# Patient Record
Sex: FEMALE | Race: WHITE | Employment: UNEMPLOYED | URBAN - NONMETROPOLITAN AREA
[De-identification: names, ages, dates, MRNs, and addresses within clinical notes are randomized per-mention and may not be internally consistent; named-entity substitution may affect disease eponyms.]

---

## 2017-03-20 DIAGNOSIS — M62.838 MUSCLE SPASM: ICD-10-CM

## 2017-03-20 RX ORDER — CYCLOBENZAPRINE HCL 10 MG
TABLET ORAL
Qty: 90 TABLET | Refills: 2 | Status: SHIPPED | OUTPATIENT
Start: 2017-03-20 | End: 2017-07-24 | Stop reason: SDUPTHER

## 2017-04-03 ENCOUNTER — TELEPHONE (OUTPATIENT)
Dept: INTERNAL MEDICINE | Age: 72
End: 2017-04-03

## 2017-04-03 ENCOUNTER — TELEPHONE (OUTPATIENT)
Dept: FAMILY MEDICINE CLINIC | Age: 72
End: 2017-04-03

## 2017-04-17 ENCOUNTER — OFFICE VISIT (OUTPATIENT)
Dept: INTERNAL MEDICINE | Age: 72
End: 2017-04-17

## 2017-04-17 VITALS
BODY MASS INDEX: 39.86 KG/M2 | DIASTOLIC BLOOD PRESSURE: 74 MMHG | HEIGHT: 64 IN | WEIGHT: 233.5 LBS | HEART RATE: 64 BPM | SYSTOLIC BLOOD PRESSURE: 122 MMHG

## 2017-04-17 DIAGNOSIS — H34.231 RETINAL ARTERIAL BRANCH OCCLUSION, RIGHT: ICD-10-CM

## 2017-04-17 DIAGNOSIS — I10 ESSENTIAL HYPERTENSION: Primary | ICD-10-CM

## 2017-04-17 DIAGNOSIS — J42 CHRONIC BRONCHITIS, UNSPECIFIED CHRONIC BRONCHITIS TYPE (HCC): ICD-10-CM

## 2017-04-17 DIAGNOSIS — R01.1 MURMUR: ICD-10-CM

## 2017-04-17 DIAGNOSIS — E78.00 PURE HYPERCHOLESTEROLEMIA: ICD-10-CM

## 2017-04-17 PROCEDURE — 93000 ELECTROCARDIOGRAM COMPLETE: CPT | Performed by: INTERNAL MEDICINE

## 2017-04-17 PROCEDURE — 99213 OFFICE O/P EST LOW 20 MIN: CPT | Performed by: INTERNAL MEDICINE

## 2017-04-17 RX ORDER — CLOPIDOGREL BISULFATE 75 MG/1
75 TABLET ORAL DAILY
Qty: 90 TABLET | Refills: 2 | Status: ON HOLD | OUTPATIENT
Start: 2017-04-17 | End: 2017-08-08

## 2017-04-17 RX ORDER — ATORVASTATIN CALCIUM 20 MG/1
20 TABLET, FILM COATED ORAL DAILY
Qty: 30 TABLET | Refills: 5 | Status: SHIPPED | OUTPATIENT
Start: 2017-04-17 | End: 2017-10-05 | Stop reason: SDUPTHER

## 2017-04-17 RX ORDER — IPRATROPIUM BROMIDE AND ALBUTEROL SULFATE 2.5; .5 MG/3ML; MG/3ML
1 SOLUTION RESPIRATORY (INHALATION) EVERY 4 HOURS
COMMUNITY

## 2017-04-17 RX ORDER — METOPROLOL TARTRATE 50 MG/1
TABLET, FILM COATED ORAL
Qty: 60 TABLET | Refills: 6 | Status: SHIPPED | OUTPATIENT
Start: 2017-04-17 | End: 2017-10-05 | Stop reason: SDUPTHER

## 2017-04-17 RX ORDER — CHLORTHALIDONE 25 MG/1
25 TABLET ORAL DAILY
Qty: 30 TABLET | Refills: 5 | Status: SHIPPED | OUTPATIENT
Start: 2017-04-17 | End: 2017-10-05 | Stop reason: SDUPTHER

## 2017-04-17 RX ORDER — VALSARTAN 160 MG/1
TABLET ORAL
Qty: 30 TABLET | Refills: 5 | Status: SHIPPED | OUTPATIENT
Start: 2017-04-17 | End: 2017-10-05 | Stop reason: SDUPTHER

## 2017-04-17 ASSESSMENT — PATIENT HEALTH QUESTIONNAIRE - PHQ9
SUM OF ALL RESPONSES TO PHQ9 QUESTIONS 1 & 2: 2
SUM OF ALL RESPONSES TO PHQ QUESTIONS 1-9: 2
1. LITTLE INTEREST OR PLEASURE IN DOING THINGS: 1
2. FEELING DOWN, DEPRESSED OR HOPELESS: 1

## 2017-05-11 ENCOUNTER — OFFICE VISIT (OUTPATIENT)
Dept: FAMILY MEDICINE CLINIC | Age: 72
End: 2017-05-11

## 2017-05-11 VITALS
DIASTOLIC BLOOD PRESSURE: 84 MMHG | RESPIRATION RATE: 12 BRPM | BODY MASS INDEX: 40.67 KG/M2 | HEIGHT: 64 IN | TEMPERATURE: 98.3 F | SYSTOLIC BLOOD PRESSURE: 152 MMHG | WEIGHT: 238.2 LBS | HEART RATE: 86 BPM

## 2017-05-11 DIAGNOSIS — G89.29 CHRONIC PAIN OF LEFT ANKLE: ICD-10-CM

## 2017-05-11 DIAGNOSIS — Z80.0 FAMILY HISTORY OF COLON CANCER: ICD-10-CM

## 2017-05-11 DIAGNOSIS — M25.572 CHRONIC PAIN OF LEFT ANKLE: ICD-10-CM

## 2017-05-11 DIAGNOSIS — I10 BENIGN ESSENTIAL HTN: ICD-10-CM

## 2017-05-11 DIAGNOSIS — M25.472 LEFT ANKLE SWELLING: Primary | ICD-10-CM

## 2017-05-11 PROCEDURE — 99214 OFFICE O/P EST MOD 30 MIN: CPT | Performed by: FAMILY MEDICINE

## 2017-05-11 RX ORDER — MELOXICAM 15 MG/1
15 TABLET ORAL DAILY
Qty: 30 TABLET | Refills: 2 | Status: SHIPPED | OUTPATIENT
Start: 2017-05-11 | End: 2017-07-14 | Stop reason: SDUPTHER

## 2017-05-11 RX ORDER — FUROSEMIDE 20 MG/1
20 TABLET ORAL DAILY
Qty: 30 TABLET | Refills: 2 | Status: SHIPPED | OUTPATIENT
Start: 2017-05-11 | End: 2017-07-14 | Stop reason: SDUPTHER

## 2017-05-11 ASSESSMENT — ENCOUNTER SYMPTOMS
COUGH: 0
RHINORRHEA: 0
WHEEZING: 0
VOMITING: 0
CHEST TIGHTNESS: 0
DIARRHEA: 0
BACK PAIN: 0
SORE THROAT: 0
EYE PAIN: 0
SHORTNESS OF BREATH: 0
NAUSEA: 0
CONSTIPATION: 0
BLOOD IN STOOL: 0
ABDOMINAL PAIN: 0

## 2017-06-26 DIAGNOSIS — I10 BENIGN ESSENTIAL HTN: ICD-10-CM

## 2017-06-26 RX ORDER — ISOSORBIDE MONONITRATE 30 MG/1
30 TABLET, EXTENDED RELEASE ORAL DAILY
Qty: 90 TABLET | Refills: 0 | Status: SHIPPED | OUTPATIENT
Start: 2017-06-26 | End: 2017-07-14 | Stop reason: SDUPTHER

## 2017-06-26 RX ORDER — ISOSORBIDE MONONITRATE 30 MG/1
TABLET, EXTENDED RELEASE ORAL
Qty: 90 TABLET | Refills: 0 | OUTPATIENT
Start: 2017-06-26

## 2017-07-14 ENCOUNTER — OFFICE VISIT (OUTPATIENT)
Dept: FAMILY MEDICINE CLINIC | Age: 72
End: 2017-07-14

## 2017-07-14 VITALS
DIASTOLIC BLOOD PRESSURE: 68 MMHG | SYSTOLIC BLOOD PRESSURE: 104 MMHG | WEIGHT: 232 LBS | BODY MASS INDEX: 41.11 KG/M2 | HEIGHT: 63 IN | RESPIRATION RATE: 14 BRPM | HEART RATE: 52 BPM

## 2017-07-14 DIAGNOSIS — I10 BENIGN ESSENTIAL HTN: ICD-10-CM

## 2017-07-14 DIAGNOSIS — Z12.39 SCREENING FOR BREAST CANCER: ICD-10-CM

## 2017-07-14 DIAGNOSIS — Z12.11 SCREEN FOR COLON CANCER: ICD-10-CM

## 2017-07-14 DIAGNOSIS — M25.472 LEFT ANKLE SWELLING: ICD-10-CM

## 2017-07-14 DIAGNOSIS — Z00.00 ANNUAL PHYSICAL EXAM: Primary | ICD-10-CM

## 2017-07-14 DIAGNOSIS — G89.29 CHRONIC PAIN OF LEFT ANKLE: ICD-10-CM

## 2017-07-14 DIAGNOSIS — J44.9 CHRONIC OBSTRUCTIVE PULMONARY DISEASE, UNSPECIFIED COPD TYPE (HCC): ICD-10-CM

## 2017-07-14 DIAGNOSIS — M25.572 CHRONIC PAIN OF LEFT ANKLE: ICD-10-CM

## 2017-07-14 PROCEDURE — 99397 PER PM REEVAL EST PAT 65+ YR: CPT | Performed by: FAMILY MEDICINE

## 2017-07-14 RX ORDER — MELOXICAM 15 MG/1
15 TABLET ORAL DAILY
Qty: 30 TABLET | Refills: 11 | Status: ON HOLD | OUTPATIENT
Start: 2017-07-14 | End: 2017-08-08 | Stop reason: HOSPADM

## 2017-07-14 RX ORDER — ISOSORBIDE MONONITRATE 30 MG/1
30 TABLET, EXTENDED RELEASE ORAL DAILY
Qty: 30 TABLET | Refills: 11 | Status: SHIPPED | OUTPATIENT
Start: 2017-07-14

## 2017-07-14 RX ORDER — ALBUTEROL SULFATE 90 UG/1
2 AEROSOL, METERED RESPIRATORY (INHALATION) EVERY 6 HOURS PRN
Qty: 1 INHALER | Refills: 11 | Status: SHIPPED | OUTPATIENT
Start: 2017-07-14

## 2017-07-14 RX ORDER — DIPHENHYDRAMINE HCL 25 MG
25 CAPSULE ORAL EVERY 6 HOURS PRN
COMMUNITY

## 2017-07-14 RX ORDER — ZINC OXIDE
OINTMENT (GRAM) TOPICAL PRN
COMMUNITY

## 2017-07-14 RX ORDER — FUROSEMIDE 20 MG/1
20 TABLET ORAL DAILY
Qty: 30 TABLET | Refills: 11 | Status: ON HOLD | OUTPATIENT
Start: 2017-07-14 | End: 2021-11-19 | Stop reason: HOSPADM

## 2017-07-14 ASSESSMENT — ENCOUNTER SYMPTOMS
WHEEZING: 0
SORE THROAT: 0
SHORTNESS OF BREATH: 0
CONSTIPATION: 0
BLOOD IN STOOL: 0
ABDOMINAL PAIN: 0
DIARRHEA: 0
NAUSEA: 0
EYE PAIN: 0
RHINORRHEA: 0
COUGH: 0
VOMITING: 0
CHEST TIGHTNESS: 0
BACK PAIN: 0

## 2017-07-24 DIAGNOSIS — M62.838 MUSCLE SPASM: ICD-10-CM

## 2017-07-24 RX ORDER — CYCLOBENZAPRINE HCL 10 MG
TABLET ORAL
Qty: 90 TABLET | Refills: 2 | Status: SHIPPED | OUTPATIENT
Start: 2017-07-24 | End: 2017-10-05 | Stop reason: SDUPTHER

## 2017-07-25 ENCOUNTER — OFFICE VISIT (OUTPATIENT)
Dept: FAMILY MEDICINE CLINIC | Age: 72
End: 2017-07-25
Payer: MEDICARE

## 2017-07-25 VITALS
RESPIRATION RATE: 14 BRPM | HEART RATE: 92 BPM | SYSTOLIC BLOOD PRESSURE: 134 MMHG | WEIGHT: 236.2 LBS | DIASTOLIC BLOOD PRESSURE: 78 MMHG | BODY MASS INDEX: 42.51 KG/M2
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
## 2017-07-25 DIAGNOSIS — S61.451A CAT BITE OF HAND, RIGHT, INITIAL ENCOUNTER: Primary | ICD-10-CM

## 2017-07-25 PROCEDURE — 99213 OFFICE O/P EST LOW 20 MIN: CPT | Performed by: FAMILY MEDICINE

## 2017-07-25 RX ORDER — AMOXICILLIN AND CLAVULANATE POTASSIUM 875; 125 MG/1; MG/1
1 TABLET, FILM COATED ORAL 2 TIMES DAILY
Qty: 20 TABLET | Refills: 0 | Status: SHIPPED | OUTPATIENT
Start: 2017-07-25 | End: 2017-08-04 | Stop reason: ALTCHOICE

## 2017-07-25 ASSESSMENT — ENCOUNTER SYMPTOMS
BLOOD IN STOOL: 0
EYE PAIN: 0
SORE THROAT: 0
COUGH: 0
VOMITING: 0
CHEST TIGHTNESS: 0
NAUSEA: 0
BACK PAIN: 0
WHEEZING: 0
CONSTIPATION: 0
RHINORRHEA: 0
ABDOMINAL PAIN: 0
DIARRHEA: 0
SHORTNESS OF BREATH: 0

## 2017-08-08 ENCOUNTER — HOSPITAL ENCOUNTER (OUTPATIENT)
Age: 72
Setting detail: OUTPATIENT SURGERY
Discharge: HOME | End: 2017-08-08
Attending: INTERNAL MEDICINE | Admitting: INTERNAL MEDICINE
Payer: MEDICARE

## 2017-08-08 ENCOUNTER — ANESTHESIA (OUTPATIENT)
Dept: ENDOSCOPY | Age: 72
End: 2017-08-08
Payer: MEDICARE

## 2017-08-08 ENCOUNTER — ANESTHESIA EVENT (OUTPATIENT)
Dept: ENDOSCOPY | Age: 72
End: 2017-08-08
Payer: MEDICARE

## 2017-08-08 VITALS
TEMPERATURE: 97.2 F | WEIGHT: 224 LBS | SYSTOLIC BLOOD PRESSURE: 129 MMHG | RESPIRATION RATE: 18 BRPM | DIASTOLIC BLOOD PRESSURE: 62 MMHG | OXYGEN SATURATION: 95 % | BODY MASS INDEX: 41.22 KG/M2 | HEART RATE: 79 BPM | HEIGHT: 62 IN

## 2017-08-08 VITALS — OXYGEN SATURATION: 99 % | DIASTOLIC BLOOD PRESSURE: 69 MMHG | SYSTOLIC BLOOD PRESSURE: 142 MMHG

## 2017-08-08 DIAGNOSIS — H34.231 RETINAL ARTERIAL BRANCH OCCLUSION, RIGHT: ICD-10-CM

## 2017-08-08 PROCEDURE — 2580000003 HC RX 258: Performed by: INTERNAL MEDICINE

## 2017-08-08 PROCEDURE — 88305 TISSUE EXAM BY PATHOLOGIST: CPT

## 2017-08-08 PROCEDURE — 3700000001 HC ADD 15 MINUTES (ANESTHESIA): Performed by: INTERNAL MEDICINE

## 2017-08-08 PROCEDURE — 6360000002 HC RX W HCPCS: Performed by: NURSE ANESTHETIST, CERTIFIED REGISTERED

## 2017-08-08 PROCEDURE — 7100000000 HC PACU RECOVERY - FIRST 15 MIN: Performed by: INTERNAL MEDICINE

## 2017-08-08 PROCEDURE — 7100000001 HC PACU RECOVERY - ADDTL 15 MIN: Performed by: INTERNAL MEDICINE

## 2017-08-08 PROCEDURE — 3609010600 HC COLONOSCOPY POLYPECTOMY SNARE/COLD BIOPSY: Performed by: INTERNAL MEDICINE

## 2017-08-08 PROCEDURE — 6360000002 HC RX W HCPCS

## 2017-08-08 PROCEDURE — 3700000000 HC ANESTHESIA ATTENDED CARE: Performed by: INTERNAL MEDICINE

## 2017-08-08 RX ORDER — CLOPIDOGREL BISULFATE 75 MG/1
75 TABLET ORAL DAILY
Qty: 1 TABLET | Refills: 0 | Status: SHIPPED | OUTPATIENT
Start: 2017-08-11 | End: 2017-10-05 | Stop reason: SDUPTHER

## 2017-08-08 RX ORDER — SODIUM CHLORIDE 450 MG/100ML
INJECTION, SOLUTION INTRAVENOUS CONTINUOUS
Status: DISCONTINUED | OUTPATIENT
Start: 2017-08-08 | End: 2017-08-08 | Stop reason: HOSPADM

## 2017-08-08 RX ORDER — PROPOFOL 10 MG/ML
INJECTION, EMULSION INTRAVENOUS PRN
Status: DISCONTINUED | OUTPATIENT
Start: 2017-08-08 | End: 2017-08-08 | Stop reason: SDUPTHER

## 2017-08-08 RX ADMIN — PROPOFOL 15 MG: 10 INJECTION, EMULSION INTRAVENOUS at 10:56

## 2017-08-08 RX ADMIN — PROPOFOL 25 MG: 10 INJECTION, EMULSION INTRAVENOUS at 11:07

## 2017-08-08 RX ADMIN — PROPOFOL 30 MG: 10 INJECTION, EMULSION INTRAVENOUS at 10:44

## 2017-08-08 RX ADMIN — PROPOFOL 30 MG: 10 INJECTION, EMULSION INTRAVENOUS at 10:51

## 2017-08-08 RX ADMIN — SODIUM CHLORIDE: 4.5 INJECTION, SOLUTION INTRAVENOUS at 11:02

## 2017-08-08 RX ADMIN — PROPOFOL 20 MG: 10 INJECTION, EMULSION INTRAVENOUS at 11:09

## 2017-08-08 RX ADMIN — PROPOFOL 30 MG: 10 INJECTION, EMULSION INTRAVENOUS at 10:41

## 2017-08-08 RX ADMIN — PROPOFOL 10 MG: 10 INJECTION, EMULSION INTRAVENOUS at 11:03

## 2017-08-08 RX ADMIN — PROPOFOL 10 MG: 10 INJECTION, EMULSION INTRAVENOUS at 10:59

## 2017-08-08 RX ADMIN — PROPOFOL 15 MG: 10 INJECTION, EMULSION INTRAVENOUS at 11:01

## 2017-08-08 RX ADMIN — PROPOFOL 20 MG: 10 INJECTION, EMULSION INTRAVENOUS at 10:48

## 2017-08-08 RX ADMIN — PROPOFOL 20 MG: 10 INJECTION, EMULSION INTRAVENOUS at 10:36

## 2017-08-08 RX ADMIN — PROPOFOL 40 MG: 10 INJECTION, EMULSION INTRAVENOUS at 10:33

## 2017-08-08 RX ADMIN — PROPOFOL 20 MG: 10 INJECTION, EMULSION INTRAVENOUS at 10:35

## 2017-08-08 RX ADMIN — PROPOFOL 15 MG: 10 INJECTION, EMULSION INTRAVENOUS at 11:05

## 2017-08-08 RX ADMIN — PROPOFOL 20 MG: 10 INJECTION, EMULSION INTRAVENOUS at 10:39

## 2017-08-08 RX ADMIN — SODIUM CHLORIDE: 4.5 INJECTION, SOLUTION INTRAVENOUS at 09:22

## 2017-08-08 ASSESSMENT — PAIN SCALES - GENERAL: PAINLEVEL_OUTOF10: 0

## 2017-08-08 ASSESSMENT — PAIN - FUNCTIONAL ASSESSMENT: PAIN_FUNCTIONAL_ASSESSMENT: 0-10

## 2017-10-05 ENCOUNTER — OFFICE VISIT (OUTPATIENT)
Dept: FAMILY MEDICINE CLINIC | Age: 72
End: 2017-10-05
Payer: MEDICARE

## 2017-10-05 VITALS
DIASTOLIC BLOOD PRESSURE: 70 MMHG | HEART RATE: 60 BPM | SYSTOLIC BLOOD PRESSURE: 130 MMHG | BODY MASS INDEX: 42.43 KG/M2 | WEIGHT: 232 LBS | RESPIRATION RATE: 20 BRPM

## 2017-10-05 DIAGNOSIS — S80.02XA CONTUSION OF LEFT KNEE, INITIAL ENCOUNTER: ICD-10-CM

## 2017-10-05 DIAGNOSIS — M62.838 MUSCLE SPASM: ICD-10-CM
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
## 2017-10-05 DIAGNOSIS — E66.01 MORBID OBESITY WITH BMI OF 40.0-44.9, ADULT (HCC): ICD-10-CM

## 2017-10-05 DIAGNOSIS — H34.231 RETINAL ARTERIAL BRANCH OCCLUSION, RIGHT: ICD-10-CM

## 2017-10-05 DIAGNOSIS — I10 BENIGN ESSENTIAL HTN: ICD-10-CM

## 2017-10-05 DIAGNOSIS — Z12.39 SCREENING FOR BREAST CANCER: ICD-10-CM

## 2017-10-05 DIAGNOSIS — E78.00 HYPERCHOLESTEROLEMIA: ICD-10-CM

## 2017-10-05 PROCEDURE — 99214 OFFICE O/P EST MOD 30 MIN: CPT | Performed by: FAMILY MEDICINE

## 2017-10-05 RX ORDER — CLOPIDOGREL BISULFATE 75 MG/1
75 TABLET ORAL DAILY
Qty: 30 TABLET | Refills: 11 | Status: SHIPPED | OUTPATIENT
Start: 2017-10-05

## 2017-10-05 RX ORDER — METOPROLOL TARTRATE 50 MG/1
TABLET, FILM COATED ORAL
Qty: 60 TABLET | Refills: 11 | Status: ON HOLD | OUTPATIENT
Start: 2017-10-05 | End: 2021-10-10 | Stop reason: SDUPTHER

## 2017-10-05 RX ORDER — ATORVASTATIN CALCIUM 20 MG/1
20 TABLET, FILM COATED ORAL DAILY
Qty: 30 TABLET | Refills: 11 | Status: ON HOLD | OUTPATIENT
Start: 2017-10-05 | End: 2021-10-08 | Stop reason: SDUPTHER

## 2017-10-05 RX ORDER — CYCLOBENZAPRINE HCL 10 MG
TABLET ORAL
Qty: 90 TABLET | Refills: 11 | Status: SHIPPED | OUTPATIENT
Start: 2017-10-05

## 2017-10-05 RX ORDER — VALSARTAN 160 MG/1
TABLET ORAL
Qty: 30 TABLET | Refills: 11 | Status: SHIPPED | OUTPATIENT
Start: 2017-10-05 | End: 2018-08-28 | Stop reason: RX

## 2017-10-05 RX ORDER — MELOXICAM 15 MG/1
15 TABLET ORAL DAILY
COMMUNITY

## 2017-10-05 RX ORDER — CHLORTHALIDONE 25 MG/1
25 TABLET ORAL DAILY
Qty: 30 TABLET | Refills: 11 | Status: ON HOLD | OUTPATIENT
Start: 2017-10-05 | End: 2021-10-08 | Stop reason: HOSPADM

## 2017-10-05 ASSESSMENT — ENCOUNTER SYMPTOMS
BLOOD IN STOOL: 0
CHEST TIGHTNESS: 0
BACK PAIN: 0
RHINORRHEA: 0
CONSTIPATION: 0
WHEEZING: 0
COUGH: 0
SORE THROAT: 0
EYE PAIN: 0
NAUSEA: 0
VOMITING: 0
SHORTNESS OF BREATH: 0
ABDOMINAL PAIN: 0
DIARRHEA: 0

## 2017-10-05 NOTE — MR AVS SNAPSHOT
· Replace butter, margarine, and hydrogenated or partially hydrogenated oils with olive and canola oils. (Canola oil margarine without trans fat is fine.)  · Replace red meat with fish, poultry, and soy protein (like tofu). · Limit processed and packaged foods like chips, crackers, and cookies. · Bake, broil, or steam foods. Don't alberto them. · Be physically active. Get at least 30 minutes of exercise on most days of the week. Walking is a good choice. You also may want to do other activities, such as running, swimming, cycling, or playing tennis or team sports. · Stay at a healthy weight or lose weight by making the changes in eating and physical activity listed above. Losing just a small amount of weight, even 5 to 10 pounds, can reduce your risk for having a heart attack or stroke. · Do not smoke. When should you call for help? Watch closely for changes in your health, and be sure to contact your doctor if:  · You need help making lifestyle changes. · You have questions about your medicine. Where can you learn more? Go to https://The Gluten Free Gourmet.Silentsoft. org and sign in to your [employer] account. Enter N333 in the Weatlas box to learn more about \"High Cholesterol: Care Instructions. \"     If you do not have an account, please click on the \"Sign Up Now\" link. Current as of: April 3, 2017  Content Version: 11.3  © 5646-0437 [employer]. Care instructions adapted under license by [facility] ([facility]). If you have questions about a medical condition or this instruction, always ask your healthcare professional. [place] [address] any warranty or liability for your use of this information. High Blood Pressure: Care Instructions  Your Care Instructions  If your blood pressure is usually above 140/90, you have high blood pressure, or hypertension. That means the top number is 140 or higher or the bottom number is 90 or higher, or both. Despite what a lot of people think, high blood pressure usually doesn't cause headaches or make you feel dizzy or lightheaded. It usually has no symptoms. But it does increase your risk for heart attack, stroke, and kidney or eye damage. The higher your blood pressure, the more your risk increases. Your doctor will give you a goal for your blood pressure. Your goal will be based on your health and your age. An example of a goal is to keep your blood pressure below 140/90. Lifestyle changes, such as eating healthy and being active, are always important to help lower blood pressure. You might also take medicine to reach your blood pressure goal.  Follow-up care is a key part of your treatment and safety. Be sure to make and go to all appointments, and call your doctor if you are having problems. It's also a good idea to know your test results and keep a list of the medicines you take. How can you care for yourself at home? Medical treatment  · If you stop taking your medicine, your blood pressure will go back up. You may take one or more types of medicine to lower your blood pressure. Be safe with medicines. Take your medicine exactly as prescribed. Call your doctor if you think you are having a problem with your medicine. · Talk to your doctor before you start taking aspirin every day. Aspirin can help certain people lower their risk of a heart attack or stroke. But taking aspirin isn't right for everyone, because it can cause serious bleeding. · See your doctor regularly. You may need to see the doctor more often at first or until your blood pressure comes down. · If you are taking blood pressure medicine, talk to your doctor before you take decongestants or anti-inflammatory medicine, such as ibuprofen. Some of these medicines can raise blood pressure. · Learn how to check your blood pressure at home. Lifestyle changes  · Stay at a healthy weight.  This is especially important if you put on weight around the waist. Losing even 10 pounds can help you lower your blood pressure. · If your doctor recommends it, get more exercise. Walking is a good choice. Bit by bit, increase the amount you walk every day. Try for at least 30 minutes on most days of the week. You also may want to swim, bike, or do other activities. · Avoid or limit alcohol. Talk to your doctor about whether you can drink any alcohol. · Try to limit how much sodium you eat to less than 2,300 milligrams (mg) a day. Your doctor may ask you to try to eat less than 1,500 mg a day. · Eat plenty of fruits (such as bananas and oranges), vegetables, legumes, whole grains, and low-fat dairy products. · Lower the amount of saturated fat in your diet. Saturated fat is found in animal products such as milk, cheese, and meat. Limiting these foods may help you lose weight and also lower your risk for heart disease. · Do not smoke. Smoking increases your risk for heart attack and stroke. If you need help quitting, talk to your doctor about stop-smoking programs and medicines. These can increase your chances of quitting for good. When should you call for help? Call 911 anytime you think you may need emergency care. This may mean having symptoms that suggest that your blood pressure is causing a serious heart or blood vessel problem. Your blood pressure may be over 180/110. For example, call 911 if:  · You have symptoms of a heart attack. These may include:  ¨ Chest pain or pressure, or a strange feeling in the chest.  ¨ Sweating. ¨ Shortness of breath. ¨ Nausea or vomiting. ¨ Pain, pressure, or a strange feeling in the back, neck, jaw, or upper belly or in one or both shoulders or arms. ¨ Lightheadedness or sudden weakness. ¨ A fast or irregular heartbeat. · You have symptoms of a stroke. These may include:  ¨ Sudden numbness, tingling, weakness, or loss of movement in your face, arm, or leg, especially on only one side of your body. account. Enter U440 in the [facility] box to learn more about \"DASH Diet: Care Instructions. \"     If you do not have an account, please click on the \"Sign Up Now\" link. Current as of: April 3, 2017  Content Version: 11.3  © 1638-4239 [employer]. Care instructions adapted under license by [facility] ([facility]). If you have questions about a medical condition or this instruction, always ask your healthcare professional. [place] [address] any warranty or liability for your use of this information. Today's Medication Changes          These changes are accurate as of: 10/5/17  9:49 AM.  If you have any questions, ask your nurse or doctor. CHANGE how you take these medications           cyclobenzaprine 10 MG tablet   Commonly known as:  FLEXERIL   Instructions:  TAKE ONE TABLET BY MOUTH THREE TIMES DAILY AS NEEDED FOR MUSCLE SPASM   Quantity:  90 tablet   Refills:  11   What changed:  See the new instructions.    Changed by:  Nia Kapadia MD            Where to Get Your Medications      [facility] [address], [address]  [address], [address]     Phone:  [phone]     atorvastatin 20 MG tablet    chlorthalidone 25 MG tablet    clopidogrel 75 MG tablet    cyclobenzaprine 10 MG tablet    metoprolol tartrate 50 MG tablet    valsartan 160 MG tablet               Your Current Medications Are              meloxicam (MOBIC) 15 MG tablet Take 15 mg by mouth daily    atorvastatin (LIPITOR) 20 MG tablet Take 1 tablet by mouth daily    metoprolol tartrate (LOPRESSOR) 50 MG tablet TAKE ONE TABLET BY MOUTH TWICE DAILY    chlorthalidone (HYGROTON) 25 MG tablet Take 1 tablet by mouth daily    valsartan (DIOVAN) 160 MG tablet Change to one tab daily PM    clopidogrel (PLAVIX) 75 MG tablet Take 1 tablet by mouth daily cyclobenzaprine (FLEXERIL) 10 MG tablet TAKE ONE TABLET BY MOUTH THREE TIMES DAILY AS NEEDED FOR MUSCLE SPASM    diphenhydrAMINE (BENADRYL) 25 MG capsule Take 25 mg by mouth every 6 hours as needed for Itching    zinc oxide (DESITIN) 40 % ointment Apply topically as needed for Dry Skin Apply topically as needed.     furosemide (LASIX) 20 MG tablet Take 1 tablet by mouth daily    isosorbide mononitrate (IMDUR) 30 MG extended release tablet Take 1 tablet by mouth daily    albuterol sulfate  (90 Base) MCG/ACT inhaler Inhale 2 puffs into the lungs every 6 hours as needed for Wheezing    Calcium-Magnesium-Zinc (FLAKITO-MAG-ZINC PO) Take by mouth daily    Calcium Carb-Cholecalciferol (CALCIUM + D3 PO) Take by mouth daily    CINNAMON PO Take by mouth daily    TURMERIC CURCUMIN PO Take by mouth daily    ipratropium-albuterol (DUONEB) 0.5-2.5 (3) MG/3ML SOLN nebulizer solution Inhale 1 vial into the lungs every 4 hours    beclomethasone (QVAR) 80 MCG/ACT inhaler Inhale 2 puffs into the lungs 2 times daily    Acetaminophen 650 MG TABS Take 650 mg by mouth every 4 hours as needed for Pain or Fever    cetirizine (ZYRTEC) 10 MG tablet Take 1 tablet by mouth daily as needed (allergies)    clonazePAM (KLONOPIN) 1 MG tablet Take 1 mg by mouth nightly     citalopram (CELEXA) 40 MG tablet Take 40 mg by mouth daily as needed    LYSINE PO Take by mouth daily    vitamin D (CHOLECALCIFEROL) 1000 UNIT TABS tablet Take 1,000 Units by mouth daily    Pyridoxine HCl (VITAMIN B-6) 50 MG tablet Take 100 mg by mouth daily    Cyanocobalamin (VITAMIN B 12 PO) Take by mouth daily    Magnesium 500 MG TABS Take by mouth    Ascorbic Acid (VITAMIN C) 500 MG tablet Take 500 mg by mouth daily      Allergies              Latex Hives    Hives from rubber gloves    Betadine [Povidone Iodine] Hives    Bee Venom Swelling    Dilaudid [Hydromorphone Hcl] Other (See Comments)    Severe confusion    Adhesive Tape Rash    Sulfa Antibiotics Rash Additional Information        Basic Information     Date Of Birth Sex Race Ethnicity Preferred Language Preferred Written Language    1945 Female White Non-/Non  English English      Problem List as of 10/5/2017  Date Reviewed: 10/5/2017                Open bimalleolar fracture of left ankle    Benign essential HTN    Morbid obesity with BMI of 40.0-44.9, adult ([facility] [address]    Hypertension    Asthma      Immunizations as of 10/5/2017     Name Date    Influenza Vaccine, unspecified formulation 1/16/2017    Influenza Whole 12/15/2015    Pneumococcal 13-valent Conjugate ([id number]) 10/17/2016    Pneumococcal Polysaccharide ([id number]) 12/15/2015    Td 3/8/2016      Preventive Care        Date Due    Hepatitis C screening is recommended for all adults regardless of risk factors born between [facility] at least once (lifetime) who have never been tested. 1945    Zoster Vaccine 3/31/2005    Osteoporosis screening or a bone density scan (Dexa) is recommended once at age 72. Based upon the results and risk factors for bone loss, your provider will recommend whether this needs to be repeated. 3/31/2010    Tetanus Combination Vaccine (1 - Tdap) 3/9/2016    Mammograms are recommended every 2 years for low/average risk patients aged 48 - 69, and every year for high risk patients per updated national guidelines. However these guidelines can be individualized by your provider. 4/1/2017    Yearly Flu Vaccine (1) 9/1/2017    Cholesterol Screening 4/10/2022    Colonoscopy 8/8/2027            MyChart Signup           Our records indicate that you have declined MyChart signup.

## 2017-10-05 NOTE — PATIENT INSTRUCTIONS
High Cholesterol: Care Instructions  Your Care Instructions  Cholesterol is a type of fat in your blood. It is needed for many body functions, such as making new cells. Cholesterol is made by your body. It also comes from food you eat. High cholesterol means that you have too much of the fat in your blood. This raises your risk of a heart attack and stroke. LDL and HDL are part of your total cholesterol. LDL is the \"bad\" cholesterol. High LDL can raise your risk for heart disease, heart attack, and stroke. HDL is the \"good\" cholesterol. It helps clear bad cholesterol from the body. High HDL is linked with a lower risk of heart disease, heart attack, and stroke. Your cholesterol levels help your doctor find out your risk for having a heart attack or stroke. You and your doctor can talk about whether you need to lower your risk and what treatment is best for you. A heart-healthy lifestyle along with medicines can help lower your cholesterol and your risk. The way you choose to lower your risk will depend on how high your risk is for heart attack and stroke. It will also depend on how you feel about taking medicines. Follow-up care is a key part of your treatment and safety. Be sure to make and go to all appointments, and call your doctor if you are having problems. It's also a good idea to know your test results and keep a list of the medicines you take. How can you care for yourself at home? · Eat a variety of foods every day. Good choices include fruits, vegetables, whole grains (like oatmeal), dried beans and peas, nuts and seeds, soy products (like tofu), and fat-free or low-fat dairy products. · Replace butter, margarine, and hydrogenated or partially hydrogenated oils with olive and canola oils. (Canola oil margarine without trans fat is fine.)  · Replace red meat with fish, poultry, and soy protein (like tofu). · Limit processed and packaged foods like chips, crackers, and cookies.   · Bake, broil, or steam foods. Don't alberto them. · Be physically active. Get at least 30 minutes of exercise on most days of the week. Walking is a good choice. You also may want to do other activities, such as running, swimming, cycling, or playing tennis or team sports. · Stay at a healthy weight or lose weight by making the changes in eating and physical activity listed above. Losing just a small amount of weight, even 5 to 10 pounds, can reduce your risk for having a heart attack or stroke. · Do not smoke. When should you call for help? Watch closely for changes in your health, and be sure to contact your doctor if:  · You need help making lifestyle changes. · You have questions about your medicine. Where can you learn more? Go to https://IntervalZero.Goodfilms. org and sign in to your [employer] account. Enter R049 in the Simpler box to learn more about \"High Cholesterol: Care Instructions. \"     If you do not have an account, please click on the \"Sign Up Now\" link. Current as of: April 3, 2017  Content Version: 11.3  © 2581-6989 [employer]. Care instructions adapted under license by [facility] ([facility]). If you have questions about a medical condition or this instruction, always ask your healthcare professional. [address] any warranty or liability for your use of this information. High Blood Pressure: Care Instructions  Your Care Instructions  If your blood pressure is usually above 140/90, you have high blood pressure, or hypertension. That means the top number is 140 or higher or the bottom number is 90 or higher, or both. Despite what a lot of people think, high blood pressure usually doesn't cause headaches or make you feel dizzy or lightheaded. It usually has no symptoms. But it does increase your risk for heart attack, stroke, and kidney or eye damage. The higher your blood pressure, the more your risk increases.   Your doctor will give you a goal for your to your doctor about whether you can drink any alcohol. · Try to limit how much sodium you eat to less than 2,300 milligrams (mg) a day. Your doctor may ask you to try to eat less than 1,500 mg a day. · Eat plenty of fruits (such as bananas and oranges), vegetables, legumes, whole grains, and low-fat dairy products. · Lower the amount of saturated fat in your diet. Saturated fat is found in animal products such as milk, cheese, and meat. Limiting these foods may help you lose weight and also lower your risk for heart disease. · Do not smoke. Smoking increases your risk for heart attack and stroke. If you need help quitting, talk to your doctor about stop-smoking programs and medicines. These can increase your chances of quitting for good. When should you call for help? Call 911 anytime you think you may need emergency care. This may mean having symptoms that suggest that your blood pressure is causing a serious heart or blood vessel problem. Your blood pressure may be over 180/110. For example, call 911 if:  · You have symptoms of a heart attack. These may include:  ¨ Chest pain or pressure, or a strange feeling in the chest.  ¨ Sweating. ¨ Shortness of breath. ¨ Nausea or vomiting. ¨ Pain, pressure, or a strange feeling in the back, neck, jaw, or upper belly or in one or both shoulders or arms. ¨ Lightheadedness or sudden weakness. ¨ A fast or irregular heartbeat. · You have symptoms of a stroke. These may include:  ¨ Sudden numbness, tingling, weakness, or loss of movement in your face, arm, or leg, especially on only one side of your body. ¨ Sudden vision changes. ¨ Sudden trouble speaking. ¨ Sudden confusion or trouble understanding simple statements. ¨ Sudden problems with walking or balance. ¨ A sudden, severe headache that is different from past headaches. · You have severe back or belly pain. Do not wait until your blood pressure comes down on its own. Get help right away.   Call your includes whole grains, fish, and poultry. The DASH diet is one of several lifestyle changes your doctor may recommend to lower your high blood pressure. Your doctor may also want you to decrease the amount of sodium in your diet. Lowering sodium while following the DASH diet can lower blood pressure even further than just the DASH diet alone. Follow-up care is a key part of your treatment and safety. Be sure to make and go to all appointments, and call your doctor if you are having problems. It's also a good idea to know your test results and keep a list of the medicines you take. How can you care for yourself at home? Following the DASH diet  · Eat 4 to 5 servings of fruit each day. A serving is 1 medium-sized piece of fruit, ½ cup chopped or canned fruit, 1/4 cup dried fruit, or 4 ounces (½ cup) of fruit juice. Choose fruit more often than fruit juice. · Eat 4 to 5 servings of vegetables each day. A serving is 1 cup of lettuce or raw leafy vegetables, ½ cup of chopped or cooked vegetables, or 4 ounces (½ cup) of vegetable juice. Choose vegetables more often than vegetable juice. · Get 2 to 3 servings of low-fat and fat-free dairy each day. A serving is 8 ounces of milk, 1 cup of yogurt, or 1 ½ ounces of cheese. · Eat 6 to 8 servings of grains each day. A serving is 1 slice of bread, 1 ounce of dry cereal, or ½ cup of cooked rice, pasta, or cooked cereal. Try to choose whole-grain products as much as possible. · Limit lean meat, poultry, and fish to 2 servings each day. A serving is 3 ounces, about the size of a deck of cards. · Eat 4 to 5 servings of nuts, seeds, and legumes (cooked dried beans, lentils, and split peas) each week. A serving is 1/3 cup of nuts, 2 tablespoons of seeds, or ½ cup of cooked beans or peas. · Limit fats and oils to 2 to 3 servings each day. A serving is 1 teaspoon of vegetable oil or 2 tablespoons of salad dressing. · Limit sweets and added sugars to 5 servings or less a week. A serving is 1 tablespoon jelly or jam, ½ cup sorbet, or 1 cup of lemonade. · Eat less than 2,300 milligrams (mg) of sodium a day. If you limit your sodium to 1,500 mg a day, you can lower your blood pressure even more. Tips for success  · Start small. Do not try to make dramatic changes to your diet all at once. You might feel that you are missing out on your favorite foods and then be more likely to not follow the plan. Make small changes, and stick with them. Once those changes become habit, add a few more changes. · Try some of the following:  ¨ Make it a goal to eat a fruit or vegetable at every meal and at snacks. This will make it easy to get the recommended amount of fruits and vegetables each day. ¨ Try yogurt topped with fruit and nuts for a snack or healthy dessert. ¨ Add lettuce, tomato, cucumber, and onion to sandwiches. ¨ Combine a ready-made pizza crust with low-fat mozzarella cheese and lots of vegetable toppings. Try using tomatoes, squash, spinach, broccoli, carrots, cauliflower, and onions. ¨ Have a variety of cut-up vegetables with a low-fat dip as an appetizer instead of chips and dip. ¨ Sprinkle sunflower seeds or chopped almonds over salads. Or try adding chopped walnuts or almonds to cooked vegetables. ¨ Try some vegetarian meals using beans and peas. Add garbanzo or kidney beans to salads. Make burritos and tacos with mashed lynn beans or black beans. Where can you learn more? Go to https://Finestrellashawna.healthTouchPo Android POS. org and sign in to your [employer] account. Enter T166 in the [facility] box to learn more about \"DASH Diet: Care Instructions. \"     If you do not have an account, please click on the \"Sign Up Now\" link. Current as of: April 3, 2017  Content Version: 11.3  © 4778-1640 [employer]. Care instructions adapted under license by [facility] ([facility]).  If you have questions about a medical condition or this instruction, always ask your healthcare professional. [address] any warranty or liability for your use of this information.

## 2017-10-05 NOTE — PROGRESS NOTES
Subjective:      Patient ID: Radha Estrella is a 67 y.o. female. HPI  Pt here for a check up. REviewed BMI of 42. Encouraged diet, exercise and weight loss. Reviewed health maintenance, needs mammogram, had hi dose flu at [address] this year. Needs med refills. Fall yesterday in her house, hit left knee. Wasn't using her cane. , former smoker, pmh reviewed. Review of Systems   Constitutional: Negative for chills, fatigue, fever and unexpected weight change. HENT: Negative for congestion, ear pain, rhinorrhea and sore throat. Eyes: Negative for pain and visual disturbance. Respiratory: Negative for cough, chest tightness, shortness of breath and wheezing. Cardiovascular: Negative for chest pain and palpitations. Gastrointestinal: Negative for abdominal pain, blood in stool, constipation, diarrhea, nausea and vomiting. Genitourinary: Negative for difficulty urinating, frequency, hematuria and urgency. Musculoskeletal: Negative for back pain, joint swelling, myalgias and neck pain. Left knee pain   Skin: Negative for rash. Neurological: Negative for dizziness and headaches. Hematological: Negative for adenopathy. Does not bruise/bleed easily. Psychiatric/Behavioral: Negative for behavioral problems and sleep disturbance. The patient is not nervous/anxious. Objective:   Physical Exam   Constitutional: She is oriented to person, place, and time. She appears well-developed and well-nourished. HENT:   Head: Normocephalic and atraumatic. Right Ear: External ear normal.   Left Ear: External ear normal.   Nose: Nose normal.   Mouth/Throat: Oropharynx is clear and moist.   Eyes: EOM are normal. Pupils are equal, round, and reactive to light. Neck: Neck supple. Carotid bruit is not present. No thyromegaly present. Cardiovascular: Normal rate, regular rhythm and normal heart sounds. Pulmonary/Chest: Breath sounds normal. She has no wheezes. She has no rales.

## 2018-01-31 ENCOUNTER — TELEPHONE (OUTPATIENT)
Dept: FAMILY MEDICINE CLINIC | Age: 73
End: 2018-01-31

## 2018-01-31 NOTE — TELEPHONE ENCOUNTER
Received fax from [facility] requesting Rx for lidocaine ointment. This medication is not on Pt medication list.    Spoke to Pt she is not requesting this medication, please disregard.

## 2018-02-27 ENCOUNTER — OFFICE VISIT (OUTPATIENT)
Dept: FAMILY MEDICINE CLINIC | Age: 73
End: 2018-02-27
Payer: MEDICARE

## 2018-02-27 VITALS
BODY MASS INDEX: 38.7 KG/M2 | SYSTOLIC BLOOD PRESSURE: 120 MMHG | OXYGEN SATURATION: 95 % | HEIGHT: 63 IN | RESPIRATION RATE: 28 BRPM | DIASTOLIC BLOOD PRESSURE: 64 MMHG | TEMPERATURE: 98.2 F | WEIGHT: 218.4 LBS | HEART RATE: 95 BPM

## 2018-02-27 DIAGNOSIS — J20.9 ACUTE BRONCHITIS, UNSPECIFIED ORGANISM: Primary | ICD-10-CM

## 2018-02-27 DIAGNOSIS — J44.9 CHRONIC OBSTRUCTIVE PULMONARY DISEASE, UNSPECIFIED COPD TYPE (HCC): ICD-10-CM

## 2018-02-27 PROCEDURE — 99213 OFFICE O/P EST LOW 20 MIN: CPT | Performed by: FAMILY MEDICINE

## 2018-02-27 RX ORDER — AMOXICILLIN AND CLAVULANATE POTASSIUM 875; 125 MG/1; MG/1
1 TABLET, FILM COATED ORAL 2 TIMES DAILY
Qty: 20 TABLET | Refills: 0 | Status: SHIPPED | OUTPATIENT
Start: 2018-02-27 | End: 2018-03-09

## 2018-02-27 RX ORDER — PREDNISONE 20 MG/1
TABLET ORAL
Qty: 15 TABLET | Refills: 0 | Status: ON HOLD | OUTPATIENT
Start: 2018-02-27 | End: 2021-10-08 | Stop reason: HOSPADM

## 2018-02-27 ASSESSMENT — ENCOUNTER SYMPTOMS
HEARTBURN: 0
WHEEZING: 0
SORE THROAT: 0
SHORTNESS OF BREATH: 1
COUGH: 1
RHINORRHEA: 1
HEMOPTYSIS: 0

## 2018-02-27 NOTE — PROGRESS NOTES
motion. She exhibits no edema. Lymphadenopathy:     She has cervical adenopathy. Right cervical: Superficial cervical adenopathy present. Left cervical: Superficial cervical adenopathy present. Neurological: She is alert and oriented to person, place, and time. She has normal reflexes. No cranial nerve deficit. Skin: Skin is warm and dry. No rash noted. Psychiatric: She has a normal mood and affect. Nursing note and vitals reviewed.       Assessment:      Acute bronchitis  COPD      Plan:      Augmentin 875 mg BID x 10 days  Prednisone 20 mg BID/QD

## 2018-08-28 DIAGNOSIS — I10 BENIGN ESSENTIAL HTN: Primary | ICD-10-CM

## 2018-08-28 RX ORDER — LOSARTAN POTASSIUM 100 MG/1
100 TABLET ORAL DAILY
Qty: 30 TABLET | Refills: 2 | Status: SHIPPED | OUTPATIENT
Start: 2018-08-28

## 2018-09-29 ENCOUNTER — CARE COORDINATION (OUTPATIENT)
Dept: CASE MANAGEMENT | Age: 73
End: 2018-09-29

## 2018-10-25 DIAGNOSIS — J44.9 CHRONIC OBSTRUCTIVE PULMONARY DISEASE, UNSPECIFIED COPD TYPE (HCC): ICD-10-CM

## 2018-10-25 DIAGNOSIS — M62.838 MUSCLE SPASM: ICD-10-CM

## 2018-10-29 RX ORDER — CYCLOBENZAPRINE HCL 10 MG
TABLET ORAL
Qty: 90 TABLET | Refills: 11 | OUTPATIENT
Start: 2018-10-29

## 2018-12-03 ENCOUNTER — CARE COORDINATION (OUTPATIENT)
Dept: CARE COORDINATION | Age: 73
End: 2018-12-03

## 2018-12-03 NOTE — CARE COORDINATION
Left message on voice mail with explanation of the benefits of enrolling in [facility] which was approved for the patient by their PCP. Please call the office (phone number given) for more information and also to let us know if you would like to enroll in (or decline) the Tele-Care Program.       Aracelsi Person.  [address] [place] [address], [address] [place] [address] Nurse /

## 2018-12-21 DIAGNOSIS — I10 BENIGN ESSENTIAL HTN: ICD-10-CM

## 2018-12-21 RX ORDER — ISOSORBIDE MONONITRATE 30 MG/1
TABLET, EXTENDED RELEASE ORAL
Qty: 30 TABLET | Refills: 11 | OUTPATIENT
Start: 2018-12-21

## 2018-12-28 DIAGNOSIS — I10 BENIGN ESSENTIAL HTN: ICD-10-CM

## 2018-12-28 RX ORDER — ISOSORBIDE MONONITRATE 30 MG/1
TABLET, EXTENDED RELEASE ORAL
Qty: 30 TABLET | Refills: 11 | OUTPATIENT
Start: 2018-12-28

## 2020-11-03 PROBLEM — I10 HYPERTENSION: Status: RESOLVED | Noted: 2020-11-03 | Resolved: 2020-11-03

## 2021-10-02 ENCOUNTER — APPOINTMENT (OUTPATIENT)
Dept: GENERAL RADIOLOGY | Age: 76
DRG: 871 | End: 2021-10-02
Payer: MEDICARE

## 2021-10-02 ENCOUNTER — HOSPITAL ENCOUNTER (INPATIENT)
Age: 76
LOS: 8 days | Discharge: NURSING, REHAB OR LONG-TERM CARE | DRG: 871 | End: 2021-10-10
Attending: EMERGENCY MEDICINE | Admitting: INTERNAL MEDICINE
Payer: MEDICARE

## 2021-10-02 DIAGNOSIS — J18.9 PNEUMONIA DUE TO INFECTIOUS ORGANISM, UNSPECIFIED LATERALITY, UNSPECIFIED PART OF LUNG: ICD-10-CM

## 2021-10-02 DIAGNOSIS — I48.91 ATRIAL FIBRILLATION WITH RAPID VENTRICULAR RESPONSE (HCC): Primary | ICD-10-CM

## 2021-10-02 DIAGNOSIS — J44.1 CHRONIC OBSTRUCTIVE PULMONARY DISEASE WITH ACUTE EXACERBATION (HCC): ICD-10-CM

## 2021-10-02 DIAGNOSIS — E78.00 HYPERCHOLESTEROLEMIA: ICD-10-CM

## 2021-10-02 DIAGNOSIS — I10 BENIGN ESSENTIAL HTN: ICD-10-CM

## 2021-10-02 LAB
-: ABNORMAL
ABSOLUTE EOS #: 0 K/UL (ref 0–0.4)
ABSOLUTE IMMATURE GRANULOCYTE: 0 K/UL (ref 0–0.3)
ABSOLUTE LYMPH #: 0.57 K/UL (ref 1–4.8)
ABSOLUTE MONO #: 2.85 K/UL (ref 0.1–0.8)
ALLEN TEST: ABNORMAL
ALLEN TEST: NORMAL
ALLEN TEST: POSITIVE
AMORPHOUS: ABNORMAL
AMYLASE: 7 U/L (ref 28–100)
ANION GAP SERPL CALCULATED.3IONS-SCNC: 19 MMOL/L (ref 9–17)
BACTERIA: ABNORMAL
BASOPHILS # BLD: 0 % (ref 0–2)
BASOPHILS ABSOLUTE: 0 K/UL (ref 0–0.2)
BILIRUBIN URINE: NEGATIVE
BNP INTERPRETATION: ABNORMAL
BUN BLDV-MCNC: 47 MG/DL (ref 8–23)
BUN/CREAT BLD: ABNORMAL (ref 9–20)
CALCIUM SERPL-MCNC: 9.2 MG/DL (ref 8.6–10.4)
CARBOXYHEMOGLOBIN: 1.5 % (ref 0–5)
CARBOXYHEMOGLOBIN: 2 % (ref 0–5)
CASTS UA: ABNORMAL /LPF (ref 0–8)
CHLORIDE BLD-SCNC: 90 MMOL/L (ref 98–107)
CO2: 21 MMOL/L (ref 20–31)
COLOR: YELLOW
CREAT SERPL-MCNC: 1.23 MG/DL (ref 0.5–0.9)
CRYSTALS, UA: ABNORMAL /HPF
DIFFERENTIAL TYPE: ABNORMAL
EOSINOPHILS RELATIVE PERCENT: 0 % (ref 1–4)
EPITHELIAL CELLS UA: ABNORMAL /HPF (ref 0–5)
FIO2: 40
FIO2: ABNORMAL
FIO2: NORMAL
GFR AFRICAN AMERICAN: 51 ML/MIN
GFR NON-AFRICAN AMERICAN: 42 ML/MIN
GFR SERPL CREATININE-BSD FRML MDRD: ABNORMAL ML/MIN/{1.73_M2}
GFR SERPL CREATININE-BSD FRML MDRD: ABNORMAL ML/MIN/{1.73_M2}
GLUCOSE BLD-MCNC: 159 MG/DL (ref 70–99)
GLUCOSE BLD-MCNC: 226 MG/DL (ref 74–100)
GLUCOSE URINE: NEGATIVE
HCO3 VENOUS: 23.9 MMOL/L (ref 24–30)
HCO3 VENOUS: 24.2 MMOL/L (ref 24–30)
HCT VFR BLD CALC: 42.8 % (ref 36.3–47.1)
HEMOGLOBIN: 14.2 G/DL (ref 11.9–15.1)
IMMATURE GRANULOCYTES: 0 %
INR BLD: 1.1
KETONES, URINE: NEGATIVE
LACTIC ACID, SEPSIS WHOLE BLOOD: 1.6 MMOL/L (ref 0.5–1.9)
LACTIC ACID, SEPSIS: NORMAL MMOL/L (ref 0.5–1.9)
LEUKOCYTE ESTERASE, URINE: ABNORMAL
LIPASE: 6 U/L (ref 13–60)
LYMPHOCYTES # BLD: 2 % (ref 24–44)
MAGNESIUM: 2.4 MG/DL (ref 1.6–2.6)
MCH RBC QN AUTO: 30.7 PG (ref 25.2–33.5)
MCHC RBC AUTO-ENTMCNC: 33.2 G/DL (ref 28.4–34.8)
MCV RBC AUTO: 92.4 FL (ref 82.6–102.9)
METHEMOGLOBIN: ABNORMAL % (ref 0–1.5)
METHEMOGLOBIN: NORMAL % (ref 0–1.5)
MODE: ABNORMAL
MODE: NORMAL
MODE: NORMAL
MONOCYTES # BLD: 10 % (ref 1–7)
MORPHOLOGY: NORMAL
MUCUS: ABNORMAL
NEGATIVE BASE EXCESS, ART: NORMAL (ref 0–2)
NEGATIVE BASE EXCESS, VEN: 1 MMOL/L (ref 0–2)
NEGATIVE BASE EXCESS, VEN: 2 MMOL/L (ref 0–2)
NITRITE, URINE: NEGATIVE
NOTIFICATION TIME: ABNORMAL
NOTIFICATION TIME: NORMAL
NOTIFICATION: ABNORMAL
NOTIFICATION: NORMAL
NRBC AUTOMATED: 0 PER 100 WBC
O2 DEVICE/FLOW/%: ABNORMAL
O2 DEVICE/FLOW/%: NORMAL
O2 DEVICE/FLOW/%: NORMAL
O2 SAT, VEN: 81.3 % (ref 60–85)
O2 SAT, VEN: 90.5 % (ref 60–85)
OTHER OBSERVATIONS UA: ABNORMAL
OXYHEMOGLOBIN: ABNORMAL % (ref 95–98)
OXYHEMOGLOBIN: NORMAL % (ref 95–98)
PARTIAL THROMBOPLASTIN TIME: 22.6 SEC (ref 20.5–30.5)
PATIENT TEMP: 37
PATIENT TEMP: 37
PATIENT TEMP: NORMAL
PCO2, VEN, TEMP ADJ: ABNORMAL MMHG (ref 39–55)
PCO2, VEN, TEMP ADJ: NORMAL MMHG (ref 39–55)
PCO2, VEN: 44.2 (ref 39–55)
PCO2, VEN: 47.6 (ref 39–55)
PDW BLD-RTO: 12.8 % (ref 11.8–14.4)
PEEP/CPAP: ABNORMAL
PEEP/CPAP: NORMAL
PH UA: 5 (ref 5–8)
PH VENOUS: 7.32 (ref 7.32–7.42)
PH VENOUS: 7.36 (ref 7.32–7.42)
PH, VEN, TEMP ADJ: ABNORMAL (ref 7.32–7.42)
PH, VEN, TEMP ADJ: NORMAL (ref 7.32–7.42)
PLATELET # BLD: ABNORMAL K/UL (ref 138–453)
PLATELET ESTIMATE: ABNORMAL
PLATELET, FLUORESCENCE: NORMAL K/UL (ref 138–453)
PLATELET, IMMATURE FRACTION: NORMAL % (ref 1.1–10.3)
PMV BLD AUTO: ABNORMAL FL (ref 8.1–13.5)
PO2, VEN, TEMP ADJ: ABNORMAL MMHG (ref 30–50)
PO2, VEN, TEMP ADJ: NORMAL MMHG (ref 30–50)
PO2, VEN: 50 (ref 30–50)
PO2, VEN: 61.1 (ref 30–50)
POC HCO3: 25.8 MMOL/L (ref 21–28)
POC O2 SATURATION: 98 % (ref 94–98)
POC PCO2 TEMP: NORMAL MM HG
POC PCO2: 44.7 MM HG (ref 35–48)
POC PH TEMP: NORMAL
POC PH: 7.37 (ref 7.35–7.45)
POC PO2 TEMP: NORMAL MM HG
POC PO2: 100.8 MM HG (ref 83–108)
POSITIVE BASE EXCESS, ART: 0 (ref 0–3)
POSITIVE BASE EXCESS, VEN: ABNORMAL MMOL/L (ref 0–2)
POSITIVE BASE EXCESS, VEN: NORMAL MMOL/L (ref 0–2)
POTASSIUM SERPL-SCNC: 3.5 MMOL/L (ref 3.7–5.3)
PRO-BNP: 3251 PG/ML
PROTEIN UA: NEGATIVE
PROTHROMBIN TIME: 11.3 SEC (ref 9.1–12.3)
PSV: ABNORMAL
PSV: NORMAL
PT. POSITION: ABNORMAL
PT. POSITION: NORMAL
RBC # BLD: 4.63 M/UL (ref 3.95–5.11)
RBC # BLD: ABNORMAL 10*6/UL
RBC UA: ABNORMAL /HPF (ref 0–4)
RENAL EPITHELIAL, UA: ABNORMAL /HPF
RESPIRATORY RATE: ABNORMAL
RESPIRATORY RATE: NORMAL
SAMPLE SITE: ABNORMAL
SAMPLE SITE: NORMAL
SAMPLE SITE: NORMAL
SARS-COV-2, RAPID: NOT DETECTED
SEG NEUTROPHILS: 88 % (ref 36–66)
SEGMENTED NEUTROPHILS ABSOLUTE COUNT: 25.08 K/UL (ref 1.8–7.7)
SET RATE: ABNORMAL
SET RATE: NORMAL
SODIUM BLD-SCNC: 130 MMOL/L (ref 135–144)
SPECIFIC GRAVITY UA: 1.01 (ref 1–1.03)
SPECIMEN DESCRIPTION: NORMAL
TCO2 (CALC), ART: NORMAL MMOL/L (ref 22–29)
TEXT FOR RESPIRATORY: ABNORMAL
TEXT FOR RESPIRATORY: NORMAL
TOTAL HB: ABNORMAL G/DL (ref 12–16)
TOTAL HB: NORMAL G/DL (ref 12–16)
TOTAL RATE: ABNORMAL
TOTAL RATE: NORMAL
TRICHOMONAS: ABNORMAL
TROPONIN INTERP: ABNORMAL
TROPONIN INTERP: ABNORMAL
TROPONIN T: ABNORMAL NG/ML
TROPONIN T: ABNORMAL NG/ML
TROPONIN, HIGH SENSITIVITY: 92 NG/L (ref 0–14)
TROPONIN, HIGH SENSITIVITY: 96 NG/L (ref 0–14)
TURBIDITY: CLEAR
URINE HGB: ABNORMAL
UROBILINOGEN, URINE: NORMAL
VT: ABNORMAL
VT: NORMAL
WBC # BLD: 28.5 K/UL (ref 3.5–11.3)
WBC # BLD: ABNORMAL 10*3/UL
WBC UA: ABNORMAL /HPF (ref 0–5)
YEAST: ABNORMAL

## 2021-10-02 PROCEDURE — 6370000000 HC RX 637 (ALT 250 FOR IP): Performed by: STUDENT IN AN ORGANIZED HEALTH CARE EDUCATION/TRAINING PROGRAM

## 2021-10-02 PROCEDURE — 87150 DNA/RNA AMPLIFIED PROBE: CPT

## 2021-10-02 PROCEDURE — 84484 ASSAY OF TROPONIN QUANT: CPT

## 2021-10-02 PROCEDURE — 83735 ASSAY OF MAGNESIUM: CPT

## 2021-10-02 PROCEDURE — 87205 SMEAR GRAM STAIN: CPT

## 2021-10-02 PROCEDURE — 81001 URINALYSIS AUTO W/SCOPE: CPT

## 2021-10-02 PROCEDURE — 80048 BASIC METABOLIC PNL TOTAL CA: CPT

## 2021-10-02 PROCEDURE — 2000000000 HC ICU R&B

## 2021-10-02 PROCEDURE — 93005 ELECTROCARDIOGRAM TRACING: CPT

## 2021-10-02 PROCEDURE — 82947 ASSAY GLUCOSE BLOOD QUANT: CPT

## 2021-10-02 PROCEDURE — 85055 RETICULATED PLATELET ASSAY: CPT

## 2021-10-02 PROCEDURE — 87635 SARS-COV-2 COVID-19 AMP PRB: CPT

## 2021-10-02 PROCEDURE — 71045 X-RAY EXAM CHEST 1 VIEW: CPT

## 2021-10-02 PROCEDURE — 85025 COMPLETE CBC W/AUTO DIFF WBC: CPT

## 2021-10-02 PROCEDURE — 96375 TX/PRO/DX INJ NEW DRUG ADDON: CPT

## 2021-10-02 PROCEDURE — 5A09357 ASSISTANCE WITH RESPIRATORY VENTILATION, LESS THAN 24 CONSECUTIVE HOURS, CONTINUOUS POSITIVE AIRWAY PRESSURE: ICD-10-PCS | Performed by: INTERNAL MEDICINE

## 2021-10-02 PROCEDURE — 36415 COLL VENOUS BLD VENIPUNCTURE: CPT

## 2021-10-02 PROCEDURE — 94660 CPAP INITIATION&MGMT: CPT

## 2021-10-02 PROCEDURE — 82805 BLOOD GASES W/O2 SATURATION: CPT

## 2021-10-02 PROCEDURE — 2500000003 HC RX 250 WO HCPCS: Performed by: STUDENT IN AN ORGANIZED HEALTH CARE EDUCATION/TRAINING PROGRAM

## 2021-10-02 PROCEDURE — 87040 BLOOD CULTURE FOR BACTERIA: CPT

## 2021-10-02 PROCEDURE — 85610 PROTHROMBIN TIME: CPT

## 2021-10-02 PROCEDURE — 82803 BLOOD GASES ANY COMBINATION: CPT

## 2021-10-02 PROCEDURE — 36600 WITHDRAWAL OF ARTERIAL BLOOD: CPT

## 2021-10-02 PROCEDURE — 2580000003 HC RX 258: Performed by: STUDENT IN AN ORGANIZED HEALTH CARE EDUCATION/TRAINING PROGRAM

## 2021-10-02 PROCEDURE — 83880 ASSAY OF NATRIURETIC PEPTIDE: CPT

## 2021-10-02 PROCEDURE — 83690 ASSAY OF LIPASE: CPT

## 2021-10-02 PROCEDURE — 6360000002 HC RX W HCPCS: Performed by: STUDENT IN AN ORGANIZED HEALTH CARE EDUCATION/TRAINING PROGRAM

## 2021-10-02 PROCEDURE — 99223 1ST HOSP IP/OBS HIGH 75: CPT | Performed by: NURSE PRACTITIONER

## 2021-10-02 PROCEDURE — 99285 EMERGENCY DEPT VISIT HI MDM: CPT

## 2021-10-02 PROCEDURE — 96374 THER/PROPH/DIAG INJ IV PUSH: CPT

## 2021-10-02 PROCEDURE — 85730 THROMBOPLASTIN TIME PARTIAL: CPT

## 2021-10-02 PROCEDURE — 2500000003 HC RX 250 WO HCPCS

## 2021-10-02 PROCEDURE — 87086 URINE CULTURE/COLONY COUNT: CPT

## 2021-10-02 PROCEDURE — 82150 ASSAY OF AMYLASE: CPT

## 2021-10-02 PROCEDURE — 83605 ASSAY OF LACTIC ACID: CPT

## 2021-10-02 PROCEDURE — 93005 ELECTROCARDIOGRAM TRACING: CPT | Performed by: STUDENT IN AN ORGANIZED HEALTH CARE EDUCATION/TRAINING PROGRAM

## 2021-10-02 RX ORDER — NITROGLYCERIN 0.4 MG/1
0.4 TABLET SUBLINGUAL ONCE
Status: COMPLETED | OUTPATIENT
Start: 2021-10-02 | End: 2021-10-02

## 2021-10-02 RX ORDER — FUROSEMIDE 10 MG/ML
40 INJECTION INTRAMUSCULAR; INTRAVENOUS ONCE
Status: COMPLETED | OUTPATIENT
Start: 2021-10-02 | End: 2021-10-02

## 2021-10-02 RX ORDER — HEPARIN SODIUM 10000 [USP'U]/100ML
5-30 INJECTION, SOLUTION INTRAVENOUS CONTINUOUS
Status: DISCONTINUED | OUTPATIENT
Start: 2021-10-02 | End: 2021-10-08

## 2021-10-02 RX ORDER — DILTIAZEM HYDROCHLORIDE 5 MG/ML
20 INJECTION INTRAVENOUS ONCE
Status: COMPLETED | OUTPATIENT
Start: 2021-10-02 | End: 2021-10-02

## 2021-10-02 RX ORDER — HEPARIN SODIUM 1000 [USP'U]/ML
4000 INJECTION, SOLUTION INTRAVENOUS; SUBCUTANEOUS ONCE
Status: COMPLETED | OUTPATIENT
Start: 2021-10-02 | End: 2021-10-02

## 2021-10-02 RX ORDER — DILTIAZEM HYDROCHLORIDE 5 MG/ML
INJECTION INTRAVENOUS
Status: COMPLETED
Start: 2021-10-02 | End: 2021-10-02

## 2021-10-02 RX ORDER — LORAZEPAM 2 MG/ML
1 INJECTION INTRAMUSCULAR ONCE
Status: COMPLETED | OUTPATIENT
Start: 2021-10-02 | End: 2021-10-02

## 2021-10-02 RX ORDER — PREDNISONE 20 MG/1
60 TABLET ORAL ONCE
Status: COMPLETED | OUTPATIENT
Start: 2021-10-02 | End: 2021-10-02

## 2021-10-02 RX ORDER — HEPARIN SODIUM 1000 [USP'U]/ML
2000 INJECTION, SOLUTION INTRAVENOUS; SUBCUTANEOUS PRN
Status: DISCONTINUED | OUTPATIENT
Start: 2021-10-02 | End: 2021-10-08 | Stop reason: ALTCHOICE

## 2021-10-02 RX ORDER — HEPARIN SODIUM 1000 [USP'U]/ML
4000 INJECTION, SOLUTION INTRAVENOUS; SUBCUTANEOUS PRN
Status: DISCONTINUED | OUTPATIENT
Start: 2021-10-02 | End: 2021-10-08 | Stop reason: ALTCHOICE

## 2021-10-02 RX ORDER — IPRATROPIUM BROMIDE AND ALBUTEROL SULFATE 2.5; .5 MG/3ML; MG/3ML
1 SOLUTION RESPIRATORY (INHALATION) ONCE
Status: DISCONTINUED | OUTPATIENT
Start: 2021-10-02 | End: 2021-10-03

## 2021-10-02 RX ORDER — METOPROLOL TARTRATE 5 MG/5ML
5 INJECTION INTRAVENOUS ONCE
Status: COMPLETED | OUTPATIENT
Start: 2021-10-02 | End: 2021-10-02

## 2021-10-02 RX ORDER — DIGOXIN 0.25 MG/ML
125 INJECTION INTRAMUSCULAR; INTRAVENOUS ONCE
Status: COMPLETED | OUTPATIENT
Start: 2021-10-02 | End: 2021-10-02

## 2021-10-02 RX ORDER — 0.9 % SODIUM CHLORIDE 0.9 %
1000 INTRAVENOUS SOLUTION INTRAVENOUS ONCE
Status: COMPLETED | OUTPATIENT
Start: 2021-10-02 | End: 2021-10-02

## 2021-10-02 RX ADMIN — LORAZEPAM 1 MG: 2 INJECTION INTRAMUSCULAR; INTRAVENOUS at 19:55

## 2021-10-02 RX ADMIN — PREDNISONE 60 MG: 20 TABLET ORAL at 19:42

## 2021-10-02 RX ADMIN — DILTIAZEM HYDROCHLORIDE 20 MG: 5 INJECTION INTRAVENOUS at 17:34

## 2021-10-02 RX ADMIN — DIGOXIN 125 MCG: 0.25 INJECTION INTRAMUSCULAR; INTRAVENOUS at 20:17

## 2021-10-02 RX ADMIN — NITROGLYCERIN 0.4 MG: 0.4 TABLET SUBLINGUAL at 20:48

## 2021-10-02 RX ADMIN — DILTIAZEM HYDROCHLORIDE 5 MG/HR: 5 INJECTION INTRAVENOUS at 18:07

## 2021-10-02 RX ADMIN — HEPARIN SODIUM 4000 UNITS: 1000 INJECTION INTRAVENOUS; SUBCUTANEOUS at 18:48

## 2021-10-02 RX ADMIN — SODIUM CHLORIDE 1000 ML: 9 INJECTION, SOLUTION INTRAVENOUS at 17:28

## 2021-10-02 RX ADMIN — METOPROLOL TARTRATE 5 MG: 1 INJECTION, SOLUTION INTRAVENOUS at 18:52

## 2021-10-02 RX ADMIN — HEPARIN SODIUM AND DEXTROSE 8.5 UNITS/KG/HR: 10000; 5 INJECTION INTRAVENOUS at 18:48

## 2021-10-02 RX ADMIN — FUROSEMIDE 40 MG: 10 INJECTION, SOLUTION INTRAMUSCULAR; INTRAVENOUS at 19:52

## 2021-10-02 RX ADMIN — Medication 500 MG: at 19:27

## 2021-10-02 RX ADMIN — CEFTRIAXONE SODIUM 1000 MG: 1 INJECTION, POWDER, FOR SOLUTION INTRAMUSCULAR; INTRAVENOUS at 18:48

## 2021-10-02 NOTE — ED PROVIDER NOTES
[facility]     Emergency Department     Faculty Attestation    I performed a history and physical examination of the patient and discussed management with the resident. I reviewed the residents note and agree with the documented findings and plan of care. Any areas of disagreement are noted on the chart. I was personally present for the key portions of any procedures. I have documented in the chart those procedures where I was not present during the key portions. I have reviewed the emergency nurses triage note. I agree with the chief complaint, past medical history, past surgical history, allergies, medications, social and family history as documented unless otherwise noted below. For Physician Assistant/ Nurse Practitioner cases/documentation I have personally evaluated this patient and have completed at least one if not all key elements of the E/M (history, physical exam, and MDM). Additional findings are as noted. Patient brought in by EMS with complaints of shortness of breath and feeling weak for the past 5 days. Patient was found to be in SVT by EMS. Patient was given 2 doses of adenosine and 20 mg of Cardizem without conversion. On arrival here, patient is alert and oriented and answering questions appropriately. She denies chest pain. She says she does feel short of breath. She is also diaphoretic. She denies any recent fever, cough, abdominal pain. Patient has been vaccinated for Covid. We will start a Cardizem drip. Will get EKG, chest x-ray, labs and plan to admit patient.     EKG Interpretation    Interpreted by emergency department physician    Rhythm: atrial fibrillation - rapid  Rate: >160  Axis: left  Ectopy: none  Conduction: normal  ST Segments: nonspecific changes  T Waves: non specific changes  Q Waves: nonspecific    Clinical Impression: atrial fibrillation (new onset)    MD Brannon Almonte MD  Attending Emergency  Physician Raheel Gracia MD  10/02/21 [id number]

## 2021-10-02 NOTE — ED NOTES
Writer notifies phlebotomy about difficulty obtaining blood cultures  Phlebotomy to be bedside approximately 30 minutes to attempt        Remy Cox RN  10/02/21 2956

## 2021-10-02 NOTE — ED NOTES
Bed: 23  Expected date:   Expected time:   Means of arrival:   Comments:  RORY [id number] Darlyn Hughes RN  10/02/21 2000

## 2021-10-02 NOTE — ED NOTES
Pt. Arrives to ED via [facility] from home for c/o SOB x5 days. On EMS arrival pt. Was found to be in SVT , given adenosine 6mg, 12mg without conversion. Pt. Was then given 20mg cardizem with intermediate conversion then HR returned to 180. Pt. Was also hypoxic for EMS SpO2 80% on RA, pt. Non-oxygen dependent. Pt. Arrives tachycardic, tachypneic, diaphoretic and labored respirations. Writer discusses COVID with pt., pt. Received both vaccines, pt. Agreeable to nasal cannula, NRB and BiPAP, pt. Expresses she does not wish to be intubated. Arnoldo Howard RN at bedside to witness pt. Wishes. Dr. Hilaria Urrutia notified. Pt. Karuna Younger changed, placed in gown, purewick in place. X-ray at bedside.        Remy Cox RN  10/02/21 4936

## 2021-10-03 ENCOUNTER — APPOINTMENT (OUTPATIENT)
Dept: CT IMAGING | Age: 76
DRG: 871 | End: 2021-10-03
Payer: MEDICARE

## 2021-10-03 PROBLEM — J44.9 COPD (CHRONIC OBSTRUCTIVE PULMONARY DISEASE) (HCC): Status: ACTIVE | Noted: 2018-09-19

## 2021-10-03 PROBLEM — A41.9 SEPSIS WITH ACUTE HYPOXIC RESPIRATORY FAILURE WITHOUT SEPTIC SHOCK (HCC): Status: ACTIVE | Noted: 2021-10-03

## 2021-10-03 PROBLEM — R77.8 ELEVATED TROPONIN: Status: ACTIVE | Noted: 2021-10-03

## 2021-10-03 PROBLEM — E87.6 HYPOKALEMIA: Status: ACTIVE | Noted: 2021-10-03

## 2021-10-03 PROBLEM — M19.90 ARTHRITIS: Status: ACTIVE | Noted: 2018-09-19

## 2021-10-03 PROBLEM — R01.1 HEART MURMUR: Status: ACTIVE | Noted: 2018-09-19

## 2021-10-03 PROBLEM — R65.20 SEPSIS WITH ACUTE HYPOXIC RESPIRATORY FAILURE WITHOUT SEPTIC SHOCK (HCC): Status: ACTIVE | Noted: 2021-10-03

## 2021-10-03 PROBLEM — D64.9 ANEMIA: Status: ACTIVE | Noted: 2018-09-20

## 2021-10-03 PROBLEM — K63.5 COLON POLYPS: Status: ACTIVE | Noted: 2021-06-02

## 2021-10-03 PROBLEM — E87.1 HYPONATREMIA: Status: ACTIVE | Noted: 2021-10-03

## 2021-10-03 PROBLEM — N39.0 UTI (URINARY TRACT INFECTION): Status: ACTIVE | Noted: 2021-10-03

## 2021-10-03 PROBLEM — H54.8 LEGALLY BLIND IN RIGHT EYE, AS DEFINED IN USA: Status: ACTIVE | Noted: 2018-09-19

## 2021-10-03 PROBLEM — Z91.81 AT HIGH RISK FOR FALLS: Status: ACTIVE | Noted: 2018-09-19

## 2021-10-03 PROBLEM — J96.01 SEPSIS WITH ACUTE HYPOXIC RESPIRATORY FAILURE WITHOUT SEPTIC SHOCK (HCC): Status: ACTIVE | Noted: 2021-10-03

## 2021-10-03 PROBLEM — I50.33 ACUTE ON CHRONIC DIASTOLIC HEART FAILURE (HCC): Status: ACTIVE | Noted: 2021-10-03

## 2021-10-03 LAB
ADENOVIRUS PCR: NOT DETECTED
ALBUMIN SERPL-MCNC: 3.3 G/DL (ref 3.5–5.2)
ALBUMIN/GLOBULIN RATIO: 0.8 (ref 1–2.5)
ALLEN TEST: NORMAL
ALLEN TEST: POSITIVE
ALP BLD-CCNC: 63 U/L (ref 35–104)
ALT SERPL-CCNC: 22 U/L (ref 5–33)
ANION GAP SERPL CALCULATED.3IONS-SCNC: 16 MMOL/L (ref 9–17)
ANION GAP: 7 MMOL/L (ref 7–16)
AST SERPL-CCNC: 28 U/L
BILIRUB SERPL-MCNC: 0.7 MG/DL (ref 0.3–1.2)
BNP INTERPRETATION: ABNORMAL
BORDETELLA PARAPERTUSSIS: NOT DETECTED
BORDETELLA PERTUSSIS PCR: NOT DETECTED
BUN BLDV-MCNC: 48 MG/DL (ref 8–23)
BUN/CREAT BLD: ABNORMAL (ref 9–20)
C-REACTIVE PROTEIN: 244 MG/L (ref 0–5)
CALCIUM SERPL-MCNC: 8.7 MG/DL (ref 8.6–10.4)
CHLAMYDIA PNEUMONIAE BY PCR: NOT DETECTED
CHLORIDE BLD-SCNC: 91 MMOL/L (ref 98–107)
CO2: 23 MMOL/L (ref 20–31)
CORONAVIRUS 229E PCR: NOT DETECTED
CORONAVIRUS HKU1 PCR: NOT DETECTED
CORONAVIRUS NL63 PCR: NOT DETECTED
CORONAVIRUS OC43 PCR: NOT DETECTED
CREAT SERPL-MCNC: 1.12 MG/DL (ref 0.5–0.9)
CULTURE: NORMAL
FIO2: 40
FIO2: NORMAL
GFR AFRICAN AMERICAN: 57 ML/MIN
GFR NON-AFRICAN AMERICAN: 47 ML/MIN
GFR NON-AFRICAN AMERICAN: 48 ML/MIN
GFR SERPL CREATININE-BSD FRML MDRD: 59 ML/MIN
GFR SERPL CREATININE-BSD FRML MDRD: ABNORMAL ML/MIN/{1.73_M2}
GLUCOSE BLD-MCNC: 165 MG/DL (ref 65–105)
GLUCOSE BLD-MCNC: 169 MG/DL (ref 70–99)
GLUCOSE BLD-MCNC: 182 MG/DL (ref 74–100)
HCO3 VENOUS: 28 MMOL/L (ref 22–29)
HCT VFR BLD CALC: 37.4 % (ref 36.3–47.1)
HEMOGLOBIN: 12 G/DL (ref 11.9–15.1)
HUMAN METAPNEUMOVIRUS PCR: NOT DETECTED
INFLUENZA A BY PCR: NOT DETECTED
INFLUENZA A H1 (2009) PCR: NORMAL
INFLUENZA A H1 PCR: NORMAL
INFLUENZA A H3 PCR: NORMAL
INFLUENZA B BY PCR: NOT DETECTED
INR BLD: 1.1
LACTIC ACID, SEPSIS WHOLE BLOOD: 1.9 MMOL/L (ref 0.5–1.9)
LACTIC ACID, SEPSIS: NORMAL MMOL/L (ref 0.5–1.9)
LACTIC ACID, WHOLE BLOOD: 1.7 MMOL/L (ref 0.7–2.1)
LACTIC ACID: NORMAL MMOL/L
LV EF: 59 %
LVEF MODALITY: NORMAL
Lab: NORMAL
MAGNESIUM: 2.2 MG/DL (ref 1.6–2.6)
MCH RBC QN AUTO: 31.1 PG (ref 25.2–33.5)
MCHC RBC AUTO-ENTMCNC: 32.1 G/DL (ref 28.4–34.8)
MCV RBC AUTO: 96.9 FL (ref 82.6–102.9)
MODE: NORMAL
MODE: NORMAL
MYCOPLASMA PNEUMONIAE PCR: NOT DETECTED
NEGATIVE BASE EXCESS, ART: NORMAL (ref 0–2)
NEGATIVE BASE EXCESS, VEN: NORMAL (ref 0–2)
NRBC AUTOMATED: 0 PER 100 WBC
O2 DEVICE/FLOW/%: NORMAL
O2 DEVICE/FLOW/%: NORMAL
O2 SAT, VEN: 82 % (ref 60–85)
PARAINFLUENZA 1 PCR: NOT DETECTED
PARAINFLUENZA 2 PCR: NOT DETECTED
PARAINFLUENZA 3 PCR: NOT DETECTED
PARAINFLUENZA 4 PCR: NOT DETECTED
PARTIAL THROMBOPLASTIN TIME: 20.3 SEC (ref 20.5–30.5)
PARTIAL THROMBOPLASTIN TIME: 21.7 SEC (ref 20.5–30.5)
PARTIAL THROMBOPLASTIN TIME: 36.8 SEC (ref 20.5–30.5)
PARTIAL THROMBOPLASTIN TIME: 45.7 SEC (ref 20.5–30.5)
PATIENT TEMP: NORMAL
PATIENT TEMP: NORMAL
PCO2, VEN: 45.1 MM HG (ref 41–51)
PDW BLD-RTO: 15.3 % (ref 11.8–14.4)
PH VENOUS: 7.4 (ref 7.32–7.43)
PLATELET # BLD: 850 K/UL (ref 138–453)
PMV BLD AUTO: 9.8 FL (ref 8.1–13.5)
PO2, VEN: 46.4 MM HG (ref 30–50)
POC BUN: 60 MG/DL (ref 8–26)
POC CHLORIDE: 97 MMOL/L (ref 98–107)
POC CREATININE: 1.1 MG/DL (ref 0.51–1.19)
POC HCO3: 24.5 MMOL/L (ref 21–28)
POC HEMATOCRIT: 39 % (ref 36–46)
POC HEMOGLOBIN: 13.4 G/DL (ref 12–16)
POC IONIZED CALCIUM: 1.06 MMOL/L (ref 1.15–1.33)
POC LACTIC ACID: 1.17 MMOL/L (ref 0.56–1.39)
POC O2 SATURATION: 97 % (ref 94–98)
POC PCO2 TEMP: NORMAL MM HG
POC PCO2 TEMP: NORMAL MM HG
POC PCO2: 37.1 MM HG (ref 35–48)
POC PH TEMP: NORMAL
POC PH TEMP: NORMAL
POC PH: 7.43 (ref 7.35–7.45)
POC PO2 TEMP: NORMAL MM HG
POC PO2 TEMP: NORMAL MM HG
POC PO2: 86 MM HG (ref 83–108)
POC POTASSIUM: 5.8 MMOL/L (ref 3.5–4.5)
POC SODIUM: 131 MMOL/L (ref 138–146)
POC TCO2: 28 MMOL/L (ref 22–30)
POSITIVE BASE EXCESS, ART: 0 (ref 0–3)
POSITIVE BASE EXCESS, VEN: 3 (ref 0–3)
POTASSIUM SERPL-SCNC: 3.4 MMOL/L (ref 3.7–5.3)
PRO-BNP: 2425 PG/ML
PROCALCITONIN: 0.66 NG/ML
PROTHROMBIN TIME: 11.4 SEC (ref 9.1–12.3)
RBC # BLD: 3.86 M/UL (ref 3.95–5.11)
RESP SYNCYTIAL VIRUS PCR: NOT DETECTED
RHINO/ENTEROVIRUS PCR: NOT DETECTED
SAMPLE SITE: NORMAL
SAMPLE SITE: NORMAL
SARS-COV-2, PCR: NOT DETECTED
SODIUM BLD-SCNC: 130 MMOL/L (ref 135–144)
SPECIMEN DESCRIPTION: NORMAL
SPECIMEN DESCRIPTION: NORMAL
TCO2 (CALC), ART: NORMAL MMOL/L (ref 22–29)
TOTAL CO2, VENOUS: NORMAL MMOL/L (ref 23–30)
TOTAL PROTEIN: 7.2 G/DL (ref 6.4–8.3)
TROPONIN INTERP: ABNORMAL
TROPONIN INTERP: ABNORMAL
TROPONIN T: ABNORMAL NG/ML
TROPONIN T: ABNORMAL NG/ML
TROPONIN, HIGH SENSITIVITY: 62 NG/L (ref 0–14)
TROPONIN, HIGH SENSITIVITY: 66 NG/L (ref 0–14)
TSH SERPL DL<=0.05 MIU/L-ACNC: 0.35 MIU/L (ref 0.3–5)
WBC # BLD: 32.8 K/UL (ref 3.5–11.3)

## 2021-10-03 PROCEDURE — 2580000003 HC RX 258: Performed by: STUDENT IN AN ORGANIZED HEALTH CARE EDUCATION/TRAINING PROGRAM

## 2021-10-03 PROCEDURE — 83605 ASSAY OF LACTIC ACID: CPT

## 2021-10-03 PROCEDURE — 93306 TTE W/DOPPLER COMPLETE: CPT

## 2021-10-03 PROCEDURE — APPSS60 APP SPLIT SHARED TIME 46-60 MINUTES: Performed by: NURSE PRACTITIONER

## 2021-10-03 PROCEDURE — 6360000002 HC RX W HCPCS: Performed by: STUDENT IN AN ORGANIZED HEALTH CARE EDUCATION/TRAINING PROGRAM

## 2021-10-03 PROCEDURE — 6370000000 HC RX 637 (ALT 250 FOR IP): Performed by: NURSE PRACTITIONER

## 2021-10-03 PROCEDURE — 85014 HEMATOCRIT: CPT

## 2021-10-03 PROCEDURE — 6370000000 HC RX 637 (ALT 250 FOR IP): Performed by: STUDENT IN AN ORGANIZED HEALTH CARE EDUCATION/TRAINING PROGRAM

## 2021-10-03 PROCEDURE — 83880 ASSAY OF NATRIURETIC PEPTIDE: CPT

## 2021-10-03 PROCEDURE — 2000000000 HC ICU R&B

## 2021-10-03 PROCEDURE — 83735 ASSAY OF MAGNESIUM: CPT

## 2021-10-03 PROCEDURE — 99222 1ST HOSP IP/OBS MODERATE 55: CPT | Performed by: INTERNAL MEDICINE

## 2021-10-03 PROCEDURE — 0202U NFCT DS 22 TRGT SARS-COV-2: CPT

## 2021-10-03 PROCEDURE — 36600 WITHDRAWAL OF ARTERIAL BLOOD: CPT

## 2021-10-03 PROCEDURE — 87641 MR-STAPH DNA AMP PROBE: CPT

## 2021-10-03 PROCEDURE — 94640 AIRWAY INHALATION TREATMENT: CPT

## 2021-10-03 PROCEDURE — 85610 PROTHROMBIN TIME: CPT

## 2021-10-03 PROCEDURE — 2500000003 HC RX 250 WO HCPCS: Performed by: STUDENT IN AN ORGANIZED HEALTH CARE EDUCATION/TRAINING PROGRAM

## 2021-10-03 PROCEDURE — 86140 C-REACTIVE PROTEIN: CPT

## 2021-10-03 PROCEDURE — 2700000000 HC OXYGEN THERAPY PER DAY

## 2021-10-03 PROCEDURE — 82947 ASSAY GLUCOSE BLOOD QUANT: CPT

## 2021-10-03 PROCEDURE — 94660 CPAP INITIATION&MGMT: CPT

## 2021-10-03 PROCEDURE — 86038 ANTINUCLEAR ANTIBODIES: CPT

## 2021-10-03 PROCEDURE — 80051 ELECTROLYTE PANEL: CPT

## 2021-10-03 PROCEDURE — 86225 DNA ANTIBODY NATIVE: CPT

## 2021-10-03 PROCEDURE — 71250 CT THORAX DX C-: CPT

## 2021-10-03 PROCEDURE — 85730 THROMBOPLASTIN TIME PARTIAL: CPT

## 2021-10-03 PROCEDURE — 2580000003 HC RX 258: Performed by: NURSE PRACTITIONER

## 2021-10-03 PROCEDURE — 51798 US URINE CAPACITY MEASURE: CPT

## 2021-10-03 PROCEDURE — 84443 ASSAY THYROID STIM HORMONE: CPT

## 2021-10-03 PROCEDURE — 94664 DEMO&/EVAL PT USE INHALER: CPT

## 2021-10-03 PROCEDURE — 84145 PROCALCITONIN (PCT): CPT

## 2021-10-03 PROCEDURE — 51702 INSERT TEMP BLADDER CATH: CPT

## 2021-10-03 PROCEDURE — 84520 ASSAY OF UREA NITROGEN: CPT

## 2021-10-03 PROCEDURE — 6360000002 HC RX W HCPCS: Performed by: NURSE PRACTITIONER

## 2021-10-03 PROCEDURE — 99291 CRITICAL CARE FIRST HOUR: CPT | Performed by: INTERNAL MEDICINE

## 2021-10-03 PROCEDURE — 99233 SBSQ HOSP IP/OBS HIGH 50: CPT | Performed by: STUDENT IN AN ORGANIZED HEALTH CARE EDUCATION/TRAINING PROGRAM

## 2021-10-03 PROCEDURE — 82803 BLOOD GASES ANY COMBINATION: CPT

## 2021-10-03 PROCEDURE — 87040 BLOOD CULTURE FOR BACTERIA: CPT

## 2021-10-03 PROCEDURE — 82330 ASSAY OF CALCIUM: CPT

## 2021-10-03 PROCEDURE — 2500000003 HC RX 250 WO HCPCS: Performed by: NURSE PRACTITIONER

## 2021-10-03 PROCEDURE — 36415 COLL VENOUS BLD VENIPUNCTURE: CPT

## 2021-10-03 PROCEDURE — 84484 ASSAY OF TROPONIN QUANT: CPT

## 2021-10-03 PROCEDURE — 80053 COMPREHEN METABOLIC PANEL: CPT

## 2021-10-03 PROCEDURE — 83516 IMMUNOASSAY NONANTIBODY: CPT

## 2021-10-03 PROCEDURE — 82565 ASSAY OF CREATININE: CPT

## 2021-10-03 PROCEDURE — 85027 COMPLETE CBC AUTOMATED: CPT

## 2021-10-03 RX ORDER — ONDANSETRON 2 MG/ML
4 INJECTION INTRAMUSCULAR; INTRAVENOUS EVERY 6 HOURS PRN
Status: DISCONTINUED | OUTPATIENT
Start: 2021-10-03 | End: 2021-10-11 | Stop reason: HOSPADM

## 2021-10-03 RX ORDER — HEPARIN SODIUM 10000 [USP'U]/100ML
5-30 INJECTION, SOLUTION INTRAVENOUS CONTINUOUS
Status: DISCONTINUED | OUTPATIENT
Start: 2021-10-03 | End: 2021-10-03 | Stop reason: SDUPTHER

## 2021-10-03 RX ORDER — POTASSIUM CHLORIDE 29.8 MG/ML
20 INJECTION INTRAVENOUS PRN
Status: DISCONTINUED | OUTPATIENT
Start: 2021-10-03 | End: 2021-10-11 | Stop reason: HOSPADM

## 2021-10-03 RX ORDER — SODIUM CHLORIDE 9 MG/ML
INJECTION, SOLUTION INTRAVENOUS CONTINUOUS
Status: DISCONTINUED | OUTPATIENT
Start: 2021-10-03 | End: 2021-10-11 | Stop reason: HOSPADM

## 2021-10-03 RX ORDER — 0.9 % SODIUM CHLORIDE 0.9 %
500 INTRAVENOUS SOLUTION INTRAVENOUS ONCE
Status: COMPLETED | OUTPATIENT
Start: 2021-10-03 | End: 2021-10-03

## 2021-10-03 RX ORDER — VANCOMYCIN HYDROCHLORIDE 1 G/200ML
1000 INJECTION, SOLUTION INTRAVENOUS EVERY 12 HOURS
Status: DISCONTINUED | OUTPATIENT
Start: 2021-10-03 | End: 2021-10-03

## 2021-10-03 RX ORDER — SODIUM CHLORIDE 9 MG/ML
25 INJECTION, SOLUTION INTRAVENOUS PRN
Status: DISCONTINUED | OUTPATIENT
Start: 2021-10-03 | End: 2021-10-03

## 2021-10-03 RX ORDER — ALBUTEROL SULFATE 2.5 MG/3ML
2.5 SOLUTION RESPIRATORY (INHALATION)
Status: DISCONTINUED | OUTPATIENT
Start: 2021-10-03 | End: 2021-10-03

## 2021-10-03 RX ORDER — NOREPINEPHRINE BIT/0.9 % NACL 16MG/250ML
2-100 INFUSION BOTTLE (ML) INTRAVENOUS CONTINUOUS
Status: DISCONTINUED | OUTPATIENT
Start: 2021-10-03 | End: 2021-10-04

## 2021-10-03 RX ORDER — ISOSORBIDE MONONITRATE 30 MG/1
30 TABLET, EXTENDED RELEASE ORAL DAILY
Status: DISCONTINUED | OUTPATIENT
Start: 2021-10-03 | End: 2021-10-03

## 2021-10-03 RX ORDER — SODIUM CHLORIDE 0.9 % (FLUSH) 0.9 %
5-40 SYRINGE (ML) INJECTION PRN
Status: DISCONTINUED | OUTPATIENT
Start: 2021-10-03 | End: 2021-10-11 | Stop reason: HOSPADM

## 2021-10-03 RX ORDER — ASCORBIC ACID 500 MG
500 TABLET ORAL DAILY
Status: DISCONTINUED | OUTPATIENT
Start: 2021-10-03 | End: 2021-10-03

## 2021-10-03 RX ORDER — ALBUTEROL SULFATE 2.5 MG/3ML
2.5 SOLUTION RESPIRATORY (INHALATION) EVERY 4 HOURS PRN
Status: DISCONTINUED | OUTPATIENT
Start: 2021-10-03 | End: 2021-10-11 | Stop reason: HOSPADM

## 2021-10-03 RX ORDER — MAGNESIUM SULFATE IN WATER 40 MG/ML
2000 INJECTION, SOLUTION INTRAVENOUS PRN
Status: DISCONTINUED | OUTPATIENT
Start: 2021-10-03 | End: 2021-10-11 | Stop reason: HOSPADM

## 2021-10-03 RX ORDER — ONDANSETRON 2 MG/ML
4 INJECTION INTRAMUSCULAR; INTRAVENOUS EVERY 6 HOURS PRN
Status: DISCONTINUED | OUTPATIENT
Start: 2021-10-03 | End: 2021-10-03

## 2021-10-03 RX ORDER — ACETAMINOPHEN 650 MG/1
650 SUPPOSITORY RECTAL EVERY 6 HOURS PRN
Status: DISCONTINUED | OUTPATIENT
Start: 2021-10-03 | End: 2021-10-03

## 2021-10-03 RX ORDER — CLOPIDOGREL BISULFATE 75 MG/1
75 TABLET ORAL DAILY
Status: DISCONTINUED | OUTPATIENT
Start: 2021-10-03 | End: 2021-10-11 | Stop reason: HOSPADM

## 2021-10-03 RX ORDER — LOSARTAN POTASSIUM 50 MG/1
100 TABLET ORAL DAILY
Status: DISCONTINUED | OUTPATIENT
Start: 2021-10-03 | End: 2021-10-03

## 2021-10-03 RX ORDER — IPRATROPIUM BROMIDE AND ALBUTEROL SULFATE 2.5; .5 MG/3ML; MG/3ML
1 SOLUTION RESPIRATORY (INHALATION) 4 TIMES DAILY
Status: DISCONTINUED | OUTPATIENT
Start: 2021-10-03 | End: 2021-10-11 | Stop reason: HOSPADM

## 2021-10-03 RX ORDER — POLYETHYLENE GLYCOL 3350 17 G/17G
17 POWDER, FOR SOLUTION ORAL DAILY PRN
Status: DISCONTINUED | OUTPATIENT
Start: 2021-10-03 | End: 2021-10-03

## 2021-10-03 RX ORDER — ACETAMINOPHEN 650 MG/1
650 SUPPOSITORY RECTAL EVERY 6 HOURS PRN
Status: DISCONTINUED | OUTPATIENT
Start: 2021-10-03 | End: 2021-10-11 | Stop reason: HOSPADM

## 2021-10-03 RX ORDER — HEPARIN SODIUM 1000 [USP'U]/ML
30 INJECTION, SOLUTION INTRAVENOUS; SUBCUTANEOUS PRN
Status: DISCONTINUED | OUTPATIENT
Start: 2021-10-03 | End: 2021-10-03

## 2021-10-03 RX ORDER — FUROSEMIDE 20 MG/1
20 TABLET ORAL DAILY
Status: DISCONTINUED | OUTPATIENT
Start: 2021-10-03 | End: 2021-10-03

## 2021-10-03 RX ORDER — ACETAMINOPHEN 325 MG/1
650 TABLET ORAL EVERY 6 HOURS PRN
Status: DISCONTINUED | OUTPATIENT
Start: 2021-10-03 | End: 2021-10-03

## 2021-10-03 RX ORDER — ATORVASTATIN CALCIUM 20 MG/1
20 TABLET, FILM COATED ORAL DAILY
Status: DISCONTINUED | OUTPATIENT
Start: 2021-10-03 | End: 2021-10-11 | Stop reason: HOSPADM

## 2021-10-03 RX ORDER — CITALOPRAM 20 MG/1
20 TABLET ORAL DAILY
Status: DISCONTINUED | OUTPATIENT
Start: 2021-10-03 | End: 2021-10-11 | Stop reason: HOSPADM

## 2021-10-03 RX ORDER — ACETAMINOPHEN 325 MG/1
650 TABLET ORAL EVERY 6 HOURS PRN
Status: DISCONTINUED | OUTPATIENT
Start: 2021-10-03 | End: 2021-10-11 | Stop reason: HOSPADM

## 2021-10-03 RX ORDER — POLYETHYLENE GLYCOL 3350 17 G/17G
17 POWDER, FOR SOLUTION ORAL DAILY PRN
Status: DISCONTINUED | OUTPATIENT
Start: 2021-10-03 | End: 2021-10-11 | Stop reason: HOSPADM

## 2021-10-03 RX ORDER — SODIUM CHLORIDE 0.9 % (FLUSH) 0.9 %
10 SYRINGE (ML) INJECTION PRN
Status: DISCONTINUED | OUTPATIENT
Start: 2021-10-03 | End: 2021-10-03

## 2021-10-03 RX ORDER — ONDANSETRON 4 MG/1
4 TABLET, ORALLY DISINTEGRATING ORAL EVERY 8 HOURS PRN
Status: DISCONTINUED | OUTPATIENT
Start: 2021-10-03 | End: 2021-10-03

## 2021-10-03 RX ORDER — SODIUM CHLORIDE 0.9 % (FLUSH) 0.9 %
5-40 SYRINGE (ML) INJECTION EVERY 12 HOURS SCHEDULED
Status: DISCONTINUED | OUTPATIENT
Start: 2021-10-03 | End: 2021-10-03

## 2021-10-03 RX ORDER — ONDANSETRON 4 MG/1
4 TABLET, ORALLY DISINTEGRATING ORAL EVERY 8 HOURS PRN
Status: DISCONTINUED | OUTPATIENT
Start: 2021-10-03 | End: 2021-10-11 | Stop reason: HOSPADM

## 2021-10-03 RX ORDER — VITAMIN B COMPLEX
1000 TABLET ORAL DAILY
Status: DISCONTINUED | OUTPATIENT
Start: 2021-10-03 | End: 2021-10-03

## 2021-10-03 RX ORDER — SODIUM CHLORIDE 9 MG/ML
25 INJECTION, SOLUTION INTRAVENOUS PRN
Status: DISCONTINUED | OUTPATIENT
Start: 2021-10-03 | End: 2021-10-11 | Stop reason: HOSPADM

## 2021-10-03 RX ORDER — HEPARIN SODIUM 1000 [USP'U]/ML
4000 INJECTION, SOLUTION INTRAVENOUS; SUBCUTANEOUS PRN
Status: DISCONTINUED | OUTPATIENT
Start: 2021-10-03 | End: 2021-10-03 | Stop reason: SDUPTHER

## 2021-10-03 RX ORDER — METOPROLOL TARTRATE 50 MG/1
50 TABLET, FILM COATED ORAL 2 TIMES DAILY
Status: DISCONTINUED | OUTPATIENT
Start: 2021-10-03 | End: 2021-10-03

## 2021-10-03 RX ORDER — SODIUM CHLORIDE 0.9 % (FLUSH) 0.9 %
5-40 SYRINGE (ML) INJECTION EVERY 12 HOURS SCHEDULED
Status: DISCONTINUED | OUTPATIENT
Start: 2021-10-03 | End: 2021-10-11 | Stop reason: HOSPADM

## 2021-10-03 RX ORDER — FLUTICASONE PROPIONATE 110 UG/1
2 AEROSOL, METERED RESPIRATORY (INHALATION) 2 TIMES DAILY
Status: DISCONTINUED | OUTPATIENT
Start: 2021-10-03 | End: 2021-10-03

## 2021-10-03 RX ADMIN — ATORVASTATIN CALCIUM 20 MG: 20 TABLET, FILM COATED ORAL at 09:58

## 2021-10-03 RX ADMIN — ISOSORBIDE MONONITRATE 30 MG: 30 TABLET ORAL at 09:58

## 2021-10-03 RX ADMIN — SODIUM CHLORIDE 500 ML: 9 INJECTION, SOLUTION INTRAVENOUS at 13:00

## 2021-10-03 RX ADMIN — Medication 1000 UNITS: at 09:57

## 2021-10-03 RX ADMIN — IPRATROPIUM BROMIDE AND ALBUTEROL SULFATE 1 AMPULE: .5; 2.5 SOLUTION RESPIRATORY (INHALATION) at 20:30

## 2021-10-03 RX ADMIN — FUROSEMIDE 20 MG: 20 TABLET ORAL at 09:57

## 2021-10-03 RX ADMIN — HEPARIN SODIUM 2000 UNITS: 1000 INJECTION INTRAVENOUS; SUBCUTANEOUS at 14:00

## 2021-10-03 RX ADMIN — SODIUM CHLORIDE 75 ML/HR: 9 INJECTION, SOLUTION INTRAVENOUS at 16:30

## 2021-10-03 RX ADMIN — ALBUTEROL SULFATE 2.5 MG: 2.5 SOLUTION RESPIRATORY (INHALATION) at 10:10

## 2021-10-03 RX ADMIN — LOSARTAN POTASSIUM 100 MG: 50 TABLET, FILM COATED ORAL at 09:57

## 2021-10-03 RX ADMIN — METOPROLOL TARTRATE 50 MG: 50 TABLET, FILM COATED ORAL at 05:41

## 2021-10-03 RX ADMIN — DILTIAZEM HYDROCHLORIDE 15 MG/HR: 5 INJECTION INTRAVENOUS at 01:26

## 2021-10-03 RX ADMIN — HEPARIN SODIUM 4000 UNITS: 1000 INJECTION INTRAVENOUS; SUBCUTANEOUS at 01:42

## 2021-10-03 RX ADMIN — Medication 4 MCG/MIN: at 20:20

## 2021-10-03 RX ADMIN — OXYCODONE HYDROCHLORIDE AND ACETAMINOPHEN 500 MG: 500 TABLET ORAL at 09:58

## 2021-10-03 RX ADMIN — VANCOMYCIN HYDROCHLORIDE 2000 MG: 1 INJECTION, POWDER, LYOPHILIZED, FOR SOLUTION INTRAVENOUS at 16:09

## 2021-10-03 RX ADMIN — CEFEPIME HYDROCHLORIDE 1000 MG: 1 INJECTION, POWDER, FOR SOLUTION INTRAMUSCULAR; INTRAVENOUS at 13:30

## 2021-10-03 RX ADMIN — CLOPIDOGREL 75 MG: 75 TABLET, FILM COATED ORAL at 09:57

## 2021-10-03 RX ADMIN — FLUTICASONE PROPIONATE 2 PUFF: 110 AEROSOL, METERED RESPIRATORY (INHALATION) at 08:58

## 2021-10-03 RX ADMIN — HEPARIN SODIUM 2000 UNITS: 1000 INJECTION INTRAVENOUS; SUBCUTANEOUS at 21:17

## 2021-10-03 RX ADMIN — SODIUM CHLORIDE: 9 INJECTION, SOLUTION INTRAVENOUS at 23:00

## 2021-10-03 ASSESSMENT — ENCOUNTER SYMPTOMS
TROUBLE SWALLOWING: 0
SORE THROAT: 0
SINUS PRESSURE: 0
EYES NEGATIVE: 1
DIARRHEA: 0
COUGH: 1
ABDOMINAL PAIN: 0
NAUSEA: 0
CONSTIPATION: 1
DIARRHEA: 1
WHEEZING: 1
CONSTIPATION: 0
RHINORRHEA: 0
ALLERGIC/IMMUNOLOGIC NEGATIVE: 1
CHOKING: 0
ABDOMINAL DISTENTION: 1
EYE DISCHARGE: 0
STRIDOR: 1
CHEST TIGHTNESS: 0
SHORTNESS OF BREATH: 1

## 2021-10-03 ASSESSMENT — PULMONARY FUNCTION TESTS
PIF_VALUE: 10
PIF_VALUE: 13
PIF_VALUE: 16

## 2021-10-03 ASSESSMENT — PAIN SCALES - GENERAL
PAINLEVEL_OUTOF10: 0

## 2021-10-03 NOTE — ED NOTES
Writer enters room, pt. Continues to remove pulse ox from finer  Pt. Reeducated about importance of pulse ox, pt. Shows evidence of learning   Pt. Resting on stretcher, eyes open, RR labored and tachypneic on BiPAP  Pt.  Updated on POC  Will continue to monitor        Mounika CourserBELKIS  10/03/21 9283

## 2021-10-03 NOTE — CONSULTS
Attending Physician Statement:    I have discussed the care of  Amadeo Markham , including pertinent history and exam findings, with the Cardiology fellow/resident. I have seen and examined the patient and the key elements of all parts of the encounter have been performed by me. I agree with the assessment, plan and orders as documented by the fellow/resident, after I modified exam findings and plan of treatments, and the final version is my approved version of the assessment. Additional Comments: The patient was seen and examined, agree with below. Presented with SOB, In Afib, new onset and on Cardizem. Will follow on CT chest, R/O underlying Pneumonia and sepsis. Currently on Antibiotics. Will follow on Echo. Continue IV heparin. [facility]    Consult / H&P               Today's Date: 10/3/2021  Patient Name: Amadeo Markham  Date of admission: 10/2/2021  5:17 PM  Patient's age: 68 y.o., 1945  Admission Dx: Atrial fibrillation with RVR ([address] [I48.91]    Reason for Consult:  Cardiac evaluation    Requesting Physician: Benny Berger MD    CHIEF COMPLAINT: Shortness of breath    History Obtained From:  patient, electronic medical record    HISTORY OF PRESENT ILLNESS:      The patient is a 68 y.o.  female who is admitted to the hospital for worsening shortness of breath. Patient states that she was feeling sick for the past 5 days, had no appetite was not taking her medications and ultimately her son called the EMS for worsening shortness of breath. Her heart rate was in 200s in SVT was initially given 6 mg followed by 12 mg of adenosine without conversion and then was given 20 mg of Cardizem after which her heart rate became better however returned back to 180s by EMS. In the ER patient was alert and oriented. EKG consistent with atrial fibrillation with RVR. Patient was still hypoxic was put on BiPAP.   Vitals /78, heart rate in 100s and saturating 100% on BiPAP. Patient was started on diltiazem drip and heparin drip    Pertinent labs sodium 130, K3.4, creatinine 1.12  Troponin 92-> 62  WBC 28.5  UA suggestive for UTI    Last echo back in 2017 showed LVEF 55%, left atrium moderately dilated, mild dilatation of aortic root and ascending aorta 4.2 cm  No significant cardiac history found    Home meds: Losartan, Lopressor 50 mg twice daily, Plavix and Lasix        Past Medical History:   has a past medical history of Asthma, COPD (chronic obstructive pulmonary disease) ([address] Gout, Hyperlipidemia, Hypertension, Mitral insufficiency, and Pneumonia. Past Surgical History:   has a past surgical history that includes Hysterectomy (1986); knee surgery; Colonoscopy; Dental surgery; Tonsillectomy and adenoidectomy; Ankle surgery (Left, 03/22/2016); and pr colsc flx w/rmvl of tumor polyp lesion snare tq (N/A, 8/8/2017). Home Medications:    Prior to Admission medications    Medication Sig Start Date End Date Taking? Authorizing Provider   losartan (COZAAR) 100 MG tablet Take 1 tablet by mouth daily 8/28/18   Emma Teran MD   predniSONE (DELTASONE) 20 MG tablet Take 1 tab BID x 5 days, then 1 tab daily x 5 days. Take with food.  2/27/18   Emma Teran MD   meloxicam (MOBIC) 15 MG tablet Take 15 mg by mouth daily    Historical Provider, MD   atorvastatin (LIPITOR) 20 MG tablet Take 1 tablet by mouth daily 10/5/17   Emma Teran MD   metoprolol tartrate (LOPRESSOR) 50 MG tablet TAKE ONE TABLET BY MOUTH TWICE DAILY 10/5/17   Emma Teran MD   chlorthalidone (HYGROTON) 25 MG tablet Take 1 tablet by mouth daily 10/5/17   Emma Teran MD   clopidogrel (PLAVIX) 75 MG tablet Take 1 tablet by mouth daily 10/5/17   Emma Teran MD   cyclobenzaprine (FLEXERIL) 10 MG tablet TAKE ONE TABLET BY MOUTH THREE TIMES DAILY AS NEEDED FOR MUSCLE SPASM 10/5/17   Emma Teran MD   diphenhydrAMINE (BENADRYL) 25 MG capsule Take 25 mg by mouth every 6 hours as needed for Itching    Historical Provider, MD   zinc oxide (DESITIN) 40 % ointment Apply topically as needed for Dry Skin Apply topically as needed.     Historical Provider, MD   furosemide (LASIX) 20 MG tablet Take 1 tablet by mouth daily 7/14/17   Papito Kirk MD   isosorbide mononitrate (IMDUR) 30 MG extended release tablet Take 1 tablet by mouth daily 7/14/17   Papito Kirk MD   albuterol sulfate  (90 Base) MCG/ACT inhaler Inhale 2 puffs into the lungs every 6 hours as needed for Wheezing 7/14/17   Papito Kirk MD   Calcium-Magnesium-Zinc (FLAKITO-MAG-ZINC PO) Take by mouth daily    Historical Provider, MD   Calcium Carb-Cholecalciferol (CALCIUM + D3 PO) Take by mouth daily    Historical Provider, MD   CINNAMON PO Take by mouth daily    Historical Provider, MD   TURMERIC CURCUMIN PO Take by mouth daily    Historical Provider, MD   ipratropium-albuterol (DUONEB) 0.5-2.5 (3) MG/3ML SOLN nebulizer solution Inhale 1 vial into the lungs every 4 hours    Historical Provider, MD   beclomethasone (QVAR) 80 MCG/ACT inhaler Inhale 2 puffs into the lungs 2 times daily 6/17/16   Papito Kirk MD   Acetaminophen 650 MG TABS Take 650 mg by mouth every 4 hours as needed for Pain or Fever 4/4/16   Susann Eisenmenger, MD   clonazePAM (KLONOPIN) 1 MG tablet Take 1 mg by mouth nightly     Historical Provider, MD   citalopram (CELEXA) 40 MG tablet Take 40 mg by mouth daily as needed    Historical Provider, MD   LYSINE PO Take by mouth daily    Historical Provider, MD   vitamin D (CHOLECALCIFEROL) 1000 UNIT TABS tablet Take 1,000 Units by mouth daily    Historical Provider, MD   Pyridoxine HCl (VITAMIN B-6) 50 MG tablet Take 100 mg by mouth daily    Historical Provider, MD   Cyanocobalamin (VITAMIN B 12 PO) Take by mouth daily    Historical Provider, MD   Magnesium 500 MG TABS Take by mouth    Historical Provider, MD   Ascorbic Acid (VITAMIN C) 500 MG tablet Take 500 mg by mouth daily Historical Provider, MD      Current Facility-Administered Medications: ascorbic acid (VITAMIN C) tablet 500 mg, 500 mg, Oral, Daily  atorvastatin (LIPITOR) tablet 20 mg, 20 mg, Oral, Daily  fluticasone (FLOVENT HFA) 110 MCG/ACT inhaler 2 puff, 2 puff, Inhalation, BID  clopidogrel (PLAVIX) tablet 75 mg, 75 mg, Oral, Daily  furosemide (LASIX) tablet 20 mg, 20 mg, Oral, Daily  isosorbide mononitrate (IMDUR) extended release tablet 30 mg, 30 mg, Oral, Daily  losartan (COZAAR) tablet 100 mg, 100 mg, Oral, Daily  metoprolol tartrate (LOPRESSOR) tablet 50 mg, 50 mg, Oral, BID  vitamin D (CHOLECALCIFEROL) tablet 1,000 Units, 1,000 Units, Oral, Daily  sodium chloride flush 0.9 % injection 5-40 mL, 5-40 mL, IntraVENous, 2 times per day  sodium chloride flush 0.9 % injection 10 mL, 10 mL, IntraVENous, PRN  0.9 % sodium chloride infusion, 25 mL, IntraVENous, PRN  ondansetron (ZOFRAN-ODT) disintegrating tablet 4 mg, 4 mg, Oral, Q8H PRN **OR** ondansetron (ZOFRAN) injection 4 mg, 4 mg, IntraVENous, Q6H PRN  polyethylene glycol (GLYCOLAX) packet 17 g, 17 g, Oral, Daily PRN  acetaminophen (TYLENOL) tablet 650 mg, 650 mg, Oral, Q6H PRN **OR** acetaminophen (TYLENOL) suppository 650 mg, 650 mg, Rectal, Q6H PRN  albuterol (PROVENTIL) nebulizer solution 2.5 mg, 2.5 mg, Nebulization, As Directed RT PRN  cefTRIAXone (ROCEPHIN) 1000 mg IVPB in 50 mL D5W minibag, 1,000 mg, IntraVENous, Q24H  azithromycin (ZITHROMAX) 500 mg in dextrose 5% 250 mL IVPB, 500 mg, IntraVENous, Q24H  dilTIAZem 125 mg in dextrose 5 % 125 mL infusion, 5-15 mg/hr, IntraVENous, Continuous  heparin (porcine) injection 4,000 Units, 4,000 Units, IntraVENous, PRN  heparin (porcine) injection 2,000 Units, 2,000 Units, IntraVENous, PRN  heparin 25,000 units in dextrose 5% 250 mL (premix) infusion, 5-30 Units/kg/hr, IntraVENous, Continuous  ipratropium-albuterol (DUONEB) nebulizer solution 1 ampule, 1 ampule, Inhalation, Once    Allergies:  Latex, Betadine [povidone iodine], Bee venom, Dilaudid [hydromorphone hcl], Adhesive tape, and Sulfa antibiotics    Social History:   reports that she has quit smoking. She has never used smokeless tobacco. She reports current alcohol use. She reports that she does not use drugs. Family History: family history includes Asthma in her father; Cancer in her father and mother; Heart Disease in her father; High Blood Pressure in her mother. No h/o sudden cardiac death. {    REVIEW OF SYSTEMS:    · Constitutional: there has been no unanticipated weight loss. There's been No change in energy level, No change in activity level. · Eyes: No visual changes or diplopia. No scleral icterus. · ENT: No Headaches  · Cardiovascular: No cardiac history  · Respiratory: No previous pulmonary problems, No cough  · Gastrointestinal: No abdominal pain. No change in bowel or bladder habits. · Genitourinary: No dysuria, trouble voiding, or hematuria. · Musculoskeletal:  No gait disturbance, No weakness or joint complaints. · Integumentary: No rash or pruritis. · Neurological: No headache, diplopia, change in muscle strength, numbness or tingling. No change in gait, balance, coordination, mood, affect, memory, mentation, behavior. · Psychiatric: No anxiety, or depression. · Endocrine: No temperature intolerance. No excessive thirst, fluid intake, or urination. No tremor. · Hematologic/Lymphatic: No abnormal bruising or bleeding, blood clots or swollen lymph nodes. · Allergic/Immunologic: No nasal congestion or hives. PHYSICAL EXAM:      /78   Pulse 116   Temp 99 °F (37.2 °C) (Oral)   Resp (!) 36   Ht 5' 4\" (1.626 m)   Wt 272 lb (123.4 kg)   SpO2 100%   BMI 46.69 kg/m²    Constitutional and General Appearance: alert, cooperative, no distress and appears stated age  HEENT: PERRL, no cervical lymphadenopathy. No masses palpable. Normal oral mucosa  Respiratory:  ·   Resp Auscultation: Good respiratory effort.   increased work of breathing. Cardiovascular:  · Heart tones are crisp and normal. irregular S1 and S2.  · Jugular venous pulsation elevated  ·   Abdomen:   · No masses or tenderness  · Bowel sounds present  Extremities:  ·  No Cyanosis or Clubbing  ·  Lower extremity edema: YES  ·  Skin: Warm and dry  Neurological:  · Alert and oriented. · Moves all extremities well  · No abnormalities of mood, affect, memory, mentation, or behavior are noted    DATA:    Diagnostics:      EKG:   atrial fibrillation, rate 160. ECHO:   not obtained. Stress Test:   not obtained. Cardiac Angiography:   not obtained. Labs:     CBC:   Recent Labs     10/02/21  1734   WBC 28.5*   HGB 14.2   HCT 42.8   PLT See Reflexed IPF Result     BMP:   Recent Labs     10/02/21  1734 10/03/21  0539   * 130*   K 3.5* 3.4*   CO2 21 23   BUN 47* 48*   CREATININE 1.23* 1.12*   LABGLOM 42* 47*   GLUCOSE 159* 169*     BNP: No results for input(s): BNP in the last 72 hours. PT/INR:   Recent Labs     10/02/21  1734 10/03/21  0539   PROTIME 11.3 11.4   INR 1.1 1.1     APTT:  Recent Labs     10/02/21  1734 10/03/21  0539   APTT 22.6 20.3*     CARDIAC ENZYMES:No results for input(s): CKTOTAL, CKMB, CKMBINDEX, TROPONINI in the last 72 hours. FASTING LIPID PANEL:  Lab Results   Component Value Date    HDL 45 06/02/2016    LDLCALC 131 06/02/2016    TRIG 94 06/02/2016     LIVER PROFILE:  Recent Labs     10/03/21  0539   AST 28   ALT 22   LABALBU 3.3*       IMPRESSION:    1. New onset atrial fibrillation  2. Acute hypoxic respiratory distress  3. NSTEMI likely type II from demand ischemia from CASEY/UTI  4. UTI  5. CASEY  6.  Hypokalemia    Patient Active Problem List   Diagnosis    Asthma    Benign essential HTN    Morbid obesity with BMI of 40.0-44.9, adult ([facility]    Open bimalleolar fracture of left ankle    Hyperlipidemia    Chronic obstructive pulmonary disease ([facility]    Pulmonary emphysema ([facility]    Postoperative confusion    Acute respiratory failure with hypoxia (HCC)    Simple chronic bronchitis (HCC)    Atrial fibrillation with RVR (HCC)    Pneumonia of left lower lobe due to infectious organism       RECOMMENDATIONS:  1. Continue heparin drip for now, try to wean off Cardizem drip as patient's heart rate is getting controlled  2. Echocardiogram pending  3. Rest of the management as per primary team    Will discuss with rounding attending Dr. Norah Nayak for final recommendations.     Sammy Packer MD  Cardiology Service  Internal Medicine Residency Program, PGY-3  [facility]

## 2021-10-03 NOTE — PROGRESS NOTES
Pharmacy Note     Renal Dose Adjustment    Jazmin Crandall is a 68 y.o. female. Pharmacist assessment of renally cleared medications. Recent Labs     10/02/21  1734 10/03/21  0539   BUN 47* 48*       Recent Labs     10/03/21  0539 10/03/21  1231   CREATININE 1.12* 1.10       Estimated Creatinine Clearance: 56 mL/min (based on SCr of 1.1 mg/dL). Estimated CrCl using Ideal Body Weight:  ~38 mL/min (based on IBW 55.1 kg)    Height:   Ht Readings from Last 1 Encounters:   10/03/21 5' 4\" (1.626 m)     Weight:  Wt Readings from Last 1 Encounters:   10/03/21 272 lb (123.4 kg)       The following medication dose has been adjusted based upon renal function per P&T Guidelines:             Changed to Cefepime 1000 mg Q 12 hours     Fabiana Gauthier, PharmD  10/3/2021 12:55 PM

## 2021-10-03 NOTE — PROGRESS NOTES
[facility]  Office: [address], DO, Deepali Sandhu, DO, Annabelle Reeder, DO, Mara Michael Luu, DO, Reji Levy MD, Lorene Garduno MD, Nitesh Pacheco MD, Mirna Warren MD, Angie Aleman MD, Vasyl Roberto MD, Ghulam Panda MD, Audra Abrams, DO, Carmen Coronel, DO, Mony Castillo MD,  Cruz Moody, DO, eFlipe Moreno MD, Roz Lentz MD, Susan Ford MD, Opal Solis MD, , Carmelo Sol MD, Anh Huerta MD, Tarun Rivas MD, Iraida Du, [facility], [facility], CNP, Selina Gonzales, CNP, Leslie Mendoza, CNS, Corinne Dennis, Jakub Marinelli, CNP, Megan Mendes, CNP, Shasha Castañeda, CNP, Alfredo Uribe, CNP, Ramila Navarro PA-C, Cara Andrews, [facility], Brittney Mercedes, CNP, Jose Apdemetra, CNP, Guido Rodas, CNP, Sahra Duenas, CNP, Veda Nathan, CNP, Chase Shoulders, CNP, Starla Liner, CNP, Michaelle Soto, [address] [place] [address]    Progress Note    10/3/2021    10:39 AM    Name:   Mitch Sheppard  MRN:     [id number]     Acct:      [de-identified]   Room:   23/[facility] Day:  1  Admit Date:  10/2/2021  5:17 PM    PCP:   KALYN Starks CNP  Code Status:  Full Code    Subjective:     C/C:   Chief Complaint   Patient presents with    Irregular Heart Beat     SVT, adenosine x2 without conversion, 20mg cardizem without conversion     Interval History Status: worsened. Patient noted at bedside. Patient is looking diaphoretic, ill-appearing. Complains of shortness of breath, not able to speak in full sentences. Patient was just taken off BiPAP. Has been on BiPAP throughout the night. Noted to be in A. Fib. Patient states that her problems started when she started having diarrhea around 5 days back, diarrhea lasted on 3 days, she had abdominal pain which gradually resolved.   Patient then started having severe shortness of breath, was not able to do her daily activities, shortness of breath even at rest.  Decreased appetite decreased energy, patient does not report any chest pain or palpitations. Brief History:     Patient was brought to the Er for the concen of shortness of breath. Patient called EMS and upon arrival patient ws found to be in svt with a rate of 200 bpm and hypoxic with saturations in the 80's on Room Air. She was given 6, 12 mg of adenosine without chemical conversion and then was given 20 mg of cardizem and converted briefly then HR returned to 180's. . Patient was transorted to [facility] for further medical care  Upon arrival patient was tachypenic, diaphoretic and placed on additional supplemental oxygen. Patient work up showed grossly abnormal electrolytes with sodium 130, potassium 3.5, chloride 90, CO2 21, BUN 47 creatinine 1.23.  proBNP 3251, high sensitive troponin 92. Hemogram with gross leukocytosis with a WBC of 28.5, hemoglobin 14.2 hematocrit 42.8. Urinalysis with moderate leukocyte esterase, many bacteria moderate hemoglobin. Chest x-ray with findings most consistent with mild congestive heart failure. Left retrocardiac opacification/consolidation may represent a combination of pleural fluid and airspace disease. Underlying pneumonia not excluded       Review of Systems:     Review of Systems   Constitutional: Positive for activity change, chills, diaphoresis and fatigue. Negative for fever. HENT: Negative for congestion and ear discharge. Eyes: Negative for discharge. Respiratory: Positive for cough, shortness of breath and stridor. Cardiovascular: Positive for palpitations and leg swelling. Negative for chest pain. Gastrointestinal: Positive for abdominal distention and diarrhea. Negative for abdominal pain and constipation. Endocrine: Negative for cold intolerance and heat intolerance. Genitourinary: Negative for difficulty urinating, flank pain and frequency. Musculoskeletal: Positive for arthralgias, gait problem, joint swelling and myalgias.    Skin: Positive for pallor. Neurological: Negative for seizures, syncope and speech difficulty. Hematological: Negative for adenopathy. Psychiatric/Behavioral: Negative for agitation and behavioral problems. Medications: Allergies: Allergies   Allergen Reactions    Latex Hives     Hives from rubber gloves    Betadine [Povidone Iodine] Hives    Bee Venom Swelling    Dilaudid [Hydromorphone Hcl] Other (See Comments)     Severe confusion    Adhesive Tape Rash    Sulfa Antibiotics Rash       Current Meds:   Scheduled Meds:    vitamin C  500 mg Oral Daily    atorvastatin  20 mg Oral Daily    fluticasone  2 puff Inhalation BID    clopidogrel  75 mg Oral Daily    furosemide  20 mg Oral Daily    isosorbide mononitrate  30 mg Oral Daily    losartan  100 mg Oral Daily    metoprolol tartrate  50 mg Oral BID    Vitamin D  1,000 Units Oral Daily    sodium chloride flush  5-40 mL IntraVENous 2 times per day    cefTRIAXone (ROCEPHIN) IV  1,000 mg IntraVENous Q24H    azithromycin  500 mg IntraVENous Q24H    ipratropium-albuterol  1 ampule Inhalation Once     Continuous Infusions:    sodium chloride      dilTIAZem (CARDIZEM) 125 mg in dextrose 5% 125 mL infusion 15 mg/hr (10/03/21 0126)    heparin (PORCINE) Infusion 12.5 Units/kg/hr (10/03/21 0141)     PRN Meds: sodium chloride flush, sodium chloride, ondansetron **OR** ondansetron, polyethylene glycol, acetaminophen **OR** acetaminophen, albuterol, heparin (porcine), heparin (porcine)    Data:     Past Medical History:   has a past medical history of Asthma, COPD (chronic obstructive pulmonary disease) ([facility] [address] Gout, Hyperlipidemia, Hypertension, Mitral insufficiency, and Pneumonia. Social History:   reports that she has quit smoking. She has never used smokeless tobacco. She reports current alcohol use. She reports that she does not use drugs.      Family History:   Family History   Problem Relation Age of Onset    High Blood Pressure Mother     Cancer Mother     Asthma Father     Heart Disease Father     Cancer Father        Vitals:  BP (!) 103/59   Pulse 88   Temp 99 °F (37.2 °C) (Oral)   Resp (!) 34   Ht 5' 4\" (1.626 m)   Wt 272 lb (123.4 kg)   SpO2 92%   BMI 46.69 kg/m²   Temp (24hrs), Av °F (37.2 °C), Min:99 °F (37.2 °C), Max:99 °F (37.2 °C)    Recent Labs     10/02/21  2135   POCGLU 226*       I/O (24Hr): Intake/Output Summary (Last 24 hours) at 10/3/2021 1039  Last data filed at 10/3/2021 0880  Gross per 24 hour   Intake 300 ml   Output 600 ml   Net -300 ml       Labs:  Hematology:  Recent Labs     10/02/21  1734 10/03/21  0539   WBC 28.5*  --    RBC 4.63  --    HGB 14.2  --    HCT 42.8  --    MCV 92.4  --    MCH 30.7  --    MCHC 33.2  --    RDW 12.8  --    PLT See Reflexed IPF Result  --    MPV NOT REPORTED  --    INR 1.1 1.1     Chemistry:  Recent Labs     10/02/21  1734 10/02/21  1734 10/02/21  2005 10/03/21  0302 10/03/21  0539   *  --   --   --  130*   K 3.5*  --   --   --  3.4*   CL 90*  --   --   --  91*   CO2 21  --   --   --  23   GLUCOSE 159*  --   --   --  169*   BUN 47*  --   --   --  48*   CREATININE 1.23*  --   --   --  1.12*   MG 2.4  --   --   --  2.2   ANIONGAP 19*  --   --   --  16   LABGLOM 42*  --   --   --  47*   GFRAA 51*  --   --   --  57*   CALCIUM 9.2  --   --   --  8.7   PROBNP 3,251*  --   --   --  2,425*   TROPHS 96*   < > 92* 66* 62*    < > = values in this interval not displayed.      Recent Labs     10/02/21  1848 10/02/21  2135 10/03/21  0539   PROT  --   --  7.2   LABALBU  --   --  3.3*   TSH  --   --  0.35   AST  --   --  28   ALT  --   --  22   ALKPHOS  --   --  63   BILITOT  --   --  0.70   AMYLASE 7*  --   --    LIPASE 6*  --   --    POCGLU  --  226*  --      ABG:  Lab Results   Component Value Date    POCPH 7.370 10/02/2021    PH 7.44 2016    POCPCO2 44.7 10/02/2021    PCO2 36 2016    POCPO2 100.8 10/02/2021    PO2 64 2016    POCHCO3 25.8 10/02/2021    HCO3 24 2016    NBEA NOT REPORTED 10/02/2021    PBEA 0 10/02/2021    KYW4AWN NOT REPORTED 10/02/2021    STEZ4JZD 98 10/02/2021    O2SAT 93 03/30/2016    FIO2 40.0 10/02/2021     Lab Results   Component Value Date/Time    SPECIAL LTAC 10ML 10/02/2021 07:00 PM     Lab Results   Component Value Date/Time    CULTURE NO GROWTH 12 HOURS 10/02/2021 07:00 PM       Radiology:  XR CHEST PORTABLE    Result Date: 10/2/2021  Findings most consistent with mild congestive heart failure. Left retrocardiac opacification/consolidation may represent a combination of pleural fluid and airspace disease. Underlying pneumonia not excluded. Physical Examination:        Physical Exam  Constitutional:       General: She is in acute distress. Appearance: She is obese. She is ill-appearing and diaphoretic. HENT:      Head: Normocephalic and atraumatic. Right Ear: Tympanic membrane normal.      Left Ear: Tympanic membrane normal.      Nose: Nose normal.      Mouth/Throat:      Mouth: Mucous membranes are moist.      Pharynx: Oropharyngeal exudate present. Comments: Oral thrush  Eyes:      Extraocular Movements: Extraocular movements intact. Pupils: Pupils are equal, round, and reactive to light. Cardiovascular:      Rate and Rhythm: Tachycardia present. Rhythm irregular. Pulses: Normal pulses. Pulmonary:      Effort: Respiratory distress present. Breath sounds: Wheezing, rhonchi and rales present. Abdominal:      General: Bowel sounds are normal. There is distension. Palpations: Abdomen is soft. There is no mass. Tenderness: There is no abdominal tenderness. There is no guarding. Hernia: No hernia is present. Musculoskeletal:         General: Swelling present. Right lower leg: Edema present. Left lower leg: Edema present. Comments: Left extremity more swollen than rt   Skin:     General: Skin is warm. Neurological:      General: No focal deficit present.       Mental Status: She is alert and oriented to person, place, and time. Psychiatric:         Mood and Affect: Mood normal.         Behavior: Behavior normal.         Assessment:        Hospital Problems         Last Modified POA    * (Principal) Sepsis with acute hypoxic respiratory failure without septic shock ([address] 10/3/2021 Yes    Benign essential HTN 10/2/2021 Yes    Morbid obesity with BMI of 40.0-44.9, adult ([address] 10/2/2021 Yes    COPD exacerbation ([address] 10/3/2021 Yes    Acute respiratory failure with hypoxia ([address] 10/3/2021 Yes    Atrial fibrillation with RVR ([address] 10/2/2021 Yes    Pneumonia of left lower lobe due to infectious organism 10/2/2021 Yes    Acute on chronic diastolic heart failure ([address] 10/3/2021 Yes    UTI (urinary tract infection) 10/3/2021 Yes    Hyponatremia 10/3/2021 Yes    Hypokalemia 10/3/2021 Yes    Elevated troponin 10/3/2021 Yes          Plan:        Sepsis with community-acquired pneumonia with leukocytosis -start Rocephin and azithromycin, x-ray shows congestion with questionable pneumonia, will obtain CT scan of the chest to better define lung pathology. Obtain Legionella, mycoplasma, strep pneumoniae. Obtain respiratory cultures. With clinical picture of pneumonia, hyponatremia, diarrhea high suspicion for Legionella. Obtain blood cultures. Acute respiratory failure with hypoxia with COPD exacerbation-likely secondary to diastolic heart failure and pneumonia-continue antibiotics, continue gentle diuresis until tolerated by blood pressure, continue BiPAP. Breathing treatments with DuoNeb and Symbicort. A. fib with RVR-new onset, on Cardizem drip, wean off as tolerated, resume Lopressor 50 mg twice daily, heparin gtt, obtain echo. Keep on telemetry    Acute on chronic diastolic heart failure secondary to A. fib with RVR, pneumonia-concern for sepsis, gentle diuresis with 20 of Lasix, hold additional IV fluids. Continue BiPAP intermittently during the day and overnight. Obtain echo.   Continue to trend troponin. UTI-follow urine culture, patient already on Rocephin. Hypertension-on Lopressor, Cozaar 100, Imdur 30. Elevated troponin likely secondary to the chronic ischemia -continue to trend, obtain echo    Hyponatremia-continue to monitor with BMP    Hypokalemia-replace potassium 40 mEq    dvt prop- already on heparin    Patient remains critically sick, requiring BiPAP.   Will evaluate with CT chest.    Deedee Vickers MD  10/3/2021  10:39 AM

## 2021-10-03 NOTE — PLAN OF CARE
Problem: Non-Violent Restraints  Goal: Removal from restraints as soon as assessed to be safe  Outcome: Ongoing  Goal: No harm/injury to patient while restraints in use  Outcome: Ongoing  Goal: Patient's dignity will be maintained  Outcome: Ongoing     Problem: Skin Integrity:  Goal: Will show no infection signs and symptoms  Description: Will show no infection signs and symptoms  Outcome: Ongoing  Goal: Absence of new skin breakdown  Description: Absence of new skin breakdown  Outcome: Ongoing     Problem: Falls - Risk of:  Goal: Will remain free from falls  Description: Will remain free from falls  Outcome: Ongoing  Goal: Absence of physical injury  Description: Absence of physical injury  Outcome: Ongoing     Problem: Respiratory:  Goal: Ability to maintain normal respiratory secretions will improve  Description: Ability to maintain normal respiratory secretions will improve  Outcome: Ongoing

## 2021-10-03 NOTE — H&P
[facility]  Office: [address], DO, Lorloly Din, DO, George Acron, DO, Irvin May Blood, DO, Scottie Garcia MD, Enrike Daly MD, Kitty Inman MD, Marco A Johnson MD, Rachna Asencio MD, Merlin Sands MD, Pete Dawson MD, Poncho Murillo, DO, Joselo Randall, DO, Talisha Corea MD,  Frank Mccain, DO, Jorge Rivera MD, Natalie Mckinley MD, Alejandra Garvey MD, Mor La MD, , Renan Arevalo MD, Ethan Geiger MD, Rufina De La Torre MD, Danika Mckay, [facility], [facility] Martita, CNP, Wilma Flores, CNP, Paula Avalos, CNS, Raquel Pang, CNP, Adelia Alpers, CNP, Remy Fierro, CNP, Humaira Gayle, CNP, Sindhu Peraza, CNP, Ana Camacho PA-C, Alicia Peterson, [facility], Edouard Cruz, CNP, Hermann Zhao, CNP, Cortez Cai, CNP, Savana Pat, CNP, Mary Ann Narayanan, CNP, Brittany Carlin, CNP, Nona Andersen, CNP, Demario Burgess, [address]    HISTORY AND PHYSICAL EXAMINATION            Date:   10/2/2021  Patient name:  Lakisha Garcia  Date of admission:  10/2/2021  5:17 PM  MRN:   [id number]  Account:  [de-identified]  YOB: 1945  PCP:    KALYN Krishnamurthy CNP  Room:   23/23  Code Status:    Prior    Chief Complaint:     Chief Complaint   Patient presents with    Irregular Heart Beat     SVT, adenosine x2 without conversion, 20mg cardizem without conversion       History Obtained From:     electronic medical record    History of Present Illness:     Lakisha Garcia is a 68 y.o. Non- / non  female who presents with Irregular Heart Beat (SVT, adenosine x2 without conversion, 20mg cardizem without conversion)   and is admitted to the hospital for the management of Acute respiratory failure with hypoxia [facility])     Patient was brought to the Er for the concen of shortness of breath.   Patient called EMS and upon arrival patient ws found to be in svt with a rate of 200 bpm and hypoxic with saturations in the 80's on Room Air. She was given 6, 12 mg of adenosine without chemical conversion and then was given 20 mg of cardizem and converted briefly then HR returned to 180's. . Patient was transorted to [facility] for further medical care  Upon arrival patient was tachypenic, diaphoretic and placed on additional supplemental oxygen. Patient work up showed grossly abnormal electrolytes with sodium 130, potassium 3.5, chloride 90, CO2 21, BUN 47 creatinine 1.23.  proBNP 3251, high sensitive troponin 92. Hemogram with gross leukocytosis with a WBC of 28.5, hemoglobin 14.2 hematocrit 42.8. Urinalysis with moderate leukocyte esterase, many bacteria moderate hemoglobin. Chest x-ray with Findings most consistent with mild congestive heart failure. Left retrocardiac opacification/consolidation may represent a combination of pleural fluid and airspace disease. Underlying pneumonia not excluded        Past Medical History:     Past Medical History:   Diagnosis Date    Asthma     COPD (chronic obstructive pulmonary disease) ([facility] [address]     Gout     Hyperlipidemia     Hypertension     Mitral insufficiency     Pneumonia         Past Surgical History:     Past Surgical History:   Procedure Laterality Date    ANKLE SURGERY Left 03/22/2016    ORIF    COLONOSCOPY      DENTAL SURGERY      HYSTERECTOMY  1986    KNEE SURGERY      [facility] COLSC FLX W/RMVL OF TUMOR POLYP LESION SNARE TQ N/A 8/8/2017    COLONOSCOPY POLYPECTOMY SNARE/COLD BIOPSY performed by Lan Queen MD at [place] [address]          Medications Prior to Admission:     Prior to Admission medications    Medication Sig Start Date End Date Taking? Authorizing Provider   losartan (COZAAR) 100 MG tablet Take 1 tablet by mouth daily 8/28/18   Jakub Tanner MD   predniSONE (DELTASONE) 20 MG tablet Take 1 tab BID x 5 days, then 1 tab daily x 5 days. Take with food.  2/27/18   Jakub Tanner MD   meloxicam (MOBIC) 15 MG tablet Take 15 mg by mouth daily    Historical Provider, MD   atorvastatin (LIPITOR) 20 MG tablet Take 1 tablet by mouth daily 10/5/17   Natacha Jones MD   metoprolol tartrate (LOPRESSOR) 50 MG tablet TAKE ONE TABLET BY MOUTH TWICE DAILY 10/5/17   Natacha Jones MD   chlorthalidone (HYGROTON) 25 MG tablet Take 1 tablet by mouth daily 10/5/17   Natacha Jones MD   clopidogrel (PLAVIX) 75 MG tablet Take 1 tablet by mouth daily 10/5/17   Natacha Jones MD   cyclobenzaprine (FLEXERIL) 10 MG tablet TAKE ONE TABLET BY MOUTH THREE TIMES DAILY AS NEEDED FOR MUSCLE SPASM 10/5/17   Natacha Jones MD   diphenhydrAMINE (BENADRYL) 25 MG capsule Take 25 mg by mouth every 6 hours as needed for Itching    Historical Provider, MD   zinc oxide (DESITIN) 40 % ointment Apply topically as needed for Dry Skin Apply topically as needed.     Historical Provider, MD   furosemide (LASIX) 20 MG tablet Take 1 tablet by mouth daily 7/14/17   Natacha Jones MD   isosorbide mononitrate (IMDUR) 30 MG extended release tablet Take 1 tablet by mouth daily 7/14/17   Natacha Jones MD   albuterol sulfate  (90 Base) MCG/ACT inhaler Inhale 2 puffs into the lungs every 6 hours as needed for Wheezing 7/14/17   Natacha Jones MD   Calcium-Magnesium-Zinc (FLAKITO-MAG-ZINC PO) Take by mouth daily    Historical Provider, MD   Calcium Carb-Cholecalciferol (CALCIUM + D3 PO) Take by mouth daily    Historical Provider, MD   CINNAMON PO Take by mouth daily    Historical Provider, MD   TURMERIC CURCUMIN PO Take by mouth daily    Historical Provider, MD   ipratropium-albuterol (DUONEB) 0.5-2.5 (3) MG/3ML SOLN nebulizer solution Inhale 1 vial into the lungs every 4 hours    Historical Provider, MD   beclomethasone (QVAR) 80 MCG/ACT inhaler Inhale 2 puffs into the lungs 2 times daily 6/17/16   Natacha Jones MD   Acetaminophen 650 MG TABS Take 650 mg by mouth every 4 hours as needed for Pain or Fever 4/4/16   Usha Knox MD   clonazePAM (KLONOPIN) 1 MG tablet Take 1 mg by mouth nightly     Historical Provider, MD   citalopram (CELEXA) 40 MG tablet Take 40 mg by mouth daily as needed    Historical Provider, MD   LYSINE PO Take by mouth daily    Historical Provider, MD   vitamin D (CHOLECALCIFEROL) 1000 UNIT TABS tablet Take 1,000 Units by mouth daily    Historical Provider, MD   Pyridoxine HCl (VITAMIN B-6) 50 MG tablet Take 100 mg by mouth daily    Historical Provider, MD   Cyanocobalamin (VITAMIN B 12 PO) Take by mouth daily    Historical Provider, MD   Magnesium 500 MG TABS Take by mouth    Historical Provider, MD   Ascorbic Acid (VITAMIN C) 500 MG tablet Take 500 mg by mouth daily    Historical Provider, MD        Allergies:     Latex, Betadine [povidone iodine], Bee venom, Dilaudid [hydromorphone hcl], Adhesive tape, and Sulfa antibiotics    Social History:     Tobacco:    reports that she has quit smoking. She has never used smokeless tobacco.  Alcohol:      reports current alcohol use. Drug Use:  reports no history of drug use. Family History:     Family History   Problem Relation Age of Onset    High Blood Pressure Mother     Cancer Mother     Asthma Father     Heart Disease Father     Cancer Father        Review of Systems:     Positive and Negative as described in HPI. Review of Systems   Unable to perform ROS: Severe respiratory distress (on bipap)       Physical Exam:   /67   Pulse 126   Temp 99 °F (37.2 °C) (Oral)   Resp 29   Wt 260 lb (117.9 kg)   SpO2 97%   BMI 46.80 kg/m²   Temp (24hrs), Av °F (37.2 °C), Min:99 °F (37.2 °C), Max:99 °F (37.2 °C)    No results for input(s): POCGLU in the last 72 hours. Intake/Output Summary (Last 24 hours) at 10/2/2021 2031  Last data filed at 10/2/2021 1941  Gross per 24 hour   Intake 300 ml   Output --   Net 300 ml       Physical Exam  Vitals and nursing note reviewed. Constitutional:       General: She is in acute distress. Appearance: She is well-developed.  She deficit.    Psychiatric:         Speech: Speech normal.         Behavior: Behavior normal.         Investigations:      Laboratory Testing:  Recent Results (from the past 24 hour(s))   CBC Auto Differential    Collection Time: 10/02/21  5:34 PM   Result Value Ref Range    WBC 28.5 (H) 3.5 - 11.3 k/uL    RBC 4.63 3.95 - 5.11 m/uL    Hemoglobin 14.2 11.9 - 15.1 g/dL    Hematocrit 42.8 36.3 - 47.1 %    MCV 92.4 82.6 - 102.9 fL    MCH 30.7 25.2 - 33.5 pg    MCHC 33.2 28.4 - 34.8 g/dL    RDW 12.8 11.8 - 14.4 %    Platelets See Reflexed IPF Result 138 - 453 k/uL    MPV NOT REPORTED 8.1 - 13.5 fL    NRBC Automated 0.0 0.0 per 100 WBC    Differential Type NOT REPORTED     WBC Morphology NOT REPORTED     RBC Morphology NOT REPORTED     Platelet Estimate NOT REPORTED     Immature Granulocytes 0 0 %    Seg Neutrophils 88 (H) 36 - 66 %    Lymphocytes 2 (L) 24 - 44 %    Monocytes 10 (H) 1 - 7 %    Eosinophils % 0 (L) 1 - 4 %    Basophils 0 0 - 2 %    Absolute Immature Granulocyte 0.00 0.00 - 0.30 k/uL    Segs Absolute 25.08 (H) 1.8 - 7.7 k/uL    Absolute Lymph # 0.57 (L) 1.0 - 4.8 k/uL    Absolute Mono # 2.85 (H) 0.1 - 0.8 k/uL    Absolute Eos # 0.00 0.0 - 0.4 k/uL    Basophils Absolute 0.00 0.0 - 0.2 k/uL    Morphology Normal    Basic Metabolic Panel w/ Reflex to MG    Collection Time: 10/02/21  5:34 PM   Result Value Ref Range    Glucose 159 (H) 70 - 99 mg/dL    BUN 47 (H) 8 - 23 mg/dL    CREATININE 1.23 (H) 0.50 - 0.90 mg/dL    Bun/Cre Ratio NOT REPORTED 9 - 20    Calcium 9.2 8.6 - 10.4 mg/dL    Sodium 130 (L) 135 - 144 mmol/L    Potassium 3.5 (L) 3.7 - 5.3 mmol/L    Chloride 90 (L) 98 - 107 mmol/L    CO2 21 20 - 31 mmol/L    Anion Gap 19 (H) 9 - 17 mmol/L    GFR Non-African American 42 (L) >60 mL/min    GFR  51 (L) >60 mL/min    GFR Comment          GFR Staging NOT REPORTED    Troponin    Collection Time: 10/02/21  5:34 PM   Result Value Ref Range    Troponin, High Sensitivity 96 (HH) 0 - 14 ng/L    Troponin T PROVIDED     Peep/Cpap NOT REPORTED     PSV NOT REPORTED     Text for Respiratory NOT REPORTED     NOTIFICATION NOT REPORTED     NOTIFICATION TIME NOT REPORTED    Lactate, Sepsis    Collection Time: 10/02/21  6:48 PM   Result Value Ref Range    Lactic Acid, Sepsis NOT REPORTED 0.5 - 1.9 mmol/L    Lactic Acid, Sepsis, Whole Blood 1.6 0.5 - 1.9 mmol/L   Lipase    Collection Time: 10/02/21  6:48 PM   Result Value Ref Range    Lipase 6 (L) 13 - 60 U/L   Amylase    Collection Time: 10/02/21  6:48 PM   Result Value Ref Range    Amylase 7 (L) 28 - 100 U/L   Blood Gas, Venous    Collection Time: 10/02/21  8:05 PM   Result Value Ref Range    pH, Jose 7.322 7.320 - 7.420    pCO2, Jose 47.6 39 - 55    pO2, Jose 61.1 (H) 30 - 50    HCO3, Venous 23.9 (L) 24 - 30 mmol/L    Positive Base Excess, Jose NOT REPORTED 0.0 - 2.0 mmol/L    Negative Base Excess, Jose 2.0 0.0 - 2.0 mmol/L    O2 Sat, Jose 90.5 (H) 60.0 - 85.0 %    Total Hb NOT REPORTED 12.0 - 16.0 g/dl    Oxyhemoglobin NOT REPORTED 95.0 - 98.0 %    Carboxyhemoglobin 1.5 0 - 5 %    Methemoglobin NOT REPORTED 0.0 - 1.5 %    Pt Temp 37.0     pH, Jose, Temp Adj NOT REPORTED 7.320 - 7.420    pCO2, Jose, Temp Adj NOT REPORTED 39 - 55 mmHg    pO2, Jose, Temp Adj NOT REPORTED 30 - 50 mmHg    O2 Device/Flow/% NOT REPORTED     Respiratory Rate NOT REPORTED     Jake Test NOT REPORTED     Sample Site NOT REPORTED     Pt. Position NOT REPORTED     Mode NOT REPORTED     Set Rate NOT REPORTED     Total Rate NOT REPORTED     VT NOT REPORTED     FIO2 INFORMATION NOT PROVIDED     Peep/Cpap NOT REPORTED     PSV NOT REPORTED     Text for Respiratory NOT REPORTED     NOTIFICATION NOT REPORTED     NOTIFICATION TIME NOT REPORTED        Imaging/Diagnostics:  XR CHEST PORTABLE    Result Date: 10/2/2021  Findings most consistent with mild congestive heart failure. Left retrocardiac opacification/consolidation may represent a combination of pleural fluid and airspace disease.   Underlying pneumonia not to Dr. Peace Blank, APRN - CNP

## 2021-10-03 NOTE — PROGRESS NOTES
[facility]  Office: [address], DO, Ana Pole, DO, Delmis Saraland, DO, Eugenio Linton Blood, DO, Hay Wadsworth MD, Kecia Burnette MD, Miguelangel White MD, Jenniffer Murphy MD, Sanju Jolley MD, Nolvia Kebede MD, Dolores Ramirez MD, Kun Self, DO, Myles Zapata, DO, Marce Garcia MD,  Nicolette Byrne, DO, Esperanza Salinas MD, Pravin Link MD, Cristi Chairez MD, Wes Newsome MD, , Cayden Garcia MD, Juan Diego Pradhan MD, Park Holland MD, Jillian Spann, CNP, [facility], CNP, Santa Duke, CNP, Tessy Zelaya, CNS, Gilberto Swan, Taisha Watkins, CNP, Famaida Gibbons, CNP, Colten Edgar, CNP, Jayjay Sanchez, CNP, Kavon Sotelo PA-C, Morris Borrego, [facility], Hannah Amador, CNP, Genevieve Turner, CNP, Wilton Larry, CNP, Hank Vargas, CNP, Sang Guerrero, CNP, Jose Luis Cortez, CNP, Cristobal Thomas, CNP, Owen Oneal, [address] [place] [address]    Second Visit Note  For more detailed information please refer to the progress note of the day      10/3/2021    12:20 PM    Name:   Elisha Sanders  MRN:     [id number]     Acct:      [de-identified]   Room:   23/23   Day:  1  Admit Date:  10/2/2021  5:17 PM    PCP:   KALYN Esteves CNP  Code Status:  Full Code      Pt vitals were reviewed   New labs were reviewed    Patient was seen at bedside, patient has been confused. She is still tachypneic and tachycardic. Blood pressure is low  systolic. Patient has difficulty speaking in complete sentences. She does answer orientation questions but few words not makes sense. CT scan reviewed, has bilateral pleural effusions, moderate pericardial effusion. WBC is up to 32,000. Updated plan : Moderate pericardial effusion-discussed with cardiology, plan to do stat echo.   Blood pressure is  systolic, 808FQ bolus ordered    Sepsis-CT chest does not show much evidence of pneumonia, WBC [place] elevated to [address], will change antibiotics to cefepime and vancomycin. Consult infectious disease. Check CRP, repeat blood cultures x2. Viral panel negative. Acute diastolic heart failure-patient has bilateral pleural effusion, needs diuresis but sepsis needs to be ruled out, 500 mL fluid given for hypotension. Continue to evaluate for Lasix later her blood pressure is stable. Monitor closely  Patient is high risk of decompensation. Critical care consulted  Discussed with critical care resident, patient is sick and stepdown beds are not available.     Benny Berger MD  10/3/2021  12:20 PM

## 2021-10-03 NOTE — ED NOTES
Writer enters room  Pt. Found to be at end of bed, monitoring equipment removed  Pt. Educated pt. Needs to remain on monitor  Many attempts at verbal redirection unsuccessful   Pt.  Repositioned back in bed by  and Abdulaziz Vivas RN  Primary service notified      Adrián Perdomo RN  10/03/21 0599

## 2021-10-03 NOTE — H&P
Critical Care - History and Physical Examination    Patient's name:  Chastity Marques [facility] Record Number: [id number]  Patient's account/billing number: [de-identified]  Patient's YOB: 1945  Age: 68 y.o. Date of Admission: 10/2/2021  5:17 PM  Date of History and Physical Examination: 10/3/2021    Primary Care Physician: KALYN Carvajal CNP  Attending Physician: Dr. Graciela Merlos Status: Full Code    Chief complaint:   Chief Complaint   Patient presents with    Irregular Heart Beat     SVT, adenosine x2 without conversion, 20mg cardizem without conversion     HISTORY OF PRESENT ILLNESS:      History was obtained from chart review and the patient. Nedra Malcolm is a 68 y.o. with PMH of   -Essential hypertension-Cozaar 100 mg daily, Lipitor 20 daily, Lopressor 50 twice daily, Plavix daily, Lasix 20 daily, Imdur 30 daily  -COPD-former smoker, and also has secondhand smoke exposure at home. On Symbicort and Spiriva and DuoNeb nebulizer as needed. -CHF-Plavix, beta-blocker, Lasix, chlorthalidone-last echo 2017-EF 37%, grade 1 diastolic dysfunction in 7989  -Chronic arthritis-on meloxicam, Tylenol, Flexeril  -Mood disorder-Klonopin and Celexa  -Morbid obesity  -SEJAL-noncompliance with BiPAP    Patient came in with chief complaint of shortness of breath and palpitations. Patient stated that the shortness of breath started about 5 days ago and was associated with increased sputum production and cough, worsened with exertion, denied any orthopnea or PND, any recent travels or any recent flulike symptoms or any recent sick contacts. Patient denies any chest pain. Patient denies any recent changes in medications, missing any medications, compliance with diet. Patient also reported that she has been feeling that her legs are becoming more swollen in the last few days.   Patient states that she started using her inhalers and nebulizers which did not provide any relief and her breathing status worsened. Yesterday patient also started having some palpitations, denied any syncope or collapse. Because of worsening respiratory status and shortness of breath and palpitations, patient son called EMS. Patient was found to be hypoxic and tachycardic with initial rhythm of SVT.  EMS. Patient was put on nasal cannula and 2 doses of adenosine were given and patient was transferred to ER for further management. Initial vitals show tachypnea with respiratory rate in 30s, heart rate in 150s or 160s, hypertension. Labs at time of admission showed no lactic acidosis, elevated proBNP in 3000 which is above patient's baseline, tropes were flat. Other labs showed CASEY with creatinine of 1.23, blood sugars were slightly elevated. TSH is normal.  Patient did have some leukocytosis. PT/INR 11.4/1.1. Covid test were negative  UA showed moderate leukocyte esterase and many bacteriuria. Patient was put on BiPAP. Initial EKG in ER showed A. fib with RVR, patient received 20 IV Cardizem without any response received digoxin. Cardiology was consulted and patient was given Cardizem bolus and started on Cardizem drip and heparin drip. Initial concerns for sepsis and patient received fluid bolus, ceftriaxone and azithromycin. Patient also received Lasix because of CHF findings on chest x-ray. Patient was also being treated for COPD exacerbation, received steroids and bronchodilators. Patient also received Ativan overnight. Per nursing staff, patient became more altered and hypotensive this morning and hence ICU was consulted. On my evaluation, patient afebrile, tachypneic, pulse in 90s, blood pressure controlled. Patient continues to be on heparin and Cardizem drip. Patient was on 4 L of oxygen via nasal cannula and saturating well. Labs this morning showed mild hyponatremia likely dilutional, mild hypokalemia likely due to Lasix. CASEY improved. Patient has normal baseline creatinine.   CT chest Take 1 tablet by mouth daily 10/5/17   Kat Gonzalez MD   clopidogrel (PLAVIX) 75 MG tablet Take 1 tablet by mouth daily 10/5/17   Kat Gonzalez MD   cyclobenzaprine (FLEXERIL) 10 MG tablet TAKE ONE TABLET BY MOUTH THREE TIMES DAILY AS NEEDED FOR MUSCLE SPASM 10/5/17   Kat Gonzalez MD   diphenhydrAMINE (BENADRYL) 25 MG capsule Take 25 mg by mouth every 6 hours as needed for Itching    Historical Provider, MD   zinc oxide (DESITIN) 40 % ointment Apply topically as needed for Dry Skin Apply topically as needed.     Historical Provider, MD   furosemide (LASIX) 20 MG tablet Take 1 tablet by mouth daily 7/14/17   Kat Gonzalez MD   isosorbide mononitrate (IMDUR) 30 MG extended release tablet Take 1 tablet by mouth daily 7/14/17   Kat Gonzalez MD   albuterol sulfate  (90 Base) MCG/ACT inhaler Inhale 2 puffs into the lungs every 6 hours as needed for Wheezing 7/14/17   Kat Gonzalez MD   Calcium-Magnesium-Zinc (FLAKITO-MAG-ZINC PO) Take by mouth daily    Historical Provider, MD   Calcium Carb-Cholecalciferol (CALCIUM + D3 PO) Take by mouth daily    Historical Provider, MD   CINNAMON PO Take by mouth daily    Historical Provider, MD   TURMERIC CURCUMIN PO Take by mouth daily    Historical Provider, MD   ipratropium-albuterol (DUONEB) 0.5-2.5 (3) MG/3ML SOLN nebulizer solution Inhale 1 vial into the lungs every 4 hours    Historical Provider, MD   beclomethasone (QVAR) 80 MCG/ACT inhaler Inhale 2 puffs into the lungs 2 times daily 6/17/16   Kat Gonzalez MD   Acetaminophen 650 MG TABS Take 650 mg by mouth every 4 hours as needed for Pain or Fever 4/4/16   Adrianne Hardin MD   clonazePAM (KLONOPIN) 1 MG tablet Take 1 mg by mouth nightly     Historical Provider, MD   citalopram (CELEXA) 40 MG tablet Take 40 mg by mouth daily as needed    Historical Provider, MD   LYSINE PO Take by mouth daily    Historical Provider, MD   vitamin D (CHOLECALCIFEROL) 1000 UNIT TABS tablet Take 1,000 Units by mouth daily    Historical Provider, MD   Pyridoxine HCl (VITAMIN B-6) 50 MG tablet Take 100 mg by mouth daily    Historical Provider, MD   Cyanocobalamin (VITAMIN B 12 PO) Take by mouth daily    Historical Provider, MD   Magnesium 500 MG TABS Take by mouth    Historical Provider, MD   Ascorbic Acid (VITAMIN C) 500 MG tablet Take 500 mg by mouth daily    Historical Provider, MD       SOCIAL HISTORY:       TOBACCO:   reports that she has quit smoking. She has never used smokeless tobacco.  ETOH:   reports current alcohol use. DRUGS:  reports no history of drug use. FAMILY HISTORY:          Problem Relation Age of Onset    High Blood Pressure Mother     Cancer Mother     Asthma Father     Heart Disease Father     Cancer Father        REVIEW OF SYSTEMS (ROS):     Review of Systems   Constitutional: Positive for fatigue. Negative for appetite change, chills, fever and unexpected weight change. HENT: Negative for hearing loss, postnasal drip, rhinorrhea, sinus pressure, sore throat and trouble swallowing. Eyes: Negative. Respiratory: Positive for cough, shortness of breath and wheezing. Negative for choking and chest tightness. Cardiovascular: Positive for palpitations and leg swelling. Gastrointestinal: Positive for constipation. Negative for abdominal pain, diarrhea and nausea. Endocrine: Negative. Genitourinary: Negative for difficulty urinating, dysuria, frequency, hematuria and urgency. Skin: Negative. Allergic/Immunologic: Negative. Neurological: Negative for tremors, syncope, light-headedness and headaches. Psychiatric/Behavioral: Negative.           OBJECTIVE:     VITAL SIGNS:  BP 90/67   Pulse 97   Temp 99 °F (37.2 °C) (Oral)   Resp 30   Ht 5' 4\" (1.626 m)   Wt 272 lb (123.4 kg)   SpO2 97%   BMI 46.69 kg/m²   Tmax over 24 hours:  Temp (24hrs), Av °F (37.2 °C), Min:99 °F (37.2 °C), Max:99 °F (37.2 °C)      Patient Vitals for the past 8 hrs:   BP Pulse Resp SpO2   10/03/21 1432 90/67 97 30 97 %   10/03/21 1401 (!) 106/56 95 27 95 %   10/03/21 1331 106/70 105 (!) 31 98 %   10/03/21 1327 -- -- (!) 32 --   10/03/21 1232 113/60 93 (!) 37 96 %   10/03/21 1136 -- -- -- 97 %   10/03/21 1131 97/82 103 (!) 38 --   10/03/21 1105 -- 98 (!) 32 96 %   10/03/21 1102 108/82 -- -- 98 %   10/03/21 1032 126/73 90 -- 94 %   10/03/21 0902 (!) 103/59 88 (!) 34 92 %   10/03/21 0858 -- -- (!) 36 98 %   10/03/21 0857 -- -- 26 --   10/03/21 0855 -- -- 28 --   10/03/21 0817 93/73 95 26 98 %         Intake/Output Summary (Last 24 hours) at 10/3/2021 1459  Last data filed at 10/3/2021 8849  Gross per 24 hour   Intake 300 ml   Output 600 ml   Net -300 ml     Date 10/03/21 0000 - 10/03/21 2359   Shift 4229-2565 9629-6634 1475-4037 24 Hour Total   INTAKE   Shift Total(mL/kg)       OUTPUT   Urine(mL/kg/hr) 400(0.4)   400   Shift Total(mL/kg) 400(3.2)   400(3.2)   Weight (kg) 123.4 123.4 123.4 123.4     Wt Readings from Last 3 Encounters:   10/03/21 272 lb (123.4 kg)   02/27/18 218 lb 6.4 oz (99.1 kg)   10/05/17 232 lb (105.2 kg)     Body mass index is 46.69 kg/m². PHYSICAL EXAM:  Physical Exam  Constitutional:       General: She is in acute distress. Appearance: She is obese. HENT:      Head: Normocephalic. Nose: Nose normal.      Mouth/Throat:      Mouth: Mucous membranes are moist.   Eyes:      Extraocular Movements: Extraocular movements intact. Pupils: Pupils are equal, round, and reactive to light. Cardiovascular:      Rate and Rhythm: Tachycardia present. Rhythm irregular. Heart sounds: No murmur heard. No friction rub. Pulmonary:      Effort: Respiratory distress present. Breath sounds: Rhonchi present. Comments: Diminished breath sounds bilaterally. Abdominal:      General: Bowel sounds are normal.      Palpations: Abdomen is soft. Musculoskeletal:         General: Normal range of motion. Cervical back: Normal range of motion.       Right lower leg: Edema present. Left lower leg: Edema present. Skin:     Capillary Refill: Capillary refill takes less than 2 seconds. Coloration: Skin is not jaundiced. Neurological:      General: No focal deficit present. Mental Status: Mental status is at baseline.        MEDICATIONS:  Scheduled Meds:   atorvastatin  20 mg Oral Daily    clopidogrel  75 mg Oral Daily    [Held by provider] furosemide  20 mg Oral Daily    isosorbide mononitrate  30 mg Oral Daily    metoprolol tartrate  50 mg Oral BID    Vitamin D  1,000 Units Oral Daily    sodium chloride flush  5-40 mL IntraVENous 2 times per day    cefepime  1,000 mg IntraVENous Q12H    vancomycin  2,000 mg IntraVENous Q18H     Continuous Infusions:   sodium chloride      dilTIAZem (CARDIZEM) 125 mg in dextrose 5% 125 mL infusion 15 mg/hr (10/03/21 0126)    heparin (PORCINE) Infusion 14.5 Units/kg/hr (10/03/21 1400)     PRN Meds:   sodium chloride flush, 10 mL, PRN  sodium chloride, 25 mL, PRN  ondansetron, 4 mg, Q8H PRN   Or  ondansetron, 4 mg, Q6H PRN  polyethylene glycol, 17 g, Daily PRN  acetaminophen, 650 mg, Q6H PRN   Or  acetaminophen, 650 mg, Q6H PRN  albuterol, 2.5 mg, As Directed RT PRN  heparin (porcine), 4,000 Units, PRN  heparin (porcine), 2,000 Units, PRN          ABGs:   Lab Results   Component Value Date    PH 7.44 03/30/2016    PCO2 36 03/30/2016    PO2 64 03/30/2016    HCO3 24 03/30/2016    SYM9ISG NOT REPORTED 10/02/2021    O2SAT 93 03/30/2016    FIO2 NOT REPORTED 10/03/2021       DATA:  Complete Blood Count:   Recent Labs     10/02/21  1734 10/03/21  1107   WBC 28.5* 32.8*   RBC 4.63 3.86*   HGB 14.2 12.0   HCT 42.8 37.4   MCV 92.4 96.9   MCH 30.7 31.1   MCHC 33.2 32.1   RDW 12.8 15.3*   PLT See Reflexed IPF Result 850*   MPV NOT REPORTED 9.8        Last 3 Blood Glucose:   Recent Labs     10/02/21  1734 10/03/21  0539   GLUCOSE 159* 169*        PT/INR:    Lab Results   Component Value Date    PROTIME 11.4 10/03/2021 INR 1.1 10/03/2021     PTT:    Lab Results   Component Value Date    APTT 45.7 10/03/2021    APTT 38.1 03/21/2016       Comprehensive Metabolic Profile:   Recent Labs     10/02/21  1734 10/03/21  0539 10/03/21  1231   * 130*  --    K 3.5* 3.4*  --    CL 90* 91*  --    CO2 21 23  --    BUN 47* 48*  --    CREATININE 1.23* 1.12* 1.10   GLUCOSE 159* 169*  --    CALCIUM 9.2 8.7  --    PROT  --  7.2  --    LABALBU  --  3.3*  --    BILITOT  --  0.70  --    ALKPHOS  --  63  --    AST  --  28  --    ALT  --  22  --       Magnesium:   Lab Results   Component Value Date    MG 2.2 10/03/2021    MG 2.4 10/02/2021    MG 2.3 03/30/2016     Phosphorus:   Lab Results   Component Value Date    PHOS 2.6 03/25/2016     Ionized Calcium:   Lab Results   Component Value Date    CAION 1.13 03/25/2016        Urinalysis:   Lab Results   Component Value Date    NITRU NEGATIVE 10/02/2021    COLORU Yellow 10/02/2021    PHUR 5.0 10/02/2021    LABCAST 4-8 Hyaline 03/22/2016    LABCAST NONE SEEN 03/22/2016    WBCUA 20 TO 50 10/02/2021    RBCUA 5 TO 10 10/02/2021    RBCUA 25-50 03/22/2016    MUCUS NOT REPORTED 10/02/2021    TRICHOMONAS NOT REPORTED 10/02/2021    YEAST NOT REPORTED 10/02/2021    BACTERIA MANY 10/02/2021    SPECGRAV 1.014 10/02/2021    LEUKOCYTESUR MODERATE 10/02/2021    UROBILINOGEN Normal 10/02/2021    BILIRUBINUR NEGATIVE 10/02/2021    BLOODU LARGE 03/22/2016    GLUCOSEU NEGATIVE 10/02/2021    KETUA NEGATIVE 10/02/2021    AMORPHOUS NOT REPORTED 10/02/2021       HgBA1c:  No results found for: LABA1C  TSH:    Lab Results   Component Value Date    TSH 0.35 10/03/2021       Lactic Acid:   Lab Results   Component Value Date    LACTA 0.9 03/23/2016      Troponin: No results for input(s): TROPONINI in the last 72 hours.     Radiological imaging  CT CHEST WO CONTRAST    Result Date: 10/3/2021  EXAMINATION: CT OF THE CHEST WITHOUT CONTRAST 10/3/2021 9:39 am TECHNIQUE: CT of the chest was performed without the administration of intravenous contrast. Multiplanar reformatted images are provided for review. Dose modulation, iterative reconstruction, and/or weight based adjustment of the mA/kV was utilized to reduce the radiation dose to as low as reasonably achievable. COMPARISON: None. HISTORY: ORDERING SYSTEM PROVIDED HISTORY: EVALUATE INFILTRATES ON CXR, LEFT SIDED CONSOLIDATION TECHNOLOGIST PROVIDED HISTORY: EVALUATE INFILTRATES ON CXR, LEFT SIDED CONSOLIDATION Reason for Exam: EVALUATE INFILTRATES ON CXR, LEFT SIDED CONSOLIDATION FINDINGS: Mediastinum: Severe cardiomegaly. There is a moderate pericardial effusion measuring up to 14 mm in thickness overlying the right atrium. Moderate atherosclerotic calcification of the coronary arteries. The aorta and pulmonary arteries are normal in caliber. No mediastinal or hilar lymphadenopathy. The thyroid gland and esophagus are normal. Lungs/pleura: The airways are patent centrally. Left pleural effusion contributes to complete atelectasis of the left lower lobe. There is partial atelectasis of the lingula. Right pleural effusion contributes to moderate right lower lobe atelectasis. Upper Abdomen: Adrenal glands are normal.  No significant findings in the visualized upper abdomen. Soft Tissues/Bones: Degenerative changes throughout the thoracic spine. No acute skeletal finding in the chest.     1. Moderate left and small right pleural effusions. There is complete atelectasis of the left lower lobe and partial atelectasis of the right lower lobe. Superimposed pneumonitis may be considered clinically. 2. Severe cardiomegaly with a moderate pericardial effusion. XR CHEST PORTABLE    Result Date: 10/2/2021  EXAMINATION: ONE XRAY VIEW OF THE CHEST 10/2/2021 5:48 pm COMPARISON: None. HISTORY: ORDERING SYSTEM PROVIDED HISTORY: shortness of breath TECHNOLOGIST PROVIDED HISTORY: shortness of breath FINDINGS: Portable study is limited by obesity. The heart is moderately enlarged.  There is left retrocardiac consolidation. There is thickening of the minor fissure. There is pulmonary vascular congestion and suspicion of trace right pleural fluid. Findings most consistent with mild congestive heart failure. Left retrocardiac opacification/consolidation may represent a combination of pleural fluid and airspace disease. Underlying pneumonia not excluded. ASSESSMENT:     Principal Problem:    Sepsis with acute hypoxic respiratory failure without septic shock (HCC)  Active Problems:    Benign essential HTN    Morbid obesity with BMI of 45.0-49.9, adult (HCC)    COPD exacerbation (HCC)    Acute respiratory failure with hypoxia (HCC)    Atrial fibrillation with RVR (HCC)    Pneumonia of left lower lobe due to infectious organism    Acute on chronic diastolic heart failure (HCC)    UTI (urinary tract infection)    Hyponatremia    Hypokalemia    Elevated troponin  Resolved Problems:    * No resolved hospital problems. *      PLAN:     -Acute hypoxic respiratory failure requiring BiPAP. Likely due to underlying pneumonia versus CHF versus A. fib with RVR. Continue BiPAP  Follow-up on chest x-ray tomorrow. Sepsis work-up as ordered. Continue Vanco and cefepime. Follow-up on pro-Gordon.  ID on board. Gentle hydration at normal slide at 75 mL/h. Follow-up on lactic acid levels. Patient is high risk of intubation, hence was transferred to the ICU.  -A. fib with RVR-currently rate controlled. Follow-up on cardiology recommendations.  -Left lower lobe pneumonia with pleural effusion-continue antibiotics, follow-up on cultures. Also follow-up on ARIANNE and ANCA. Follow-up on chest x-ray tomorrow.  -shock-sepsis due to pneumonia versus iatrogenic due to diuresis and home meds. -Asymptomatic UTI-already on antibiotics. Follow-up on urine cultures  -Pericardial [id number] on echo. Cardiology recs. -COPD-continue bronchodilators. -[id number] on echo.       Gladys Harkins MD  Internal Medicine Resident  [address] [facility]  10/3/2021 2:59 PM      The critical care team assigned to the patient will be following up the patient in the intensive care unit. I have discussed the current plan with the critical care attending. The above mentioned assessment and plan will be reviewed again in detail by the critical care attending at bedside, and can be further changed or modified accordingly by the attending physician. Attending Physician Statement  I have discussed the care of Mitch Sheppard, including pertinent history and exam findings,  with the resident. I have seen and examined the patient and the key elements of all parts of the encounter have been performed by me. I agree with the assessment, plan and orders as documented by the resident with additions . Seen in ER. Requiring BiPAP for respiratory support. Morbidly obese. Decreased breath sounds bilateral posterior left greater than right  CT of the chest shows left lower lobe consolidation with left loculated pleural effusion. Small right effusion   WBC is elevated  Tachycardic, A. fib now rate controlled  Impression  Sepsis due to left lower lobe pneumonia with loculated effusion. Small right pleural effusion  Acute hypoxic respiratory failure. Requiring BiPAP  A. fib with a RVR presently rate controlled  Pericardial effusion  Plan  IV antibiotics  Continue BiPAP for respiratory support. High risk for intubation. Admit to MICU  Diagnostic thoracentesis in a.m. by IR    Total critical care time caring for this patient with life threatening, unstable organ failure, including direct patient contact, management of life support systems, review of data including imaging and labs, discussions with other team members and physicians at least 28   Min so far today, excluding procedures. Treatment plan Discussed with nursing staff in detail , all questions answered .    Electronically signed by Channing Stokes MD on   10/3/21 at 4:57 PM EDT    Please note that this chart was generated using voice recognition Dragon dictation software. Although every effort was made to ensure the accuracy of this automated transcription, some errors in transcription may have occurred.

## 2021-10-03 NOTE — FLOWSHEET NOTE
10/03/21 1911   Provider Notification   Reason for Communication Evaluate; Review case  (pt lethargic/altered LOC)   Provider Name Dr. Flaco Wolff   Provider Notification Resident   Method of Communication Face to face   Response Other (Comment)  (keep pt on Bipap through the night)   Notification Time 1911    pt became hypotensive and had decreased LOC after switching from bipap to NC @ 4L. Bipap restarted and pt orientation improved.

## 2021-10-03 NOTE — CONSULTS
[facility] - Initial Consult Note  Today's Date and Time: 10/3/2021, 1:13 PM    Impression :   Concern for pneumonia  Concern for sepsis  Hypotension   Leukocytosis  Acute Afib RVR  CHF exacerbation  COPD  CASEY  Essential HN  Sulfa allergy    Recommendations:   D/C Vancomycin  Continue cefepime pending culture data  Repeat blood cultures 10/3/21 as 1/2 coag neg staph from cultures on 10/2/21 is likely a contaminant  Consider thoracentesis to remove Lt pleural effusion    Medical Decision Making/Summary/Discussion:10/3/2021     Patient evaluated  Discussed with Dr Toñito Campbell  Patient is acutely ill. Most likely cause is cardiac decompensation with associated pleural effusion. I don't really see a pneumonia by CXR or CT but will maintain Cefepime pending culture data and clinical evolution. Infection Control Recommendations   [place] Precautions      Antimicrobial Stewardship Recommendations     Simplification of therapy  Targeted therapy  Renal considerations    Coordination of Outpatient Care:   Estimated Length of IV antimicrobials:TBD  Patient will need Midline Catheter Insertion: No  Patient will need PICC line Insertion:No  Patient will need: Home IV , [place],  SNF,  LTAC: TBD  Patient will need outpatient wound care:No    Chief complaint/reason for consultation:   Sepsis, pneumonia, respiratory failure, pneumonia      History of Present Illness:   Atilio Sargent is a 68y.o.-year-old female who was initially admitted on 10/2/2021. Patient seen at the request of Dr. Kayla Sierra:    This patient presented to the ED via EMS on 10/2/21 with increased weakness, diarrhea and shortness of breath over the past few days. She was found to be in SVT and then afib RVR for which she was started on a Cardizem gtt without successful conversion from adenosine and Cardizem push.     CXR is concerning for CHF exacerbation and CT chest shows a large left sided pleural effusion. WBC elevated     Blood culture 1/2 positive for coag negative staph-likely contaminant    Urine culture pending  UA suggestive of UTI    She has been started on cefepime and vancomycin     CURRENT EVALUATION 10/3/2021    Low grade fevers  Tachypnea on BiPAP    The patient is restless and continues taking off her monitor equipment. Echocardiogram ordered per Cardiology  Heparin and Cardizem gtt for afib. Labs, X rays reviewed: 10/3/2021    BUN:48  Cr:1.12    WBC:28.5-->32.8  Hb:12  Plat: 850    CRP: Pending    CXR:  10/2/21    CT chest:  10/3/21      Cultures:  Urine:    Blood:  10/2/21: Coag negative staph 1/2=-likely contaminant  Sputum :    Wound:      Discussed with patient, RN, Dr Samra Leger. .    I have personally reviewed the past medical history, past surgical history, medications, social history, and family history, and I have updated the database accordingly.   Past Medical History:     Past Medical History:   Diagnosis Date    Asthma     COPD (chronic obstructive pulmonary disease) ([facility] [address]     Gout     Hyperlipidemia     Hypertension     Mitral insufficiency     Pneumonia        Past Surgical  History:     Past Surgical History:   Procedure Laterality Date    ANKLE SURGERY Left 03/22/2016    ORIF    COLONOSCOPY      DENTAL SURGERY      HYSTERECTOMY  1986    KNEE SURGERY      FL COLSC FLX W/RMVL OF TUMOR POLYP LESION SNARE TQ N/A 8/8/2017    COLONOSCOPY POLYPECTOMY SNARE/COLD BIOPSY performed by Anisha Armstrong MD at [facility]    TONSILLECTOMY AND ADENOIDECTOMY         Medications:      vitamin C  500 mg Oral Daily    atorvastatin  20 mg Oral Daily    fluticasone  2 puff Inhalation BID    clopidogrel  75 mg Oral Daily    [Held by provider] furosemide  20 mg Oral Daily    isosorbide mononitrate  30 mg Oral Daily    losartan  100 mg Oral Daily    metoprolol tartrate  50 mg Oral BID    Vitamin D  1,000 Units Oral Daily    sodium chloride flush  5-40 mL IntraVENous 2 times per day    sodium chloride  500 mL IntraVENous Once    vancomycin  1,000 mg IntraVENous Q12H    cefepime  1,000 mg IntraVENous Q12H    ipratropium-albuterol  1 ampule Inhalation Once       Social History:     Social History     Socioeconomic History    Marital status:      Spouse name: Not on file    Number of children: Not on file    Years of education: Not on file    Highest education level: Not on file   Occupational History    Not on file   Tobacco Use    Smoking status: Former Smoker    Smokeless tobacco: Never Used    Tobacco comment: over 30 years ago    Vaping Use    Vaping Use: Never used   Substance and Sexual Activity    Alcohol use: Yes     Alcohol/week: 0.0 standard drinks     Comment: occasionally     Drug use: No    Sexual activity: Not on file   Other Topics Concern    Not on file   Social History Narrative    Not on file     Social Determinants of Health     Financial Resource Strain:     Difficulty of Paying Living Expenses:    Food Insecurity:     Worried About Running Out of Food in the Last Year:     [address] in the Last Year:    Transportation Needs:     Lack of Transportation (Medical):      Lack of Transportation (Non-Medical):    Physical Activity:     Days of Exercise per Week:     Minutes of Exercise per Session:    Stress:     Feeling of Stress :    Social Connections:     Frequency of Communication with Friends and Family:     Frequency of Social Gatherings with Friends and Family:     Attends [employer] Services:     Active Member of Clubs or Organizations:     Attends Club or Organization Meetings:     Marital Status:    Intimate Partner Violence:     Fear of Current or Ex-Partner:     Emotionally Abused:     Physically Abused:     Sexually Abused:        Family History:     Family History   Problem Relation Age of Onset    High Blood Pressure Mother     Cancer Mother     Asthma Father     Heart Disease Father     Cancer Father         Allergies:   Latex, Betadine [povidone iodine], Bee venom, Dilaudid [hydromorphone hcl], Adhesive tape, and Sulfa antibiotics     Review of Systems:   Constitutional: Diaphoretic  Head: No headaches  Eyes: No double vision or blurry vision. No conjunctival inflammation. ENT: No sore throat or runny nose. . No hearing loss, tinnitus or vertigo. Cardiovascular: No chest pain or palpitations. shortness of breath. JOHN  Lung:  shortness of breath & cough. clear sputum production  Abdomen: No nausea, vomiting, diarrhea, or abdominal pain. Jakub Gurabo No cramps. Genitourinary: No increased urinary frequency, or dysuria. No hematuria. No suprapubic or CVA pain  Musculoskeletal: No muscle aches or pains. No joint effusions, swelling or deformities  Hematologic: No bleeding or bruising. Neurologic: No headache, weakness, numbness, or tingling. Integument: No rash, no ulcers. Psychiatric: No depression. Endocrine: No polyuria, no polydipsia, no polyphagia. Physical Examination :     Patient Vitals for the past 8 hrs:   BP Pulse Resp SpO2   10/03/21 1232 113/60 93 (!) 37 96 %   10/03/21 1136 -- -- -- 97 %   10/03/21 1131 97/82 103 (!) 38 --   10/03/21 1105 -- 98 (!) 32 96 %   10/03/21 1102 108/82 -- -- 98 %   10/03/21 1032 126/73 90 -- 94 %   10/03/21 0902 (!) 103/59 88 (!) 34 92 %   10/03/21 0858 -- -- (!) 36 98 %   10/03/21 0857 -- -- 26 --   10/03/21 0855 -- -- 28 --   10/03/21 0817 93/73 95 26 98 %   10/03/21 0631 123/78 116 (!) 36 100 %   10/03/21 0543 131/68 114 (!) 40 100 %   10/03/21 0541 131/68 130 29 92 %     General Appearance: Restless  Head:  Normocephalic, no trauma  Eyes: Pupils equal, round, reactive to light and accommodation; extraocular movements intact; sclera anicteric; conjunctivae pink. No embolic phenomena. ENT: Oropharynx clear, without erythema, exudate, or thrush. No tenderness of sinuses. Mouth/throat: mucosa pink and moist. No lesions. Dentition in good repair.   Neck:Supple, without lymphadenopathy. Thyroid normal, No bruits. Pulmonary/Chest: Clear to auscultation, without wheezes, rales, or rhonchi. No dullness to percussion. Cardiovascular: afib  Abdomen: Soft, non tender. Bowel sounds normal. No organomegaly  All four Extremities: No cyanosis, clubbing, edema, or effusions. Neurologic: No gross sensory or motor deficits. Skin: Warm and dry with good turgor. No signs of peripheral arterial or venous insufficiency. No ulcerations. No open wounds. Medical Decision Making -Laboratory:   I have independently reviewed/ordered the following labs:    CBC with Differential:   Recent Labs     10/02/21  1734 10/03/21  1107   WBC 28.5* 32.8*   HGB 14.2 12.0   HCT 42.8 37.4   PLT See Reflexed IPF Result 850*   LYMPHOPCT 2*  --    MONOPCT 10*  --      BMP:   Recent Labs     10/02/21  1734 10/02/21  1734 10/03/21  0539 10/03/21  1231   *  --  130*  --    K 3.5*  --  3.4*  --    CL 90*  --  91*  --    CO2 21  --  23  --    BUN 47*  --  48*  --    CREATININE 1.23*   < > 1.12* 1.10   MG 2.4  --  2.2  --     < > = values in this interval not displayed. Hepatic Function Panel:   Recent Labs     10/03/21  0539   PROT 7.2   LABALBU 3.3*   BILITOT 0.70   ALKPHOS 63   ALT 22   AST 28     No results for input(s): RPR in the last 72 hours. No results for input(s): HIV in the last 72 hours. No results for input(s): BC in the last 72 hours.   Lab Results   Component Value Date    MUCUS NOT REPORTED 10/02/2021    PH 7.44 03/30/2016    RBC 3.86 10/03/2021    RBC 3.94 06/02/2016    TRICHOMONAS NOT REPORTED 10/02/2021    WBC 32.8 10/03/2021    YEAST NOT REPORTED 10/02/2021    TURBIDITY Clear 10/02/2021     Lab Results   Component Value Date    CREATININE 1.10 10/03/2021    CREATININE 1.12 10/03/2021    GLUCOSE 169 10/03/2021    GLUCOSE 97 05/09/2016       Medical Decision Making-Imaging:     Narrative   EXAMINATION:   CT OF THE CHEST WITHOUT CONTRAST 10/3/2021 9:39 am       TECHNIQUE:   CT of the chest was performed without the administration of intravenous   contrast. Multiplanar reformatted images are provided for review. Dose   modulation, iterative reconstruction, and/or weight based adjustment of the   mA/kV was utilized to reduce the radiation dose to as low as reasonably   achievable.       COMPARISON:   None.       HISTORY:   ORDERING SYSTEM PROVIDED HISTORY: EVALUATE INFILTRATES ON CXR, LEFT SIDED   CONSOLIDATION   TECHNOLOGIST PROVIDED HISTORY:   EVALUATE INFILTRATES ON CXR, LEFT SIDED CONSOLIDATION   Reason for Exam: EVALUATE INFILTRATES ON CXR, LEFT SIDED CONSOLIDATION       FINDINGS:   Mediastinum: Severe cardiomegaly. [place] Section is a moderate pericardial effusion   measuring up to 14 mm in thickness overlying the right atrium.  Moderate   atherosclerotic calcification of the coronary arteries.  The aorta and   pulmonary arteries are normal in caliber.  No mediastinal or hilar   lymphadenopathy.  The thyroid gland and esophagus are normal.       Lungs/pleura: The airways are patent centrally.  Left pleural effusion   contributes to complete atelectasis of the left lower lobe.  There is partial   atelectasis of the lingula.  Right pleural effusion contributes to moderate   right lower lobe atelectasis.       Upper Abdomen: Adrenal glands are normal.  No significant findings in the   visualized upper abdomen.       Soft Tissues/Bones: Degenerative changes throughout the thoracic spine.  No   acute skeletal finding in the chest.           Impression   1. Moderate left and small right pleural effusions. [place] Section is complete   atelectasis of the left lower lobe and partial atelectasis of the right lower   lobe.  Superimposed pneumonitis may be considered clinically.    2. Severe cardiomegaly with a moderate pericardial effusion.             Narrative   EXAMINATION:   ONE XRAY VIEW OF THE CHEST       10/2/2021 5:48 pm       COMPARISON:   None.       HISTORY:   ORDERING SYSTEM PROVIDED HISTORY: shortness of breath TECHNOLOGIST PROVIDED HISTORY:   shortness of breath       FINDINGS:   Portable study is limited by obesity.  The heart is moderately enlarged. There is left retrocardiac consolidation.  There is thickening of the minor   fissure.  There is pulmonary vascular congestion and suspicion of trace right   pleural fluid.           Impression   Findings most consistent with mild congestive heart failure.       Left retrocardiac opacification/consolidation may represent a combination of   pleural fluid and airspace disease.  Underlying pneumonia not excluded.               Medical Decision [id number]:     Respiratory Panel, Molecular, with COVID-19 (Restricted: peds pts or suitable admitted adults) [[id number]]    Collected: 10/03/21 1108    Updated: 10/03/21 1332    Specimen Source: Nasopharyngeal Swab     Specimen Description . NASOPHARYNGEAL SWAB    Adenovirus PCR Not Detected    Coronavirus 229E PCR Not Detected    Coronavirus HKU1 PCR Not Detected    Coronavirus NL63 PCR Not Detected    Coronavirus OC43 PCR Not Detected    SARS-CoV-2, PCR Not Detected    Human Metapneumovirus PCR Not Detected    Rhino/Enterovirus PCR Not Detected    Influenza A by PCR Not Detected    Influenza A H1 PCR NOT REPORTED    Influenza A H1 (2009) PCR NOT REPORTED    Influenza A H3 PCR NOT REPORTED    Influenza B by PCR Not Detected    Parainfluenza 1 PCR Not Detected    Parainfluenza 2 PCR Not Detected    Parainfluenza 3 PCR Not Detected    Parainfluenza 4 PCR Not Detected    Resp Syncytial Virus PCR Not Detected    Bordetella Parapertussis Not Detected    B Pertussis by PCR Not Detected    Chlamydia pneumoniae By PCR Not Detected    Mycoplasma pneumo by PCR Not Detected       Medical Decision Making-Other:     Note:  Labs, medications, radiologic studies were reviewed with personal review of films  Large amounts of data were reviewed  Discussed with nursing Staff, Discharge planner  Infection Control and Prevention measures reviewed  All prior entries were reviewed  Administer medications as ordered  Prognosis: Good  Discharge planning reviewed      Thank you for allowing us to participate in the care of this patient. Please call with questions. KALYN Aquino - JORDY     ATTESTATION:    I have discussed the case, including pertinent history and exam findings with the APRN. I have evaluated the  History, physical findings and pictures of the patient and the key elements of the encounter have been performed by me. I have reviewed the laboratory data, other diagnostic studies and discussed them with the APRN. I have updated the medical record where necessary. I agree with the assessment, plan and orders as documented by the APRN.     Otilio Montalvo MD.      Pager: [phone] - Office: [phone]

## 2021-10-03 NOTE — ED NOTES
Pt. Returns from CT via stretcher   Pt. Work of breathing increasing  Leonard, RT to bedside to place BiPAP  Pt.  Continues to remove pulse ox sticker      Hetal Godfrey RN  10/03/21 2412

## 2021-10-03 NOTE — PROGRESS NOTES
Pharmacy Note  Vancomycin Consult    Tereza Suarez is a 68 y.o. female started on Vancomycin for blood stream infection, pneumonia; consult received from Dr. Clementina Gilman, to manage therapy. Also receiving the following antibiotics: cefepime. Patient Active Problem List   Diagnosis    Asthma    Benign essential HTN    Morbid obesity with BMI of 45.0-49.9, [facility] ([facility]    Open bimalleolar fracture of left ankle    Hyperlipidemia    COPD exacerbation ([facility]    Pulmonary emphysema (HCC)    Postoperative confusion    Acute respiratory failure with hypoxia (HCC)    Simple chronic bronchitis (HCC)    Atrial fibrillation with RVR (HCC)    Pneumonia of left lower lobe due to infectious organism    Sepsis with acute hypoxic respiratory failure without septic shock (HCC)    Acute on chronic diastolic heart failure (HCC)    UTI (urinary tract infection)    Hyponatremia    Hypokalemia    Elevated troponin    Anemia    Arthritis    At high risk for falls    Colon polyps    Heart murmur    Legally blind in right eye, as defined in [place]    COPD (chronic obstructive pulmonary disease) ([facility]     Allergies:  Latex, Betadine [povidone iodine], Bee venom, Dilaudid [hydromorphone hcl], Adhesive tape, and Sulfa antibiotics     Temp max: 99    Recent Labs     10/02/21  1734 10/02/21  1734 10/03/21  0539 10/03/21  1107 10/03/21  1231   BUN 47*  --  48*  --   --    CREATININE 1.23*   < > 1.12*  --  1.10   WBC 28.5*  --   --  32.8*  --     < > = values in this interval not displayed. Intake/Output Summary (Last 24 hours) at 10/3/2021 1306  Last data filed at 10/3/2021 7165  Gross per 24 hour   Intake 300 ml   Output 600 ml   Net -300 ml     Culture Date      Source                       Results  10/2                     blood    Ht Readings from Last 1 Encounters:   10/03/21 5' 4\" (1.626 m)        Wt Readings from Last 1 Encounters:   10/03/21 272 lb (123.4 kg)       Body mass index is 46.69 kg/m².     Estimated Creatinine

## 2021-10-03 NOTE — ED NOTES
Dr. Jayesh Armstrong at bedside   Pt.  RR labored, remains on vent   Will continue to monitor      Payton Isaacs RN  10/03/21 6133

## 2021-10-04 ENCOUNTER — APPOINTMENT (OUTPATIENT)
Dept: ULTRASOUND IMAGING | Age: 76
DRG: 871 | End: 2021-10-04
Payer: MEDICARE

## 2021-10-04 ENCOUNTER — APPOINTMENT (OUTPATIENT)
Dept: GENERAL RADIOLOGY | Age: 76
DRG: 871 | End: 2021-10-04
Payer: MEDICARE

## 2021-10-04 LAB
ALBUMIN SERPL-MCNC: 2.8 G/DL (ref 3.5–5.2)
ALBUMIN/GLOBULIN RATIO: 0.8 (ref 1–2.5)
ALP BLD-CCNC: 56 U/L (ref 35–104)
ALT SERPL-CCNC: 38 U/L (ref 5–33)
ANION GAP SERPL CALCULATED.3IONS-SCNC: 12 MMOL/L (ref 9–17)
AST SERPL-CCNC: 83 U/L
BILIRUB SERPL-MCNC: 0.42 MG/DL (ref 0.3–1.2)
BUN BLDV-MCNC: 59 MG/DL (ref 8–23)
BUN/CREAT BLD: ABNORMAL (ref 9–20)
CALCIUM SERPL-MCNC: 8.1 MG/DL (ref 8.6–10.4)
CHLORIDE BLD-SCNC: 97 MMOL/L (ref 98–107)
CO2: 24 MMOL/L (ref 20–31)
CREAT SERPL-MCNC: 1.35 MG/DL (ref 0.5–0.9)
CULTURE: ABNORMAL
GFR AFRICAN AMERICAN: 46 ML/MIN
GFR NON-AFRICAN AMERICAN: 38 ML/MIN
GFR SERPL CREATININE-BSD FRML MDRD: ABNORMAL ML/MIN/{1.73_M2}
GFR SERPL CREATININE-BSD FRML MDRD: ABNORMAL ML/MIN/{1.73_M2}
GLUCOSE BLD-MCNC: 133 MG/DL (ref 70–99)
GLUCOSE, FLUID: 116 MG/DL
HCT VFR BLD CALC: 35.3 % (ref 36.3–47.1)
HEMOGLOBIN: 11.4 G/DL (ref 11.9–15.1)
INR BLD: 1.1
LACTATE DEHYDROGENASE, FLUID: 159 U/L
LACTATE DEHYDROGENASE: 242 U/L (ref 135–214)
Lab: ABNORMAL
MAGNESIUM: 2.5 MG/DL (ref 1.6–2.6)
MCH RBC QN AUTO: 30.6 PG (ref 25.2–33.5)
MCHC RBC AUTO-ENTMCNC: 32.3 G/DL (ref 28.4–34.8)
MCV RBC AUTO: 94.9 FL (ref 82.6–102.9)
MRSA, DNA, NASAL: NORMAL
NRBC AUTOMATED: 0 PER 100 WBC
PARTIAL THROMBOPLASTIN TIME: 61.3 SEC (ref 20.5–30.5)
PARTIAL THROMBOPLASTIN TIME: 67.1 SEC (ref 20.5–30.5)
PDW BLD-RTO: 12.5 % (ref 11.8–14.4)
PH FLUID: 7.5
PLATELET # BLD: 448 K/UL (ref 138–453)
PMV BLD AUTO: 9 FL (ref 8.1–13.5)
POTASSIUM SERPL-SCNC: 3.1 MMOL/L (ref 3.7–5.3)
PROTHROMBIN TIME: 11.3 SEC (ref 9.1–12.3)
RBC # BLD: 3.72 M/UL (ref 3.95–5.11)
SODIUM BLD-SCNC: 133 MMOL/L (ref 135–144)
SPECIMEN DESCRIPTION: ABNORMAL
SPECIMEN DESCRIPTION: NORMAL
SPECIMEN TYPE: NORMAL
TOTAL PROTEIN, BODY FLUID: 3.1 G/DL
TOTAL PROTEIN: 6 G/DL (ref 6.4–8.3)
TOTAL PROTEIN: 6.1 G/DL (ref 6.4–8.3)
WBC # BLD: 26.1 K/UL (ref 3.5–11.3)

## 2021-10-04 PROCEDURE — 6370000000 HC RX 637 (ALT 250 FOR IP): Performed by: INTERNAL MEDICINE

## 2021-10-04 PROCEDURE — 94660 CPAP INITIATION&MGMT: CPT

## 2021-10-04 PROCEDURE — 82945 GLUCOSE OTHER FLUID: CPT

## 2021-10-04 PROCEDURE — 94640 AIRWAY INHALATION TREATMENT: CPT

## 2021-10-04 PROCEDURE — 80053 COMPREHEN METABOLIC PANEL: CPT

## 2021-10-04 PROCEDURE — 83735 ASSAY OF MAGNESIUM: CPT

## 2021-10-04 PROCEDURE — 2580000003 HC RX 258: Performed by: STUDENT IN AN ORGANIZED HEALTH CARE EDUCATION/TRAINING PROGRAM

## 2021-10-04 PROCEDURE — 36415 COLL VENOUS BLD VENIPUNCTURE: CPT

## 2021-10-04 PROCEDURE — C1729 CATH, DRAINAGE: HCPCS

## 2021-10-04 PROCEDURE — 99233 SBSQ HOSP IP/OBS HIGH 50: CPT | Performed by: INTERNAL MEDICINE

## 2021-10-04 PROCEDURE — 0W9B3ZZ DRAINAGE OF LEFT PLEURAL CAVITY, PERCUTANEOUS APPROACH: ICD-10-PCS | Performed by: RADIOLOGY

## 2021-10-04 PROCEDURE — 2700000000 HC OXYGEN THERAPY PER DAY

## 2021-10-04 PROCEDURE — 89051 BODY FLUID CELL COUNT: CPT

## 2021-10-04 PROCEDURE — 6360000002 HC RX W HCPCS: Performed by: STUDENT IN AN ORGANIZED HEALTH CARE EDUCATION/TRAINING PROGRAM

## 2021-10-04 PROCEDURE — 87206 SMEAR FLUORESCENT/ACID STAI: CPT

## 2021-10-04 PROCEDURE — 85730 THROMBOPLASTIN TIME PARTIAL: CPT

## 2021-10-04 PROCEDURE — 88112 CYTOPATH CELL ENHANCE TECH: CPT

## 2021-10-04 PROCEDURE — 94761 N-INVAS EAR/PLS OXIMETRY MLT: CPT

## 2021-10-04 PROCEDURE — 87102 FUNGUS ISOLATION CULTURE: CPT

## 2021-10-04 PROCEDURE — 83986 ASSAY PH BODY FLUID NOS: CPT

## 2021-10-04 PROCEDURE — 71045 X-RAY EXAM CHEST 1 VIEW: CPT

## 2021-10-04 PROCEDURE — 85610 PROTHROMBIN TIME: CPT

## 2021-10-04 PROCEDURE — 87070 CULTURE OTHR SPECIMN AEROBIC: CPT

## 2021-10-04 PROCEDURE — 6370000000 HC RX 637 (ALT 250 FOR IP): Performed by: STUDENT IN AN ORGANIZED HEALTH CARE EDUCATION/TRAINING PROGRAM

## 2021-10-04 PROCEDURE — 85027 COMPLETE CBC AUTOMATED: CPT

## 2021-10-04 PROCEDURE — 99291 CRITICAL CARE FIRST HOUR: CPT | Performed by: INTERNAL MEDICINE

## 2021-10-04 PROCEDURE — 87116 MYCOBACTERIA CULTURE: CPT

## 2021-10-04 PROCEDURE — 32555 ASPIRATE PLEURA W/ IMAGING: CPT

## 2021-10-04 PROCEDURE — 84155 ASSAY OF PROTEIN SERUM: CPT

## 2021-10-04 PROCEDURE — 2709999900 HC NON-CHARGEABLE SUPPLY

## 2021-10-04 PROCEDURE — 87075 CULTR BACTERIA EXCEPT BLOOD: CPT

## 2021-10-04 PROCEDURE — 2000000000 HC ICU R&B

## 2021-10-04 PROCEDURE — 87205 SMEAR GRAM STAIN: CPT

## 2021-10-04 PROCEDURE — 88305 TISSUE EXAM BY PATHOLOGIST: CPT

## 2021-10-04 PROCEDURE — 83615 LACTATE (LD) (LDH) ENZYME: CPT

## 2021-10-04 PROCEDURE — 84157 ASSAY OF PROTEIN OTHER: CPT

## 2021-10-04 RX ORDER — FUROSEMIDE 20 MG/1
20 TABLET ORAL DAILY
Status: DISCONTINUED | OUTPATIENT
Start: 2021-10-04 | End: 2021-10-11 | Stop reason: HOSPADM

## 2021-10-04 RX ORDER — SODIUM CHLORIDE 0.9 % (FLUSH) 0.9 %
5-40 SYRINGE (ML) INJECTION PRN
Status: DISCONTINUED | OUTPATIENT
Start: 2021-10-04 | End: 2021-10-11 | Stop reason: HOSPADM

## 2021-10-04 RX ORDER — POTASSIUM CHLORIDE 7.45 MG/ML
40 INJECTION INTRAVENOUS 2 TIMES DAILY
Status: DISCONTINUED | OUTPATIENT
Start: 2021-10-04 | End: 2021-10-04

## 2021-10-04 RX ORDER — SODIUM CHLORIDE 0.9 % (FLUSH) 0.9 %
5-40 SYRINGE (ML) INJECTION EVERY 12 HOURS SCHEDULED
Status: DISCONTINUED | OUTPATIENT
Start: 2021-10-04 | End: 2021-10-11 | Stop reason: HOSPADM

## 2021-10-04 RX ORDER — METOPROLOL TARTRATE 50 MG/1
50 TABLET, FILM COATED ORAL 2 TIMES DAILY
Status: DISCONTINUED | OUTPATIENT
Start: 2021-10-04 | End: 2021-10-09

## 2021-10-04 RX ORDER — SODIUM CHLORIDE 9 MG/ML
25 INJECTION, SOLUTION INTRAVENOUS PRN
Status: DISCONTINUED | OUTPATIENT
Start: 2021-10-04 | End: 2021-10-11 | Stop reason: HOSPADM

## 2021-10-04 RX ADMIN — ACETAMINOPHEN 650 MG: 325 TABLET ORAL at 09:25

## 2021-10-04 RX ADMIN — DILTIAZEM HYDROCHLORIDE 30 MG: 30 TABLET, FILM COATED ORAL at 23:29

## 2021-10-04 RX ADMIN — IPRATROPIUM BROMIDE AND ALBUTEROL SULFATE 1 AMPULE: .5; 2.5 SOLUTION RESPIRATORY (INHALATION) at 19:53

## 2021-10-04 RX ADMIN — FUROSEMIDE 20 MG: 20 TABLET ORAL at 12:41

## 2021-10-04 RX ADMIN — METOPROLOL TARTRATE 50 MG: 50 TABLET, FILM COATED ORAL at 12:41

## 2021-10-04 RX ADMIN — IPRATROPIUM BROMIDE AND ALBUTEROL SULFATE 1 AMPULE: .5; 2.5 SOLUTION RESPIRATORY (INHALATION) at 15:42

## 2021-10-04 RX ADMIN — HEPARIN SODIUM AND DEXTROSE 16.5 UNITS/KG/HR: 10000; 5 INJECTION INTRAVENOUS at 21:41

## 2021-10-04 RX ADMIN — METOPROLOL TARTRATE 50 MG: 50 TABLET, FILM COATED ORAL at 21:28

## 2021-10-04 RX ADMIN — CEFEPIME HYDROCHLORIDE 1000 MG: 1 INJECTION, POWDER, FOR SOLUTION INTRAMUSCULAR; INTRAVENOUS at 12:39

## 2021-10-04 RX ADMIN — ATORVASTATIN CALCIUM 20 MG: 20 TABLET, FILM COATED ORAL at 09:26

## 2021-10-04 RX ADMIN — CITALOPRAM 20 MG: 20 TABLET, FILM COATED ORAL at 09:26

## 2021-10-04 RX ADMIN — SODIUM CHLORIDE: 9 INJECTION, SOLUTION INTRAVENOUS at 12:49

## 2021-10-04 RX ADMIN — SODIUM CHLORIDE, PRESERVATIVE FREE 10 ML: 5 INJECTION INTRAVENOUS at 08:06

## 2021-10-04 RX ADMIN — IPRATROPIUM BROMIDE AND ALBUTEROL SULFATE 1 AMPULE: .5; 2.5 SOLUTION RESPIRATORY (INHALATION) at 12:50

## 2021-10-04 RX ADMIN — CLOPIDOGREL 75 MG: 75 TABLET, FILM COATED ORAL at 17:26

## 2021-10-04 RX ADMIN — HEPARIN SODIUM AND DEXTROSE 16.5 UNITS/KG/HR: 10000; 5 INJECTION INTRAVENOUS at 02:56

## 2021-10-04 RX ADMIN — POTASSIUM CHLORIDE 40 MEQ: 7.46 INJECTION, SOLUTION INTRAVENOUS at 08:10

## 2021-10-04 RX ADMIN — POTASSIUM BICARBONATE 40 MEQ: 782 TABLET, EFFERVESCENT ORAL at 17:21

## 2021-10-04 RX ADMIN — DILTIAZEM HYDROCHLORIDE 30 MG: 30 TABLET, FILM COATED ORAL at 12:41

## 2021-10-04 RX ADMIN — SODIUM CHLORIDE, PRESERVATIVE FREE 10 ML: 5 INJECTION INTRAVENOUS at 23:31

## 2021-10-04 RX ADMIN — CEFEPIME HYDROCHLORIDE 1000 MG: 1 INJECTION, POWDER, FOR SOLUTION INTRAMUSCULAR; INTRAVENOUS at 00:56

## 2021-10-04 RX ADMIN — IPRATROPIUM BROMIDE AND ALBUTEROL SULFATE 1 AMPULE: .5; 2.5 SOLUTION RESPIRATORY (INHALATION) at 07:51

## 2021-10-04 ASSESSMENT — PAIN SCALES - GENERAL
PAINLEVEL_OUTOF10: 0
PAINLEVEL_OUTOF10: 0
PAINLEVEL_OUTOF10: 3

## 2021-10-04 ASSESSMENT — PULMONARY FUNCTION TESTS
PIF_VALUE: 16
PIF_VALUE: 17

## 2021-10-04 NOTE — PROGRESS NOTES
[facility]   Progress Note                   Date:   10/4/2021  Patient name: Shen Ortiz  Date of admission:  10/2/2021  5:17 PM  MRN:   [id number]  YOB: 1945  PCP: KALYN Doyle CNP    Reason for Admission:     Subjective:       Vitals stable, in atrial fibrillation rate controlled. Currently requiring pressor support, on Levophed  Continue heparin drip  CT chest suggestive of left lower lobe loculated pleural effusion, plan for thoracentesis by IR    Echocardiogram : left ventricle is normal in size with normal systolic function globally. Calculated ejection fraction is 59% ( Atrial-Fib). Mild mitral regurgitation. Mild tricuspid regurgitation. Estimated right ventricular systolic pressure is 35 mmHg. Anterior clear space noted, most likely adipose tissue vs small loculated  pericardial effusion. No evidence of tamponade. Pleural effusion is seen. Patient is on Lasix 20 mg daily  Meds: Imdur 30, Cozaar 100 mg, Lopressor 50 mg twice daily      Brief history  The patient is a 68 y.o.  female who is admitted to the hospital for worsening shortness of breath. Patient states that she was feeling sick for the past 5 days, had no appetite was not taking her medications and ultimately her son called the EMS for worsening shortness of breath.     Her heart rate was in 200s in SVT was initially given 6 mg followed by 12 mg of adenosine without conversion and then was given 20 mg of Cardizem after which her heart rate became better however returned back to 180s by EMS.    In the ER patient was alert and oriented. EKG consistent with atrial fibrillation with RVR. Patient was still hypoxic was put on BiPAP. Vitals /78, heart rate in 100s and saturating 100% on BiPAP.   Patient was started on diltiazem drip and heparin drip     Pertinent labs sodium 130, K3.4, creatinine 1.12  Troponin 92-> 62  WBC 28.5  UA suggestive for UTI     Last echo back in 2017 showed LVEF 55%, left atrium moderately dilated, mild dilatation of aortic root and ascending aorta 4.2 cm  No significant cardiac history found    Medications:   Scheduled Meds:   potassium chloride  40 mEq IntraVENous BID    sodium chloride flush  5-40 mL IntraVENous 2 times per day    atorvastatin  20 mg Oral Daily    clopidogrel  75 mg Oral Daily    cefepime  1,000 mg IntraVENous Q12H    citalopram  20 mg Oral Daily    sodium chloride flush  5-40 mL IntraVENous 2 times per day    ipratropium-albuterol  1 ampule Inhalation 4x daily     Continuous Infusions:   sodium chloride      sodium chloride 75 mL/hr at 10/03/21 2300    sodium chloride      norepinephrine Stopped (10/04/21 0212)    dilTIAZem (CARDIZEM) 125 mg in dextrose 5% 125 mL infusion 15 mg/hr (10/03/21 0126)    heparin (PORCINE) Infusion 16.5 Units/kg/hr (10/04/21 0256)     CBC:   Recent Labs     10/02/21  1734 10/03/21  1107 10/04/21  0345   WBC 28.5* 32.8* 26.1*   HGB 14.2 12.0 11.4*   PLT See Reflexed IPF Result 850* 448     BMP:    Recent Labs     10/02/21  1734 10/02/21  1734 10/03/21  0539 10/03/21  1231 10/04/21  0345   *  --  130*  --  133*   K 3.5*  --  3.4*  --  3.1*   CL 90*  --  91*  --  97*   CO2 21  --  23  --  24   BUN 47*  --  48*  --  59*   CREATININE 1.23*   < > 1.12* 1.10 1.35*   GLUCOSE 159*  --  169*  --  133*    < > = values in this interval not displayed. Hepatic:   Recent Labs     10/03/21  0539 10/04/21  0345   AST 28 83*   ALT 22 38*   BILITOT 0.70 0.42   ALKPHOS 63 56     Troponin: No results for input(s): TROPONINI in the last 72 hours. BNP: No results for input(s): BNP in the last 72 hours. Lipids: No results for input(s): CHOL, HDL in the last 72 hours.     Invalid input(s): LDLCALCU  INR:   Recent Labs     10/02/21  1734 10/03/21  0539   INR 1.1 1.1       Objective:   Vitals: BP (!) 94/57   Pulse 94   Temp 98.4 °F (36.9 °C) (Oral)   Resp 26   Ht 5' 4\" (1.626 m)   Wt 268 lb 4.8 oz (121.7 kg)   SpO2 97%   BMI 46.05 kg/m²   General appearance: alert and cooperative with exam  HEENT: Head: Normocephalic, no lesions, without obvious abnormality. Neck: no adenopathy, no carotid bruit, no JVD, supple, symmetrical, trachea midline and thyroid not enlarged, symmetric, no tenderness/mass/nodules  Lungs: clear to auscultation bilaterally  Heart: regular rate and rhythm, S1, S2 normal, no murmur, click, rub or gallop  Abdomen: soft, non-tender; bowel sounds normal; no masses,  no organomegaly  Extremities: extremities normal, atraumatic, no cyanosis or edema  Neurologic: Mental status: Alert, oriented, thought content appropriate    EKG:    ECHO:    CATH:    STRESS:      Other Current Problems:   Patient Active Problem List:     Asthma     Benign essential HTN     Morbid obesity with BMI of 40.0-44.9, adult (HCC)     Open bimalleolar fracture of left ankle     Hyperlipidemia     COPD exacerbation (HCC)     Pulmonary emphysema (HCC)     Postoperative confusion     Acute respiratory failure with hypoxia (HCC)     Simple chronic bronchitis (HCC)     Atrial fibrillation with RVR ([address]     Pneumonia of left lower lobe due to infectious organism     Sepsis with acute hypoxic respiratory failure without septic shock (HCC)     Acute on chronic diastolic heart failure (HCC)     UTI (urinary tract infection)     Hyponatremia     Hypokalemia     Elevated troponin     Anemia     Arthritis     At high risk for falls     Colon polyps     Heart murmur     Legally blind in right eye, as defined in [place]     COPD (chronic obstructive pulmonary disease) ([facility]      Assessment and Plan:    Impression:    1. New onset atrial fibrillation  2. Acute hypoxic respiratory distress  3. NSTEMI likely type II from demand ischemia from CASEY/UTI  4. Sepsis  5. UTI  6. CASEY  7. Hypokalemia   8. Left loculated pleural effusion      Recommendations  1. Continue heparin drip  2. Echocardiogram reviewed. 3. Replace electrolytes  4.  Will reevaluate after thoracentesis      Will discuss patient with attending physician. Recommendations above pending approval by attending physician. Magdy Calderón MD  PGY-3 Internal Medicine Resident  [address] [place] [address]  10/4/2021 8:03 AM               I performed a history and physical examination of the patient and discussed management with the resident. I reviewed the residents note and agree with the documented findings and plan of care. Any areas of disagreement are noted on the chart. I was personally present for the key portions of any procedures. I have documented in the chart those procedures where I was not present during the key portions. I have personally evaluated this patient and have completed at least one if not all key elements of the E/M (history, physical exam, and MDM). Additional findings are as noted. Loculated left pleural effusion-plan for thoracocentesis. Also has UTI- septic shock. Off pressors. Has afib. Hypokalemia- replace K. On IV heparin. On IV diltiazem. Continue metoprolol 50mg po BID. TTE showed preserved LV systolic function- no significant valve abnormalities. Add cardiazem 30mg po TID and wean IV dilitazem. Continue IV heparin. Once no need for procedure, will switch to NOAC.  Small pericardial effusion noted on echo  Polo Vázquez MD

## 2021-10-04 NOTE — PLAN OF CARE
Problem: Skin Integrity:  Goal: Will show no infection signs and symptoms  Description: Will show no infection signs and symptoms  10/4/2021 0842 by Veronica Vila  Outcome: Ongoing  10/4/2021 0032 by Ede Downs RN  Outcome: Met This Shift  Goal: Absence of new skin breakdown  Description: Absence of new skin breakdown  10/4/2021 0842 by Veronica Vila  Outcome: Ongoing  10/4/2021 0032 by Ede Downs RN  Outcome: Met This Shift     Problem: Falls - Risk of:  Goal: Will remain free from falls  Description: Will remain free from falls  10/4/2021 0842 by Veronica Vila  Outcome: Ongoing  10/4/2021 0032 by Ede Downs RN  Outcome: Met This Shift  Goal: Absence of physical injury  Description: Absence of physical injury  10/4/2021 0842 by Vernoica Vila  Outcome: Ongoing  10/4/2021 0032 by Ede Downs RN  Outcome: Met This Shift     Problem: Respiratory:  Goal: Ability to maintain normal respiratory secretions will improve  Description: Ability to maintain normal respiratory secretions will improve  10/4/2021 0842 by Veronica Vila  Outcome: Ongoing  10/4/2021 0032 by Ede Downs RN  Outcome: Met This Shift

## 2021-10-04 NOTE — CARE COORDINATION
Case Management Initial Discharge Plan  Barbara Campbell,         Met with:patient to discuss discharge plans. Information verified: address, contacts, phone number, , insurance Yes  Insurance Provider: [facility]    Emergency Contact/Next of Kin name & number: pt's son Vamshi Gonzales [phone], pt's son Althea Plasencia [phone]  Who are involved in patient's support system? Pt's son Jerson Garrido    PCP: Jatinder Ramirez, APRN - CNP  Date of last visit: 6 months ago      Discharge Planning    Living Arrangements:  Children, Family Members     Home has 1 story  3 stairs to climb to get into front door    Patient able to perform ADL's:Independent    Current Services (outpatient & in home) none  DME equipment: walker, nebulizer  DME provider: n/a    Is patient receiving oral anticoagulation therapy? Yes, Plavix    If indicated:   Physician managing anticoagulation treatment: n/a  Where does patient obtain lab work for ATC treatment? n/a      Potential Assistance Needed:  [address] [place] [address]    Patient agreeable to home care: No  [place] of choice provided:  n/a    Prior SNF/Rehab Placement and Facility: somewhere in [address] [place] [address] to SNF/Rehab: No  [place] of choice provided: n/a     Evaluation: n/a    Expected Discharge date:  10/09/21    Patient expects to be discharged to: If home: is the family and/or caregiver wiling & able to provide support at home? yes  Who will be providing this support? Pt's son    Follow Up Appointment: Best Day/ Time:      Transportation provider: family  Transportation arrangements needed for discharge: No    Readmission Risk              Risk of Unplanned Readmission:  22             Does patient have a readmission risk score greater than 14?: Yes  If yes, follow-up appointment must be made within 7 days of discharge.      Goals of Care:       Educated pt on transitional options, provided freedom of choice and she is  agreeable with plan      Discharge Plan: Home w/ son, Monitor for Home O2 needs.  has transportation and established PCP        Electronically signed by Vangie Persaud RN on 10/4/21 at 3:56 PM EDT

## 2021-10-04 NOTE — FLOWSHEET NOTE
SPIRITUAL CARE DEPARTMENT - [address]  PROGRESS NOTE    Shift date: 10/3/21  Shift day: Sunday   Shift # 2    Room # 0124/0124-01   Name: Irma Mirza            Age: 68 y.o. Gender: female          Caodaism: unknown   Place of Judaism: unknown    Referral: Routine Visit    Admit Date & Time: 10/2/2021  5:17 PM    PATIENT/EVENT DESCRIPTION:  Irma Mirza is a 68 y.o. female       SPIRITUAL ASSESSMENT/INTERVENTION:   stopped by for routine rounding several times this evening. The patient is on a bap machine and was resting each time the  visited.  offered a prayer. SPIRITUAL CARE FOLLOW-UP PLAN:  Chaplains will remain available to offer spiritual and emotional support as needed.       Electronically signed by Jackie Guadalupe on 10/3/2021 at 8:06 PM.  [email or web address]  [phone]

## 2021-10-04 NOTE — PROGRESS NOTES
[facility], [facility]. Atrial fibrillation with rapid ventricular response (HCC) [I48.91]  Atrial fibrillation with RVR (HCC) [I48.91]  Chronic obstructive pulmonary disease with acute exacerbation (HCC) [J44.1]  Pneumonia due to infectious organism, unspecified laterality, unspecified part of lung [J18.9] . Vitals:    10/04/21 0815   BP: (!) 109/57   Pulse: 87   Resp: 24   Temp:    SpO2: 96%   . Patients home meds are   Prior to Admission medications    Medication Sig Start Date End Date Taking? Authorizing Provider   losartan (COZAAR) 100 MG tablet Take 1 tablet by mouth daily 8/28/18   Lizette Shoemaker MD   predniSONE (DELTASONE) 20 MG tablet Take 1 tab BID x 5 days, then 1 tab daily x 5 days. Take with food. 2/27/18   Lizette Shoemaker MD   meloxicam (MOBIC) 15 MG tablet Take 15 mg by mouth daily    Historical Provider, MD   atorvastatin (LIPITOR) 20 MG tablet Take 1 tablet by mouth daily 10/5/17   Lizette Shoemaker MD   metoprolol tartrate (LOPRESSOR) 50 MG tablet TAKE ONE TABLET BY MOUTH TWICE DAILY 10/5/17   Lizette Shoemaker MD   chlorthalidone (HYGROTON) 25 MG tablet Take 1 tablet by mouth daily 10/5/17   Lizette Shoemaker MD   clopidogrel (PLAVIX) 75 MG tablet Take 1 tablet by mouth daily 10/5/17   Lizette Shoemaker MD   cyclobenzaprine (FLEXERIL) 10 MG tablet TAKE ONE TABLET BY MOUTH THREE TIMES DAILY AS NEEDED FOR MUSCLE SPASM 10/5/17   Lizette Shoemaker MD   diphenhydrAMINE (BENADRYL) 25 MG capsule Take 25 mg by mouth every 6 hours as needed for Itching    Historical Provider, MD   zinc oxide (DESITIN) 40 % ointment Apply topically as needed for Dry Skin Apply topically as needed.     Historical Provider, MD   furosemide (LASIX) 20 MG tablet Take 1 tablet by mouth daily 7/14/17   Lizette Shoemaker MD   isosorbide mononitrate (IMDUR) 30 MG extended release tablet Take 1 tablet by mouth daily 7/14/17   Lizette Shoemaker MD   albuterol sulfate  (90 Base) MCG/ACT inhaler Inhale 2 puffs into the lungs every 6 hours as needed for Wheezing 7/14/17   Fabio Everett MD   Calcium-Magnesium-Zinc (FLAKITO-MAG-ZINC PO) Take by mouth daily    Historical Provider, MD   Calcium Carb-Cholecalciferol (CALCIUM + D3 PO) Take by mouth daily    Historical Provider, MD   CINNAMON PO Take by mouth daily    Historical Provider, MD   TURMERIC CURCUMIN PO Take by mouth daily    Historical Provider, MD   ipratropium-albuterol (DUONEB) 0.5-2.5 (3) MG/3ML SOLN nebulizer solution Inhale 1 vial into the lungs every 4 hours    Historical Provider, MD   beclomethasone (QVAR) 80 MCG/ACT inhaler Inhale 2 puffs into the lungs 2 times daily 6/17/16   Fabio Everett MD   Acetaminophen 650 MG TABS Take 650 mg by mouth every 4 hours as needed for Pain or Fever 4/4/16   Avila Pham MD   clonazePAM (KLONOPIN) 1 MG tablet Take 1 mg by mouth nightly     Historical Provider, MD   citalopram (CELEXA) 40 MG tablet Take 40 mg by mouth daily as needed    Historical Provider, MD   LYSINE PO Take by mouth daily    Historical Provider, MD   vitamin D (CHOLECALCIFEROL) 1000 UNIT TABS tablet Take 1,000 Units by mouth daily    Historical Provider, MD   Pyridoxine HCl (VITAMIN B-6) 50 MG tablet Take 100 mg by mouth daily    Historical Provider, MD   Cyanocobalamin (VITAMIN B 12 PO) Take by mouth daily    Historical Provider, MD   Magnesium 500 MG TABS Take by mouth    Historical Provider, MD   Ascorbic Acid (VITAMIN C) 500 MG tablet Take 500 mg by mouth daily    Historical Provider, MD   .      Assessment       RR 24  Breath Sounds: clear/diminished      Bronchodilator assessment at level  3    []    Bronchodilator Assessment  BRONCHODILATOR ASSESSMENT SCORE  Score 0 1 2 3 4 5   Breath Sounds   []  Patient Baseline []  No Wheeze good aeration []  Faint, scattered wheezing, good aeration [x]  Expiratory Wheezing and or moderately diminished []  Insp/Exp wheeze and/or very diminished []  Insp/Exp and/ or marked distress   Respiratory Rate   []  Patient Baseline []  Less than 20 []  Less than 20 [x]  20-25 []  Greater than 25 []  Greater than 25   Peak flow % of Pred or PB [x]  NA   []  Greater than 90%  []  81-90% []  71-80% []  Less than or equal to 70%  or unable to perform []  Unable due to Respiratory Distress   Dyspnea re []  Patient Baseline []  No SOB []  No SOB [x]  SOB on exertion []  SOB min activity []  At rest/acute   e FEV% Predicted       [x]  NA []  Above 69%  []  Unable []  Above 60-69%  []  Unable []  Above 50-59%  []  Unable []  Above 35-49%  []  Unable []  Less than 35%  []  Unable

## 2021-10-04 NOTE — PROGRESS NOTES
[facility] -Progress Note    Today's Date and Time: 10/4/2021, 11:06 AM    Impression :   Concern for pneumonia  Concern for sepsis  Hypotension   Leukocytosis  Acute Afib RVR  CHF exacerbation  COPD  CASEY  Essential HN  Sulfa allergy  UTI  Lt pleural effusion  S/P Lt thoracentesis with removal of 350 cc fluid    Recommendations:   D/C Vancomycin  Continue cefepime pending culture data  Repeat blood cultures 10/3/21 as 1/2 coag neg staph from cultures on 10/2/21 is likely a contaminant      Medical Decision Making/Summary/Discussion:10/4/2021     Patient evaluated  Patient is acutely ill. Most likely cause is cardiac decompensation with associated pleural effusion. I don't really see a pneumonia by CXR or CT but will maintain Cefepime pending culture data and clinical evolution. S/P Lt thoracentesis with removal of 350 cc fluid 10-4-21    Infection Control Recommendations   [facility] [place] Precautions      Antimicrobial Stewardship Recommendations     Simplification of therapy  Targeted therapy  Renal considerations    Coordination of Outpatient Care:   Estimated Length of IV antimicrobials:TBD  Patient will need Midline Catheter Insertion: No  Patient will need PICC line Insertion:No  Patient will need: Home IV , [place],  SNF,  LTAC: TBD  Patient will need outpatient wound care:No    Chief complaint/reason for consultation:   Sepsis, pneumonia, respiratory failure, pneumonia      History of Present Illness:   Roverto Vásquez is a 68y.o.-year-old female who was initially admitted on 10/2/2021. Patient seen at the request of Dr. Maame Lee:    This patient presented to the ED via EMS on 10/2/21 with increased weakness, diarrhea and shortness of breath over the past few days. She was found to be in SVT and then afib RVR for which she was started on a Cardizem gtt without successful conversion from adenosine and Cardizem push.     CXR is concerning for CHF exacerbation and CT chest shows a large left sided pleural effusion. WBC elevated     Blood culture 1/2 positive for coag negative staph-likely contaminant    Urine culture pending  UA suggestive of UTI    She has been started on cefepime and vancomycin     CURRENT EVALUATION 10/4/2021    Patient evaluated and examined in the ICU. Afebrile, hemodynamically stable, off pressors  - On BiPAP this morning, FiO2 40%, saturating well    Echocardiogram ordered per Cardiology  Heparin and Cardizem gtt for afib. Patient underwent Lt thoracentesis with removal of 350 cc clear yellowish fluid      Labs, X rays reviewed: 10/4/2021    BUN: 59<--48  Cr:1.35<--1.12    WBC: 26.1<--32.8  Hb: 11.4<--12  Plat: 448<-- 850    CRP: 244    CXR:  10/2/21    CT chest:  10/3/21      Cultures:  Urine:    Blood:  10/2/21: Coag negative staph 1/2=-likely contaminant  Sputum :    Wound:  10-4-21: Thoracentesis pending    Discussed with patient, RN, Dr Aleah Lam. .    I have personally reviewed the past medical history, past surgical history, medications, social history, and family history, and I have updated the database accordingly.   Past Medical History:     Past Medical History:   Diagnosis Date    Asthma     COPD (chronic obstructive pulmonary disease) ([facility] [address]     Gout     Hyperlipidemia     Hypertension     Mitral insufficiency     Pneumonia        Past Surgical  History:     Past Surgical History:   Procedure Laterality Date    ANKLE SURGERY Left 03/22/2016    ORIF    COLONOSCOPY      DENTAL SURGERY      HYSTERECTOMY  1986    KNEE SURGERY      MN COLSC FLX W/RMVL OF TUMOR POLYP LESION SNARE TQ N/A 8/8/2017    COLONOSCOPY POLYPECTOMY SNARE/C[facility]    TONSILLECTOMY AND ADENOIDECTOMY         Medications:      potassium chloride  40 mEq IntraVENous BID    sodium chloride flush  5-40 mL IntraVENous 2 times per day    furosemide  20 mg Oral Daily    metoprolol tartrate 50 mg Oral BID    atorvastatin  20 mg Oral Daily    clopidogrel  75 mg Oral Daily    cefepime  1,000 mg IntraVENous Q12H    citalopram  20 mg Oral Daily    sodium chloride flush  5-40 mL IntraVENous 2 times per day    ipratropium-albuterol  1 ampule Inhalation 4x daily       Social History:     Social History     Socioeconomic History    Marital status:      Spouse name: Not on file    Number of children: Not on file    Years of education: Not on file    Highest education level: Not on file   Occupational History    Not on file   Tobacco Use    Smoking status: Former Smoker    Smokeless tobacco: Never Used    Tobacco comment: over 30 years ago    Vaping Use    Vaping Use: Never used   Substance and Sexual Activity    Alcohol use: Yes     Alcohol/week: 0.0 standard drinks     Comment: occasionally     Drug use: No    Sexual activity: Not on file   Other Topics Concern    Not on file   Social History Narrative    Not on file     Social Determinants of Health     Financial Resource Strain:     Difficulty of Paying Living Expenses:    Food Insecurity:     Worried About Running Out of Food in the Last Year:     [address] in the Last Year:    Transportation Needs:     Lack of Transportation (Medical):      Lack of Transportation (Non-Medical):    Physical Activity:     Days of Exercise per Week:     Minutes of Exercise per Session:    Stress:     Feeling of Stress :    Social Connections:     Frequency of Communication with Friends and Family:     Frequency of Social Gatherings with Friends and Family:     Attends Taoist Services:     Active Member of Clubs or Organizations:     Attends Club or Organization Meetings:     Marital Status:    Intimate Partner Violence:     Fear of Current or Ex-Partner:     Emotionally Abused:     Physically Abused:     Sexually Abused:        Family History:     Family History   Problem Relation Age of Onset    High Blood Pressure Mother     Cancer Mother     Asthma Father     Heart Disease Father     Cancer Father         Allergies:   Latex, Betadine [povidone iodine], Bee venom, Dilaudid [hydromorphone hcl], Adhesive tape, and Sulfa antibiotics     Review of Systems:   Constitutional: Diaphoretic  Head: No headaches  Eyes: No double vision or blurry vision. No conjunctival inflammation. ENT: No sore throat or runny nose. . No hearing loss, tinnitus or vertigo. Cardiovascular: No chest pain or palpitations. shortness of breath. JOHN  Lung:  shortness of breath & cough. clear sputum production  Abdomen: No nausea, vomiting, diarrhea, or abdominal pain. Pinky Angles No cramps. Genitourinary: No increased urinary frequency, or dysuria. No hematuria. No suprapubic or CVA pain  Musculoskeletal: No muscle aches or pains. No joint effusions, swelling or deformities  Hematologic: No bleeding or bruising. Neurologic: No headache, weakness, numbness, or tingling. Integument: No rash, no ulcers. Psychiatric: No depression. Endocrine: No polyuria, no polydipsia, no polyphagia.     Physical Examination :     Patient Vitals for the past 8 hrs:   BP Temp Temp src Pulse Resp SpO2   10/04/21 1030 (!) 154/56 -- -- 111 24 98 %   10/04/21 1000 (!) 142/50 -- -- 102 27 95 %   10/04/21 0945 131/64 -- -- 99 20 94 %   10/04/21 0915 111/60 -- -- 100 25 95 %   10/04/21 0900 (!) 82/58 -- -- 101 22 94 %   10/04/21 0845 (!) 120/59 -- -- 92 26 96 %   10/04/21 0830 109/65 -- -- 93 24 97 %   10/04/21 0815 (!) 109/57 -- -- 87 24 96 %   10/04/21 0800 (!) 117/52 -- -- 78 24 97 %   10/04/21 0745 (!) 112/55 -- -- 95 24 96 %   10/04/21 0730 (!) 98/44 -- -- 90 25 97 %   10/04/21 0715 (!) 97/50 -- -- 87 25 97 %   10/04/21 0700 103/63 -- -- 88 22 97 %   10/04/21 0645 99/60 -- -- 87 24 97 %   10/04/21 0615 (!) 94/57 -- -- 94 26 97 %   10/04/21 0600 (!) 97/51 -- -- 91 26 97 %   10/04/21 0545 (!) 94/52 -- -- 93 24 97 %   10/04/21 0530 (!) 88/49 -- -- 95 21 97 %   10/04/21 0526 -- -- -- 96 24 97 %   10/04/21 0515 (!) 91/54 -- -- 90 24 91 %   10/04/21 0500 (!) 106/56 -- -- 103 (!) 35 96 %   10/04/21 0445 (!) 98/46 -- -- 83 23 93 %   10/04/21 0430 (!) 122/91 -- -- 94 26 94 %   10/04/21 0415 (!) 105/47 -- -- 100 24 95 %   10/04/21 0400 101/64 98.4 °F (36.9 °C) Oral 94 25 94 %   10/04/21 0345 (!) 106/54 -- -- 84 23 96 %   10/04/21 0330 (!) 110/50 -- -- 92 24 96 %   10/04/21 0318 -- -- -- -- 22 97 %   10/04/21 0315 113/62 -- -- 98 (!) 31 97 %     General Appearance: Restless  Head:  Normocephalic, no trauma  Eyes: Pupils equal, round, reactive to light and accommodation; extraocular movements intact; sclera anicteric; conjunctivae pink. No embolic phenomena. ENT: Oropharynx clear, without erythema, exudate, or thrush. No tenderness of sinuses. Mouth/throat: mucosa pink and moist. No lesions. Dentition in good repair. Neck:Supple, without lymphadenopathy. Thyroid normal, No bruits. Pulmonary/Chest: Clear to auscultation, without wheezes, rales, or rhonchi. No dullness to percussion. Cardiovascular: afib  Abdomen: Soft, non tender. Bowel sounds normal. No organomegaly  All four Extremities: No cyanosis, clubbing, edema, or effusions. Neurologic: No gross sensory or motor deficits. Skin: Warm and dry with good turgor. No signs of peripheral arterial or venous insufficiency. No ulcerations. No open wounds. Medical Decision Making -Laboratory:   I have independently reviewed/ordered the following labs:    CBC with Differential:   Recent Labs     10/02/21  1734 10/02/21  1734 10/03/21  1107 10/04/21  0345   WBC 28.5*   < > 32.8* 26.1*   HGB 14.2   < > 12.0 11.4*   HCT 42.8   < > 37.4 35.3*   PLT See Reflexed IPF Result   < > 850* 448   LYMPHOPCT 2*  --   --   --    MONOPCT 10*  --   --   --     < > = values in this interval not displayed.      BMP:   Recent Labs     10/03/21  0539 10/03/21  1231 10/04/21  0345   *  --  133*   K 3.4*  --  3.1*   CL 91*  --  97*   CO2 23  --  24   BUN 48*  --  59* CREATININE 1.12* 1.10 1.35*   MG 2.2  --  2.5     Hepatic Function Panel:   Recent Labs     10/03/21  0539 10/03/21  0539 10/04/21  0345 10/04/21  0858   PROT 7.2   < > 6.1* 6.0*   LABALBU 3.3*  --  2.8*  --    BILITOT 0.70  --  0.42  --    ALKPHOS 63  --  56  --    ALT 22  --  38*  --    AST 28  --  83*  --     < > = values in this interval not displayed. No results for input(s): RPR in the last 72 hours. No results for input(s): HIV in the last 72 hours. No results for input(s): BC in the last 72 hours. Lab Results   Component Value Date    MUCUS NOT REPORTED 10/02/2021    PH 7.44 03/30/2016    RBC 3.72 10/04/2021    RBC 3.94 06/02/2016    TRICHOMONAS NOT REPORTED 10/02/2021    WBC 26.1 10/04/2021    YEAST NOT REPORTED 10/02/2021    TURBIDITY Clear 10/02/2021     Lab Results   Component Value Date    CREATININE 1.35 10/04/2021    GLUCOSE 133 10/04/2021    GLUCOSE 97 05/09/2016       Medical Decision Making-Imaging:     Narrative   EXAMINATION:   CT OF THE CHEST WITHOUT CONTRAST 10/3/2021 9:39 am       TECHNIQUE:   CT of the chest was performed without the administration of intravenous   contrast. Multiplanar reformatted images are provided for review.  Dose   modulation, iterative reconstruction, and/or weight based adjustment of the   mA/kV was utilized to reduce the radiation dose to as low as reasonably   achievable.       COMPARISON:   None.       HISTORY:   ORDERING SYSTEM PROVIDED HISTORY: EVALUATE INFILTRATES ON CXR, LEFT SIDED   CONSOLIDATION   TECHNOLOGIST PROVIDED HISTORY:   EVALUATE INFILTRATES ON CXR, LEFT SIDED CONSOLIDATION   Reason for Exam: EVALUATE INFILTRATES ON CXR, LEFT SIDED CONSOLIDATION       FINDINGS:   Mediastinum: Severe cardiomegaly. Omelia Sovereign is a moderate pericardial effusion   measuring up to 14 mm in thickness overlying the right atrium.  Moderate   atherosclerotic calcification of the coronary arteries.  The aorta and   pulmonary arteries are normal in caliber.  No mediastinal or hilar   lymphadenopathy.  The thyroid gland and esophagus are normal.       Lungs/pleura: The airways are patent centrally.  Left pleural effusion   contributes to complete atelectasis of the left lower lobe.  There is partial   atelectasis of the lingula.  Right pleural effusion contributes to moderate   right lower lobe atelectasis.       Upper Abdomen: Adrenal glands are normal.  No significant findings in the   visualized upper abdomen.       Soft Tissues/Bones: Degenerative changes throughout the thoracic spine.  No   acute skeletal finding in the chest.           Impression   1. Moderate left and small right pleural effusions. Robertha Migue is complete   atelectasis of the left lower lobe and partial atelectasis of the right lower   lobe.  Superimposed pneumonitis may be considered clinically. 2. Severe cardiomegaly with a moderate pericardial effusion.             Narrative   EXAMINATION:   ONE XRAY VIEW OF THE CHEST       10/2/2021 5:48 pm       COMPARISON:   None.       HISTORY:   ORDERING SYSTEM PROVIDED HISTORY: shortness of breath   TECHNOLOGIST PROVIDED HISTORY:   shortness of breath       FINDINGS:   Portable study is limited by obesity.  The heart is moderately enlarged. There is left retrocardiac consolidation.  There is thickening of the minor   fissure.  There is pulmonary vascular congestion and suspicion of trace right   pleural fluid.           Impression   Findings most consistent with mild congestive heart failure.       Left retrocardiac opacification/consolidation may represent a combination of   pleural fluid and airspace disease.  Underlying pneumonia not excluded.               Medical Decision [facility]:     Respiratory Panel, Molecular, with COVID-19 (Restricted: peds pts or suitable admitted adults) [[id number]]    Collected: 10/03/21 1108    Updated: 10/03/21 0662    Specimen Source: Nasopharyngeal Swab     Specimen Description . NASOPHARYNGEAL SWAB    Adenovirus PCR Not Detected    Coronavirus 229E PCR Not Detected    Coronavirus HKU1 PCR Not Detected    Coronavirus NL63 PCR Not Detected    Coronavirus OC43 PCR Not Detected    SARS-CoV-2, PCR Not Detected    Human Metapneumovirus PCR Not Detected    Rhino/Enterovirus PCR Not Detected    Influenza A by PCR Not Detected    Influenza A H1 PCR NOT REPORTED    Influenza A H1 (2009) PCR NOT REPORTED    Influenza A H3 PCR NOT REPORTED    Influenza B by PCR Not Detected    Parainfluenza 1 PCR Not Detected    Parainfluenza 2 PCR Not Detected    Parainfluenza 3 PCR Not Detected    Parainfluenza 4 PCR Not Detected    Resp Syncytial Virus PCR Not Detected    Bordetella Parapertussis Not Detected    B Pertussis by PCR Not Detected    Chlamydia pneumoniae By PCR Not Detected    Mycoplasma pneumo by PCR Not Detected       Medical Decision Making-Other:     Note:  Labs, medications, radiologic studies were reviewed with personal review of films  Large amounts of data were reviewed  Discussed with nursing Staff, Discharge planner  Infection Control and Prevention measures reviewed  All prior entries were reviewed  Administer medications as ordered  Prognosis: Good  Discharge planning reviewed      Thank you for allowing us to participate in the care of this patient. Please call with questions. Bety Peres MD, PGY-1  Infectious Disease/ Internal Medicine Resident  [address], [place]    ATTESTATION:    I have discussed the case, including pertinent history and exam findings with the residents and students. I have seen and examined the patient and the key elements of the encounter have been performed by me. I was present when the student obtained his information or examined the patient. I have reviewed the laboratory data, other diagnostic studies and discussed them with the residents. I have updated the medical record where necessary.     I agree with the assessment, plan and orders as documented by the resident/ student.     Titi Marsh MD.    Pager: [phone] - Office: [phone]

## 2021-10-04 NOTE — PROGRESS NOTES
Physical Therapy        Physical Therapy Cancel Note      DATE: 10/4/2021    NAME: Raheel Gaston  MRN: [id number]   : 1945      Patient not seen this date for Physical Therapy due to:    Pt politely declined PT this date; she's been going back and forth between bipap and nasal O2--currently on nasal O2, satting at 90/91% and feels SOB; she's to have thoracentesis sometime later today, and is agreeable to try to mobilize tomorrow. Per RN, pt is now off levophed for BP. Check pt status 10/5 and evaluate as appropriate.         Electronically signed by Isaiah Julien PT on 10/4/2021 at 8:39 AM

## 2021-10-04 NOTE — FLOWSHEET NOTE
10/04/21 1643   Encounter Summary   Services provided to: Patient   Referral/Consult From: [address] [facility]   (10/04/2021)   Complexity of Encounter Moderate   Length of Encounter 30 minutes   Spiritual Assessment Completed Yes   Routine   Type Follow up   Assessment Calm; Approachable; Hopeful;Coping   Intervention Active listening;Explored feelings, thoughts, concerns;Explored coping resources;Nurtured hope;Prayer;Sustaining presence/ Ministry of presence   Outcome Acceptance;Comfort;Expressed gratitude

## 2021-10-04 NOTE — PROGRESS NOTES
INTENSIVE CARE UNIT  Resident Physician Progress Note    Patient - Anisa Graham  Date of Admission -  10/2/2021  5:17 PM  Date of Evaluation -  10/4/2021  Room and Bed Number -  0124/0124-01   Hospital Day - 2    SUBJECTIVE:   HISTORY OF PRESENT ILLNESS:      Shortness of breath weakness. Atrial fibrillation with RVR. Adenosine x's 2 without conversion. 20 mg cardizem without conversion and therefore started on cardizem drip     HX of HTN, COPD, CHF, SEJAL, Obesity,     OVERNIGHT EVENTS:        No acute events overnight. Continues on cardizem drip. Heparin drip. Will plan to transition to oral medications today pending bedside swallow     TODAY:      No sedation - awake and alert     Vasopressors: Off of levophed since yesterday. BiPap  Overnight --> Nasal cannula this am     Hypokalemia at 3.1 --> replaced 20 meq IV, effer k ordered daily.    BUN/Creatinine: 59/1.35 --> continue to monitor   WBC downtrending   Hgb stable         OBJECTIVE:   VITAL SIGNS:  Patient Vitals for the past 8 hrs:   BP Temp Temp src Pulse Resp SpO2   10/04/21 0815 (!) 109/57 -- -- 87 24 96 %   10/04/21 0800 (!) 117/52 -- -- 78 24 97 %   10/04/21 0745 (!) 112/55 -- -- 95 24 96 %   10/04/21 0730 (!) 98/44 -- -- 90 25 97 %   10/04/21 0715 (!) 97/50 -- -- 87 25 97 %   10/04/21 0700 103/63 -- -- 88 22 97 %   10/04/21 0645 99/60 -- -- 87 24 97 %   10/04/21 0615 (!) 94/57 -- -- 94 26 97 %   10/04/21 0600 (!) 97/51 -- -- 91 26 97 %   10/04/21 0545 (!) 94/52 -- -- 93 24 97 %   10/04/21 0530 (!) 88/49 -- -- 95 21 97 %   10/04/21 0526 -- -- -- 96 24 97 %   10/04/21 0515 (!) 91/54 -- -- 90 24 91 %   10/04/21 0500 (!) 106/56 -- -- 103 (!) 35 96 %   10/04/21 0445 (!) 98/46 -- -- 83 23 93 %   10/04/21 0430 (!) 122/91 -- -- 94 26 94 %   10/04/21 0415 (!) 105/47 -- -- 100 24 95 %   10/04/21 0400 101/64 98.4 °F (36.9 °C) Oral 94 25 94 %   10/04/21 0345 (!) 106/54 -- -- 84 23 96 %   10/04/21 0330 (!) 110/50 -- -- 92 24 96 %   10/04/21 0318 -- -- -- -- 22 97 %   10/04/21 0315 113/62 -- -- 98 (!) 31 97 %   10/04/21 0300 (!) 106/57 -- -- 97 24 94 %   10/04/21 0245 116/76 -- -- 92 27 95 %   10/04/21 0230 (!) 114/58 -- -- 90 22 95 %   10/04/21 0215 (!) 117/59 -- -- 100 28 97 %   10/04/21 0200 120/70 -- -- 85 24 97 %   10/04/21 0145 110/60 -- -- 95 26 96 %       Last Body weight:   Wt Readings from Last 3 Encounters:   10/03/21 268 lb 4.8 oz (121.7 kg)   18 218 lb 6.4 oz (99.1 kg)   10/05/17 232 lb (105.2 kg)       Body Mass Index : Body mass index is 46.05 kg/m². Tmax over 24 hours: Temp (24hrs), Av.4 °F (36.9 °C), Min:97.9 °F (36.6 °C), Max:98.9 °F (37.2 °C)      Ins/Outs:   In: 2970.5 [I.V.:2970.5]  Out:  [Urine:]    PHYSICAL EXAM:  Constitutional: Awake and alert, well developed and well nourished, in no acute distress, tolerating bipap well   EENT: PERRL, EOMI, sclera clear, anicteric,  no lesions,  Neck: Supple, symmetrical, trachea midline, no adenopathy, no jvd  Respiratory: clear to auscultation, course breath sounds bilateral lowers, diminished bilateral lowers.    Cardiovascular: regular rate and rhythm, no murmur noted and 2+ pulses throughout, pedal edema noted bilaterally  Abdomen: soft, nontender, nondistended, no masses or organomegaly  Extremities:  peripheral pulses normal, no pedal edema, no clubbing or cyanosis    MEDICATIONS:  Scheduled Meds:   potassium chloride  40 mEq IntraVENous BID    sodium chloride flush  5-40 mL IntraVENous 2 times per day    atorvastatin  20 mg Oral Daily    clopidogrel  75 mg Oral Daily    cefepime  1,000 mg IntraVENous Q12H    citalopram  20 mg Oral Daily    sodium chloride flush  5-40 mL IntraVENous 2 times per day    ipratropium-albuterol  1 ampule Inhalation 4x daily       Continuous Infusions:   sodium chloride      sodium chloride 75 mL/hr at 10/03/21 2300    sodium chloride      norepinephrine Stopped (10/04/21 021)    dilTIAZem (CARDIZEM) 125 mg in dextrose 5% 125 mL infusion Stopped (10/04/21 0820)    heparin (PORCINE) Infusion 16.5 Units/kg/hr (10/04/21 0256)       PRN Meds:   sodium chloride flush, 5-40 mL, PRN  sodium chloride, 25 mL, PRN  sodium chloride flush, 5-40 mL, PRN  sodium chloride, 25 mL, PRN  ondansetron, 4 mg, Q8H PRN   Or  ondansetron, 4 mg, Q6H PRN  polyethylene glycol, 17 g, Daily PRN  acetaminophen, 650 mg, Q6H PRN   Or  acetaminophen, 650 mg, Q6H PRN  potassium chloride, 20 mEq, PRN  magnesium sulfate, 2,000 mg, PRN  albuterol, 2.5 mg, Q4H PRN  heparin (porcine), 4,000 Units, PRN  heparin (porcine), 2,000 Units, PRN        SUPPORT DEVICES: [] Ventilator [] BIPAP  [x] Nasal Cannula [] Room Air  VENT SETTINGS (Comprehensive) (if applicable):        DATA:  Complete Blood Count:   Recent Labs     10/02/21  1734 10/03/21  1107 10/04/21  0345   WBC 28.5* 32.8* 26.1*   RBC 4.63 3.86* 3.72*   HGB 14.2 12.0 11.4*   HCT 42.8 37.4 35.3*   MCV 92.4 96.9 94.9   MCH 30.7 31.1 30.6   MCHC 33.2 32.1 32.3   RDW 12.8 15.3* 12.5   PLT See Reflexed IPF Result 850* 448   MPV NOT REPORTED 9.8 9.0        Last 3 Blood Glucose:   Recent Labs     10/02/21  1734 10/03/21  0539 10/04/21  0345   GLUCOSE 159* 169* 133*        PT/INR:    Lab Results   Component Value Date    PROTIME 11.3 10/04/2021    INR 1.1 10/04/2021     PTT:    Lab Results   Component Value Date    APTT 67.1 10/04/2021    APTT 38.1 03/21/2016       Basic Metabolic Profile:   Recent Labs     10/02/21  1734 10/02/21  1734 10/03/21  0539 10/03/21  1231 10/04/21  0345   *  --  130*  --  133*   K 3.5*  --  3.4*  --  3.1*   CL 90*  --  91*  --  97*   CO2 21  --  23  --  24   BUN 47*  --  48*  --  59*   CREATININE 1.23*   < > 1.12* 1.10 1.35*   GLUCOSE 159*  --  169*  --  133*    < > = values in this interval not displayed.        Liver Function:  Recent Labs     10/04/21  0345   PROT 6.1*   LABALBU 2.8*   ALT 38*   AST 83*   ALKPHOS 56   BILITOT 0.42       Magnesium:   Lab Results   Component Value Date    MG 2.5 excluded. US THORACENTESIS Which side should the procedure be performed? Left    (Results Pending)       ASSESSMENT:     Patient Active Problem List    Diagnosis Date Noted    Sepsis with acute hypoxic respiratory failure without septic shock ([facility] 10/03/2021    Acute on chronic diastolic heart failure ([address] 10/03/2021    UTI (urinary tract infection) 10/03/2021    Hyponatremia 10/03/2021    Hypokalemia 10/03/2021    Elevated troponin 10/03/2021    Atrial fibrillation with RVR ([address] 10/02/2021    Pneumonia of left lower lobe due to infectious organism 10/02/2021    Colon polyps 06/02/2021    Anemia 09/20/2018    Arthritis 09/19/2018    At high risk for falls 09/19/2018    Heart murmur 09/19/2018    Legally blind in right eye, as defined in [place] 09/19/2018    COPD (chronic obstructive pulmonary disease) ([facility] [address] 09/19/2018    Simple chronic bronchitis (HCC)     Acute respiratory failure with hypoxia (HCC)     Postoperative confusion     Pulmonary emphysema ([address]     Open bimalleolar fracture of left ankle 03/22/2016    Hyperlipidemia     COPD exacerbation (HCC)     Benign essential HTN 03/04/2016    Morbid obesity with BMI of 40.0-44.9, adult ([address] 03/04/2016    Asthma         PLAN:       PLAN/MEDICAL DECISION MAKING:      Acute hypoxic respiratory failure requiring BiPAP. - CHF versus A. fib with RVR. - left sided pleural effusion   - Continue BiPAP at night prn, NC throughout the day as tolerated. - Continue cefepime. Follow-up on pro-Gordon.   - discontinue vancomycin    - ID on board. - no pneumonia noted. -Gentle hydration at normal slide at 75 mL/h.   -Patient is high risk of intubation, continue icu monitoring     A. fib with RVR-   - Continue cardizem drip    - transition to oral medications as able    - Off of levophed today    - new onset     Left lower lobe pneumonia with pleural effusion   - continue antibiotics, follow-up on cultures.      - CXR showing left sided pleural effusion    - IR for thoracentesis today    - CXR post - pending     -Asymptomatic UTI-   - Follow-up on urine cultures   - Continue cefepime for now     -Pericardial effusion   - follow-up on echo. - Cardiology recs. -COPD   -continue bronchodilators. - nasal cannula as needed for     -WPY-smoxba-sm on echo.    - Plavix restarted   - Lopressor restarted   - Lasix restarted   - Lipitor restarted     - Urinary retention    - goldstein placed    - monitor I/O's   - Urine output 0.6 cc/kg/hr overnight     DVT ppx: Heparin   GI ppx: pepcid 20 mg bid         CODE STATUS: Full Code    DISPOSITION:  [x] To remain ICU: on cardizem drip   [] OK for out of ICU from [place], [place]  Emergency Medicine Resident, PGY2  Critical Care Service   10/04/21 9:33 AM

## 2021-10-04 NOTE — PLAN OF CARE
Problem: Non-Violent Restraints  Goal: Removal from restraints as soon as assessed to be safe  10/4/2021 0032 by Torri Downs RN  Outcome: Completed  10/3/2021 1757 by Renetta Moreno RN  Outcome: Ongoing  Goal: No harm/injury to patient while restraints in use  10/4/2021 0032 by Torri Downs RN  Outcome: Completed  10/3/2021 1757 by Renetta Moreno RN  Outcome: Ongoing  Goal: Patient's dignity will be maintained  10/4/2021 0032 by Torri Downs RN  Outcome: Completed  10/3/2021 1757 by Renetta Moreno RN  Outcome: Ongoing     Problem: Skin Integrity:  Goal: Will show no infection signs and symptoms  Description: Will show no infection signs and symptoms  10/4/2021 0032 by Torri Downs RN  Outcome: Met This Shift  10/3/2021 1757 by Renetta Moreno RN  Outcome: Ongoing  Goal: Absence of new skin breakdown  Description: Absence of new skin breakdown  10/4/2021 0032 by Torri Downs RN  Outcome: Met This Shift  10/3/2021 1757 by Renetta Moreno RN  Outcome: Ongoing     Problem: Falls - Risk of:  Goal: Will remain free from falls  Description: Will remain free from falls  10/4/2021 0032 by Torri Downs RN  Outcome: Met This Shift  10/3/2021 1757 by Renetta Moreno RN  Outcome: Ongoing  Goal: Absence of physical injury  Description: Absence of physical injury  10/4/2021 0032 by Torri Downs RN  Outcome: Met This Shift  10/3/2021 1757 by Renetta Moreno RN  Outcome: Ongoing     Problem: Respiratory:  Goal: Ability to maintain normal respiratory secretions will improve  Description: Ability to maintain normal respiratory secretions will improve  10/4/2021 0032 by Torri Downs RN  Outcome: Met This Shift  10/3/2021 1757 by Renetta Moreno RN  Outcome: Ongoing

## 2021-10-04 NOTE — PROGRESS NOTES
Patient here for ultrasound thoracentesis. Here with floor RN et on monitors. Addison ZAMORANO in room and consent signed. Ultrasound of left back done. Back prepped, draped and numbed with lidocaine. Needle in without difficulty and 350 ml of yellow fluid drained. Needle out and dressing on. Post scan has been completed. Specimen to be sent to lab.

## 2021-10-04 NOTE — BRIEF OP NOTE
Brief Postoperative Note for Thoracentesis    Shad Rivero  YOB: 1945  [id number]    Pre-operative Diagnosis: leftPleural effusion      Post-operative Diagnosis: Same    Procedure: Ultrasound guided Thoracentesis     Anesthesia: 1% Lidocaine     Surgeons/Assistants: Roseline Teran PA-C    Complications: none    EBL: Minimal    Specimens: were obtained    Ultrasound guided left thoracentesis performed. 350 ml clear yellow fluid obtained. Dressing applied.       Electronically signed by JAUN Lovelace on 10/4/2021 at 1:50 PM

## 2021-10-05 ENCOUNTER — APPOINTMENT (OUTPATIENT)
Dept: GENERAL RADIOLOGY | Age: 76
DRG: 871 | End: 2021-10-05
Payer: MEDICARE

## 2021-10-05 LAB
ANCA MYELOPEROXIDASE: <0.3 AU/ML (ref 0–3.5)
ANCA PROTEINASE 3: <0.7 AU/ML (ref 0–2)
ANION GAP SERPL CALCULATED.3IONS-SCNC: 11 MMOL/L (ref 9–17)
ANTI DNA DOUBLE STRANDED: 0.7 IU/ML
ANTI-NUCLEAR ANTIBODY (ANA): NEGATIVE
APPEARANCE FLUID: NORMAL
BASO FLUID: NORMAL %
BUN BLDV-MCNC: 41 MG/DL (ref 8–23)
BUN/CREAT BLD: ABNORMAL (ref 9–20)
CALCIUM SERPL-MCNC: 8 MG/DL (ref 8.6–10.4)
CASE NUMBER:: NORMAL
CHLORIDE BLD-SCNC: 102 MMOL/L (ref 98–107)
CO2: 23 MMOL/L (ref 20–31)
COLOR FLUID: NORMAL
CREAT SERPL-MCNC: 0.97 MG/DL (ref 0.5–0.9)
ENA ANTIBODIES SCREEN: 0.2 U/ML
EOSINOPHIL FLUID: NORMAL %
FLUID DIFF COMMENT: NORMAL
GFR AFRICAN AMERICAN: >60 ML/MIN
GFR NON-AFRICAN AMERICAN: 56 ML/MIN
GFR SERPL CREATININE-BSD FRML MDRD: ABNORMAL ML/MIN/{1.73_M2}
GFR SERPL CREATININE-BSD FRML MDRD: ABNORMAL ML/MIN/{1.73_M2}
GLUCOSE BLD-MCNC: 108 MG/DL (ref 70–99)
HCT VFR BLD CALC: 36.7 % (ref 36.3–47.1)
HEMOGLOBIN: 11.5 G/DL (ref 11.9–15.1)
LEGIONELLA PNEUMOPHILIA AG, URINE: NEGATIVE
LYMPHOCYTES, BODY FLUID: 0 %
MCH RBC QN AUTO: 30.3 PG (ref 25.2–33.5)
MCHC RBC AUTO-ENTMCNC: 31.3 G/DL (ref 28.4–34.8)
MCV RBC AUTO: 96.8 FL (ref 82.6–102.9)
MONOCYTE, FLUID: NORMAL %
NEUTROPHIL, FLUID: 72 %
NRBC AUTOMATED: 0 PER 100 WBC
OTHER CELLS FLUID: NORMAL %
PARTIAL THROMBOPLASTIN TIME: 58.4 SEC (ref 20.5–30.5)
PDW BLD-RTO: 12.5 % (ref 11.8–14.4)
PLATELET # BLD: 443 K/UL (ref 138–453)
PMV BLD AUTO: 9.1 FL (ref 8.1–13.5)
POTASSIUM SERPL-SCNC: 3.6 MMOL/L (ref 3.7–5.3)
RBC # BLD: 3.79 M/UL (ref 3.95–5.11)
RBC FLUID: <3000 /MM3
SODIUM BLD-SCNC: 136 MMOL/L (ref 135–144)
SOURCE: NORMAL
SPECIMEN DESCRIPTION: NORMAL
SPECIMEN TYPE: NORMAL
STREP PNEUMONIAE ANTIGEN: NEGATIVE
WBC # BLD: 12.9 K/UL (ref 3.5–11.3)
WBC FLUID: 1693 /MM3

## 2021-10-05 PROCEDURE — 6360000002 HC RX W HCPCS: Performed by: STUDENT IN AN ORGANIZED HEALTH CARE EDUCATION/TRAINING PROGRAM

## 2021-10-05 PROCEDURE — 6370000000 HC RX 637 (ALT 250 FOR IP): Performed by: STUDENT IN AN ORGANIZED HEALTH CARE EDUCATION/TRAINING PROGRAM

## 2021-10-05 PROCEDURE — 71045 X-RAY EXAM CHEST 1 VIEW: CPT

## 2021-10-05 PROCEDURE — 99291 CRITICAL CARE FIRST HOUR: CPT | Performed by: INTERNAL MEDICINE

## 2021-10-05 PROCEDURE — 97530 THERAPEUTIC ACTIVITIES: CPT

## 2021-10-05 PROCEDURE — 97162 PT EVAL MOD COMPLEX 30 MIN: CPT

## 2021-10-05 PROCEDURE — 97110 THERAPEUTIC EXERCISES: CPT

## 2021-10-05 PROCEDURE — 85027 COMPLETE CBC AUTOMATED: CPT

## 2021-10-05 PROCEDURE — 94640 AIRWAY INHALATION TREATMENT: CPT

## 2021-10-05 PROCEDURE — 2500000003 HC RX 250 WO HCPCS: Performed by: STUDENT IN AN ORGANIZED HEALTH CARE EDUCATION/TRAINING PROGRAM

## 2021-10-05 PROCEDURE — 87899 AGENT NOS ASSAY W/OPTIC: CPT

## 2021-10-05 PROCEDURE — 94761 N-INVAS EAR/PLS OXIMETRY MLT: CPT

## 2021-10-05 PROCEDURE — 2700000000 HC OXYGEN THERAPY PER DAY

## 2021-10-05 PROCEDURE — 2000000000 HC ICU R&B

## 2021-10-05 PROCEDURE — 85730 THROMBOPLASTIN TIME PARTIAL: CPT

## 2021-10-05 PROCEDURE — 94660 CPAP INITIATION&MGMT: CPT

## 2021-10-05 PROCEDURE — 97535 SELF CARE MNGMENT TRAINING: CPT

## 2021-10-05 PROCEDURE — 99233 SBSQ HOSP IP/OBS HIGH 50: CPT | Performed by: INTERNAL MEDICINE

## 2021-10-05 PROCEDURE — 6370000000 HC RX 637 (ALT 250 FOR IP): Performed by: INTERNAL MEDICINE

## 2021-10-05 PROCEDURE — 1200000000 HC SEMI PRIVATE

## 2021-10-05 PROCEDURE — 2580000003 HC RX 258: Performed by: STUDENT IN AN ORGANIZED HEALTH CARE EDUCATION/TRAINING PROGRAM

## 2021-10-05 PROCEDURE — 36415 COLL VENOUS BLD VENIPUNCTURE: CPT

## 2021-10-05 PROCEDURE — 87449 NOS EACH ORGANISM AG IA: CPT

## 2021-10-05 PROCEDURE — 80048 BASIC METABOLIC PNL TOTAL CA: CPT

## 2021-10-05 PROCEDURE — 93005 ELECTROCARDIOGRAM TRACING: CPT | Performed by: INTERNAL MEDICINE

## 2021-10-05 PROCEDURE — 97166 OT EVAL MOD COMPLEX 45 MIN: CPT

## 2021-10-05 RX ORDER — ESMOLOL HYDROCHLORIDE 10 MG/ML
50-300 INJECTION, SOLUTION INTRAVENOUS CONTINUOUS
Status: DISCONTINUED | OUTPATIENT
Start: 2021-10-05 | End: 2021-10-07

## 2021-10-05 RX ORDER — POLYETHYLENE GLYCOL 3350 17 G/17G
17 POWDER, FOR SOLUTION ORAL DAILY
Status: DISCONTINUED | OUTPATIENT
Start: 2021-10-05 | End: 2021-10-11 | Stop reason: HOSPADM

## 2021-10-05 RX ORDER — DILTIAZEM HYDROCHLORIDE 120 MG/1
120 CAPSULE, COATED, EXTENDED RELEASE ORAL DAILY
Status: DISCONTINUED | OUTPATIENT
Start: 2021-10-05 | End: 2021-10-11 | Stop reason: HOSPADM

## 2021-10-05 RX ORDER — ESMOLOL HYDROCHLORIDE 10 MG/ML
50 INJECTION INTRAVENOUS ONCE
Status: COMPLETED | OUTPATIENT
Start: 2021-10-05 | End: 2021-10-05

## 2021-10-05 RX ORDER — FUROSEMIDE 10 MG/ML
40 INJECTION INTRAMUSCULAR; INTRAVENOUS ONCE
Status: COMPLETED | OUTPATIENT
Start: 2021-10-05 | End: 2021-10-05

## 2021-10-05 RX ADMIN — ONDANSETRON 4 MG: 4 TABLET, ORALLY DISINTEGRATING ORAL at 23:11

## 2021-10-05 RX ADMIN — IPRATROPIUM BROMIDE AND ALBUTEROL SULFATE 1 AMPULE: .5; 2.5 SOLUTION RESPIRATORY (INHALATION) at 11:38

## 2021-10-05 RX ADMIN — ACETAMINOPHEN 650 MG: 325 TABLET ORAL at 07:39

## 2021-10-05 RX ADMIN — IPRATROPIUM BROMIDE AND ALBUTEROL SULFATE 1 AMPULE: .5; 2.5 SOLUTION RESPIRATORY (INHALATION) at 08:55

## 2021-10-05 RX ADMIN — SODIUM CHLORIDE, PRESERVATIVE FREE 10 ML: 5 INJECTION INTRAVENOUS at 19:34

## 2021-10-05 RX ADMIN — DILTIAZEM HYDROCHLORIDE 120 MG: 120 CAPSULE, COATED, EXTENDED RELEASE ORAL at 17:30

## 2021-10-05 RX ADMIN — ATORVASTATIN CALCIUM 20 MG: 20 TABLET, FILM COATED ORAL at 07:39

## 2021-10-05 RX ADMIN — SODIUM CHLORIDE: 9 INJECTION, SOLUTION INTRAVENOUS at 04:00

## 2021-10-05 RX ADMIN — HEPARIN SODIUM AND DEXTROSE 16.5 UNITS/KG/HR: 10000; 5 INJECTION INTRAVENOUS at 12:00

## 2021-10-05 RX ADMIN — IPRATROPIUM BROMIDE AND ALBUTEROL SULFATE 1 AMPULE: .5; 2.5 SOLUTION RESPIRATORY (INHALATION) at 15:46

## 2021-10-05 RX ADMIN — ESMOLOL HYDROCHLORIDE 6 MG: 10 INJECTION, SOLUTION INTRAVENOUS at 20:08

## 2021-10-05 RX ADMIN — CEFEPIME HYDROCHLORIDE 1000 MG: 1 INJECTION, POWDER, FOR SOLUTION INTRAMUSCULAR; INTRAVENOUS at 12:02

## 2021-10-05 RX ADMIN — ESMOLOL HYDROCHLORIDE 50 MCG/KG/MIN: 10 INJECTION INTRAVENOUS at 20:12

## 2021-10-05 RX ADMIN — DILTIAZEM HYDROCHLORIDE 30 MG: 30 TABLET, FILM COATED ORAL at 05:01

## 2021-10-05 RX ADMIN — CLOPIDOGREL 75 MG: 75 TABLET, FILM COATED ORAL at 07:39

## 2021-10-05 RX ADMIN — CEFEPIME HYDROCHLORIDE 1000 MG: 1 INJECTION, POWDER, FOR SOLUTION INTRAMUSCULAR; INTRAVENOUS at 01:33

## 2021-10-05 RX ADMIN — DILTIAZEM HYDROCHLORIDE 30 MG: 30 TABLET, FILM COATED ORAL at 12:45

## 2021-10-05 RX ADMIN — IPRATROPIUM BROMIDE AND ALBUTEROL SULFATE 1 AMPULE: .5; 2.5 SOLUTION RESPIRATORY (INHALATION) at 19:05

## 2021-10-05 RX ADMIN — ESMOLOL HYDROCHLORIDE 150 MCG/KG/MIN: 10 INJECTION INTRAVENOUS at 22:32

## 2021-10-05 RX ADMIN — SODIUM CHLORIDE, PRESERVATIVE FREE 10 ML: 5 INJECTION INTRAVENOUS at 08:51

## 2021-10-05 RX ADMIN — METOPROLOL TARTRATE 50 MG: 50 TABLET, FILM COATED ORAL at 19:35

## 2021-10-05 RX ADMIN — FUROSEMIDE 40 MG: 10 INJECTION, SOLUTION INTRAMUSCULAR; INTRAVENOUS at 07:21

## 2021-10-05 RX ADMIN — METOPROLOL TARTRATE 50 MG: 50 TABLET, FILM COATED ORAL at 07:39

## 2021-10-05 RX ADMIN — POTASSIUM BICARBONATE 40 MEQ: 782 TABLET, EFFERVESCENT ORAL at 08:51

## 2021-10-05 RX ADMIN — POLYETHYLENE GLYCOL 3350 17 G: 17 POWDER, FOR SOLUTION ORAL at 07:39

## 2021-10-05 RX ADMIN — CITALOPRAM 20 MG: 20 TABLET, FILM COATED ORAL at 07:39

## 2021-10-05 ASSESSMENT — PULMONARY FUNCTION TESTS
PIF_VALUE: 13
PIF_VALUE: 15

## 2021-10-05 ASSESSMENT — PAIN SCALES - GENERAL
PAINLEVEL_OUTOF10: 0
PAINLEVEL_OUTOF10: 3

## 2021-10-05 NOTE — PROGRESS NOTES
BRONCHOSPASM/BRONCHOCONSTRICTION     [x]         IMPROVE AERATION/BREATH SOUNDS  [x]   ADMINISTER BRONCHODILATOR THERAPY AS APPROPRIATE  [x]   ASSESS BREATH SOUNDS  []   IMPLEMENT AEROSOL/MDI PROTOCOL  [x]   PATIENT EDUCATION AS NEEDED    NON INVASIVE VENTILATION  PROVIDE OPTIMAL VENTILATION/ACCEPTABLE SP02  IMPLEMENT NON INVASIVE VENTILATION PROTOCOL  ASSESSMENT SKIN INTEGRITY  PATIENT EDUCATION AS NEEDED  BIPAP AS NEEDED

## 2021-10-05 NOTE — PROGRESS NOTES
INTENSIVE CARE UNIT  Resident Physician Progress Note    Patient - Shad Corbetttter  Date of Admission -  10/2/2021  5:17 PM  Date of Evaluation -  10/5/2021  Room and Bed Number -  0124/0124-01   Hospital Day - 3    SUBJECTIVE:   HISTORY OF PRESENT ILLNESS:      Shortness of breath weakness. New onset Atrial fibrillation with RVR. Adenosine x's 2 without conversion. 20 mg cardizem without conversion and therefore started on cardizem drip      HX of HTN, COPD, CHF, SEJAL, Obesity,     OVERNIGHT EVENTS:        Thoracentesis yesterday - 350 ml off of left sided effusion   Increased Bipap utilization overnight - CXR this am showing continued left sided pleural effusion  Lasix 40 mg IV this am. - hold home PO   Off cardizem drip - transitioned to orals    TODAY:      Tachycardic in low 100's   Hypertensive    Bipap Settings: 40% PIP 2 PEEP 8  4 L NC intermittently     BUN/Cr: 41/0.97 - improving   Urine output: 1 ml/kg/hr   I/Os: -2,700 since admit   IVF: Balbina Presley   BM - none since arrival - added miralax     BG: controlled     WBC: 12 trending downward - ID recommending continuing cefepime pending culture data   Hgb 11.5  Hypokalemic - replacing with effer k           OBJECTIVE:   VITAL SIGNS:  Patient Vitals for the past 8 hrs:   BP Pulse Resp SpO2   10/05/21 1935 (!) 130/96 134 -- --   10/05/21 1908 -- 118 30 95 %   10/05/21 1905 -- -- 28 90 %   10/05/21 1638 -- 125 30 96 %   10/05/21 1600 114/88 110 26 95 %   10/05/21 1553 -- 112 27 94 %   10/05/21 1322 (!) 131/93 137 (!) 33 94 %       Last Body weight:   Wt Readings from Last 3 Encounters:   10/05/21 267 lb 10.2 oz (121.4 kg)   02/27/18 218 lb 6.4 oz (99.1 kg)   10/05/17 232 lb (105.2 kg)       Body Mass Index : Body mass index is 45.94 kg/m². Tmax over 24 hours: No data recorded. Ins/Outs: In: 1167 [P.O.:120;  I.V.:1047]  Out: 4395 [Urine:4395]    PHYSICAL EXAM:  Constitutional: Awake and alert, well developed and well nourished, in no acute distress, 10/05/21  0523   WBC 32.8* 26.1* 12.9*   RBC 3.86* 3.72* 3.79*   HGB 12.0 11.4* 11.5*   HCT 37.4 35.3* 36.7   MCV 96.9 94.9 96.8   MCH 31.1 30.6 30.3   MCHC 32.1 32.3 31.3   RDW 15.3* 12.5 12.5   * 448 443   MPV 9.8 9.0 9.1        Last 3 Blood Glucose:   Recent Labs     10/03/21  0539 10/04/21  0345 10/05/21  0523   GLUCOSE 169* 133* 108*        PT/INR:    Lab Results   Component Value Date    PROTIME 11.3 10/04/2021    INR 1.1 10/04/2021     PTT:    Lab Results   Component Value Date    APTT 58.4 10/05/2021    APTT 38.1 03/21/2016       Basic Metabolic Profile:   Recent Labs     10/03/21  0539 10/03/21  1231 10/04/21  0345 10/05/21  0523   *  --  133* 136   K 3.4*  --  3.1* 3.6*   CL 91*  --  97* 102   CO2 23  --  24 23   BUN 48*  --  59* 41*   CREATININE 1.12* 1.10 1.35* 0.97*   GLUCOSE 169*  --  133* 108*       Liver Function:  Recent Labs     10/04/21  0345 10/04/21  0345 10/04/21  0858   PROT 6.1*   < > 6.0*   LABALBU 2.8*  --   --    ALT 38*  --   --    AST 83*  --   --    ALKPHOS 56  --   --    BILITOT 0.42  --   --     < > = values in this interval not displayed.        Magnesium:   Lab Results   Component Value Date    MG 2.5 10/04/2021    MG 2.2 10/03/2021    MG 2.4 10/02/2021     Phosphorus:   Lab Results   Component Value Date    PHOS 2.6 03/25/2016     Ionized Calcium:   Lab Results   Component Value Date    CAION 1.13 03/25/2016        Urinalysis:   Lab Results   Component Value Date    NITRU NEGATIVE 10/02/2021    COLORU Yellow 10/02/2021    PHUR 5.0 10/02/2021    LABCAST 4-8 Hyaline 03/22/2016    LABCAST NONE SEEN 03/22/2016    WBCUA 20 TO 50 10/02/2021    RBCUA 5 TO 10 10/02/2021    RBCUA 25-50 03/22/2016    MUCUS NOT REPORTED 10/02/2021    TRICHOMONAS NOT REPORTED 10/02/2021    YEAST NOT REPORTED 10/02/2021    BACTERIA MANY 10/02/2021    SPECGRAV 1.014 10/02/2021    LEUKOCYTESUR MODERATE 10/02/2021    UROBILINOGEN Normal 10/02/2021    BILIRUBINUR NEGATIVE 10/02/2021    BLOODU LARGE 03/22/2016    GLUCOSEU NEGATIVE 10/02/2021    KETUA NEGATIVE 10/02/2021    AMORPHOUS NOT REPORTED 10/02/2021       HgBA1c:  No results found for: LABA1C  TSH:    Lab Results   Component Value Date    TSH 0.35 10/03/2021     Lactic Acid:   Lab Results   Component Value Date    LACTA NOT REPORTED 10/03/2021    LACTA 0.9 03/23/2016      Troponin: No results for input(s): TROPONINI in the last 72 hours. Microbiology:  No new data to review     Radiology/Imaging:  XR CHEST PORTABLE   Final Result   No significant interval change. XR CHEST PORTABLE   Final Result   Interval decrease in the left pleural effusion. No evidence of pneumothorax         US THORACENTESIS Which side should the procedure be performed? Left   Final Result   Successful ultrasound guided thoracentesis. XR CHEST PORTABLE   Final Result   No significant interval change in pleural effusions and associated basilar   opacities, left greater than right. CT CHEST WO CONTRAST   Final Result   1. Moderate left and small right pleural effusions. There is complete   atelectasis of the left lower lobe and partial atelectasis of the right lower   lobe. Superimposed pneumonitis may be considered clinically. 2. Severe cardiomegaly with a moderate pericardial effusion. XR CHEST PORTABLE   Final Result   Findings most consistent with mild congestive heart failure. Left retrocardiac opacification/consolidation may represent a combination of   pleural fluid and airspace disease. Underlying pneumonia not excluded.              ASSESSMENT:     Patient Active Problem List    Diagnosis Date Noted    Sepsis with acute hypoxic respiratory failure without septic shock ([address] 10/03/2021    Acute on chronic diastolic heart failure ([address] 10/03/2021    UTI (urinary tract infection) 10/03/2021    Hyponatremia 10/03/2021    Hypokalemia 10/03/2021    Elevated troponin 10/03/2021    Atrial fibrillation with RVR ([address] 10/02/2021    Pneumonia of left lower lobe due to infectious organism 10/02/2021    Colon polyps 06/02/2021    Anemia 09/20/2018    Arthritis 09/19/2018    At high risk for falls 09/19/2018    Heart murmur 09/19/2018    Legally blind in right eye, as defined in [place] 09/19/2018    COPD (chronic obstructive pulmonary disease) ([facility] [address] 09/19/2018    Simple chronic bronchitis (HCC)     Acute respiratory failure with hypoxia (HCC)     Postoperative confusion     Pulmonary emphysema ([facility] [address] [facility]     Open bimalleolar fracture of left ankle 03/22/2016    Hyperlipidemia     COPD exacerbation (HCC)     Benign essential HTN 03/04/2016    Morbid obesity with BMI of 40.0-44.9, adult ([facility] [address] 03/04/2016    Asthma         PLAN:       PLAN/MEDICAL DECISION MAKING:    Acute hypoxic respiratory failure requiring BiPAP - resolved                 - CHF    - New onset A. fib with RVR. - left sided pleural effusion              - Continue BiPAP at night prn, NC throughout the day as tolerated. - Continue cefepime.  Follow-up on pro-Gordon.              - ID on board. - no pneumonia noted.      A. fib with RVR- new onset              - Cardizem  mg daily    - Metoprolol    - Continue heparin gtt per cardiology    - plan for CURT/CV tomorrow                    Left lower lobe pneumonia with pleural effusion              - continue antibiotics, follow-up on cultures.                - CXR showing left sided pleural effusion               - IR for thoracentesis yesterday 350 ml off      -Asymptomatic UTI-              - Continue cefepime for now    - ID following      -Pericardial effusion              - follow-up on echo.                - Cardiology recs as above     -COPD              -continue bronchodilators. - nasal cannula as needed for      -[id number] on echo.               - Plavix restarted              - Lopressor restarted              - Lasix restarted              - Lipitor restarted      - Urinary retention               - goldstein placed               - monitor I/O's              - Urine output 1 cc/kg/hr overnight    - I/O: net negative  2, 751 L     DVT ppx: Heparin   GI ppx: pepcid 20 mg bid     CODE STATUS: Full Code    DISPOSITION:  [] To remain ICU:   [x] OK for out of ICU from [address]  Emergency Medicine Resident, PGY2  Critical Care Service   10/05/21 7:44 PM

## 2021-10-05 NOTE — PROGRESS NOTES
Physical Therapy        Physical Therapy Cancel Note      DATE: 10/5/2021    NAME: Elisha Sanders  MRN: [id number]   : 1945      Patient not seen this date for Physical Therapy due to:     Other: OT evaluating pt/ADLs; check back later for PT eval      Electronically signed by Raquel Barajas PT on 10/5/2021 at 8:38 AM

## 2021-10-05 NOTE — PROGRESS NOTES
[facility]   Progress Note                   Date:   10/5/2021  Patient name: Duane Stone  Date of admission:  10/2/2021  5:17 PM  MRN:   [id number]  YOB: 1945  PCP: KALYN Torrez CNP    Reason for Admission:     Subjective:       Patient continues to be in A. fib heart rate in 100s -110's this morning. She received her morning dose of Cardizem 30 mg. Patient underwent thoracocentesis yesterday with 350 mL drained. Chest x-ray showed resolution of pleural effusion however repeat chest x-ray done this morning suggestive of reaccumulation of fluid. Final report pending. Got additional IV Lasix 40 mg  Continue heparin drip    Repeat potassium 3.6, will replace. Continue Cardizem 30 mg 3 times daily, Lopressor 50 mg twice daily and heparin drip    [id number] Score for Atrial Fibrillation Stroke Risk   Risk   Factors  Component Value   C CHF No 0   H HTN Yes 1   A2 Age >= 76 Yes,  (77 y.o.) 2   D DM No 0   S2 Prior Stroke/TIA No 0   V Vascular Disease No 0   A Age 74-69 No,  (77 y.o.) 0   Sc Sex female 1    [id number]  Score  4   Score last updated 10/5/21 4:74 AM EDT    Click here for a link to the UpToDate guideline \"Atrial Fibrillation: Anticoagulation therapy to prevent embolization    Disclaimer: Risk Score calculation is dependent on accuracy of patient problem list and past encounter diagnosis. Brief history  The patient is a 68 y.o.  female who is admitted to the hospital for worsening shortness of breath. Patient states that she was feeling sick for the past 5 days, had no appetite was not taking her medications and ultimately her son called the EMS for worsening shortness of breath.     Her heart rate was in 200s in SVT was initially given 6 mg followed by 12 mg of adenosine without conversion and then was given 20 mg of Cardizem after which her heart rate became better however returned back to 180s by EMS.      In the ER patient was alert and oriented. EKG consistent with atrial fibrillation with RVR. Patient was still hypoxic was put on BiPAP. Vitals /78, heart rate in 100s and saturating 100% on BiPAP. Patient was started on diltiazem drip and heparin drip     Pertinent labs sodium 130, K3.4, creatinine 1.12  Troponin 92-> 62  WBC 28.5  UA suggestive for UTI     Last echo back in 2017 showed LVEF 55%, left atrium moderately dilated, mild dilatation of aortic root and ascending aorta 4.2 cm  No significant cardiac history found    Medications:   Scheduled Meds:   polyethylene glycol  17 g Oral Daily    sodium chloride flush  5-40 mL IntraVENous 2 times per day    furosemide  20 mg Oral Daily    metoprolol tartrate  50 mg Oral BID    dilTIAZem  30 mg Oral 3 times per day    potassium bicarb-citric acid  40 mEq Oral Daily    atorvastatin  20 mg Oral Daily    clopidogrel  75 mg Oral Daily    cefepime  1,000 mg IntraVENous Q12H    citalopram  20 mg Oral Daily    sodium chloride flush  5-40 mL IntraVENous 2 times per day    ipratropium-albuterol  1 ampule Inhalation 4x daily     Continuous Infusions:   sodium chloride      sodium chloride 10 mL/hr at 10/05/21 0717    sodium chloride      heparin (PORCINE) Infusion 16.5 Units/kg/hr (10/04/21 2141)     CBC:   Recent Labs     10/03/21  1107 10/04/21  0345 10/05/21  0523   WBC 32.8* 26.1* 12.9*   HGB 12.0 11.4* 11.5*   * 448 443     BMP:    Recent Labs     10/03/21  0539 10/03/21  1231 10/04/21  0345 10/05/21  0523   *  --  133* 136   K 3.4*  --  3.1* 3.6*   CL 91*  --  97* 102   CO2 23  --  24 23   BUN 48*  --  59* 41*   CREATININE 1.12* 1.10 1.35* 0.97*   GLUCOSE 169*  --  133* 108*     Hepatic:   Recent Labs     10/03/21  0539 10/04/21  0345   AST 28 83*   ALT 22 38*   BILITOT 0.70 0.42   ALKPHOS 63 56     Troponin: No results for input(s): TROPONINI in the last 72 hours. BNP: No results for input(s): BNP in the last 72 hours.   Lipids: No results for input(s): CHOL, HDL in the last 72 hours. Invalid input(s): LDLCALCU  INR:   Recent Labs     10/02/21  1734 10/03/21  0539 10/04/21  0858   INR 1.1 1.1 1.1       Objective:   Vitals: /82   Pulse 99   Temp 98.4 °F (36.9 °C) (Oral)   Resp 26   Ht 5' 4\" (1.626 m)   Wt 267 lb 10.2 oz (121.4 kg)   SpO2 96%   BMI 45.94 kg/m²   General appearance: alert and cooperative with exam  HEENT: Head: Normocephalic, no lesions, without obvious abnormality. Neck: no adenopathy, no carotid bruit, no JVD, supple, symmetrical, trachea midline and thyroid not enlarged, symmetric, no tenderness/mass/nodules  Lungs: clear to auscultation bilaterally  Heart: regular rate and rhythm, S1, S2 normal, no murmur, click, rub or gallop  Abdomen: soft, non-tender; bowel sounds normal; no masses,  no organomegaly  Extremities: extremities normal, atraumatic, no cyanosis or edema  Neurologic: Mental status: Alert, oriented, thought content appropriate    EKG: A fib    ECHO:  Echocardiogram : left ventricle is normal in size with normal systolic function globally. Calculated ejection fraction is 59% ( Atrial-Fib). Mild mitral regurgitation. Mild tricuspid regurgitation. Estimated right ventricular systolic pressure is 35 mmHg. Anterior clear space noted, most likely adipose tissue vs small loculated  pericardial effusion. No evidence of tamponade. Pleural effusion is seen.           Other Current Problems:   Patient Active Problem List:     Asthma     Benign essential HTN     Morbid obesity with BMI of 40.0-44.9, adult ([facility] [address]     Open bimalleolar fracture of left ankle     Hyperlipidemia     COPD exacerbation (HCC)     Pulmonary emphysema (HCC)     Postoperative confusion     Acute respiratory failure with hypoxia (HCC)     Simple chronic bronchitis (HCC)     Atrial fibrillation with RVR (HCC)     Pneumonia of left lower lobe due to infectious organism     Sepsis with acute hypoxic respiratory failure without septic shock (HCC)     Acute on chronic diastolic heart failure (HCC)     UTI (urinary tract infection)     Hyponatremia     Hypokalemia     Elevated troponin     Anemia     Arthritis     At high risk for falls     Colon polyps     Heart murmur     Legally blind in right eye, as defined in [place]     COPD (chronic obstructive pulmonary disease) ([facility] [address]      Assessment and Plan:    Impression:    1. New onset atrial fibrillation  2. Acute hypoxic respiratory distress resolved  3. NSTEMI likely type II from demand ischemia from CASEY/UTI  4. Sepsis  5. Hypokalemia  6. CASEY resolving  7. Hypokalemia   8. Left loculated pleural effusion      Recommendations  1. Continue heparin drip for now, and can switch to NOAC once patient does not need any more procedures. 2. Patient remains tachycardic with heart rate in 110's, will remonitor after dose of metoprolol is given. Is still tachycardic we can go up on the dose of metoprolol 75 mg twice daily. 3. Replace electrolytes to keep K more than 4 and Mg >2      Will discuss patient with attending physician. Recommendations above pending approval by attending physician. Shirley Jackson MD  PGY-3 Internal Medicine Resident  [address] [place] [address]  10/5/2021 7:44 AM    I performed a history and physical examination of the patient and discussed management with the resident. I reviewed the residents note and agree with the documented findings and plan of care. Any areas of disagreement are noted on the chart. I was personally present for the key portions of any procedures. I have documented in the chart those procedures where I was not present during the key portions. I have personally evaluated this patient and have completed at least one if not all key elements of the E/M (history, physical exam, and MDM). Additional findings are as noted. Replace K. Continue metoprolol. Change cardiazem to CD 120mg qday. Continue IV heparin. CURT/CV tomorrow. NPO MN.      Shyla Bruner MD

## 2021-10-05 NOTE — PROGRESS NOTES
[facility]. Atrial fibrillation with rapid ventricular response (HCC) [I48.91]  Atrial fibrillation with RVR (HCC) [I48.91]  Chronic obstructive pulmonary disease with acute exacerbation (HCC) [J44.1]  Pneumonia due to infectious organism, unspecified laterality, unspecified part of lung [J18.9] . Vitals:    10/05/21 1007   BP: 116/72   Pulse: 88   Resp: 23   Temp:    SpO2: 96%   . Patients home meds are   Prior to Admission medications    Medication Sig Start Date End Date Taking? Authorizing Provider   losartan (COZAAR) 100 MG tablet Take 1 tablet by mouth daily 8/28/18   Idella Boas, MD   predniSONE (DELTASONE) 20 MG tablet Take 1 tab BID x 5 days, then 1 tab daily x 5 days. Take with food. 2/27/18   Idella Boas, MD   meloxicam (MOBIC) 15 MG tablet Take 15 mg by mouth daily    Historical Provider, MD   atorvastatin (LIPITOR) 20 MG tablet Take 1 tablet by mouth daily 10/5/17   Idella Boas, MD   metoprolol tartrate (LOPRESSOR) 50 MG tablet TAKE ONE TABLET BY MOUTH TWICE DAILY 10/5/17   Idella Boas, MD   chlorthalidone (HYGROTON) 25 MG tablet Take 1 tablet by mouth daily 10/5/17   Idella Boas, MD   clopidogrel (PLAVIX) 75 MG tablet Take 1 tablet by mouth daily 10/5/17   Idella Boas, MD   cyclobenzaprine (FLEXERIL) 10 MG tablet TAKE ONE TABLET BY MOUTH THREE TIMES DAILY AS NEEDED FOR MUSCLE SPASM 10/5/17   Idella Boas, MD   diphenhydrAMINE (BENADRYL) 25 MG capsule Take 25 mg by mouth every 6 hours as needed for Itching    Historical Provider, MD   zinc oxide (DESITIN) 40 % ointment Apply topically as needed for Dry Skin Apply topically as needed.     Historical Provider, MD   furosemide (LASIX) 20 MG tablet Take 1 tablet by mouth daily 7/14/17   Idella Boas, MD   isosorbide mononitrate (IMDUR) 30 MG extended release tablet Take 1 tablet by mouth daily 7/14/17   Idella Boas, MD   albuterol sulfate  (90 Base) MCG/ACT inhaler Inhale 2 puffs into the lungs every 6 hours as needed for Wheezing 7/14/17   Fabio Everett MD   Calcium-Magnesium-Zinc (FLAKITO-MAG-ZINC PO) Take by mouth daily    Historical Provider, MD   Calcium Carb-Cholecalciferol (CALCIUM + D3 PO) Take by mouth daily    Historical Provider, MD   CINNAMON PO Take by mouth daily    Historical Provider, MD   TURMERIC CURCUMIN PO Take by mouth daily    Historical Provider, MD   ipratropium-albuterol (DUONEB) 0.5-2.5 (3) MG/3ML SOLN nebulizer solution Inhale 1 vial into the lungs every 4 hours    Historical Provider, MD   beclomethasone (QVAR) 80 MCG/ACT inhaler Inhale 2 puffs into the lungs 2 times daily 6/17/16   Fabio Everett MD   Acetaminophen 650 MG TABS Take 650 mg by mouth every 4 hours as needed for Pain or Fever 4/4/16   Avila Pham MD   clonazePAM (KLONOPIN) 1 MG tablet Take 1 mg by mouth nightly     Historical Provider, MD   citalopram (CELEXA) 40 MG tablet Take 40 mg by mouth daily as needed    Historical Provider, MD   LYSINE PO Take by mouth daily    Historical Provider, MD   vitamin D (CHOLECALCIFEROL) 1000 UNIT TABS tablet Take 1,000 Units by mouth daily    Historical Provider, MD   Pyridoxine HCl (VITAMIN B-6) 50 MG tablet Take 100 mg by mouth daily    Historical Provider, MD   Cyanocobalamin (VITAMIN B 12 PO) Take by mouth daily    Historical Provider, MD   Magnesium 500 MG TABS Take by mouth    Historical Provider, MD   Ascorbic Acid (VITAMIN C) 500 MG tablet Take 500 mg by mouth daily    Historical Provider, MD   .      Assessment   RR 24  Breath Sounds: clear/diminished in bases      Bronchodilator assessment at level  3    [x]    Bronchodilator Assessment  BRONCHODILATOR ASSESSMENT SCORE  Score 0 1 2 3 4 5   Breath Sounds   []  Patient Baseline []  No Wheeze good aeration []  Faint, scattered wheezing, good aeration [x]  Expiratory Wheezing and or moderately diminished []  Insp/Exp wheeze and/or very diminished []  Insp/Exp and/ or marked distress   Respiratory Rate   []  Patient Baseline []  Less than 20 []  Less than 20 [x]  20-25 []  Greater than 25 []  Greater than 25   Peak flow % of Pred or PB [x]  NA   []  Greater than 90%  []  81-90% []  71-80% []  Less than or equal to 70%  or unable to perform []  Unable due to Respiratory Distress   Dyspnea re []  Patient Baseline []  No SOB []  No SOB [x]  SOB on exertion []  SOB min activity []  At rest/acute   e FEV% Predicted       [x]  NA []  Above 69%  []  Unable []  Above 60-69%  []  Unable []  Above 50-59%  []  Unable []  Above 35-49%  []  Unable []  Less than 35%  []  Unable

## 2021-10-05 NOTE — PLAN OF CARE
Problem: Skin Integrity:  Goal: Will show no infection signs and symptoms  Description: Will show no infection signs and symptoms  Outcome: Ongoing  Goal: Absence of new skin breakdown  Description: Absence of new skin breakdown  Outcome: Ongoing     Problem: Falls - Risk of:  Goal: Will remain free from falls  Description: Will remain free from falls  Outcome: Ongoing  Goal: Absence of physical injury  Description: Absence of physical injury  Outcome: Ongoing     Problem: Respiratory:  Goal: Ability to maintain normal respiratory secretions will improve  Description: Ability to maintain normal respiratory secretions will improve  Outcome: Ongoing

## 2021-10-05 NOTE — PROGRESS NOTES
Occupational Therapy   Occupational Therapy Initial Assessment  Date: 10/5/2021   Patient Name: Angélica Tomlin  MRN: [id number]     : 1945    Date of Service: 10/5/2021    Chief Complaint   Patient presents with    Irregular Heart Beat     SVT, adenosine x2 without conversion, 20mg cardizem without conversion       Discharge Recommendations:  Patient would benefit from continued therapy after discharge  OT Equipment Recommendations  Equipment Needed: Yes  Mobility Devices: ADL Assistive Devices; [place]: Rolling  ADL Assistive Devices: Toileting - Drop Arm Commode, Heavy Duty Drop Arm Commode;Sock-Aid Hard;Long-handled [place];Long-handled Sponge;Reacher    Assessment   Performance deficits / Impairments: Decreased ADL status; Decreased functional mobility ; Decreased safe awareness;Decreased cognition;Decreased high-level IADLs;Decreased endurance;Decreased balance;Decreased posture  Assessment: Pt supine in bed upon arrival and retired supine in bed at end of session. Pt required Max-Max A x 2 for sit<>supine this date for LE and trunk progression. 1 posterior LOB seated EOB with Max A to correct. CGA-Min A for functional transfers and Min A for functional mobility EOB<>BSC. Use of RW. Pt required mx A for toileting to complete bottom hygiene d/t inability to maintain balance and unilaterally reach. Brushed hair with Min A and washed face with Mod IND lying supine in bed at end of session. Pt required restbreaks throughout session d/t fatigue and SOB. Good use of pursed lip breathing tech with cues to initiate. Pt is expected to require skilled OT services during their acute hospitalization stay to address the above noted deficits through skilled occupational therapy intervention for promotion of increased independence throughout ADLs, IADLs and functional mobility tasks.  Pt is currently unsafe to return to their prior living arrangements without  assist/supervision to safely engage in all aspects of ADLs, IADLs and functional mobility tasks. Prognosis: Good  Decision Making: Medium Complexity  OT Education: Plan of Care;Energy Conservation;OT Role;ADL Adaptive Strategies;Transfer Training;Equipment  Patient Education: safety awareness, activity promotion, walker management-good to fair return  Barriers to Learning: Decreased cognition  REQUIRES OT FOLLOW UP: Yes  Activity Tolerance  Activity Tolerance: Patient limited by fatigue  Activity Tolerance: Limited by SOB, Limited by c/o dizziness  Safety Devices  Safety Devices in place: Yes  Type of devices: Gait belt;Call light within reach; Left in bed;Bed alarm in place; Patient at risk for falls;Nurse notified  Restraints  Initially in place: No           Patient Diagnosis(es): The primary encounter diagnosis was Atrial fibrillation with rapid ventricular response ([facility] [address] Diagnoses of Pneumonia due to infectious organism, unspecified laterality, unspecified part of lung and Chronic obstructive pulmonary disease with acute exacerbation ([facility] [address] were also pertinent to this visit. has a past medical history of Asthma, COPD (chronic obstructive pulmonary disease) ([facility] [address] Gout, Hyperlipidemia, Hypertension, Mitral insufficiency, and Pneumonia. has a past surgical history that includes Hysterectomy (1986); knee surgery; Colonoscopy; Dental surgery; Tonsillectomy and adenoidectomy; Ankle surgery (Left, 03/22/2016); and pr colsc flx w/rmvl of tumor polyp lesion snare tq (N/A, 8/8/2017).            Restrictions  Restrictions/Precautions  Restrictions/Precautions: General Precautions, Fall Risk, Up as Tolerated  Required Braces or Orthoses?: No  Position Activity Restriction  Other position/activity restrictions: 10/5 Thoracentesis    Subjective   General  Patient assessed for rehabilitation services?: Yes  Family / Caregiver Present: No  Diagnosis: Sepsis w/ acute hypoxic respiratory failure without septic shock  General Comment  Comments: RN ok'd for OT rasheed minutes.)  Standing Balance: Contact guard assistance  Standing Balance  Time: 4 minutes  Activity: static standing EOB, dynamic standing for toileting tasks  Comment: Min VCs for proper hand placement on RW while standing d/t pt attempting to lean on walker with forearms w/ fair return, poor carryover. Functional Mobility  Functional - Mobility Device: Rolling Walker  Activity: Other  Assist Level: Minimal assistance  Functional Mobility Comments: Completed func mob EOB<>BSC with min A for walker management. Pt fatigued extremely quickly. Toilet Transfers  Toilet - Technique: Ambulating  Equipment Used: Standard toilet  Toilet Transfer: Minimal assistance  Toilet Transfers Comments: Min sit to stand, CGA stand to sit. ADL  Feeding: Independent;Setup  Grooming: Minimal assistance;Setup; Increased time to complete (Lying supine at end of session, pt washed face with Mod IND. Brushed hair with Min A to complete back d/t c/o BP cuff hurting. Increased time d/t fatigue, requiring rest breaks intermittently.)  UE Bathing: Minimal assistance;Setup; Increased time to complete;Verbal cueing  LE Bathing: Maximum assistance; Increased time to complete;Setup;Verbal cueing  UE Dressing: Minimal assistance;Setup;Verbal cueing; Increased time to complete  LE Dressing: Maximum assistance; Increased time to complete;Setup;Verbal cueing (Max A to don socks seated EOB d/t pt unable to tolerate completing dynamic task d/t SOB, fatigue, decreased balance.)  Toileting: Maximum assistance; Increased time to complete;Setup (Standing at toilet required max A to complete bottom hygiene d/t inabiltiy to unilaterally reach and maintain standing balance. Assist to manage clothing.)  Additional Comments: Pt significant limited by fatigue. SOB throughout with intermittent rest breaks requiring. Good use of pursed lip breathing tech w/ cues to initiate.     Tone RUE  RUE Tone: Normotonic  Tone LUE  LUE Tone: Normotonic  Coordination  Movements Are Fluid And Coordinated: Yes     Bed mobility  Supine to Sit: Maximum assistance (Trunk and LE progression)  Sit to Supine: Maximum assistance;2 Person assistance (LE and trunk progression, RN kindly assisted writer)  Scooting: Moderate assistance (Scoot EOB for proper positioning)  Comment: HOB elevated ~40 degrees. Increased time/effort, pt with posterior LOB during supine to sit with max A to correct. Pt had significant difficulty throughout bed mobility tasks. Became SOB EOB requiring time to recover with good return on education to use pursed lip breathing tech. Transfers  Sit to stand: Minimal assistance  Stand to sit: Contact guard assistance  Transfer Comments: Use of RW     Cognition  Overall Cognitive Status: Exceptions  Arousal/Alertness: Delayed responses to stimuli  Following Commands: Follows one step commands with increased time; Follows multistep commands with increased time  Attention Span: Attends with cues to redirect  Safety Judgement: Decreased awareness of need for safety  Problem Solving: Assistance required to generate solutions;Assistance required to implement solutions  Insights: Decreased awareness of deficits  Initiation: Requires cues for some  Sequencing: Requires cues for some  Cognition Comment: Increased time to repond to commands/questions throughout session.         Sensation  Overall Sensation Status: WFL        LUE AROM (degrees)  LUE General AROM: Attempted to formally assess, but d/t pt fatigue/SOB and pt requesting to return supine, aborted to focus on pt tolerating static sitting EOB  Left Hand AROM (degrees)  Left Hand AROM: WFL  RUE AROM (degrees)  RUE General AROM: Attempted to formally assess, but d/t pt fatigue/SOB and pt requesting to return supine, aborted to focus on pt tolerating static sitting EOB  Right Hand AROM (degrees)  Right Hand AROM: WFL  LUE Strength  LUE Strength Comment: Not formally assessed d/t pt fatigue at end of session and not able to tolerate

## 2021-10-05 NOTE — PROGRESS NOTES
Physical Therapy    [address]  Initial Assessment    NAME: Mahesh Barnes  : 1945  MRN: [id number]    Date of Service: 10/5/2021  History copied and pasted from H+P:  Patient came in with chief complaint of shortness of breath and palpitations. Patient stated that the shortness of breath started about 5 days ago and was associated with increased sputum production and cough, worsened with exertion, denied any orthopnea or PND, any recent travels or any recent flulike symptoms or any recent sick contacts. Patient denies any chest pain. Patient denies any recent changes in medications, missing any medications, compliance with diet. Patient also reported that she has been feeling that her legs are becoming more swollen in the last few days. Patient states that she started using her inhalers and nebulizers which did not provide any relief and her breathing status worsened. Yesterday patient also started having some palpitations, denied any syncope or collapse. Because of worsening respiratory status and shortness of breath and palpitations, patient son called EMS. Patient was found to be hypoxic and tachycardic with initial rhythm of SVT.  EMS. Patient was put on nasal cannula and 2 doses of adenosine were given and patient was transferred to ER for further management. Pt underwent thoracentesis 10/4/21 with removal of 350mls of fluid.        Discharge Recommendations:  Further therapy recommended at discharge. PT Equipment Recommendations  Equipment Needed: No    Assessment    Pt alert, cooperative, oriented but still had some difficulty following directions for MMT and exercise. Mod A for bed mobility and transfers. She's currently unsafe for DC home and would benefit from continued therapies to improve safety/strength and mobility. Body structures, Functions, Activity limitations: Decreased functional mobility ; Decreased ROM; Decreased endurance;Decreased balance;Decreased strength;Decreased posture  Prognosis: Good  Decision Making: Medium Complexity  PT Education: Goals;PT Role;Plan of Care  REQUIRES PT FOLLOW UP: Yes  Activity Tolerance  Activity Tolerance: Patient limited by fatigue;Patient limited by endurance       Patient Diagnosis(es): The primary encounter diagnosis was Atrial fibrillation with rapid ventricular response ([facility] [address] Diagnoses of Pneumonia due to infectious organism, unspecified laterality, unspecified part of lung and Chronic obstructive pulmonary disease with acute exacerbation ([facility] [address] were also pertinent to this visit. has a past medical history of Asthma, COPD (chronic obstructive pulmonary disease) ([facility] [address] Gout, Hyperlipidemia, Hypertension, Mitral insufficiency, and Pneumonia. has a past surgical history that includes Hysterectomy (1986); knee surgery; Colonoscopy; Dental surgery; Tonsillectomy and adenoidectomy; Ankle surgery (Left, 03/22/2016); and pr colsc flx w/rmvl of tumor polyp lesion snare tq (N/A, 8/8/2017).     Restrictions  Restrictions/Precautions  Restrictions/Precautions: General Precautions, Fall Risk, Up as Tolerated  Required Braces or Orthoses?: No  Position Activity Restriction  Other position/activity restrictions: 10/5 Thoracentesis  Vision/Hearing  Vision Exceptions: Wears glasses for reading  Hearing: Exceptions to [facility]  Hearing Exceptions: Hard of hearing/hearing concerns     Subjective  General  Patient assessed for rehabilitation services?: Yes  Response To Previous Treatment: Not applicable  Family / Caregiver Present: No  Follows Commands: Impaired (slow to respond, needed frequent verbal, visual and tactile cues to participate with MMT and exercise)  Pain Screening  Patient Currently in Pain: Denies    Orientation  Orientation  Overall Orientation Status: Within Normal Limits  Social/Functional History  Social/Functional History  Lives With: Son, Family  Type of Home: House  Home Layout: One level  Home Access: Stairs to enter without rails  Entrance Stairs - Number of Steps: 3  Bathroom Shower/Tub: Tub/Shower unit  Bathroom Toilet: Standard  Bathroom Equipment:  (no equiptment)  Home Equipment: 4 wheeled walker (Multiple attempt to retrieve info on how often pt uses rollator, but answers fluctuated throughout session)  Receives Help From: Family  ADL Assistance: [address]: Needs assistance (Family assists)  Homemaking Responsibilities: Yes  Meal Prep Responsibility: Primary  Laundry Responsibility: Primary (Her laundry only)  Cleaning Responsibility: Secondary (light cleaning)  Shopping Responsibility: No (son and daughter in law)  Ambulation Assistance: Independent (uses rwalker)  Transfer Assistance: Independent  Active : No (pt states she has a license, but doesn't have a car)  Patient's  Info: son drives  Occupation: Retired  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 Objective     Observation/Palpation  Posture: Fair    PROM RLE (degrees)  RLE PROM: WFL  PROM LLE (degrees)  LLE PROM: WFL  PROM RUE (degrees)  RUE PROM: Exceptions  PROM LUE (degrees)  LUE PROM: WFL  Strength RLE  Strength RLE: Exception--hip 3-/5; knee distal grossly 3+/5  Strength LLE  Strength LLE: Exception-hip 3-/5; knee distal grossly 3+/5  Strength RUE  Strength RUE: Exception--shoulder 1/5; elbow distal 3/5  Strength LUE  Strength LUE: Exception--grossly 3+/5  Tone RLE  RLE Tone: Normotonic  Tone LLE  LLE Tone: Normotonic  Sensation  Overall Sensation Status: WFL  Bed mobility  Rolling to Right: Moderate assistance (HOB elevated)  Supine to Sit: Moderate assistance (HOB elevated)  Scooting: Moderate assistance  Comment:  (pt moves slowly and had a lot of difficulty with supine to sit and moving from bed to chair)  Transfers  Sit to Stand:  Moderate Assistance  Stand to sit: Moderate

## 2021-10-05 NOTE — PLAN OF CARE
Problem: Skin Integrity:  Goal: Will show no infection signs and symptoms  Description: Will show no infection signs and symptoms  10/5/2021 0712 by Omar Rogers  Outcome: Ongoing  10/5/2021 0240 by Mery Downs RN  Outcome: Ongoing  Goal: Absence of new skin breakdown  Description: Absence of new skin breakdown  10/5/2021 0712 by Omar Rogers  Outcome: Ongoing  10/5/2021 0240 by Mery Downs RN  Outcome: Ongoing     Problem: Falls - Risk of:  Goal: Will remain free from falls  Description: Will remain free from falls  10/5/2021 0712 by Omar Rogers  Outcome: Ongoing  10/5/2021 0240 by Mery Downs RN  Outcome: Ongoing  Goal: Absence of physical injury  Description: Absence of physical injury  10/5/2021 6413 by Omar Rogers  Outcome: Ongoing  10/5/2021 0240 by Mery Downs RN  Outcome: Ongoing     Problem: Respiratory:  Goal: Ability to maintain normal respiratory secretions will improve  Description: Ability to maintain normal respiratory secretions will improve  10/5/2021 0712 by Omar Rogers  Outcome: Ongoing  10/5/2021 0240 by Mery Downs RN  Outcome: Ongoing

## 2021-10-05 NOTE — PROGRESS NOTES
Critical care team - Resident sign-out to medicine service      Date and time: 10/5/2021 7:45 PM  Patient's name:  [id number] Record Number: [id number]  Patient's account/billing number: [de-identified]  Patient's YOB: 1945  Age: 68 y.o. Date of Admission: 10/2/2021  5:17 PM  Length of stay during current admission: 3    Primary Care Physician: KALYN Pascual CNP    Code Status: Full Code    Mode of physician to physician communication:        [x] Via telephone   [] In person     Date and time of sign-out: 10/5/2021 7:45 PM    Accepting Internal Medicine resident: Dr. Dr Markus coronel    Accepting Medicine team: IM Team Med 1    Accepting team's attending: Dr. Ruthie Figueroa    Patient's current ICU Bed:  117     Patient's assigned bed on floor:  331        [x] Med-Surg Monitored [] Step-down       [] Psychiatry ICU       [] Psych floor     Reason for ICU admission:     Acute hypoxic respiratory failure requiring bipap  Atrial fibrillation with RVR - new onset       ICU course summary:     69 yo F with PMH of HTN, COPD, CHF, Mood Disorder, SEJAL presented with SOB, increased sputum production, cough, palpitations, leg swelling. She was noted to be  Hypoxic and in SVT,  Was given adenosine x 2 by the EMS and transferred to ER. She was noted to be hypoxic Tachypneic, Tachycardic, hypertensive. She was placed on BiPAP with improvement in oxygen saturation. She was transferred to ICU due to concern for impending intubation. There was initial concern for possible pneumonia with COPD and CHF exacerbation, was started on rocephin azithromycin, later switched to cefepime, steroids, gentle hydration with 50 cc/hr. CT chest was obtained which revealed concern for bilateral pleural effusion, L>R with atelectasis. She underwent thoracentesis on 10/4/2021, with removal of 350 cc of transudative fluid. COVID negative, was also noted to have U/A s/o UTI.   ID was consulted and completed antibiotics for 5 days and is currently off antibiotics. She was weaned off of BiPAP and transitioned to nasal cannula. During admission, she went into atrial fibrillation with RVR, was started on cardizem gtt and heparin gtt. Due to concern for CHF, after stabilization of vitals, she was also started on diuresis with lasix. Cardiology was consulted,   She was transitioned to PO cardizem and is currently on PO cardizem and PO lopressor with plan for cardioversion under anesthesia today. The procedure was canceled yesterday as the they were unable to pass scope in the cath lab after sedation. Procedures during patient's ICU stay:     Thoracentesis 10/4 - 350 cc transudative fluid removed. CURT with Cardioversion today     Current Vitals:   BP (!) 130/96   Pulse 134   Temp 98.4 °F (36.9 °C) (Oral)   Resp 30   Ht 5' 4\" (1.626 m)   Wt 267 lb 10.2 oz (121.4 kg)   SpO2 95%   BMI 45.94 kg/m²     Cultures:     Blood cultures:                 [] None drawn      [] Negative             [x]  Positive (Details: likely contaminate 1/2 coag neg staph )  Urine Culture:                   [] None drawn      [x] Negative             []  Positive (Details:  )  Sputum Culture:               [] None drawn       [x] Negative             []  Positive (Details:  )   Endotracheal aspirate:     [x] None drawn       [] Negative             []  Positive (Details:  )       Consults:     1. Infectious Disease   2. Cardiology   3.  Interventional Radiology     Assessment:     Patient Active Problem List    Diagnosis Date Noted    Sepsis with acute hypoxic respiratory failure without septic shock ([facility] [address] 10/03/2021    Acute on chronic diastolic heart failure ([address] 10/03/2021    UTI (urinary tract infection) 10/03/2021    Hyponatremia 10/03/2021    Hypokalemia 10/03/2021    Elevated troponin 10/03/2021    Atrial fibrillation with RVR ([address] 10/02/2021    Pneumonia of left lower lobe due to infectious organism 10/02/2021  Colon polyps 06/02/2021    Anemia 09/20/2018    Arthritis 09/19/2018    At high risk for falls 09/19/2018    Heart murmur 09/19/2018    Legally blind in right eye, as defined in [place] 09/19/2018    COPD (chronic obstructive pulmonary disease) ([facility] [address] 09/19/2018    Simple chronic bronchitis (HCC)     Acute respiratory failure with hypoxia (HCC)     Postoperative confusion     Pulmonary emphysema ([facility] [address]     Open bimalleolar fracture of left ankle 03/22/2016    Hyperlipidemia     COPD exacerbation (HCC)     Benign essential HTN 03/04/2016    Morbid obesity with BMI of 40.0-44.9, adult ([facility] [address] 03/04/2016    Asthma        Additional assessment:    Acute hypoxic respiratory failure requiring BiPAP - resolved  - secondary to CHF, COPD exacerbation, New onset A. fib with RVR, left > right pleural effusion s/p transudative thoracentesis  - Continue BiPAP at night prn, NC throughout the day as tolerated. - Currently off antibiotics.      A. fib with RVR- new onset  - Cardizem  mg daily Metoprolol 50 mg twice daily. - Continue heparin gtt per cardiology   - Plan for CURT/CV later today under anesthesia  - Depending on conversion we will plan for transfer to step down       Left lower lobe pneumonia with pleural effusion  - S/P thoracentesis - s/o transudative effusion.   - CXR showing left sided pleural effusion   - No concern for infection at this time. - Follow up on final cultures.      Asymptomatic UTI-  - Completed course of antibiotics     Pericardial effusion - stable      COPD  - Continue bronchodilators, continue supplemental oxygen.      CHF  - Continue home dose of lasix. - Continue lopressor, lipitor, plavix.      - Urinary retention   - S/P goldstein catheter placement. - monitor I/O's     DVT ppx: Heparin   GI ppx: pepcid 20 mg bid      CODE STATUS: Full Code     DISPOSITION:  []? To remain ICU:   [x]?  OK for out of ICU from [address] standpoint       Recommended Follow-up: 1. Follow up cardiology recommendations   2. Infectious disease recommendations   3.  Monitor left sided pleural effusion       Isabelle Davis MD      Department of Internal Medicine  [facility] [address]         10/7/2021, 2:12 PM

## 2021-10-05 NOTE — PROGRESS NOTES
[facility] -Progress Note    Today's Date and Time: 10/5/2021, 9:57 AM    Impression :   Concern for pneumonia  Concern for sepsis  Hypotension   Leukocytosis  Acute Afib RVR  CHF exacerbation  COPD  CASEY  Essential HN  Sulfa allergy  UTI  Lt pleural effusion  S/P Lt thoracentesis with removal of 350 cc fluid    Recommendations:   D/C Vancomycin  Continue cefepime pending culture data- for pneumonia concern  Repeat blood cultures 10/3/21 as 1/2 coag neg staph from cultures on 10/2/21 is likely a contaminant    Medical Decision Making/Summary/Discussion:10/5/2021     Patient evaluated  Patient is acutely ill. Most likely cause is cardiac decompensation with associated pleural effusion. I don't really see a pneumonia by CXR or CT but will maintain Cefepime pending culture data and clinical evolution. S/P Lt thoracentesis with removal of 350 cc fluid 10-4-21    Infection Control Recommendations   [place] Precautions      Antimicrobial Stewardship Recommendations     Simplification of therapy  Targeted therapy  Renal considerations    Coordination of Outpatient Care:   Estimated Length of IV antimicrobials:TBD  Patient will need Midline Catheter Insertion: No  Patient will need PICC line Insertion:No  Patient will need: Home IV , [place],  SNF,  LTAC: TBD  Patient will need outpatient wound care:No    Chief complaint/reason for consultation:   Sepsis, pneumonia, respiratory failure, pneumonia      History of Present Illness:   Mahesh Barnes is a 68y.o.-year-old female who was initially admitted on 10/2/2021. Patient seen at the request of Dr. Jewels Link:    This patient presented to the ED via EMS on 10/2/21 with increased weakness, diarrhea and shortness of breath over the past few days. She was found to be in SVT and then afib RVR for which she was started on a Cardizem gtt without successful conversion from adenosine and Cardizem push.     CXR is concerning for CHF exacerbation and CT chest shows a large left sided pleural effusion. WBC elevated     Blood culture 1/2 positive for coag negative staph-likely contaminant    Urine culture pending  UA suggestive of UTI    She has been started on cefepime and vancomycin     CURRENT EVALUATION 10/5/2021    Patient evaluated and examined in the ICU. Afebrile, hemodynamically stable, off pressors.  - On 3L NC oxygen this morning, saturating well, she used BiPAP overnight with FiO2 40% and PEEP 8.    Echocardiogram 10/3: LVEF 59%;     S/p Left thoracentesis on 10/4:  with removal of 350 cc clear yellowish fluid. Culture\" No growth        Labs, X rays reviewed: 10/5/2021    BUN: 41<--48  Cr:0.97<--1.12    WBC: 12.9<--26.1  Hb: 11.5<--11.4  Plat: 443<-- 448    CRP: 244    CXR:  10/2/21    CT chest:  10/3/21      Cultures:  Urine:    Blood:  10/2/21: Coag negative staph 1/2=-likely contaminant  Sputum :    Wound:  10-4-21: Thoracentesis: No growth      Discussed with patient, RN, Dr Hannah Dos Santos. .    I have personally reviewed the past medical history, past surgical history, medications, social history, and family history, and I have updated the database accordingly.   Past Medical History:     Past Medical History:   Diagnosis Date    Asthma     COPD [facility]chronic [facility] pulmonary disease) ([facility] [address]     Gout     Hyperlipidemia     Hypertension     Mitral insufficiency     Pneumonia        Past Surgical  History:     Past Surgical History:   Procedure Laterality Date    ANKLE SURGERY Left 03/22/2016    ORIF    COLONOSCOPY      DENTAL SURGERY      HYSTERECTOMY  1986    KNEE SURGERY      NE COLSC FLX W/RMVL OF TUMOR POLYP LESION SNARE TQ N/A 8/8/2017    COLONOSCOPY POLYPECTOMY SNARE/COLD BIOPSY performed by Darylene Brazier, MD at [facility]    TONSILLECTOMY AND ADENOIDECTOMY         Medications:      polyethylene glycol  17 g Oral Daily    sodium chloride flush  5-40 mL IntraVENous 2 times per day     Physically Abused:     Sexually Abused:        Family History:     Family History   Problem Relation Age of Onset    High Blood Pressure Mother     Cancer Mother     Asthma Father     Heart Disease Father     Cancer Father         Allergies:   Latex, Betadine [povidone iodine], Bee venom, Dilaudid [hydromorphone hcl], Adhesive tape, and Sulfa antibiotics     Review of Systems:   Constitutional: Diaphoretic  Head: No headaches  Eyes: No double vision or blurry vision. No conjunctival inflammation. ENT: No sore throat or runny nose. . No hearing loss, tinnitus or vertigo. Cardiovascular: No chest pain or palpitations. shortness of breath. JOHN  Lung:  shortness of breath & cough. clear sputum production  Abdomen: No nausea, vomiting, diarrhea, or abdominal pain. Ricardo Gutter No cramps. Genitourinary: No increased urinary frequency, or dysuria. No hematuria. No suprapubic or CVA pain  Musculoskeletal: No muscle aches or pains. No joint effusions, swelling or deformities  Hematologic: No bleeding or bruising. Neurologic: No headache, weakness, numbness, or tingling. Integument: No rash, no ulcers. Psychiatric: No depression. Endocrine: No polyuria, no polydipsia, no polyphagia.     Physical Examination :     Patient Vitals for the past 8 hrs:   BP Pulse Resp SpO2 Weight   10/05/21 0900 (!) 136/98 94 24 99 % --   10/05/21 0855 -- -- 21 97 % --   10/05/21 0830 (!) 151/68 107 (!) 31 92 % --   10/05/21 0806 (!) 159/84 117 (!) 36 96 % --   10/05/21 0719 (!) 153/81 107 27 96 % --   10/05/21 0614 131/82 99 26 96 % --   10/05/21 0503 (!) 156/87 118 (!) 31 98 % --   10/05/21 0400 138/81 102 20 92 % --   10/05/21 0346 -- -- 25 -- --   10/05/21 0300 106/62 92 22 96 % --   10/05/21 0218 119/63 97 19 98 % --   10/05/21 0200 -- -- -- -- 267 lb 10.2 oz (121.4 kg)     General Appearance: Restless  Head:  Normocephalic, no trauma  Eyes: Pupils equal, round, reactive to light and accommodation; extraocular movements intact; sclera anicteric; conjunctivae pink. No embolic phenomena. ENT: Oropharynx clear, without erythema, exudate, or thrush. No tenderness of sinuses. Mouth/throat: mucosa pink and moist. No lesions. Dentition in good repair. Neck:Supple, without lymphadenopathy. Thyroid normal, No bruits. Pulmonary/Chest: Clear to auscultation, without wheezes, rales, or rhonchi. No dullness to percussion. Cardiovascular: afib  Abdomen: Soft, non tender. Bowel sounds normal. No organomegaly  All four Extremities: No cyanosis, clubbing, edema, or effusions. Neurologic: No gross sensory or motor deficits. Skin: Warm and dry with good turgor. No signs of peripheral arterial or venous insufficiency. No ulcerations. No open wounds. Medical Decision Making -Laboratory:   I have independently reviewed/ordered the following labs:    CBC with Differential:   Recent Labs     10/02/21  1734 10/03/21  1107 10/04/21  0345 10/05/21  0523   WBC 28.5*   < > 26.1* 12.9*   HGB 14.2   < > 11.4* 11.5*   HCT 42.8   < > 35.3* 36.7   PLT See Reflexed IPF Result   < > 448 443   LYMPHOPCT 2*  --   --   --    MONOPCT 10*  --   --   --     < > = values in this interval not displayed. BMP:   Recent Labs     10/03/21  0539 10/03/21  1231 10/04/21  0345 10/05/21  0523   *  --  133* 136   K 3.4*  --  3.1* 3.6*   CL 91*  --  97* 102   CO2 23  --  24 23   BUN 48*  --  59* 41*   CREATININE 1.12*   < > 1.35* 0.97*   MG 2.2  --  2.5  --     < > = values in this interval not displayed. Hepatic Function Panel:   Recent Labs     10/03/21  0539 10/03/21  0539 10/04/21  0345 10/04/21  0858   PROT 7.2   < > 6.1* 6.0*   LABALBU 3.3*  --  2.8*  --    BILITOT 0.70  --  0.42  --    ALKPHOS 63  --  56  --    ALT 22  --  38*  --    AST 28  --  83*  --     < > = values in this interval not displayed. No results for input(s): RPR in the last 72 hours. No results for input(s): HIV in the last 72 hours. No results for input(s): BC in the last 72 hours.   Lab Results Parainfluenza 3 PCR Not Detected    Parainfluenza 4 PCR Not Detected    Resp Syncytial Virus PCR Not Detected    Bordetella Parapertussis Not Detected    B Pertussis by PCR Not Detected    Chlamydia pneumoniae By PCR Not Detected    Mycoplasma pneumo by PCR Not Detected       Medical Decision Making-Other:     Note:  Labs, medications, radiologic studies were reviewed with personal review of films  Large amounts of data were reviewed  Discussed with nursing Staff, Discharge planner  Infection Control and Prevention measures reviewed  All prior entries were reviewed  Administer medications as ordered  Prognosis: Good  Discharge planning reviewed      Thank you for allowing us to participate in the care of this patient. Please call with questions. Chanda Payne MD, PGY-1  Infectious Disease/ Internal Medicine Resident  [address] [place] [address]      ATTESTATION:    I have discussed the case, including pertinent history and exam findings with the residents and students. I have seen and examined the patient and the key elements of the encounter have been performed by me. I was present when the student obtained his information or examined the patient. I have reviewed the laboratory data, other diagnostic studies and discussed them with the residents. I have updated the medical record where necessary. I agree with the assessment, plan and orders as documented by the resident/ student.     Galdino Branch MD.

## 2021-10-06 ENCOUNTER — APPOINTMENT (OUTPATIENT)
Dept: CARDIAC CATH/INVASIVE PROCEDURES | Age: 76
DRG: 871 | End: 2021-10-06
Payer: MEDICARE

## 2021-10-06 LAB
ANION GAP SERPL CALCULATED.3IONS-SCNC: 10 MMOL/L (ref 9–17)
BUN BLDV-MCNC: 30 MG/DL (ref 8–23)
BUN/CREAT BLD: ABNORMAL (ref 9–20)
CALCIUM SERPL-MCNC: 8.7 MG/DL (ref 8.6–10.4)
CHLORIDE BLD-SCNC: 100 MMOL/L (ref 98–107)
CO2: 27 MMOL/L (ref 20–31)
CREAT SERPL-MCNC: 0.85 MG/DL (ref 0.5–0.9)
EKG ATRIAL RATE: 74 BPM
EKG P AXIS: 62 DEGREES
EKG P-R INTERVAL: 234 MS
EKG Q-T INTERVAL: 376 MS
EKG QRS DURATION: 104 MS
EKG QTC CALCULATION (BAZETT): 417 MS
EKG R AXIS: 45 DEGREES
EKG T AXIS: 53 DEGREES
EKG VENTRICULAR RATE: 74 BPM
GFR AFRICAN AMERICAN: >60 ML/MIN
GFR NON-AFRICAN AMERICAN: >60 ML/MIN
GFR SERPL CREATININE-BSD FRML MDRD: ABNORMAL ML/MIN/{1.73_M2}
GFR SERPL CREATININE-BSD FRML MDRD: ABNORMAL ML/MIN/{1.73_M2}
GLUCOSE BLD-MCNC: 120 MG/DL (ref 70–99)
HCT VFR BLD CALC: 39.5 % (ref 36.3–47.1)
HEMOGLOBIN: 12.5 G/DL (ref 11.9–15.1)
LV EF: 55 %
LVEF MODALITY: NORMAL
MCH RBC QN AUTO: 30 PG (ref 25.2–33.5)
MCHC RBC AUTO-ENTMCNC: 31.6 G/DL (ref 28.4–34.8)
MCV RBC AUTO: 94.7 FL (ref 82.6–102.9)
NRBC AUTOMATED: 0 PER 100 WBC
PARTIAL THROMBOPLASTIN TIME: 53.4 SEC (ref 20.5–30.5)
PARTIAL THROMBOPLASTIN TIME: 66.9 SEC (ref 20.5–30.5)
PARTIAL THROMBOPLASTIN TIME: 83 SEC (ref 20.5–30.5)
PDW BLD-RTO: 12.4 % (ref 11.8–14.4)
PLATELET # BLD: 465 K/UL (ref 138–453)
PMV BLD AUTO: 8.5 FL (ref 8.1–13.5)
POTASSIUM SERPL-SCNC: 3.9 MMOL/L (ref 3.7–5.3)
RBC # BLD: 4.17 M/UL (ref 3.95–5.11)
SODIUM BLD-SCNC: 137 MMOL/L (ref 135–144)
SURGICAL PATHOLOGY REPORT: NORMAL
WBC # BLD: 12.4 K/UL (ref 3.5–11.3)

## 2021-10-06 PROCEDURE — 6360000002 HC RX W HCPCS

## 2021-10-06 PROCEDURE — 6370000000 HC RX 637 (ALT 250 FOR IP): Performed by: STUDENT IN AN ORGANIZED HEALTH CARE EDUCATION/TRAINING PROGRAM

## 2021-10-06 PROCEDURE — 6360000002 HC RX W HCPCS: Performed by: STUDENT IN AN ORGANIZED HEALTH CARE EDUCATION/TRAINING PROGRAM

## 2021-10-06 PROCEDURE — 2000000000 HC ICU R&B

## 2021-10-06 PROCEDURE — 85730 THROMBOPLASTIN TIME PARTIAL: CPT

## 2021-10-06 PROCEDURE — 2580000003 HC RX 258: Performed by: STUDENT IN AN ORGANIZED HEALTH CARE EDUCATION/TRAINING PROGRAM

## 2021-10-06 PROCEDURE — 2500000003 HC RX 250 WO HCPCS: Performed by: STUDENT IN AN ORGANIZED HEALTH CARE EDUCATION/TRAINING PROGRAM

## 2021-10-06 PROCEDURE — 36415 COLL VENOUS BLD VENIPUNCTURE: CPT

## 2021-10-06 PROCEDURE — 94640 AIRWAY INHALATION TREATMENT: CPT

## 2021-10-06 PROCEDURE — 93005 ELECTROCARDIOGRAM TRACING: CPT | Performed by: INTERNAL MEDICINE

## 2021-10-06 PROCEDURE — 5A2204Z RESTORATION OF CARDIAC RHYTHM, SINGLE: ICD-10-PCS | Performed by: INTERNAL MEDICINE

## 2021-10-06 PROCEDURE — 6370000000 HC RX 637 (ALT 250 FOR IP): Performed by: INTERNAL MEDICINE

## 2021-10-06 PROCEDURE — 99233 SBSQ HOSP IP/OBS HIGH 50: CPT | Performed by: INTERNAL MEDICINE

## 2021-10-06 PROCEDURE — 94761 N-INVAS EAR/PLS OXIMETRY MLT: CPT

## 2021-10-06 PROCEDURE — 2700000000 HC OXYGEN THERAPY PER DAY

## 2021-10-06 PROCEDURE — 94660 CPAP INITIATION&MGMT: CPT

## 2021-10-06 PROCEDURE — 80048 BASIC METABOLIC PNL TOTAL CA: CPT

## 2021-10-06 PROCEDURE — 99291 CRITICAL CARE FIRST HOUR: CPT | Performed by: INTERNAL MEDICINE

## 2021-10-06 PROCEDURE — 85027 COMPLETE CBC AUTOMATED: CPT

## 2021-10-06 PROCEDURE — B24BZZ4 ULTRASONOGRAPHY OF HEART WITH AORTA, TRANSESOPHAGEAL: ICD-10-PCS | Performed by: INTERNAL MEDICINE

## 2021-10-06 PROCEDURE — 93010 ELECTROCARDIOGRAM REPORT: CPT | Performed by: INTERNAL MEDICINE

## 2021-10-06 RX ORDER — METOPROLOL TARTRATE 5 MG/5ML
5 INJECTION INTRAVENOUS EVERY 5 MIN PRN
Status: COMPLETED | OUTPATIENT
Start: 2021-10-06 | End: 2021-10-08

## 2021-10-06 RX ADMIN — CITALOPRAM 20 MG: 20 TABLET, FILM COATED ORAL at 10:12

## 2021-10-06 RX ADMIN — METOPROLOL TARTRATE 50 MG: 50 TABLET, FILM COATED ORAL at 19:47

## 2021-10-06 RX ADMIN — ATORVASTATIN CALCIUM 20 MG: 20 TABLET, FILM COATED ORAL at 10:12

## 2021-10-06 RX ADMIN — IPRATROPIUM BROMIDE AND ALBUTEROL SULFATE 1 AMPULE: .5; 2.5 SOLUTION RESPIRATORY (INHALATION) at 13:04

## 2021-10-06 RX ADMIN — IPRATROPIUM BROMIDE AND ALBUTEROL SULFATE 1 AMPULE: .5; 2.5 SOLUTION RESPIRATORY (INHALATION) at 20:35

## 2021-10-06 RX ADMIN — ESMOLOL HYDROCHLORIDE 50 MCG/KG/MIN: 10 INJECTION INTRAVENOUS at 02:05

## 2021-10-06 RX ADMIN — DILTIAZEM HYDROCHLORIDE 120 MG: 120 CAPSULE, COATED, EXTENDED RELEASE ORAL at 10:13

## 2021-10-06 RX ADMIN — FUROSEMIDE 20 MG: 20 TABLET ORAL at 10:13

## 2021-10-06 RX ADMIN — SODIUM CHLORIDE, PRESERVATIVE FREE 10 ML: 5 INJECTION INTRAVENOUS at 19:49

## 2021-10-06 RX ADMIN — CEFEPIME HYDROCHLORIDE 1000 MG: 1 INJECTION, POWDER, FOR SOLUTION INTRAMUSCULAR; INTRAVENOUS at 14:39

## 2021-10-06 RX ADMIN — CEFEPIME HYDROCHLORIDE 1000 MG: 1 INJECTION, POWDER, FOR SOLUTION INTRAMUSCULAR; INTRAVENOUS at 00:45

## 2021-10-06 RX ADMIN — POTASSIUM BICARBONATE 40 MEQ: 782 TABLET, EFFERVESCENT ORAL at 11:08

## 2021-10-06 RX ADMIN — METOPROLOL TARTRATE 50 MG: 50 TABLET, FILM COATED ORAL at 10:14

## 2021-10-06 RX ADMIN — HEPARIN SODIUM AND DEXTROSE 14.5 UNITS/KG/HR: 10000; 5 INJECTION INTRAVENOUS at 17:40

## 2021-10-06 RX ADMIN — ONDANSETRON 4 MG: 2 INJECTION INTRAMUSCULAR; INTRAVENOUS at 11:43

## 2021-10-06 RX ADMIN — IPRATROPIUM BROMIDE AND ALBUTEROL SULFATE 1 AMPULE: .5; 2.5 SOLUTION RESPIRATORY (INHALATION) at 15:51

## 2021-10-06 RX ADMIN — HEPARIN SODIUM AND DEXTROSE 16.5 UNITS/KG/HR: 10000; 5 INJECTION INTRAVENOUS at 01:49

## 2021-10-06 RX ADMIN — CLOPIDOGREL 75 MG: 75 TABLET, FILM COATED ORAL at 10:13

## 2021-10-06 RX ADMIN — IPRATROPIUM BROMIDE AND ALBUTEROL SULFATE 1 AMPULE: .5; 2.5 SOLUTION RESPIRATORY (INHALATION) at 09:07

## 2021-10-06 RX ADMIN — METOPROLOL TARTRATE 5 MG: 1 INJECTION, SOLUTION INTRAVENOUS at 17:33

## 2021-10-06 ASSESSMENT — PAIN SCALES - GENERAL
PAINLEVEL_OUTOF10: 0

## 2021-10-06 ASSESSMENT — PULMONARY FUNCTION TESTS
PIF_VALUE: 14
PIF_VALUE: 12

## 2021-10-06 NOTE — PROGRESS NOTES
[facility]   Progress Note                   Date:   10/6/2021  Patient name: Antonio Cox  Date of admission:  10/2/2021  5:17 PM  MRN:   [id number]  YOB: 1945  PCP: KALYN Pascual CNP    Reason for Admission:     Subjective:     Overnight patient was tachycardic in 170s, was started on esmolol drip      Patient continues to be in atrial fibrillation, with heart rate in 100s. T-max 99.1 °F, blood pressure stable. Labs reviewed, keep K > 4  Continue heparin drip. Patient is undergoing CURT cardioversion today. Continue Cardizem 120 mg daily, Lopressor 50 mg twice daily and heparin drip  On Lasix 20 mg daily    Intake/Output Summary (Last 24 hours) at 10/6/2021 1014  Last data filed at 10/6/2021 0807  Gross per 24 hour   Intake 1016.6 ml   Output 2615 ml   Net -1598.4 ml       Brief history  The patient is a 68 y.o.  female who is admitted to the hospital for worsening shortness of breath. Patient states that she was feeling sick for the past 5 days, had no appetite was not taking her medications and ultimately her son called the EMS for worsening shortness of breath.     Her heart rate was in 200s in SVT was initially given 6 mg followed by 12 mg of adenosine without conversion and then was given 20 mg of Cardizem after which her heart rate became better however returned back to 180s by EMS.    In the ER patient was alert and oriented. EKG consistent with atrial fibrillation with RVR. Patient was still hypoxic was put on BiPAP. Vitals /78, heart rate in 100s and saturating 100% on BiPAP.   Patient was started on diltiazem drip and heparin drip     Pertinent labs sodium 130, K3.4, creatinine 1.12  Troponin 92-> 62  WBC 28.5  UA suggestive for UTI     Last echo back in 2017 showed LVEF 55%, left atrium moderately dilated, mild dilatation of aortic root and ascending aorta 4.2 cm  No significant cardiac history found    Medications:   Scheduled Meds:   bilaterally  Heart: regular rate and rhythm, S1, S2 normal, no murmur, click, rub or gallop  Abdomen: soft, non-tender; bowel sounds normal; no masses,  no organomegaly  Extremities: extremities normal, atraumatic, no cyanosis or edema  Neurologic: Mental status: Alert, oriented, thought content appropriate    EKG: A fib    ECHO:  Echocardiogram : left ventricle is normal in size with normal systolic function globally. Calculated ejection fraction is 59% ( Atrial-Fib). Mild mitral regurgitation. Mild tricuspid regurgitation. Estimated right ventricular systolic pressure is 35 mmHg. Anterior clear space noted, most likely adipose tissue vs small loculated  pericardial effusion. No evidence of tamponade. Pleural effusion is seen. Other Current Problems:   Patient Active Problem List:     Asthma     Benign essential HTN     Morbid obesity with BMI of 40.0-44.9, adult ([facility] [address]     Open bimalleolar fracture of left ankle     Hyperlipidemia     COPD exacerbation (HCC)     Pulmonary emphysema (HCC)     Postoperative confusion     Acute respiratory failure with hypoxia (HCC)     Simple chronic bronchitis (HCC)     Atrial fibrillation with RVR (HCC)     Pneumonia of left lower lobe due to infectious organism     Sepsis with acute hypoxic respiratory failure without septic shock (HCC)     Acute on chronic diastolic heart failure (HCC)     UTI (urinary tract infection)     Hyponatremia     Hypokalemia     Elevated troponin     Anemia     Arthritis     At high risk for falls     Colon polyps     Heart murmur     Legally blind in right eye, as defined in [place]     COPD (chronic obstructive pulmonary disease) ([facility] [address]      Assessment and Plan:    Impression:    1. New onset atrial fibrillation, CHADVASC score- 4  2. Acute hypoxic respiratory distress resolved  3. NSTEMI likely type II from demand ischemia from CASEY/UTI  4. Sepsis  5. Hypokalemia  6. CASEY resolving  7. No evidence of GI injury  8.  Left loculated pleural effusion      Recommendations  1. Continue heparin drip for now, and can switch to NOAC once patient does not need any more procedures. 2. Plan for cardioversion today  3. Continue Cardizem 120 mg and Lopressor  4. Replace electrolytes to keep K > 4 and Mg >2      Will discuss patient with attending physician. Recommendations above pending approval by attending physician. Cristina Newman MD  PGY-3 Internal Medicine Resident  [facility]  10/6/2021 10:07 AM    Attending Physician Statement  I have discussed the care of Shen Ortiz, including pertinent history and exam findings,  with the cardiology fellow/resident. I have seen and examined the patient and the key elements of all parts of the encounter have been performed by me. I agree with the assessment, plan and orders as documented by the resident with additional recommendations as below:     Progressive improvement in resp status, comfortable on exam, remains in Afib with good rate control, plan for CURT guided CV today. Risks, alternatives and benefits of procedure discussed with patient, she understands and willing to proceed.      Lotus Saldaña MD, [address] [facility]

## 2021-10-06 NOTE — PROGRESS NOTES
INTENSIVE CARE UNIT  Resident Physician Progress Note    Patient - Laura Whitman  Date of Admission -  10/2/2021  5:17 PM  Date of Evaluation -  10/6/2021  Room and Bed Number -  0124/0124-01   Hospital Day - 4    SUBJECTIVE:   HISTORY OF PRESENT ILLNESS:      Shortness of breath weakness. New onset Atrial fibrillation with RVR. Adenosine x's 2 without conversion. 20 mg cardizem without conversion and therefore started on cardizem drip      HX of HTN, COPD, CHF, SEJAL, Obesity,     Patient was agitated in ER. nd was given 1 mg IV ativan was given. Patient placed on Bipap due to low Sao2 per ABG. 500ml IV bolus for hypotension. ID consult for sepsis from UTI/ possible pneumonia. Patient admitted to MICU     Levofed was started and was slowly able to be weaned off. Patient having to swapped Bipap for cannula, during night. L thoracentesis pulled 350mL demonstrating transudative fluid    Patient had improved and was discussed transfering to tep down when she experienced Afib with HR of 170's. Patient was started on esmolol gtt started. Esmolol slowly wean off however she continues in afib, HR controlled at 100s. Cardio to perform a CURT and cardioversion today. Pending patient coverts out of A-fib it is possible she transitions to step down/med surg today. OVERNIGHT EVENTS:        Patient had improved and was discussed transfering to tep down when she experienced Afib with HR of 170's. Patient was started on esmolol gtt started    TODAY:      Remains in A-fib  Tachycardic in low 100's   Hypertensive  Cardiology to perform CURT and cardioversion later today. Bipap Settings: 40% PIP 2 PEEP 8  4 L NC intermittently     BUN/Cr: 30/.85 - improving   Urine output: 1.5 ml/kg/hr yesterday  I/Os: - since admit -3,150.9  IVF: Bety Jade   BM - none since arrival - on miralax     BG: controlled     WBC: 12.4 continues trending downward - ID recommending continuing cefepime pending culture data.  Plan to continue x 2 more days  Hgb 12.5 and stable          OBJECTIVE:   VITAL SIGNS:  Patient Vitals for the past 8 hrs:   BP Temp Temp src Pulse Resp SpO2 Weight   10/06/21 1014 -- -- -- 116 -- -- --   10/06/21 1000 116/69 98.4 °F (36.9 °C) Bladder 114 29 94 % --   10/06/21 0926 -- -- -- -- (!) 38 96 % --   10/06/21 0908 -- -- -- -- (!) 31 97 % --   10/06/21 0800 115/71 99.1 °F (37.3 °C) Bladder 106 29 -- --   10/06/21 0700 117/73 -- -- 105 19 99 % --   10/06/21 0630 -- -- -- 101 20 97 % --   10/06/21 0600 132/79 99 °F (37.2 °C) Bladder 105 22 98 % 254 lb 10.1 oz (115.5 kg)   10/06/21 0530 -- -- -- 102 17 99 % --   10/06/21 0500 104/72 -- -- 102 21 98 % --   10/06/21 0430 106/72 -- -- 103 22 99 % --   10/06/21 0400 104/69 99.3 °F (37.4 °C) Bladder 105 18 97 % --   10/06/21 0345 -- -- -- 113 (!) 33 99 % --       Last Body weight:   Wt Readings from Last 3 Encounters:   10/06/21 254 lb 10.1 oz (115.5 kg)   18 218 lb 6.4 oz (99.1 kg)   10/05/17 232 lb (105.2 kg)       Body Mass Index : Body mass index is 43.71 kg/m². Tmax over 24 hours: Temp (24hrs), Av.4 °F (37.4 °C), Min:98.4 °F (36.9 °C), Max:100 °F (37.8 °C)      Ins/Outs:    In: 865.6 [I.V.:865.6]  Out:  [Urine:]    PHYSICAL EXAM:  Constitutional: Awake and alert, well developed and well nourished, in no acute distress, tolerating bipap well intermittently on NC   EENT: PERRL, EOMI, sclera clear, anicteric,  no lesions,  Neck: Supple, symmetrical, trachea midline, no adenopathy, no jvd  Respiratory: clear to auscultation, course breath sounds bilateral lowers, diminished bilateral lowers left > right   Cardiovascular: tachycardia and irregular rhythm, no murmur noted and 2+ pulses throughout, pedal edema noted bilaterally  Abdomen: soft, nontender, nondistended, no masses or organomegaly  Extremities:  peripheral pulses normal, no pedal edema, no clubbing or cyanosis    MEDICATIONS:  Scheduled Meds:   polyethylene glycol  17 g Oral Daily    dilTIAZem  120 mg Oral Daily    sodium chloride flush  5-40 mL IntraVENous 2 times per day    furosemide  20 mg Oral Daily    metoprolol tartrate  50 mg Oral BID    potassium bicarb-citric acid  40 mEq Oral Daily    atorvastatin  20 mg Oral Daily    clopidogrel  75 mg Oral Daily    cefepime  1,000 mg IntraVENous Q12H    citalopram  20 mg Oral Daily    sodium chloride flush  5-40 mL IntraVENous 2 times per day    ipratropium-albuterol  1 ampule Inhalation 4x daily       Continuous Infusions:   esmolol Stopped (10/06/21 0251)    sodium chloride      sodium chloride Stopped (10/05/21 1925)    sodium chloride      heparin (PORCINE) Infusion 14.5 Units/kg/hr (10/06/21 0347)       PRN Meds:   sodium chloride flush, 5-40 mL, PRN  sodium chloride, 25 mL, PRN  sodium chloride flush, 5-40 mL, PRN  sodium chloride, 25 mL, PRN  ondansetron, 4 mg, Q8H PRN   Or  ondansetron, 4 mg, Q6H PRN  polyethylene glycol, 17 g, Daily PRN  acetaminophen, 650 mg, Q6H PRN   Or  acetaminophen, 650 mg, Q6H PRN  potassium chloride, 20 mEq, PRN  magnesium sulfate, 2,000 mg, PRN  albuterol, 2.5 mg, Q4H PRN  heparin (porcine), 4,000 Units, PRN  heparin (porcine), 2,000 Units, PRN        SUPPORT DEVICES: [] Ventilator [x] BIPAP  [x] Nasal Cannula [] Room Air      DATA:  Complete Blood Count:   Recent Labs     10/04/21  0345 10/05/21  0523 10/06/21  0313   WBC 26.1* 12.9* 12.4*   RBC 3.72* 3.79* 4.17   HGB 11.4* 11.5* 12.5   HCT 35.3* 36.7 39.5   MCV 94.9 96.8 94.7   MCH 30.6 30.3 30.0   MCHC 32.3 31.3 31.6   RDW 12.5 12.5 12.4    443 465*   MPV 9.0 9.1 8.5        Last 3 Blood Glucose:   Recent Labs     10/04/21  0345 10/05/21  0523 10/06/21  0313   GLUCOSE 133* 108* 120*        PT/INR:    Lab Results   Component Value Date    PROTIME 11.3 10/04/2021    INR 1.1 10/04/2021     PTT:    Lab Results   Component Value Date    APTT 66.9 10/06/2021    APTT 38.1 03/21/2016       Basic Metabolic Profile:   Recent Labs     10/04/21  0345 10/05/21  0523 10/06/21  0313   * 136 137   K 3.1* 3.6* 3.9   CL 97* 102 100   CO2 24 23 27   BUN 59* 41* 30*   CREATININE 1.35* 0.97* 0.85   GLUCOSE 133* 108* 120*       Liver Function:  Recent Labs     10/04/21  0345 10/04/21  0345 10/04/21  0858   PROT 6.1*   < > 6.0*   LABALBU 2.8*  --   --    ALT 38*  --   --    AST 83*  --   --    ALKPHOS 56  --   --    BILITOT 0.42  --   --     < > = values in this interval not displayed. Magnesium:   Lab Results   Component Value Date    MG 2.5 10/04/2021    MG 2.2 10/03/2021    MG 2.4 10/02/2021     Phosphorus:   Lab Results   Component Value Date    PHOS 2.6 03/25/2016     Ionized Calcium:   Lab Results   Component Value Date    CAION 1.13 03/25/2016        Urinalysis:   Lab Results   Component Value Date    NITRU NEGATIVE 10/02/2021    COLORU Yellow 10/02/2021    PHUR 5.0 10/02/2021    LABCAST 4-8 Hyaline 03/22/2016    LABCAST NONE SEEN 03/22/2016    WBCUA 20 TO 50 10/02/2021    RBCUA 5 TO 10 10/02/2021    RBCUA 25-50 03/22/2016    MUCUS NOT REPORTED 10/02/2021    TRICHOMONAS NOT REPORTED 10/02/2021    YEAST NOT REPORTED 10/02/2021    BACTERIA MANY 10/02/2021    SPECGRAV 1.014 10/02/2021    LEUKOCYTESUR MODERATE 10/02/2021    UROBILINOGEN Normal 10/02/2021    BILIRUBINUR NEGATIVE 10/02/2021    BLOODU LARGE 03/22/2016    GLUCOSEU NEGATIVE 10/02/2021    KETUA NEGATIVE 10/02/2021    AMORPHOUS NOT REPORTED 10/02/2021       HgBA1c:  No results found for: LABA1C  TSH:    Lab Results   Component Value Date    TSH 0.35 10/03/2021     Lactic Acid:   Lab Results   Component Value Date    LACTA NOT REPORTED 10/03/2021    LACTA 0.9 03/23/2016      Troponin: No results for input(s): TROPONINI in the last 72 hours. Microbiology:  No new data to review     Radiology/Imaging:  XR CHEST PORTABLE   Final Result   No significant interval change. XR CHEST PORTABLE   Final Result   Interval decrease in the left pleural effusion.   No evidence of pneumothorax         US THORACENTESIS Which side should the procedure be performed? Left   Final Result   Successful ultrasound guided thoracentesis. XR CHEST PORTABLE   Final Result   No significant interval change in pleural effusions and associated basilar   opacities, left greater than right. CT CHEST WO CONTRAST   Final Result   1. Moderate left and small right pleural effusions. There is complete   atelectasis of the left lower lobe and partial atelectasis of the right lower   lobe. Superimposed pneumonitis may be considered clinically. 2. Severe cardiomegaly with a moderate pericardial effusion. XR CHEST PORTABLE   Final Result   Findings most consistent with mild congestive heart failure. Left retrocardiac opacification/consolidation may represent a combination of   pleural fluid and airspace disease. Underlying pneumonia not excluded.              ASSESSMENT:     Patient Active Problem List    Diagnosis Date Noted    Sepsis with acute hypoxic respiratory failure without septic shock ([address] 10/03/2021    Acute on chronic diastolic heart failure ([address] 10/03/2021    UTI (urinary tract infection) 10/03/2021    Hyponatremia 10/03/2021    Hypokalemia 10/03/2021    Elevated troponin 10/03/2021    Atrial fibrillation with RVR ([address] 10/02/2021    Pneumonia of left lower lobe due to infectious organism 10/02/2021    Colon polyps 06/02/2021    Anemia 09/20/2018    Arthritis 09/19/2018    At high risk for falls 09/19/2018    Heart murmur 09/19/2018    Legally blind in right eye, as defined in [place] 09/19/2018    COPD (chronic obstructive pulmonary disease) ([address] 09/19/2018    Simple chronic bronchitis (HCC)     Acute respiratory failure with hypoxia (HCC)     Postoperative confusion     Pulmonary emphysema ([address]     Open bimalleolar fracture of left ankle 03/22/2016    Hyperlipidemia     COPD exacerbation (HCC)     Benign essential HTN 03/04/2016    Morbid obesity with BMI of 40.0-44.9, adult ([address] 03/04/2016    Asthma         PLAN:       PLAN/MEDICAL DECISION MAKING:    Acute hypoxic respiratory failure requiring BiPAP - resolved                 - CHF    - New onset A. fib with RVR. - left sided pleural effusion s/p transudative thoracentesis              - Continue BiPAP at night prn, NC throughout the day as tolerated. - Continue cefepime. Continue x 2 more days. Pro-Gordon .66. Leukocytosis correcting.              - ID on board. - no pneumonia noted.      A. fib with RVR- new onset              - Cardizem  mg daily    - Metoprolol    - Continue heparin gtt per cardiology    - Plan for CURT/CV later today   - Depending on conversion we will plan for step down      Left lower lobe pneumonia with pleural effusion              - continue antibiotics, follow-up on cultures.                - CXR showing left sided pleural effusion               - IR for thoracentesis 350 ml transudate fluid.   - No concern for infection at this time. - Follow up cultures     -Asymptomatic UTI-              - Continue cefepime for now. Leukocytosis correcting.   - ID following      -Pericardial effusion              - follow-up on echo.                - Cardiology recs as above     -COPD              -continue bronchodilators. - nasal cannula as needed     -[id number] on echo. - Plavix restarted              - Lopressor restarted              - Lasix restarted              - Lipitor restarted      - Urinary retention               - goldstein placed               - monitor I/O's              - Urine output 15 cc/kg/hr overnight    - I/O: net -3,150. 9     DVT ppx: Heparin   GI ppx: pepcid 20 mg bid     CODE STATUS: Full Code    DISPOSITION:  [x] To remain ICU:   [] OK for out of ICU from [address] [place] [address] Road    -------------------------------------  Summer Gibbs DO  PGY-1, Department of [facility] [address], [place], [place]

## 2021-10-06 NOTE — PROGRESS NOTES
[facility] -Progress Note    Today's Date and Time: 10/6/2021, 1:42 PM    Impression :   Concern for pneumonia  Concern for sepsis  Hypotension   Leukocytosis  Acute Afib RVR  CHF exacerbation  COPD  CASEY  Essential HN  Sulfa allergy  UTI  Lt pleural effusion  S/P Lt thoracentesis with removal of 350 cc fluid    Recommendations:   D/C Vancomycin  Discontinue cefepime, culture data: No growth . Stop date 10/6/2021. Repeat blood cultures 10/3/21 as 1/2 coag neg staph from cultures on 10/2/21 is likely a contaminant    Medical Decision Making/Summary/Discussion:10/6/2021     Patient evaluated  Patient is acutely ill. Most likely cause is cardiac decompensation with associated pleural effusion. I don't really see a pneumonia by CXR or CT but will maintain Cefepime pending culture data and clinical evolution. S/P Lt thoracentesis with removal of 350 cc fluid 10-4-21    Infection Control Recommendations   [place] Precautions      Antimicrobial Stewardship Recommendations     Simplification of therapy  Targeted therapy  Renal considerations    Coordination of Outpatient Care:   Estimated Length of IV antimicrobials:TBD  Patient will need Midline Catheter Insertion: No  Patient will need PICC line Insertion:No  Patient will need: Home IV , [place],  SNF,  LTAC: TBD  Patient will need outpatient wound care:No    Chief complaint/reason for consultation:   Sepsis, pneumonia, respiratory failure, pneumonia      History of Present Illness:   Jazmin Crandall is a 68y.o.-year-old female who was initially admitted on 10/2/2021. Patient seen at the request of Dr. Scott Ibarra:    This patient presented to the ED via EMS on 10/2/21 with increased weakness, diarrhea and shortness of breath over the past few days.      She was found to be in SVT and then afib RVR for which she was started on a Cardizem gtt without successful conversion from adenosine and Cardizem push.    CXR is concerning for CHF exacerbation and CT chest shows a large left sided pleural effusion. WBC elevated     Blood culture 1/2 positive for coag negative staph-likely contaminant    Urine culture pending  UA suggestive of UTI    She has been started on cefepime and vancomycin     CURRENT EVALUATION 10/6/2021    Patient evaluated and examined in the ICU. She is alert and oriented. Son is at bedside. Denies chest pain, SOB. She does have some nausea, but no vomiting. Afebrile, hemodynamically stable, off pressors. On 3L [place] oxygen this morning, saturating well. Echocardiogram 10/3: LVEF 59%. CURT will be done today. S/p Left thoracentesis on 10/4:  with removal of 350 cc clear yellowish fluid. Culture: No growth    Labs, X rays reviewed: 10/6/2021    BUN: 41<--48 <-- 30  Cr:0.97<--1.12 <-- 0.85    WBC: 12.9<--26.1 <-- 12.4  Hb: 11.5<--11.4 --> 12.5  Plat: 443<-- 448 --> 465    CRP: 244    CXR:  10/2/21    CT chest:  10/3/21      Cultures:  Urine:    Blood:  10/2/21: Coag negative staph 1/2=-likely contaminant  10/3/21: No growth   Sputum :    Wound:  10-4-21: Thoracentesis: No growth      Discussed with patient, RN, Dr Tad Banks. .    I have personally reviewed the past medical history, past surgical history, medications, social history, and family history, and I have updated the database accordingly.   Past Medical History:     Past Medical History:   Diagnosis Date    Asthma     COPD (chronic obstructive pulmonary disease) ([facility] [address]     Gout     Hyperlipidemia     Hypertension     Mitral insufficiency     Pneumonia        Past Surgical  History:     Past Surgical History:   Procedure Laterality Date    ANKLE SURGERY Left 03/22/2016    ORIF    COLONOSCOPY      DENTAL SURGERY      HYSTERECTOMY  1986    KNEE SURGERY      NV COLSC FLX W/RMVL OF TUMOR POLYP LESION SNARE TQ N/A 8/8/2017    COLONOSCOPY POLYPECTOMY SNARE/COLD BIOPSY performed by Shara Dodson MD at [address] [place] [address] TONSILLECTOMY AND ADENOIDECTOMY         Medications:      polyethylene glycol  17 g Oral Daily    dilTIAZem  120 mg Oral Daily    sodium chloride flush  5-40 mL IntraVENous 2 times per day    furosemide  20 mg Oral Daily    metoprolol tartrate  50 mg Oral BID    potassium bicarb-citric acid  40 mEq Oral Daily    atorvastatin  20 mg Oral Daily    clopidogrel  75 mg Oral Daily    cefepime  1,000 mg IntraVENous Q12H    citalopram  20 mg Oral Daily    sodium chloride flush  5-40 mL IntraVENous 2 times per day    ipratropium-albuterol  1 ampule Inhalation 4x daily       Social History:     Social History     Socioeconomic History    Marital status:      Spouse name: Not on file    Number of children: Not on file    Years of education: Not on file    Highest education level: Not on file   Occupational History    Not on file   Tobacco Use    Smoking status: Former Smoker    Smokeless tobacco: Never Used    Tobacco comment: over 30 years ago    Vaping Use    Vaping Use: Never used   Substance and Sexual Activity    Alcohol use: Yes     Alcohol/week: 0.0 standard drinks     Comment: occasionally     Drug use: No    Sexual activity: Not on file   Other Topics Concern    Not on file   Social History Narrative    Not on file     Social Determinants of Health     Financial Resource Strain:     Difficulty of Paying Living Expenses:    Food Insecurity:     Worried About Running Out of Food in the Last Year:     [address] in the Last Year:    Transportation Needs:     Lack of Transportation (Medical):      Lack of Transportation (Non-Medical):    Physical Activity:     Days of Exercise per Week:     Minutes of Exercise per Session:    Stress:     Feeling of Stress :    Social Connections:     Frequency of Communication with Friends and Family:     Frequency of Social Gatherings with Friends and Family:     Attends Nondenominational Services:     Active Member of Clubs or Organizations:     Attends Club or Organization Meetings:     Marital Status:    Intimate Partner Violence:     Fear of Current or Ex-Partner:     Emotionally Abused:     Physically Abused:     Sexually Abused:        Family History:     Family History   Problem Relation Age of Onset    High Blood Pressure Mother     Cancer Mother     Asthma Father     Heart Disease Father     Cancer Father         Allergies:   Latex, Betadine [povidone iodine], Bee venom, Dilaudid [hydromorphone hcl], Adhesive tape, and Sulfa antibiotics     Review of Systems:   Constitutional: Diaphoretic  Head: No headaches  Eyes: No double vision or blurry vision. No conjunctival inflammation. ENT: No sore throat or runny nose. . No hearing loss, tinnitus or vertigo. Cardiovascular: No chest pain or palpitations. shortness of breath. JOHN  Lung:  shortness of breath & cough. clear sputum production  Abdomen: No nausea, vomiting, diarrhea, or abdominal pain. Elizabeth Gip No cramps. Genitourinary: No increased urinary frequency, or dysuria. No hematuria. No suprapubic or CVA pain  Musculoskeletal: No muscle aches or pains. No joint effusions, swelling or deformities  Hematologic: No bleeding or bruising. Neurologic: No headache, weakness, numbness, or tingling. Integument: No rash, no ulcers. Psychiatric: No depression. Endocrine: No polyuria, no polydipsia, no polyphagia.     Physical Examination :     Patient Vitals for the past 8 hrs:   BP Temp Temp src Pulse Resp SpO2 Weight   10/06/21 1014 -- -- -- 116 -- -- --   10/06/21 1000 116/69 98.4 °F (36.9 °C) Bladder 114 29 94 % --   10/06/21 0926 -- -- -- -- (!) 38 96 % --   10/06/21 0908 -- -- -- -- (!) 31 97 % --   10/06/21 0800 115/71 99.1 °F (37.3 °C) Bladder 106 29 -- --   10/06/21 0700 117/73 -- -- 105 19 99 % --   10/06/21 0630 -- -- -- 101 20 97 % --   10/06/21 0600 132/79 99 °F (37.2 °C) Bladder 105 22 98 % 254 lb 10.1 oz (115.5 kg)     General Appearance: Restless  Head:  Normocephalic, no trauma  Eyes: Pupils equal, round, reactive to light and accommodation; extraocular movements intact; sclera anicteric; conjunctivae pink. No embolic phenomena. ENT: Oropharynx clear, without erythema, exudate, or thrush. No tenderness of sinuses. Mouth/throat: mucosa pink and moist. No lesions. Dentition in good repair. Neck:Supple, without lymphadenopathy. Thyroid normal, No bruits. Pulmonary/Chest: Clear to auscultation, without wheezes, rales, or rhonchi. No dullness to percussion. Cardiovascular: afib  Abdomen: Soft, non tender. Bowel sounds normal. No organomegaly  All four Extremities: No cyanosis, clubbing, edema, or effusions. Neurologic: No gross sensory or motor deficits. Skin: Warm and dry with good turgor. No signs of peripheral arterial or venous insufficiency. No ulcerations. No open wounds. Medical Decision Making -Laboratory:   I have independently reviewed/ordered the following labs:    CBC with Differential:   Recent Labs     10/05/21  0523 10/06/21  0313   WBC 12.9* 12.4*   HGB 11.5* 12.5   HCT 36.7 39.5    465*     BMP:   Recent Labs     10/04/21  0345 10/04/21  0345 10/05/21  0523 10/06/21  0313   *   < > 136 137   K 3.1*   < > 3.6* 3.9   CL 97*   < > 102 100   CO2 24   < > 23 27   BUN 59*   < > 41* 30*   CREATININE 1.35*   < > 0.97* 0.85   MG 2.5  --   --   --     < > = values in this interval not displayed. Hepatic Function Panel:   Recent Labs     10/04/21  0345 10/04/21  0858   PROT 6.1* 6.0*   LABALBU 2.8*  --    BILITOT 0.42  --    ALKPHOS 56  --    ALT 38*  --    AST 83*  --      No results for input(s): RPR in the last 72 hours. No results for input(s): HIV in the last 72 hours. No results for input(s): BC in the last 72 hours.   Lab Results   Component Value Date    MUCUS NOT REPORTED 10/02/2021    PH 7.44 03/30/2016    RBC 4.17 10/06/2021    RBC 3.94 06/02/2016    TRICHOMONAS NOT REPORTED 10/02/2021    WBC 12.4 10/06/2021    YEAST NOT REPORTED 10/02/2021    TURBIDITY Clear 10/02/2021     Lab Results   Component Value Date    CREATININE 0.85 10/06/2021    GLUCOSE 120 10/06/2021    GLUCOSE 97 05/09/2016       Medical Decision Making-Imaging:     Narrative   EXAMINATION:   CT OF THE CHEST WITHOUT CONTRAST 10/3/2021 9:39 am       TECHNIQUE:   CT of the chest was performed without the administration of intravenous   contrast. Multiplanar reformatted images are provided for review. Dose   modulation, iterative reconstruction, and/or weight based adjustment of the   mA/kV was utilized to reduce the radiation dose to as low as reasonably   achievable.       COMPARISON:   None.       HISTORY:   ORDERING SYSTEM PROVIDED HISTORY: EVALUATE INFILTRATES ON CXR, LEFT SIDED   CONSOLIDATION   TECHNOLOGIST PROVIDED HISTORY:   EVALUATE INFILTRATES ON CXR, LEFT SIDED CONSOLIDATION   Reason for Exam: EVALUATE INFILTRATES ON CXR, LEFT SIDED CONSOLIDATION       FINDINGS:   Mediastinum: Severe cardiomegaly. Hahn Neither is a moderate pericardial effusion   measuring up to 14 mm in thickness overlying the right atrium.  Moderate   atherosclerotic calcification of the coronary arteries.  The aorta and   pulmonary arteries are normal in caliber.  No mediastinal or hilar   lymphadenopathy.  The thyroid gland and esophagus are normal.       Lungs/pleura: The airways are patent centrally.  Left pleural effusion   contributes to complete atelectasis of the left lower lobe.  There is partial   atelectasis of the lingula.  Right pleural effusion contributes to moderate   right lower lobe atelectasis.       Upper Abdomen: Adrenal glands are normal.  No significant findings in the   visualized upper abdomen.       Soft Tissues/Bones: Degenerative changes throughout the thoracic spine.  No   acute skeletal finding in the chest.           Impression   1.  Moderate left and small right pleural effusions. Hahn Neither is complete   atelectasis of the left lower lobe and partial atelectasis of the right lower   lobe.  Superimposed pneumonitis may be considered clinically. 2. Severe cardiomegaly with a moderate pericardial effusion.             Narrative   EXAMINATION:   ONE XRAY VIEW OF THE CHEST       10/2/2021 5:48 pm       COMPARISON:   None.       HISTORY:   ORDERING SYSTEM PROVIDED HISTORY: shortness of breath   TECHNOLOGIST PROVIDED HISTORY:   shortness of breath       FINDINGS:   Portable study is limited by obesity.  The heart is moderately enlarged. There is left retrocardiac consolidation.  There is thickening of the minor   fissure.  There is pulmonary vascular congestion and suspicion of trace right   pleural fluid.           Impression   Findings most consistent with mild congestive heart failure.       Left retrocardiac opacification/consolidation may represent a combination of   pleural fluid and airspace disease.  Underlying pneumonia not excluded.               Medical Decision Hphytg-[id number]-Ojpio:     Respiratory Panel, Molecular, with COVID-19 (Restricted: peds pts or suitable admitted adults) [[id number]]    Collected: 10/03/21 1108    Updated: 10/03/21 1332    Specimen Source: Nasopharyngeal Swab     Specimen Description . NASOPHARYNGEAL SWAB    Adenovirus PCR Not Detected    Coronavirus 229E PCR Not Detected    Coronavirus HKU1 PCR Not Detected    Coronavirus NL63 PCR Not Detected    Coronavirus OC43 PCR Not Detected    SARS-CoV-2, PCR Not Detected    Human Metapneumovirus PCR Not Detected    Rhino/Enterovirus PCR Not Detected    Influenza A by PCR Not Detected    Influenza A H1 PCR NOT REPORTED    Influenza A H1 (2009) PCR NOT REPORTED    Influenza A H3 PCR NOT REPORTED    Influenza B by PCR Not Detected    Parainfluenza 1 PCR Not Detected    Parainfluenza 2 PCR Not Detected    Parainfluenza 3 PCR Not Detected    Parainfluenza 4 PCR Not Detected    Resp Syncytial Virus PCR Not Detected    Bordetella Parapertussis Not Detected    B Pertussis by PCR Not Detected    Chlamydia pneumoniae By PCR Not Detected Mycoplasma pneumo by PCR Not Detected       Medical Decision Making-Other:     Note:  Labs, medications, radiologic studies were reviewed with personal review of films  Large amounts of data were reviewed  Discussed with nursing Staff, Discharge planner  Infection Control and Prevention measures reviewed  All prior entries were reviewed  Administer medications as ordered  Prognosis: Good  Discharge planning reviewed      Thank you for allowing us to participate in the care of this patient. Please call with questions. Cris Boone, MS3, participated in the evaluation of the patient under my direct supervision. ATTESTATION:    I have discussed the case, including pertinent history and exam findings with the residents and students. I have seen and examined the patient and the key elements of the encounter have been performed by me. I was present when the student obtained his information or examined the patient. I have reviewed the laboratory data, other diagnostic studies and discussed them with the residents. I have updated the medical record where necessary. I agree with the assessment, plan and orders as documented by the resident/ student.     Cassie Ravi MD.

## 2021-10-06 NOTE — PROGRESS NOTES
Physical Therapy        Physical Therapy Cancel Note      DATE: 10/6/2021    NAME: Akosua Powell  MRN: [id number]   : 1945      Patient not seen this date for Physical Therapy due to:    Pt leaving floor for a CURT, therapist will resume 10/7/21      Electronically signed by Pam Franco PTA on 10/6/2021 at 1:18 PM

## 2021-10-06 NOTE — PROGRESS NOTES
Patient overnight has removed bipap multiple times as well as nasal cannula, Respiratory has been notified as well as critical care.

## 2021-10-06 NOTE — PLAN OF CARE
Problem: Skin Integrity:  Goal: Will show no infection signs and symptoms  Description: Will show no infection signs and symptoms  Outcome: Ongoing  Goal: Absence of new skin breakdown  Description: Absence of new skin breakdown  Outcome: Ongoing     Problem: Falls - Risk of:  Goal: Will remain free from falls  Description: Will remain free from falls  Outcome: Ongoing  Goal: Absence of physical injury  Description: Absence of physical injury  Outcome: Ongoing     Problem: Respiratory:  Goal: Ability to maintain normal respiratory secretions will improve  Description: Ability to maintain normal respiratory secretions will improve  Outcome: Ongoing  Intervention: Encourage coughing and deep breathing  10/5/2021 0859 by Moriah Isaacs RCP  Note: BRONCHOSPASM/BRONCHOCONSTRICTION   [x]  IMPROVE AERATION/BREATH SOUNDS  [x]   ADMINISTER BRONCHODILATOR THERAPY AS APPROPRIATE  [x]   ASSESS BREATH SOUNDS  [x]   IMPLEMENT AEROSOL/MDI PROTOCOL  [x]   PATIENT EDUCATION AS NEEDED        Problem: Musculor/Skeletal Functional Status  Goal: Highest potential functional level  Outcome: Ongoing  Goal: Absence of falls  Outcome: Ongoing

## 2021-10-06 NOTE — PROGRESS NOTES
Patient presented to cath lab for CURT/cardioverison. Unable to pass CURT probe. Will cancel the procedure for now and reschedule with anesthesia.      [facility]  Cardiology Fellow

## 2021-10-06 NOTE — PLAN OF CARE
Problem: Skin Integrity:  Goal: Will show no infection signs and symptoms  Description: Will show no infection signs and symptoms  10/6/2021 0809 by aBm Ferreira RN  Outcome: Ongoing  10/5/2021 2113 by Oumar Barrow RN  Outcome: Ongoing  Goal: Absence of new skin breakdown  Description: Absence of new skin breakdown  10/6/2021 0809 by Bam Ferreira RN  Outcome: Ongoing  10/5/2021 2113 by Oumar Barrow RN  Outcome: Ongoing

## 2021-10-07 ENCOUNTER — APPOINTMENT (OUTPATIENT)
Dept: CARDIAC CATH/INVASIVE PROCEDURES | Age: 76
DRG: 871 | End: 2021-10-07
Payer: MEDICARE

## 2021-10-07 ENCOUNTER — ANESTHESIA EVENT (OUTPATIENT)
Dept: CARDIAC CATH/INVASIVE PROCEDURES | Age: 76
DRG: 871 | End: 2021-10-07
Payer: MEDICARE

## 2021-10-07 ENCOUNTER — ANESTHESIA (OUTPATIENT)
Dept: CARDIAC CATH/INVASIVE PROCEDURES | Age: 76
DRG: 871 | End: 2021-10-07
Payer: MEDICARE

## 2021-10-07 VITALS — DIASTOLIC BLOOD PRESSURE: 63 MMHG | SYSTOLIC BLOOD PRESSURE: 84 MMHG | OXYGEN SATURATION: 99 %

## 2021-10-07 PROBLEM — I48.91 ATRIAL FIBRILLATION STATUS POST CARDIOVERSION (HCC): Status: ACTIVE | Noted: 2021-10-07

## 2021-10-07 LAB
ANION GAP SERPL CALCULATED.3IONS-SCNC: 11 MMOL/L (ref 9–17)
BUN BLDV-MCNC: 23 MG/DL (ref 8–23)
BUN/CREAT BLD: ABNORMAL (ref 9–20)
CALCIUM SERPL-MCNC: 9.2 MG/DL (ref 8.6–10.4)
CHLORIDE BLD-SCNC: 99 MMOL/L (ref 98–107)
CO2: 28 MMOL/L (ref 20–31)
CREAT SERPL-MCNC: 0.76 MG/DL (ref 0.5–0.9)
EKG ATRIAL RATE: 138 BPM
EKG ATRIAL RATE: 197 BPM
EKG Q-T INTERVAL: 268 MS
EKG Q-T INTERVAL: 322 MS
EKG QRS DURATION: 104 MS
EKG QRS DURATION: 122 MS
EKG QTC CALCULATION (BAZETT): 457 MS
EKG QTC CALCULATION (BAZETT): 475 MS
EKG R AXIS: -25 DEGREES
EKG R AXIS: -32 DEGREES
EKG T AXIS: 143 DEGREES
EKG T AXIS: 145 DEGREES
EKG VENTRICULAR RATE: 131 BPM
EKG VENTRICULAR RATE: 175 BPM
GFR AFRICAN AMERICAN: >60 ML/MIN
GFR NON-AFRICAN AMERICAN: >60 ML/MIN
GFR SERPL CREATININE-BSD FRML MDRD: ABNORMAL ML/MIN/{1.73_M2}
GFR SERPL CREATININE-BSD FRML MDRD: ABNORMAL ML/MIN/{1.73_M2}
GLUCOSE BLD-MCNC: 107 MG/DL (ref 70–99)
HCT VFR BLD CALC: 39.4 % (ref 36.3–47.1)
HEMOGLOBIN: 12 G/DL (ref 11.9–15.1)
MAGNESIUM: 2.1 MG/DL (ref 1.6–2.6)
MCH RBC QN AUTO: 30.1 PG (ref 25.2–33.5)
MCHC RBC AUTO-ENTMCNC: 30.5 G/DL (ref 28.4–34.8)
MCV RBC AUTO: 98.7 FL (ref 82.6–102.9)
NRBC AUTOMATED: 0 PER 100 WBC
PARTIAL THROMBOPLASTIN TIME: 69.1 SEC (ref 20.5–30.5)
PDW BLD-RTO: 12.3 % (ref 11.8–14.4)
PLATELET # BLD: 435 K/UL (ref 138–453)
PMV BLD AUTO: 8.7 FL (ref 8.1–13.5)
POTASSIUM SERPL-SCNC: 3.6 MMOL/L (ref 3.7–5.3)
RBC # BLD: 3.99 M/UL (ref 3.95–5.11)
SODIUM BLD-SCNC: 138 MMOL/L (ref 135–144)
WBC # BLD: 13 K/UL (ref 3.5–11.3)

## 2021-10-07 PROCEDURE — 93312 ECHO TRANSESOPHAGEAL: CPT

## 2021-10-07 PROCEDURE — 6370000000 HC RX 637 (ALT 250 FOR IP): Performed by: STUDENT IN AN ORGANIZED HEALTH CARE EDUCATION/TRAINING PROGRAM

## 2021-10-07 PROCEDURE — 93325 DOPPLER ECHO COLOR FLOW MAPG: CPT

## 2021-10-07 PROCEDURE — 6360000002 HC RX W HCPCS

## 2021-10-07 PROCEDURE — 93010 ELECTROCARDIOGRAM REPORT: CPT | Performed by: INTERNAL MEDICINE

## 2021-10-07 PROCEDURE — 2700000000 HC OXYGEN THERAPY PER DAY

## 2021-10-07 PROCEDURE — 6360000002 HC RX W HCPCS: Performed by: STUDENT IN AN ORGANIZED HEALTH CARE EDUCATION/TRAINING PROGRAM

## 2021-10-07 PROCEDURE — 85027 COMPLETE CBC AUTOMATED: CPT

## 2021-10-07 PROCEDURE — 6370000000 HC RX 637 (ALT 250 FOR IP): Performed by: INTERNAL MEDICINE

## 2021-10-07 PROCEDURE — 6360000002 HC RX W HCPCS: Performed by: NURSE ANESTHETIST, CERTIFIED REGISTERED

## 2021-10-07 PROCEDURE — 1200000000 HC SEMI PRIVATE

## 2021-10-07 PROCEDURE — 7100000001 HC PACU RECOVERY - ADDTL 15 MIN

## 2021-10-07 PROCEDURE — 83735 ASSAY OF MAGNESIUM: CPT

## 2021-10-07 PROCEDURE — 36415 COLL VENOUS BLD VENIPUNCTURE: CPT

## 2021-10-07 PROCEDURE — 94761 N-INVAS EAR/PLS OXIMETRY MLT: CPT

## 2021-10-07 PROCEDURE — 80048 BASIC METABOLIC PNL TOTAL CA: CPT

## 2021-10-07 PROCEDURE — 94640 AIRWAY INHALATION TREATMENT: CPT

## 2021-10-07 PROCEDURE — 2500000003 HC RX 250 WO HCPCS: Performed by: STUDENT IN AN ORGANIZED HEALTH CARE EDUCATION/TRAINING PROGRAM

## 2021-10-07 PROCEDURE — 7100000000 HC PACU RECOVERY - FIRST 15 MIN

## 2021-10-07 PROCEDURE — 3700000001 HC ADD 15 MINUTES (ANESTHESIA)

## 2021-10-07 PROCEDURE — 99233 SBSQ HOSP IP/OBS HIGH 50: CPT | Performed by: INTERNAL MEDICINE

## 2021-10-07 PROCEDURE — 92960 CARDIOVERSION ELECTRIC EXT: CPT | Performed by: INTERNAL MEDICINE

## 2021-10-07 PROCEDURE — 99291 CRITICAL CARE FIRST HOUR: CPT | Performed by: INTERNAL MEDICINE

## 2021-10-07 PROCEDURE — 94660 CPAP INITIATION&MGMT: CPT

## 2021-10-07 PROCEDURE — 2580000003 HC RX 258: Performed by: STUDENT IN AN ORGANIZED HEALTH CARE EDUCATION/TRAINING PROGRAM

## 2021-10-07 PROCEDURE — 85730 THROMBOPLASTIN TIME PARTIAL: CPT

## 2021-10-07 PROCEDURE — 3700000000 HC ANESTHESIA ATTENDED CARE

## 2021-10-07 RX ORDER — SODIUM CHLORIDE 9 MG/ML
25 INJECTION, SOLUTION INTRAVENOUS PRN
Status: CANCELLED | OUTPATIENT
Start: 2021-10-07

## 2021-10-07 RX ORDER — SODIUM CHLORIDE 0.9 % (FLUSH) 0.9 %
5-40 SYRINGE (ML) INJECTION EVERY 12 HOURS SCHEDULED
Status: CANCELLED | OUTPATIENT
Start: 2021-10-07

## 2021-10-07 RX ORDER — POTASSIUM CHLORIDE 7.45 MG/ML
10 INJECTION INTRAVENOUS
Status: COMPLETED | OUTPATIENT
Start: 2021-10-07 | End: 2021-10-07

## 2021-10-07 RX ORDER — SODIUM CHLORIDE 0.9 % (FLUSH) 0.9 %
5-40 SYRINGE (ML) INJECTION PRN
Status: CANCELLED | OUTPATIENT
Start: 2021-10-07

## 2021-10-07 RX ORDER — PROPOFOL 10 MG/ML
INJECTION, EMULSION INTRAVENOUS PRN
Status: DISCONTINUED | OUTPATIENT
Start: 2021-10-07 | End: 2021-10-07 | Stop reason: SDUPTHER

## 2021-10-07 RX ORDER — POTASSIUM BICARBONATE 25 MEQ/1
25 TABLET, EFFERVESCENT ORAL 2 TIMES DAILY
Status: DISCONTINUED | OUTPATIENT
Start: 2021-10-07 | End: 2021-10-07

## 2021-10-07 RX ADMIN — DILTIAZEM HYDROCHLORIDE 120 MG: 120 CAPSULE, COATED, EXTENDED RELEASE ORAL at 08:59

## 2021-10-07 RX ADMIN — CLOPIDOGREL 75 MG: 75 TABLET, FILM COATED ORAL at 08:59

## 2021-10-07 RX ADMIN — METOPROLOL TARTRATE 5 MG: 1 INJECTION, SOLUTION INTRAVENOUS at 04:23

## 2021-10-07 RX ADMIN — METOPROLOL TARTRATE 50 MG: 50 TABLET, FILM COATED ORAL at 09:00

## 2021-10-07 RX ADMIN — CITALOPRAM 20 MG: 20 TABLET, FILM COATED ORAL at 08:58

## 2021-10-07 RX ADMIN — POTASSIUM CHLORIDE 10 MEQ: 10 INJECTION, SOLUTION INTRAVENOUS at 12:52

## 2021-10-07 RX ADMIN — POTASSIUM CHLORIDE 10 MEQ: 10 INJECTION, SOLUTION INTRAVENOUS at 16:51

## 2021-10-07 RX ADMIN — HEPARIN SODIUM AND DEXTROSE 14.5 UNITS/KG/HR: 10000; 5 INJECTION INTRAVENOUS at 04:48

## 2021-10-07 RX ADMIN — PROPOFOL INJECTABLE EMULSION 50 MG: 10 INJECTION, EMULSION INTRAVENOUS at 14:53

## 2021-10-07 RX ADMIN — POTASSIUM BICARBONATE 25 MEQ: 977.5 TABLET, EFFERVESCENT ORAL at 09:01

## 2021-10-07 RX ADMIN — IPRATROPIUM BROMIDE AND ALBUTEROL SULFATE 1 AMPULE: .5; 2.5 SOLUTION RESPIRATORY (INHALATION) at 16:02

## 2021-10-07 RX ADMIN — ATORVASTATIN CALCIUM 20 MG: 20 TABLET, FILM COATED ORAL at 08:58

## 2021-10-07 RX ADMIN — SODIUM CHLORIDE, PRESERVATIVE FREE 10 ML: 5 INJECTION INTRAVENOUS at 09:01

## 2021-10-07 RX ADMIN — ACETAMINOPHEN 650 MG: 325 TABLET ORAL at 21:38

## 2021-10-07 RX ADMIN — POTASSIUM CHLORIDE 10 MEQ: 10 INJECTION, SOLUTION INTRAVENOUS at 19:13

## 2021-10-07 RX ADMIN — FUROSEMIDE 20 MG: 20 TABLET ORAL at 09:00

## 2021-10-07 RX ADMIN — POTASSIUM CHLORIDE 10 MEQ: 10 INJECTION, SOLUTION INTRAVENOUS at 14:25

## 2021-10-07 RX ADMIN — METOPROLOL TARTRATE 50 MG: 50 TABLET, FILM COATED ORAL at 21:39

## 2021-10-07 RX ADMIN — HEPARIN SODIUM AND DEXTROSE 14.5 UNITS/KG/HR: 10000; 5 INJECTION INTRAVENOUS at 18:31

## 2021-10-07 RX ADMIN — IPRATROPIUM BROMIDE AND ALBUTEROL SULFATE 1 AMPULE: .5; 2.5 SOLUTION RESPIRATORY (INHALATION) at 12:12

## 2021-10-07 RX ADMIN — IPRATROPIUM BROMIDE AND ALBUTEROL SULFATE 1 AMPULE: .5; 2.5 SOLUTION RESPIRATORY (INHALATION) at 08:52

## 2021-10-07 ASSESSMENT — PULMONARY FUNCTION TESTS
PIF_VALUE: 0
PIF_VALUE: 0
PIF_VALUE: 1
PIF_VALUE: 0
PIF_VALUE: 1
PIF_VALUE: 0
PIF_VALUE: 0
PIF_VALUE: 1
PIF_VALUE: 0
PIF_VALUE: 0
PIF_VALUE: 16
PIF_VALUE: 0
PIF_VALUE: 1
PIF_VALUE: 0
PIF_VALUE: 1
PIF_VALUE: 0
PIF_VALUE: 1
PIF_VALUE: 1
PIF_VALUE: 0
PIF_VALUE: 0
PIF_VALUE: 1
PIF_VALUE: 0
PIF_VALUE: 1
PIF_VALUE: 0
PIF_VALUE: 1
PIF_VALUE: 0
PIF_VALUE: 1
PIF_VALUE: 0
PIF_VALUE: 1
PIF_VALUE: 0
PIF_VALUE: 1
PIF_VALUE: 1
PIF_VALUE: 0
PIF_VALUE: 16
PIF_VALUE: 0
PIF_VALUE: 0
PIF_VALUE: 1

## 2021-10-07 ASSESSMENT — PAIN SCALES - GENERAL
PAINLEVEL_OUTOF10: 3
PAINLEVEL_OUTOF10: 0
PAINLEVEL_OUTOF10: 2
PAINLEVEL_OUTOF10: 0

## 2021-10-07 ASSESSMENT — COPD QUESTIONNAIRES: CAT_SEVERITY: NO INTERVAL CHANGE

## 2021-10-07 NOTE — PROGRESS NOTES
[facility]   Progress Note                   Date:   10/7/2021  Patient name: Jazmin Crandall  Date of admission:  10/2/2021  5:17 PM  MRN:   [id number]  YOB: 1945  PCP: KALYN Berman CNP    Reason for Admission:     Subjective:     Overnight patient was tachycardic in 140s, rate controlled after IV Lopressor 5 mg. Patient could not undergo CURT cardioversion yesterday as the CURT probe could not pass through. Plan is to get it under anesthesia. Patient continues to be in atrial fibrillation with heart rate in 110s   T-max 99.7 °F, blood pressure stable. Labs reviewed, keep K > 4  Continue heparin drip. Continue Cardizem 120 mg daily, Lopressor 50 mg twice daily and heparin drip  On Lasix 20 mg daily    Intake/Output Summary (Last 24 hours) at 10/7/2021 0731  Last data filed at 10/7/2021 0600  Gross per 24 hour   Intake 1110 ml   Output 2155 ml   Net -1045 ml       Brief history  The patient is a 68 y.o.  female who is admitted to the hospital for worsening shortness of breath. Patient states that she was feeling sick for the past 5 days, had no appetite was not taking her medications and ultimately her son called the EMS for worsening shortness of breath.     Her heart rate was in 200s in SVT was initially given 6 mg followed by 12 mg of adenosine without conversion and then was given 20 mg of Cardizem after which her heart rate became better however returned back to 180s by EMS.    In the ER patient was alert and oriented. EKG consistent with atrial fibrillation with RVR. Patient was still hypoxic was put on BiPAP. Vitals /78, heart rate in 100s and saturating 100% on BiPAP.   Patient was started on diltiazem drip and heparin drip     Pertinent labs sodium 130, K3.4, creatinine 1.12  Troponin 92-> 62  WBC 28.5  UA suggestive for UTI     Last echo back in 2017 showed LVEF 55%, left atrium moderately dilated, mild dilatation of aortic root and ascending aorta 4.2 cm  No significant cardiac history found    Medications:   Scheduled Meds:   potassium bicarbonate  25 mEq Oral BID    polyethylene glycol  17 g Oral Daily    dilTIAZem  120 mg Oral Daily    sodium chloride flush  5-40 mL IntraVENous 2 times per day    furosemide  20 mg Oral Daily    metoprolol tartrate  50 mg Oral BID    atorvastatin  20 mg Oral Daily    clopidogrel  75 mg Oral Daily    citalopram  20 mg Oral Daily    sodium chloride flush  5-40 mL IntraVENous 2 times per day    ipratropium-albuterol  1 ampule Inhalation 4x daily     Continuous Infusions:   esmolol Stopped (10/06/21 0251)    sodium chloride      sodium chloride Stopped (10/05/21 1925)    sodium chloride      heparin (PORCINE) Infusion 14.5 Units/kg/hr (10/07/21 0448)     CBC:   Recent Labs     10/05/21  0523 10/06/21  0313 10/07/21  0411   WBC 12.9* 12.4* 13.0*   HGB 11.5* 12.5 12.0    465* 435     BMP:    Recent Labs     10/05/21  0523 10/06/21  0313 10/07/21  0411    137 138   K 3.6* 3.9 3.6*    100 99   CO2 23 27 28   BUN 41* 30* 23   CREATININE 0.97* 0.85 0.76   GLUCOSE 108* 120* 107*     Hepatic:   No results for input(s): AST, ALT, ALB, BILITOT, ALKPHOS in the last 72 hours. Troponin: No results for input(s): TROPONINI in the last 72 hours. BNP: No results for input(s): BNP in the last 72 hours. Lipids: No results for input(s): CHOL, HDL in the last 72 hours. Invalid input(s): LDLCALCU  INR:   Recent Labs     10/04/21  0858   INR 1.1       Objective:   Vitals: /78   Pulse 97   Temp 99.7 °F (37.6 °C) (Bladder)   Resp 20   Ht 5' 4\" (1.626 m)   Wt 256 lb 6.3 oz (116.3 kg)   SpO2 95%   BMI 44.01 kg/m²   General appearance: alert and cooperative with exam  HEENT: Head: Normocephalic, no lesions, without obvious abnormality.   Neck: no adenopathy, no carotid bruit, no JVD, supple, symmetrical, trachea midline and thyroid not enlarged, symmetric, no tenderness/mass/nodules  Lungs: clear to auscultation bilaterally  Heart: regular rate and rhythm, S1, S2 normal, no murmur, click, rub or gallop  Abdomen: soft, non-tender; bowel sounds normal; no masses,  no organomegaly  Extremities: extremities normal, atraumatic, no cyanosis or edema  Neurologic: Mental status: Alert, oriented, thought content appropriate    EKG: A fib    ECHO:  Echocardiogram : left ventricle is normal in size with normal systolic function globally. Calculated ejection fraction is 59% ( Atrial-Fib). Mild mitral regurgitation. Mild tricuspid regurgitation. Estimated right ventricular systolic pressure is 35 mmHg. Anterior clear space noted, most likely adipose tissue vs small loculated  pericardial effusion. No evidence of tamponade. Pleural effusion is seen. Other Current Problems:   Patient Active Problem List:     Asthma     Benign essential HTN     Morbid obesity with BMI of 40.0-44.9, adult ([facility] [address]     Open bimalleolar fracture of left ankle     Hyperlipidemia     COPD exacerbation (HCC)     Pulmonary emphysema (HCC)     Postoperative confusion     Acute respiratory failure with hypoxia (HCC)     Simple chronic bronchitis (HCC)     Atrial fibrillation with RVR (HCC)     Pneumonia of left lower lobe due to infectious organism     Sepsis with acute hypoxic respiratory failure without septic shock (HCC)     Acute on chronic diastolic heart failure (HCC)     UTI (urinary tract infection)     Hyponatremia     Hypokalemia     Elevated troponin     Anemia     Arthritis     At high risk for falls     Colon polyps     Heart murmur     Legally blind in right eye, as defined in [place]     COPD (chronic obstructive pulmonary disease) ([facility] [address]      Assessment and Plan:    Impression:    1. New onset atrial fibrillation, CHADVASC score- 4  2. Acute hypoxic respiratory distress resolved  3. NSTEMI likely type II from demand ischemia from CASEY/UTI  4. Sepsis  5. Hypokalemia  6. CASEY resolving  7. No evidence of GI injury  8.  Left loculated pleural effusion      Recommendations  1. Continue heparin drip for now, and can switch to NOAC once patient does not need any more procedures. 2. Plan to reschedule cardioversion with anesthesia since CURT probe could not pass through. 3. Continue Cardizem 120 mg and Lopressor  4. Replace electrolytes to keep K > 4 and Mg >2      Will discuss patient with attending physician. Recommendations above pending approval by attending physician. Viki Garrido MD  PGY-3 Internal Medicine Resident  [address] [place] [address]  10/7/2021 7:31 AM    I performed a history and physical examination of the patient and discussed management with the resident. I reviewed the residents note and agree with the documented findings and plan of care. Any areas of disagreement are noted on the chart. I was personally present for the key portions of any procedures. I have documented in the chart those procedures where I was not present during the key portions. I have personally evaluated this patient and have completed at least one if not all key elements of the E/M (history, physical exam, and MDM). Additional findings are as noted. Unable to pass CURT probe. Anesthesia requested for CURT/CV.  Negrito Hodge MD

## 2021-10-07 NOTE — PLAN OF CARE
Problem: Respiratory:  Goal: Ability to maintain normal respiratory secretions will improve  Description: Ability to maintain normal respiratory secretions will improve  10/6/2021 2152 by Cm Woods RCP  Outcome: Ongoing   BRONCHOSPASM/BRONCHOCONSTRICTION     [x]         IMPROVE AERATION/BREATH SOUNDS  [x]   ADMINISTER BRONCHODILATOR THERAPY AS APPROPRIATE  [x]   ASSESS BREATH SOUNDS  [x]   IMPLEMENT AEROSOL/MDI PROTOCOL  [x]   PATIENT EDUCATION AS NEEDED     PROVIDE ADEQUATE OXYGENATION WITH ACCEPTABLE SP02/ABG'S    [x]  IDENTIFY APPROPRIATE OXYGEN THERAPY  [x]   MONITOR SP02/ABG'S AS NEEDED   [x]   PATIENT EDUCATION AS NEEDED     NON INVASIVE VENTILATION  PROVIDE OPTIMAL VENTILATION/ACCEPTABLE SP02  IMPLEMENT NON INVASIVE VENTILATION PROTOCOL  ASSESSMENT SKIN INTEGRITY  PATIENT EDUCATION AS NEEDED  BIPAP AS NEEDED

## 2021-10-07 NOTE — PROGRESS NOTES
Report- called to Jcarlos Him a short time ago  Pt transferred to room 331 without issue. Jcarlos Him updated further.

## 2021-10-07 NOTE — ANESTHESIA POSTPROCEDURE EVALUATION
Department of Anesthesiology  Postprocedure Note    Patient: Nedra Malcolm  MRN: [id number]  YOB: 1945  Date of evaluation: 10/7/2021  Time:  3:29 PM     Procedure Summary     Date: 10/07/21 Room / Location: [facility] Cath Lab    Anesthesia Start: 7381 Anesthesia Stop: 5117    Procedure: CATH LAB WITH ANESTHESIA Diagnosis:     Scheduled Providers: Marilou Singleton MD; KALYN Iverson - CRNA Responsible Provider: Marilou Singleton MD    Anesthesia Type: MAC ASA Status: 4          Anesthesia Type: MAC    Chris Phase I:      Chris Phase II:      Last vitals: Reviewed and per EMR flowsheets.        Anesthesia Post Evaluation    Patient location during evaluation: PACU  Patient participation: complete - patient participated  Level of consciousness: awake and alert  Pain score: 0  Airway patency: patent  Nausea & Vomiting: no nausea and no vomiting  Cardiovascular status: hemodynamically stable  Respiratory status: acceptable  Hydration status: stable

## 2021-10-07 NOTE — PROGRESS NOTES
Physical Therapy        Physical Therapy Cancel Note      DATE: 10/7/2021    NAME: Mague Lombardi  MRN: [id number]   : 1945      Patient not seen this date for Physical Therapy due to:    Surgery/Procedure: CATH LAB      Electronically signed by Isadora Lowery PTA on 10/7/2021 at 3:14 PM

## 2021-10-07 NOTE — PLAN OF CARE
Problem: Skin Integrity:  Goal: Will show no infection signs and symptoms  Description: Will show no infection signs and symptoms  10/7/2021 0803 by Nia Haywood RN  Outcome: Ongoing  10/6/2021 2310 by Ariel Oliveira RN  Outcome: Ongoing  Goal: Absence of new skin breakdown  Description: Absence of new skin breakdown  10/7/2021 0803 by Nia Haywood RN  Outcome: Ongoing  10/6/2021 2310 by Ariel Oliveira RN  Outcome: Ongoing

## 2021-10-07 NOTE — PROGRESS NOTES
[facility] -Progress Note    Today's Date and Time: 10/7/2021, 7:49 AM    Impression :   Concern for pneumonia  Concern for sepsis  Hypotension   Leukocytosis  Acute Afib RVR  CHF exacerbation  COPD  CASEY  Essential HN  Sulfa allergy  UTI  Lt pleural effusion  S/P Lt thoracentesis with removal of 350 cc fluid    Recommendations:   D/C Vancomycin  Completed cefepime, culture data: No growth . Stop date 10/6/2021. Repeat blood cultures 10/3/21 as 1/2 coag neg staph from cultures on 10/2/21 is likely a contaminant    Medical Decision Making/Summary/Discussion:10/7/2021     Patient evaluated  Patient is acutely ill. Most likely cause is cardiac decompensation with associated pleural effusion. I don't really see a pneumonia by CXR or CT but will maintain Cefepime pending culture data and clinical evolution. S/P Lt thoracentesis with removal of 350 cc fluid 10-4-21    Infection Control Recommendations   Polo Precautions      Antimicrobial Stewardship Recommendations     Simplification of therapy  Targeted therapy  Renal considerations    Coordination of Outpatient Care:   Estimated Length of IV antimicrobials:TBD  Patient will need Midline Catheter Insertion: No  Patient will need PICC line Insertion:No  Patient will need: Home IV , [place],  SNF,  LTAC: TBD  Patient will need outpatient wound care:No    Chief complaint/reason for consultation:   Sepsis, pneumonia, respiratory failure, pneumonia      History of Present Illness:   Laura Whitman is a 68y.o.-year-old female who was initially admitted on 10/2/2021. Patient seen at the request of Dr. Kellee Ramirez:    This patient presented to the ED via EMS on 10/2/21 with increased weakness, diarrhea and shortness of breath over the past few days.      She was found to be in SVT and then afib RVR for which she was started on a Cardizem gtt without successful conversion from adenosine and Cardizem push.    CXR is concerning for CHF exacerbation and CT chest shows a large left sided pleural effusion. WBC elevated     Blood culture 1/2 positive for coag negative staph-likely contaminant    Urine culture pending  UA suggestive of UTI    She has been started on cefepime and vancomycin     CURRENT EVALUATION 10/7/2021    Patient evaluated and examined in the ICU. She is alert and oriented. Denies chest pain, SOB. She does have some nausea, but no vomiting. Afebrile, hemodynamically stable, off pressors, still in a.fib HR 110s  On BiPAP this morning, saturating well. BiPAP: FiO2 40%/ PEEP 8/ RR 14    Echocardiogram 10/3: LVEF 59%. Patient could not undergo CURT cardioversion yesterday as the CURT probe could not pass through. Plan is to get it under anesthesia. S/p Left thoracentesis on 10/4:  with removal of 350 cc clear yellowish fluid. Culture: No growth    Labs, X rays reviewed: 10/7/2021    BUN: 23<--30<--41<--48 <-- 30  Cr: 0.76<--0.85<--0.97<--1.12 <-- 0.85    WBC: 13.0<--12.4<--12.9<--26.1 <-- 12.4  Hb: 12.0<--12.5<--11.5<--11.4 --> 12.5  Plat: 435<--465<--443<-- 448 --> 465    CRP: 244    CXR:  10/2/21    CT chest:  10/3/21      Cultures:  Urine:    Blood:  10/2/21: Coag negative staph 1/2=-likely contaminant  10/3/21: No growth   Sputum :    Wound:  10-4-21: Thoracentesis: No growth      Discussed with patient, RN, Dr Lisa Hutchins. .    I have personally reviewed the past medical history, past surgical history, medications, social history, and family history, and I have updated the database accordingly.   Past Medical History:     Past Medical History:   Diagnosis Date    Asthma     COPD (chronic obstructive pulmonary disease) ([address]     Gout     Hyperlipidemia     Hypertension     Mitral insufficiency     Pneumonia        Past Surgical  History:     Past Surgical History:   Procedure Laterality Date    ANKLE SURGERY Left 03/22/2016    ORIF    COLONOSCOPY     [facility]      HYSTERECTOMY  1986    KNEE SURGERY      IN COLSC FLX W/RMVL OF TUMOR POLYP LESION SNARE TQ N/A 8/8/2017    COLONOSCOPY POLYPECTOMY SNARE/COLD BIOPSY performed by Samira Maravilla MD at [facility]    TONSILLECTOMY AND ADENOIDECTOMY         Medications:      potassium bicarbonate  25 mEq Oral BID    polyethylene glycol  17 g Oral Daily    dilTIAZem  120 mg Oral Daily    sodium chloride flush  5-40 mL IntraVENous 2 times per day    furosemide  20 mg Oral Daily    metoprolol tartrate  50 mg Oral BID    atorvastatin  20 mg Oral Daily    clopidogrel  75 mg Oral Daily    citalopram  20 mg Oral Daily    sodium chloride flush  5-40 mL IntraVENous 2 times per day    ipratropium-albuterol  1 ampule Inhalation 4x daily       Social History:     Social History     Socioeconomic History    Marital status:      Spouse name: Not on file    Number of children: Not on file    Years of education: Not on file    Highest education level: Not on file   Occupational History    Not on file   Tobacco Use    Smoking status: Former Smoker    Smokeless tobacco: Never Used    Tobacco comment: over 30 years ago    Vaping Use    Vaping Use: Never used   Substance and Sexual Activity    Alcohol use: Yes     Alcohol/week: 0.0 standard drinks     Comment: occasionally     Drug use: No    Sexual activity: Not on file   Other Topics Concern    Not on file   Social History Narrative    Not on file     Social Determinants of Health     Financial Resource Strain:     Difficulty of Paying Living Expenses:    Food Insecurity:     Worried About Running Out of Food in the Last Year:     [address] in the Last Year:    Transportation Needs:     Lack of Transportation (Medical):      Lack of Transportation (Non-Medical):    Physical Activity:     Days of Exercise per Week:     Minutes of Exercise per Session:    Stress:     Feeling of Stress :    Social Connections:     Frequency of Communication with Friends and Family:     Frequency of Social Gatherings with Friends and Family:     Attends Mandaen Services:     Active Member of Clubs or Organizations:     Attends Club or Organization Meetings:     Marital Status:    Intimate Partner Violence:     Fear of Current or Ex-Partner:     Emotionally Abused:     Physically Abused:     Sexually Abused:        Family History:     Family History   Problem Relation Age of Onset    High Blood Pressure Mother     Cancer Mother     Asthma Father     Heart Disease Father     Cancer Father         Allergies:   Latex, Betadine [povidone iodine], Bee venom, Dilaudid [hydromorphone hcl], Adhesive tape, and Sulfa antibiotics     Review of Systems:   Constitutional: Diaphoretic  Head: No headaches  Eyes: No double vision or blurry vision. No conjunctival inflammation. ENT: No sore throat or runny nose. . No hearing loss, tinnitus or vertigo. Cardiovascular: No chest pain or palpitations. shortness of breath. JOHN  Lung:  shortness of breath & cough. clear sputum production  Abdomen: No nausea, vomiting, diarrhea, or abdominal pain. Erleen Jenkins No cramps. Genitourinary: No increased urinary frequency, or dysuria. No hematuria. No suprapubic or CVA pain  Musculoskeletal: No muscle aches or pains. No joint effusions, swelling or deformities  Hematologic: No bleeding or bruising. Neurologic: No headache, weakness, numbness, or tingling. Integument: No rash, no ulcers. Psychiatric: No depression. Endocrine: No polyuria, no polydipsia, no polyphagia.     Physical Examination :     Patient Vitals for the past 8 hrs:   BP Temp Temp src Pulse Resp SpO2 Weight   10/07/21 0700 137/78 -- -- 97 20 95 % --   10/07/21 0630 -- -- -- 97 22 96 % --   10/07/21 0600 125/88 99.7 °F (37.6 °C) Bladder 106 (!) 34 96 % 256 lb 6.3 oz (116.3 kg)   10/07/21 0530 -- -- -- 100 25 96 % --   10/07/21 0526 -- -- -- 93 29 93 % --   10/07/21 0500 -- -- -- 100 (!) 33 98 % --   10/07/21 0430 -- -- -- 120 25 98 % -- 10/07/21 0400 132/72 99.9 °F (37.7 °C) Bladder 111 25 98 % --   10/07/21 0330 -- -- -- 94 24 98 % --   10/07/21 0300 123/75 -- -- 114 26 97 % --   10/07/21 0230 -- -- -- 108 21 98 % --   10/07/21 0200 120/77 100 °F (37.8 °C) Bladder 101 20 98 % --   10/07/21 0130 -- -- -- 114 (!) 33 98 % --   10/07/21 0100 (!) 147/104 -- -- 121 (!) 40 98 % --   10/07/21 0030 -- -- -- 101 22 99 % --   10/07/21 0006 -- -- -- 93 15 98 % --   10/07/21 0000 134/73 100.4 °F (38 °C) Bladder 95 20 98 % --     General Appearance: Restless  Head:  Normocephalic, no trauma  Eyes: Pupils equal, round, reactive to light and accommodation; extraocular movements intact; sclera anicteric; conjunctivae pink. No embolic phenomena. ENT: Oropharynx clear, without erythema, exudate, or thrush. No tenderness of sinuses. Mouth/throat: mucosa pink and moist. No lesions. Dentition in good repair. Neck:Supple, without lymphadenopathy. Thyroid normal, No bruits. Pulmonary/Chest: Clear to auscultation, without wheezes, rales, or rhonchi. No dullness to percussion. Cardiovascular: afib  Abdomen: Soft, non tender. Bowel sounds normal. No organomegaly  All four Extremities: No cyanosis, clubbing, edema, or effusions. Neurologic: No gross sensory or motor deficits. Skin: Warm and dry with good turgor. No signs of peripheral arterial or venous insufficiency. No ulcerations. No open wounds. Medical Decision Making -Laboratory:   I have independently reviewed/ordered the following labs:    CBC with Differential:   Recent Labs     10/06/21  0313 10/07/21  0411   WBC 12.4* 13.0*   HGB 12.5 12.0   HCT 39.5 39.4   * 435     BMP:   Recent Labs     10/06/21  0313 10/07/21  0411    138   K 3.9 3.6*    99   CO2 27 28   BUN 30* 23   CREATININE 0.85 0.76   MG  --  2.1     Hepatic Function Panel:   Recent Labs     10/04/21  0858   PROT 6.0*     No results for input(s): RPR in the last 72 hours.   No results for input(s): HIV in the last 72 hours. No results for input(s): BC in the last 72 hours. Lab Results   Component Value Date    MUCUS NOT REPORTED 10/02/2021    PH 7.44 03/30/2016    RBC 3.99 10/07/2021    RBC 3.94 06/02/2016    TRICHOMONAS NOT REPORTED 10/02/2021    WBC 13.0 10/07/2021    YEAST NOT REPORTED 10/02/2021    TURBIDITY Clear 10/02/2021     Lab Results   Component Value Date    CREATININE 0.76 10/07/2021    GLUCOSE 107 10/07/2021    GLUCOSE 97 05/09/2016       Medical Decision Making-Imaging:     Narrative   EXAMINATION:   CT OF THE CHEST WITHOUT CONTRAST 10/3/2021 9:39 am       TECHNIQUE:   CT of the chest was performed without the administration of intravenous   contrast. Multiplanar reformatted images are provided for review. Dose   modulation, iterative reconstruction, and/or weight based adjustment of the   mA/kV was utilized to reduce the radiation dose to as low as reasonably   achievable.       COMPARISON:   None.       HISTORY:   ORDERING SYSTEM PROVIDED HISTORY: EVALUATE INFILTRATES ON CXR, LEFT SIDED   CONSOLIDATION   TECHNOLOGIST PROVIDED HISTORY:   EVALUATE INFILTRATES ON CXR, LEFT SIDED CONSOLIDATION   Reason for Exam: EVALUATE INFILTRATES ON CXR, LEFT SIDED CONSOLIDATION       FINDINGS:   Mediastinum: Severe cardiomegaly. Teddy Aniya is a moderate pericardial effusion   measuring up to 14 mm in thickness overlying the right atrium.  Moderate   atherosclerotic calcification of the coronary arteries.  The aorta and   pulmonary arteries are normal in caliber.  No mediastinal or hilar   lymphadenopathy.  The thyroid gland and esophagus are normal.       Lungs/pleura:  The airways are patent centrally.  Left pleural effusion   contributes to complete atelectasis of the left lower lobe.  There is partial   atelectasis of the lingula.  Right pleural effusion contributes to moderate   right lower lobe atelectasis.       Upper Abdomen: Adrenal glands are normal.  No significant findings in the   visualized upper abdomen.       Soft Tissues/Bones: Degenerative changes throughout the thoracic spine.  No   acute skeletal finding in the chest.           Impression   1. Moderate left and small right pleural effusions. Jolayne Cancel is complete   atelectasis of the left lower lobe and partial atelectasis of the right lower   lobe.  Superimposed pneumonitis may be considered clinically. 2. Severe cardiomegaly with a moderate pericardial effusion.             Narrative   EXAMINATION:   ONE XRAY VIEW OF THE CHEST       10/2/2021 5:48 pm       COMPARISON:   None.       HISTORY:   ORDERING SYSTEM PROVIDED HISTORY: shortness of breath   TECHNOLOGIST PROVIDED HISTORY:   shortness of breath       FINDINGS:   Portable study is limited by obesity.  The heart is moderately enlarged. There is left retrocardiac consolidation.  There is thickening of the minor   fissure.  There is pulmonary vascular congestion and suspicion of trace right   pleural fluid.           Impression   Findings most consistent with mild congestive heart failure.       Left retrocardiac opacification/consolidation may represent a combination of   pleural fluid and airspace disease.  Underlying pneumonia not excluded.               Medical Decision [id number]:     Respiratory Panel, Molecular, with COVID-19 (Restricted: peds pts or suitable admitted adults) [[id number]]    Collected: 10/03/21 1108    Updated: 10/03/21 1332    Specimen Source: Nasopharyngeal Swab     Specimen Description . NASOPHARYNGEAL SWAB    Adenovirus PCR Not Detected    Coronavirus 229E PCR Not Detected    Coronavirus HKU1 PCR Not Detected    Coronavirus NL63 PCR Not Detected    Coronavirus OC43 PCR Not Detected    SARS-CoV-2, PCR Not Detected    Human Metapneumovirus PCR Not Detected    Rhino/Enterovirus PCR Not Detected    Influenza A by PCR Not Detected    Influenza A H1 PCR NOT REPORTED    Influenza A H1 (2009) PCR NOT REPORTED    Influenza A H3 PCR NOT REPORTED    Influenza B by PCR Not Detected Parainfluenza 1 PCR Not Detected    Parainfluenza 2 PCR Not Detected    Parainfluenza 3 PCR Not Detected    Parainfluenza 4 PCR Not Detected    Resp Syncytial Virus PCR Not Detected    Bordetella Parapertussis Not Detected    B Pertussis by PCR Not Detected    Chlamydia pneumoniae By PCR Not Detected    Mycoplasma pneumo by PCR Not Detected       Medical Decision Making-Other:     Note:  Labs, medications, radiologic studies were reviewed with personal review of films  Large amounts of data were reviewed  Discussed with nursing Staff, Discharge planner  Infection Control and Prevention measures reviewed  All prior entries were reviewed  Administer medications as ordered  Prognosis: Good  Discharge planning reviewed      Thank you for allowing us to participate in the care of this patient. Please call with questions. Tressa Nelson MD, PGY-1  Infectious Disease/ Internal Medicine Resident  [place], [place]    ATTESTATION:    I have discussed the case, including pertinent history and exam findings with the APRN. I have evaluated the  History, physical findings and pictures of the patient and the key elements of the encounter have been performed by me. I have reviewed the laboratory data, other diagnostic studies and discussed them with the APRN. I have updated the medical record where necessary. I agree with the assessment, plan and orders as documented by the APRN.     Gucci Banks MD.

## 2021-10-07 NOTE — PROGRESS NOTES
[facility]  Pre-Procedure Conscious Sedation Data         Pre Procedure Conscious Sedation Data:  ASA Class:                  [] I [] II [x] III [] IV     Mallampati Class:       [] I [] II [x] III [] IV      Dina Leblanc MD  Fellow Cardiovascular Disease  [facility]

## 2021-10-07 NOTE — PROCEDURES
[facility]  CURT / Cardioversion procedure Note         Today's Date: 10/7/2021    Operators:  Primary: Rubia Brumfield MD (Attending)   Assistant: Dimas Sanchez MD (Fellow)    Patient seen and examined. History and Physical reviewed. Labs reviewed. After informed consent was obtained with explanation of the risks and benefits, the patient was brought to Cath lab. All sedation was administered by the cardiologist. The oropharynx was pre anesthetisized with cetacaine spray. CURT:    Structures:  LA: Dilated  SHANNAN: No thrombus  RA: Normal  RV:  Normal  LV: Normal in size with normal systolic function   Estimated LVEF: 55%  Aorta: Mild atheromatous disease arch  Percardium: No pericardial effusion  Septum: No intracardiac shunt via color Doppler. Valves:  Mitral Valve: Structurally normal. No regurgitation is identified. Aortic Valve: The aortic valve is trileaflet and opens adequately. No regurgitiation is identified. Tricuspid valve: Structurally normal. Moderate to severe regurgitation is identified. Pulmonary valve: Normal. No significant regurgitation    No valvular vegetations or thrombus identified. Summary:     1. A CURT was performed without complications. 2. Normal Lv size with normal systolic function. Estimated LVEF 55%  3. No thrombus or valvular vegetation identified      CARDIOVERSION:  After an adequate level of sedation was achieved, 200J in biphasic synchronized delivery was administered. conversion to normal sinus rhythm. The patient awoke without complications. A post procedure 12 L ECG was ordered and reviewed. The patient will continue with the discharge meds and has been instructed to follow-up with his cardiologist for continued long term care and cardiovascular management. There were no complications encountered.       Electronically signed by Viridiana Devi MD on 10/7/2021 at 2:58 PM  Fellow, [address] [place] [address] Mo Hammond

## 2021-10-07 NOTE — PROGRESS NOTES
INTENSIVE CARE UNIT  Resident Physician Progress Note    Patient - Amadeo Markham  Date of Admission -  10/2/2021  5:17 PM  Date of Evaluation -  10/7/2021  Room and Bed Number -  0124/0124-01   Hospital Day - 5    SUBJECTIVE:   HISTORY OF PRESENT ILLNESS:      Shortness of breath weakness. New onset Atrial fibrillation with RVR. Initially SVT - Adenosine x's 2 without conversion. 20 mg cardizem without conversion and therefore started on cardizem drip      HX of HTN, COPD, CHF, SEJAL, Obesity,     Patient was agitated in ER. nd was given 1 mg IV ativan was given. Patient placed on Bipap due to low Sao2 per ABG. 500ml IV bolus for hypotension. ID consult for sepsis from UTI/ possible pneumonia. Patient admitted to MICU     Levofed was started and was slowly able to be weaned off. Patient having to swapped Bipap for cannula, during night. L thoracentesis pulled 350mL demonstrating transudative fluid    Patient had improved and was discussed transfering to step down when she experienced Afib with HR of 170's. Patient was started on esmolol gtt started. Esmolol slowly wean off however she continues in afib, HR controlled at 100s. Cardio to perform a CURT and cardioversion today. Pending patient coverts out of A-fib it is possible she transitions to step down/med surg today. OVERNIGHT EVENTS:        Patient was seen and evaluated at bedside, no acute events overnight. She continues to be on 2L oxygen through nasal cannula. BP stable, 128/108 mm Hg, /min. Labs and imaging reviewed, K 3.6 replaced, BMP unremarkable, WBC 13, plan for CURT with cardioversion in OR today.        OBJECTIVE:   VITAL SIGNS:  Patient Vitals for the past 8 hrs:   BP Temp Temp src Pulse Resp SpO2 Weight   10/07/21 1100 -- 99.9 °F (37.7 °C) Bladder 97 28 -- --   10/07/21 1000 (!) 128/108 -- -- 114 25 -- --   10/07/21 0957 124/86 99.7 °F (37.6 °C) Bladder 122 25 -- --   10/07/21 0900 -- -- -- 113 30 -- --   10/07/21 4072 -- -- -- -- 24 92 % --   10/07/21 0800 (!) 152/130 99.5 °F (37.5 °C) Bladder 121 29 -- --   10/07/21 0700 137/78 -- -- 97 20 95 % --   10/07/21 0630 -- -- -- 97 22 96 % --   10/07/21 0600 125/88 99.7 °F (37.6 °C) Bladder 106 (!) 34 96 % 256 lb 6.3 oz (116.3 kg)   10/07/21 0530 -- -- -- 100 25 96 % --   10/07/21 0526 -- -- -- 93 29 93 % --   10/07/21 0500 -- -- -- 100 (!) 33 98 % --       Last Body weight:   Wt Readings from Last 3 Encounters:   10/07/21 256 lb 6.3 oz (116.3 kg)   18 218 lb 6.4 oz (99.1 kg)   10/05/17 232 lb (105.2 kg)       Body Mass Index : Body mass index is 44.01 kg/m². Tmax over 24 hours: Temp (24hrs), Av.9 °F (37.7 °C), Min:99.5 °F (37.5 °C), Max:100.4 °F (38 °C)      Ins/Outs: In: 1110 [P.O.:200; I.V.:910]  Out:  [Urine:]    PHYSICAL EXAM:  Constitutional: Awake and alert, well developed and well nourished, in no acute distress, on nasal cannula.   EENT: PERRL, EOMI, sclera clear, anicteric,  no lesions,  Neck: Supple, symmetrical, trachea midline, no adenopathy, no jvd  Respiratory: clear to auscultation, course breath sounds bilateral lowers, diminished bilateral lowers left > right   Cardiovascular: tachycardia and irregular rhythm, no murmur noted and 2+ pulses throughout, pedal edema noted bilaterally  Abdomen: soft, nontender, nondistended, no masses or organomegaly  Extremities:  peripheral pulses normal, no pedal edema, no clubbing or cyanosis    MEDICATIONS:  Scheduled Meds:   potassium chloride  10 mEq IntraVENous Q1H    polyethylene glycol  17 g Oral Daily    dilTIAZem  120 mg Oral Daily    sodium chloride flush  5-40 mL IntraVENous 2 times per day    furosemide  20 mg Oral Daily    metoprolol tartrate  50 mg Oral BID    atorvastatin  20 mg Oral Daily    clopidogrel  75 mg Oral Daily    citalopram  20 mg Oral Daily    sodium chloride flush  5-40 mL IntraVENous 2 times per day    ipratropium-albuterol  1 ampule Inhalation 4x daily       Continuous Infusions:   sodium chloride      sodium chloride Stopped (10/05/21 1925)    sodium chloride      heparin (PORCINE) Infusion 14.5 Units/kg/hr (10/07/21 0448)       PRN Meds:   metoprolol, 5 mg, Q5 Min PRN  metoprolol, 5 mg, Q5 Min PRN  sodium chloride flush, 5-40 mL, PRN  sodium chloride, 25 mL, PRN  sodium chloride flush, 5-40 mL, PRN  sodium chloride, 25 mL, PRN  ondansetron, 4 mg, Q8H PRN   Or  ondansetron, 4 mg, Q6H PRN  polyethylene glycol, 17 g, Daily PRN  acetaminophen, 650 mg, Q6H PRN   Or  acetaminophen, 650 mg, Q6H PRN  potassium chloride, 20 mEq, PRN  magnesium sulfate, 2,000 mg, PRN  albuterol, 2.5 mg, Q4H PRN  heparin (porcine), 4,000 Units, PRN  heparin (porcine), 2,000 Units, PRN        SUPPORT DEVICES: [] Ventilator [] BIPAP  [x] Nasal Cannula [] Room Air      DATA:  Complete Blood Count:   Recent Labs     10/05/21  0523 10/06/21  0313 10/07/21  0411   WBC 12.9* 12.4* 13.0*   RBC 3.79* 4.17 3.99   HGB 11.5* 12.5 12.0   HCT 36.7 39.5 39.4   MCV 96.8 94.7 98.7   MCH 30.3 30.0 30.1   MCHC 31.3 31.6 30.5   RDW 12.5 12.4 12.3    465* 435   MPV 9.1 8.5 8.7        Last 3 Blood Glucose:   Recent Labs     10/05/21  0523 10/06/21  0313 10/07/21  0411   GLUCOSE 108* 120* 107*        PT/INR:    Lab Results   Component Value Date    PROTIME 11.3 10/04/2021    INR 1.1 10/04/2021     PTT:    Lab Results   Component Value Date    APTT 69.1 10/07/2021    APTT 38.1 03/21/2016       Basic Metabolic Profile:   Recent Labs     10/05/21  0523 10/06/21  0313 10/07/21  0411    137 138   K 3.6* 3.9 3.6*    100 99   CO2 23 27 28   BUN 41* 30* 23   CREATININE 0.97* 0.85 0.76   GLUCOSE 108* 120* 107*       Liver Function:  No results for input(s): PROT, LABALBU, ALT, AST, GGT, ALKPHOS, BILITOT in the last 72 hours.     Magnesium:   Lab Results   Component Value Date    MG 2.1 10/07/2021    MG 2.5 10/04/2021    MG 2.2 10/03/2021     Phosphorus:   Lab Results   Component Value Date    PHOS 2.6 03/25/2016 Ionized Calcium:   Lab Results   Component Value Date    CAION 1.13 03/25/2016        Urinalysis:   Lab Results   Component Value Date    NITRU NEGATIVE 10/02/2021    COLORU Yellow 10/02/2021    PHUR 5.0 10/02/2021    LABCAST 4-8 Hyaline 03/22/2016    LABCAST NONE SEEN 03/22/2016    WBCUA 20 TO 50 10/02/2021    RBCUA 5 TO 10 10/02/2021    RBCUA 25-50 03/22/2016    MUCUS NOT REPORTED 10/02/2021    TRICHOMONAS NOT REPORTED 10/02/2021    YEAST NOT REPORTED 10/02/2021    BACTERIA MANY 10/02/2021    SPECGRAV 1.014 10/02/2021    LEUKOCYTESUR MODERATE 10/02/2021    UROBILINOGEN Normal 10/02/2021    BILIRUBINUR NEGATIVE 10/02/2021    BLOODU LARGE 03/22/2016    GLUCOSEU NEGATIVE 10/02/2021    KETUA NEGATIVE 10/02/2021    AMORPHOUS NOT REPORTED 10/02/2021       HgBA1c:  No results found for: LABA1C  TSH:    Lab Results   Component Value Date    TSH 0.35 10/03/2021     Lactic Acid:   Lab Results   Component Value Date    LACTA NOT REPORTED 10/03/2021    LACTA 0.9 03/23/2016      Troponin: No results for input(s): TROPONINI in the last 72 hours. Microbiology:  No new data to review     Radiology/Imaging:  XR CHEST PORTABLE   Final Result   No significant interval change. XR CHEST PORTABLE   Final Result   Interval decrease in the left pleural effusion. No evidence of pneumothorax         US THORACENTESIS Which side should the procedure be performed? Left   Final Result   Successful ultrasound guided thoracentesis. XR CHEST PORTABLE   Final Result   No significant interval change in pleural effusions and associated basilar   opacities, left greater than right. CT CHEST WO CONTRAST   Final Result   1. Moderate left and small right pleural effusions. There is complete   atelectasis of the left lower lobe and partial atelectasis of the right lower   lobe. Superimposed pneumonitis may be considered clinically. 2. Severe cardiomegaly with a moderate pericardial effusion.          XR CHEST PORTABLE Final Result   Findings most consistent with mild congestive heart failure. Left retrocardiac opacification/consolidation may represent a combination of   pleural fluid and airspace disease. Underlying pneumonia not excluded. ASSESSMENT:     Patient Active Problem List    Diagnosis Date Noted    Sepsis with acute hypoxic respiratory failure without septic shock ([address] 10/03/2021    Acute on chronic diastolic heart failure ([address] 10/03/2021    UTI (urinary tract infection) 10/03/2021    Hyponatremia 10/03/2021    Hypokalemia 10/03/2021    Elevated troponin 10/03/2021    Atrial fibrillation with RVR ([address] 10/02/2021    Pneumonia of left lower lobe due to infectious organism 10/02/2021    Colon polyps 06/02/2021    Anemia 09/20/2018    Arthritis 09/19/2018    At high risk for falls 09/19/2018    Heart murmur 09/19/2018    Legally blind in right eye, as defined in [place] 09/19/2018    COPD (chronic obstructive pulmonary disease) ([address] 09/19/2018    Simple chronic bronchitis (HCC)     Acute respiratory failure with hypoxia (HCC)     Postoperative confusion     Pulmonary emphysema ([address]     Open bimalleolar fracture of left ankle 03/22/2016    Hyperlipidemia     COPD exacerbation (HCC)     Benign essential HTN 03/04/2016    Morbid obesity with BMI of 40.0-44.9, adult ([address] 03/04/2016    Asthma         PLAN:       PLAN/MEDICAL DECISION MAKING:    Acute hypoxic respiratory failure requiring BiPAP - resolved     - CHF   - New onset A. fib with RVR. - left sided pleural effusion s/p transudative thoracentesis  - Continue BiPAP at night prn, NC throughout the day as tolerated. - Currently off antibiotics.      A. fib with RVR- new onset  - Cardizem  mg daily Metoprolol 50 mg twice daily.    - Continue heparin gtt per cardiology   - Plan for CURT/CV later today under anesthesia  - Depending on conversion we will plan for transfer to step down      Left lower lobe pneumonia with pleural effusion  - S/P thoracentesis - s/o transudative effusion.   - CXR showing left sided pleural effusion   - No concern for infection at this time. - Follow up on final cultures.      Asymptomatic UTI-  - Completed course of antibiotics    Pericardial effusion - stable      COPD  - Continue bronchodilators, continue supplemental oxygen.      CHF  - Continue home dose of lasix. - Continue lopressor, lipitor, plavix.      - Urinary retention   - S/P goldstein catheter placement.    - monitor I/O's    DVT ppx: Heparin   GI ppx: pepcid 20 mg bid     CODE STATUS: Full Code    DISPOSITION:  [] To remain ICU:   [x] OK for out of ICU from [address] standpoint    John Douglass MD      Department of Internal Medicine  [facility]         10/7/2021, 1:12 PM

## 2021-10-07 NOTE — ANESTHESIA PRE PROCEDURE
Department of Anesthesiology  Preprocedure Note       Name:  Shen Ortiz   Age:  68 y.o.  :  1945                                          MRN:  [id number]         Date:  10/7/2021      Surgeon: * No surgeons listed *    Procedure: * No procedures listed *    Medications prior to admission:   Prior to Admission medications    Medication Sig Start Date End Date Taking? Authorizing Provider   losartan (COZAAR) 100 MG tablet Take 1 tablet by mouth daily 18   Lindwood Rubinstein, MD   predniSONE (DELTASONE) 20 MG tablet Take 1 tab BID x 5 days, then 1 tab daily x 5 days. Take with food. 18   Lindwood Rubinstein, MD   meloxicam (MOBIC) 15 MG tablet Take 15 mg by mouth daily    Historical Provider, MD   atorvastatin (LIPITOR) 20 MG tablet Take 1 tablet by mouth daily 10/5/17   Lindwood Rubinstein, MD   metoprolol tartrate (LOPRESSOR) 50 MG tablet TAKE ONE TABLET BY MOUTH TWICE DAILY 10/5/17   Lindwood Rubinstein, MD   chlorthalidone (HYGROTON) 25 MG tablet Take 1 tablet by mouth daily 10/5/17   Lindwood Rubinstein, MD   clopidogrel (PLAVIX) 75 MG tablet Take 1 tablet by mouth daily 10/5/17   Lindwood Rubinstein, MD   cyclobenzaprine (FLEXERIL) 10 MG tablet TAKE ONE TABLET BY MOUTH THREE TIMES DAILY AS NEEDED FOR MUSCLE SPASM 10/5/17   Lindwood Rubinstein, MD   diphenhydrAMINE (BENADRYL) 25 MG capsule Take 25 mg by mouth every 6 hours as needed for Itching    Historical Provider, MD   zinc oxide (DESITIN) 40 % ointment Apply topically as needed for Dry Skin Apply topically as needed.     Historical Provider, MD   furosemide (LASIX) 20 MG tablet Take 1 tablet by mouth daily 17   Lindwood Rubinstein, MD   isosorbide mononitrate (IMDUR) 30 MG extended release tablet Take 1 tablet by mouth daily 17   Lindwood Rubinstein, MD   albuterol sulfate  (90 Base) MCG/ACT inhaler Inhale 2 puffs into the lungs every 6 hours as needed for Wheezing 17   Lindwood Rubinstein, MD   Calcium-Magnesium-Zinc [facility] PO) Take by mouth daily    Historical Provider, MD   Calcium Carb-Cholecalciferol (CALCIUM + D3 PO) Take by mouth daily    Historical Provider, MD   CINNAMON PO Take by mouth daily    Historical Provider, MD   TURMERIC CURCUMIN PO Take by mouth daily    Historical Provider, MD   ipratropium-albuterol (DUONEB) 0.5-2.5 (3) MG/3ML SOLN nebulizer solution Inhale 1 vial into the lungs every 4 hours    Historical Provider, MD   beclomethasone (QVAR) 80 MCG/ACT inhaler Inhale 2 puffs into the lungs 2 times daily 6/17/16   Jakub Tanner MD   Acetaminophen 650 MG TABS Take 650 mg by mouth every 4 hours as needed for Pain or Fever 4/4/16   Akira Cotton MD   clonazePAM (KLONOPIN) 1 MG tablet Take 1 mg by mouth nightly     Historical Provider, MD   citalopram (CELEXA) 40 MG tablet Take 40 mg by mouth daily as needed    Historical Provider, MD   LYSINE PO Take by mouth daily    Historical Provider, MD   vitamin D (CHOLECALCIFEROL) 1000 UNIT TABS tablet Take 1,000 Units by mouth daily    Historical Provider, MD   Pyridoxine HCl (VITAMIN B-6) 50 MG tablet Take 100 mg by mouth daily    Historical Provider, MD   Cyanocobalamin (VITAMIN B 12 PO) Take by mouth daily    Historical Provider, MD   Magnesium 500 MG TABS Take by mouth    Historical Provider, MD   Ascorbic Acid (VITAMIN C) 500 MG tablet Take 500 mg by mouth daily    Historical Provider, MD       Current medications:    Current Facility-Administered Medications   Medication Dose Route Frequency Provider Last Rate Last Admin    potassium chloride 10 mEq/100 mL IVPB (Peripheral Line)  10 mEq IntraVENous Q1H Geronimo Holm  mL/hr at 10/07/21 1252 10 mEq at 10/07/21 1252    metoprolol (LOPRESSOR) injection 5 mg  5 mg IntraVENous Q5 Min PRN Noah Singh MD   5 mg at 10/06/21 7037    metoprolol (LOPRESSOR) injection 5 mg  5 mg IntraVENous Q5 Min PRN Noah Singh MD   5 mg at 10/07/21 4170    polyethylene glycol (GLYCOLAX) packet 17 g  17 g Oral Daily Katy Tylerkimberlee Rosas, DO   17 g at 10/05/21 0739    dilTIAZem (CARDIZEM CD) extended release capsule 120 mg  120 mg Oral Daily Karon Berg MD   120 mg at 10/07/21 0859    sodium chloride flush 0.9 % injection 5-40 mL  5-40 mL IntraVENous 2 times per day Pam Gonzalez MD   10 mL at 10/07/21 0901    sodium chloride flush 0.9 % injection 5-40 mL  5-40 mL IntraVENous PRN Pam Gonzalez MD        0.9 % sodium chloride infusion  25 mL IntraVENous PRN Pam Gonzalez MD        furosemide (LASIX) tablet 20 mg  20 mg Oral Daily Katy Becker DO   20 mg at 10/07/21 0900    metoprolol tartrate (LOPRESSOR) tablet 50 mg  50 mg Oral BID Katy Becker DO   50 mg at 10/07/21 0900    atorvastatin (LIPITOR) tablet 20 mg  20 mg Oral Daily Steve Castellon MD   20 mg at 10/07/21 0858    clopidogrel (PLAVIX) tablet 75 mg  75 mg Oral Daily Steve Castellon MD   75 mg at 10/07/21 0859    0.9 % sodium chloride infusion   IntraVENous Continuous Medina Olivares DO   Stopped at 10/05/21 1925    citalopram (CELEXA) tablet 20 mg  20 mg Oral Daily Steve Castellon MD   20 mg at 10/07/21 0858    sodium chloride flush 0.9 % injection 5-40 mL  5-40 mL IntraVENous 2 times per day Steve Castellon MD   10 mL at 10/04/21 0806    sodium chloride flush 0.9 % injection 5-40 mL  5-40 mL IntraVENous PRN Steve Castellon MD        0.9 % sodium chloride infusion  25 mL IntraVENous PRN Steve Castellon MD        ondansetron (ZOFRAN-ODT) disintegrating tablet 4 mg  4 mg Oral Q8H PRN Steve Castellon MD   4 mg at 10/05/21 2311    Or    ondansetron (ZOFRAN) injection 4 mg  4 mg IntraVENous Q6H PRN Steve Castellon MD   4 mg at 10/06/21 1143    polyethylene glycol (GLYCOLAX) packet 17 g  17 g Oral Daily PRN Steve Castellon MD        acetaminophen (TYLENOL) tablet 650 mg  650 mg Oral Q6H PRN Steve Castellon MD   650 mg at 10/05/21 0739    Or    acetaminophen (TYLENOL) suppository 650 mg  650 mg Rectal Q6H PRN Steve Castellon MD        potassium chloride 20 mEq/50 mL IVPB (Central Line)  20 mEq IntraVENous PRN Allison Matos MD        magnesium sulfate 2000 mg in 50 mL IVPB premix  2,000 mg IntraVENous PRN Allison Matos MD        albuterol (PROVENTIL) nebulizer solution 2.5 mg  2.5 mg Nebulization Q4H PRN Allison Matos MD        ipratropium-albuterol (DUONEB) nebulizer solution 1 ampule  1 ampule Inhalation 4x daily Allison Matos MD   1 ampule at 10/07/21 1212    heparin (porcine) injection 4,000 Units  4,000 Units IntraVENous PRN Allison Matos MD   4,000 Units at 10/03/21 0142    heparin (porcine) injection 2,000 Units  2,000 Units IntraVENous PRN Allison Matos MD   2,000 Units at 10/03/21 2117    heparin 25,000 units in dextrose 5% 250 mL (premix) infusion  5-30 Units/kg/hr IntraVENous Continuous Allison Matos MD 17.1 mL/hr at 10/07/21 0448 14.5 Units/kg/hr at 10/07/21 0448       Allergies:     Allergies   Allergen Reactions    Latex Hives     Hives from rubber gloves    Betadine [Povidone Iodine] Hives    Bee Venom Swelling    Dilaudid [Hydromorphone Hcl] Other (See Comments)     Severe confusion    Adhesive Tape Rash    Sulfa Antibiotics Rash       Problem List:    Patient Active Problem List   Diagnosis Code    Asthma J45.909    Benign essential HTN I10    Morbid obesity with BMI of 40.0-44.9, adult ([facility]) E66.01, Z68.41    Open bimalleolar fracture of left ankle S82.842B    Hyperlipidemia E78.5    COPD exacerbation ([facility]) J44.1    Pulmonary emphysema ([facility]) J43.9    Postoperative confusion R41.0    Acute respiratory failure with hypoxia ([facility]) J96.01    Simple chronic bronchitis ([facility]) J41.0    Atrial fibrillation with RVR ([facility]) I48.91    Pneumonia of left lower lobe due to infectious organism J18.9    Sepsis with acute hypoxic respiratory failure without septic shock ([facility]) A41.9, R65.20, J96.01    Acute on chronic diastolic heart failure ([facility]) I50.33    UTI (urinary tract infection) N39.0    Hyponatremia E87.1    Hypokalemia E87.6    Elevated troponin R77.8    Anemia D64.9  Arthritis M19.90    At high risk for falls Z91.81    Colon polyps K63.5    Heart murmur R01.1    Legally blind in right eye, as defined in [place] H54.8    COPD (chronic obstructive pulmonary disease) ([address] J44.9       Past Medical History:        Diagnosis Date    Asthma     COPD (chronic obstructive pulmonary disease) (HCC)     Gout     Hyperlipidemia     Hypertension     Mitral insufficiency     Pneumonia        Past Surgical History:        Procedure Laterality Date    ANKLE SURGERY Left 03/22/2016    ORIF    COLONOSCOPY      DENTAL SURGERY      HYSTERECTOMY  1986    KNEE SURGERY      NM COLSC FLX W/RMVL OF TUMOR POLYP LESION SNARE TQ N/A 8/8/2017    COLONOSCOPY POLYPECTOMY SNARE/COLD BIOPSY performed by Shara Dodson MD at [place] [address]         Social History:    Social History     Tobacco Use    Smoking status: Former Smoker    Smokeless tobacco: Never Used    Tobacco comment: over 30 years ago    Substance Use Topics    Alcohol use: Yes     Alcohol/week: 0.0 standard drinks     Comment: occasionally                                 Counseling given: Not Answered  Comment: over 30 years ago       Vital Signs (Current):   Vitals:    10/07/21 0900 10/07/21 0957 10/07/21 1000 10/07/21 1100   BP:  124/86 (!) 128/108    Pulse: 113 122 114 97   Resp: 30 25 25 28   Temp:  99.7 °F (37.6 °C)  99.9 °F (37.7 °C)   TempSrc:  Bladder  Bladder   SpO2:       Weight:       Height:                                                  BP Readings from Last 3 Encounters:   10/07/21 (!) 128/108   02/27/18 120/64   10/05/17 130/70       NPO Status:                                                                                 BMI:   Wt Readings from Last 3 Encounters:   10/07/21 256 lb 6.3 oz (116.3 kg)   02/27/18 218 lb 6.4 oz (99.1 kg)   10/05/17 232 lb (105.2 kg)     Body mass index is 44.01 kg/m².     CBC:   Lab Results   Component Value Date    WBC 13.0 10/07/2021    RBC 3.99 10/07/2021    RBC 3.94 06/02/2016    HGB 12.0 10/07/2021    HCT 39.4 10/07/2021    MCV 98.7 10/07/2021    RDW 12.3 10/07/2021     10/07/2021       CMP:   Lab Results   Component Value Date     10/07/2021    K 3.6 10/07/2021    CL 99 10/07/2021    CO2 28 10/07/2021    BUN 23 10/07/2021    CREATININE 0.76 10/07/2021    GFRAA >60 10/07/2021    AGRATIO 2.2 05/09/2016    LABGLOM >60 10/07/2021    LABGLOM >90 03/30/2016    GLUCOSE 107 10/07/2021    GLUCOSE 97 05/09/2016    PROT 6.0 10/04/2021    CALCIUM 9.2 10/07/2021    BILITOT 0.42 10/04/2021    ALKPHOS 56 10/04/2021    AST 83 10/04/2021    ALT 38 10/04/2021       POC Tests: No results for input(s): POCGLU, POCNA, POCK, POCCL, POCBUN, POCHEMO, POCHCT in the last 72 hours.     Coags:   Lab Results   Component Value Date    PROTIME 11.3 10/04/2021    INR 1.1 10/04/2021    APTT 69.1 10/07/2021    APTT 38.1 03/21/2016       HCG (If Applicable): No results found for: PREGTESTUR, PREGSERUM, HCG, HCGQUANT     ABGs: No results found for: PHART, PO2ART, YFF6XOM, KTQ8ZWM, BEART, [id number]     Type & Screen (If Applicable):  Lab Results   Component Value Date    LABRH POS 03/21/2016       Drug/Infectious Status (If Applicable):  No results found for: HIV, HEPCAB    COVID-19 Screening (If Applicable):   Lab Results   Component Value Date    COVID19 Not Detected 10/03/2021           Anesthesia Evaluation  Patient summary reviewed no history of anesthetic complications:   Airway: Mallampati: III  TM distance: >3 FB   Neck ROM: full  Mouth opening: > = 3 FB Dental:          Pulmonary:normal exam    (+) COPD: no interval change,  asthma: seasonal asthma,     (-) pneumonia                           Cardiovascular:    (+) hypertension: no interval change, dysrhythmias: atrial fibrillation,       ECG reviewed  Rhythm: irregular  Rate: abnormal  Echocardiogram reviewed                  Neuro/Psych:   (+) psychiatric history:depression/anxiety             GI/Hepatic/Renal: (+) morbid obesity          Endo/Other: Negative Endo/Other ROS                    Abdominal:   (+) obese,           Vascular: negative vascular ROS. Other Findings:             Anesthesia Plan      MAC     ASA 4       Induction: intravenous. Anesthetic plan and risks discussed with patient. Plan discussed with CRNA.                   Lam Arreola MD   10/7/2021

## 2021-10-08 LAB
ANION GAP SERPL CALCULATED.3IONS-SCNC: 12 MMOL/L (ref 9–17)
BUN BLDV-MCNC: 19 MG/DL (ref 8–23)
BUN/CREAT BLD: ABNORMAL (ref 9–20)
CALCIUM SERPL-MCNC: 9.4 MG/DL (ref 8.6–10.4)
CHLORIDE BLD-SCNC: 98 MMOL/L (ref 98–107)
CO2: 28 MMOL/L (ref 20–31)
CREAT SERPL-MCNC: 0.75 MG/DL (ref 0.5–0.9)
CULTURE: NORMAL
EKG ATRIAL RATE: 468 BPM
EKG Q-T INTERVAL: 354 MS
EKG QRS DURATION: 118 MS
EKG QTC CALCULATION (BAZETT): 463 MS
EKG R AXIS: -37 DEGREES
EKG T AXIS: 132 DEGREES
EKG VENTRICULAR RATE: 103 BPM
GFR AFRICAN AMERICAN: >60 ML/MIN
GFR NON-AFRICAN AMERICAN: >60 ML/MIN
GFR SERPL CREATININE-BSD FRML MDRD: ABNORMAL ML/MIN/{1.73_M2}
GFR SERPL CREATININE-BSD FRML MDRD: ABNORMAL ML/MIN/{1.73_M2}
GLUCOSE BLD-MCNC: 112 MG/DL (ref 70–99)
HCT VFR BLD CALC: 42.2 % (ref 36.3–47.1)
HEMOGLOBIN: 13 G/DL (ref 11.9–15.1)
Lab: NORMAL
MCH RBC QN AUTO: 30.4 PG (ref 25.2–33.5)
MCHC RBC AUTO-ENTMCNC: 30.8 G/DL (ref 28.4–34.8)
MCV RBC AUTO: 98.8 FL (ref 82.6–102.9)
NRBC AUTOMATED: 0 PER 100 WBC
PARTIAL THROMBOPLASTIN TIME: 70.1 SEC (ref 20.5–30.5)
PDW BLD-RTO: 12.3 % (ref 11.8–14.4)
PLATELET # BLD: 442 K/UL (ref 138–453)
PMV BLD AUTO: 8.8 FL (ref 8.1–13.5)
POTASSIUM SERPL-SCNC: 3.7 MMOL/L (ref 3.7–5.3)
RBC # BLD: 4.27 M/UL (ref 3.95–5.11)
SARS-COV-2, RAPID: NOT DETECTED
SODIUM BLD-SCNC: 138 MMOL/L (ref 135–144)
SPECIMEN DESCRIPTION: NORMAL
SPECIMEN DESCRIPTION: NORMAL
WBC # BLD: 17.1 K/UL (ref 3.5–11.3)

## 2021-10-08 PROCEDURE — 6370000000 HC RX 637 (ALT 250 FOR IP): Performed by: NURSE PRACTITIONER

## 2021-10-08 PROCEDURE — 87635 SARS-COV-2 COVID-19 AMP PRB: CPT

## 2021-10-08 PROCEDURE — 93005 ELECTROCARDIOGRAM TRACING: CPT

## 2021-10-08 PROCEDURE — 94640 AIRWAY INHALATION TREATMENT: CPT

## 2021-10-08 PROCEDURE — 99232 SBSQ HOSP IP/OBS MODERATE 35: CPT | Performed by: INTERNAL MEDICINE

## 2021-10-08 PROCEDURE — 1200000000 HC SEMI PRIVATE

## 2021-10-08 PROCEDURE — 99233 SBSQ HOSP IP/OBS HIGH 50: CPT | Performed by: INTERNAL MEDICINE

## 2021-10-08 PROCEDURE — 85730 THROMBOPLASTIN TIME PARTIAL: CPT

## 2021-10-08 PROCEDURE — 6360000002 HC RX W HCPCS: Performed by: STUDENT IN AN ORGANIZED HEALTH CARE EDUCATION/TRAINING PROGRAM

## 2021-10-08 PROCEDURE — 6370000000 HC RX 637 (ALT 250 FOR IP): Performed by: INTERNAL MEDICINE

## 2021-10-08 PROCEDURE — 85027 COMPLETE CBC AUTOMATED: CPT

## 2021-10-08 PROCEDURE — 6370000000 HC RX 637 (ALT 250 FOR IP): Performed by: STUDENT IN AN ORGANIZED HEALTH CARE EDUCATION/TRAINING PROGRAM

## 2021-10-08 PROCEDURE — 93010 ELECTROCARDIOGRAM REPORT: CPT | Performed by: INTERNAL MEDICINE

## 2021-10-08 PROCEDURE — 80048 BASIC METABOLIC PNL TOTAL CA: CPT

## 2021-10-08 PROCEDURE — 2700000000 HC OXYGEN THERAPY PER DAY

## 2021-10-08 PROCEDURE — 2500000003 HC RX 250 WO HCPCS: Performed by: STUDENT IN AN ORGANIZED HEALTH CARE EDUCATION/TRAINING PROGRAM

## 2021-10-08 PROCEDURE — 97530 THERAPEUTIC ACTIVITIES: CPT

## 2021-10-08 PROCEDURE — 97116 GAIT TRAINING THERAPY: CPT

## 2021-10-08 PROCEDURE — 2580000003 HC RX 258

## 2021-10-08 PROCEDURE — 36415 COLL VENOUS BLD VENIPUNCTURE: CPT

## 2021-10-08 PROCEDURE — 2580000003 HC RX 258: Performed by: STUDENT IN AN ORGANIZED HEALTH CARE EDUCATION/TRAINING PROGRAM

## 2021-10-08 PROCEDURE — 97110 THERAPEUTIC EXERCISES: CPT

## 2021-10-08 RX ORDER — CITALOPRAM 40 MG/1
20 TABLET ORAL DAILY
Qty: 30 TABLET | Refills: 3 | Status: SHIPPED | OUTPATIENT
Start: 2021-10-08

## 2021-10-08 RX ORDER — DILTIAZEM HYDROCHLORIDE 120 MG/1
120 CAPSULE, COATED, EXTENDED RELEASE ORAL DAILY
Qty: 30 CAPSULE | Refills: 3 | Status: SHIPPED | OUTPATIENT
Start: 2021-10-09 | End: 2021-10-10

## 2021-10-08 RX ORDER — ATORVASTATIN CALCIUM 40 MG/1
40 TABLET, FILM COATED ORAL DAILY
Qty: 90 TABLET | Refills: 3 | Status: SHIPPED | OUTPATIENT
Start: 2021-10-08

## 2021-10-08 RX ORDER — 0.9 % SODIUM CHLORIDE 0.9 %
250 INTRAVENOUS SOLUTION INTRAVENOUS ONCE
Status: COMPLETED | OUTPATIENT
Start: 2021-10-08 | End: 2021-10-08

## 2021-10-08 RX ADMIN — METOPROLOL TARTRATE 5 MG: 1 INJECTION, SOLUTION INTRAVENOUS at 19:17

## 2021-10-08 RX ADMIN — METOPROLOL TARTRATE 50 MG: 50 TABLET, FILM COATED ORAL at 19:25

## 2021-10-08 RX ADMIN — CITALOPRAM 20 MG: 20 TABLET, FILM COATED ORAL at 09:32

## 2021-10-08 RX ADMIN — ATORVASTATIN CALCIUM 20 MG: 20 TABLET, FILM COATED ORAL at 09:32

## 2021-10-08 RX ADMIN — SODIUM CHLORIDE 250 ML: 9 INJECTION, SOLUTION INTRAVENOUS at 19:36

## 2021-10-08 RX ADMIN — APIXABAN 5 MG: 5 TABLET, FILM COATED ORAL at 21:54

## 2021-10-08 RX ADMIN — DILTIAZEM HYDROCHLORIDE 120 MG: 120 CAPSULE, COATED, EXTENDED RELEASE ORAL at 09:32

## 2021-10-08 RX ADMIN — IPRATROPIUM BROMIDE AND ALBUTEROL SULFATE 1 AMPULE: .5; 2.5 SOLUTION RESPIRATORY (INHALATION) at 12:14

## 2021-10-08 RX ADMIN — SODIUM CHLORIDE, PRESERVATIVE FREE 10 ML: 5 INJECTION INTRAVENOUS at 21:55

## 2021-10-08 RX ADMIN — POLYETHYLENE GLYCOL 3350 17 G: 17 POWDER, FOR SOLUTION ORAL at 09:32

## 2021-10-08 RX ADMIN — IPRATROPIUM BROMIDE AND ALBUTEROL SULFATE 1 AMPULE: .5; 2.5 SOLUTION RESPIRATORY (INHALATION) at 08:12

## 2021-10-08 RX ADMIN — FUROSEMIDE 20 MG: 20 TABLET ORAL at 09:32

## 2021-10-08 RX ADMIN — HEPARIN SODIUM AND DEXTROSE 14.67 UNITS/KG/HR: 10000; 5 INJECTION INTRAVENOUS at 06:59

## 2021-10-08 RX ADMIN — CLOPIDOGREL 75 MG: 75 TABLET, FILM COATED ORAL at 09:32

## 2021-10-08 RX ADMIN — APIXABAN 5 MG: 5 TABLET, FILM COATED ORAL at 14:20

## 2021-10-08 RX ADMIN — METOPROLOL TARTRATE 5 MG: 1 INJECTION, SOLUTION INTRAVENOUS at 19:04

## 2021-10-08 RX ADMIN — IPRATROPIUM BROMIDE AND ALBUTEROL SULFATE 1 AMPULE: .5; 2.5 SOLUTION RESPIRATORY (INHALATION) at 21:04

## 2021-10-08 RX ADMIN — IPRATROPIUM BROMIDE AND ALBUTEROL SULFATE 1 AMPULE: .5; 2.5 SOLUTION RESPIRATORY (INHALATION) at 15:49

## 2021-10-08 RX ADMIN — METOPROLOL TARTRATE 50 MG: 50 TABLET, FILM COATED ORAL at 09:32

## 2021-10-08 ASSESSMENT — PAIN SCALES - GENERAL: PAINLEVEL_OUTOF10: 0

## 2021-10-08 NOTE — PROGRESS NOTES
[facility]. Atrial fibrillation with rapid ventricular response (HCC) [I48.91]  Atrial fibrillation with RVR (HCC) [I48.91]  Chronic obstructive pulmonary disease with acute exacerbation (HCC) [J44.1]  Pneumonia due to infectious organism, unspecified laterality, unspecified part of lung [J18.9] . Vitals:    10/07/21 2025   BP: (!) 157/88   Pulse: 80   Resp: 20   Temp: 98.6 °F (37 °C)   SpO2: 94%   . Patients home meds are   Prior to Admission medications    Medication Sig Start Date End Date Taking? Authorizing Provider   losartan (COZAAR) 100 MG tablet Take 1 tablet by mouth daily 8/28/18   Brittney Ferreira MD   predniSONE (DELTASONE) 20 MG tablet Take 1 tab BID x 5 days, then 1 tab daily x 5 days. Take with food. 2/27/18   Brittney Ferreira MD   meloxicam (MOBIC) 15 MG tablet Take 15 mg by mouth daily    Historical Provider, MD   atorvastatin (LIPITOR) 20 MG tablet Take 1 tablet by mouth daily 10/5/17   Brittney Ferreira MD   metoprolol tartrate (LOPRESSOR) 50 MG tablet TAKE ONE TABLET BY MOUTH TWICE DAILY 10/5/17   Brittney Ferreira MD   chlorthalidone (HYGROTON) 25 MG tablet Take 1 tablet by mouth daily 10/5/17   Brittney Ferreira MD   clopidogrel (PLAVIX) 75 MG tablet Take 1 tablet by mouth daily 10/5/17   Brittney Ferreira MD   cyclobenzaprine (FLEXERIL) 10 MG tablet TAKE ONE TABLET BY MOUTH THREE TIMES DAILY AS NEEDED FOR MUSCLE SPASM 10/5/17   Brittney Ferreira MD   diphenhydrAMINE (BENADRYL) 25 MG capsule Take 25 mg by mouth every 6 hours as needed for Itching    Historical Provider, MD   zinc oxide (DESITIN) 40 % ointment Apply topically as needed for Dry Skin Apply topically as needed.     Historical Provider, MD   furosemide (LASIX) 20 MG tablet Take 1 tablet by mouth daily 7/14/17   Brittney Ferreira MD   isosorbide mononitrate (IMDUR) 30 MG extended release tablet Take 1 tablet by mouth daily 7/14/17   Brittney Ferreira MD   albuterol sulfate  (90 Base) MCG/ACT inhaler Inhale 2 puffs into the lungs every 6 hours as needed for Wheezing 7/14/17   Narda Evans MD   Calcium-Magnesium-Zinc (FLAKITO-MAG-ZINC PO) Take by mouth daily    Historical Provider, MD   Calcium Carb-Cholecalciferol (CALCIUM + D3 PO) Take by mouth daily    Historical Provider, MD   CINNAMON PO Take by mouth daily    Historical Provider, MD   TURMERIC CURCUMIN PO Take by mouth daily    Historical Provider, MD   ipratropium-albuterol (DUONEB) 0.5-2.5 (3) MG/3ML SOLN nebulizer solution Inhale 1 vial into the lungs every 4 hours    Historical Provider, MD   beclomethasone (QVAR) 80 MCG/ACT inhaler Inhale 2 puffs into the lungs 2 times daily 6/17/16   Narda Evans MD   Acetaminophen 650 MG TABS Take 650 mg by mouth every 4 hours as needed for Pain or Fever 4/4/16   Vu Mcgregor MD   clonazePAM (KLONOPIN) 1 MG tablet Take 1 mg by mouth nightly     Historical Provider, MD   citalopram (CELEXA) 40 MG tablet Take 40 mg by mouth daily as needed    Historical Provider, MD   LYSINE PO Take by mouth daily    Historical Provider, MD   vitamin D (CHOLECALCIFEROL) 1000 UNIT TABS tablet Take 1,000 Units by mouth daily    Historical Provider, MD   Pyridoxine HCl (VITAMIN B-6) 50 MG tablet Take 100 mg by mouth daily    Historical Provider, MD   Cyanocobalamin (VITAMIN B 12 PO) Take by mouth daily    Historical Provider, MD   Magnesium 500 MG TABS Take by mouth    Historical Provider, MD   Ascorbic Acid (VITAMIN C) 500 MG tablet Take 500 mg by mouth daily    Historical Provider, MD   .  Recent Surgical History: None = 0     Assessment       RR 20  Breath Sounds: clear and dimnished      Bronchodilator assessment at level  0  Home meds QID  Hyperinflation assessment at level   Secretion Management assessment at level      []    Bronchodilator Assessment  BRONCHODILATOR ASSESSMENT SCORE  Score 0 1 2 3 4 5   Breath Sounds   [x]  Patient Baseline []  No Wheeze good aeration []  Faint, scattered wheezing, good aeration []  Expiratory Wheezing and or moderately diminished []  Insp/Exp wheeze and/or very diminished []  Insp/Exp and/ or marked distress   Respiratory Rate   [x]  Patient Baseline []  Less than 20 []  Less than 20 []  20-25 []  Greater than 25 []  Greater than 25   Peak flow % of Pred or PB []  NA   []  Greater than 90%  []  81-90% []  71-80% []  Less than or equal to 70%  or unable to perform []  Unable due to Respiratory Distress   Dyspnea re [x]  Patient Baseline []  No SOB []  No SOB []  SOB on exertion []  SOB min activity []  At rest/acute   e FEV% Predicted       []  NA []  Above 69%  []  Unable []  Above 60-69%  []  Unable []  Above 50-59%  []  Unable []  Above 35-49%  []  Unable []  Less than 35%  []  Unable                 []  Hyperinflation Assessment  Score 1 2 3   CXR and Breath Sounds   []  Clear []  No atelectasis  Basilar aeration []  Atelectasis or absent basilar breath sounds   Incentive Spirometry Volume  (Per IBW)   []  Greater than or equal to 15ml/Kg []  less than 15ml/Kg []  less than 15ml/Kg   Surgery within last 2 weeks []  None or general   []  Abdominal or thoracic surgery  []  Abdominal or thoracic   Chronic Pulmonary Historyre []  No []  Yes []  Yes     []  Secretion Management Assessment  Score 1 2 3   Bilateral Breath Sounds   []  Occasional Rhonchi []  Scattered Rhonchi []  Course Rhonchi and/or poor aeration   Sputum    []  Small amount of thin secretions []  Moderate amount of viscous secretions []  Copius, Viscious Yellow/ Secretions   CXR as reported by physician []  clear  []  Unavailable []  Infiltrates and/or consolidation  []  Unavailable []  Mucus Plugging and or lobar consolidation  []  Unavailable   Cough []  Strong, productive cough []  Weak productive cough []  No cough or weak non-productive cough   RAMIRO CONCEPCION RCP  8:15 AM                            FEMALE                                  MALE                            FEV1 Predicted Normal Values                        FEV1 Predicted Normal Values          Age                                     Height in Feet and Inches       Age                                     Height in Feet and Inches       4' 11\" 5' 1\" 5' 3\" 5' 5\" 5' 7\" 5' 9\" 5' 11\" 6' 1\"  4' 11\" 5' 1\" 5' 3\" 5' 5\" 5' 7\" 5' 9\" 5' 11\" 6' 1\"   42 - 45 2.49 2.66 2.84 3.03 3.22 3.42 3.62 3.83 42 - 45 2.82 3.03 3.26 3.49 3.72 3.96 4.22 4.47   46 - 49 2.40 2.57 2.76 2.94 3.14 3.33 3.54 3.75 46 - 49 2.70 2.92 3.14 3.37 3.61 3.85 4.10 4.36   50 - 53 2.31 2.48 2.66 2.85 3.04 3.24 3.45 3.66 50 - 53 2.58 2.80 3.02 3.25 3.49 3.73 3.98 4.24   54 - 57 2.21 2.38 2.57 2.75 2.95 3.14 3.35 3.56 54 - 57 2.46 2.67 2.89 3.12 3.36 3.60 3.85 4.11   58 - 61 2.10 2.28 2.46 2.65 2.84 3.04 3.24 3.45 58 - 61 2.32 2.54 2.76 2.99 3.23 3.47 3.72 3.98   62 - 65 1.99 2.17 2.35 2.54 2.73 2.93 3.13 3.34 62 - 65 2.19 2.40 2.62 2.85 3.09 3.33 3.58 3.84   66 - 69 1.88 2.05 2.23 2.42 2.61 2.81 3.02 3.23 66 - 69 2.04 2.26 2.48 2.71 2.95 3.19 3.44 3.70   70+ 1.82 1.99 2.17 2.36 2.55 2.75 2.95 3.16 70+ 1.97 2.19 2.41 2.64 2.87 3.12 3.37 3.62             Predicted Peak Expiratory Flow Rate                                       Height (in)  Female       Height (in) Male           Age 23 11 69 10 51 57 78 74 Age            58 268 085 309 087 060 316 242 108  51 48 92 01 13 54 46 31 76   25 337 352 366 381 396 411 426 441 25 447 476 [phone] [phone]   30 329 344 359 374 389 404 419 434 30 437 466 494 523 552 [phone] 667   35 322 337 351 366 381 396 411 426 35 426 455 484 512 541 [phone]   40 314 329 344 359 374 389 404 419 40 416 445 473 502 531 559 588 617 647   45 307 322 336 351 366 381 396 411 45 405 434 463 491 520 549 577 606 636   50 299 314 329 344 359 374 389 404 50 395 424 452 481 510 538 567 596 625   55 292 307 321 336 351 366 381 396 55 384 413 442 470 499 528 556 585 615   60 284 299 314 329 344 359 374 389 60 374 403 431 460 489 517 546 575 605   65 277 292 306 321 336 351 366 381 65 363 392 421 449 478 507 535 564 594   70 269 284 299 314 329 344 359 374 70 353 382 410 439 468 496 525 554 583   75 261 274 289 305 319 334 348 364 75 344 372 400 429 458 487 515 544 573   80 253 266 282 296 312 327 342 356 80 335 362 390 419 448 476 [phone]

## 2021-10-08 NOTE — PROGRESS NOTES
[facility] -Progress Note    Today's Date and Time: 10/8/2021, 9:38 AM    Impression :   Concern for pneumonia  Concern for sepsis  Hypotension   Leukocytosis  Acute Afib RVR  CHF exacerbation  COPD  CASEY  Essential HN  Sulfa allergy  UTI  Lt pleural effusion  S/P Lt thoracentesis with removal of 350 cc fluid    Recommendations:   Monitor off antibiotics:  D/C Vancomycin  Completed cefepime, culture data: No growth . Stop date 10/6/2021. ID will sign off    Medical Decision Making/Summary/Discussion:10/8/2021     Patient evaluated  Patient is acutely ill. Most likely cause is cardiac decompensation with associated pleural effusion. I don't really see a pneumonia by CXR or CT but will maintain Cefepime pending culture data and clinical evolution. S/P Lt thoracentesis with removal of 350 cc fluid 10-4-21    Infection Control Recommendations   [place] Precautions      Antimicrobial Stewardship Recommendations     Simplification of therapy  Targeted therapy  Renal considerations    Coordination of Outpatient Care:   Estimated Length of IV antimicrobials:TBD  Patient will need Midline Catheter Insertion: No  Patient will need PICC line Insertion:No  Patient will need: Home IV , [place],  SNF,  LTAC: TBD  Patient will need outpatient wound care:No    Chief complaint/reason for consultation:   Sepsis, pneumonia, respiratory failure, pneumonia      History of Present Illness:   Akosua Powell is a 68y.o.-year-old female who was initially admitted on 10/2/2021. Patient seen at the request of Dr. Keri Zamudio:    This patient presented to the ED via EMS on 10/2/21 with increased weakness, diarrhea and shortness of breath over the past few days. She was found to be in SVT and then afib RVR for which she was started on a Cardizem gtt without successful conversion from adenosine and Cardizem push.     CXR is concerning for CHF exacerbation and CT chest shows a large left sided pleural effusion. WBC elevated     Blood culture 1/2 positive for coag negative staph-likely contaminant    Urine culture pending  UA suggestive of UTI    She has been started on cefepime and vancomycin     CURRENT EVALUATION 10/8/2021    Patient evaluated and examined in the room. She is transferred out of ICU. She is alert and oriented. Denies chest pain, SOB. Afebrile, hemodynamically stable    - On 3L NC oxygen, saturating well. Echocardiogram 10/3: LVEF 59%. S/p CURT on 10/7/21: No thrombus or valvular vegetation idenified. LVEF 55%    S/p Left thoracentesis on 10/4:  with removal of 350 cc clear yellowish fluid. Culture: No growth    Labs, X rays reviewed: 10/8/2021    BUN: 19<--23<--30<--41<--48 <-- 30  Cr: 0.75<-- 0.76<--0.85<--0.97<--1.12 <-- 0.85    WBC: 17.1<--13.0<--12.4<--12.9<--26.1 <-- 12.4  Hb: 13.0<--12.0<--12.5<--11.5<--11.4 --> 12.5  Plat: 442<--435<--465<--443<-- 448 --> 465    CRP 10/3/21: 244    CXR:  10/2/21    CT chest:  10/3/21      Cultures:  Urine:    Blood:  10/2/21: Coag negative staph 1/2=-likely contaminant  10/3/21: No growth   Sputum :    Wound:  10-4-21: Thoracentesis: No growth      Discussed with patient, RN, Dr Lucero Matos. .    I have personally reviewed the past medical history, past surgical history, medications, social history, and family history, and I have updated the database accordingly.   Past Medical History:     Past Medical History:   Diagnosis Date    Asthma     COPD [facility]chronic obstructive pulmonary disease) ([facility] [address]     Gout     Hyperlipidemia     Hypertension     Mitral insufficiency     Pneumonia        Past Surgical  History:     Past Surgical History:   Procedure Laterality Date    ANKLE SURGERY Left 03/22/2016    ORIF    COLONOSCOPY      DENTAL SURGERY      HYSTERECTOMY  1986    KNEE SURGERY      NM COLSC FLX W/RMVL OF TUMOR POLYP LESION SNARE TQ N/A 8/8/2017    COLONOSCOPY POLYPECTOMY SNARE/COLD BIOPSY performed by Edmond Kaye Giovani Kelly MD at [facility]    TONSILLECTOMY AND ADENOIDECTOMY         Medications:      polyethylene glycol  17 g Oral Daily    dilTIAZem  120 mg Oral Daily    sodium chloride flush  5-40 mL IntraVENous 2 times per day    furosemide  20 mg Oral Daily    metoprolol tartrate  50 mg Oral BID    atorvastatin  20 mg Oral Daily    clopidogrel  75 mg Oral Daily    citalopram  20 mg Oral Daily    sodium chloride flush  5-40 mL IntraVENous 2 times per day    ipratropium-albuterol  1 ampule Inhalation 4x daily       Social History:     Social History     Socioeconomic History    Marital status:      Spouse name: Not on file    Number of children: Not on file    Years of education: Not on file    Highest education level: Not on file   Occupational History    Not on file   Tobacco Use    Smoking status: Former Smoker    Smokeless tobacco: Never Used    Tobacco comment: over 30 years ago    Vaping Use    Vaping Use: Never used   Substance and Sexual Activity    Alcohol use: Yes     Alcohol/week: 0.0 standard drinks     Comment: occasionally     Drug use: No    Sexual activity: Not on file   Other Topics Concern    Not on file   Social History Narrative    Not on file     Social Determinants of Health     Financial Resource Strain:     Difficulty of Paying Living Expenses:    Food Insecurity:     Worried About Running Out of Food in the Last Year:     [address] in the Last Year:    Transportation Needs:     Lack of Transportation (Medical):      Lack of Transportation (Non-Medical):    Physical Activity:     Days of Exercise per Week:     Minutes of Exercise per Session:    Stress:     Feeling of Stress :    Social Connections:     Frequency of Communication with Friends and Family:     Frequency of Social Gatherings with Friends and Family:     Attends Hinduism Services:     Active Member of Clubs or Organizations:     Attends Club or Organization Meetings:     Marital Status:    Intimate Partner Violence:     Fear of Current or Ex-Partner:     Emotionally Abused:     Physically Abused:     Sexually Abused:        Family History:     Family History   Problem Relation Age of Onset    High Blood Pressure Mother     Cancer Mother     Asthma Father     Heart Disease Father     Cancer Father         Allergies:   Latex, Betadine [povidone iodine], Bee venom, Dilaudid [hydromorphone hcl], Adhesive tape, and Sulfa antibiotics     Review of Systems:   Constitutional: Diaphoretic  Head: No headaches  Eyes: No double vision or blurry vision. No conjunctival inflammation. ENT: No sore throat or runny nose. . No hearing loss, tinnitus or vertigo. Cardiovascular: No chest pain or palpitations. shortness of breath. JOHN  Lung:  shortness of breath & cough. clear sputum production  Abdomen: No nausea, vomiting, diarrhea, or abdominal pain. Shahida Fanning No cramps. Genitourinary: No increased urinary frequency, or dysuria. No hematuria. No suprapubic or CVA pain  Musculoskeletal: No muscle aches or pains. No joint effusions, swelling or deformities  Hematologic: No bleeding or bruising. Neurologic: No headache, weakness, numbness, or tingling. Integument: No rash, no ulcers. Psychiatric: No depression. Endocrine: No polyuria, no polydipsia, no polyphagia. Physical Examination :     Patient Vitals for the past 8 hrs:   BP Temp Temp src Pulse Resp SpO2 Weight   10/08/21 0841 (!) 153/60 98.1 °F (36.7 °C) Oral 74 20 96 % --   10/08/21 0451 -- -- -- -- -- -- 255 lb 9.6 oz (115.9 kg)     General Appearance: Restless  Head:  Normocephalic, no trauma  Eyes: Pupils equal, round, reactive to light and accommodation; extraocular movements intact; sclera anicteric; conjunctivae pink. No embolic phenomena. ENT: Oropharynx clear, without erythema, exudate, or thrush. No tenderness of sinuses. Mouth/throat: mucosa pink and moist. No lesions. Dentition in good repair. Neck:Supple, without lymphadenopathy. Thyroid normal, No bruits. Pulmonary/Chest: Clear to auscultation, without wheezes, rales, or rhonchi. No dullness to percussion. Cardiovascular: afib  Abdomen: Soft, non tender. Bowel sounds normal. No organomegaly  All four Extremities: No cyanosis, clubbing, edema, or effusions. Neurologic: No gross sensory or motor deficits. Skin: Warm and dry with good turgor. No signs of peripheral arterial or venous insufficiency. No ulcerations. No open wounds. Medical Decision Making -Laboratory:   I have independently reviewed/ordered the following labs:    CBC with Differential:   Recent Labs     10/07/21  0411 10/08/21  0551   WBC 13.0* 17.1*   HGB 12.0 13.0   HCT 39.4 42.2    442     BMP:   Recent Labs     10/07/21  0411 10/08/21  0551    138   K 3.6* 3.7   CL 99 98   CO2 28 28   BUN 23 19   CREATININE 0.76 0.75   MG 2.1  --      Hepatic Function Panel:   No results for input(s): PROT, LABALBU, BILIDIR, IBILI, BILITOT, ALKPHOS, ALT, 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 No results for input(s): RPR in the last 72 hours. No results for input(s): HIV in the last 72 hours. No results for input(s): BC in the last 72 hours. Lab Results   Component Value Date    MUCUS NOT REPORTED 10/02/2021    PH 7.44 03/30/2016    RBC 4.27 10/08/2021    RBC 3.94 06/02/2016    TRICHOMONAS NOT REPORTED 10/02/2021    WBC 17.1 10/08/2021    YEAST NOT REPORTED 10/02/2021    TURBIDITY Clear 10/02/2021     Lab Results   Component Value Date    CREATININE 0.75 10/08/2021    GLUCOSE 112 10/08/2021    GLUCOSE 97 05/09/2016       Medical Decision Making-Imaging:     Narrative   EXAMINATION:   CT OF THE CHEST WITHOUT CONTRAST 10/3/2021 9:39 am       TECHNIQUE:   CT of the chest was performed without the administration of intravenous   contrast. Multiplanar reformatted images are provided for review.  Dose   modulation, iterative reconstruction, and/or weight based adjustment of the   mA/kV was utilized to reduce the radiation dose to as low as reasonably   achievable.       COMPARISON:   None.       HISTORY:   ORDERING SYSTEM PROVIDED HISTORY: EVALUATE INFILTRATES ON CXR, LEFT SIDED   CONSOLIDATION   TECHNOLOGIST PROVIDED HISTORY:   EVALUATE INFILTRATES ON CXR, LEFT SIDED CONSOLIDATION   Reason for Exam: EVALUATE INFILTRATES ON CXR, LEFT SIDED CONSOLIDATION       FINDINGS:   Mediastinum: Severe cardiomegaly. Clementon Camacho is a moderate pericardial effusion   measuring up to 14 mm in thickness overlying the right atrium.  Moderate   atherosclerotic calcification of the coronary arteries.  The aorta and   pulmonary arteries are normal in caliber.  No mediastinal or hilar   lymphadenopathy.  The thyroid gland and esophagus are normal.       Lungs/pleura: The airways are patent centrally.  Left pleural effusion   contributes to complete atelectasis of the left lower lobe.  There is partial   atelectasis of the lingula.  Right pleural effusion contributes to moderate   right lower lobe atelectasis.       Upper Abdomen: Adrenal glands are normal.  No significant findings in the   visualized upper abdomen.       Soft Tissues/Bones: Degenerative changes throughout the thoracic spine.  No   acute skeletal finding in the chest.           Impression   1. Moderate left and small right pleural effusions. Clementon Camacho is complete   atelectasis of the left lower lobe and partial atelectasis of the right lower   lobe.  Superimposed pneumonitis may be considered clinically. 2. Severe cardiomegaly with a moderate pericardial effusion.             Narrative   EXAMINATION:   ONE XRAY VIEW OF THE CHEST       10/2/2021 5:48 pm       COMPARISON:   None.       HISTORY:   ORDERING SYSTEM PROVIDED HISTORY: shortness of breath   TECHNOLOGIST PROVIDED HISTORY:   shortness of breath       FINDINGS:   Portable study is limited by obesity.  The heart is moderately enlarged.    There is left retrocardiac consolidation.  There is thickening of the minor   fissure.  There is pulmonary vascular Disease/ Internal Medicine Resident  [place], [place]    ATTESTATION:    I have discussed the case, including pertinent history and exam findings with the residents and students. I have seen and examined the patient and the key elements of the encounter have been performed by me. I was present when the student obtained his information or examined the patient. I have reviewed the laboratory data, other diagnostic studies and discussed them with the residents. I have updated the medical record where necessary. I agree with the assessment, plan and orders as documented by the resident/ student.     Yuly Farmer MD.

## 2021-10-08 NOTE — CARE COORDINATION
Patient does not feel that she can safely return home and is requesting SNF placement. [place] of choice is given via SNF list.  Writer requests that patient make 3 choices to start with.      1600 Patient and son would like [facility]. Ines from [facility] is notified. Other choices include [facility] in [place] and [facility]     1630 Per Ines, Patient is accepted to [facility].   Will need updated PT/OT note and covid test.  [facility] weekend rep is Ne Villafuerte [phone]

## 2021-10-08 NOTE — PROGRESS NOTES
212Physical Therapy  Facility/Department: [facility] RENAL//MED SURG  Daily Treatment Note  NAME: Roverto Vásquez  : 1945  MRN: [id number]    Date of Service: 10/8/2021    Discharge Recommendations:  Patient would benefit from continued therapy after discharge   PT Equipment Recommendations  Equipment Needed: No    Assessment   Body structures, Functions, Activity limitations: Decreased functional mobility ; Decreased ROM; Decreased endurance;Decreased balance;Decreased strength;Decreased posture  Assessment: The pt is overall MOD A for bed mobs and transfers,able to amb 6ft with RW, MOD , limited by fear of falling , weakness and decrease endurance. recomending continued therapy to address deficits. Prognosis: Good  PT Education: Goals;Plan of Care;General Safety;Gait Training;Home Exercise Program;Functional Mobility Training;Transfer Training;Energy Conservation  REQUIRES PT FOLLOW UP: Yes  Activity Tolerance  Activity Tolerance: Patient limited by fatigue;Patient limited by endurance     Patient Diagnosis(es): The primary encounter diagnosis was Atrial fibrillation with rapid ventricular response ([address] Diagnoses of Pneumonia due to infectious organism, unspecified laterality, unspecified part of lung, Chronic obstructive pulmonary disease with acute exacerbation ([address] and Hypercholesterolemia were also pertinent to this visit. has a past medical history of Asthma, COPD (chronic obstructive pulmonary disease) ([address] Gout, Hyperlipidemia, Hypertension, Mitral insufficiency, and Pneumonia. has a past surgical history that includes Hysterectomy (); knee surgery; Colonoscopy; Dental surgery; Tonsillectomy and adenoidectomy; Ankle surgery (Left, 2016); and pr colsc flx w/rmvl of tumor polyp lesion snare tq (N/A, 2017).     Restrictions  Restrictions/Precautions  Restrictions/Precautions: General Precautions, Fall Risk, Up as Tolerated  Required Braces or Orthoses?: No  Position Activity Restriction  Other position/activity restrictions: 10/5 Thoracentesis  Subjective   General  Response To Previous Treatment: Patient with no complaints from previous session. Family / Caregiver Present: No  Subjective  Subjective: RN and pt agreeable to PT. Pt alert in bed upon arrival, pleasant and cooperative t/o  Pain Screening  Patient Currently in Pain: Denies  Vital Signs  Patient Currently in Pain: Denies       Orientation  Orientation  Overall Orientation Status: Within Normal Limits  Cognition      Objective   Bed mobility  Rolling to Right: Moderate assistance  Supine to Sit: Maximum assistance  Sit to Supine: Unable to assess (Pt retired to chair.)  Scooting: Moderate assistance  Comment: HOB elavated . Cueing for hand placement and sequencing with Good return. Transfers  Sit to Stand: Moderate Assistance  Stand to sit: Moderate Assistance  Bed to Chair: Moderate assistance  Ambulation  Ambulation?: Yes  Ambulation 1  Surface: level tile  Device: Rolling Walker  Other Apparatus: O2  Assistance: Moderate assistance  Quality of Gait: Posterior lean . fear of falling . max cueing for posture and sequencing. Increase difficulty to advance BLE. Gait Deviations: Slow Radha; Increased DONNA; Decreased step length;Decreased step height;Shuffles;Staggers  Distance: 6ft  Comments: Significant amount of time to comeplete task. Limited by fatigue and decrease endurance. Stairs/Curb  Stairs?: No     Balance  Posture: Fair  Sitting - Static: Good  Sitting - Dynamic: Fair  Standing - Static: Fair  Standing - Dynamic: Poor;+  Comments: Standing with RW. Exercises  Heelslides: x10 reps  Hip Flexion: x10 reps  Hip Abduction: x10 reps  Knee Long Arc Quad: x10 reps  Ankle Pumps: x10 reps  Comments: Rest as needed. limited by SOB and decrease endurance.          AM-PAC Score  AM-PAC Inpatient Mobility Raw Score : 11 (10/08/21 1511)  AM-PAC Inpatient T-Scale Score : 33.86 (10/08/21 1511)  Mobility Inpatient CMS 0-100% Score: 72.57 (10/08/21 1511)  Mobility Inpatient CMS G-Code Modifier : CL (10/08/21 1511)          Goals  Short term goals  Time Frame for Short term goals: 12 visits  Short term goal 1: indeepndent bed mobility  Short term goal 2: independent transfers  Short term goal 3: independent gait with rwalker x 50'  Short term goal 4: stair ambulation x 3 steps without HRs with min A+1  Patient Goals   Patient goals : walk by myself again    Plan    Plan  Times per week: 5-6 visits weekly  Times per day: Daily  Current Treatment Recommendations: Strengthening, ROM, Balance Training, Functional Mobility Training, Transfer Training, Endurance Training, Gait Training, Stair training, Safety Education & Training, Patient/Caregiver Education & Training, Equipment Evaluation, Education, & procurement, Positioning  Safety Devices  Type of devices: Call light within reach, Chair alarm in place, Gait belt, Patient at risk for falls, Left in chair, Nurse notified  Restraints  Initially in place: No     Therapy Time   Individual Concurrent Group Co-treatment   Time In 1254         Time Out 1337         Minutes 43         Timed Code Treatment Minutes: 127 [facility]

## 2021-10-08 NOTE — PROGRESS NOTES
[facility]  Progress Note                   Date:   10/8/2021  Patient name: Jazmin Crandall  Date of admission:  10/2/2021  5:17 PM  MRN:   [id number]  YOB: 1945  PCP: KALYN Berman CNP    Reason for Admission: Atrial fibrillation with rapid ventricular response ([address] [I48.91]  Atrial fibrillation with RVR ([address] [I48.91]  Chronic obstructive pulmonary disease with acute exacerbation ([address] [J44.1]  Pneumonia due to infectious organism, unspecified laterality, unspecified part of lung [J18.9]    Subjective:       Clinical Changes /Abnormalities: Seen & examined in room after discussing with RN. No acute CV issues/concerns overnight. Labs, vitals & tele reviewed. Review of Systems    Medications:   Scheduled Meds:   polyethylene glycol  17 g Oral Daily    dilTIAZem  120 mg Oral Daily    sodium chloride flush  5-40 mL IntraVENous 2 times per day    furosemide  20 mg Oral Daily    metoprolol tartrate  50 mg Oral BID    atorvastatin  20 mg Oral Daily    clopidogrel  75 mg Oral Daily    citalopram  20 mg Oral Daily    sodium chloride flush  5-40 mL IntraVENous 2 times per day    ipratropium-albuterol  1 ampule Inhalation 4x daily     Continuous Infusions:   sodium chloride      sodium chloride Stopped (10/05/21 1925)    sodium chloride      heparin (PORCINE) Infusion 14.673 Units/kg/hr (10/08/21 0659)     CBC:   Recent Labs     10/06/21  0313 10/07/21  0411 10/08/21  0551   WBC 12.4* 13.0* 17.1*   HGB 12.5 12.0 13.0   * 435 442     BMP:    Recent Labs     10/06/21  0313 10/07/21  0411 10/08/21  0551    138 138   K 3.9 3.6* 3.7    99 98   CO2 27 28 28   BUN 30* 23 19   CREATININE 0.85 0.76 0.75   GLUCOSE 120* 107* 112*     Hepatic:No results for input(s): AST, ALT, ALB, BILITOT, ALKPHOS in the last 72 hours. Troponin: No results for input(s): TROPHS in the last 72 hours. BNP: No results for input(s): BNP in the last 72 hours.   Lipids: No results for input(s): CHOL, HDL in the last 72 hours. Invalid input(s): LDLCALCU  INR: No results for input(s): INR in the last 72 hours. Objective:   Vitals: BP (!) 151/86   Pulse 70   Temp 95.7 °F (35.4 °C) (Oral)   Resp 20   Ht 5' 4\" (1.626 m)   Wt 255 lb 9.6 oz (115.9 kg)   SpO2 97%   BMI 43.87 kg/m²   General appearance: alert and cooperative with exam  HEENT: Head: Normocephalic, no lesions, without obvious abnormality. Neck:no JVD, trachea midline, no adenopathy  Lungs: Clear to auscultation, dim  Heart: Regular rate and rhythm, s1/s2 auscultated, no murmurs. SR  Abdomen: soft, non-tender, bowel sounds active  Extremities: no edema  Neurologic: not done    Echo 10/3/21  Summary  Left ventricle is normal in size with normal systolic function globally. Calculated ejection fraction is 59% ( Atrial-Fib). Mild mitral regurgitation. Mild tricuspid regurgitation. Estimated right ventricular systolic pressure is 35 mmHg. Anterior clear space noted, most likely adipose tissue vs small loculated  pericardial effusion. No evidence of tamponade. Pleural effusion is seen. CURT/CV 10/7/21   CURT:     Structures:  LA: Dilated  SHANNAN: No thrombus  RA: Normal  RV:  Normal  LV: Normal in size with normal systolic function   Estimated LVEF: 55%  Aorta: Mild atheromatous disease arch  Percardium: No pericardial effusion  Septum: No intracardiac shunt via color Doppler.      Valves:  Mitral Valve: Structurally normal. No regurgitation is identified. Aortic Valve: The aortic valve is trileaflet and opens adequately. No regurgitiation is identified. Tricuspid valve: Structurally normal. Moderate to severe regurgitation is identified. Pulmonary valve: Normal. No significant regurgitation     No valvular vegetations or thrombus identified.     Summary:      1. A CURT was performed without complications. 2. Normal Lv size with normal systolic function. Estimated LVEF 55%  3.  No thrombus or valvular vegetation identified        CARDIOVERSION:  After an adequate level of sedation was achieved, 200J in biphasic synchronized delivery was administered. conversion to normal sinus rhythm. The patient awoke without complications. A post procedure 12 L ECG was ordered and reviewed.     The patient will continue with the discharge meds and has been instructed to follow-up with his cardiologist for continued long term care and cardiovascular management. There were no complications encountered.       Assessment / Acute Cardiac Problems:   1. New onset atrial fibrillation, CHADVASC score- 4  2. Acute hypoxic respiratory distress resolved  3. NSTEMI likely type II from demand ischemia from CASEY/UTI  4. Sepsis  5. Hypokalemia  6. CASEY resolving  7. No evidence of GI injury  8.  Left loculated pleural effusion    Patient Active Problem List:     Asthma     Benign essential HTN     Morbid obesity with BMI of 40.0-44.9, adult ([address]     Open bimalleolar fracture of left ankle     Hyperlipidemia     COPD exacerbation (HCC)     Pulmonary emphysema (HCC)     Postoperative confusion     Acute respiratory failure with hypoxia (HCC)     Simple chronic bronchitis (HCC)     Atrial fibrillation with rapid ventricular response (HCC)     Pneumonia of left lower lobe due to infectious organism     Sepsis with acute hypoxic respiratory failure without septic shock (HCC)     Acute on chronic diastolic heart failure (HCC)     UTI (urinary tract infection)     Hyponatremia     Hypokalemia     Elevated troponin     Anemia     Arthritis     At high risk for falls     Colon polyps     Heart murmur     Legally blind in right eye, as defined in [place]     COPD (chronic obstructive pulmonary disease) ([facility]     Atrial fibrillation status post cardioversion (HCC)    [id number] Score for Atrial Fibrillation Stroke Risk   Risk   Factors  Component Value   C CHF No 0   H HTN Yes 1   A2 Age >= 76 Yes,  (77 y.o.) 2   D DM No 0   S2 Prior Stroke/TIA No 0   V Vascular Disease No 0   A Age 74-69 No,  (77 y.o.) 0   Sc Sex female 1    [id number]  Score  4   Score last updated 10/8/21 31:08 PM EDT    Click here for a link to the UpToDate guideline \"Atrial Fibrillation: Anticoagulation therapy to prevent embolization    Disclaimer: Risk Score calculation is dependent on accuracy of patient problem list and past encounter diagnosis. Plan of Treatment:   1. Remains SR post CURT/CV. Continue PO Cardizem & BB. Will switch Heparin gtt to PO Eliquis  2. Clinically no volume overload. Continue PO Lasix & BB  3. Continue statin, Plavix, BB. No ASA d/t duel therapy with NOAC & Plavix  4. D/C planning. No objection to discharge from CV standpoint with OP f/u in clinic in 2 weeks.      Electronically signed by KALYN Ledbetter CNP on 10/8/2021 at 12:47 PM  [address]  [phone]

## 2021-10-08 NOTE — PROGRESS NOTES
[facility]  Internal Medicine Teaching Residency Program  Inpatient Daily Progress Note  ______________________________________________________________________________    Patient: Sebastian Shah  YOB: 1945   GCS:[id number]    Acct: [de-identified]     Room: 0331/0331-01  Admit date: 10/2/2021  Today's date: 10/08/21  Number of days in the hospital: 6    SUBJECTIVE   Admitting Diagnosis: Sepsis with acute hypoxic respiratory failure without septic shock ([facility]  CC:   Pt seen and examined at bedside. Chart & results reviewed. No acute events overnight. She is hemodynamically stable, saturating well on 3L NC. At home, she is on 2L NC and inhalers. Her shortness of breath is improving. Cardio will switch heparin to NOAC on discharge and follow up outpatient for A.fib    ROS:  Constitutional:  negative for chills, fevers, sweats  Respiratory:  Negative hemoptysis. She has SOB on exertion, cough,   Cardiovascular:  negative for chest pain, chest pressure/discomfort, lower extremity edema, palpitations  Gastrointestinal:  negative for abdominal pain, constipation, diarrhea, nausea, vomiting  Neurological:  negative for dizziness, headache  BRIEF HISTORY   68-year-old  female with PMH of COPD/CHF/SEJAL/HTN, presented with SOB and palpitations. She was found to have left lower lobe pneumonia with pleural effusion and relief SVT followed by new onset A. fib with RVR. She was admitted to medical ICU for management of acute hypoxic respiratory failure likely due to underlying pneumonia vs CHF vs new onset A. fib with RVR. She required BiPAP and was treated with fluids, antibiotics and vasopressors. She weaned off Levophed and completed the course of cefepime for sepsis secondary to pneumonia versus asymptomatic UTI. Inflammatory markers were monitored.   ID was on board.     Cardiology on board for A. fib with RVR was managed with heparin drip and retraction or use of accessory muscles. No egophony noted. Cardiovascular: Regular without murmur, clicks, gallops or rubs. Abdomen: Round and soft without organomegaly. No rebound, rigidity or guarding was appreciated. Lymphatic: No lymphadenopathy. Musculoskeletal: Normal curvature of the spine. No gross muscle weakness. Extremities:  No lower extremity edema, ulcerations, tenderness, varicosities or erythema. Muscle size, tone and strength are normal.  No involuntary movements are noted. Skin:  Warm and dry. Good color, turgor and pigmentation. No lesions or scars.   No cyanosis or clubbing  Neurological/Psychiatric: The patient's general behavior, level of consciousness, thought content and emotional status is normal.        Medications:  Scheduled Medications:    polyethylene glycol  17 g Oral Daily    dilTIAZem  120 mg Oral Daily    sodium chloride flush  5-40 mL IntraVENous 2 times per day    furosemide  20 mg Oral Daily    metoprolol tartrate  50 mg Oral BID    atorvastatin  20 mg Oral Daily    clopidogrel  75 mg Oral Daily    citalopram  20 mg Oral Daily    sodium chloride flush  5-40 mL IntraVENous 2 times per day    ipratropium-albuterol  1 ampule Inhalation 4x daily     Continuous Infusions:    sodium chloride      sodium chloride Stopped (10/05/21 1925)    sodium chloride      heparin (PORCINE) Infusion 14.5 Units/kg/hr (10/07/21 1831)     PRN Medicationsmetoprolol, 5 mg, Q5 Min PRN  metoprolol, 5 mg, Q5 Min PRN  sodium chloride flush, 5-40 mL, PRN  sodium chloride, 25 mL, PRN  sodium chloride flush, 5-40 mL, PRN  sodium chloride, 25 mL, PRN  ondansetron, 4 mg, Q8H PRN   Or  ondansetron, 4 mg, Q6H PRN  polyethylene glycol, 17 g, Daily PRN  acetaminophen, 650 mg, Q6H PRN   Or  acetaminophen, 650 mg, Q6H PRN  potassium chloride, 20 mEq, PRN  magnesium sulfate, 2,000 mg, PRN  albuterol, 2.5 mg, Q4H PRN  heparin (porcine), 4,000 Units, PRN  heparin (porcine), 2,000 Units, PRN        Diagnostic Labs:  CBC:   Recent Labs     10/06/21  0313 10/07/21  0411   WBC 12.4* 13.0*   RBC 4.17 3.99   HGB 12.5 12.0   HCT 39.5 39.4   MCV 94.7 98.7   RDW 12.4 12.3   * 435     BMP:   Recent Labs     10/06/21  0313 10/07/21  0411    138   K 3.9 3.6*    99   CO2 27 28   BUN 30* 23   CREATININE 0.85 0.76     BNP: No results for input(s): BNP in the last 72 hours. PT/INR: No results for input(s): PROTIME, INR in the last 72 hours. APTT:   Recent Labs     10/06/21  1035 10/06/21  1556 10/07/21  0411   APTT 66.9* 53.4* 69.1*     CARDIAC ENZYMES: No results for input(s): CKMB, CKMBINDEX, TROPONINI in the last 72 hours. Invalid input(s): CKTOTAL;3  FASTING LIPID PANEL:  Lab Results   Component Value Date    CHOL 195 06/02/2016    HDL 45 06/02/2016    TRIG 94 06/02/2016     LIVER PROFILE: No results for input(s): AST, ALT, ALB, BILIDIR, BILITOT, ALKPHOS in the last 72 hours. MICROBIOLOGY:   Lab Results   Component Value Date/Time    CULTURE NO GROWTH 3 DAYS 10/04/2021 05:27 PM    CULTURE PENDING 10/04/2021 05:27 PM       Imaging:    CT CHEST WO CONTRAST    Result Date: 10/3/2021  1. Moderate left and small right pleural effusions. There is complete atelectasis of the left lower lobe and partial atelectasis of the right lower lobe. Superimposed pneumonitis may be considered clinically. 2. Severe cardiomegaly with a moderate pericardial effusion. XR CHEST PORTABLE    Result Date: 10/5/2021  No significant interval change. XR CHEST PORTABLE    Result Date: 10/4/2021  Interval decrease in the left pleural effusion. No evidence of pneumothorax     XR CHEST PORTABLE    Result Date: 10/4/2021  No significant interval change in pleural effusions and associated basilar opacities, left greater than right. XR CHEST PORTABLE    Result Date: 10/2/2021  Findings most consistent with mild congestive heart failure.  Left retrocardiac opacification/consolidation may represent a

## 2021-10-08 NOTE — PROGRESS NOTES
INTERNAL MEDICINE                                                                             ICU PATIENT TRANSFER NOTE        Patient:  Minerva Cochran  YOB: 1945  MRN: [id number]     Acct: [de-identified]     Admit date: 10/2/2021    Code Status:-  Full code     Reason for ICU Admission:-       SUPPORT DEVICES: [] Ventilator [] BIPAP  [x] Nasal Cannula [] Room Air    Consultations:- [x] Cardiology [] Nephrology  [] Hemo onco  [] GI                               [x] ID [] ENT  [] Rheum [] Endo   [x]Physiotherapy                                 Others:-     NUTRITION:  [] NPO [] Tube Feeding (Specify: ) [] TPN  [x] PO    Central Lines:- [x] No   [] Yes           If yes - Days/Date of Insertion. Pt seen,examined and Chart reviewed. ICU COURSE :-   71-year-old  female with PMH of COPD/CHF/SEJAL/HTN, presented with SOB and palpitations. She was found to have left lower lobe pneumonia with pleural effusion and relief SVT followed by new onset A. fib with RVR. She was admitted to medical ICU for management of acute hypoxic respiratory failure likely due to underlying pneumonia vs CHF vs new onset A. fib with RVR. She required BiPAP and was treated with fluids, antibiotics and vasopressors. She weaned off Levophed and completed the course of cefepime for sepsis secondary to pneumonia versus asymptomatic UTI. Inflammatory markers were monitored. ID was on board. Cardiology on board for A. fib with RVR was managed with heparin drip and Cardizem drip and Lopressor 50 mg twice daily. TTE showed preserved LV systolic function and no significant valve abnormalities. Cardizem drip was weaned off and started on p.o. Cardizem with increasing dose. She was also found to have stable small pericardial effusion.    She was started on bronchodilators for her COPD. Was on IV Lasix for concomitant CHF exacerbation exacerbation. Electrolytes were replaced timely. Patient underwent CURT today, found to have LA dilated, no thrombus in left atrial appendage, EF 55%. She underwent synchronized cardioversion. She tolerated procedure well without any complications. On my evaluation, patient is hemodynamically stable (/70, 88 HR regular rhythm, 20 RR, afebrile), saturating well on 4 L NC. She is sitting on the bed in a propped up position. She was due for her bronchodilator therapy and was feeling somewhat short of breath, otherwise she felt well. She denies any active complaints. Physical Exam:   Vitals: BP (!) 157/88   Pulse 80   Temp 98.6 °F (37 °C) (Oral)   Resp 20   Ht 5' 4\" (1.626 m)   Wt 255 lb 9.6 oz (115.9 kg)   SpO2 94%   BMI 43.87 kg/m²   24 hour intake/output:    Intake/Output Summary (Last 24 hours) at 10/8/2021 0545  Last data filed at 10/7/2021 1900  Gross per 24 hour   Intake 804 ml   Output 940 ml   Net -136 ml     Last 3 weights: Wt Readings from Last 3 Encounters:   10/08/21 255 lb 9.6 oz (115.9 kg)   02/27/18 218 lb 6.4 oz (99.1 kg)   10/05/17 232 lb (105.2 kg)     · General appearance: awake, alert, cooperative  · HEENT: Head: Normocephalic, no lesions, without obvious abnormality. · Lungs: clear to auscultation bilaterally  · Heart: regular rate and rhythm, S1, S2 normal, no murmur  · Abdomen: soft, non-tender; bowel sounds normal; no masses,  no organomegaly  · Extremities: extremities normal, atraumatic, no cyanosis or edema  · Neurological:  Awake, alert, oriented to name, place and time. Cranial nerves II-XII are grossly intact. Reflexes normal and symmetric.  Sensation grossly normal    Medications:Current Inpatient  Scheduled Meds:   polyethylene glycol  17 g Oral Daily    dilTIAZem  120 mg Oral Daily    sodium chloride flush  5-40 mL IntraVENous 2 times per day    furosemide  20 mg Oral Daily    metoprolol tartrate  50 mg Oral BID    atorvastatin  20 mg Oral Daily    clopidogrel  75 mg Oral Daily    citalopram  20 mg Oral Daily    sodium chloride flush  5-40 mL IntraVENous 2 times per day    ipratropium-albuterol  1 ampule Inhalation 4x daily     Continuous Infusions:   sodium chloride      sodium chloride Stopped (10/05/21 1925)    sodium chloride      heparin (PORCINE) Infusion 14.5 Units/kg/hr (10/07/21 1831)     PRN Meds:metoprolol, metoprolol, sodium chloride flush, sodium chloride, sodium chloride flush, sodium chloride, ondansetron **OR** ondansetron, polyethylene glycol, acetaminophen **OR** acetaminophen, potassium chloride, magnesium sulfate, albuterol, heparin (porcine), heparin (porcine)    Objective:    CBC:   Recent Labs     10/06/21  0313 10/07/21  0411   WBC 12.4* 13.0*   HGB 12.5 12.0   * 435     BMP:    Recent Labs     10/06/21  0313 10/07/21  0411    138   K 3.9 3.6*    99   CO2 27 28   BUN 30* 23   CREATININE 0.85 0.76   GLUCOSE 120* 107*     Calcium:  Recent Labs     10/07/21  0411   CALCIUM 9.2     Ionized Calcium:No results for input(s): IONCA in the last 72 hours. Magnesium:  Recent Labs     10/07/21  0411   MG 2.1     Phosphorus:No results for input(s): PHOS in the last 72 hours. BNP:No results for input(s): BNP in the last 72 hours. Glucose:No results for input(s): POCGLU in the last 72 hours. HgbA1C: No results for input(s): LABA1C in the last 72 hours. INR: No results for input(s): INR in the last 72 hours. Hepatic: No results for input(s): ALKPHOS, ALT, AST, PROT, BILITOT, BILIDIR, LABALBU in the last 72 hours. Amylase and Lipase:No results for input(s): LACTA, AMYLASE in the last 72 hours. Lactic Acid: No results for input(s): LACTA in the last 72 hours. CARDIAC ENZYMES:No results for input(s): CKTOTAL, CKMB, CKMBINDEX, TROPONINI in the last 72 hours. BNP: No results for input(s): BNP in the last 72 hours.   Lipids: No results for input(s): CHOL, TRIG, HDL, LDLCALC in the last 72 hours. Invalid input(s): LDL  ABGs:   Lab Results   Component Value Date    PH 7.44 03/30/2016    PCO2 36 03/30/2016    PO2 64 03/30/2016    HCO3 24 03/30/2016    O2SAT 93 03/30/2016     Thyroid:   Lab Results   Component Value Date    TSH 0.35 10/03/2021        Urinalysis: No results for input(s): BACTERIA, BLOODU, CLARITYU, COLORU, PHUR, PROTEINU, RBCUA, SPECGRAV, BILIRUBINUR, NITRU, WBCUA, LEUKOCYTESUR, GLUCOSEU in the last 72 hours. CULTURES:     Assessment:  Principal Problem:    Sepsis with acute hypoxic respiratory failure without septic shock (HCC)  Active Problems:    Benign essential HTN    Morbid obesity with BMI of 40.0-44.9, adult (HCC)    COPD exacerbation ([facility])    Acute respiratory failure with hypoxia ([facility])    Atrial fibrillation with rapid ventricular response ([facility])    Pneumonia of left lower lobe due to infectious organism    Acute on chronic diastolic heart failure ([facility])    UTI (urinary tract infection)    Hyponatremia    Hypokalemia    Elevated troponin    Atrial fibrillation status post cardioversion [facility])  Resolved Problems:    * No resolved hospital problems. *          Plan:    Acute hypoxic respiratory failure: Resolved  Required BiPAP  Continue nightly BiPAP as needed  On 4 L NC. A. fib with RVR:   [id number] score 4  Underwent CURT/CV today under anesthesia  Continue anticoagulation with heparin  NOAC on discharge  Continue Cardizem 120 mg OD     Left lower lobe pneumonia with pleural effusion with sepsis: resolving  S/p thoracocentesis S/O transudative  No growth on culture    Pericardial effusion:  Stable    COPD exacerbation:resolving  Continue as needed bronchodilators (Symbicort, Spiriva, DuoNeb)  Supplemental oxygen to keep saturations between 88 and 92%    Acute on chronic CHF: resolving  Continue Lasix  Continue Lopressor, Lipitor and Plavix    HTN:  Will resume home BP med, Cozaar 100 mg, Lopressor 50 BID as the BP tolerates.      Chronic arthritis    Morbid obesity    SEJAL:  On home CPAP  Noncompliant    Mood disorder:  Resume Celexa/citalopram    Asymptomatic UTI:  Treated with antibiotics- Cefepime    Urinary retention:  Resolved    NSTEMI type II  Elevated troponin - Demand ischemia from CASEY/UTI    DVT prophylaxis:  On heparin drip    GI prophylaxis:  On Pepcid 20 mg twice daily    PT/OT/SW:  Ongoing    Discharge planning:   to assist in discharge planning. Josee Jay MD  Internal Medicine Resident, PGY-1   [address], [place]  10/7/2021, 10:24 PM

## 2021-10-08 NOTE — PROGRESS NOTES
Called to evaluate patient as she went into A-fib with RVR. With rates of 152. She received 2 doses if 5mg Lopressor IV. -140s. /84. Saturating on 3L NC   EKG afib with RVR. She was asymptomatic. Ordered 250ml fluid bolus.    Ordered her scheduled oral Lopresspr 50mg to be given  Will continue to monitor

## 2021-10-08 NOTE — PROGRESS NOTES
Comprehensive Nutrition Assessment    Type and Reason for Visit:  RD Nutrition Re-Screen/LOS (LOS)    Nutrition Recommendations/Plan:   - Will order Ensure High Protein BID.   - Encourage PO and ONS intake at meals    Nutrition Assessment:  67 yo F admmitted w/ SOB, found to have NSTEMI, afib, sepsis, UTI. PMH: COPD, CHF, SEJAL. Per chart, 25-50% on CLD. Diet advanced to regular during visit. Diet at home: 3 meals/day, snacks (Kristel Bacca), water & 1/2 bottle gatorade typical. Appetite poor this admission. Per pt, some wt loss noticed, not trying to lose. NFPE performed. Malnutrition Assessment:  Malnutrition Status: At risk for malnutrition (Comment) (Potenital wt loss, per pt.)    Context:  Acute Illness     Findings of the 6 clinical characteristics of malnutrition:  Energy Intake:  7 - 50% or less of estimated energy requirements for 5 or more days  Weight Loss:  Unable to assess     Body Fat Loss:  1 - Mild body fat loss Orbital, Triceps   Muscle Mass Loss:  No significant muscle mass loss Temples (temporalis), Clavicles (pectoralis & deltoids), Hand (interosseous)  Fluid Accumulation:  1 - Mild     Strength:  Not Performed    Estimated Daily Nutrient Needs:  Energy (kcal):  160 to 1900 kcal/day (1.0-1.2 factor); Weight Used for Energy Requirements:  Current     Protein (g):  100-120 g/day (1.8-2.0 g/kg); Weight Used for Protein Requirements:  Ideal        Fluid (ml/day):  1 mL/kcal or per MD; Method Used for Fluid Requirements:  1 ml/kcal      Nutrition Related Findings:  Labs reviewed. Meds: Lasix. +2 edema BLE. No wounds. Wounds:  None       Current Nutrition Therapies:    ADULT DIET;  Regular; Low Fat/Low Chol/High Fiber/2 gm Na    Anthropometric Measures:  · Height: 5' 4\" (162.6 cm)  · Current Body Weight: 255 lb 9.6 oz (115.9 kg) (10/8)   · Usual Body Weight: 282 lb (127.9 kg) (Per pt, unsure when she last weighed that)     · Ideal Body Weight: 120 lbs; % Ideal Body Weight 213 %   · BMI: 43.9  · BMI Categories: Obese Class 3 (BMI 40.0 or greater)       Nutrition Diagnosis:   · Inadequate protein-energy intake related to inadequate protein-energy intake as evidenced by intake 0-25%, intake 26-50%    Nutrition Interventions:   Food and/or Nutrient Delivery:  Continue Current Diet, Start Oral Nutrition Supplement  Nutrition Education/Counseling:  Education completed (Educated on 2g Na. Provided Low Na and Label reading handouts from [facility].)   Coordination of Nutrition Care:  Continue to monitor while inpatient    Goals: Increase PO intake to >50% of nutrition needs. Nutrition Monitoring and Evaluation:   Behavioral-Environmental Outcomes:  None Identified   Food/Nutrient Intake Outcomes:  Diet Advancement/Tolerance, Supplement Intake  Physical Signs/Symptoms Outcomes:  Biochemical Data, Fluid Status or Edema, Nutrition Focused Physical Findings, Skin, Weight     Discharge Planning:     Too soon to determine     Electronically signed by Virginia Scott MS, RD, LD on 10/8/21 at 11:33 AM EDT    Contact: [phone] or floor RD

## 2021-10-09 LAB
ANION GAP SERPL CALCULATED.3IONS-SCNC: 14 MMOL/L (ref 9–17)
BUN BLDV-MCNC: 15 MG/DL (ref 8–23)
BUN/CREAT BLD: ABNORMAL (ref 9–20)
CALCIUM SERPL-MCNC: 9.3 MG/DL (ref 8.6–10.4)
CHLORIDE BLD-SCNC: 94 MMOL/L (ref 98–107)
CO2: 27 MMOL/L (ref 20–31)
CREAT SERPL-MCNC: 0.63 MG/DL (ref 0.5–0.9)
CULTURE: NORMAL
CULTURE: NORMAL
EKG ATRIAL RATE: 178 BPM
EKG Q-T INTERVAL: 330 MS
EKG QRS DURATION: 112 MS
EKG QTC CALCULATION (BAZETT): 487 MS
EKG R AXIS: -44 DEGREES
EKG T AXIS: 141 DEGREES
EKG VENTRICULAR RATE: 131 BPM
GFR AFRICAN AMERICAN: >60 ML/MIN
GFR NON-AFRICAN AMERICAN: >60 ML/MIN
GFR SERPL CREATININE-BSD FRML MDRD: ABNORMAL ML/MIN/{1.73_M2}
GFR SERPL CREATININE-BSD FRML MDRD: ABNORMAL ML/MIN/{1.73_M2}
GLUCOSE BLD-MCNC: 103 MG/DL (ref 70–99)
HCT VFR BLD CALC: 43.4 % (ref 36.3–47.1)
HEMOGLOBIN: 13.4 G/DL (ref 11.9–15.1)
Lab: NORMAL
Lab: NORMAL
MCH RBC QN AUTO: 29.7 PG (ref 25.2–33.5)
MCHC RBC AUTO-ENTMCNC: 30.9 G/DL (ref 28.4–34.8)
MCV RBC AUTO: 96.2 FL (ref 82.6–102.9)
NRBC AUTOMATED: 0 PER 100 WBC
PARTIAL THROMBOPLASTIN TIME: 26.7 SEC (ref 20.5–30.5)
PDW BLD-RTO: 12.2 % (ref 11.8–14.4)
PLATELET # BLD: 419 K/UL (ref 138–453)
PMV BLD AUTO: 8.7 FL (ref 8.1–13.5)
POTASSIUM SERPL-SCNC: 3.4 MMOL/L (ref 3.7–5.3)
RBC # BLD: 4.51 M/UL (ref 3.95–5.11)
SODIUM BLD-SCNC: 135 MMOL/L (ref 135–144)
SPECIMEN DESCRIPTION: NORMAL
SPECIMEN DESCRIPTION: NORMAL
WBC # BLD: 16.4 K/UL (ref 3.5–11.3)

## 2021-10-09 PROCEDURE — 6370000000 HC RX 637 (ALT 250 FOR IP): Performed by: STUDENT IN AN ORGANIZED HEALTH CARE EDUCATION/TRAINING PROGRAM

## 2021-10-09 PROCEDURE — 2500000003 HC RX 250 WO HCPCS: Performed by: STUDENT IN AN ORGANIZED HEALTH CARE EDUCATION/TRAINING PROGRAM

## 2021-10-09 PROCEDURE — 6370000000 HC RX 637 (ALT 250 FOR IP): Performed by: NURSE PRACTITIONER

## 2021-10-09 PROCEDURE — 51702 INSERT TEMP BLADDER CATH: CPT

## 2021-10-09 PROCEDURE — 2580000003 HC RX 258: Performed by: STUDENT IN AN ORGANIZED HEALTH CARE EDUCATION/TRAINING PROGRAM

## 2021-10-09 PROCEDURE — 85730 THROMBOPLASTIN TIME PARTIAL: CPT

## 2021-10-09 PROCEDURE — 93005 ELECTROCARDIOGRAM TRACING: CPT | Performed by: STUDENT IN AN ORGANIZED HEALTH CARE EDUCATION/TRAINING PROGRAM

## 2021-10-09 PROCEDURE — 2700000000 HC OXYGEN THERAPY PER DAY

## 2021-10-09 PROCEDURE — 99233 SBSQ HOSP IP/OBS HIGH 50: CPT | Performed by: INTERNAL MEDICINE

## 2021-10-09 PROCEDURE — 6370000000 HC RX 637 (ALT 250 FOR IP): Performed by: INTERNAL MEDICINE

## 2021-10-09 PROCEDURE — 97535 SELF CARE MNGMENT TRAINING: CPT

## 2021-10-09 PROCEDURE — 1200000000 HC SEMI PRIVATE

## 2021-10-09 PROCEDURE — 93010 ELECTROCARDIOGRAM REPORT: CPT | Performed by: INTERNAL MEDICINE

## 2021-10-09 PROCEDURE — 2500000003 HC RX 250 WO HCPCS

## 2021-10-09 PROCEDURE — 36415 COLL VENOUS BLD VENIPUNCTURE: CPT

## 2021-10-09 PROCEDURE — 80048 BASIC METABOLIC PNL TOTAL CA: CPT

## 2021-10-09 PROCEDURE — 85027 COMPLETE CBC AUTOMATED: CPT

## 2021-10-09 PROCEDURE — 94640 AIRWAY INHALATION TREATMENT: CPT

## 2021-10-09 PROCEDURE — 94760 N-INVAS EAR/PLS OXIMETRY 1: CPT

## 2021-10-09 RX ORDER — METOPROLOL TARTRATE 5 MG/5ML
5 INJECTION INTRAVENOUS ONCE
Status: COMPLETED | OUTPATIENT
Start: 2021-10-09 | End: 2021-10-09

## 2021-10-09 RX ORDER — METOPROLOL TARTRATE 5 MG/5ML
5 INJECTION INTRAVENOUS EVERY 6 HOURS PRN
Status: DISCONTINUED | OUTPATIENT
Start: 2021-10-09 | End: 2021-10-09

## 2021-10-09 RX ORDER — POTASSIUM BICARBONATE 25 MEQ/1
25 TABLET, EFFERVESCENT ORAL
Status: DISPENSED | OUTPATIENT
Start: 2021-10-09 | End: 2021-10-09

## 2021-10-09 RX ORDER — METOPROLOL TARTRATE 5 MG/5ML
5 INJECTION INTRAVENOUS EVERY 6 HOURS PRN
Status: DISCONTINUED | OUTPATIENT
Start: 2021-10-09 | End: 2021-10-11 | Stop reason: HOSPADM

## 2021-10-09 RX ORDER — SODIUM CHLORIDE, SODIUM LACTATE, POTASSIUM CHLORIDE, AND CALCIUM CHLORIDE .6; .31; .03; .02 G/100ML; G/100ML; G/100ML; G/100ML
500 INJECTION, SOLUTION INTRAVENOUS ONCE
Status: COMPLETED | OUTPATIENT
Start: 2021-10-09 | End: 2021-10-09

## 2021-10-09 RX ORDER — DILTIAZEM HYDROCHLORIDE 180 MG/1
180 CAPSULE, COATED, EXTENDED RELEASE ORAL DAILY
Status: DISCONTINUED | OUTPATIENT
Start: 2021-10-10 | End: 2021-10-09

## 2021-10-09 RX ORDER — METOPROLOL TARTRATE 5 MG/5ML
5 INJECTION INTRAVENOUS EVERY 6 HOURS
Status: DISCONTINUED | OUTPATIENT
Start: 2021-10-09 | End: 2021-10-09

## 2021-10-09 RX ADMIN — DILTIAZEM HYDROCHLORIDE 120 MG: 120 CAPSULE, COATED, EXTENDED RELEASE ORAL at 08:52

## 2021-10-09 RX ADMIN — METOPROLOL TARTRATE 5 MG: 1 INJECTION, SOLUTION INTRAVENOUS at 05:40

## 2021-10-09 RX ADMIN — CLOPIDOGREL 75 MG: 75 TABLET, FILM COATED ORAL at 09:05

## 2021-10-09 RX ADMIN — SODIUM CHLORIDE, PRESERVATIVE FREE 10 ML: 5 INJECTION INTRAVENOUS at 21:40

## 2021-10-09 RX ADMIN — POTASSIUM BICARBONATE 25 MEQ: 977.5 TABLET, EFFERVESCENT ORAL at 10:07

## 2021-10-09 RX ADMIN — METOPROLOL TARTRATE 75 MG: 25 TABLET ORAL at 21:39

## 2021-10-09 RX ADMIN — CITALOPRAM 20 MG: 20 TABLET, FILM COATED ORAL at 08:52

## 2021-10-09 RX ADMIN — METOPROLOL TARTRATE 5 MG: 1 INJECTION, SOLUTION INTRAVENOUS at 14:32

## 2021-10-09 RX ADMIN — IPRATROPIUM BROMIDE AND ALBUTEROL SULFATE 1 AMPULE: .5; 2.5 SOLUTION RESPIRATORY (INHALATION) at 19:41

## 2021-10-09 RX ADMIN — IPRATROPIUM BROMIDE AND ALBUTEROL SULFATE 1 AMPULE: .5; 2.5 SOLUTION RESPIRATORY (INHALATION) at 15:57

## 2021-10-09 RX ADMIN — SODIUM CHLORIDE: 9 INJECTION, SOLUTION INTRAVENOUS at 21:44

## 2021-10-09 RX ADMIN — IPRATROPIUM BROMIDE AND ALBUTEROL SULFATE 1 AMPULE: .5; 2.5 SOLUTION RESPIRATORY (INHALATION) at 08:46

## 2021-10-09 RX ADMIN — SODIUM CHLORIDE, PRESERVATIVE FREE 10 ML: 5 INJECTION INTRAVENOUS at 14:00

## 2021-10-09 RX ADMIN — APIXABAN 5 MG: 5 TABLET, FILM COATED ORAL at 21:39

## 2021-10-09 RX ADMIN — METOPROLOL TARTRATE 5 MG: 1 INJECTION, SOLUTION INTRAVENOUS at 18:41

## 2021-10-09 RX ADMIN — IPRATROPIUM BROMIDE AND ALBUTEROL SULFATE 1 AMPULE: .5; 2.5 SOLUTION RESPIRATORY (INHALATION) at 11:26

## 2021-10-09 RX ADMIN — APIXABAN 5 MG: 5 TABLET, FILM COATED ORAL at 08:52

## 2021-10-09 RX ADMIN — SODIUM CHLORIDE, POTASSIUM CHLORIDE, SODIUM LACTATE AND CALCIUM CHLORIDE 500 ML: 600; 310; 30; 20 INJECTION, SOLUTION INTRAVENOUS at 14:31

## 2021-10-09 RX ADMIN — FUROSEMIDE 20 MG: 20 TABLET ORAL at 08:52

## 2021-10-09 RX ADMIN — ATORVASTATIN CALCIUM 20 MG: 20 TABLET, FILM COATED ORAL at 08:53

## 2021-10-09 RX ADMIN — METOPROLOL TARTRATE 50 MG: 50 TABLET, FILM COATED ORAL at 08:53

## 2021-10-09 ASSESSMENT — PAIN SCALES - GENERAL
PAINLEVEL_OUTOF10: 0

## 2021-10-09 NOTE — PLAN OF CARE
Problem: Skin Integrity:  Goal: Will show no infection signs and symptoms  Description: Will show no infection signs and symptoms  10/9/2021 1717 by Anne Soto RN  Outcome: Ongoing  10/9/2021 0453 by Dakota Fletcher RN  Outcome: Ongoing  Goal: Absence of new skin breakdown  Description: Absence of new skin breakdown  10/9/2021 1717 by Anne Soto RN  Outcome: Ongoing  10/9/2021 0453 by Dakota Fletcher RN  Outcome: Ongoing     Problem: Falls - Risk of:  Goal: Will remain free from falls  Description: Will remain free from falls  10/9/2021 1717 by Anne Soto RN  Outcome: Ongoing  10/9/2021 0453 by Dakota Fletcher RN  Outcome: Ongoing  Goal: Absence of physical injury  Description: Absence of physical injury  10/9/2021 1717 by Anne Soto RN  Outcome: Ongoing  10/9/2021 0453 by Dakota Fletcher RN  Outcome: Ongoing     Problem: Respiratory:  Goal: Ability to maintain normal respiratory secretions will improve  Description: Ability to maintain normal respiratory secretions will improve  10/9/2021 1717 by Anne Soto RN  Outcome: Ongoing  10/9/2021 0453 by Dakota Fletcher RN  Outcome: Ongoing     Problem: Musculor/Skeletal Functional Status  Goal: Highest potential functional level  10/9/2021 1717 by Anne Soto RN  Outcome: Ongoing  10/9/2021 0453 by Dakota Fletcher RN  Outcome: Ongoing  Goal: Absence of falls  10/9/2021 1717 by Anne Soto RN  Outcome: Ongoing  10/9/2021 0453 by Dakota Fletcher RN  Outcome: Ongoing     Problem: Nutrition  Goal: Optimal nutrition therapy  10/9/2021 1717 by Anne Soto RN  Outcome: Ongoing  10/9/2021 0453 by Dakota Fletcher RN  Outcome: Ongoing

## 2021-10-09 NOTE — PLAN OF CARE
Problem: Respiratory:  Goal: Ability to maintain normal respiratory secretions will improve  Description: Ability to maintain normal respiratory secretions will improve  10/9/2021 1943 by Verona Barakat RCP  Outcome: Ongoing     Problem: Respiratory  Intervention: Respiratory assessment  Note: BRONCHOSPASM/BRONCHOCONSTRICTION     [x]         IMPROVE AERATION/BREATH SOUNDS  [x]   ADMINISTER BRONCHODILATOR THERAPY AS APPROPRIATE  [x]   ASSESS BREATH SOUNDS  []   IMPLEMENT AEROSOL/MDI PROTOCOL  [x]   PATIENT EDUCATION AS NEEDED

## 2021-10-09 NOTE — PROGRESS NOTES
Occupational Therapy  Facility/Department: [facility] RENAL//MED SURG  Daily Treatment Note  NAME: Doni Dunaway  : 1945  MRN: [id number]    Date of Service: 10/9/2021    Discharge Recommendations:  Patient would benefit from continued therapy after discharge in order to increase pt endurance, balance and independence. Discussed with OTR to update POC. Assessment   Performance deficits / Impairments: Decreased ADL status; Decreased functional mobility ; Decreased safe awareness;Decreased high-level IADLs;Decreased endurance;Decreased balance;Decreased posture  Prognosis: Good  OT Education: OT Role;Transfer Training;Precautions; Energy Conservation  Patient Education: purpose of OT; proper hand and foot placement; balance maintaince; deep breathing; walker management  Barriers to Learning: pt demo F carry over  REQUIRES OT FOLLOW UP: Yes  Activity Tolerance  Activity Tolerance: Treatment limited secondary to medical complications (free text)  Activity Tolerance: SOB and tachycardia  Safety Devices  Safety Devices in place: Yes  Type of devices: Gait belt;Patient at risk for falls; Left in chair;Chair alarm in place;Call light within reach;Nurse notified  Restraints  Initially in place: No       Patient Diagnosis(es): The primary encounter diagnosis was Atrial fibrillation with rapid ventricular response ([facility] [address] Diagnoses of Pneumonia due to infectious organism, unspecified laterality, unspecified part of lung, Chronic obstructive pulmonary disease with acute exacerbation ([address] and Hypercholesterolemia were also pertinent to this visit. has a past medical history of Asthma, COPD (chronic obstructive pulmonary disease) ([address] Gout, Hyperlipidemia, Hypertension, Mitral insufficiency, and Pneumonia. has a past surgical history that includes Hysterectomy (); knee surgery; Colonoscopy; Dental surgery; Tonsillectomy and adenoidectomy;  Ankle surgery (Left, 2016); and pr colsc flx w/rmvl of tumor polyp lesion snare tq (N/A, 8/8/2017). Restrictions  Restrictions/Precautions  Restrictions/Precautions: General Precautions, Fall Risk, Up as Tolerated  Required Braces or Orthoses?: No  Position Activity Restriction  Other position/activity restrictions: 10/5 Thoracentesis  Subjective   General  Chart Reviewed: Yes  Patient assessed for rehabilitation services?: Yes  Family / Caregiver Present: No  Diagnosis: Sepsis w/ acute hypoxic respiratory failure without septic shock  General Comment  Comments: Pt and RN agreeable to therapy this day  Pain Assessment  Pain Assessment: 0-10  Pain Level: 0  Pre Treatment Pain Screening  Comments / Details: Pt supine in bed at start of session req min encouragement for therapy. At session end pt seated in chair with BLE elevated, call light in reach and chair alarm on. Vital Signs  Patient Currently in Pain: Denies   Orientation  Orientation  Overall Orientation Status: Within Functional Limits  Objective    ADL  Toileting: Moderate assistance;Setup; Increased time to complete (at [facility])  Additional Comments: Pt noted need for BM transfered to [facility]. Pt attempting personal hygiene standing with RW limited per habitus req CERAN assist. During session pt tachycardic, further ADLs not attmepted. During session pt limited per HR, high BMI and increased SOB.      Balance  Sitting Balance: Stand by assistance (Pt tolerated approx 10-12 min static sitting at EOB and BSC utilizing 0-2 hand support for balance)  Standing Balance: Contact guard assistance  Standing Balance  Time: Pt tolerated approx 5 min  Activity: static/dynamic standing during ADLs  Comment: utilizing RW req verbal cues for pursed lip breathing throughout  Functional Mobility  Functional - Mobility Device: Rolling Walker  Activity: Other  Assist Level: Minimal assistance  Functional Mobility Comments: EOB>BSC>chair req assist with walker management pt on 4L O2 via NC throughout  Toilet Transfers  Toilet - Technique: Ambulating  Equipment Used: Standard bedside commode  Toilet Transfer: Minimal assistance  Toilet Transfers Comments: req assist with walker management  Bed mobility  Supine to Sit: Minimal assistance (req assist with trunk)  Scooting: Minimal assistance  Comment: HOB elevated, utilizing bed rails req increased time  Transfers  Sit to stand: Minimal assistance  Stand to sit: Minimal assistance  Transfer Comments: utilizing RW req cues for proper hand placement     Cognition  Overall Cognitive Status: [facility]      Plan   Plan  Times per week: 3-5x/wk  Current Treatment Recommendations: Safety Education & Training, Balance Training, Patient/Caregiver Education & Training, Self-Care / ADL, Functional Mobility Training, Equipment Evaluation, Education, & procurement, Home Management Training, Endurance Training    AM-PAC Score  AM-PAC Inpatient Daily Activity Raw Score: 17 (10/09/21 1219)  AM-PAC Inpatient ADL T-Scale Score : 37.26 (10/09/21 1219)  ADL Inpatient CMS 0-100% Score: 50.11 (10/09/21 1219)  ADL Inpatient CMS G-Code Modifier : CK (10/09/21 1219)    Goals  Short term goals  Time Frame for Short term goals: By discharge, pt will:  Short term goal 1: Demonstrate bed mobility with Mod A  Short term goal 2: Demonstrate functional sit<>stand transfers with SBA, using LRD PRN  Short term goal 3: Demonstrate functional mobility with SBA, using LRD PRN and 0 VCs for walker navigation  Short term goal 4: Demonstrate +4 minutes of dynamic standing balance w/ unilateral release from RW at [facility] throughout functional/ADL task  Short term goal 5: Demonstrate UB ADLs with SUP and setup  Short term goal 6: Demonstrate LB ADLs with Min A, setup, use of DME PRN  Short term goal 7: Demonstrate good use of energy conservation techniques IND throughotu all functional/ADL tasks with 0 VCs for initiation       Therapy Time   Individual Concurrent Group Co-treatment   Time In 0915         Time Out 0944         Minutes 29         Timed Code Treatment Minutes: [address], CERNA/L

## 2021-10-09 NOTE — PROGRESS NOTES
failure likely due to underlying pneumonia vs CHF vs new onset A. fib with RVR. She required BiPAP and was treated with fluids, antibiotics and vasopressors. She weaned off Levophed and completed the course of cefepime for sepsis secondary to pneumonia versus asymptomatic UTI. Inflammatory markers were monitored. ID was on board.     Cardiology on board for A. fib with RVR was managed with heparin drip and Cardizem drip and Lopressor 50 mg twice daily. TTE showed preserved LV systolic function and no significant valve abnormalities. Cardizem drip was weaned off and started on p.o. Cardizem with increasing dose. She was also found to have stable small pericardial effusion. She was started on bronchodilators for her COPD. Was on IV Lasix for concomitant CHF exacerbation exacerbation. Electrolytes were replaced timely.     Patient underwent CURT today, found to have LA dilated, no thrombus in left atrial appendage, EF 55%. She underwent synchronized cardioversion. She tolerated procedure well without any complications.     On my evaluation, patient is hemodynamically stable (/70, 88 HR regular rhythm, 20 RR, afebrile), saturating well on 4 L NC. She is sitting on the bed in a propped up position. She was due for her bronchodilator therapy and was feeling somewhat short of breath, otherwise she felt well. She denies any active complaints.     OBJECTIVE     Vital Signs:  /85   Pulse 109   Temp 98.1 °F (36.7 °C) (Oral)   Resp 24   Ht 5' 4\" (1.626 m)   Wt 253 lb 15.5 oz (115.2 kg)   SpO2 95%   BMI 43.59 kg/m²     Temp (24hrs), Av.6 °F (36.4 °C), Min:95.7 °F (35.4 °C), Max:98.2 °F (36.8 °C)    In: -   Out: 1325 [Urine:1325]    Physical Exam:  Constitutional: This is a well developed, well nourished, Greater than 40 - Morbid Obesity / Extreme Obesity / Grade III 68y.o. year old female who is alert, oriented, cooperative and in no apparent distress.   Head:normocephalic and atraumatic. EENT:  PERRLA. No conjunctival injections. Septum was midline, mucosa was without erythema, exudates or cobblestoning. No thrush was noted. Neck: Supple without thyromegaly. No elevated JVP. Trachea was midline. Respiratory: Chest was symmetrical without dullness to percussion. Breath sounds bilaterally were clear to auscultation. There were no wheezes, rhonchi or rales. There is no intercostal retraction or use of accessory muscles. No egophony noted. Cardiovascular: Regular without murmur, clicks, gallops or rubs. Abdomen: Round and soft without organomegaly. No rebound, rigidity or guarding was appreciated. Lymphatic: No lymphadenopathy. Musculoskeletal: Normal curvature of the spine. No gross muscle weakness. Extremities:  No lower extremity edema, ulcerations, tenderness, varicosities or erythema. Muscle size, tone and strength are normal.  No involuntary movements are noted. Skin:  Warm and dry. Good color, turgor and pigmentation. No lesions or scars.   No cyanosis or clubbing  Neurological/Psychiatric: The patient's general behavior, level of consciousness, thought content and emotional status is normal.        Medications:  Scheduled Medications:    apixaban  5 mg Oral BID    polyethylene glycol  17 g Oral Daily    dilTIAZem  120 mg Oral Daily    sodium chloride flush  5-40 mL IntraVENous 2 times per day    furosemide  20 mg Oral Daily    metoprolol tartrate  50 mg Oral BID    atorvastatin  20 mg Oral Daily    clopidogrel  75 mg Oral Daily    citalopram  20 mg Oral Daily    sodium chloride flush  5-40 mL IntraVENous 2 times per day    ipratropium-albuterol  1 ampule Inhalation 4x daily     Continuous Infusions:    sodium chloride      sodium chloride Stopped (10/05/21 1925)    sodium chloride       PRN Medicationsmetoprolol, 5 mg, Q6H PRN  sodium chloride flush, 5-40 mL, PRN  sodium chloride, 25 mL, PRN  sodium chloride flush, 5-40 mL, PRN  sodium chloride, 25 mL, PRN  ondansetron, 4 mg, Q8H PRN   Or  ondansetron, 4 mg, Q6H PRN  polyethylene glycol, 17 g, Daily PRN  acetaminophen, 650 mg, Q6H PRN   Or  acetaminophen, 650 mg, Q6H PRN  potassium chloride, 20 mEq, PRN  magnesium sulfate, 2,000 mg, PRN  albuterol, 2.5 mg, Q4H PRN        Diagnostic Labs:  CBC:   Recent Labs     10/07/21  0411 10/08/21  0551 10/09/21  0611   WBC 13.0* 17.1* 16.4*   RBC 3.99 4.27 4.51   HGB 12.0 13.0 13.4   HCT 39.4 42.2 43.4   MCV 98.7 98.8 96.2   RDW 12.3 12.3 12.2    442 419     BMP:   Recent Labs     10/07/21  0411 10/08/21  0551 10/09/21  0611    138 135   K 3.6* 3.7 3.4*   CL 99 98 94*   CO2 28 28 27   BUN 23 19 15   CREATININE 0.76 0.75 0.63     BNP: No results for input(s): BNP in the last 72 hours. PT/INR: No results for input(s): PROTIME, INR in the last 72 hours. APTT:   Recent Labs     10/07/21  0411 10/08/21  0551 10/09/21  0611   APTT 69.1* 70.1* 26.7     CARDIAC ENZYMES: No results for input(s): CKMB, CKMBINDEX, TROPONINI in the last 72 hours. Invalid input(s): CKTOTAL;3  FASTING LIPID PANEL:  Lab Results   Component Value Date    CHOL 195 06/02/2016    HDL 45 06/02/2016    TRIG 94 06/02/2016     LIVER PROFILE: No results for input(s): AST, ALT, ALB, BILIDIR, BILITOT, ALKPHOS in the last 72 hours. MICROBIOLOGY:   Lab Results   Component Value Date/Time    CULTURE NO GROWTH 4 DAYS 10/04/2021 05:27 PM    CULTURE PENDING 10/04/2021 05:27 PM       Imaging:    CT CHEST WO CONTRAST    Result Date: 10/3/2021  1. Moderate left and small right pleural effusions. There is complete atelectasis of the left lower lobe and partial atelectasis of the right lower lobe. Superimposed pneumonitis may be considered clinically. 2. Severe cardiomegaly with a moderate pericardial effusion. XR CHEST PORTABLE    Result Date: 10/5/2021  No significant interval change. XR CHEST PORTABLE    Result Date: 10/4/2021  Interval decrease in the left pleural effusion.   No evidence of pneumothorax     XR CHEST PORTABLE    Result Date: 10/4/2021  No significant interval change in pleural effusions and associated basilar opacities, left greater than right. XR CHEST PORTABLE    Result Date: 10/2/2021  Findings most consistent with mild congestive heart failure. Left retrocardiac opacification/consolidation may represent a combination of pleural fluid and airspace disease. Underlying pneumonia not excluded. US THORACENTESIS Which side should the procedure be performed? Left    Result Date: 10/4/2021  Successful ultrasound guided thoracentesis. ASSESSMENT & PLAN     Acute hypoxic respiratory failure: Resolved  Required BiPAP  Continue nightly BiPAP as needed  On 3 L NC. Will decrease with goal SaO2 of 88-92%      A. fib with RVR:   [id number] score 4  Underwent CURT with under anesthesia  Continue anticoagulation with heparin  NOAC on discharge  Continue Cardizem 120 mg   Continue lopressor increased to 75 mg BID     Left lower lobe pneumonia with pleural effusion with sepsis: resolving  S/p thoracocentesis S/O transudative  No growth on culture  Completed the course of Cefepime  ID signed off.      Pericardial effusion:  Stable     COPD exacerbation:resolving  Continue as needed bronchodilators (Symbicort, Spiriva, DuoNeb)  Supplemental oxygen to keep saturations between 88 and 92%     Chronic CHF:   EF 59 %  Lasix held as she is in negative fluid balance today.    Continue Lopressor, Lipitor and Plavix     HTN:  Will resume home BP med, Cozaar 100 mg, Lopressor 50 BID as the BP tolerates.      Chronic arthritis     Morbid obesity     SEJAL:  On home CPAP  Noncompliant     Mood disorder:  Resume Celexa/citalopram     Asymptomatic UTI:  Treated with antibiotics- Cefepime     Urinary retention:  Resolved     NSTEMI type II  Elevated troponin - Demand ischemia from CASEY/UTI     DVT prophylaxis:  On heparin drip     GI prophylaxis:  On Pepcid 20 mg twice daily     PT/OT/SW:  Ongoing     Discharge planning:   to assist in discharge planning. Darion Burris MD  Internal Medicine Resident, PGY-1   [facility];  [place], [place]  10/9/2021, 8:18 AM

## 2021-10-09 NOTE — CARE COORDINATION
Writer called [facility] weekend rep is Eva Pérez [phone] and left message that OT notes are available. [facility] states that patient is accepted to [facility] and will need a rapid covid prior to discharge. Eva Pérez is also working 10/10 if patient is ready tomorrow.

## 2021-10-09 NOTE — PLAN OF CARE
Problem: Skin Integrity:  Goal: Will show no infection signs and symptoms  Description: Will show no infection signs and symptoms  Outcome: Ongoing  Goal: Absence of new skin breakdown  Description: Absence of new skin breakdown  Outcome: Ongoing     Problem: Falls - Risk of:  Goal: Will remain free from falls  Description: Will remain free from falls  Outcome: Ongoing  Goal: Absence of physical injury  Description: Absence of physical injury  Outcome: Ongoing     Problem: Respiratory:  Goal: Ability to maintain normal respiratory secretions will improve  Description: Ability to maintain normal respiratory secretions will improve  Outcome: Ongoing     Problem: Musculor/Skeletal Functional Status  Goal: Highest potential functional level  Outcome: Ongoing  Goal: Absence of falls  Outcome: Ongoing     Problem: Nutrition  Goal: Optimal nutrition therapy  Outcome: Ongoing

## 2021-10-10 VITALS
BODY MASS INDEX: 43.36 KG/M2 | HEART RATE: 111 BPM | TEMPERATURE: 98.1 F | HEIGHT: 64 IN | RESPIRATION RATE: 24 BRPM | SYSTOLIC BLOOD PRESSURE: 144 MMHG | WEIGHT: 254 LBS | DIASTOLIC BLOOD PRESSURE: 102 MMHG | OXYGEN SATURATION: 99 %

## 2021-10-10 LAB
ANION GAP SERPL CALCULATED.3IONS-SCNC: 13 MMOL/L (ref 9–17)
BUN BLDV-MCNC: 16 MG/DL (ref 8–23)
BUN/CREAT BLD: ABNORMAL (ref 9–20)
CALCIUM SERPL-MCNC: 9.4 MG/DL (ref 8.6–10.4)
CHLORIDE BLD-SCNC: 96 MMOL/L (ref 98–107)
CO2: 28 MMOL/L (ref 20–31)
CREAT SERPL-MCNC: 0.62 MG/DL (ref 0.5–0.9)
CULTURE: ABNORMAL
DIRECT EXAM: ABNORMAL
EKG ATRIAL RATE: 125 BPM
EKG Q-T INTERVAL: 334 MS
EKG QRS DURATION: 118 MS
EKG QTC CALCULATION (BAZETT): 469 MS
EKG R AXIS: -42 DEGREES
EKG T AXIS: 131 DEGREES
EKG VENTRICULAR RATE: 119 BPM
GFR AFRICAN AMERICAN: >60 ML/MIN
GFR NON-AFRICAN AMERICAN: >60 ML/MIN
GFR SERPL CREATININE-BSD FRML MDRD: ABNORMAL ML/MIN/{1.73_M2}
GFR SERPL CREATININE-BSD FRML MDRD: ABNORMAL ML/MIN/{1.73_M2}
GLUCOSE BLD-MCNC: 112 MG/DL (ref 70–99)
HCT VFR BLD CALC: 44.4 % (ref 36.3–47.1)
HEMOGLOBIN: 13.7 G/DL (ref 11.9–15.1)
Lab: ABNORMAL
MCH RBC QN AUTO: 29.3 PG (ref 25.2–33.5)
MCHC RBC AUTO-ENTMCNC: 30.9 G/DL (ref 28.4–34.8)
MCV RBC AUTO: 95.1 FL (ref 82.6–102.9)
NRBC AUTOMATED: 0 PER 100 WBC
PARTIAL THROMBOPLASTIN TIME: 27.5 SEC (ref 20.5–30.5)
PDW BLD-RTO: 12.2 % (ref 11.8–14.4)
PLATELET # BLD: 404 K/UL (ref 138–453)
PMV BLD AUTO: 9 FL (ref 8.1–13.5)
POTASSIUM SERPL-SCNC: 3.4 MMOL/L (ref 3.7–5.3)
RBC # BLD: 4.67 M/UL (ref 3.95–5.11)
SODIUM BLD-SCNC: 137 MMOL/L (ref 135–144)
SPECIMEN DESCRIPTION: ABNORMAL
WBC # BLD: 16.6 K/UL (ref 3.5–11.3)

## 2021-10-10 PROCEDURE — 85027 COMPLETE CBC AUTOMATED: CPT

## 2021-10-10 PROCEDURE — 80048 BASIC METABOLIC PNL TOTAL CA: CPT

## 2021-10-10 PROCEDURE — 6370000000 HC RX 637 (ALT 250 FOR IP): Performed by: STUDENT IN AN ORGANIZED HEALTH CARE EDUCATION/TRAINING PROGRAM

## 2021-10-10 PROCEDURE — 94640 AIRWAY INHALATION TREATMENT: CPT

## 2021-10-10 PROCEDURE — 6370000000 HC RX 637 (ALT 250 FOR IP): Performed by: NURSE PRACTITIONER

## 2021-10-10 PROCEDURE — 36415 COLL VENOUS BLD VENIPUNCTURE: CPT

## 2021-10-10 PROCEDURE — 99233 SBSQ HOSP IP/OBS HIGH 50: CPT | Performed by: INTERNAL MEDICINE

## 2021-10-10 PROCEDURE — 85730 THROMBOPLASTIN TIME PARTIAL: CPT

## 2021-10-10 PROCEDURE — 2500000003 HC RX 250 WO HCPCS: Performed by: STUDENT IN AN ORGANIZED HEALTH CARE EDUCATION/TRAINING PROGRAM

## 2021-10-10 PROCEDURE — 2700000000 HC OXYGEN THERAPY PER DAY

## 2021-10-10 PROCEDURE — 93010 ELECTROCARDIOGRAM REPORT: CPT | Performed by: INTERNAL MEDICINE

## 2021-10-10 PROCEDURE — 6370000000 HC RX 637 (ALT 250 FOR IP): Performed by: INTERNAL MEDICINE

## 2021-10-10 RX ORDER — POTASSIUM CHLORIDE 20 MEQ/1
40 TABLET, EXTENDED RELEASE ORAL ONCE
Status: COMPLETED | OUTPATIENT
Start: 2021-10-10 | End: 2021-10-10

## 2021-10-10 RX ORDER — POTASSIUM BICARBONATE 25 MEQ/1
25 TABLET, EFFERVESCENT ORAL EVERY 4 HOURS
Status: DISCONTINUED | OUTPATIENT
Start: 2021-10-10 | End: 2021-10-10

## 2021-10-10 RX ORDER — DILTIAZEM HYDROCHLORIDE 120 MG/1
120 CAPSULE, COATED, EXTENDED RELEASE ORAL ONCE
Status: COMPLETED | OUTPATIENT
Start: 2021-10-10 | End: 2021-10-10

## 2021-10-10 RX ORDER — DILTIAZEM HYDROCHLORIDE 240 MG/1
240 CAPSULE, COATED, EXTENDED RELEASE ORAL DAILY
Qty: 30 CAPSULE | Refills: 3 | Status: ON HOLD | OUTPATIENT
Start: 2021-10-10 | End: 2021-10-22 | Stop reason: HOSPADM

## 2021-10-10 RX ORDER — METOPROLOL TARTRATE 75 MG/1
TABLET, FILM COATED ORAL
Qty: 60 TABLET | Refills: 3 | Status: ON HOLD | OUTPATIENT
Start: 2021-10-10 | End: 2021-10-22 | Stop reason: HOSPADM

## 2021-10-10 RX ADMIN — CITALOPRAM 20 MG: 20 TABLET, FILM COATED ORAL at 09:37

## 2021-10-10 RX ADMIN — METOPROLOL TARTRATE 75 MG: 25 TABLET ORAL at 09:52

## 2021-10-10 RX ADMIN — CLOPIDOGREL 75 MG: 75 TABLET, FILM COATED ORAL at 09:38

## 2021-10-10 RX ADMIN — POTASSIUM CHLORIDE 40 MEQ: 1500 TABLET, EXTENDED RELEASE ORAL at 14:13

## 2021-10-10 RX ADMIN — POTASSIUM BICARBONATE 25 MEQ: 977.5 TABLET, EFFERVESCENT ORAL at 09:36

## 2021-10-10 RX ADMIN — DILTIAZEM HYDROCHLORIDE 120 MG: 120 CAPSULE, COATED, EXTENDED RELEASE ORAL at 14:13

## 2021-10-10 RX ADMIN — ACETAMINOPHEN 650 MG: 325 TABLET ORAL at 11:37

## 2021-10-10 RX ADMIN — IPRATROPIUM BROMIDE AND ALBUTEROL SULFATE 1 AMPULE: .5; 2.5 SOLUTION RESPIRATORY (INHALATION) at 08:30

## 2021-10-10 RX ADMIN — IPRATROPIUM BROMIDE AND ALBUTEROL SULFATE 1 AMPULE: .5; 2.5 SOLUTION RESPIRATORY (INHALATION) at 16:07

## 2021-10-10 RX ADMIN — DILTIAZEM HYDROCHLORIDE 120 MG: 120 CAPSULE, COATED, EXTENDED RELEASE ORAL at 09:38

## 2021-10-10 RX ADMIN — ATORVASTATIN CALCIUM 20 MG: 20 TABLET, FILM COATED ORAL at 09:38

## 2021-10-10 RX ADMIN — APIXABAN 5 MG: 5 TABLET, FILM COATED ORAL at 09:37

## 2021-10-10 RX ADMIN — METOPROLOL TARTRATE 5 MG: 1 INJECTION, SOLUTION INTRAVENOUS at 05:53

## 2021-10-10 ASSESSMENT — PAIN DESCRIPTION - DESCRIPTORS: DESCRIPTORS: ACHING

## 2021-10-10 ASSESSMENT — PAIN SCALES - GENERAL
PAINLEVEL_OUTOF10: 5
PAINLEVEL_OUTOF10: 8
PAINLEVEL_OUTOF10: 8

## 2021-10-10 ASSESSMENT — PAIN DESCRIPTION - LOCATION: LOCATION: KNEE;HAND

## 2021-10-10 ASSESSMENT — PAIN DESCRIPTION - ORIENTATION: ORIENTATION: RIGHT;LEFT

## 2021-10-10 NOTE — PLAN OF CARE
Problem: Skin Integrity:  Goal: Will show no infection signs and symptoms  Description: Will show no infection signs and symptoms  10/10/2021 0508 by Courtney Ibarra RN  Outcome: Ongoing  10/9/2021 1717 by Jeffrey Patel RN  Outcome: Ongoing  Goal: Absence of new skin breakdown  Description: Absence of new skin breakdown  10/10/2021 0508 by Courtney Ibarra RN  Outcome: Ongoing  10/9/2021 1717 by Jeffrey Patel RN  Outcome: Ongoing     Problem: Falls - Risk of:  Goal: Will remain free from falls  Description: Will remain free from falls  10/10/2021 0508 by Courtney Ibarra RN  Outcome: Ongoing  10/9/2021 1717 by Jeffrey Patel RN  Outcome: Ongoing  Goal: Absence of physical injury  Description: Absence of physical injury  10/10/2021 0508 by Courtney Ibarar RN  Outcome: Ongoing  10/9/2021 1717 by Jeffrey Patel RN  Outcome: Ongoing     Problem: Respiratory:  Goal: Ability to maintain normal respiratory secretions will improve  Description: Ability to maintain normal respiratory secretions will improve  10/10/2021 0508 by Courtney Ibarra RN  Outcome: Ongoing  10/9/2021 1943 by Lacho Farrar RCP  Outcome: Ongoing  10/9/2021 1717 by Jeffrey Patel RN  Outcome: Ongoing     Problem: Musculor/Skeletal Functional Status  Goal: Highest potential functional level  10/10/2021 0508 by Courtney Ibarra RN  Outcome: Ongoing  10/9/2021 1717 by Jeffrey Patel RN  Outcome: Ongoing  Goal: Absence of falls  10/10/2021 0508 by Courtney Ibarra RN  Outcome: Ongoing  10/9/2021 1717 by Jeffrey Patel RN  Outcome: Ongoing     Problem: Nutrition  Goal: Optimal nutrition therapy  10/10/2021 0508 by Coutrney Ibarra RN  Outcome: Ongoing  10/9/2021 1717 by Jeffrey Patel RN  Outcome: Ongoing

## 2021-10-10 NOTE — DISCHARGE INSTR - COC
Continuity of Care Form    Patient Name: Anisa Graham   :  [birth year]  MRN:  [id number]    Admit date:  10/2/2021  Discharge date:  10/10/2021    Code Status Order: Full Code   Advance Directives:      Admitting Physician:  Lenore Barrera MD  PCP: Mae Son, APRN - CNP    Discharging Nurse: MILI POLLARD Unit/Room#: 7802/1828-53  Discharging Unit Phone Number: [phone]    Emergency Contact:   Extended Emergency Contact Information  Primary Emergency Contact: Katie Cardoza [address] Phone: [phone]  Relation: Child   needed?  No  Secondary Emergency Contact: Murtis Cheadle [address] Phone: [phone]  Relation: Child    Past Surgical History:  Past Surgical History:   Procedure Laterality Date    ANKLE SURGERY Left 2016    ORIF    COLONOSCOPY      DENTAL SURGERY      HYSTERECTOMY  1986    KNEE SURGERY      MI COLSC FLX W/RMVL OF TUMOR POLYP LESION SNARE TQ N/A 2017    COLONOSCOPY POLYPECTOMY SNARE/COLD BIOPSY performed by Dina Powell MD at [place] [address]         Immunization History:   Immunization History   Administered Date(s) Administered    COVID-19, Moderna, PF, 100mcg/0.5mL 2021, 04/10/2021    Influenza Vaccine, unspecified formulation 2017    Influenza Virus Vaccine 2017, 2018    Influenza Whole 12/15/2015    Pneumococcal Conjugate 13-valent ([id number]) 10/17/2016    Pneumococcal Polysaccharide ([id number]) 12/15/2015    Td, unspecified formulation 2016       Active Problems:  Patient Active Problem List   Diagnosis Code    Asthma J45.909    Benign essential HTN I10    Morbid obesity with BMI of 40.0-44.9, adult ([address] E66.01, Z68.41    Open bimalleolar fracture of left ankle S82.842B    Hyperlipidemia E78.5    COPD exacerbation ([address] J44.1    Pulmonary emphysema ([address] J43.9    Postoperative confusion R41.0  Acute respiratory failure with hypoxia ([facility]) J96.01    Simple chronic bronchitis ([facility]) J41.0    Atrial fibrillation with rapid ventricular response ([facility]) I48.91    Pneumonia of left lower lobe due to infectious organism J18.9    Sepsis with acute hypoxic respiratory failure without septic shock ([facility]) A41.9, R65.20, J96.01    Acute on chronic diastolic heart failure ([facility]) I50.33    UTI (urinary tract infection) N39.0    Hyponatremia E87.1    Hypokalemia E87.6    Elevated troponin R77.8    Anemia D64.9    Arthritis M19.90    At high risk for falls Z91.81    Colon polyps K63.5    Heart murmur R01.1    Legally blind in right eye, as defined in [place] H54.8    COPD (chronic obstructive pulmonary disease) ([facility]) J44.9    Atrial fibrillation status post cardioversion [facility]) I48.91       Isolation/Infection:   Isolation            No Isolation          Patient Infection Status       Infection Onset Added Last Indicated Last Indicated By Review Planned Expiration Resolved Resolved By    None active    Resolved    COVID-19 Rule Out 10/03/21 10/03/21 10/03/21 Respiratory Panel, Molecular, with COVID-19 (Restricted: peds pts or suitable admitted adults) (Ordered)   10/03/21 Rule-Out Test Resulted    COVID-19 Rule Out 10/02/21 10/02/21 10/02/21 COVID-19, Rapid (Ordered)   10/02/21 Rule-Out Test Resulted            Nurse Assessment:  Last Vital Signs: /60   Pulse 84   Temp 98.3 °F (36.8 °C) (Oral)   Resp 18   Ht 5' 4\" (1.626 m)   Wt 254 lb (115.2 kg)   SpO2 95%   BMI 43.60 kg/m²     Last documented pain score (0-10 scale): Pain Level: 0  Last Weight:   Wt Readings from Last 1 Encounters:   10/10/21 254 lb (115.2 kg)     Mental Status:  oriented    IV Access:  - None    Nursing Mobility/ADLs:  Walking   Assisted  Transfer  Assisted  Bathing  Assisted  Dressing  Assisted  Toileting  Assisted  Feeding  Independent  Med Admin  Independent  Med Delivery   whole    Wound Care Documentation and Therapy:  Wound 03/28/16 Blister Foot Left;Medial Blood filled blister (Active)   Number of days: 2021       Wound 03/28/16 Blister Other (Comment) Left;Posterior intact blood filled blister (Active)   Number of days: 2021       Wound 03/28/16 Blister Foot Dorsal Multiple clear fliud filled blisters  (Active)   Number of days: 2021       Incision 03/22/16 Ankle Left;Medial (Active)   Number of days: 2028       Incision 03/28/16 Ankle Left;Lateral (Active)   Number of days: 2021        Elimination:  Continence:   · Bowel: Yes  · Bladder: Catheter  Urinary Catheter: Insertion Date: 10/2/2021   Colostomy/Ileostomy/Ileal Conduit: No       Date of Last BM: 10/9/2021    Intake/Output Summary (Last 24 hours) at 10/10/2021 1042  Last data filed at 10/10/2021 1009  Gross per 24 hour   Intake 480 ml   Output 1275 ml   Net -795 ml     I/O last 3 completed shifts:  In: -   Out: [address] [facility] [Urine:1175]    Safety Concerns: At Risk for Falls    Impairments/Disabilities:      None    Nutrition Therapy:  Current Nutrition Therapy:   - Oral Diet:  Cardiac    Routes of Feeding: Oral  Liquids: No Restrictions  Daily Fluid Restriction: no  Last Modified Barium Swallow with Video (Video Swallowing Test): not done    Treatments at the Time of Hospital Discharge:   Respiratory Treatments: Nebulizer  Oxygen Therapy:  is on oxygen at 3 L/min per nasal cannula.   Ventilator:    - No ventilator support    Rehab Therapies: Physical Therapy and Occupational Therapy  Weight Bearing Status/Restrictions: No weight bearing restirctions  Other Medical Equipment (for information only, NOT a DME order):  walker, bath bench and bedside commode  Other Treatments: ***    Patient's personal belongings (please select all that are sent with patient):  personal belongings    RN SIGNATURE:  Electronically signed by Sophie Street RN on 10/10/21 at 4:40 PM EDT    CASE MANAGEMENT/SOCIAL WORK SECTION    Inpatient Status Date: ***    Readmission Risk Assessment Score:  Readmission Risk              Risk of Unplanned Readmission:  15           Discharging to Facility/ Agency   · Name: [facility] [address] [facility] [address]         Phone: [phone]       Fax: [phone]        Dialysis Facility (if applicable)   · Name:  · Address:  · Dialysis Schedule:  · Phone:  · Fax:    / signature: Electronically signed by Bety Cotton RN on 10/10/21 at 4:21 PM EDT    PHYSICIAN SECTION    Prognosis: Fair    Condition at Discharge: Stable    Rehab Potential (if transferring to Rehab): Fair    Recommended Labs or Other Treatments After Discharge: please continue eliquis, metoprolol and cardizem for atrial fibrillation     Please follow-up with your primary care physician and cardiologist    Physician Certification: I certify the above information and transfer of Antonio Cox  is necessary for the continuing treatment of the diagnosis listed and that she requires [place] for less 30 days.      Update Admission H&P: No change in H&P    PHYSICIAN SIGNATURE:  Electronically signed by Ruthie Figueroa MD on 10/10/21 at 10:42 AM EDT

## 2021-10-10 NOTE — PROGRESS NOTES
[facility]  Internal Medicine Teaching Residency Program  Inpatient Daily Progress Note  ______________________________________________________________________________    Patient: Antonio Cox  YOB: 1945   PHY:[id number]    Acct: [de-identified]     Room: 0331/0331-01  Admit date: 10/2/2021  Today's date: 10/10/21  Number of days in the hospital: 8    SUBJECTIVE   Admitting Diagnosis: Sepsis with acute hypoxic respiratory failure without septic shock ([facility]  CC: Shortness of breath and palpitation  Pt examined at bedside. Chart & results reviewed. No acute events overnight. Patient did not have any complaints. Heart rate in 120s fluctuating to 140s. Increase Cardizem from 120 daily to 240 mg daily. Plan for discharge to SNF today. On 3 L oxygen via nasal cannula. Reviewed labs. ROS:  Constitutional:  negative for chills, fevers, sweats  Respiratory:  negative for cough, dyspnea on exertion, hemoptysis, shortness of breath, wheezing  Cardiovascular:  negative for chest pain, chest pressure/discomfort, lower extremity edema, palpitations  Gastrointestinal:  negative for abdominal pain, constipation, diarrhea, nausea, vomiting  Neurological:  negative for dizziness, headache  BRIEF HISTORY     26-year-old  female with PMH of COPD/CHF/SEJAL/HTN, presented with SOB and palpitations.  She was found to have left lower lobe pneumonia with pleural effusion and relief SVT followed by new onset A. fib with RVR.  She was admitted to medical ICU for management of acute hypoxic respiratory failure likely due to underlying pneumonia vs CHF vs new onset A. fib with RVR.      She required BiPAP and was treated with fluids, antibiotics and vasopressors.  She weaned off Levophed and completed the course of cefepime for sepsis secondary to pneumonia versus asymptomatic UTI.  Inflammatory markers were monitored.  ID was on board.     Cardiology on board for ADRIA fib with RVR was managed with heparin drip and Cardizem drip and Lopressor 50 mg twice daily.  TTE showed preserved LV systolic function and no significant valve abnormalities.  Cardizem drip was weaned off and started on p.o. Cardizem with increasing dose.  She was also found to have stable small pericardial effusion. She was started on bronchodilators for her COPD. Patient underwent CURT  found to have LA dilated, no thrombus in left atrial appendage, EF 55%.  She underwent synchronized cardioversion.  She tolerated procedure well without any complications. OBJECTIVE     Vital Signs:  BP (!) 144/102   Pulse 111   Temp 98.1 °F (36.7 °C) (Oral)   Resp 24   Ht 5' 4\" (1.626 m)   Wt 254 lb (115.2 kg)   SpO2 99%   BMI 43.60 kg/m²     Temp (24hrs), Av °F (36.7 °C), Min:97.3 °F (36.3 °C), Max:98.6 °F (37 °C)    In: 480   Out: 1275 [Urine:1275]    Physical Exam:  Constitutional: This is a well developed, well nourished, Greater than 40 - Morbid Obesity / Extreme Obesity / Grade III 68y.o. year old female who is alert, oriented, cooperative and in no apparent distress. Head:normocephalic and atraumatic. Neck: Supple without thyromegaly. No elevated JVP. Trachea was midline. Respiratory: Chest was symmetrical without dullness to percussion. Breath sounds bilaterally were clear to auscultation. .   Cardiovascular: Tachycardic heart rate in 130s. Rhythm in atrial fibrillation. Abdomen: Slightly rounded and soft without organomegaly. No rebound, rigidity or guarding was appreciated.     Extremities:  No lower extremity edema  Neurological/Psychiatric: The patient's general behavior, level of consciousness, thought content and emotional status is normal.        Medications:  Scheduled Medications:    dilTIAZem  120 mg Oral Once    potassium chloride  40 mEq Oral Once    metoprolol tartrate  75 mg Oral BID    apixaban  5 mg Oral BID    polyethylene glycol  17 g Oral Daily    dilTIAZem  120 mg Oral Daily    sodium chloride flush  5-40 mL IntraVENous 2 times per day    [Held by provider] furosemide  20 mg Oral Daily    atorvastatin  20 mg Oral Daily    clopidogrel  75 mg Oral Daily    citalopram  20 mg Oral Daily    sodium chloride flush  5-40 mL IntraVENous 2 times per day    ipratropium-albuterol  1 ampule Inhalation 4x daily     Continuous Infusions:    sodium chloride      sodium chloride 10 mL/hr at 10/09/21 2144    sodium chloride       PRN Medicationsmetoprolol, 5 mg, Q6H PRN  sodium chloride flush, 5-40 mL, PRN  sodium chloride, 25 mL, PRN  sodium chloride flush, 5-40 mL, PRN  sodium chloride, 25 mL, PRN  ondansetron, 4 mg, Q8H PRN   Or  ondansetron, 4 mg, Q6H PRN  polyethylene glycol, 17 g, Daily PRN  acetaminophen, 650 mg, Q6H PRN   Or  acetaminophen, 650 mg, Q6H PRN  potassium chloride, 20 mEq, PRN  magnesium sulfate, 2,000 mg, PRN  albuterol, 2.5 mg, Q4H PRN        Diagnostic Labs:  CBC:   Recent Labs     10/08/21  0551 10/09/21  0611 10/10/21  0600   WBC 17.1* 16.4* 16.6*   RBC 4.27 4.51 4.67   HGB 13.0 13.4 13.7   HCT 42.2 43.4 44.4   MCV 98.8 96.2 95.1   RDW 12.3 12.2 12.2    419 404     BMP:   Recent Labs     10/08/21  0551 10/09/21  0611 10/10/21  0600    135 137   K 3.7 3.4* 3.4*   CL 98 94* 96*   CO2 28 27 28   BUN 19 15 16   CREATININE 0.75 0.63 0.62     BNP: No results for input(s): BNP in the last 72 hours. PT/INR: No results for input(s): PROTIME, INR in the last 72 hours. APTT:   Recent Labs     10/08/21  0551 10/09/21  0611 10/10/21  0600   APTT 70.1* 26.7 27.5     CARDIAC ENZYMES: No results for input(s): CKMB, CKMBINDEX, TROPONINI in the last 72 hours. Invalid input(s): CKTOTAL;3  FASTING LIPID PANEL:  Lab Results   Component Value Date    CHOL 195 06/02/2016    HDL 45 06/02/2016    TRIG 94 06/02/2016     LIVER PROFILE: No results for input(s): AST, ALT, ALB, BILIDIR, BILITOT, ALKPHOS in the last 72 hours.    MICROBIOLOGY:   Lab Results   Component Value

## 2021-10-10 NOTE — PROGRESS NOTES
Got perfect serve that HR is 120-140s and Sbp 140. Cardiology recomends increasing cardizem to 240 mg daily. will give the additional dose and discharge only if HR< 110. Notified  and nurse.     Myke Wagner MD  PGY-3, Internal Medicine Resident  [facility]  10/10/2021 12:07 PM

## 2021-10-10 NOTE — CARE COORDINATION
Transitional planning. Informed by Dr. Ruby Hernandez that plans are for pt to discharge today if her HR stays under 100.     [address] transport with Sonia Humberto for 8:45pm  LM for [place] [phone] to call CM back. [address] to [place] with [facility], COVID test on the 8th is good, HENS needed. Informed her of 8:45pm transport time. Will call her if pt is not discharged.      2021 Nikita Emery to ask for report number and fax    [phone], they provided numbers for report and fax  Report: [phone] Ask for Rosemary Forte 81: [phone]

## 2021-10-10 NOTE — PROGRESS NOTES
[facility] Assessment complete. Atrial fibrillation with rapid ventricular response (HCC) [I48.91]  Atrial fibrillation with RVR (HCC) [I48.91]  Chronic obstructive pulmonary disease with acute exacerbation (HCC) [J44.1]  Pneumonia due to infectious organism, unspecified laterality, unspecified part of lung [J18.9] . Vitals:    10/10/21 1114   BP: (!) 144/102   Pulse: 111   Resp: 24   Temp: 98.1 °F (36.7 °C)   SpO2: 99%   . Patients home meds are   Prior to Admission medications    Medication Sig Start Date End Date Taking? Authorizing Provider   metoprolol tartrate 75 MG TABS TAKE ONE TABLET BY MOUTH TWICE DAILY 10/10/21  Yes Myke Wagner MD   dilTIAZem (CARDIZEM CD) 240 MG extended release capsule Take 1 capsule by mouth daily 10/10/21  Yes Myke Wagner MD   apixaban (ELIQUIS) 5 MG TABS tablet Take 1 tablet by mouth 2 times daily 10/8/21  Yes Myke Wagner MD   atorvastatin (LIPITOR) 40 MG tablet Take 1 tablet by mouth daily 10/8/21  Yes Myke Wagner MD   citalopram (CELEXA) 40 MG tablet Take 0.5 tablets by mouth daily 10/8/21  Yes Myke Wagner MD   losartan (COZAAR) 100 MG tablet Take 1 tablet by mouth daily 8/28/18   Sai Aleman MD   meloxicam (MOBIC) 15 MG tablet Take 15 mg by mouth daily    Historical Provider, MD   clopidogrel (PLAVIX) 75 MG tablet Take 1 tablet by mouth daily 10/5/17   Sai Aleman MD   cyclobenzaprine (FLEXERIL) 10 MG tablet TAKE ONE TABLET BY MOUTH THREE TIMES DAILY AS NEEDED FOR MUSCLE SPASM 10/5/17   Sai Aleman MD   diphenhydrAMINE (BENADRYL) 25 MG capsule Take 25 mg by mouth every 6 hours as needed for Itching    Historical Provider, MD   zinc oxide (DESITIN) 40 % ointment Apply topically as needed for Dry Skin Apply topically as needed.     Historical Provider, MD   furosemide (LASIX) 20 MG tablet Take 1 tablet by mouth daily 7/14/17   Sai Aleman MD   isosorbide mononitrate (IMDUR) 30 MG extended release tablet Take 1 tablet by mouth daily 7/14/17   Adilson Watson MD   albuterol sulfate  (90 Base) MCG/ACT inhaler Inhale 2 puffs into the lungs every 6 hours as needed for Wheezing 7/14/17   Adilson Watson MD   Calcium Carb-Cholecalciferol (CALCIUM + D3 PO) Take by mouth daily    Historical Provider, MD   CINNAMON PO Take by mouth daily    Historical Provider, MD   TURMERIC CURCUMIN PO Take by mouth daily    Historical Provider, MD   ipratropium-albuterol (DUONEB) 0.5-2.5 (3) MG/3ML SOLN nebulizer solution Inhale 1 vial into the lungs every 4 hours    Historical Provider, MD   beclomethasone (QVAR) 80 MCG/ACT inhaler Inhale 2 puffs into the lungs 2 times daily 6/17/16   Adilson Watson MD   Acetaminophen 650 MG TABS Take 650 mg by mouth every 4 hours as needed for Pain or Fever 4/4/16   Zakia Nieves MD   clonazePAM (KLONOPIN) 1 MG tablet Take 1 mg by mouth nightly     Historical Provider, MD   LYSINE PO Take by mouth daily    Historical Provider, MD   vitamin D (CHOLECALCIFEROL) 1000 UNIT TABS tablet Take 1,000 Units by mouth daily    Historical Provider, MD   Pyridoxine HCl (VITAMIN B-6) 50 MG tablet Take 100 mg by mouth daily    Historical Provider, MD   Cyanocobalamin (VITAMIN B 12 PO) Take by mouth daily    Historical Provider, MD   Magnesium 500 MG TABS Take by mouth    Historical Provider, MD   Ascorbic Acid (VITAMIN C) 500 MG tablet Take 500 mg by mouth daily    Historical Provider, MD   .  RR 20  Breath Sounds: diminished      Bronchodilator assessment at level  3  Hyperinflation assessment at level   Secretion Management assessment at level      []    Bronchodilator Assessment  BRONCHODILATOR ASSESSMENT SCORE  Score 0 1 2 3 4 5   Breath Sounds   []  Patient Baseline []  No Wheeze good aeration []  Faint, scattered wheezing, good aeration [x]  Expiratory Wheezing and or moderately diminished []  Insp/Exp wheeze and/or very diminished []  Insp/Exp and/ or marked distress   Respiratory Rate []  Patient Baseline []  Less than 20 []  Less than 20 []  20-25 []  Greater than 25 []  Greater than 25   Peak flow % of Pred or PB []  NA   []  Greater than 90%  []  81-90% []  71-80% []  Less than or equal to 70%  or unable to perform []  Unable due to Respiratory Distress   Dyspnea re []  Patient Baseline []  No SOB []  No SOB []  SOB on exertion []  SOB min activity []  At rest/acute   e FEV% Predicted       []  NA []  Above 69%  []  Unable []  Above 60-69%  []  Unable []  Above 50-59%  []  Unable []  Above 35-49%  []  Unable []  Less than 35%  []  Unable                 []  Hyperinflation Assessment  Score 1 2 3   CXR and Breath Sounds   []  Clear []  No atelectasis  Basilar aeration []  Atelectasis or absent basilar breath sounds   Incentive Spirometry Volume  (Per IBW)   []  Greater than or equal to 15ml/Kg []  less than 15ml/Kg []  less than 15ml/Kg   Surgery within last 2 weeks []  None or general   []  Abdominal or thoracic surgery  []  Abdominal or thoracic   Chronic Pulmonary Historyre []  No []  Yes []  Yes     []  Secretion Management Assessment  Score 1 2 3   Bilateral Breath Sounds   []  Occasional Rhonchi []  Scattered Rhonchi []  Course Rhonchi and/or poor aeration   Sputum    []  Small amount of thin secretions []  Moderate amount of viscous secretions []  Copius, Viscious Yellow/ Secretions   CXR as reported by physician []  clear  []  Unavailable []  Infiltrates and/or consolidation  []  Unavailable []  Mucus Plugging and or lobar consolidation  []  Unavailable   Cough []  Strong, productive cough []  Weak productive cough []  No cough or weak non-productive cough   SHELBY LOWE, P  4:11 PM                            FEMALE                                  MALE                            FEV1 Predicted Normal Values                        FEV1 Predicted Normal Values          Age                                     Height in Feet and Inches       Age 364 75 344 372 400 429 [phone] 573   80 253 266 282 296 312 327 342 356 80 335 362 390 419 448 [phone]

## 2021-10-15 ENCOUNTER — APPOINTMENT (OUTPATIENT)
Dept: GENERAL RADIOLOGY | Age: 76
DRG: 871 | End: 2021-10-15
Payer: MEDICARE

## 2021-10-15 ENCOUNTER — APPOINTMENT (OUTPATIENT)
Dept: ULTRASOUND IMAGING | Age: 76
DRG: 871 | End: 2021-10-15
Payer: MEDICARE

## 2021-10-15 ENCOUNTER — HOSPITAL ENCOUNTER (INPATIENT)
Age: 76
LOS: 7 days | Discharge: NURSING, REHAB OR LONG-TERM CARE | DRG: 871 | End: 2021-10-22
Attending: EMERGENCY MEDICINE | Admitting: INTERNAL MEDICINE
Payer: MEDICARE

## 2021-10-15 ENCOUNTER — APPOINTMENT (OUTPATIENT)
Dept: CT IMAGING | Age: 76
DRG: 871 | End: 2021-10-15
Payer: MEDICARE

## 2021-10-15 DIAGNOSIS — I48.91 ATRIAL FIBRILLATION WITH RAPID VENTRICULAR RESPONSE (HCC): ICD-10-CM

## 2021-10-15 DIAGNOSIS — E87.1 HYPONATREMIA: ICD-10-CM

## 2021-10-15 DIAGNOSIS — R07.9 ACUTE CHEST PAIN: Primary | ICD-10-CM

## 2021-10-15 DIAGNOSIS — N17.9 AKI (ACUTE KIDNEY INJURY) (HCC): ICD-10-CM

## 2021-10-15 DIAGNOSIS — J90 PLEURAL EFFUSION: ICD-10-CM

## 2021-10-15 LAB
-: ABNORMAL
ABSOLUTE EOS #: 0.4 K/UL (ref 0–0.44)
ABSOLUTE IMMATURE GRANULOCYTE: 0.1 K/UL (ref 0–0.3)
ABSOLUTE LYMPH #: 1.74 K/UL (ref 1.1–3.7)
ABSOLUTE MONO #: 1.13 K/UL (ref 0.1–1.2)
ALBUMIN SERPL-MCNC: 3.6 G/DL (ref 3.5–5.2)
ALBUMIN/GLOBULIN RATIO: 1.2 (ref 1–2.5)
ALP BLD-CCNC: 75 U/L (ref 35–104)
ALT SERPL-CCNC: 23 U/L (ref 5–33)
AMORPHOUS: ABNORMAL
ANION GAP SERPL CALCULATED.3IONS-SCNC: 13 MMOL/L (ref 9–17)
AST SERPL-CCNC: 27 U/L
BACTERIA: ABNORMAL
BASOPHILS # BLD: 1 % (ref 0–2)
BASOPHILS ABSOLUTE: 0.09 K/UL (ref 0–0.2)
BILIRUB SERPL-MCNC: 1.26 MG/DL (ref 0.3–1.2)
BILIRUBIN URINE: NEGATIVE
BNP INTERPRETATION: ABNORMAL
BUN BLDV-MCNC: 26 MG/DL (ref 8–23)
BUN/CREAT BLD: ABNORMAL (ref 9–20)
CALCIUM SERPL-MCNC: 9.5 MG/DL (ref 8.6–10.4)
CASTS UA: ABNORMAL /LPF (ref 0–2)
CASTS UA: ABNORMAL /LPF (ref 0–2)
CHLORIDE BLD-SCNC: 90 MMOL/L (ref 98–107)
CO2: 26 MMOL/L (ref 20–31)
COLOR: YELLOW
CREAT SERPL-MCNC: 1.35 MG/DL (ref 0.5–0.9)
CRYSTALS, UA: ABNORMAL /HPF
DIFFERENTIAL TYPE: ABNORMAL
EOSINOPHILS RELATIVE PERCENT: 3 % (ref 1–4)
EPITHELIAL CELLS UA: ABNORMAL /HPF (ref 0–5)
GFR AFRICAN AMERICAN: 46 ML/MIN
GFR NON-AFRICAN AMERICAN: 38 ML/MIN
GFR SERPL CREATININE-BSD FRML MDRD: ABNORMAL ML/MIN/{1.73_M2}
GFR SERPL CREATININE-BSD FRML MDRD: ABNORMAL ML/MIN/{1.73_M2}
GLUCOSE BLD-MCNC: 119 MG/DL (ref 70–99)
GLUCOSE URINE: NEGATIVE
GLUCOSE, FLUID: 128 MG/DL
HCT VFR BLD CALC: 43.9 % (ref 36.3–47.1)
HEMOGLOBIN: 14.2 G/DL (ref 11.9–15.1)
IMMATURE GRANULOCYTES: 1 %
KETONES, URINE: NEGATIVE
LACTATE DEHYDROGENASE, FLUID: 125 U/L
LACTATE DEHYDROGENASE: 218 U/L (ref 135–214)
LACTIC ACID, SEPSIS WHOLE BLOOD: 1.8 MMOL/L (ref 0.5–1.9)
LACTIC ACID, SEPSIS WHOLE BLOOD: 1.8 MMOL/L (ref 0.5–1.9)
LACTIC ACID, SEPSIS: NORMAL MMOL/L (ref 0.5–1.9)
LACTIC ACID, SEPSIS: NORMAL MMOL/L (ref 0.5–1.9)
LEGIONELLA PNEUMOPHILIA AG, URINE: NEGATIVE
LEUKOCYTE ESTERASE, URINE: ABNORMAL
LYMPHOCYTES # BLD: 11 % (ref 24–43)
MCH RBC QN AUTO: 30.2 PG (ref 25.2–33.5)
MCHC RBC AUTO-ENTMCNC: 32.3 G/DL (ref 28.4–34.8)
MCV RBC AUTO: 93.4 FL (ref 82.6–102.9)
MONOCYTES # BLD: 7 % (ref 3–12)
MUCUS: ABNORMAL
NITRITE, URINE: NEGATIVE
NRBC AUTOMATED: 0 PER 100 WBC
OTHER OBSERVATIONS UA: ABNORMAL
PDW BLD-RTO: 12.6 % (ref 11.8–14.4)
PH FLUID: 7
PH UA: 5.5 (ref 5–8)
PLATELET # BLD: 391 K/UL (ref 138–453)
PLATELET ESTIMATE: ABNORMAL
PMV BLD AUTO: 9.6 FL (ref 8.1–13.5)
POTASSIUM SERPL-SCNC: 3.7 MMOL/L (ref 3.7–5.3)
PRO-BNP: 1745 PG/ML
PROTEIN UA: NEGATIVE
RBC # BLD: 4.7 M/UL (ref 3.95–5.11)
RBC # BLD: ABNORMAL 10*6/UL
RBC UA: ABNORMAL /HPF (ref 0–2)
RENAL EPITHELIAL, UA: ABNORMAL /HPF
SARS-COV-2, RAPID: NOT DETECTED
SEG NEUTROPHILS: 77 % (ref 36–65)
SEGMENTED NEUTROPHILS ABSOLUTE COUNT: 12.14 K/UL (ref 1.5–8.1)
SODIUM BLD-SCNC: 129 MMOL/L (ref 135–144)
SPECIFIC GRAVITY UA: 1.01 (ref 1–1.03)
SPECIMEN DESCRIPTION: NORMAL
SPECIMEN TYPE: NORMAL
TOTAL PROTEIN, BODY FLUID: 2.5 G/DL
TOTAL PROTEIN: 5.8 G/DL (ref 6.4–8.3)
TOTAL PROTEIN: 6.6 G/DL (ref 6.4–8.3)
TRICHOMONAS: ABNORMAL
TROPONIN INTERP: ABNORMAL
TROPONIN INTERP: ABNORMAL
TROPONIN T: ABNORMAL NG/ML
TROPONIN T: ABNORMAL NG/ML
TROPONIN, HIGH SENSITIVITY: 39 NG/L (ref 0–14)
TROPONIN, HIGH SENSITIVITY: 40 NG/L (ref 0–14)
TURBIDITY: ABNORMAL
URINE HGB: ABNORMAL
UROBILINOGEN, URINE: NORMAL
WBC # BLD: 15.6 K/UL (ref 3.5–11.3)
WBC # BLD: ABNORMAL 10*3/UL
WBC UA: ABNORMAL /HPF (ref 0–5)
YEAST: ABNORMAL

## 2021-10-15 PROCEDURE — 87186 SC STD MICRODIL/AGAR DIL: CPT

## 2021-10-15 PROCEDURE — 83986 ASSAY PH BODY FLUID NOS: CPT

## 2021-10-15 PROCEDURE — 6360000002 HC RX W HCPCS: Performed by: STUDENT IN AN ORGANIZED HEALTH CARE EDUCATION/TRAINING PROGRAM

## 2021-10-15 PROCEDURE — 6360000002 HC RX W HCPCS

## 2021-10-15 PROCEDURE — 94640 AIRWAY INHALATION TREATMENT: CPT

## 2021-10-15 PROCEDURE — C1729 CATH, DRAINAGE: HCPCS

## 2021-10-15 PROCEDURE — 2580000003 HC RX 258: Performed by: STUDENT IN AN ORGANIZED HEALTH CARE EDUCATION/TRAINING PROGRAM

## 2021-10-15 PROCEDURE — 71250 CT THORAX DX C-: CPT

## 2021-10-15 PROCEDURE — 83605 ASSAY OF LACTIC ACID: CPT

## 2021-10-15 PROCEDURE — 83615 LACTATE (LD) (LDH) ENZYME: CPT

## 2021-10-15 PROCEDURE — 2060000000 HC ICU INTERMEDIATE R&B

## 2021-10-15 PROCEDURE — 96375 TX/PRO/DX INJ NEW DRUG ADDON: CPT

## 2021-10-15 PROCEDURE — 87205 SMEAR GRAM STAIN: CPT

## 2021-10-15 PROCEDURE — 96365 THER/PROPH/DIAG IV INF INIT: CPT

## 2021-10-15 PROCEDURE — 81001 URINALYSIS AUTO W/SCOPE: CPT

## 2021-10-15 PROCEDURE — 2700000000 HC OXYGEN THERAPY PER DAY

## 2021-10-15 PROCEDURE — 80053 COMPREHEN METABOLIC PANEL: CPT

## 2021-10-15 PROCEDURE — 87070 CULTURE OTHR SPECIMN AEROBIC: CPT

## 2021-10-15 PROCEDURE — 84155 ASSAY OF PROTEIN SERUM: CPT

## 2021-10-15 PROCEDURE — 94664 DEMO&/EVAL PT USE INHALER: CPT

## 2021-10-15 PROCEDURE — 85025 COMPLETE CBC W/AUTO DIFF WBC: CPT

## 2021-10-15 PROCEDURE — 87449 NOS EACH ORGANISM AG IA: CPT

## 2021-10-15 PROCEDURE — 89051 BODY FLUID CELL COUNT: CPT

## 2021-10-15 PROCEDURE — 99223 1ST HOSP IP/OBS HIGH 75: CPT | Performed by: INTERNAL MEDICINE

## 2021-10-15 PROCEDURE — 71045 X-RAY EXAM CHEST 1 VIEW: CPT

## 2021-10-15 PROCEDURE — 83880 ASSAY OF NATRIURETIC PEPTIDE: CPT

## 2021-10-15 PROCEDURE — 0W9B3ZZ DRAINAGE OF LEFT PLEURAL CAVITY, PERCUTANEOUS APPROACH: ICD-10-PCS | Performed by: STUDENT IN AN ORGANIZED HEALTH CARE EDUCATION/TRAINING PROGRAM

## 2021-10-15 PROCEDURE — 6370000000 HC RX 637 (ALT 250 FOR IP): Performed by: INTERNAL MEDICINE

## 2021-10-15 PROCEDURE — 87077 CULTURE AEROBIC IDENTIFY: CPT

## 2021-10-15 PROCEDURE — 32555 ASPIRATE PLEURA W/ IMAGING: CPT

## 2021-10-15 PROCEDURE — 6370000000 HC RX 637 (ALT 250 FOR IP): Performed by: STUDENT IN AN ORGANIZED HEALTH CARE EDUCATION/TRAINING PROGRAM

## 2021-10-15 PROCEDURE — 96361 HYDRATE IV INFUSION ADD-ON: CPT

## 2021-10-15 PROCEDURE — 87040 BLOOD CULTURE FOR BACTERIA: CPT

## 2021-10-15 PROCEDURE — 87086 URINE CULTURE/COLONY COUNT: CPT

## 2021-10-15 PROCEDURE — 87075 CULTR BACTERIA EXCEPT BLOOD: CPT

## 2021-10-15 PROCEDURE — 84484 ASSAY OF TROPONIN QUANT: CPT

## 2021-10-15 PROCEDURE — 36415 COLL VENOUS BLD VENIPUNCTURE: CPT

## 2021-10-15 PROCEDURE — 84157 ASSAY OF PROTEIN OTHER: CPT

## 2021-10-15 PROCEDURE — 87635 SARS-COV-2 COVID-19 AMP PRB: CPT

## 2021-10-15 PROCEDURE — 2709999900 HC NON-CHARGEABLE SUPPLY

## 2021-10-15 PROCEDURE — 84311 SPECTROPHOTOMETRY: CPT

## 2021-10-15 PROCEDURE — 93005 ELECTROCARDIOGRAM TRACING: CPT | Performed by: STUDENT IN AN ORGANIZED HEALTH CARE EDUCATION/TRAINING PROGRAM

## 2021-10-15 PROCEDURE — 94761 N-INVAS EAR/PLS OXIMETRY MLT: CPT

## 2021-10-15 PROCEDURE — 87150 DNA/RNA AMPLIFIED PROBE: CPT

## 2021-10-15 PROCEDURE — 99285 EMERGENCY DEPT VISIT HI MDM: CPT

## 2021-10-15 PROCEDURE — 82945 GLUCOSE OTHER FLUID: CPT

## 2021-10-15 RX ORDER — ATORVASTATIN CALCIUM 80 MG/1
40 TABLET, FILM COATED ORAL DAILY
Status: DISCONTINUED | OUTPATIENT
Start: 2021-10-16 | End: 2021-10-15

## 2021-10-15 RX ORDER — SODIUM CHLORIDE 9 MG/ML
25 INJECTION, SOLUTION INTRAVENOUS PRN
Status: DISCONTINUED | OUTPATIENT
Start: 2021-10-15 | End: 2021-10-22 | Stop reason: HOSPADM

## 2021-10-15 RX ORDER — ALBUTEROL SULFATE 90 UG/1
2 AEROSOL, METERED RESPIRATORY (INHALATION) EVERY 6 HOURS PRN
Status: DISCONTINUED | OUTPATIENT
Start: 2021-10-15 | End: 2021-10-16

## 2021-10-15 RX ORDER — ONDANSETRON 2 MG/ML
INJECTION INTRAMUSCULAR; INTRAVENOUS
Status: COMPLETED
Start: 2021-10-15 | End: 2021-10-15

## 2021-10-15 RX ORDER — IPRATROPIUM BROMIDE AND ALBUTEROL SULFATE 2.5; .5 MG/3ML; MG/3ML
1 SOLUTION RESPIRATORY (INHALATION) 4 TIMES DAILY
Status: DISCONTINUED | OUTPATIENT
Start: 2021-10-15 | End: 2021-10-22 | Stop reason: HOSPADM

## 2021-10-15 RX ORDER — SODIUM CHLORIDE 9 MG/ML
INJECTION, SOLUTION INTRAVENOUS CONTINUOUS
Status: DISCONTINUED | OUTPATIENT
Start: 2021-10-15 | End: 2021-10-16

## 2021-10-15 RX ORDER — ACETAMINOPHEN 650 MG/1
650 SUPPOSITORY RECTAL EVERY 6 HOURS PRN
Status: DISCONTINUED | OUTPATIENT
Start: 2021-10-15 | End: 2021-10-22 | Stop reason: HOSPADM

## 2021-10-15 RX ORDER — ATORVASTATIN CALCIUM 20 MG/1
20 TABLET, FILM COATED ORAL NIGHTLY
Status: DISCONTINUED | OUTPATIENT
Start: 2021-10-16 | End: 2021-10-22 | Stop reason: HOSPADM

## 2021-10-15 RX ORDER — IPRATROPIUM BROMIDE AND ALBUTEROL SULFATE 2.5; .5 MG/3ML; MG/3ML
1 SOLUTION RESPIRATORY (INHALATION) EVERY 4 HOURS
Status: DISCONTINUED | OUTPATIENT
Start: 2021-10-15 | End: 2021-10-15

## 2021-10-15 RX ORDER — DILTIAZEM HYDROCHLORIDE 240 MG/1
240 CAPSULE, COATED, EXTENDED RELEASE ORAL DAILY
Status: DISCONTINUED | OUTPATIENT
Start: 2021-10-16 | End: 2021-10-16

## 2021-10-15 RX ORDER — ONDANSETRON 2 MG/ML
4 INJECTION INTRAMUSCULAR; INTRAVENOUS EVERY 6 HOURS PRN
Status: DISCONTINUED | OUTPATIENT
Start: 2021-10-15 | End: 2021-10-22 | Stop reason: HOSPADM

## 2021-10-15 RX ORDER — POLYETHYLENE GLYCOL 3350 17 G/17G
17 POWDER, FOR SOLUTION ORAL DAILY PRN
Status: DISCONTINUED | OUTPATIENT
Start: 2021-10-15 | End: 2021-10-22 | Stop reason: HOSPADM

## 2021-10-15 RX ORDER — ACETAMINOPHEN 325 MG/1
650 TABLET ORAL EVERY 6 HOURS PRN
Status: DISCONTINUED | OUTPATIENT
Start: 2021-10-15 | End: 2021-10-22 | Stop reason: HOSPADM

## 2021-10-15 RX ORDER — VANCOMYCIN HYDROCHLORIDE 1 G/200ML
1000 INJECTION, SOLUTION INTRAVENOUS EVERY 24 HOURS
Status: DISCONTINUED | OUTPATIENT
Start: 2021-10-15 | End: 2021-10-16

## 2021-10-15 RX ORDER — FUROSEMIDE 20 MG/1
20 TABLET ORAL DAILY
Status: DISCONTINUED | OUTPATIENT
Start: 2021-10-16 | End: 2021-10-16

## 2021-10-15 RX ORDER — FLUTICASONE PROPIONATE 110 UG/1
2 AEROSOL, METERED RESPIRATORY (INHALATION) 2 TIMES DAILY
Status: DISCONTINUED | OUTPATIENT
Start: 2021-10-15 | End: 2021-10-22 | Stop reason: HOSPADM

## 2021-10-15 RX ORDER — CLOPIDOGREL BISULFATE 75 MG/1
75 TABLET ORAL DAILY
Status: DISCONTINUED | OUTPATIENT
Start: 2021-10-16 | End: 2021-10-22 | Stop reason: HOSPADM

## 2021-10-15 RX ORDER — IODINE/SODIUM IODIDE 2 %
TINCTURE TOPICAL PRN
Status: DISCONTINUED | OUTPATIENT
Start: 2021-10-15 | End: 2021-10-22 | Stop reason: HOSPADM

## 2021-10-15 RX ORDER — ONDANSETRON 4 MG/1
4 TABLET, ORALLY DISINTEGRATING ORAL EVERY 8 HOURS PRN
Status: DISCONTINUED | OUTPATIENT
Start: 2021-10-15 | End: 2021-10-22 | Stop reason: HOSPADM

## 2021-10-15 RX ORDER — 0.9 % SODIUM CHLORIDE 0.9 %
1000 INTRAVENOUS SOLUTION INTRAVENOUS ONCE
Status: COMPLETED | OUTPATIENT
Start: 2021-10-15 | End: 2021-10-15

## 2021-10-15 RX ORDER — ONDANSETRON 2 MG/ML
4 INJECTION INTRAMUSCULAR; INTRAVENOUS ONCE
Status: COMPLETED | OUTPATIENT
Start: 2021-10-15 | End: 2021-10-15

## 2021-10-15 RX ADMIN — FLUTICASONE PROPIONATE 2 PUFF: 110 AEROSOL, METERED RESPIRATORY (INHALATION) at 19:46

## 2021-10-15 RX ADMIN — ONDANSETRON 4 MG: 2 INJECTION INTRAMUSCULAR; INTRAVENOUS at 05:30

## 2021-10-15 RX ADMIN — ONDANSETRON 4 MG: 2 INJECTION INTRAMUSCULAR; INTRAVENOUS at 13:37

## 2021-10-15 RX ADMIN — CEFEPIME HYDROCHLORIDE 2000 MG: 2 INJECTION, POWDER, FOR SOLUTION INTRAVENOUS at 22:39

## 2021-10-15 RX ADMIN — ACETAMINOPHEN 650 MG: 325 TABLET ORAL at 13:37

## 2021-10-15 RX ADMIN — CEFEPIME HYDROCHLORIDE 2000 MG: 2 INJECTION, POWDER, FOR SOLUTION INTRAVENOUS at 05:00

## 2021-10-15 RX ADMIN — SODIUM CHLORIDE: 9 INJECTION, SOLUTION INTRAVENOUS at 15:10

## 2021-10-15 RX ADMIN — SODIUM CHLORIDE 1000 ML: 9 INJECTION, SOLUTION INTRAVENOUS at 05:00

## 2021-10-15 RX ADMIN — IPRATROPIUM BROMIDE AND ALBUTEROL SULFATE 3 ML: .5; 2.5 SOLUTION RESPIRATORY (INHALATION) at 15:10

## 2021-10-15 RX ADMIN — ONDANSETRON 4 MG: 2 INJECTION, SOLUTION INTRAMUSCULAR; INTRAVENOUS at 05:30

## 2021-10-15 RX ADMIN — IPRATROPIUM BROMIDE AND ALBUTEROL SULFATE 3 ML: .5; 2.5 SOLUTION RESPIRATORY (INHALATION) at 19:46

## 2021-10-15 RX ADMIN — VANCOMYCIN HYDROCHLORIDE 2000 MG: 1 INJECTION, POWDER, LYOPHILIZED, FOR SOLUTION INTRAVENOUS at 08:48

## 2021-10-15 ASSESSMENT — ENCOUNTER SYMPTOMS
SHORTNESS OF BREATH: 0
ABDOMINAL PAIN: 0
PHOTOPHOBIA: 0
TACHYPNEA: 1

## 2021-10-15 ASSESSMENT — PAIN DESCRIPTION - ORIENTATION: ORIENTATION: LEFT

## 2021-10-15 ASSESSMENT — PAIN DESCRIPTION - LOCATION: LOCATION: CHEST

## 2021-10-15 ASSESSMENT — PAIN SCALES - GENERAL: PAINLEVEL_OUTOF10: 8

## 2021-10-15 ASSESSMENT — PAIN DESCRIPTION - PAIN TYPE: TYPE: ACUTE PAIN

## 2021-10-15 NOTE — ED NOTES
Pt arrived from skilled nursing facility for complaints of left sided chest pain. Pt stated that it doesn't  go up the neck or arm. Pt placed on full cardiac monitor. Pt received 324 of aspirin PTA. Pt stated that she is currently being treated for a uti and sepsis. Pt arrived with a pure wick in place. Pt is alert and oriented. Resident at the bedside.  Will continue plan of care      Carry BELKIS Zaidi  10/15/21 1857

## 2021-10-15 NOTE — ED NOTES
Bed: 20  Expected date:   Expected time:   Means of arrival:   Comments:  Frank Aleman RN  10/15/21 5519

## 2021-10-15 NOTE — ED PROVIDER NOTES
[facility] ED  Emergency Department  Faculty Sign-Out Addendum     Care of Angélica Tomlin was assumed from previous attending and is being seen for Chest Pain  . The patient's initial evaluation and plan have been discussed with the prior provider who initially evaluated the patient. Handoff taken on this patient patient from prior Attending Physician: Dr. Yanet Naqvi    I was available and discussed any additional care issues that arose and coordinated the management plans with the resident(s) caring for the patient during my duty period. Any areas of disagreement with residents documentation of care or procedures are noted on the chart. I was personally present for the key portions of any/all procedures performed during my duty period. EMERGENCY DEPARTMENT COURSE / MEDICAL DECISION MAKING:       MEDICATIONS GIVEN:  Orders Placed This Encounter   Medications    ondansetron (ZOFRAN) 4 MG/2ML injection     Estevan Graham: cabinet override    cefepime (MAXIPIME) 2000 mg IVPB minibag     Order Specific Question:   Antimicrobial Indications     Answer: Other     Order Specific Question:   Other Abx Indication     Answer:    Suspected Sepsis of Urinary Tract Origin    0.9 % sodium chloride bolus    ondansetron (ZOFRAN) injection 4 mg    vancomycin (VANCOCIN) 2,000 mg in dextrose 5 % 500 mL IVPB     Order Specific Question:   Antimicrobial Indications     Answer:   Pneumonia (HAP)    vancomycin (VANCOCIN) 1000 mg in dextrose 5% 200 mL IVPB     Order Specific Question:   Antimicrobial Indications     Answer:   Pneumonia (HAP)    vancomycin (VANCOCIN) intermittent dosing (placeholder)     Order Specific Question:   Antimicrobial Indications     Answer:   Pneumonia (HAP)       LABS / RADIOLOGY:     Labs Reviewed   CBC WITH AUTO DIFFERENTIAL - Abnormal; Notable for the following components:       Result Value    WBC 15.6 (*)     Seg Neutrophils 77 (*)     Lymphocytes 11 (*)     Immature Granulocytes 1 (*)     Segs Absolute 12.14 (*)     All other components within normal limits   COMPREHENSIVE METABOLIC PANEL - Abnormal; Notable for the following components:    Glucose 119 (*)     BUN 26 (*)     CREATININE 1.35 (*)     Sodium 129 (*)     Chloride 90 (*)     Total Bilirubin 1.26 (*)     GFR Non- 38 (*)     GFR  46 (*)     All other components within normal limits   TROPONIN - Abnormal; Notable for the following components:    Troponin, High Sensitivity 40 (*)     All other components within normal limits   TROPONIN - Abnormal; Notable for the following components:    Troponin, High Sensitivity 39 (*)     All other components within normal limits   URINALYSIS WITH MICROSCOPIC - Abnormal; Notable for the following components:    Turbidity UA Cloudy (*)     Urine Hgb MODERATE (*)     Leukocyte Esterase, Urine SMALL (*)     Mucus, UA 1+ (*)     Yeast, UA MANY (*)     All other components within normal limits   COVID-19, RAPID   CULTURE, URINE   CULTURE, BLOOD 2   CULTURE, BLOOD 1   LEGIONELLA ANTIGEN, URINE   LACTATE, SEPSIS   LACTATE, SEPSIS       XR CHEST PORTABLE    Result Date: 10/15/2021  EXAMINATION: ONE XRAY VIEW OF THE CHEST 10/15/2021 4:46 am COMPARISON: Chest portable October 5, 2021 HISTORY: ORDERING SYSTEM PROVIDED HISTORY: cp TECHNOLOGIST PROVIDED HISTORY:  Reason for Exam: pot upright FINDINGS: The heart is moderately to severely enlarged with otherwise unremarkable configuration. The mediastinal contours are within normal limits. Large left-sided pleural effusion is noted with adjacent marked compressive atelectasis seen. Right lung is well aerated. Bones and soft tissues are unremarkable. Large left-sided pleural effusion with marked compressive atelectasis of the left lung. Moderate to severe cardiomegaly. RECENT VITALS:     Temp: 98.3 °F (36.8 °C),  Pulse: 73, Resp: 24, BP: 98/64, SpO2: 93 %    This patient is a 68 y.o.  Female with left sided chest pain. Labs and imaging reviewed. Found to have large left sided chest pleural effusion. Antibiotics ordered. Stable VS. Patient admitted.     OUTSTANDING TASKS / RECOMMENDATIONS:    1. Bed placement    Ivis Abdi DO  Attending Emergency Physician  [facility] ED        Michael Becker DO  10/15/21 8952

## 2021-10-15 NOTE — ED NOTES
The following labs labeled with pt sticker and tubed to lab:     [] Blue     [x] Lavender   [] on ice  [x] Green/yellow  [x] Green/black [] on ice  [] Yellow  [] Red  [] Pink      [x] COVID-19 swab    [x] Rapid  [] PCR  [] Peds Viral Panel     [] Urine Sample  [] Pelvic Cultures  [x] Blood Cultures x 2          Thiago Wilde RN  10/15/21 4739

## 2021-10-15 NOTE — PROGRESS NOTES
[facility]90[facility] . Vitals:    10/1945   BP:    Pulse:    Resp: 24   Temp:    SpO2:    . Patients home meds are   Prior to Admission medications    Medication Sig Start Date End Date Taking? Authorizing Provider   metoprolol tartrate 75 MG TABS TAKE ONE TABLET BY MOUTH TWICE DAILY 10/10/21   Cecilia Washington MD   dilTIAZem (CARDIZEM CD) 240 MG extended release capsule Take 1 capsule by mouth daily 10/10/21   Cecilia Washington MD   apixaban (ELIQUIS) 5 MG TABS tablet Take 1 tablet by mouth 2 times daily 10/8/21   Cecilia Washington MD   atorvastatin (LIPITOR) 40 MG tablet Take 1 tablet by mouth daily 10/8/21   Cecilia Washington MD   citalopram (CELEXA) 40 MG tablet Take 0.5 tablets by mouth daily 10/8/21   Cecilia Washington MD   losartan (COZAAR) 100 MG tablet Take 1 tablet by mouth daily 8/28/18   Idella Boas, MD   meloxicam (MOBIC) 15 MG tablet Take 15 mg by mouth daily    Historical Provider, MD   clopidogrel (PLAVIX) 75 MG tablet Take 1 tablet by mouth daily 10/5/17   Idella Boas, MD   cyclobenzaprine (FLEXERIL) 10 MG tablet TAKE ONE TABLET BY MOUTH THREE TIMES DAILY AS NEEDED FOR MUSCLE SPASM 10/5/17   Idella Boas, MD   diphenhydrAMINE (BENADRYL) 25 MG capsule Take 25 mg by mouth every 6 hours as needed for Itching    Historical Provider, MD   zinc oxide (DESITIN) 40 % ointment Apply topically as needed for Dry Skin Apply topically as needed.     Historical Provider, MD   furosemide (LASIX) 20 MG tablet Take 1 tablet by mouth daily 7/14/17   Idella Boas, MD   isosorbide mononitrate (IMDUR) 30 MG extended release tablet Take 1 tablet by mouth daily 7/14/17   Idella Boas, MD   albuterol sulfate  (90 Base) MCG/ACT inhaler Inhale 2 puffs into the lungs every 6 hours as needed for Wheezing 7/14/17   Idella Boas, MD   Calcium Carb-Cholecalciferol (CALCIUM + D3 PO) Take by mouth daily    Historical Provider, MD   CINNAMON PO than or equal to 70%  or unable to perform []  Unable due to Respiratory Distress   Dyspnea re [x]  Patient Baseline []  No SOB []  No SOB []  SOB on exertion []  SOB min activity []  At rest/acute   e FEV% Predicted       [x]  NA []  Above 69%  []  Unable []  Above 60-69%  []  Unable []  Above 50-59%  []  Unable []  Above 35-49%  []  Unable []  Less than 35%  []  Unable                 []  Hyperinflation Assessment  Score 1 2 3   CXR and Breath Sounds   []  Clear []  No atelectasis  Basilar aeration []  Atelectasis or absent basilar breath sounds   Incentive Spirometry Volume  (Per IBW)   []  Greater than or equal to 15ml/Kg []  less than 15ml/Kg []  less than 15ml/Kg   Surgery within last 2 weeks []  None or general   []  Abdominal or thoracic surgery  []  Abdominal or thoracic   Chronic Pulmonary Historyre []  No []  Yes []  Yes     []  Secretion Management Assessment  Score 1 2 3   Bilateral Breath Sounds   []  Occasional Rhonchi []  Scattered Rhonchi []  Course Rhonchi and/or poor aeration   Sputum    []  Small amount of thin secretions []  Moderate amount of viscous secretions []  Copius, Viscious Yellow/ Secretions   CXR as reported by physician []  clear  []  Unavailable []  Infiltrates and/or consolidation  []  Unavailable []  Mucus Plugging and or lobar consolidation  []  Unavailable   Cough []  Strong, productive cough []  Weak productive cough []  No cough or weak non-productive cough   Alisha Morgan RCP  7:50 PM                            FEMALE                                  MALE                            FEV1 Predicted Normal Values                        FEV1 Predicted Normal Values          Age                                     Height in Feet and Inches       Age                                     Height in Feet and Inches       4' 11\" 5' 1\" 5' 3\" 5' 5\" 5' 7\" 5' 9\" 5' 11\" 6' 1\"  4' 11\" 5' 1\" 5' 3\" 5' 5\" 5' 7\" 5' 9\" 5' 11\" 6' 1\"   42 - 45 2.49 2.66 2.84 3.03 3.22 3.42 3.62 3.83 42 - 45 2.82 3. 03 3.26 3.49 3.72 3.96 4.22 4.47   46 - 49 2.40 2.57 2.76 2.94 3.14 3.33 3.54 3.75 46 - 49 2.70 2.92 3.14 3.37 3.61 3.85 4.10 4.36   50 - 53 2.31 2.48 2.66 2.85 3.04 3.24 3.45 3.66 50 - 53 2.58 2.80 3.02 3.25 3.49 3.73 3.98 4.24   54 - 57 2.21 2.38 2.57 2.75 2.95 3.14 3.35 3.56 54 - 57 2.46 2.67 2.89 3.12 3.36 3.60 3.85 4.11   58 - 61 2.10 2.28 2.46 2.65 2.84 3.04 3.24 3.45 58 - 61 2.32 2.54 2.76 2.99 3.23 3.47 3.72 3.98   62 - 65 1.99 2.17 2.35 2.54 2.73 2.93 3.13 3.34 62 - 65 2.19 2.40 2.62 2.85 3.09 3.33 3.58 3.84   66 - 69 1.88 2.05 2.23 2.42 2.61 2.81 3.02 3.23 66 - 69 2.04 2.26 2.48 2.71 2.95 3.19 3.44 3.70   70+ 1.82 1.99 2.17 2.36 2.55 2.75 2.95 3.16 70+ 1.97 2.19 2.41 2.64 2.87 3.12 3.37 3.62             Predicted Peak Expiratory Flow Rate                                       Height (in)  Female       Height (in) Male           Age 64 63 56 61 58 73 78 74 Age            21 344 357 372 387 402 417 432 446  60 62 64 66 68 70 72 74 76   25 337 352 366 381 396 411 426 441 25 447 476 505 533 562 591 619 648 677   30 329 344 359 374 389 404 419 434 30 437 466 494 523 552 580 609 638 667   35 322 337 351 366 381 396 411 426 35 426 455 484 512 541 570 598 627 657   40 314 329 344 359 374 389 404 419 40 416 445 473 502 531 559 588 617 647   45 307 322 336 351 366 381 396 411 45 405 434 463 491 520 549 577 606 636   50 299 314 329 344 359 374 389 404 50 395 424 452 481 510 538 567 596 625   55 292 307 321 336 351 366 381 396 55 384 413 442 470 499 528 556 585 615   60 284 299 314 329 344 359 374 389 60 374 403 431 460 489 517 546 575 605   65 277 292 306 321 336 351 366 381 65 363 392 421 449 478 507 535 564 594   70 269 284 299 314 329 344 359 374 70 353 382 410 439 468 496 525 554 583   75 261 274 289 305 319 334 348 364 75 344 372 400 429 458 487 515 544 573   80 253 266 282 296 312 327 342 356 80 335 362 390 419 448 476 [phone]

## 2021-10-15 NOTE — ED NOTES
Pt oxygen level was 89% on room air. Pt placed on 3L NC. Pt fluids infusing at 100 ml/hr. Pt awaiting bed placement.       Aleta Phalen, RN  10/15/21 0109

## 2021-10-15 NOTE — ED PROVIDER NOTES
[facility] [address] [facility]  Emergency Department  Emergency Medicine Resident Sign-out     Care of Irma Mirza was assumed from Dr. Cheyanne Bloom and is being seen for Chest Pain  . The patient's initial evaluation and plan have been discussed with the prior provider who initially evaluated the patient. EMERGENCY DEPARTMENT COURSE / MEDICAL DECISION MAKING:       MEDICATIONS GIVEN:  Orders Placed This Encounter   Medications    ondansetron (ZOFRAN) 4 MG/2ML injection     Estevan Graham: cabinet override    cefepime (MAXIPIME) 2000 mg IVPB minibag     Order Specific Question:   Antimicrobial Indications     Answer: Other     Order Specific Question:   Other Abx Indication     Answer:    Suspected Sepsis of Urinary Tract Origin    0.9 % sodium chloride bolus    ondansetron (ZOFRAN) injection 4 mg    vancomycin (VANCOCIN) 2,000 mg in dextrose 5 % 500 mL IVPB     Order Specific Question:   Antimicrobial Indications     Answer:   Pneumonia (HAP)    vancomycin (VANCOCIN) 1000 mg in dextrose 5% 200 mL IVPB     Order Specific Question:   Antimicrobial Indications     Answer:   Pneumonia (HAP)    vancomycin (VANCOCIN) intermittent dosing (placeholder)     Order Specific Question:   Antimicrobial Indications     Answer:   Pneumonia (HAP)       LABS / RADIOLOGY:     Labs Reviewed   CBC WITH AUTO DIFFERENTIAL - Abnormal; Notable for the following components:       Result Value    WBC 15.6 (*)     Seg Neutrophils 77 (*)     Lymphocytes 11 (*)     Immature Granulocytes 1 (*)     Segs Absolute 12.14 (*)     All other components within normal limits   COMPREHENSIVE METABOLIC PANEL - Abnormal; Notable for the following components:    Glucose 119 (*)     BUN 26 (*)     CREATININE 1.35 (*)     Sodium 129 (*)     Chloride 90 (*)     Total Bilirubin 1.26 (*)     GFR Non- 38 (*)     GFR  46 (*)     All other components within normal limits   TROPONIN - Abnormal; Notable for the following components:    Troponin, High Sensitivity 40 (*)     All other components within normal limits   TROPONIN - Abnormal; Notable for the following components:    Troponin, High Sensitivity 39 (*)     All other components within normal limits   URINALYSIS WITH MICROSCOPIC - Abnormal; Notable for the following components:    Turbidity UA Cloudy (*)     Urine Hgb MODERATE (*)     Leukocyte Esterase, Urine SMALL (*)     Mucus, UA 1+ (*)     Yeast, UA MANY (*)     All other components within normal limits   COVID-19, RAPID   CULTURE, URINE   CULTURE, BLOOD 2   CULTURE, BLOOD 1   LEGIONELLA ANTIGEN, URINE   LACTATE, SEPSIS   LACTATE, SEPSIS       ECHO Complete 2D W Doppler W Color    Result Date: 10/3/2021  Transthoracic Echocardiography Report (TTE)  Patient Name MCLEAN   Date of Study               10/03/2021               ANDRE BURTON   Date of      1945  Gender                      Female  Birth   Age          68 year(s)  Race                           Room Number  0124        Height:                     64 inch, 162.56 cm   Corporate ID [id number]    Weight:                     272 pounds, 123.4 kg  #   Patient Acct [de-identified]   BSA:          2.23 m^2      BMI:      46.69  #                                                              kg/m^2   MR #         [id number]     Sonographer                 Dhruv Mukesh   Accession #  [id number]  Interpreting Physician      Margarita Handy   Fellow                   Referring Nurse                           Practitioner   Interpreting             Referring Physician         Rina Shoemaker MD  Fellow  Additional Comments Technically difficult study due to body habitus Type of Study   TTE procedure:2D Echocardiogram, M-Mode, Doppler, Color Doppler. Procedure Date Date: 10/03/2021 Start: 01:22 PM Study Location: [facility] History / Terri Lipoma.  Comments: COPD, Pneumonia, Obesity, Patient Status: Inpatient Height: 64 inches Weight: 272.01 pounds BSA: 2.23 m^2 BMI: 46.69 kg/m^2 CONCLUSIONS Summary Left ventricle is normal in size with normal systolic function globally. Calculated ejection fraction is 59% ( Atrial-Fib). Mild mitral regurgitation. Mild tricuspid regurgitation. Estimated right ventricular systolic pressure is 35 mmHg. Anterior clear space noted, most likely adipose tissue vs small loculated pericardial effusion. No evidence of tamponade. Pleural effusion is seen. Signature ----------------------------------------------------------------------------  Electronically signed by Toi Hutchinson(Sonographer) on 10/03/2021 02:13  PM ---------------------------------------------------------------------------- ----------------------------------------------------------------------------  Electronically signed by Margarita Handy(Interpreting physician) on  10/03/2021 03:50 PM ---------------------------------------------------------------------------- FINDINGS Left Atrium Left atrium is moderately dilated. Left Ventricle Left ventricle is normal in size with normal systolic function globally. Calculated ejection fraction is 59% ( Atrial-Fib) Right Atrium Right atrial dilatation. Right Ventricle Dilated right ventricle with systolic function within the range of normal. Mitral Valve Mitral valve structure is normal. Mild mitral regurgitation. Aortic Valve Aortic valve is trileaflet. Aortic sclerosis without stenosis. No aortic insufficiency. Tricuspid Valve Tricuspid valve structure is normal. Mild tricuspid regurgitation. Estimated right ventricular systolic pressure is 35 mmHg. Pulmonic Valve The pulmonic valve is normal in structure. Pericardial Effusion Anterior clear space noted, most likely adipose tissue vs small loculated pericardial effusion. No evidence of tamponade. Miscellaneous Normal aortic root dimension.  M-mode / 2D Measurements & Calculations:   LVIDd:5.2 cm(3.7 - 5.6 cm)       Diastolic VKNACR:71.8 ml  YQRDO:5.9 cm(2.2 - 4.0 cm)       Systolic Volume:12.3 ml  IVSd:0.6 cm(0.6 - 1.1 cm)        Aortic Root:3.6 cm(2.0 - 3.7 cm)  LVPWd:0.8 cm(0.6 - 1.1 cm)       LA Dimension: 4.5 cm(1.9 - 4.0 cm)  Fractional Shortenin.69 %    LA volume/Index: 61.2 ml /27m^2  Calculated LVEF (%): 59.67 %     LVOT:2.2 cm                                   RVDd:2.3 cm   Mitral:                                 Aortic   Valve Area (P1/2-Time): 4.23 cm^2       Peak Velocity: 1.69 m/s  Peak E-Wave: 0.95 m/s                   Mean Velocity: 1.21 m/s                                          Peak Gradient: 11.42 mmHg  Peak Gradient: 3.58 mmHg                Mean Gradient: 7 mmHg  Mean Gradient: 2 mmHg  Deceleration Time: 171 msec  P1/2t: 52 msec                          Area (continuity): 2.86 cm^2                                          AV VTI: 28.6 cm   Area (continuity): 3.71 cm^2  Mean Velocity: 0.66 m/s   Tricuspid:                              Pulmonic:   Estimated RVSP: 35 mmHg                 Peak Velocity: 1.26 m/s  Peak TR Velocity: 2.52 m/s              Peak Gradient: 6.35 mmHg  Peak TR Gradient: 25.4016 mmHg  Estimated RA Pressure: 10 mmHg                                           Estimated PASP: 35.4 mmHg      CT CHEST WO CONTRAST    Result Date: 10/3/2021  EXAMINATION: CT OF THE CHEST WITHOUT CONTRAST 10/3/2021 9:39 am TECHNIQUE: CT of the chest was performed without the administration of intravenous contrast. Multiplanar reformatted images are provided for review. Dose modulation, iterative reconstruction, and/or weight based adjustment of the mA/kV was utilized to reduce the radiation dose to as low as reasonably achievable. COMPARISON: None. HISTORY: ORDERING SYSTEM PROVIDED HISTORY: EVALUATE INFILTRATES ON CXR, LEFT SIDED CONSOLIDATION TECHNOLOGIST PROVIDED HISTORY: EVALUATE INFILTRATES ON CXR, LEFT SIDED CONSOLIDATION Reason for Exam: EVALUATE INFILTRATES ON CXR, LEFT SIDED CONSOLIDATION FINDINGS: Mediastinum: Severe cardiomegaly.   There is a moderate pericardial effusion measuring up to 14 mm in thickness overlying the right atrium. Moderate atherosclerotic calcification of the coronary arteries. The aorta and pulmonary arteries are normal in caliber. No mediastinal or hilar lymphadenopathy. The thyroid gland and esophagus are normal. Lungs/pleura: The airways are patent centrally. Left pleural effusion contributes to complete atelectasis of the left lower lobe. There is partial atelectasis of the lingula. Right pleural effusion contributes to moderate right lower lobe atelectasis. Upper Abdomen: Adrenal glands are normal.  No significant findings in the visualized upper abdomen. Soft Tissues/Bones: Degenerative changes throughout the thoracic spine. No acute skeletal finding in the chest.     1. Moderate left and small right pleural effusions. There is complete atelectasis of the left lower lobe and partial atelectasis of the right lower lobe. Superimposed pneumonitis may be considered clinically. 2. Severe cardiomegaly with a moderate pericardial effusion. XR CHEST PORTABLE    Result Date: 10/15/2021  EXAMINATION: ONE XRAY VIEW OF THE CHEST 10/15/2021 4:46 am COMPARISON: Chest portable October 5, 2021 HISTORY: ORDERING SYSTEM PROVIDED HISTORY:  TECHNOLOGIST PROVIDED HISTORY:  Reason for Exam: pot upright FINDINGS: The heart is moderately to severely enlarged with otherwise unremarkable configuration. The mediastinal contours are within normal limits. Large left-sided pleural effusion is noted with adjacent marked compressive atelectasis seen. Right lung is well aerated. Bones and soft tissues are unremarkable. Large left-sided pleural effusion with marked compressive atelectasis of the left lung. Moderate to severe cardiomegaly.      XR CHEST PORTABLE    Result Date: 10/5/2021  EXAMINATION: ONE XRAY VIEW OF THE CHEST 10/5/2021 7:20 am COMPARISON: 10/04/2021, 10/02/2021 HISTORY: ORDERING SYSTEM PROVIDED HISTORY: Increased SOA, L thoracentesis yesterday TECHNOLOGIST PROVIDED HISTORY: Increased SOA, L thoracentesis yesterday FINDINGS: The cardiac and mediastinal contours appear unchanged. Basilar opacities and pleural effusions are again demonstrated without appreciable change. No new airspace disease identified in the interval.  No evidence for pneumothorax. No significant interval change. XR CHEST PORTABLE    Result Date: 10/4/2021  EXAMINATION: ONE XRAY VIEW OF THE CHEST 10/4/2021 4:09 pm COMPARISON: None. HISTORY: ORDERING SYSTEM PROVIDED HISTORY: post thoracentesis, interval monitoring TECHNOLOGIST PROVIDED HISTORY: post thoracentesis, interval monitoring FINDINGS: Interval decrease in pleural fluid on the left compared to the prior study no evidence of pneumothorax. Persistent effusion suggested on the right     Interval decrease in the left pleural effusion. No evidence of pneumothorax     XR CHEST PORTABLE    Result Date: 10/4/2021  EXAMINATION: ONE XRAY VIEW OF THE CHEST 10/4/2021 7:27 am COMPARISON: Chest CT 10/03/2021. Chest radiograph 10/02/2021 HISTORY: ORDERING SYSTEM PROVIDED HISTORY: evaluate TECHNOLOGIST PROVIDED HISTORY: evaluate Follow-up left-sided airspace disease. FINDINGS: Left perihilar and left basilar opacity again demonstrated and without significant change. The streaky opacities in the right lung base are also without significant change. Pleural effusions, left greater than right again demonstrated. Cardiac silhouette enlargement again demonstrated. A portion of the right lung apex is obscured, however no evidence for pneumothorax. No significant interval change in pleural effusions and associated basilar opacities, left greater than right. XR CHEST PORTABLE    Result Date: 10/2/2021  EXAMINATION: ONE XRAY VIEW OF THE CHEST 10/2/2021 5:48 pm COMPARISON: None.  HISTORY: ORDERING SYSTEM PROVIDED HISTORY: shortness of breath TECHNOLOGIST PROVIDED HISTORY: shortness of breath FINDINGS: Portable study is limited by obesity. The heart is moderately enlarged. There is left retrocardiac consolidation. There is thickening of the minor fissure. There is pulmonary vascular congestion and suspicion of trace right pleural fluid. Findings most consistent with mild congestive heart failure. Left retrocardiac opacification/consolidation may represent a combination of pleural fluid and airspace disease. Underlying pneumonia not excluded. US THORACENTESIS Which side should the procedure be performed? Left    Result Date: 10/4/2021  PROCEDURE: ULTRASOUNDGUIDED left THORACENTESIS 10/4/2021 HISTORY: ORDERING SYSTEM PROVIDED HISTORY: Diagnostic thoracentesis for sepsis and concern for left sided pneumonia with left sided pleural effusion TECHNOLOGIST PROVIDED HISTORY: Diagnostic thoracentesis for sepsis and concern for left sided pneumonia with left sided pleural effusion Which side should the procedure be performed? ->Left Left pleural effusion TECHNIQUE: This procedure was performed by Trung Nugent PA-C under indirect supervision of Dr. Mini Becerra. A detailed explanation of the procedure including but not limited to risks and benefits of the procedure were discussed. The posterior chest was prepped and draped using universal protocol and maximum sterile barrier technique. While the patient was seated upright, after localizing, marking and anesthetizing the posterior left lower chest with one percent lidocaine, a 5 [employer] Yudelka Yueh needle was advanced under ultrasound guidance. Some sonogram image demonstrate safe tract access. The catheter was advanced and the needle was removed. The catheter was connected to a Vacutainer bottle and 350 mL of clear yellow fluidwas drained. Postprocedural ultrasound demonstrated minimal residual fluid. The catheter was removed and the site was dressed appropriately. A sample was sent for laboratory analysis. Estimated blood loss was minimum.  The patient tolerated the procedure well and left the Department in stable condition. FINDINGS: A total of 350 mL of clear yellow fluid was removed. Successful ultrasound guided thoracentesis. RECENT VITALS:     Temp: 98.3 °F (36.8 °C),  Pulse: 73, Resp: 24, BP: 98/64, SpO2: 93 %      This patient is a 68 y.o. Female with sudden onset Left sided chest pain. Patient has history of indwelling Marquez catheter and is actively being treated for urinary tract infection. Patient was found to have a large left pleural effusion, CASEY. Currently treating patient with Vanco and cefepime. Bounce back to medicine. ED Course as of Oct 15 0728   Fri Oct 15, 2021   0510 Seen and evaluated septic workup. Repeat trop xr reviewed    [BG]   5115 casey    [BG]   0626 Trop downtrending    [BG]   0630 Called for admission    [BG]      ED Course User Index  [BG] Vaughn Aguero DO       OUTSTANDING TASKS / RECOMMENDATIONS:    1. Awaiting admission to medicine      FINAL IMPRESSION:     1. Acute chest pain    2. CASEY (acute kidney injury) ([facility] [address]    3. Pleural effusion    4. Hyponatremia        DISPOSITION:         DISPOSITION:  []  Discharge   []  Transfer -    [x]  Admission -  Medicine    []  Against Medical Advice   []  Eloped   FOLLOW-UP: No follow-up provider specified.    DISCHARGE MEDICATIONS: New Prescriptions    No medications on file          Lisa Bailey DO  Emergency Medicine Resident  [address], [address] [facility]  Resident  10/15/21 1601

## 2021-10-15 NOTE — H&P
Attending Physician Statement  I have discussed the case of Roverto Vásquez, including pertinent history and exam findings with the resident/fellow/medical student/NP/PA. I have seen and examined the patient and the key elements of the encounter have been performed by me. I agree with the assessment, plan and orders as documented by the resident/fellow/medical student/NP/PA  With changes made to the note as needed. Pt was seen during rounds. Review of Systems:   In addition to the pertinent positives and negatives as stated within HPI and the review of systems as documented in their notes, all other systems were reviewed when able to and are reported negative. Patient was admitted with left-sided chest pain that has increased with coughing. Found to have left pleural effusion with possibly underlying atelectasis. Patient previous history of left pleural effusion that was transudate. No trauma. No fever-open thoracentesis and send the fluid for analysis including protein, LDH, cholesterol and save the remaining fluid for analysis. Obtain chest x-ray postthoracentesis to evaluate for any underlying infiltrate. If no infiltrate, discontinue antibiotics    Small right pleural effusion with underlying atelectasis-continue to monitor. The cause of the pleural effusions could be acute on chronic diastolic congestive heart failure if the pleural effusions remain to be transudative    Essential hypertension-continue to monitor blood pressure and adjust medications    Morbid obesity-weight loss recommended    COPD/bronchial asthma-bronchodilators. Continue supplemental oxygen    Atrial fibrillation with rate that is controlled-continue Cardizem, metoprolol and Eliquis    Obstructive sleep apnea-use CPAP therapy    Unspecified hyperlipidemia-resume home medications    Chronic diastolic congestive heart failure-control blood pressure    Gout-avoid dehydration and decrease protein intake    Hyponatremia? Cause-continue to monitor    Acute kidney injury-continue to monitor    Leukocytosis-continue to monitor        Hart Leventhal, MD  10/15/2021  4:18 PM

## 2021-10-15 NOTE — BRIEF OP NOTE
Brief Postoperative Note for Thoracentesis    Duane Stone  YOB: 1945  [id number]    Pre-operative Diagnosis: left Pleural effusion      Post-operative Diagnosis: Same    Procedure: Ultrasound guided Thoracentesis     Anesthesia: 1% Lidocaine     Surgeons/Assistants: Fabiola Teran PA-C    Complications: none    EBL: Minimal    Specimens: were obtained    Ultrasound guided left thoracentesis performed. 300 ml straw fluid obtained. Dressing applied.       Electronically signed by JAUN Myers on 10/15/2021 at 1:11 PM

## 2021-10-15 NOTE — ED PROVIDER NOTES
[facility]  ED  Emergency Department Encounter  EmergencyMedicine Resident     Pt Dione Muñiz  MRN: [id number]  [place] 1945  Date of evaluation: 10/15/21  PCP:  KALYN Parra CNP    This patient was evaluated in the Emergency Department for symptoms described in the history of present illness. The patient was evaluated in the context of the global COVID-19 pandemic, which necessitated consideration that the patient might be at risk for infection with the SARS-CoV-2 virus that causes COVID-19. Institutional protocols and algorithms that pertain to the evaluation of patients at risk for COVID-19 are in a state of rapid change based on information released by regulatory bodies including the CDC and federal and state organizations. These policies and algorithms were followed during the patient's care in the ED. CHIEF COMPLAINT       Chief Complaint   Patient presents with    Chest Pain       HISTORY OF PRESENT ILLNESS  (Location/Symptom, Timing/Onset, Context/Setting, Quality, Duration, Modifying Factors, Severity.)      Wagner Lamb is a 68 y.o. female who presents with nerve sudden onset chest pain. Patient is arriving from a skilled nursing facility where she was being treated patient denies any nausea diaphoresis vomiting shortness of breath cough abdominal pain received aspirin by EMS personnel. PAST MEDICAL / SURGICAL / SOCIAL / FAMILY HISTORY      has a past medical history of Asthma, COPD (chronic obstructive pulmonary disease) ([address] Gout, Hyperlipidemia, Hypertension, Mitral insufficiency, and Pneumonia. has a past surgical history that includes Hysterectomy (1986); knee surgery; Colonoscopy; Dental surgery; Tonsillectomy and adenoidectomy; Ankle surgery (Left, 03/22/2016); and pr colsc flx w/rmvl of tumor polyp lesion snare tq (N/A, 8/8/2017). Social History     Socioeconomic History    Marital status:       Spouse name: Not on file  Number of children: Not on file    Years of education: Not on file    Highest education level: Not on file   Occupational History    Not on file   Tobacco Use    Smoking status: Former Smoker    Smokeless tobacco: Never Used    Tobacco comment: over 30 years ago    Vaping Use    Vaping Use: Never used   Substance and Sexual Activity    Alcohol use: Yes     Alcohol/week: 0.0 standard drinks     Comment: occasionally     Drug use: No    Sexual activity: Not on file   Other Topics Concern    Not on file   Social History Narrative    Not on file     Social Determinants of Health     Financial Resource Strain:     Difficulty of Paying Living Expenses:    Food Insecurity:     Worried About Running Out of Food in the Last Year:     [address] in the Last Year:    Transportation Needs:     Lack of Transportation (Medical):  Lack of Transportation (Non-Medical):    Physical Activity:     Days of Exercise per Week:     Minutes of Exercise per Session:    Stress:     Feeling of Stress :    Social Connections:     Frequency of Communication with Friends and Family:     Frequency of Social Gatherings with Friends and Family:     Attends [employer] Services:     Active Member of Clubs or Organizations:     Attends Club or Organization Meetings:     Marital Status:    Intimate Partner Violence:     Fear of Current or Ex-Partner:     Emotionally Abused:     Physically Abused:     Sexually Abused:        Family History   Problem Relation Age of Onset    High Blood Pressure Mother     Cancer Mother     Asthma Father     Heart Disease Father     Cancer Father        Allergies:  Latex, Betadine [povidone iodine], Bee venom, Dilaudid [hydromorphone hcl], Adhesive tape, and Sulfa antibiotics    Home Medications:  Prior to Admission medications    Medication Sig Start Date End Date Taking?  Authorizing Provider   metoprolol tartrate 75 MG TABS TAKE ONE TABLET BY MOUTH TWICE DAILY 10/10/21 Angelique Diaz MD   dilTIAZem (CARDIZEM CD) 240 MG extended release capsule Take 1 capsule by mouth daily 10/10/21   Angelique Diaz MD   apixaban (ELIQUIS) 5 MG TABS tablet Take 1 tablet by mouth 2 times daily 10/8/21   Angelique Diaz MD   atorvastatin (LIPITOR) 40 MG tablet Take 1 tablet by mouth daily 10/8/21   Angelique Diaz MD   citalopram (CELEXA) 40 MG tablet Take 0.5 tablets by mouth daily 10/8/21   Angelique Diaz MD   losartan (COZAAR) 100 MG tablet Take 1 tablet by mouth daily 8/28/18   Matthew Olivarez MD   meloxicam (MOBIC) 15 MG tablet Take 15 mg by mouth daily    Historical Provider, MD   clopidogrel (PLAVIX) 75 MG tablet Take 1 tablet by mouth daily 10/5/17   Matthew Olivarez MD   cyclobenzaprine (FLEXERIL) 10 MG tablet TAKE ONE TABLET BY MOUTH THREE TIMES DAILY AS NEEDED FOR MUSCLE SPASM 10/5/17   Matthew Olivarez MD   diphenhydrAMINE (BENADRYL) 25 MG capsule Take 25 mg by mouth every 6 hours as needed for Itching    Historical Provider, MD   zinc oxide (DESITIN) 40 % ointment Apply topically as needed for Dry Skin Apply topically as needed.     Historical Provider, MD   furosemide (LASIX) 20 MG tablet Take 1 tablet by mouth daily 7/14/17   Matthew Olivarez MD   isosorbide mononitrate (IMDUR) 30 MG extended release tablet Take 1 tablet by mouth daily 7/14/17   Matthew Olivarez MD   albuterol sulfate  (90 Base) MCG/ACT inhaler Inhale 2 puffs into the lungs every 6 hours as needed for Wheezing 7/14/17   Matthew Olivarez MD   Calcium Carb-Cholecalciferol (CALCIUM + D3 PO) Take by mouth daily    Historical Provider, MD   CINNAMON PO Take by mouth daily    Historical Provider, MD   TURMERIC CURCUMIN PO Take by mouth daily    Historical Provider, MD   ipratropium-albuterol (DUONEB) 0.5-2.5 (3) MG/3ML SOLN nebulizer solution Inhale 1 vial into the lungs every 4 hours    Historical Provider, MD   beclomethasone (QVAR) 80 MCG/ACT inhaler Inhale 2 puffs into the lungs 2 times daily 6/17/16   Adilson Watson MD   Acetaminophen 650 MG TABS Take 650 mg by mouth every 4 hours as needed for Pain or Fever 4/4/16   Zakia Nieves MD   clonazePAM (KLONOPIN) 1 MG tablet Take 1 mg by mouth nightly     Historical Provider, MD   LYSINE PO Take by mouth daily    Historical Provider, MD   vitamin D (CHOLECALCIFEROL) 1000 UNIT TABS tablet Take 1,000 Units by mouth daily    Historical Provider, MD   Pyridoxine HCl (VITAMIN B-6) 50 MG tablet Take 100 mg by mouth daily    Historical Provider, MD   Cyanocobalamin (VITAMIN B 12 PO) Take by mouth daily    Historical Provider, MD   Magnesium 500 MG TABS Take by mouth    Historical Provider, MD   Ascorbic Acid (VITAMIN C) 500 MG tablet Take 500 mg by mouth daily    Historical Provider, MD       REVIEW OF SYSTEMS    (2-9 systems for level 4, 10 or more for level 5)      Review of Systems   Constitutional: Negative for fever. HENT: Negative for congestion. Eyes: Negative for photophobia. Respiratory: Negative for shortness of breath. Cardiovascular: Positive for chest pain. Gastrointestinal: Negative for abdominal pain. Genitourinary: Positive for dysuria. Negative for hematuria. Musculoskeletal: Negative for neck pain. Skin: Negative for rash. Allergic/Immunologic: Positive for environmental allergies. Neurological: Negative for syncope and headaches. Psychiatric/Behavioral: Negative for agitation and confusion. PHYSICAL EXAM   (up to 7 for level 4, 8 or more for level 5)      INITIAL VITALS:   /76   Pulse 98   Temp 98.3 °F (36.8 °C) (Oral)   Resp 24   Ht 5' 2.52\" (1.588 m)   Wt 250 lb (113.4 kg)   SpO2 (!) 89%   BMI 44.97 kg/m²     Physical Exam  Vitals and nursing note reviewed. Constitutional:       Appearance: She is obese. She is not toxic-appearing. HENT:      Head: Normocephalic.       Right Ear: External ear normal.      Left Ear: External ear normal.      Nose: Nose normal.      Mouth/Throat: Pharynx: Oropharynx is clear. Eyes:      Conjunctiva/sclera: Conjunctivae normal.   Cardiovascular:      Rate and Rhythm: Normal rate. Pulses: Normal pulses. Pulmonary:      Effort: Pulmonary effort is normal.   Abdominal:      Palpations: Abdomen is soft. Musculoskeletal:      Cervical back: Normal range of motion. Right lower leg: Edema present. Left lower leg: Edema present. Skin:     General: Skin is warm. Capillary Refill: Capillary refill takes less than 2 seconds. Neurological:      Mental Status: She is alert and oriented to person, place, and time. Psychiatric:         Mood and Affect: Mood normal.         DIFFERENTIAL  DIAGNOSIS     PLAN (LABS / IMAGING / EKG):  Orders Placed This Encounter   Procedures    COVID-19, Rapid    XR CHEST PORTABLE    CBC WITH AUTO DIFFERENTIAL    COMPREHENSIVE METABOLIC PANEL    Troponin    EKG 12 Lead       MEDICATIONS ORDERED:  Orders Placed This Encounter   Medications    ondansetron (ZOFRAN) 4 MG/2ML injection     Estevan Graham: cabinet override    cefepime (MAXIPIME) 2000 mg IVPB minibag     Order Specific Question:   Antimicrobial Indications     Answer: Other     Order Specific Question:   Other Abx Indication     Answer: Suspected Sepsis of Urinary Tract Origin    0.9 % sodium chloride bolus    ondansetron (ZOFRAN) injection 4 mg    vancomycin (VANCOCIN) 2,000 mg in dextrose 5 % 500 mL IVPB     Order Specific Question:   Antimicrobial Indications     Answer:   Pneumonia (HAP)    vancomycin (VANCOCIN) 1000 mg in dextrose 5% 200 mL IVPB     Order Specific Question:   Antimicrobial Indications     Answer:   Pneumonia (HAP)    vancomycin (VANCOCIN) intermittent dosing (placeholder)     Order Specific Question:   Antimicrobial Indications     Answer:   Pneumonia (HAP)    albuterol sulfate  (90 Base) MCG/ACT inhaler 2 puff     Order Specific Question:   Initiate RT Bronchodilator Protocol     Answer:    Yes    apixaban (ELIQUIS) tablet 5 mg    DISCONTD: atorvastatin (LIPITOR) tablet 40 mg    fluticasone (FLOVENT HFA) 110 MCG/ACT inhaler 2 puff    clopidogrel (PLAVIX) tablet 75 mg    dilTIAZem (CARDIZEM CD) extended release capsule 240 mg    furosemide (LASIX) tablet 20 mg    DISCONTD: ipratropium-albuterol (DUONEB) nebulizer solution 3 mL     Order Specific Question:   Initiate RT Bronchodilator Protocol     Answer: Yes    metoprolol tartrate (LOPRESSOR) tablet 75 mg    0.9 % sodium chloride infusion    OR Linked Order Group     ondansetron (ZOFRAN-ODT) disintegrating tablet 4 mg     ondansetron (ZOFRAN) injection 4 mg    polyethylene glycol (GLYCOLAX) packet 17 g    OR Linked Order Group     acetaminophen (TYLENOL) tablet 650 mg     acetaminophen (TYLENOL) suppository 650 mg    cefepime (MAXIPIME) 2000 mg IVPB minibag     Order Specific Question:   Antimicrobial Indications     Answer:   Sepsis of Unknown Etiology    atorvastatin (LIPITOR) tablet 20 mg    0.9 % sodium chloride infusion    ipratropium-albuterol (DUONEB) nebulizer solution 3 mL     Order Specific Question:   Initiate RT Bronchodilator Protocol     Answer:   Yes       DDX: sepsis, acs, chf,     DIAGNOSTIC RESULTS / EMERGENCY DEPARTMENT COURSE / MDM   LAB RESULTS:  Results for orders placed or performed during the hospital encounter of 10/15/21   COVID-19, Rapid    Specimen: Nasopharyngeal Swab   Result Value Ref Range    Specimen Description . NASOPHARYNGEAL SWAB     SARS-CoV-2, Rapid Not Detected Not Detected   Culture, Blood 2    Specimen: Blood   Result Value Ref Range    Specimen Description . BLOOD     Special Requests RT WRIST 10ML     Culture NO GROWTH 13 HOURS    Culture, Blood 1    Specimen: Blood   Result Value Ref Range    Specimen Description . BLOOD     Special Requests LT AC 10ML     Culture NO GROWTH 13 HOURS    LEGIONELLA ANTIGEN, URINE    Specimen: Urine voided   Result Value Ref Range    Legionella Pneumophilia Ag, Urine NEGATIVE    CBC WITH AUTO DIFFERENTIAL   Result Value Ref Range    WBC 15.6 (H) 3.5 - 11.3 k/uL    RBC 4.70 3.95 - 5.11 m/uL    Hemoglobin 14.2 11.9 - 15.1 g/dL    Hematocrit 43.9 36.3 - 47.1 %    MCV 93.4 82.6 - 102.9 fL    MCH 30.2 25.2 - 33.5 pg    MCHC 32.3 28.4 - 34.8 g/dL    RDW 12.6 11.8 - 14.4 %    Platelets 561 682 - 933 k/uL    MPV 9.6 8.1 - 13.5 fL    NRBC Automated 0.0 0.0 per 100 WBC    Differential Type NOT REPORTED     Seg Neutrophils 77 (H) 36 - 65 %    Lymphocytes 11 (L) 24 - 43 %    Monocytes 7 3 - 12 %    Eosinophils % 3 1 - 4 %    Basophils 1 0 - 2 %    Immature Granulocytes 1 (H) 0 %    Segs Absolute 12.14 (H) 1.50 - 8.10 k/uL    Absolute Lymph # 1.74 1.10 - 3.70 k/uL    Absolute Mono # 1.13 0.10 - 1.20 k/uL    Absolute Eos # 0.40 0.00 - 0.44 k/uL    Basophils Absolute 0.09 0.00 - 0.20 k/uL    Absolute Immature Granulocyte 0.10 0.00 - 0.30 k/uL    WBC Morphology NOT REPORTED     RBC Morphology NOT REPORTED     Platelet Estimate NOT REPORTED    COMPREHENSIVE METABOLIC PANEL   Result Value Ref Range    Glucose 119 (H) 70 - 99 mg/dL    BUN 26 (H) 8 - 23 mg/dL    CREATININE 1.35 (H) 0.50 - 0.90 mg/dL    Bun/Cre Ratio NOT REPORTED 9 - 20    Calcium 9.5 8.6 - 10.4 mg/dL    Sodium 129 (L) 135 - 144 mmol/L    Potassium 3.7 3.7 - 5.3 mmol/L    Chloride 90 (L) 98 - 107 mmol/L    CO2 26 20 - 31 mmol/L    Anion Gap 13 9 - 17 mmol/L    Alkaline Phosphatase 75 35 - 104 U/L    ALT 23 5 - 33 U/L    AST 27 <32 U/L    Total Bilirubin 1.26 (H) 0.3 - 1.2 mg/dL    Total Protein 6.6 6.4 - 8.3 g/dL    Albumin 3.6 3.5 - 5.2 g/dL    Albumin/Globulin Ratio 1.2 1.0 - 2.5    GFR Non- 38 (L) >60 mL/min    GFR  46 (L) >60 mL/min    GFR Comment          GFR Staging NOT REPORTED    Troponin   Result Value Ref Range    Troponin, High Sensitivity 40 (H) 0 - 14 ng/L    Troponin T NOT REPORTED <0.03 ng/mL    Troponin Interp NOT REPORTED    Lactate, Sepsis   Result Value Ref Range    Lactic Acid, Sepsis NOT REPORTED 0.5 - 1.9 mmol/L    Lactic Acid, Sepsis, Whole Blood 1.8 0.5 - 1.9 mmol/L   Lactate, Sepsis   Result Value Ref Range    Lactic Acid, Sepsis NOT REPORTED 0.5 - 1.9 mmol/L    Lactic Acid, Sepsis, Whole Blood 1.8 0.5 - 1.9 mmol/L   Troponin   Result Value Ref Range    Troponin, High Sensitivity 39 (H) 0 - 14 ng/L    Troponin T NOT REPORTED <0.03 ng/mL    Troponin Interp NOT REPORTED    Urinalysis with microscopic   Result Value Ref Range    Color, UA Yellow Yellow    Turbidity UA Cloudy (A) Clear    Glucose, Ur NEGATIVE NEGATIVE    Bilirubin Urine NEGATIVE NEGATIVE    Ketones, Urine NEGATIVE NEGATIVE    Specific Gravity, UA 1.012 1.005 - 1.030    Urine Hgb MODERATE (A) NEGATIVE    pH, UA 5.5 5.0 - 8.0    Protein, UA NEGATIVE NEGATIVE    Urobilinogen, Urine Normal Normal    Nitrite, Urine NEGATIVE NEGATIVE    Leukocyte Esterase, Urine SMALL (A) NEGATIVE    -          WBC, UA 20 TO 50 0 - 5 /HPF    RBC, UA 10 TO 20 0 - 2 /HPF    Casts UA 20 TO 50 0 - 2 /LPF    Casts UA HYALINE 0 - 2 /LPF    Crystals, UA NOT REPORTED None /HPF    Epithelial Cells UA 5 TO 10 0 - 5 /HPF    Renal Epithelial, UA NOT REPORTED 0 /HPF    Bacteria, UA NOT REPORTED None    Mucus, UA 1+ (A) None    Trichomonas, UA NOT REPORTED None    Amorphous, UA NOT REPORTED None    Other Observations UA NOT REPORTED NOT REQ. Yeast, UA MANY (A) None   LACTATE DEHYDROGENASE, BODY FLUID   Result Value Ref Range    Specimen Type . PLEURAL FLUID     LD, Fluid 125 U/L   Body Fluid Cell Count with Differential   Result Value Ref Range    Color, Fluid NOT REPORTED     Appearance, Fluid NOT REPORTED     WBC, Fluid 262 /mm3    RBC, Fluid <3,000 /mm3    Specimen Type . PLEURAL FLUID     Neutrophil Count, Fluid 33 %    Lymphocytes, Body Fluid 43 %    Monocyte Count, Fluid NOT REPORTED %    Eos, Fluid NOT REPORTED 0 %    Basos, Fluid NOT REPORTED 0 %    Other Cells, Fluid PENDING %    Fluid Diff Comment NOT REPORTED    PROTEIN, TOTAL   Result Value Ref Range    Total Protein 5.8 (L) 6.4 - 8.3 g/dL   PROTEIN, BODY FLUID   Result Value Ref Range    Specimen Type . PLEURAL FLUID     Total Protein, Body Fluid 2.5 g/dL   LACTATE DEHYDROGENASE   Result Value Ref Range     (H) 135 - 214 U/L   GLUCOSE, BODY FLUID   Result Value Ref Range    Specimen Type . PLEURAL FLUID     Glucose, Fluid 128 mg/dL   PH, BODY FLUID   Result Value Ref Range    Specimen Type . PLEURAL FLUID     pH, Fluid 7.0    Brain Natriuretic Peptide   Result Value Ref Range    Pro-BNP 1,745 (H) <300 pg/mL    BNP Interpretation Pro-BNP Reference Range:    EKG 12 Lead   Result Value Ref Range    Ventricular Rate 86 BPM    Atrial Rate 357 BPM    QRS Duration 130 ms    Q-T Interval 396 ms    QTc Calculation (Bazett) 473 ms    R Axis -58 degrees    T Axis 126 degrees       IMPRESSION: Chronically ill-appearing 68-year-old female acute onset chest pain plan will be cardiac work-up reevaluation chest pain admission    RADIOLOGY:  CT CHEST WO CONTRAST    Result Date: 10/15/2021  EXAMINATION: CT OF THE CHEST WITHOUT CONTRAST 10/15/2021 8:48 am TECHNIQUE: CT of the chest was performed without the administration of intravenous contrast. Multiplanar reformatted images are provided for review. Dose modulation, iterative reconstruction, and/or weight based adjustment of the mA/kV was utilized to reduce the radiation dose to as low as reasonably achievable. COMPARISON: Chest x-ray dated 10/15/2021; CT chest dated 10/03/2021 HISTORY: ORDERING SYSTEM PROVIDED HISTORY: left pleural effusion TECHNOLOGIST PROVIDED HISTORY: left pleural effusion Reason for Exam: lt pleural effusion, sob Acuity: Unknown Type of Exam: Unknown FINDINGS: Mediastinum: Heart size is enlarged. Pericardial fluid is improved when compared to 10/03/2021. No significant pericardial effusion on today's study. There is extensive coronary artery calcification. Thoracic aorta and pulmonary arteries are normal in caliber.   No mediastinal lymphadenopathy. Assessment of rick is limited by lack of contrast.  No axillary lymphadenopathy. Lungs/pleura: There is a moderate left pleural effusion. There is trace right pleural fluid, improved from the previous study. There is a bandlike opacity in the medial right base, unchanged and most suggestive of atelectasis. There is increased consolidation throughout most of the left lung. There is debris within the left main bronchi, which appears new. No pneumothorax. Upper Abdomen: Limited visualized upper abdominal structures are unremarkable. Soft Tissues/Bones: There is no acute or suspicious osseous abnormality. Visualized superficial soft tissues are within normal limits. 1.  Moderate left pleural effusion with increased debris in the left bronchi and increased consolidation in the left lung. Associated volume loss is most suggestive of atelectasis, but possibility of superimposed pneumonia would be difficult to exclude. 2.  Trace right pleural fluid with right basilar atelectasis. 3.  Improved pericardial effusion. XR CHEST PORTABLE    Result Date: 10/15/2021  EXAMINATION: ONE XRAY VIEW OF THE CHEST 10/15/2021 1:44 pm COMPARISON: 10/15/2021 HISTORY: ORDERING SYSTEM PROVIDED HISTORY: POST THORACENTESIS TECHNOLOGIST PROVIDED HISTORY: POST THORACENTESIS FINDINGS: Improved aeration of the left upper lung with decrease in left pleural effusion status post left thoracentesis without evidence of pneumothorax. Stable cardiomediastinal silhouette with calcific plaque of the thoracic aorta. Mild perihilar congestive changes. Overall decrease in left pleural effusion with moderate residual pleural/parenchymal density left mid and lower chest.  Bones are osteopenic with degenerative changes.      No evidence of pneumothorax status post left thoracentesis     XR CHEST PORTABLE    Result Date: 10/15/2021  EXAMINATION: ONE XRAY VIEW OF THE CHEST 10/15/2021 4:46 am COMPARISON: Chest portable October 5, 2021 HISTORY: ORDERING SYSTEM PROVIDED HISTORY: cp TECHNOLOGIST PROVIDED HISTORY: cp Reason for Exam: pot upright FINDINGS: The heart is moderately to severely enlarged with otherwise unremarkable configuration. The mediastinal contours are within normal limits. Large left-sided pleural effusion is noted with adjacent marked compressive atelectasis seen. Right lung is well aerated. Bones and soft tissues are unremarkable. Large left-sided pleural effusion with marked compressive atelectasis of the left lung. Moderate to severe cardiomegaly. US THORACENTESIS Which side should the procedure be performed? Left    Result Date: 10/15/2021  PROCEDURE: Barrie Eng LEFT THORACENTESIS 10/15/2021 HISTORY: ORDERING SYSTEM PROVIDED HISTORY: pleural effusion TECHNOLOGIST PROVIDED HISTORY: pleural effusion Which side should the procedure be performed? ->Left TECHNIQUE: This procedure was performed by Dr. Biju Ramires. Informed consent was obtained after a detailed explanation of the procedure including risks, benefits, and alternatives. Universal protocol was performed. The left chest was prepped and draped in sterile fashion and local anesthesia was achieved with lidocaine. A 5 [place] needle sheath was advanced under ultrasound guidance into pleural effusion and thoracentesis was performed. The patient tolerated the procedure well. The patient began coughing after approximately 300 mL of fluid was obtained and requested termination of the procedure. Small effusion persists. EBL: None FINDINGS: A total of 300 mL of clear yellow fluid was removed. Sample was sent for the requested studies. Successful ultrasound guided left thoracentesis. EKG  afib at rate of 86.  lafb twi 1 and avl     All EKG's are interpreted by the Emergency Department Physician who either signs or Co-signs this chart in the absence of a cardiologist.    EMERGENCY DEPARTMENT COURSE:  ED Course as of Oct 15 2052   Fri Oct 15, 2021   0510 Seen

## 2021-10-15 NOTE — PROGRESS NOTES
Patient in room 8  Pulse ox and BP taken  DR LEVINE [facility] in room  [facility] in room  Site prepped and draped  Access obtained  Draining STRAW colored fluid  TOTAL OF 300ML REMOVED AND SPECIMEN SENT TO LAB  Access removed  Site covered with 2x2 and tegaderm  Patient tolerated well  CHEST XRAY ORDERED AT BEDSIDE  REPORT TO ER RN  Stable upon transport.

## 2021-10-15 NOTE — H&P
[facility]     Department of Internal Medicine - Staff Internal Medicine Teaching Service          ADMISSION NOTE/HISTORY AND PHYSICAL EXAMINATION   Date: 10/15/2021  Patient Name: Guanako Shah  Date of admission: 10/15/2021  4:20 AM  YOB: 1945  PCP: KALYN Syed CNP  History Obtained From:  patient, electronic medical record    CHIEF COMPLAINT     Chief complaint: chest pain and shortness of breath     HISTORY OF PRESENTING ILLNESS     A 68 y.o.  female with PMH of A. fib (on Eliquis), COPD (on home oxygen), SEJAL, HTN, morbid obesity,  Was brought in by EMS from nursing facility with chief complaint of chest pain and shortness of breath. Last night, patient woke up from chest pain- left-sided, non-radiating, dull, associated with nausea, diaphoresis, dizziness and difficulty breathing. Chest pain lasted about 20/30 minutes. Chest pain is nonreproducible, increased with deep breathing and coughing. She is on 3 L O2 nasal cannula, denies orthopnea/PND episodes. She does not ambulate much. She was recently admitted for similar complaints and was found to have acute hypoxic respiratory failure secondary to left lower lobe pneumonia and pleural effusion. she also had new onset A. fib with RVR. Left chest thoracocentesis was done, 350 cc transudate fluid was removed. She underwent CURT and synchronized cardioversion for A. fib and was discharged on Eliquis, Cardizem 240 mg and Lopressor 70 mg twice daily. She also has indwelling urinary catheter since her last admission. She has reduced appetite. Denies any urinary complaints. Has been constipated for last 3 days. She has chronic left leg edema. In the ER,  Chest x-ray showed large left-sided pleural effusion with marked compressive atelectasis of left lung with moderate to severe cardiomegaly.   Troponins-40 (downtrending) (baseline 60s)  proBNP 1745  Creatinine 1.35 (baseline normal)  WBCs-15.69 lactic acid 1.8  Urine : Cloudy, 20-50 WBC present, will culture. She received vancomycin and cefepime in ER.       Review of Systems:  General ROS: Completed and except as mentioned above were negative   HEENT ROS: Completed and except as mentioned above were negative   Allergy and Immunology ROS:  Completed and except as mentioned above were negative  Hematological and Lymphatic ROS:  Completed and except as mentioned above were negative  Respiratory ROS:  Completed and except as mentioned above were negative  Cardiovascular ROS:  Completed and except as mentioned above were negative  Gastrointestinal ROS: Completed and except as mentioned above were negative  Genito-Urinary ROS:  Completed and except as mentioned above were negative  Musculoskeletal ROS:  Completed and except as mentioned above were negative  Neurological ROS:  Completed and except as mentioned above were negative  Skin & Dermatological ROS:  Completed and except as mentioned above were negative  Psychological ROS:  Completed and except as mentioned above were negative    PAST MEDICAL HISTORY     Past Medical History:   Diagnosis Date    Asthma     COPD (chronic obstructive pulmonary disease) [facility] [address]     Gout     Hyperlipidemia     Hypertension     Mitral insufficiency     Pneumonia        PAST SURGICAL HISTORY     Past Surgical History:   Procedure Laterality Date    ANKLE SURGERY Left 03/22/2016    ORIF    COLONOSCOPY      DENTAL SURGERY      HYSTERECTOMY  1986    KNEE SURGERY      [place] COLSC FLX W/RMVL OF TUMOR POLYP LESION SNARE TQ N/A 8/8/2017    COLONOSCOPY POLYPECTOMY SNARE/COLD BIOPSY performed by Oh Brandon MD at [facility]    TONSILLECTOMY AND ADENOIDECTOMY         ALLERGIES     Latex, Betadine [povidone iodine], Bee venom, Dilaudid [hydromorphone hcl], Adhesive tape, and Sulfa antibiotics    MEDICATIONS PRIOR TO ADMISSION     Prior to Admission medications    Medication Sig Start Date End Date Taking? Authorizing Provider   metoprolol tartrate 75 MG TABS TAKE ONE TABLET BY MOUTH TWICE DAILY 10/10/21   Flakito Melissa MD   dilTIAZem (CARDIZEM CD) 240 MG extended release capsule Take 1 capsule by mouth daily 10/10/21   Flakito Melissa MD   apixaban (ELIQUIS) 5 MG TABS tablet Take 1 tablet by mouth 2 times daily 10/8/21   Flakito Melissa MD   atorvastatin (LIPITOR) 40 MG tablet Take 1 tablet by mouth daily 10/8/21   Flakito Melissa MD   citalopram (CELEXA) 40 MG tablet Take 0.5 tablets by mouth daily 10/8/21   Flakito Melissa MD   losartan (COZAAR) 100 MG tablet Take 1 tablet by mouth daily 8/28/18   Ellyn Alford MD   meloxicam (MOBIC) 15 MG tablet Take 15 mg by mouth daily    Historical Provider, MD   clopidogrel (PLAVIX) 75 MG tablet Take 1 tablet by mouth daily 10/5/17   Ellyn Alford MD   cyclobenzaprine (FLEXERIL) 10 MG tablet TAKE ONE TABLET BY MOUTH THREE TIMES DAILY AS NEEDED FOR MUSCLE SPASM 10/5/17   Ellyn Alford MD   diphenhydrAMINE (BENADRYL) 25 MG capsule Take 25 mg by mouth every 6 hours as needed for Itching    Historical Provider, MD   zinc oxide (DESITIN) 40 % ointment Apply topically as needed for Dry Skin Apply topically as needed.     Historical Provider, MD   furosemide (LASIX) 20 MG tablet Take 1 tablet by mouth daily 7/14/17   Ellyn Alford MD   isosorbide mononitrate (IMDUR) 30 MG extended release tablet Take 1 tablet by mouth daily 7/14/17   Ellyn Alford MD   albuterol sulfate  (90 Base) MCG/ACT inhaler Inhale 2 puffs into the lungs every 6 hours as needed for Wheezing 7/14/17   Ellyn Alford MD   Calcium Carb-Cholecalciferol (CALCIUM + D3 PO) Take by mouth daily    Historical Provider, MD   CINNAMON PO Take by mouth daily    Historical Provider, MD   TURMERIC CURCUMIN PO Take by mouth daily    Historical Provider, MD   ipratropium-albuterol (DUONEB) 0.5-2.5 (3) MG/3ML SOLN nebulizer solution Inhale 1 vial into the lungs every 4 hours injections. Septum was midline, mucosa was without erythema, exudates or cobblestoning. No thrush was noted. Neck: Supple without thyromegaly. No elevated JVP. Trachea was midline. Respiratory: Chest was symmetrical without dullness to percussion. Breath sounds bilaterally were clear to auscultation. There were no wheezes, rhonchi or rales. There is no intercostal retraction or use of accessory muscles. No egophony noted. Cardiovascular: Regular without murmur, clicks, gallops or rubs. Abdomen: Slightly rounded and soft without organomegaly. No rebound, rigidity or guarding was appreciated. Lymphatic: No lymphadenopathy. Musculoskeletal: Normal curvature of the spine. No gross muscle weakness. Extremities:  No lower extremity edema, ulcerations, tenderness, varicosities or erythema. Muscle size, tone and strength are normal.  No involuntary movements are noted. Skin:  Warm and dry. Good color, turgor and pigmentation. No lesions or scars.   No cyanosis or clubbing  Neurological/Psychiatric: The patient's general behavior, level of consciousness, thought content and emotional status is normal.          INVESTIGATIONS     Laboratory Testing:     Recent Results (from the past 24 hour(s))   EKG 12 Lead    Collection Time: 10/15/21  4:24 AM   Result Value Ref Range    Ventricular Rate 86 BPM    Atrial Rate 357 BPM    QRS Duration 130 ms    Q-T Interval 396 ms    QTc Calculation (Bazett) 473 ms    R Axis -58 degrees    T Axis 126 degrees   CBC WITH AUTO DIFFERENTIAL    Collection Time: 10/15/21  4:35 AM   Result Value Ref Range    WBC 15.6 (H) 3.5 - 11.3 k/uL    RBC 4.70 3.95 - 5.11 m/uL    Hemoglobin 14.2 11.9 - 15.1 g/dL    Hematocrit 43.9 36.3 - 47.1 %    MCV 93.4 82.6 - 102.9 fL    MCH 30.2 25.2 - 33.5 pg    MCHC 32.3 28.4 - 34.8 g/dL    RDW 12.6 11.8 - 14.4 %    Platelets 117 876 - 615 k/uL    MPV 9.6 8.1 - 13.5 fL    NRBC Automated 0.0 0.0 per 100 WBC    Differential Type NOT REPORTED     Seg Neutrophils 77 (H) 36 - 65 %    Lymphocytes 11 (L) 24 - 43 %    Monocytes 7 3 - 12 %    Eosinophils % 3 1 - 4 %    Basophils 1 0 - 2 %    Immature Granulocytes 1 (H) 0 %    Segs Absolute 12.14 (H) 1.50 - 8.10 k/uL    Absolute Lymph # 1.74 1.10 - 3.70 k/uL    Absolute Mono # 1.13 0.10 - 1.20 k/uL    Absolute Eos # 0.40 0.00 - 0.44 k/uL    Basophils Absolute 0.09 0.00 - 0.20 k/uL    Absolute Immature Granulocyte 0.10 0.00 - 0.30 k/uL    WBC Morphology NOT REPORTED     RBC Morphology NOT REPORTED     Platelet Estimate NOT REPORTED    COMPREHENSIVE METABOLIC PANEL    Collection Time: 10/15/21  4:35 AM   Result Value Ref Range    Glucose 119 (H) 70 - 99 mg/dL    BUN 26 (H) 8 - 23 mg/dL    CREATININE 1.35 (H) 0.50 - 0.90 mg/dL    Bun/Cre Ratio NOT REPORTED 9 - 20    Calcium 9.5 8.6 - 10.4 mg/dL    Sodium 129 (L) 135 - 144 mmol/L    Potassium 3.7 3.7 - 5.3 mmol/L    Chloride 90 (L) 98 - 107 mmol/L    CO2 26 20 - 31 mmol/L    Anion Gap 13 9 - 17 mmol/L    Alkaline Phosphatase 75 35 - 104 U/L    ALT 23 5 - 33 U/L    AST 27 <32 U/L    Total Bilirubin 1.26 (H) 0.3 - 1.2 mg/dL    Total Protein 6.6 6.4 - 8.3 g/dL    Albumin 3.6 3.5 - 5.2 g/dL    Albumin/Globulin Ratio 1.2 1.0 - 2.5    GFR Non- 38 (L) >60 mL/min    GFR  46 (L) >60 mL/min    GFR Comment          GFR Staging NOT REPORTED    Troponin    Collection Time: 10/15/21  4:35 AM   Result Value Ref Range    Troponin, High Sensitivity 40 (H) 0 - 14 ng/L    Troponin T NOT REPORTED <0.03 ng/mL    Troponin Interp NOT REPORTED    Lactate, Sepsis    Collection Time: 10/15/21  4:35 AM   Result Value Ref Range    Lactic Acid, Sepsis NOT REPORTED 0.5 - 1.9 mmol/L    Lactic Acid, Sepsis, Whole Blood 1.8 0.5 - 1.9 mmol/L   COVID-19, Rapid    Collection Time: 10/15/21  4:39 AM    Specimen: Nasopharyngeal Swab   Result Value Ref Range    Specimen Description . NASOPHARYNGEAL SWAB     SARS-CoV-2, Rapid Not Detected Not Detected   Culture, Blood 1 Collection Time: 10/15/21  5:00 AM    Specimen: Blood   Result Value Ref Range    Specimen Description . BLOOD     Special Requests LT AC 10ML     Culture NO GROWTH 5 HOURS    Culture, Blood 2    Collection Time: 10/15/21  5:11 AM    Specimen: Blood   Result Value Ref Range    Specimen Description . BLOOD     Special Requests RT WRIST 10ML     Culture NO GROWTH 5 HOURS    Urinalysis with microscopic    Collection Time: 10/15/21  5:45 AM   Result Value Ref Range    Color, UA Yellow Yellow    Turbidity UA Cloudy (A) Clear    Glucose, Ur NEGATIVE NEGATIVE    Bilirubin Urine NEGATIVE NEGATIVE    Ketones, Urine NEGATIVE NEGATIVE    Specific Gravity, UA 1.012 1.005 - 1.030    Urine Hgb MODERATE (A) NEGATIVE    pH, UA 5.5 5.0 - 8.0    Protein, UA NEGATIVE NEGATIVE    Urobilinogen, Urine Normal Normal    Nitrite, Urine NEGATIVE NEGATIVE    Leukocyte Esterase, Urine SMALL (A) NEGATIVE    -          WBC, UA 20 TO 50 0 - 5 /HPF    RBC, UA 10 TO 20 0 - 2 /HPF    Casts UA 20 TO 50 0 - 2 /LPF    Casts UA HYALINE 0 - 2 /LPF    Crystals, UA NOT REPORTED None /HPF    Epithelial Cells UA 5 TO 10 0 - 5 /HPF    Renal Epithelial, UA NOT REPORTED 0 /HPF    Bacteria, UA NOT REPORTED None    Mucus, UA 1+ (A) None    Trichomonas, UA NOT REPORTED None    Amorphous, UA NOT REPORTED None    Other Observations UA NOT REPORTED NOT REQ.     Yeast, UA MANY (A) None   Troponin    Collection Time: 10/15/21  5:48 AM   Result Value Ref Range    Troponin, High Sensitivity 39 (H) 0 - 14 ng/L    Troponin T NOT REPORTED <0.03 ng/mL    Troponin Interp NOT REPORTED    Brain Natriuretic Peptide    Collection Time: 10/15/21  5:48 AM   Result Value Ref Range    Pro-BNP 1,745 (H) <300 pg/mL    BNP Interpretation Pro-BNP Reference Range:    Lactate, Sepsis    Collection Time: 10/15/21  7:50 AM   Result Value Ref Range    Lactic Acid, Sepsis NOT REPORTED 0.5 - 1.9 mmol/L    Lactic Acid, Sepsis, Whole Blood 1.8 0.5 - 1.9 mmol/L   LEGIONELLA ANTIGEN, URINE Collection Time: 10/15/21  8:06 AM    Specimen: Urine voided   Result Value Ref Range    Legionella Pneumophilia Ag, Urine NEGATIVE    PROTEIN, TOTAL    Collection Time: 10/15/21  8:22 AM   Result Value Ref Range    Total Protein 5.8 (L) 6.4 - 8.3 g/dL   LACTATE DEHYDROGENASE    Collection Time: 10/15/21  8:22 AM   Result Value Ref Range     (H) 135 - 214 U/L   LACTATE DEHYDROGENASE, BODY FLUID    Collection Time: 10/15/21  2:31 PM   Result Value Ref Range    Specimen Type . PLEURAL FLUID     LD, Fluid 125 U/L   Body Fluid Cell Count with Differential    Collection Time: 10/15/21  2:31 PM   Result Value Ref Range    Color, Fluid NOT REPORTED     Appearance, Fluid NOT REPORTED     WBC, Fluid 262 /mm3    RBC, Fluid <3,000 /mm3    Specimen Type . PLEURAL FLUID     Neutrophil Count, Fluid 33 %    Lymphocytes, Body Fluid 43 %    Monocyte Count, Fluid NOT REPORTED %    Eos, Fluid NOT REPORTED 0 %    Basos, Fluid NOT REPORTED 0 %    Other Cells, Fluid PENDING %    Fluid Diff Comment NOT REPORTED    PROTEIN, BODY FLUID    Collection Time: 10/15/21  2:31 PM   Result Value Ref Range    Specimen Type . PLEURAL FLUID     Total Protein, Body Fluid 2.5 g/dL   GLUCOSE, BODY FLUID    Collection Time: 10/15/21  2:31 PM   Result Value Ref Range    Specimen Type . PLEURAL FLUID     Glucose, Fluid 128 mg/dL   PH, BODY FLUID    Collection Time: 10/15/21  2:31 PM   Result Value Ref Range    Specimen Type . PLEURAL FLUID     pH, Fluid 7.0        Imaging:   XR CHEST PORTABLE    Result Date: 10/15/2021  Large left-sided pleural effusion with marked compressive atelectasis of the left lung. Moderate to severe cardiomegaly. ASSESSMENT & PLAN     ASSESSMENT / PLAN:     IMPRESSION  This is a 68 y.o. female who presented with chest pain and shortness of breath and found to have massive left-sided pleural effusion. Patient admitted to inpatient status to evaluate and manage the same.     Pleural effusion:  IR consulted for USG guided thoracocentesis  Will follow respiratory culture, Strep pneumoniae antigen, Legionella. Will repeat chest x-ray after thoracocentesis, to rule out associated pneumonia. CASEY  1000 mL 0.9% NaCl bolus given  We will monitor    Sepsis  Likely secondary to pneumonia with pleural effusion versus UTI  Will follow cultures  On Vanco and cefepime  Will de-escalate based on culture    UTI  Has indwelling Marquez catheter  On cefepime, will de-escalate based on culture. A. fib  Resume Eliquis  Resume metoprolol  Resume Cardizem    COPD  Continue inhalers as needed  Fluticasone  DuoNeb    CHF   continue Lasix 20 mg  Lopressor 75 mg  Lipitor 20 mg  Plavix 75 mg    SEJAL  CPAP during sleep    DVT ppx: On Eliquis    GI ppx:  Not indicated    PT/OT/SW:  Ongoing    Discharge Planning:   to assist in discharge planning. Ashia Fletcher MD  Internal Medicine Resident, PGY-1   [address], [place]  10/15/2021, 4:40 PM

## 2021-10-15 NOTE — ED NOTES
The following labs labeled with pt sticker and tubed to lab:     [] Blue     [] Lavender   [] on ice  [] Green/yellow  [x] Green/black [] on ice  [] Yellow  [] Red  [] Pink      [] COVID-19 swab    [] Rapid  [] PCR  [] Peds Viral Panel     [] Urine Sample  [] Pelvic Cultures  [] Blood Cultures            Thomas Angel RN  10/15/21 0942

## 2021-10-15 NOTE — CARE COORDINATION
I accessed this chart as it showed up on my list for admission. I reviewed the chart and noted the patient belongs to [address] [place] [address] FOR THIS PATIENT THIS PAST MONTH AND LAST VISIT [facility] [address] /- 10/10   I contacted ON CALL PROVIDER and notified of issue. I did not assume care of this patient, I did not provide care to this patient.

## 2021-10-15 NOTE — PROGRESS NOTES
Pharmacy Note  Vancomycin Consult    Duane Stone is a 68 y.o. female started on Vancomycin for Pneumonia; consult received from Dr. Mirta Maria to manage therapy. Also receiving the following antibiotics: Cefepime. Patient Active Problem List   Diagnosis    Asthma    Benign essential HTN    Morbid obesity with BMI of 40.0-44.9, adult ([facility] [address]    Open bimalleolar fracture of left ankle    Hyperlipidemia    COPD exacerbation ([facility]    Pulmonary emphysema (HCC)    Postoperative confusion    Acute respiratory failure with hypoxia (HCC)    Simple chronic bronchitis (HCC)    Atrial fibrillation with rapid ventricular response ([facility] [address]    Pneumonia of left lower lobe due to infectious organism    Sepsis with acute hypoxic respiratory failure without septic shock (HCC)    Acute on chronic diastolic heart failure ([facility])    UTI (urinary tract infection)    Hyponatremia    Hypokalemia    Elevated troponin    Anemia    Arthritis    At high risk for falls    Colon polyps    Heart murmur    Legally blind in right eye, as defined in [place]    COPD (chronic obstructive pulmonary disease) ([facility]    Atrial fibrillation status post cardioversion ([facility])     Allergies:  Latex, Betadine [povidone iodine], Bee venom, Dilaudid [hydromorphone hcl], Adhesive tape, and Sulfa antibiotics     Temp max: 98.3    Recent Labs     10/15/21  0435   BUN 26*   CREATININE 1.35*   WBC 15.6*     No intake or output data in the 24 hours ending 10/15/21 0708  Culture Date      Source                       Results      Ht Readings from Last 1 Encounters:   10/15/21 5' 2.52\" (1.588 m)        Wt Readings from Last 1 Encounters:   10/15/21 250 lb (113.4 kg)       Body mass index is 44.97 kg/m². Estimated Creatinine Clearance: 43 mL/min (A) (based on SCr of 1.35 mg/dL (H)). Goal Trough Level: 15-19 mcg/mL    Assessment/Plan:  Will initiate Vancomycin with a one time loading dose of 2000 mg x1, followed by 1000 mg IV every 24 hours.   Timing of trough level will be determined based on culture results, renal function, and clinical response. Thank you for the consult. Will continue to follow. Epifanio Carreno Pharm. D.    10/15/2021  7:09 AM

## 2021-10-15 NOTE — PROGRESS NOTES
None this this is a 77-year-old female presenting from nursing facility with complaints of left-sided chest pain, increased with coughing. Denies UTI symptoms    She has past medical history of hypertension, morbid obesity, COPD, CHF, atrial fibrillation, SEJAL. Recently discharged from the hospital after being treated for pneumonia and pleural effusion (required thoracentesis recently left, removal of 350 cc, transudate) and underwent CURT cardioversion for atrial fibrillation in her last admission. Assessment and plan:    Sepsis of unknown etiology: Likely pneumonia with pleural effusion. Plan for thoracentesis today. We will send pleural fluid for pleural fluid analysis.   S/p vancomycin and cefepime de-escalate antibiotics based on culture    Resume Eliquis metoprolol and Cardizem for atrial fibrillation    Krystal Tamez MD  PGY-3, Internal Medicine Resident  [facility]  10/15/2021 2:31 PM

## 2021-10-16 ENCOUNTER — APPOINTMENT (OUTPATIENT)
Dept: GENERAL RADIOLOGY | Age: 76
DRG: 871 | End: 2021-10-16
Payer: MEDICARE

## 2021-10-16 LAB
CULTURE: ABNORMAL
EKG ATRIAL RATE: 357 BPM
EKG Q-T INTERVAL: 396 MS
EKG QRS DURATION: 130 MS
EKG QTC CALCULATION (BAZETT): 473 MS
EKG R AXIS: -58 DEGREES
EKG T AXIS: 126 DEGREES
EKG VENTRICULAR RATE: 86 BPM
Lab: ABNORMAL
SPECIMEN DESCRIPTION: ABNORMAL

## 2021-10-16 PROCEDURE — 2580000003 HC RX 258: Performed by: STUDENT IN AN ORGANIZED HEALTH CARE EDUCATION/TRAINING PROGRAM

## 2021-10-16 PROCEDURE — 6360000002 HC RX W HCPCS: Performed by: STUDENT IN AN ORGANIZED HEALTH CARE EDUCATION/TRAINING PROGRAM

## 2021-10-16 PROCEDURE — 94761 N-INVAS EAR/PLS OXIMETRY MLT: CPT

## 2021-10-16 PROCEDURE — 6370000000 HC RX 637 (ALT 250 FOR IP): Performed by: INTERNAL MEDICINE

## 2021-10-16 PROCEDURE — 71045 X-RAY EXAM CHEST 1 VIEW: CPT

## 2021-10-16 PROCEDURE — 6370000000 HC RX 637 (ALT 250 FOR IP): Performed by: STUDENT IN AN ORGANIZED HEALTH CARE EDUCATION/TRAINING PROGRAM

## 2021-10-16 PROCEDURE — 2700000000 HC OXYGEN THERAPY PER DAY

## 2021-10-16 PROCEDURE — 94640 AIRWAY INHALATION TREATMENT: CPT

## 2021-10-16 PROCEDURE — 2060000000 HC ICU INTERMEDIATE R&B

## 2021-10-16 PROCEDURE — 99233 SBSQ HOSP IP/OBS HIGH 50: CPT | Performed by: INTERNAL MEDICINE

## 2021-10-16 PROCEDURE — 2500000003 HC RX 250 WO HCPCS

## 2021-10-16 RX ORDER — METOPROLOL TARTRATE 50 MG/1
50 TABLET, FILM COATED ORAL 2 TIMES DAILY
Status: DISCONTINUED | OUTPATIENT
Start: 2021-10-16 | End: 2021-10-22 | Stop reason: HOSPADM

## 2021-10-16 RX ORDER — FUROSEMIDE 10 MG/ML
20 INJECTION INTRAMUSCULAR; INTRAVENOUS ONCE
Status: COMPLETED | OUTPATIENT
Start: 2021-10-16 | End: 2021-10-16

## 2021-10-16 RX ORDER — SODIUM CHLORIDE 0.9 % (FLUSH) 0.9 %
5-40 SYRINGE (ML) INJECTION PRN
Status: DISCONTINUED | OUTPATIENT
Start: 2021-10-16 | End: 2021-10-22 | Stop reason: HOSPADM

## 2021-10-16 RX ORDER — FUROSEMIDE 40 MG/1
40 TABLET ORAL DAILY
Status: DISCONTINUED | OUTPATIENT
Start: 2021-10-17 | End: 2021-10-17

## 2021-10-16 RX ORDER — SODIUM CHLORIDE 0.9 % (FLUSH) 0.9 %
5-40 SYRINGE (ML) INJECTION EVERY 12 HOURS SCHEDULED
Status: DISCONTINUED | OUTPATIENT
Start: 2021-10-16 | End: 2021-10-22 | Stop reason: HOSPADM

## 2021-10-16 RX ORDER — DIPHENHYDRAMINE HCL 25 MG
25 TABLET ORAL EVERY 6 HOURS PRN
Status: DISCONTINUED | OUTPATIENT
Start: 2021-10-16 | End: 2021-10-22 | Stop reason: HOSPADM

## 2021-10-16 RX ORDER — AMOXICILLIN AND CLAVULANATE POTASSIUM 875; 125 MG/1; MG/1
1 TABLET, FILM COATED ORAL EVERY 12 HOURS SCHEDULED
Status: COMPLETED | OUTPATIENT
Start: 2021-10-16 | End: 2021-10-20

## 2021-10-16 RX ORDER — ALBUTEROL SULFATE 2.5 MG/3ML
2.5 SOLUTION RESPIRATORY (INHALATION) EVERY 4 HOURS PRN
Status: DISCONTINUED | OUTPATIENT
Start: 2021-10-16 | End: 2021-10-22 | Stop reason: HOSPADM

## 2021-10-16 RX ORDER — LEVOFLOXACIN 750 MG/1
750 TABLET ORAL EVERY OTHER DAY
Status: DISCONTINUED | OUTPATIENT
Start: 2021-10-17 | End: 2021-10-16

## 2021-10-16 RX ADMIN — AMOXICILLIN AND CLAVULANATE POTASSIUM 1 TABLET: 875; 125 TABLET, FILM COATED ORAL at 21:15

## 2021-10-16 RX ADMIN — ANTI-FUNGAL POWDER MICONAZOLE NITRATE TALC FREE: 1.42 POWDER TOPICAL at 21:14

## 2021-10-16 RX ADMIN — FLUTICASONE PROPIONATE 2 PUFF: 110 AEROSOL, METERED RESPIRATORY (INHALATION) at 07:30

## 2021-10-16 RX ADMIN — FUROSEMIDE 20 MG: 20 TABLET ORAL at 09:58

## 2021-10-16 RX ADMIN — CLOPIDOGREL 75 MG: 75 TABLET, FILM COATED ORAL at 09:58

## 2021-10-16 RX ADMIN — SODIUM CHLORIDE, PRESERVATIVE FREE 10 ML: 5 INJECTION INTRAVENOUS at 21:18

## 2021-10-16 RX ADMIN — METOPROLOL TARTRATE 75 MG: 25 TABLET ORAL at 00:06

## 2021-10-16 RX ADMIN — IPRATROPIUM BROMIDE AND ALBUTEROL SULFATE 3 ML: .5; 2.5 SOLUTION RESPIRATORY (INHALATION) at 16:32

## 2021-10-16 RX ADMIN — IPRATROPIUM BROMIDE AND ALBUTEROL SULFATE 3 ML: .5; 2.5 SOLUTION RESPIRATORY (INHALATION) at 07:30

## 2021-10-16 RX ADMIN — ACETAMINOPHEN 650 MG: 325 TABLET ORAL at 10:15

## 2021-10-16 RX ADMIN — IPRATROPIUM BROMIDE AND ALBUTEROL SULFATE 3 ML: .5; 2.5 SOLUTION RESPIRATORY (INHALATION) at 21:27

## 2021-10-16 RX ADMIN — APIXABAN 5 MG: 5 TABLET, FILM COATED ORAL at 21:15

## 2021-10-16 RX ADMIN — APIXABAN 5 MG: 5 TABLET, FILM COATED ORAL at 09:58

## 2021-10-16 RX ADMIN — ACETAMINOPHEN 650 MG: 325 TABLET ORAL at 18:08

## 2021-10-16 RX ADMIN — AMOXICILLIN AND CLAVULANATE POTASSIUM 1 TABLET: 875; 125 TABLET, FILM COATED ORAL at 12:36

## 2021-10-16 RX ADMIN — AMIODARONE HYDROCHLORIDE 1 MG/MIN: 150 INJECTION, SOLUTION INTRAVENOUS at 13:44

## 2021-10-16 RX ADMIN — SODIUM CHLORIDE, PRESERVATIVE FREE 10 ML: 5 INJECTION INTRAVENOUS at 15:43

## 2021-10-16 RX ADMIN — APIXABAN 5 MG: 5 TABLET, FILM COATED ORAL at 00:06

## 2021-10-16 RX ADMIN — AMIODARONE HYDROCHLORIDE 0.5 MG/MIN: 50 INJECTION, SOLUTION INTRAVENOUS at 21:14

## 2021-10-16 RX ADMIN — DEXTROSE 150 MG: 50 INJECTION, SOLUTION INTRAVENOUS at 13:26

## 2021-10-16 RX ADMIN — ATORVASTATIN CALCIUM 20 MG: 20 TABLET, FILM COATED ORAL at 21:15

## 2021-10-16 RX ADMIN — SODIUM CHLORIDE, PRESERVATIVE FREE 10 ML: 5 INJECTION INTRAVENOUS at 13:24

## 2021-10-16 RX ADMIN — IPRATROPIUM BROMIDE AND ALBUTEROL SULFATE 3 ML: .5; 2.5 SOLUTION RESPIRATORY (INHALATION) at 11:32

## 2021-10-16 RX ADMIN — FUROSEMIDE 20 MG: 10 INJECTION, SOLUTION INTRAMUSCULAR; INTRAVENOUS at 13:22

## 2021-10-16 RX ADMIN — METOPROLOL TARTRATE 50 MG: 25 TABLET ORAL at 21:15

## 2021-10-16 RX ADMIN — METOPROLOL TARTRATE 75 MG: 25 TABLET ORAL at 09:58

## 2021-10-16 RX ADMIN — DILTIAZEM HYDROCHLORIDE 240 MG: 240 CAPSULE, COATED, EXTENDED RELEASE ORAL at 09:58

## 2021-10-16 RX ADMIN — FUROSEMIDE 20 MG: 10 INJECTION, SOLUTION INTRAMUSCULAR; INTRAVENOUS at 15:43

## 2021-10-16 RX ADMIN — FLUTICASONE PROPIONATE 2 PUFF: 110 AEROSOL, METERED RESPIRATORY (INHALATION) at 21:31

## 2021-10-16 ASSESSMENT — PAIN SCALES - GENERAL
PAINLEVEL_OUTOF10: 0
PAINLEVEL_OUTOF10: 3
PAINLEVEL_OUTOF10: 3

## 2021-10-16 NOTE — CARE COORDINATION
10/16/21 1119   Readmission Assessment   Number of Days since last admission? 8-30 days   Previous Disposition SNF   Who is being Interviewed Patient   What was the patient's/caregiver's perception as to why they think they needed to return back to the hospital? Other (Comment)  (chest pain)   Did you visit your Primary Care Physician after you left the hospital, before you returned this time? Yes   Did you see a specialist, such as Cardiac, Pulmonary, Orthopedic Physician, etc. after you left the hospital? No   Who advised the patient to return to the hospital? Skilled Unit   Does the patient report anything that got in the way of taking their medications? No   In our efforts to provide the best possible care to you and others like you, can you think of anything that we could have done to help you after you left the hospital the first time, so that you might not have needed to return so soon?  Other (Comment)  (pt states \"maybe I did too much with therapy\")

## 2021-10-16 NOTE — CARE COORDINATION
Case Management Initial Discharge Plan  [facility],             Met with:patient to discuss discharge plans. Information verified: address, contacts, phone number, , insurance Yes  Insurance Provider: Medicare, [employer]    Emergency Contact/Next of Kin name & number: Landon Harris (son) [phone]  Who are involved in patient's support system? Family, SNF    PCP: KALYN Galvez CNP  Date of last visit:       Discharge Planning    Living Arrangements:        Home has 1 stories  0 stairs to climb to get into front door, stairs to climb to reach second floor  Location of bedroom/bathroom in home     Patient able to perform ADL's:Assisted    Current Services (outpatient & in home) [place] [address]  DME equipment:   DME provider:     Is patient receiving oral anticoagulation therapy? Yes    If indicated:   Physician managing anticoagulation treatment:   Where does patient obtain lab work for ATC treatment? Potential Assistance Needed:       Patient agreeable to home care: No  Rattan of choice provided:  n/a    Prior SNF/Rehab Placement and Facility: [facility]  Agreeable to SNF/Rehab: Yes  Rattan of choice provided: yes     Evaluation: no    Expected Discharge date:       Patient expects to be discharged to: If home: is the family and/or caregiver wiling & able to provide support at home? Who will be providing this support? Follow Up Appointment: Best Day/ Time:      Transportation provider:   Transportation arrangements needed for discharge: Yes    Readmission Risk              Risk of Unplanned Readmission:  18             Does patient have a readmission risk score greater than 14?: Yes  If yes, follow-up appointment must be made within 7 days of discharge.      Goals of Care: comfort      Educated patient on transitional options, provided freedom of choice and are agreeable with plan      Discharge Plan: return to [place] [address]          Electronically

## 2021-10-16 NOTE — CONSULTS
Attestation signed by      Attending Physician Statement:    I have discussed the care of  Duane Stone , including pertinent history and exam findings, with the Cardiology fellow/resident. I have seen and examined the patient and the key elements of all parts of the encounter have been performed by me. I agree with the assessment, plan and orders as documented by the fellow/resident, after I modified exam findings and plan of treatments, and the final version is my approved version of the assessment. Additional Comments: back in afib. Recent CURT/CV. On eliquis. Start Iv amiodarone bolus and drip. DC cardiazem due to low BP. Reduced metoprolol to 50mg po BID. Will consider cardioversion on Monday if still in afib. Marifer Haider, [address] [facility]   Consultation Note               Today's Date: 10/16/2021  Patient Name: Duane Stone  Date of admission: 10/15/2021  4:20 AM  Patient's age: 68 y.o., 1945  Admission Dx: Hyponatremia [E87.1]  Pleural effusion [J90]  Acute chest pain [R07.9]  CASEY (acute kidney injury) ([facility] [address] [N17.9]    Reason for Consult:  Afib with RVR    Requesting Physician: Teodora Atkinson MD    CHIEF COMPLAINT:    Chief Complaint   Patient presents with    Chest Pain       History Obtained From:  patient    HISTORY OF PRESENT ILLNESS:      The patient is a 68 y.o. with hx of CAD and Afib presented to ED with left-sided chest pain on the back. He was noted to have PNA and left sided pleural effusion and underwent thoracocentesis with 350 cc yesterday. Cards was consulted for Afib with RVR        Past Medical History:   has a past medical history of Asthma, COPD (chronic obstructive pulmonary disease) ([address] Gout, Hyperlipidemia, Hypertension, Mitral insufficiency, and Pneumonia. Past Surgical History:   has a past surgical history that includes Hysterectomy (1986); knee surgery; Colonoscopy; Dental surgery;  Tonsillectomy and adenoidectomy; Ankle surgery (Left, 03/22/2016); and pr colsc flx w/rmvl of tumor polyp lesion snare tq (N/A, 8/8/2017). Home Medications:    Prior to Admission medications    Medication Sig Start Date End Date Taking? Authorizing Provider   metoprolol tartrate 75 MG TABS TAKE ONE TABLET BY MOUTH TWICE DAILY 10/10/21   Jessa Lozano MD   dilTIAZem (CARDIZEM CD) 240 MG extended release capsule Take 1 capsule by mouth daily 10/10/21   Jessa Lozano MD   apixaban (ELIQUIS) 5 MG TABS tablet Take 1 tablet by mouth 2 times daily 10/8/21   Jessa Lozano MD   atorvastatin (LIPITOR) 40 MG tablet Take 1 tablet by mouth daily 10/8/21   Jessa Lozano MD   citalopram (CELEXA) 40 MG tablet Take 0.5 tablets by mouth daily 10/8/21   Jessa Lozano MD   losartan (COZAAR) 100 MG tablet Take 1 tablet by mouth daily 8/28/18   Yara Sheridan MD   meloxicam (MOBIC) 15 MG tablet Take 15 mg by mouth daily    Historical Provider, MD   clopidogrel (PLAVIX) 75 MG tablet Take 1 tablet by mouth daily 10/5/17   Yara Sheridan MD   cyclobenzaprine (FLEXERIL) 10 MG tablet TAKE ONE TABLET BY MOUTH THREE TIMES DAILY AS NEEDED FOR MUSCLE SPASM 10/5/17   Yara Sheridan MD   diphenhydrAMINE (BENADRYL) 25 MG capsule Take 25 mg by mouth every 6 hours as needed for Itching    Historical Provider, MD   zinc oxide (DESITIN) 40 % ointment Apply topically as needed for Dry Skin Apply topically as needed.     Historical Provider, MD   furosemide (LASIX) 20 MG tablet Take 1 tablet by mouth daily 7/14/17   Yara Sheridan MD   isosorbide mononitrate (IMDUR) 30 MG extended release tablet Take 1 tablet by mouth daily 7/14/17   Yara Sheridan MD   albuterol sulfate  (90 Base) MCG/ACT inhaler Inhale 2 puffs into the lungs every 6 hours as needed for Wheezing 7/14/17   Yara Sheridan MD   Calcium Carb-Cholecalciferol (CALCIUM + D3 PO) Take by mouth daily    Historical Provider, MD   CINNAMON PO Take by mouth daily Historical Provider, MD   TURMERIC CURCUMIN PO Take by mouth daily    Historical Provider, MD   ipratropium-albuterol (DUONEB) 0.5-2.5 (3) MG/3ML SOLN nebulizer solution Inhale 1 vial into the lungs every 4 hours    Historical Provider, MD   beclomethasone (QVAR) 80 MCG/ACT inhaler Inhale 2 puffs into the lungs 2 times daily 6/17/16   Idella Boas, MD   Acetaminophen 650 MG TABS Take 650 mg by mouth every 4 hours as needed for Pain or Fever 4/4/16   Jeromy Aquino MD   clonazePAM (KLONOPIN) 1 MG tablet Take 1 mg by mouth nightly     Historical Provider, MD   LYSINE PO Take by mouth daily    Historical Provider, MD   vitamin D (CHOLECALCIFEROL) 1000 UNIT TABS tablet Take 1,000 Units by mouth daily    Historical Provider, MD   Pyridoxine HCl (VITAMIN B-6) 50 MG tablet Take 100 mg by mouth daily    Historical Provider, MD   Cyanocobalamin (VITAMIN B 12 PO) Take by mouth daily    Historical Provider, MD   Magnesium 500 MG TABS Take by mouth    Historical Provider, MD   Ascorbic Acid (VITAMIN C) 500 MG tablet Take 500 mg by mouth daily    Historical Provider, MD      Current Facility-Administered Medications: sodium chloride flush 0.9 % injection 5-40 mL, 5-40 mL, IntraVENous, 2 times per day  sodium chloride flush 0.9 % injection 5-40 mL, 5-40 mL, IntraVENous, PRN  albuterol (PROVENTIL) nebulizer solution 2.5 mg, 2.5 mg, Nebulization, Q4H PRN  [START ON 10/17/2021] furosemide (LASIX) tablet 40 mg, 40 mg, Oral, Daily  furosemide (LASIX) injection 20 mg, 20 mg, IntraVENous, Once  metoprolol tartrate (LOPRESSOR) tablet 50 mg, 50 mg, Oral, BID  amiodarone (CORDARONE) 150 mg in dextrose 5 % 100 mL bolus, 150 mg, IntraVENous, Once **FOLLOWED BY** amiodarone (CORDARONE) 450 mg in dextrose 5 % 250 mL infusion, 1 mg/min, IntraVENous, Continuous **FOLLOWED BY** amiodarone (CORDARONE) 450 mg in dextrose 5 % 250 mL infusion, 0.5 mg/min, IntraVENous, Continuous  amoxicillin-clavulanate (AUGMENTIN) 875-125 MG per tablet 1 tablet, 1 tablet, Oral, 2 times per day  apixaban (ELIQUIS) tablet 5 mg, 5 mg, Oral, BID  fluticasone (FLOVENT HFA) 110 MCG/ACT inhaler 2 puff, 2 puff, Inhalation, BID  clopidogrel (PLAVIX) tablet 75 mg, 75 mg, Oral, Daily  0.9 % sodium chloride infusion, 25 mL, IntraVENous, PRN  ondansetron (ZOFRAN-ODT) disintegrating tablet 4 mg, 4 mg, Oral, Q8H PRN **OR** ondansetron (ZOFRAN) injection 4 mg, 4 mg, IntraVENous, Q6H PRN  polyethylene glycol (GLYCOLAX) packet 17 g, 17 g, Oral, Daily PRN  acetaminophen (TYLENOL) tablet 650 mg, 650 mg, Oral, Q6H PRN **OR** acetaminophen (TYLENOL) suppository 650 mg, 650 mg, Rectal, Q6H PRN  atorvastatin (LIPITOR) tablet 20 mg, 20 mg, Oral, Nightly  ipratropium-albuterol (DUONEB) nebulizer solution 3 mL, 1 vial, Inhalation, 4x daily  Calamine 8-8 % lotion, , Topical, PRN      Allergies:  Latex, Betadine [povidone iodine], Bee venom, Dilaudid [hydromorphone hcl], Adhesive tape, and Sulfa antibiotics      Social History:   reports that she has quit smoking. She has never used smokeless tobacco. She reports current alcohol use. She reports that she does not use drugs. Family History: family history includes Asthma in her father; Cancer in her father and mother; Heart Disease in her father; High Blood Pressure in her mother. No h/o sudden cardiac death. REVIEW OF SYSTEMS:    Constitutional: there has been no unanticipated weight loss. Eyes: No visual changes or diplopia. ENT: No Headaches  Cardiovascular: see above  Respiratory: No previous pulmonary problems, No cough  Gastrointestinal: No abdominal pain. No change in bowel or bladder habits. Genitourinary: No dysuria, trouble voiding, or hematuria. Musculoskeletal:  No gait disturbance, No weakness or joint complaints. Integumentary: No rash or pruritis.   Neurological: No headache, diplopia      PHYSICAL EXAM:      /74   Pulse 128   Temp 97.7 °F (36.5 °C) (Oral)   Resp 28   Ht 5' 2.52\" (1.588 m)   Wt 250 lb (113.4 kg)   SpO2 100%   BMI 44.97 kg/m²    Constitutional and General Appearance: Alert, no distress  Respiratory:  No increased work of breathing. Clear to auscultation bilaterally. No wheeze or crackles. Cardiovascular:  Normal S1 and S2.   Jugular venous pulsation Normal  Abdomen:   Soft  No tenderness  Extremities:  No lower extremity edema  Neurologic:  Alert and oriented. Moves all extremities well      DATA:    Diagnostics:    Labs:     CBC:   Recent Labs     10/15/21  0435   WBC 15.6*   HGB 14.2   HCT 43.9        BMP:   Recent Labs     10/15/21  0435   *   K 3.7   CO2 26   BUN 26*   CREATININE 1.35*   LABGLOM 38*   GLUCOSE 119*     BNP: No results for input(s): BNP in the last 72 hours. PT/INR: No results for input(s): PROTIME, INR in the last 72 hours. APTT:No results for input(s): APTT in the last 72 hours. CARDIAC ENZYMES:  Recent Labs     10/15/21  0435 10/15/21  0548   TROPHS 40* 39*     No results for input(s): CKTOTAL, CKMB, CKMBINDEX, TROPONINI in the last 72 hours. Invalid input(s):  [address]  Recent Labs     10/15/21  0435 10/15/21  0548   TROPONINT NOT REPORTED NOT REPORTED     FASTING LIPID PANEL:  Lab Results   Component Value Date    HDL 45 06/02/2016    LDLCALC 131 06/02/2016    TRIG 94 06/02/2016     LIVER PROFILE:  Recent Labs     10/15/21  0435   AST 27   ALT 23   LABALBU 3.6         EKG: Afib    ECHO 10/3/201  Left ventricle is normal in size with normal systolic function globally. Calculated ejection fraction is 59% ( Atrial-Fib). Mild mitral regurgitation. Mild tricuspid regurgitation. Estimated right ventricular systolic pressure is 35 mmHg. Anterior clear space noted, most likely adipose tissue vs small loculated  pericardial effusion. No evidence of tamponade. Pleural effusion is seen.           IMPRESSION:    Afib previously cardioverted on 10/7 and started on cardizem and lopressor  On eliquis  Currently in Atrial flutter  Sepsis 2/2 UTI and PNA  PNA  Left sided pleural effusion  COPD        RECOMMENDATIONS:  Discontinue cardizem due to borderline BP  Reduce lopressor to 50 mg BID  Amio bolus followed by gtt  Continue eliquis  IF remains in Afib, will consider cardioversion on Monday 10/18      Thank you for allowing us to participate in [address] [place] [address]'s [facility]. Will follow with you.       Electronically signed on 10/16/21 at 10:45 AM by:    Fran Norris MD, MD   Fellow, [id number]

## 2021-10-16 NOTE — ED NOTES
Report given to Randi [address] RN, all questions answered     Brigido Burns, BELKIS  10/15/21 2793

## 2021-10-16 NOTE — PROGRESS NOTES
Physical Therapy  DATE: 10/16/2021    NAME: Duane Stone  MRN: [id number]   : 1945    Patient not seen this date for Physical Therapy due to:      [x] Cancel by RN    [] Hemodialysis    [] Critical Lab Value Level     [] Blood transfusion in progress    [x] Acute or unstable cardiovascular status HR > 125 at rest      [] Acute or unstable pulmonary status   -FiO2 > 60%   _RR < 5 or >40    _O2 sats < 85%    [] Strict Bedrest    [] Off Unit for surgery or procedure    [] Off Unit for testing       [] Pending imaging to R/O fracture    [] Refusal by Patient      [] Other      [] PT being discontinued at this time. Patient independent. No further needs. [] PT being discontinued at this time as the patient has been transferred to hospice care. No further needs.       Alex Talbot, PT

## 2021-10-16 NOTE — PLAN OF CARE
Problem: Falls - Risk of:  Goal: Will remain free from falls  Description: Will remain free from falls  10/16/2021 0740 by Martin Thorne RN  Outcome: Ongoing  10/16/2021 0629 by Martin Aleman RN  Outcome: Ongoing     Problem: Falls - Risk of:  Goal: Absence of physical injury  Description: Absence of physical injury  10/16/2021 0740 by Martin Thorne RN  Outcome: Ongoing  10/16/2021 0629 by Martin Aleman RN  Outcome: Ongoing     Will continue to monitor patient

## 2021-10-16 NOTE — PROGRESS NOTES
Occupational [address] [facility]  Occupational Therapy Not Seen Note    DATE: 10/16/2021    NAME: Judy Trotter  MRN: [id number]   : 1945      Patient not seen this date for Occupational Therapy due to:    Patient is not appropriate for active participation in OT evaluation/treatment at this time d/t pt going through A-fib upon arrival, resting HR average of 125 bpm, HR fluncuationg from ~111-145, hold OT eval.    Next Scheduled Treatment: Attempt in pm or 10/17 as appropriate.     Electronically signed by Rory Medina OT on 10/16/2021 at 8:57 AM

## 2021-10-16 NOTE — PLAN OF CARE
Problem: Gas Exchange - Impaired:  Goal: Levels of oxygenation will improve  Description: Levels of oxygenation will improve  Outcome: Ongoing     Problem: Falls - Risk of:  Goal: Will remain free from falls  Description: Will remain free from falls  Outcome: Ongoing  Goal: Absence of physical injury  Description: Absence of physical injury  Outcome: Ongoing

## 2021-10-16 NOTE — PROGRESS NOTES
[facility]  Internal Medicine Teaching Residency Program  Inpatient Daily Progress Note  ______________________________________________________________________________    Patient: Judy Jimenez  YOB: 1945   MPH:[id number]    Acct: [de-identified]     Room: 2015/2015-01  Admit date: 10/15/2021  Today's date: 10/16/21  Number of days in the hospital: 1    SUBJECTIVE   Admitting Diagnosis: <principal problem not specified>  CC: shortness of breath   Pt seen & examined at bedside. Chart & results reviewed. No acute events overnight. She is hemodynamically stable, saturating well on 3L NC. She has increased HR, 120s-130s, on metaprolol and cardizem. Cardio is consulted. Her breathing is improved. Pleural tap fluid came back positive for exudative effusion. ROS:  Constitutional:  negative for chills, fevers, sweats  Respiratory:  negative for cough, dyspnea on exertion, hemoptysis, shortness of breath, wheezing  Cardiovascular:  negative for chest pain, chest pressure/discomfort, lower extremity edema, palpitations  Gastrointestinal:  negative for abdominal pain, constipation, diarrhea, nausea, vomiting  Neurological:  negative for dizziness, headache  BRIEF HISTORY     A 68 y.o.  female with PMH of A. fib (on Eliquis), COPD (on home oxygen), SEJAL, HTN, morbid obesity,  Was brought in by EMS from nursing facility with chief complaint of chest pain and shortness of breath. Last night, patient woke up from chest pain- left-sided, non-radiating, dull, associated with nausea, diaphoresis, dizziness and difficulty breathing. Chest pain lasted about 20/30 minutes. Chest pain is nonreproducible, increased with deep breathing and coughing. She is on 3 L O2 nasal cannula, denies orthopnea/PND episodes. She does not ambulate much.   She was recently admitted for similar complaints and was found to have acute hypoxic respiratory failure secondary to left lower lobe pneumonia and pleural effusion. she also had new onset A. fib with RVR. Left chest thoracocentesis was done, 350 cc transudate fluid was removed. She underwent CURT and synchronized cardioversion for A. fib and was discharged on Eliquis, Cardizem 240 mg and Lopressor 70 mg twice daily. She also has indwelling urinary catheter since her last admission.     She has reduced appetite. Denies any urinary complaints. Has been constipated for last 3 days. She has chronic left leg edema.     In the ER,  Chest x-ray showed large left-sided pleural effusion with marked compressive atelectasis of left lung with moderate to severe cardiomegaly. Troponins-40 (downtrending) (baseline 60s)  proBNP 1745  Creatinine 1.35 (baseline normal)  WBCs-15.69 lactic acid 1.8  Urine : Cloudy, 20-50 WBC present, will culture.     She received vancomycin and cefepime in ER. OBJECTIVE     Vital Signs:  /74   Pulse 128   Temp 98.3 °F (36.8 °C) (Oral)   Resp 20   Ht 5' 2.52\" (1.588 m)   Wt 250 lb (113.4 kg)   SpO2 95%   BMI 44.97 kg/m²     No data recorded. No intake/output data recorded. Physical Exam:  Constitutional: This is a well developed, well nourished, Greater than 40 - Morbid Obesity / Extreme Obesity / Grade III 68y.o. year old female who is alert, oriented, cooperative and in no apparent distress. Head:normocephalic and atraumatic. EENT:  PERRLA. No conjunctival injections. Septum was midline, mucosa was without erythema, exudates or cobblestoning. No thrush was noted. Neck: Supple without thyromegaly. No elevated JVP. Trachea was midline. Respiratory: Chest was symmetrical without dullness to percussion. Breath sounds bilaterally were clear to auscultation. There were no wheezes, rhonchi. Crackles present. There is no intercostal retraction or use of accessory muscles. No egophony noted. Cardiovascular: irregular rhythm and tachycardia present.    Abdomen: Obese and soft without organomegaly. No rebound, rigidity or guarding was appreciated. Lymphatic: No lymphadenopathy. Musculoskeletal: Normal curvature of the spine. No gross muscle weakness. Extremities:  No lower extremity edema, ulcerations, tenderness, varicosities or erythema. Muscle size, tone and strength are normal.  No involuntary movements are noted. Rash on feet. Skin:  Warm and dry. Good color, turgor and pigmentation. No lesions or scars. No cyanosis or clubbing  Neurological/Psychiatric: The patient's general behavior, level of consciousness, thought content and emotional status is normal.        Medications:  Scheduled Medications:    sodium chloride flush  5-40 mL IntraVENous 2 times per day    vancomycin  1,000 mg IntraVENous Q24H    vancomycin (VANCOCIN) intermittent dosing (placeholder)   Other RX Placeholder    apixaban  5 mg Oral BID    fluticasone  2 puff Inhalation BID    clopidogrel  75 mg Oral Daily    dilTIAZem  240 mg Oral Daily    furosemide  20 mg Oral Daily    metoprolol tartrate  75 mg Oral BID    cefepime  2,000 mg IntraVENous Q12H    atorvastatin  20 mg Oral Nightly    ipratropium-albuterol  1 vial Inhalation 4x daily     Continuous Infusions:    sodium chloride      sodium chloride 100 mL/hr at 10/15/21 1510     PRN Medicationssodium chloride flush, 5-40 mL, PRN  albuterol sulfate HFA, 2 puff, Q6H PRN  sodium chloride, 25 mL, PRN  ondansetron, 4 mg, Q8H PRN   Or  ondansetron, 4 mg, Q6H PRN  polyethylene glycol, 17 g, Daily PRN  acetaminophen, 650 mg, Q6H PRN   Or  acetaminophen, 650 mg, Q6H PRN  Calamine, , PRN        Diagnostic Labs:  CBC:   Recent Labs     10/15/21  0435   WBC 15.6*   RBC 4.70   HGB 14.2   HCT 43.9   MCV 93.4   RDW 12.6        BMP:   Recent Labs     10/15/21  0435   *   K 3.7   CL 90*   CO2 26   BUN 26*   CREATININE 1.35*     BNP: No results for input(s): BNP in the last 72 hours.   PT/INR: No results for input(s): PROTIME, INR in the last 72 hours.  APTT: No results for input(s): APTT in the last 72 hours. CARDIAC ENZYMES: No results for input(s): CKMB, CKMBINDEX, TROPONINI in the last 72 hours. Invalid input(s): CKTOTAL;3  FASTING LIPID PANEL:  Lab Results   Component Value Date    CHOL 195 06/02/2016    HDL 45 06/02/2016    TRIG 94 06/02/2016     LIVER PROFILE:   Recent Labs     10/15/21  0435   AST 27   ALT 23   BILITOT 1.26*   ALKPHOS 75      MICROBIOLOGY:   Lab Results   Component Value Date/Time    CULTURE PENDING 10/15/2021 02:31 PM       Imaging:    CT CHEST WO CONTRAST    Result Date: 10/15/2021  1. Moderate left pleural effusion with increased debris in the left bronchi and increased consolidation in the left lung. Associated volume loss is most suggestive of atelectasis, but possibility of superimposed pneumonia would be difficult to exclude. 2.  Trace right pleural fluid with right basilar atelectasis. 3.  Improved pericardial effusion. XR CHEST PORTABLE    Result Date: 10/15/2021  No evidence of pneumothorax status post left thoracentesis     XR CHEST PORTABLE    Result Date: 10/15/2021  Large left-sided pleural effusion with marked compressive atelectasis of the left lung. Moderate to severe cardiomegaly. US THORACENTESIS Which side should the procedure be performed? Left    Result Date: 10/15/2021  Successful ultrasound guided left thoracentesis. ASSESSMENT & PLAN     IMPRESSION  This is a 68 y.o. female who presented with chest pain and shortness of breath and found to have massive left-sided pleural effusion.  Patient admitted to inpatient status to evaluate and manage the same.     Pleural effusion:  USG guided thoracocentesis revealed 300 cc straw colored fluid consistent with exudative fluid   Respiratory culture, Strep pneumoniae antigen, Legionella are unremarkable  Repeat chest xray revealed somewhat improved pleural effusion with atelectasis present.     Acute on chronic diastolic CHF:  Likely secondary to afib On lasix  BP under control   Lopressor 75 mg  Lipitor 20 mg  Plavix 75 mg    CASEY  Will give extra bolus of lasix today apart from home dose. We will monitor    A. fib  On Eliquis  Continue metoprolol and Cardizem  Cardiology is consulted and started her on amiodarone drip     Sepsis  Likely secondary to pneumonia with pleural effusion versus UTI  Cultures so far negative  Received Vanco and cefepime  Will shift to Augmentin      UTI  Has indwelling Goldstein catheter  On cefepime, will de-escalate based on culture. Will remove goldstein catheter and try external catheter.      COPD  Continue inhalers as needed  Fluticasone  DuoNeb     SEJAL  CPAP during sleep     DVT ppx: On Eliquis     GI ppx:  Not indicated     PT/OT/SW:  Ongoing     Discharge Planning:   to assist in discharge planning.       Екатерина Amador MD  Internal Medicine Resident, PGY-1   [address], [place]  10/16/2021, 6:25 AM

## 2021-10-17 ENCOUNTER — APPOINTMENT (OUTPATIENT)
Dept: GENERAL RADIOLOGY | Age: 76
DRG: 871 | End: 2021-10-17
Payer: MEDICARE

## 2021-10-17 LAB
ABSOLUTE EOS #: 0.85 K/UL (ref 0–0.44)
ABSOLUTE IMMATURE GRANULOCYTE: 0.08 K/UL (ref 0–0.3)
ABSOLUTE LYMPH #: 1.46 K/UL (ref 1.1–3.7)
ABSOLUTE MONO #: 1.31 K/UL (ref 0.1–1.2)
ANION GAP SERPL CALCULATED.3IONS-SCNC: 12 MMOL/L (ref 9–17)
ANION GAP SERPL CALCULATED.3IONS-SCNC: 12 MMOL/L (ref 9–17)
BASOPHILS # BLD: 1 % (ref 0–2)
BASOPHILS ABSOLUTE: 0.1 K/UL (ref 0–0.2)
BUN BLDV-MCNC: 12 MG/DL (ref 8–23)
BUN BLDV-MCNC: 13 MG/DL (ref 8–23)
BUN/CREAT BLD: ABNORMAL (ref 9–20)
BUN/CREAT BLD: ABNORMAL (ref 9–20)
CALCIUM SERPL-MCNC: 8.3 MG/DL (ref 8.6–10.4)
CALCIUM SERPL-MCNC: 8.5 MG/DL (ref 8.6–10.4)
CHLORIDE BLD-SCNC: 91 MMOL/L (ref 98–107)
CHLORIDE BLD-SCNC: 91 MMOL/L (ref 98–107)
CHOLESTEROL FLUID: 52 MG/DL
CO2: 24 MMOL/L (ref 20–31)
CO2: 26 MMOL/L (ref 20–31)
CREAT SERPL-MCNC: 0.66 MG/DL (ref 0.5–0.9)
CREAT SERPL-MCNC: 0.7 MG/DL (ref 0.5–0.9)
DIFFERENTIAL TYPE: ABNORMAL
EOSINOPHILS RELATIVE PERCENT: 5 % (ref 1–4)
GFR AFRICAN AMERICAN: >60 ML/MIN
GFR AFRICAN AMERICAN: >60 ML/MIN
GFR NON-AFRICAN AMERICAN: >60 ML/MIN
GFR NON-AFRICAN AMERICAN: >60 ML/MIN
GFR SERPL CREATININE-BSD FRML MDRD: ABNORMAL ML/MIN/{1.73_M2}
GLUCOSE BLD-MCNC: 101 MG/DL (ref 70–99)
GLUCOSE BLD-MCNC: 113 MG/DL (ref 70–99)
HCT VFR BLD CALC: 39 % (ref 36.3–47.1)
HEMOGLOBIN: 12.6 G/DL (ref 11.9–15.1)
IMMATURE GRANULOCYTES: 1 %
LYMPHOCYTES # BLD: 9 % (ref 24–43)
MAGNESIUM: 1.6 MG/DL (ref 1.6–2.6)
MCH RBC QN AUTO: 30.7 PG (ref 25.2–33.5)
MCHC RBC AUTO-ENTMCNC: 32.3 G/DL (ref 28.4–34.8)
MCV RBC AUTO: 94.9 FL (ref 82.6–102.9)
MONOCYTES # BLD: 8 % (ref 3–12)
NRBC AUTOMATED: 0 PER 100 WBC
PDW BLD-RTO: 12.6 % (ref 11.8–14.4)
PLATELET # BLD: 333 K/UL (ref 138–453)
PLATELET ESTIMATE: ABNORMAL
PMV BLD AUTO: 10.1 FL (ref 8.1–13.5)
POTASSIUM SERPL-SCNC: 3.3 MMOL/L (ref 3.7–5.3)
POTASSIUM SERPL-SCNC: 3.9 MMOL/L (ref 3.7–5.3)
POTASSIUM SERPL-SCNC: 4.1 MMOL/L (ref 3.7–5.3)
RBC # BLD: 4.11 M/UL (ref 3.95–5.11)
RBC # BLD: ABNORMAL 10*6/UL
SEG NEUTROPHILS: 76 % (ref 36–65)
SEGMENTED NEUTROPHILS ABSOLUTE COUNT: 11.91 K/UL (ref 1.5–8.1)
SODIUM BLD-SCNC: 127 MMOL/L (ref 135–144)
SODIUM BLD-SCNC: 129 MMOL/L (ref 135–144)
SOURCE: NORMAL
WBC # BLD: 15.7 K/UL (ref 3.5–11.3)
WBC # BLD: ABNORMAL 10*3/UL

## 2021-10-17 PROCEDURE — 6370000000 HC RX 637 (ALT 250 FOR IP): Performed by: STUDENT IN AN ORGANIZED HEALTH CARE EDUCATION/TRAINING PROGRAM

## 2021-10-17 PROCEDURE — 94640 AIRWAY INHALATION TREATMENT: CPT

## 2021-10-17 PROCEDURE — 94761 N-INVAS EAR/PLS OXIMETRY MLT: CPT

## 2021-10-17 PROCEDURE — 6360000002 HC RX W HCPCS: Performed by: STUDENT IN AN ORGANIZED HEALTH CARE EDUCATION/TRAINING PROGRAM

## 2021-10-17 PROCEDURE — 6370000000 HC RX 637 (ALT 250 FOR IP)

## 2021-10-17 PROCEDURE — 99233 SBSQ HOSP IP/OBS HIGH 50: CPT | Performed by: INTERNAL MEDICINE

## 2021-10-17 PROCEDURE — 83735 ASSAY OF MAGNESIUM: CPT

## 2021-10-17 PROCEDURE — 85025 COMPLETE CBC W/AUTO DIFF WBC: CPT

## 2021-10-17 PROCEDURE — 80048 BASIC METABOLIC PNL TOTAL CA: CPT

## 2021-10-17 PROCEDURE — 36415 COLL VENOUS BLD VENIPUNCTURE: CPT

## 2021-10-17 PROCEDURE — 2060000000 HC ICU INTERMEDIATE R&B

## 2021-10-17 PROCEDURE — 2580000003 HC RX 258: Performed by: STUDENT IN AN ORGANIZED HEALTH CARE EDUCATION/TRAINING PROGRAM

## 2021-10-17 PROCEDURE — 71045 X-RAY EXAM CHEST 1 VIEW: CPT

## 2021-10-17 PROCEDURE — 84132 ASSAY OF SERUM POTASSIUM: CPT

## 2021-10-17 PROCEDURE — 6370000000 HC RX 637 (ALT 250 FOR IP): Performed by: INTERNAL MEDICINE

## 2021-10-17 PROCEDURE — 2700000000 HC OXYGEN THERAPY PER DAY

## 2021-10-17 RX ORDER — POTASSIUM BICARBONATE 25 MEQ/1
50 TABLET, EFFERVESCENT ORAL 2 TIMES DAILY
Status: COMPLETED | OUTPATIENT
Start: 2021-10-17 | End: 2021-10-17

## 2021-10-17 RX ORDER — HYDROXYZINE HYDROCHLORIDE 10 MG/1
10 TABLET, FILM COATED ORAL ONCE
Status: COMPLETED | OUTPATIENT
Start: 2021-10-17 | End: 2021-10-17

## 2021-10-17 RX ORDER — FUROSEMIDE 10 MG/ML
40 INJECTION INTRAMUSCULAR; INTRAVENOUS DAILY
Status: DISCONTINUED | OUTPATIENT
Start: 2021-10-17 | End: 2021-10-18

## 2021-10-17 RX ORDER — POTASSIUM CHLORIDE 7.45 MG/ML
40 INJECTION INTRAVENOUS 2 TIMES DAILY
Status: DISCONTINUED | OUTPATIENT
Start: 2021-10-17 | End: 2021-10-17

## 2021-10-17 RX ORDER — PREDNISONE 20 MG/1
20 TABLET ORAL DAILY
Status: COMPLETED | OUTPATIENT
Start: 2021-10-17 | End: 2021-10-21

## 2021-10-17 RX ORDER — LANOLIN ALCOHOL/MO/W.PET/CERES
3 CREAM (GRAM) TOPICAL ONCE
Status: COMPLETED | OUTPATIENT
Start: 2021-10-17 | End: 2021-10-17

## 2021-10-17 RX ORDER — POTASSIUM BICARBONATE 25 MEQ/1
25 TABLET, EFFERVESCENT ORAL DAILY
Status: DISCONTINUED | OUTPATIENT
Start: 2021-10-17 | End: 2021-10-17

## 2021-10-17 RX ADMIN — AMOXICILLIN AND CLAVULANATE POTASSIUM 1 TABLET: 875; 125 TABLET, FILM COATED ORAL at 08:51

## 2021-10-17 RX ADMIN — ATORVASTATIN CALCIUM 20 MG: 20 TABLET, FILM COATED ORAL at 21:28

## 2021-10-17 RX ADMIN — APIXABAN 5 MG: 5 TABLET, FILM COATED ORAL at 21:28

## 2021-10-17 RX ADMIN — Medication 3 MG: at 21:28

## 2021-10-17 RX ADMIN — AMIODARONE HYDROCHLORIDE 0.5 MG/MIN: 50 INJECTION, SOLUTION INTRAVENOUS at 12:01

## 2021-10-17 RX ADMIN — AMOXICILLIN AND CLAVULANATE POTASSIUM 1 TABLET: 875; 125 TABLET, FILM COATED ORAL at 21:28

## 2021-10-17 RX ADMIN — HYDROXYZINE HYDROCHLORIDE 10 MG: 10 TABLET ORAL at 19:44

## 2021-10-17 RX ADMIN — APIXABAN 5 MG: 5 TABLET, FILM COATED ORAL at 08:51

## 2021-10-17 RX ADMIN — METOPROLOL TARTRATE 50 MG: 25 TABLET ORAL at 21:28

## 2021-10-17 RX ADMIN — METOPROLOL TARTRATE 50 MG: 25 TABLET ORAL at 08:51

## 2021-10-17 RX ADMIN — ACETAMINOPHEN 650 MG: 325 TABLET ORAL at 05:19

## 2021-10-17 RX ADMIN — IPRATROPIUM BROMIDE AND ALBUTEROL SULFATE 3 ML: .5; 2.5 SOLUTION RESPIRATORY (INHALATION) at 11:31

## 2021-10-17 RX ADMIN — POTASSIUM BICARBONATE 50 MEQ: 977.5 TABLET, EFFERVESCENT ORAL at 21:28

## 2021-10-17 RX ADMIN — PREDNISONE 20 MG: 20 TABLET ORAL at 16:45

## 2021-10-17 RX ADMIN — FLUTICASONE PROPIONATE 2 PUFF: 110 AEROSOL, METERED RESPIRATORY (INHALATION) at 08:22

## 2021-10-17 RX ADMIN — POTASSIUM BICARBONATE 50 MEQ: 977.5 TABLET, EFFERVESCENT ORAL at 11:55

## 2021-10-17 RX ADMIN — ANTI-FUNGAL POWDER MICONAZOLE NITRATE TALC FREE: 1.42 POWDER TOPICAL at 21:29

## 2021-10-17 RX ADMIN — CLOPIDOGREL 75 MG: 75 TABLET, FILM COATED ORAL at 08:51

## 2021-10-17 RX ADMIN — ANTI-FUNGAL POWDER MICONAZOLE NITRATE TALC FREE: 1.42 POWDER TOPICAL at 08:52

## 2021-10-17 RX ADMIN — FUROSEMIDE 40 MG: 10 INJECTION, SOLUTION INTRAMUSCULAR; INTRAVENOUS at 09:05

## 2021-10-17 RX ADMIN — FLUTICASONE PROPIONATE 2 PUFF: 110 AEROSOL, METERED RESPIRATORY (INHALATION) at 21:10

## 2021-10-17 RX ADMIN — IPRATROPIUM BROMIDE AND ALBUTEROL SULFATE 3 ML: .5; 2.5 SOLUTION RESPIRATORY (INHALATION) at 08:22

## 2021-10-17 RX ADMIN — IPRATROPIUM BROMIDE AND ALBUTEROL SULFATE 3 ML: .5; 2.5 SOLUTION RESPIRATORY (INHALATION) at 21:10

## 2021-10-17 RX ADMIN — IPRATROPIUM BROMIDE AND ALBUTEROL SULFATE 3 ML: .5; 2.5 SOLUTION RESPIRATORY (INHALATION) at 16:08

## 2021-10-17 RX ADMIN — ACETAMINOPHEN 650 MG: 325 TABLET ORAL at 12:36

## 2021-10-17 RX ADMIN — DIPHENHYDRAMINE HCL 25 MG: 25 TABLET ORAL at 16:45

## 2021-10-17 RX ADMIN — POLYETHYLENE GLYCOL 3350 17 G: 17 POWDER, FOR SOLUTION ORAL at 05:56

## 2021-10-17 ASSESSMENT — PAIN DESCRIPTION - PAIN TYPE: TYPE: ACUTE PAIN

## 2021-10-17 ASSESSMENT — PAIN SCALES - GENERAL
PAINLEVEL_OUTOF10: 0
PAINLEVEL_OUTOF10: 6
PAINLEVEL_OUTOF10: 3
PAINLEVEL_OUTOF10: 3

## 2021-10-17 ASSESSMENT — PAIN DESCRIPTION - LOCATION
LOCATION: BUTTOCKS;LEG
LOCATION: LEG

## 2021-10-17 NOTE — PLAN OF CARE
Problem: Gas Exchange - Impaired:  Goal: Levels of oxygenation will improve  Description: Levels of oxygenation will improve  Outcome: Ongoing     Problem: Falls - Risk of:  Goal: Will remain free from falls  Description: Will remain free from falls  Outcome: Ongoing  Goal: Absence of physical injury  Description: Absence of physical injury  Outcome: Ongoing     Problem:  Activity:  Goal: Ability to tolerate increased activity will improve  Description: Ability to tolerate increased activity will improve  Outcome: Ongoing     Problem: Pain:  Goal: Pain level will decrease  Description: Pain level will decrease  Outcome: Ongoing  Goal: Control of acute pain  Description: Control of acute pain  Outcome: Ongoing  Goal: Control of chronic pain  Description: Control of chronic pain  Outcome: Ongoing

## 2021-10-17 NOTE — PROGRESS NOTES
[facility]   Progress Note                   Date:   10/17/2021  Patient name: Judy Jimenez  Date of admission:  10/15/2021  4:20 AM  MRN:   [id number]  YOB: 1945  PCP: KALYN Randolph CNP    Reason for Admission: Hyponatremia [E87.1]  Pleural effusion [J90]  Acute chest pain [R07.9]  CASEY (acute kidney injury) ([address] [N17.9]    Subjective:       Clinical Changes / Abnormalities: Pt seen and examined int he room. Pt denies any CP. On 02 per NC. She remains in afib, rate 90's while lying in bed. On amiodarone gtt. Medications:   Scheduled Meds:   sodium chloride flush  5-40 mL IntraVENous 2 times per day    furosemide  40 mg Oral Daily    metoprolol tartrate  50 mg Oral BID    amoxicillin-clavulanate  1 tablet Oral 2 times per day    miconazole   Topical BID    apixaban  5 mg Oral BID    fluticasone  2 puff Inhalation BID    clopidogrel  75 mg Oral Daily    atorvastatin  20 mg Oral Nightly    ipratropium-albuterol  1 vial Inhalation 4x daily     Continuous Infusions:   amiodarone 0.5 mg/min (10/16/21 2114)    sodium chloride       CBC:   Recent Labs     10/15/21  0435   WBC 15.6*   HGB 14.2        BMP:    Recent Labs     10/15/21  0435   *   K 3.7   CL 90*   CO2 26   BUN 26*   CREATININE 1.35*   GLUCOSE 119*     Hepatic:   Recent Labs     10/15/21  0435   AST 27   ALT 23   BILITOT 1.26*   ALKPHOS 75     Troponin:   Recent Labs     10/15/21  0435 10/15/21  0548   TROPHS 40* 39*     BNP: No results for input(s): BNP in the last 72 hours. Lipids: No results for input(s): CHOL, HDL in the last 72 hours. Invalid input(s): LDLCALCU  INR: No results for input(s): INR in the last 72 hours.     Objective:   Vitals: /76   Pulse 95   Temp 98.1 °F (36.7 °C) (Oral)   Resp 17   Ht 5' 2.52\" (1.588 m)   Wt 250 lb (113.4 kg)   SpO2 99%   BMI 44.97 kg/m²   General appearance: alert and cooperative with exam  HEENT: Head: Normocephalic, no lesions, without obvious abnormality. Neck: no JVD, trachea midline, no adenopathy  Lungs: Clear to auscultation on 02 per   Heart: irregular rate and rhythm, s1/s2 auscultated, no murmurs  Abdomen: soft, non-tender, bowel sounds active  Extremities: L ankle  edema  Neurologic: not done    EKG: Afib     ECHO 10/3/201  Left ventricle is normal in size with normal systolic function globally. Calculated ejection fraction is 59% ( Atrial-Fib). Mild mitral regurgitation. Mild tricuspid regurgitation. Estimated right ventricular systolic pressure is 35 mmHg. Anterior clear space noted, most likely adipose tissue vs small loculated  pericardial effusion. No evidence of tamponade. Pleural effusion is seen. Assessment / Acute Cardiac Problems:   1. Afib previously cardioverted on 10/7 and started on cardizem and lopressor  2. On eliquis  3. Currently in Atrial flutter  4. Sepsis 2/2 UTI and PNA  5. PNA  6. Left sided pleural effusion  7.  COPD    Patient Active Problem List:     Asthma     Benign essential HTN     Morbid obesity with BMI of 40.0-44.9, adult ([address]     Open bimalleolar fracture of left ankle     Hyperlipidemia     COPD exacerbation (HCC)     Pulmonary emphysema (HCC)     Postoperative confusion     Acute respiratory failure with hypoxia (HCC)     Simple chronic bronchitis (HCC)     Atrial fibrillation with rapid ventricular response (HCC)     Pneumonia of left lower lobe due to infectious organism     Sepsis with acute hypoxic respiratory failure without septic shock (HCC)     Acute on chronic diastolic heart failure (HCC)     UTI (urinary tract infection)     Hyponatremia     Hypokalemia     Elevated troponin     Anemia     Arthritis     At high risk for falls     Colon polyps     Heart murmur     Legally blind in right eye, as defined in [place]     COPD (chronic obstructive pulmonary disease) ([address]     Atrial fibrillation status post cardioversion ([address]     Pleural effusion      Plan of Treatment: 1. Afib. IV amiodarone. Contiunue eliquis. Continue low dose BB   2. Await AM labs. 3. CHF. Agree with IV lasix. Strict I/O  4. If remains in afib, will proceed with CV in am.  NPO after Midnight. Risks and benefits reviewed with pt and she is agreeable to proceed.      Electronically signed by KALYN Hodge CNP on 10/17/2021 at [address] [facility] [address].  [phone]

## 2021-10-17 NOTE — PROGRESS NOTES
[facility]  Internal Medicine Teaching Residency Program  Inpatient Daily Progress Note  ______________________________________________________________________________    Patient: Raheel Gaston  YOB: 1945   RCU:[id number]    Acct: [de-identified]     Room: 2015/2015-01  Admit date: 10/15/2021  Today's date: 10/17/21  Number of days in the hospital: 2    SUBJECTIVE   Admitting Diagnosis: <principal problem not specified>  CC:  Pt examined at bedside. Chart & results reviewed. No acute issues overnight  Hemodynamically stable  Saturating 95% 1 L O2 via NC  Heart rate controlled with amiodarone  Chest x-ray tomorrow, pleural tap if worsening of effusion  Add prednisone 20 mg daily    ROS:  Constitutional:  negative for chills, fevers, sweats  Respiratory: Positive for cough, dyspnea on exertion, hemoptysis,  Cardiovascular:  negative for chest pain, chest pressure/discomfort, lower extremity edema, palpitations  Gastrointestinal:  negative for abdominal pain, constipation, diarrhea, nausea, vomiting  Neurological:  negative for dizziness, headache  BRIEF HISTORY     A 76 y.o.  female with PMH of A. fib (on Eliquis), COPD (on home oxygen), SEJAL, HTN, morbid obesity,  Was brought in by EMS from nursing facility with chief complaint of chest pain and shortness of breath. Last night, patient woke up from chest pain- left-sided, non-radiating, dull, associated with nausea, diaphoresis, dizziness and difficulty breathing.  Chest pain lasted about 20/30 minutes. Chest pain is nonreproducible, increased with deep breathing and coughing.   She is on 3 L O2 nasal cannula, denies orthopnea/PND episodes.  She does not ambulate much. She was recently admitted for similar complaints and was found to have acute hypoxic respiratory failure secondary to left lower lobe pneumonia and pleural effusion. she also had new onset A. fib with RVR.   Left chest thoracocentesis was done, 350 cc transudate fluid was removed. She underwent CURT and synchronized cardioversion for A. fib and was discharged on Eliquis, Cardizem 240 mg and Lopressor 70 mg twice daily. She also has indwelling urinary catheter since her last admission.     She has reduced appetite.  Denies any urinary complaints.  Has been constipated for last 3 days. She has chronic left leg edema.     In the ER,  Chest x-ray showed large left-sided pleural effusion with marked compressive atelectasis of left lung with moderate to severe cardiomegaly. Troponins-40 (downtrending) (baseline 60s)  proBNP 1745  Creatinine 1.35 (baseline normal)  WBCs-15.69 lactic acid 1.8  Urine : Cloudy, 20-50 WBC present, will culture.     She received vancomycin and cefepime in ER. OBJECTIVE     Vital Signs:  /67   Pulse 96   Temp 98.7 °F (37.1 °C) (Oral)   Resp 24   Ht 5' 2.52\" (1.588 m)   Wt 250 lb (113.4 kg)   SpO2 96%   BMI 44.97 kg/m²     Temp (24hrs), Av.2 °F (36.8 °C), Min:97.7 °F (36.5 °C), Max:98.7 °F (37.1 °C)    In: 256   Out: 2360 [Urine:2360]    Physical Exam:  Constitutional: This is a well developed, well nourished,  68y.o. year old female who is alert, oriented, cooperative and in no apparent distress. Head:normocephalic and atraumatic. EENT:  PERRLA. No conjunctival injections. Septum was midline, mucosa was without erythema, exudates or cobblestoning. No thrush was noted. Neck: Supple without thyromegaly. No elevated JVP. Trachea was midline. Respiratory: Chest was symmetrical without dullness to percussion. Bilateral decreased air entry with fine crackles at the base. There is no intercostal retraction or use of accessory muscles. No egophony noted. Cardiovascular: Regular without murmur, clicks, gallops or rubs. Abdomen: Slightly rounded and soft without organomegaly. No rebound, rigidity or guarding was appreciated. Lymphatic: No lymphadenopathy.   Musculoskeletal: Normal curvature of the spine. No gross muscle weakness. Extremities: Bilateral lower extremity pitting edema. .  Muscle size, tone and strength are normal.  No involuntary movements are noted. Skin:  Warm and dry. Good color, turgor and pigmentation. No lesions or scars. No cyanosis or clubbing  Neurological/Psychiatric: The patient's general behavior, level of consciousness, thought content and emotional status is normal.        Medications:  Scheduled Medications:    furosemide  40 mg IntraVENous Daily    potassium bicarb-citric acid  20 mEq Oral Daily    sodium chloride flush  5-40 mL IntraVENous 2 times per day    metoprolol tartrate  50 mg Oral BID    amoxicillin-clavulanate  1 tablet Oral 2 times per day    miconazole   Topical BID    apixaban  5 mg Oral BID    fluticasone  2 puff Inhalation BID    clopidogrel  75 mg Oral Daily    atorvastatin  20 mg Oral Nightly    ipratropium-albuterol  1 vial Inhalation 4x daily     Continuous Infusions:    amiodarone 0.5 mg/min (10/16/21 2114)    sodium chloride       PRN Medicationssodium chloride flush, 5-40 mL, PRN  albuterol, 2.5 mg, Q4H PRN  diphenhydrAMINE, 25 mg, Q6H PRN  sodium chloride, 25 mL, PRN  ondansetron, 4 mg, Q8H PRN   Or  ondansetron, 4 mg, Q6H PRN  polyethylene glycol, 17 g, Daily PRN  acetaminophen, 650 mg, Q6H PRN   Or  acetaminophen, 650 mg, Q6H PRN  Calamine, , PRN        Diagnostic Labs:  CBC:   Recent Labs     10/15/21  0435 10/17/21  0557   WBC 15.6* 15.7*   RBC 4.70 4.11   HGB 14.2 12.6   HCT 43.9 39.0   MCV 93.4 94.9   RDW 12.6 12.6    333     BMP:   Recent Labs     10/15/21  0435 10/17/21  0557   * 127*   K 3.7 3.3*   CL 90* 91*   CO2 26 24   BUN 26* 13   CREATININE 1.35* 0.70     BNP: No results for input(s): BNP in the last 72 hours. PT/INR: No results for input(s): PROTIME, INR in the last 72 hours. APTT: No results for input(s): APTT in the last 72 hours.   CARDIAC ENZYMES: No results for input(s): CKMB, Elian Bogdan in the last 72 hours. Invalid input(s): CKTOTAL;3  FASTING LIPID PANEL:  Lab Results   Component Value Date    CHOL 195 06/02/2016    HDL 45 06/02/2016    TRIG 94 06/02/2016     LIVER PROFILE:   Recent Labs     10/15/21  0435   AST 27   ALT 23   BILITOT 1.26*   ALKPHOS 75      MICROBIOLOGY:   Lab Results   Component Value Date/Time    CULTURE NO GROWTH 2 DAYS 10/15/2021 02:31 PM       Imaging:    CT CHEST WO CONTRAST    Result Date: 10/15/2021  1. Moderate left pleural effusion with increased debris in the left bronchi and increased consolidation in the left lung. Associated volume loss is most suggestive of atelectasis, but possibility of superimposed pneumonia would be difficult to exclude. 2.  Trace right pleural fluid with right basilar atelectasis. 3.  Improved pericardial effusion. XR CHEST PORTABLE    Result Date: 10/16/2021  Increasing opacification in the left hemithorax particularly in the left upper lobe likely related to increase left effusion although 10 of left apical airspace disease is not excluded. Significant opacification in the left mid and lower lung field is noted. Mild perihilar congestive changes are again noted. No extrapleural air is seen. XR CHEST PORTABLE    Result Date: 10/15/2021  No evidence of pneumothorax status post left thoracentesis     XR CHEST PORTABLE    Result Date: 10/15/2021  Large left-sided pleural effusion with marked compressive atelectasis of the left lung. Moderate to severe cardiomegaly. US THORACENTESIS Which side should the procedure be performed? Left    Result Date: 10/15/2021  Successful ultrasound guided left thoracentesis.        ASSESSMENT & PLAN     Pleural effusion:  USG guided thoracocentesis revealed 300 cc straw colored fluid consistent with exudative fluid   Respiratory culture, Strep pneumoniae antigen, Legionella are unremarkable  Repeat chest xray revealed somewhat improved pleural effusion with atelectasis present. Chest x-ray repeat tomorrow if worsening of effusion> pleural tap     1. Acute on chronic diastolic CHF:  Likely secondary to afib   On lasix  BP under control   Lopressor 75 mg  Lipitor 20 mg  Plavix 75 mg    2. CASEY-resolved     3.A. fib  On Eliquis  Continue metoprolol and Cardizem  Currently heart rate is controlled on amiodarone     4. Sepsis  Likely secondary to pneumonia with pleural effusion versus UTI  Cultures so far negative  Received Vanco and cefepime  Will shift to Augmentin      5.UTI:Has indwelling Goldstein catheter  On cefepime, will de-escalate based on culture. Will remove goldstein catheter and try external catheter.       6. COPD  Continue inhalers as needed  Fluticasone  DuoNeb  Start prednisone 20 mg daily     7. OSACPAP: during sleep     DVT ppx: On Eliquis  PT/OT/SW:Ongoing  Discharge Planning: to assist in discharge planning. Angelia Rai MD  Internal Medicine Resident, PGY-1  [facility];  [place], [place]  10/17/2021, 9:22 AM

## 2021-10-17 NOTE — PLAN OF CARE
Problem: Falls - Risk of:  Goal: Will remain free from falls  Description: Will remain free from falls  10/17/2021 0741 by Aj Moore RN  Outcome: Ongoing  10/17/2021 0432 by Spencer Langston RN  Outcome: Ongoing     Problem: Falls - Risk of:  Goal: Absence of physical injury  Description: Absence of physical injury  10/17/2021 0741 by Aj Moore RN  Outcome: Ongoing  10/17/2021 0432 by Spencer Langston RN  Outcome: Ongoing     Problem:  Activity:  Goal: Ability to tolerate increased activity will improve  Description: Ability to tolerate increased activity will improve  10/17/2021 0741 by Aj Moore RN  Outcome: Ongoing  10/17/2021 0432 by Spencer Langston RN  Outcome: Ongoing     Problem: Pain:  Goal: Pain level will decrease  Description: Pain level will decrease  10/17/2021 0741 by Aj Moore RN  Outcome: Ongoing  10/17/2021 0432 by Spencer Langston RN  Outcome: Ongoing     Will continue to monitor patient

## 2021-10-18 ENCOUNTER — APPOINTMENT (OUTPATIENT)
Dept: ULTRASOUND IMAGING | Age: 76
DRG: 871 | End: 2021-10-18
Payer: MEDICARE

## 2021-10-18 ENCOUNTER — APPOINTMENT (OUTPATIENT)
Dept: GENERAL RADIOLOGY | Age: 76
DRG: 871 | End: 2021-10-18
Payer: MEDICARE

## 2021-10-18 ENCOUNTER — APPOINTMENT (OUTPATIENT)
Dept: CARDIAC CATH/INVASIVE PROCEDURES | Age: 76
DRG: 871 | End: 2021-10-18
Payer: MEDICARE

## 2021-10-18 LAB
ABSOLUTE EOS #: 0.2 K/UL (ref 0–0.44)
ABSOLUTE IMMATURE GRANULOCYTE: 0.1 K/UL (ref 0–0.3)
ABSOLUTE LYMPH #: 1.1 K/UL (ref 1.1–3.7)
ABSOLUTE MONO #: 0.91 K/UL (ref 0.1–1.2)
ANION GAP SERPL CALCULATED.3IONS-SCNC: 10 MMOL/L (ref 9–17)
BASOPHILS # BLD: 0 % (ref 0–2)
BASOPHILS ABSOLUTE: 0.05 K/UL (ref 0–0.2)
BUN BLDV-MCNC: 10 MG/DL (ref 8–23)
BUN/CREAT BLD: ABNORMAL (ref 9–20)
CALCIUM SERPL-MCNC: 8.7 MG/DL (ref 8.6–10.4)
CHLORIDE BLD-SCNC: 88 MMOL/L (ref 98–107)
CO2: 27 MMOL/L (ref 20–31)
CREAT SERPL-MCNC: 0.61 MG/DL (ref 0.5–0.9)
D-DIMER QUANTITATIVE: 0.82 MG/L FEU
DIFFERENTIAL TYPE: ABNORMAL
EOSINOPHILS RELATIVE PERCENT: 1 % (ref 1–4)
GFR AFRICAN AMERICAN: >60 ML/MIN
GFR NON-AFRICAN AMERICAN: >60 ML/MIN
GFR SERPL CREATININE-BSD FRML MDRD: ABNORMAL ML/MIN/{1.73_M2}
GFR SERPL CREATININE-BSD FRML MDRD: ABNORMAL ML/MIN/{1.73_M2}
GLUCOSE BLD-MCNC: 124 MG/DL (ref 70–99)
GLUCOSE, FLUID: 117 MG/DL
HCT VFR BLD CALC: 37.3 % (ref 36.3–47.1)
HEMOGLOBIN: 12.1 G/DL (ref 11.9–15.1)
IMMATURE GRANULOCYTES: 1 %
LACTATE DEHYDROGENASE, FLUID: 102 U/L
LACTATE DEHYDROGENASE: 232 U/L (ref 135–214)
LYMPHOCYTES # BLD: 8 % (ref 24–43)
MCH RBC QN AUTO: 30.4 PG (ref 25.2–33.5)
MCHC RBC AUTO-ENTMCNC: 32.4 G/DL (ref 28.4–34.8)
MCV RBC AUTO: 93.7 FL (ref 82.6–102.9)
MONOCYTES # BLD: 6 % (ref 3–12)
NRBC AUTOMATED: 0 PER 100 WBC
PDW BLD-RTO: 12.3 % (ref 11.8–14.4)
PH FLUID: 8
PLATELET # BLD: 323 K/UL (ref 138–453)
PLATELET ESTIMATE: ABNORMAL
PMV BLD AUTO: 9.9 FL (ref 8.1–13.5)
POTASSIUM SERPL-SCNC: 4.7 MMOL/L (ref 3.7–5.3)
RBC # BLD: 3.98 M/UL (ref 3.95–5.11)
RBC # BLD: ABNORMAL 10*6/UL
SEG NEUTROPHILS: 84 % (ref 36–65)
SEGMENTED NEUTROPHILS ABSOLUTE COUNT: 12.32 K/UL (ref 1.5–8.1)
SODIUM BLD-SCNC: 125 MMOL/L (ref 135–144)
SPECIMEN TYPE: NORMAL
TOTAL PROTEIN, BODY FLUID: 2 G/DL
TOTAL PROTEIN: 5.8 G/DL (ref 6.4–8.3)
WBC # BLD: 14.7 K/UL (ref 3.5–11.3)
WBC # BLD: ABNORMAL 10*3/UL

## 2021-10-18 PROCEDURE — 85379 FIBRIN DEGRADATION QUANT: CPT

## 2021-10-18 PROCEDURE — 85025 COMPLETE CBC W/AUTO DIFF WBC: CPT

## 2021-10-18 PROCEDURE — 89051 BODY FLUID CELL COUNT: CPT

## 2021-10-18 PROCEDURE — 84157 ASSAY OF PROTEIN OTHER: CPT

## 2021-10-18 PROCEDURE — 6370000000 HC RX 637 (ALT 250 FOR IP): Performed by: NURSE PRACTITIONER

## 2021-10-18 PROCEDURE — 87205 SMEAR GRAM STAIN: CPT

## 2021-10-18 PROCEDURE — 82945 GLUCOSE OTHER FLUID: CPT

## 2021-10-18 PROCEDURE — 94640 AIRWAY INHALATION TREATMENT: CPT

## 2021-10-18 PROCEDURE — 87075 CULTR BACTERIA EXCEPT BLOOD: CPT

## 2021-10-18 PROCEDURE — 87077 CULTURE AEROBIC IDENTIFY: CPT

## 2021-10-18 PROCEDURE — 6370000000 HC RX 637 (ALT 250 FOR IP): Performed by: INTERNAL MEDICINE

## 2021-10-18 PROCEDURE — 99232 SBSQ HOSP IP/OBS MODERATE 35: CPT | Performed by: INTERNAL MEDICINE

## 2021-10-18 PROCEDURE — 6370000000 HC RX 637 (ALT 250 FOR IP): Performed by: STUDENT IN AN ORGANIZED HEALTH CARE EDUCATION/TRAINING PROGRAM

## 2021-10-18 PROCEDURE — 6360000002 HC RX W HCPCS: Performed by: STUDENT IN AN ORGANIZED HEALTH CARE EDUCATION/TRAINING PROGRAM

## 2021-10-18 PROCEDURE — 5A2204Z RESTORATION OF CARDIAC RHYTHM, SINGLE: ICD-10-PCS | Performed by: INTERNAL MEDICINE

## 2021-10-18 PROCEDURE — 0W9B3ZZ DRAINAGE OF LEFT PLEURAL CAVITY, PERCUTANEOUS APPROACH: ICD-10-PCS | Performed by: INTERNAL MEDICINE

## 2021-10-18 PROCEDURE — 51702 INSERT TEMP BLADDER CATH: CPT

## 2021-10-18 PROCEDURE — 36415 COLL VENOUS BLD VENIPUNCTURE: CPT

## 2021-10-18 PROCEDURE — 71045 X-RAY EXAM CHEST 1 VIEW: CPT

## 2021-10-18 PROCEDURE — 6370000000 HC RX 637 (ALT 250 FOR IP)

## 2021-10-18 PROCEDURE — 84155 ASSAY OF PROTEIN SERUM: CPT

## 2021-10-18 PROCEDURE — 6360000002 HC RX W HCPCS

## 2021-10-18 PROCEDURE — C1729 CATH, DRAINAGE: HCPCS

## 2021-10-18 PROCEDURE — 83615 LACTATE (LD) (LDH) ENZYME: CPT

## 2021-10-18 PROCEDURE — 2580000003 HC RX 258: Performed by: STUDENT IN AN ORGANIZED HEALTH CARE EDUCATION/TRAINING PROGRAM

## 2021-10-18 PROCEDURE — 2060000000 HC ICU INTERMEDIATE R&B

## 2021-10-18 PROCEDURE — 83986 ASSAY PH BODY FLUID NOS: CPT

## 2021-10-18 PROCEDURE — 94761 N-INVAS EAR/PLS OXIMETRY MLT: CPT

## 2021-10-18 PROCEDURE — 80048 BASIC METABOLIC PNL TOTAL CA: CPT

## 2021-10-18 PROCEDURE — 94664 DEMO&/EVAL PT USE INHALER: CPT

## 2021-10-18 PROCEDURE — 32555 ASPIRATE PLEURA W/ IMAGING: CPT

## 2021-10-18 PROCEDURE — 87070 CULTURE OTHR SPECIMN AEROBIC: CPT

## 2021-10-18 PROCEDURE — 87186 SC STD MICRODIL/AGAR DIL: CPT

## 2021-10-18 PROCEDURE — 84311 SPECTROPHOTOMETRY: CPT

## 2021-10-18 PROCEDURE — 92960 CARDIOVERSION ELECTRIC EXT: CPT | Performed by: INTERNAL MEDICINE

## 2021-10-18 PROCEDURE — 2700000000 HC OXYGEN THERAPY PER DAY

## 2021-10-18 PROCEDURE — 2709999900 HC NON-CHARGEABLE SUPPLY

## 2021-10-18 RX ORDER — AMIODARONE HYDROCHLORIDE 200 MG/1
200 TABLET ORAL DAILY
Status: DISCONTINUED | OUTPATIENT
Start: 2021-10-18 | End: 2021-10-22 | Stop reason: HOSPADM

## 2021-10-18 RX ORDER — CHOLECALCIFEROL (VITAMIN D3) 125 MCG
5 CAPSULE ORAL NIGHTLY PRN
Status: DISCONTINUED | OUTPATIENT
Start: 2021-10-18 | End: 2021-10-18

## 2021-10-18 RX ORDER — FUROSEMIDE 10 MG/ML
20 INJECTION INTRAMUSCULAR; INTRAVENOUS DAILY
Status: DISCONTINUED | OUTPATIENT
Start: 2021-10-19 | End: 2021-10-22 | Stop reason: HOSPADM

## 2021-10-18 RX ADMIN — CLOPIDOGREL 75 MG: 75 TABLET, FILM COATED ORAL at 12:18

## 2021-10-18 RX ADMIN — APIXABAN 5 MG: 5 TABLET, FILM COATED ORAL at 21:14

## 2021-10-18 RX ADMIN — FLUTICASONE PROPIONATE 2 PUFF: 110 AEROSOL, METERED RESPIRATORY (INHALATION) at 08:40

## 2021-10-18 RX ADMIN — AMIODARONE HYDROCHLORIDE 200 MG: 200 TABLET ORAL at 14:16

## 2021-10-18 RX ADMIN — AMOXICILLIN AND CLAVULANATE POTASSIUM 1 TABLET: 875; 125 TABLET, FILM COATED ORAL at 21:15

## 2021-10-18 RX ADMIN — PREDNISONE 20 MG: 20 TABLET ORAL at 12:18

## 2021-10-18 RX ADMIN — ANTI-FUNGAL POWDER MICONAZOLE NITRATE TALC FREE: 1.42 POWDER TOPICAL at 08:35

## 2021-10-18 RX ADMIN — ACETAMINOPHEN 650 MG: 325 TABLET ORAL at 18:07

## 2021-10-18 RX ADMIN — APIXABAN 5 MG: 5 TABLET, FILM COATED ORAL at 12:19

## 2021-10-18 RX ADMIN — IPRATROPIUM BROMIDE AND ALBUTEROL SULFATE 3 ML: .5; 2.5 SOLUTION RESPIRATORY (INHALATION) at 15:54

## 2021-10-18 RX ADMIN — ACETAMINOPHEN 650 MG: 325 TABLET ORAL at 12:15

## 2021-10-18 RX ADMIN — ANTI-FUNGAL POWDER MICONAZOLE NITRATE TALC FREE: 1.42 POWDER TOPICAL at 21:18

## 2021-10-18 RX ADMIN — DIPHENHYDRAMINE HCL 25 MG: 25 TABLET ORAL at 21:14

## 2021-10-18 RX ADMIN — FUROSEMIDE 40 MG: 10 INJECTION, SOLUTION INTRAMUSCULAR; INTRAVENOUS at 08:35

## 2021-10-18 RX ADMIN — AMOXICILLIN AND CLAVULANATE POTASSIUM 1 TABLET: 875; 125 TABLET, FILM COATED ORAL at 12:19

## 2021-10-18 RX ADMIN — DIPHENHYDRAMINE HCL 25 MG: 25 TABLET ORAL at 12:19

## 2021-10-18 RX ADMIN — IPRATROPIUM BROMIDE AND ALBUTEROL SULFATE 3 ML: .5; 2.5 SOLUTION RESPIRATORY (INHALATION) at 19:58

## 2021-10-18 RX ADMIN — IPRATROPIUM BROMIDE AND ALBUTEROL SULFATE 3 ML: .5; 2.5 SOLUTION RESPIRATORY (INHALATION) at 08:40

## 2021-10-18 RX ADMIN — SODIUM CHLORIDE, PRESERVATIVE FREE 10 ML: 5 INJECTION INTRAVENOUS at 08:36

## 2021-10-18 RX ADMIN — SODIUM CHLORIDE, PRESERVATIVE FREE 10 ML: 5 INJECTION INTRAVENOUS at 21:00

## 2021-10-18 RX ADMIN — METOPROLOL TARTRATE 50 MG: 25 TABLET ORAL at 21:14

## 2021-10-18 RX ADMIN — ATORVASTATIN CALCIUM 20 MG: 20 TABLET, FILM COATED ORAL at 21:15

## 2021-10-18 RX ADMIN — FLUTICASONE PROPIONATE 2 PUFF: 110 AEROSOL, METERED RESPIRATORY (INHALATION) at 19:58

## 2021-10-18 ASSESSMENT — PAIN SCALES - GENERAL
PAINLEVEL_OUTOF10: 4
PAINLEVEL_OUTOF10: 4
PAINLEVEL_OUTOF10: 6
PAINLEVEL_OUTOF10: 8
PAINLEVEL_OUTOF10: 3

## 2021-10-18 NOTE — PROGRESS NOTES
[facility]   Progress Note                   Date:   10/18/2021  Patient name: Mahesh Barnes  Date of admission:  10/15/2021  4:20 AM  MRN:   [id number]  YOB: 1945  PCP: KALYN Maldonado CNP    Reason for Admission: Hyponatremia [E87.1]  Pleural effusion [J90]  Acute chest pain [R07.9]  CASEY (acute kidney injury) ([address] [N17.9]    Subjective:       Clinical Changes / Abnormalities: Pt seen and examined int he room jessica RN. Had CV today and then US guided thoracentesis today. Currently SR. Denies any chest pain or SOB. C/O headache      Medications:   Scheduled Meds:   furosemide  40 mg IntraVENous Daily    predniSONE  20 mg Oral Daily    sodium chloride flush  5-40 mL IntraVENous 2 times per day    metoprolol tartrate  50 mg Oral BID    amoxicillin-clavulanate  1 tablet Oral 2 times per day    miconazole   Topical BID    apixaban  5 mg Oral BID    fluticasone  2 puff Inhalation BID    clopidogrel  75 mg Oral Daily    atorvastatin  20 mg Oral Nightly    ipratropium-albuterol  1 vial Inhalation 4x daily     Continuous Infusions:   amiodarone 0.5 mg/min (10/17/21 1201)    sodium chloride       CBC:   Recent Labs     10/17/21  0557 10/18/21  0612   WBC 15.7* 14.7*   HGB 12.6 12.1    323     BMP:    Recent Labs     10/17/21  0557 10/17/21  0557 10/17/21  1825 10/17/21  1929 10/18/21  0612   *  --  129*  --  125*   K 3.3*   < > 3.9 4.1 4.7   CL 91*  --  91*  --  88*   CO2 24  --  26  --  27   BUN 13  --  12  --  10   CREATININE 0.70  --  0.66  --  0.61   GLUCOSE 101*  --  113*  --  124*    < > = values in this interval not displayed. Hepatic:   No results for input(s): AST, ALT, ALB, BILITOT, ALKPHOS in the last 72 hours. Troponin:   No results for input(s): TROPHS in the last 72 hours. BNP: No results for input(s): BNP in the last 72 hours. Lipids: No results for input(s): CHOL, HDL in the last 72 hours.     Invalid input(s): LDLCALCU  INR: No results for input(s): INR in the last 72 hours. Objective:   Vitals: /74   Pulse 74   Temp 98.2 °F (36.8 °C) (Oral)   Resp 18   Ht 5' 2.52\" (1.588 m)   Wt 250 lb (113.4 kg)   SpO2 96%   BMI 44.97 kg/m²   General appearance: alert and cooperative with exam  HEENT: Head: Normocephalic, no lesions, without obvious abnormality. Neck: no JVD, trachea midline, no adenopathy  Lungs: Clear to auscultation on 02 per NC. Slightly diminished thorughout   Heart: regular rate and rhythm, s1/s2 auscultated, no murmurs. SR 76  Abdomen: soft, non-tender, bowel sounds active, obese  Extremities: +1 b/l LE edema  Neurologic: not done    EKG: Afib     ECHO 10/3/201  Left ventricle is normal in size with normal systolic function globally. Calculated ejection fraction is 59% ( Atrial-Fib). Mild mitral regurgitation. Mild tricuspid regurgitation. Estimated right ventricular systolic pressure is 35 mmHg. Anterior clear space noted, most likely adipose tissue vs small loculated  pericardial effusion. No evidence of tamponade. Pleural effusion is seen. CARDIOVERSION 10/18/21     After an adequate level of sedation was achieved  200J in biphasic synchronized delivery was administered. conversion to normal sinus rhythm.      The patient awoke without complications. A post procedure 12 L ECG was ordered and reviewed.     Impression:  Successful Consious Sedation - safely  Successful Cardioversion     Complications: There were no complications encountered. Assessment / Acute Cardiac Problems:   1. Afib s/p CV x2 (10/7 and 10/18)  2. On eliquis  3. Currently in Atrial flutter  4. Sepsis 2/2 UTI and PNA  5. PNA  6. Left sided pleural effusion  7.  COPD    Patient Active Problem List:     Asthma     Benign essential HTN     Morbid obesity with BMI of 40.0-44.9, adult ([address]     Open bimalleolar fracture of left ankle     Hyperlipidemia     COPD exacerbation ([address]     Pulmonary emphysema ([address]     Postoperative

## 2021-10-18 NOTE — PROCEDURES
[facility]  Cardioversion Procedure Note         Today's Date: 10/18/2021  Indication: Atrial fibrillation/Atrial flutter    Patient seen and examined. History and Physical reviewed. Labs reviewed. After informed consent was obtained with explanation of the risks and benefits, the patient was prepared using standard tecqniques. All Conscious Sedation was administered via the Cardiologist.     CARDIOVERSION:    After an adequate level of sedation was achieved  200J in biphasic synchronized delivery was administered. conversion to normal sinus rhythm. The patient awoke without complications. A post procedure 12 L ECG was ordered and reviewed. Impression:  Successful Consious Sedation - safely  Successful Cardioversion    Complications: There were no complications encountered. The patient will continue with the discharge meds and has been instructed to follow-up with your primary cardiologist for continued long term care and cardiovascular management. There were no complications encountered.       Electronically signed on 10/18/21 at 10:40 AM by:    Ramona Schreiber MD  Fellow, [address]      Attending Physician    Correction of hyponatremia per primary team  Continue amiodarone     Mia Cartwright MD, [place]

## 2021-10-18 NOTE — PLAN OF CARE
Problem: Gas Exchange - Impaired:  Goal: Levels of oxygenation will improve  Description: Levels of oxygenation will improve  Outcome: Ongoing     Problem: Falls - Risk of:  Goal: Will remain free from falls  Description: Will remain free from falls  Outcome: Ongoing  Goal: Absence of physical injury  Description: Absence of physical injury  Outcome: Ongoing     Problem:  Activity:  Goal: Ability to tolerate increased activity will improve  Description: Ability to tolerate increased activity will improve  Outcome: Ongoing     Problem: Pain:  Goal: Pain level will decrease  Description: Pain level will decrease  Outcome: Ongoing  Goal: Control of acute pain  Description: Control of acute pain  Outcome: Ongoing  Goal: Control of chronic pain  Description: Control of chronic pain  Outcome: Ongoing     Problem: Skin Integrity:  Goal: Will show no infection signs and symptoms  Description: Will show no infection signs and symptoms  Outcome: Ongoing  Goal: Absence of new skin breakdown  Description: Absence of new skin breakdown  Outcome: Ongoing

## 2021-10-18 NOTE — PLAN OF CARE
Problem: Gas Exchange - Impaired:  Goal: Levels of oxygenation will improve  Description: Levels of oxygenation will improve  10/18/2021 1749 by Saige Youssef RN  Outcome: Ongoing  10/18/2021 0353 by Areli Perez  Outcome: Ongoing     Problem: Falls - Risk of:  Goal: Will remain free from falls  Description: Will remain free from falls  10/18/2021 1749 by Saige Youssef RN  Outcome: Ongoing  10/18/2021 0353 by Areli Perez  Outcome: Ongoing  Goal: Absence of physical injury  Description: Absence of physical injury  10/18/2021 1749 by Saige Youssef RN  Outcome: Ongoing  10/18/2021 0353 by Areli Perez  Outcome: Ongoing     Problem:  Activity:  Goal: Ability to tolerate increased activity will improve  Description: Ability to tolerate increased activity will improve  10/18/2021 1749 by Saige Youssef RN  Outcome: Ongoing  10/18/2021 0353 by Areli Perez  Outcome: Ongoing     Problem: Pain:  Goal: Pain level will decrease  Description: Pain level will decrease  10/18/2021 1749 by Saige Youssef RN  Outcome: Ongoing  10/18/2021 0353 by Areli Perez  Outcome: Ongoing  Goal: Control of acute pain  Description: Control of acute pain  10/18/2021 1749 by Saige Youssef RN  Outcome: Ongoing  10/18/2021 0353 by Areli Perez  Outcome: Ongoing  Goal: Control of chronic pain  Description: Control of chronic pain  10/18/2021 1749 by Saige Youssef RN  Outcome: Ongoing  10/18/2021 0353 by Areli Perez  Outcome: Ongoing     Problem: Skin Integrity:  Goal: Will show no infection signs and symptoms  Description: Will show no infection signs and symptoms  10/18/2021 1749 by Saige Youssef RN  Outcome: Ongoing  10/18/2021 0353 by Areli Perez  Outcome: Ongoing  Goal: Absence of new skin breakdown  Description: Absence of new skin breakdown  10/18/2021 1749 by Saige Youssef RN  Outcome: Ongoing  10/18/2021 0353 by Areli Perez  Outcome: Ongoing

## 2021-10-18 NOTE — PROGRESS NOTES
Physical Therapy         Physical Therapy Cancel Note      DATE: 10/18/2021    NAME: Shad Rivero  MRN: [id number]   : 1945      Patient not seen this date for Physical Therapy due to:    Discussed with nurse. Already at 110-116 bpm in supine at rest. Deemed not appropriate for exertion and mobility. Will continue to pursue.       Electronically signed by Jael Lockwood PT on 10/18/2021 at 8:56 AM

## 2021-10-18 NOTE — PROGRESS NOTES
Physician Progress Note      PATIENT:               Ingrid Carmona  CSN #:                  [id number]  :                       1945  ADMIT DATE:       10/15/2021 4:20 AM  DISCH DATE:  RESPONDING  PROVIDER #:        Duy MEJIA          QUERY TEXT:    Pt admitted with sepsis. Pt noted to have indwelling goldstein catheter. If   possible, please document in the progress notes and discharge summary if you   are evaluating and / or treating any of the following: The medical record reflects the following:  Risk Factors: recurrent UTI, indwelling goldstein catheter  Clinical Indicators: per H&P \"sepsis likely secondary to pneumonia with   pleural effusion versus UTI, Has indwelling Goldstein catheter\", WBC 15.6, UA   shows small leukocyte esterase  Treatment: IV Vanco and Cefepime, labs, cultures, monitoring    Call if any questions.  Thank you, Yeimi Syed, [address] [place] [address]  Options provided:  -- Sepsis related to indwelling goldstein catheter  -- Sepsis unrelated to indwelling goldsteni catheter  -- Other - I will add my own diagnosis  -- Disagree - Not applicable / Not valid  -- Disagree - Clinically unable to determine / Unknown  -- Refer to Clinical Documentation Reviewer    PROVIDER RESPONSE TEXT:    Sepsis related to pneumonia    Query created by: Aureliano Perkins on 10/16/2021 8:01 AM      Electronically signed by:  Ewelina Proctor 10/18/2021 7:32 AM

## 2021-10-18 NOTE — PROGRESS NOTES
Occupational [address]  Occupational Therapy Not Seen Note    DATE: 10/18/2021    NAME: Shad Rivero  MRN: [id number]   : 1945      Patient not seen this date for Occupational Therapy due to:    Surgery/Procedure: Cardioversion scheduled for this date per RN, pt was tachy and going through A-fib yesterday. Next Scheduled Treatment: Attempt on 10/19 as appropriate.     Electronically signed by Mauricio Nieto OT on 10/18/2021 at 9:15 AM

## 2021-10-18 NOTE — PROGRESS NOTES
[facility]  Internal Medicine Teaching Residency Program  Inpatient Daily Progress Note  ______________________________________________________________________________    Patient: John Vargas  YOB: 1945   PBM:[id number]    Acct: [de-identified]     Room: 2015/2015-01  Admit date: 10/15/2021  Today's date: 10/18/21  Number of days in the hospital: 3    SUBJECTIVE   Admitting Diagnosis: Recurrent left pleural effusion  CC: shortness of breath   Pt seen & examined at bedside. Chart & results reviewed. No acute events overnight. She is hemodynamically stable, saturating well on 4.5L O2 via NC. She is spitting out blood stained mucoid phlegm with cough  HR is in 110s-120s. On amiodarone drip. Cardio plans for cardioversion if Afib doesn't control  Pleural fluid culture shows no growth yet. Planned for repeat thoracocentesis today after Chest xray. If lung doesn't reexpand after thoracocentesis, then plan for bronchoscopy   She is on 20 mg prednisone for concomitant COPD. Pertinent labs  Na- 125  WBC- 14.7     ROS:  Constitutional:  negative for chills, fevers, sweats  Respiratory:  negative for cough, dyspnea on exertion, hemoptysis, shortness of breath, wheezing  Cardiovascular:  negative for chest pain, chest pressure/discomfort, lower extremity edema, palpitations  Gastrointestinal:  negative for abdominal pain, constipation, diarrhea, nausea, vomiting  Neurological:  negative for dizziness, headache  BRIEF HISTORY     A 68 y.o.  female with PMH of A. fib (on Eliquis), COPD (on home oxygen), SEJAL, HTN, morbid obesity,  Was brought in by EMS from nursing facility with chief complaint of chest pain and shortness of breath. Last night, patient woke up from chest pain- left-sided, non-radiating, dull, associated with nausea, diaphoresis, dizziness and difficulty breathing. Chest pain lasted about 20/30 minutes.   Chest pain is nonreproducible, increased with deep breathing and coughing. She is on 3 L O2 nasal cannula, denies orthopnea/PND episodes. She does not ambulate much. She was recently admitted for similar complaints and was found to have acute hypoxic respiratory failure secondary to left lower lobe pneumonia and pleural effusion. she also had new onset A. fib with RVR. Left chest thoracocentesis was done, 350 cc transudate fluid was removed. She underwent CURT and synchronized cardioversion for A. fib and was discharged on Eliquis, Cardizem 240 mg and Lopressor 70 mg twice daily. She also has indwelling urinary catheter since her last admission.     She has reduced appetite. Denies any urinary complaints. Has been constipated for last 3 days. She has chronic left leg edema.     In the ER,  Chest x-ray showed large left-sided pleural effusion with marked compressive atelectasis of left lung with moderate to severe cardiomegaly. Troponins-40 (downtrending) (baseline 60s)  proBNP 1745  Creatinine 1.35 (baseline normal)  WBCs-15.69 lactic acid 1.8  Urine : Cloudy, 20-50 WBC present, will culture.     She received vancomycin and cefepime in ER. OBJECTIVE     Vital Signs:  BP (!) 126/93   Pulse 94   Temp 98.2 °F (36.8 °C) (Oral)   Resp 20   Ht 5' 2.52\" (1.588 m)   Wt 250 lb (113.4 kg)   SpO2 96%   BMI 44.97 kg/m²     Temp (24hrs), Av.3 °F (36.8 °C), Min:97.7 °F (36.5 °C), Max:98.9 °F (37.2 °C)    In: 1300.4   Out: 4270 [Urine:4270]    Physical Exam:  Constitutional: This is a well developed, well nourished, Greater than 40 - Morbid Obesity / Extreme Obesity / Grade III 68y.o. year old female who is alert, oriented, cooperative and in no apparent distress. Head:normocephalic and atraumatic. EENT:  PERRLA. No conjunctival injections. Septum was midline, mucosa was without erythema, exudates or cobblestoning. No thrush was noted. Neck: Supple without thyromegaly. No elevated JVP.  Trachea was midline. Respiratory: Chest was symmetrical without dullness to percussion. Breath sounds bilaterally were clear to auscultation. There were no wheezes, rhonchi. Crackles present. There is no intercostal retraction or use of accessory muscles. No egophony noted. Cardiovascular: irregular rhythm and tachycardia present. Abdomen: Obese and soft without organomegaly. No rebound, rigidity or guarding was appreciated. Lymphatic: No lymphadenopathy. Musculoskeletal: Normal curvature of the spine. No gross muscle weakness. Extremities:  No lower extremity edema, ulcerations, tenderness, varicosities or erythema. Muscle size, tone and strength are normal.  No involuntary movements are noted. Rash on feet. Skin:  Warm and dry. Good color, turgor and pigmentation. No lesions or scars.   No cyanosis or clubbing  Neurological/Psychiatric: The patient's general behavior, level of consciousness, thought content and emotional status is normal.        Medications:  Scheduled Medications:    furosemide  40 mg IntraVENous Daily    predniSONE  20 mg Oral Daily    sodium chloride flush  5-40 mL IntraVENous 2 times per day    metoprolol tartrate  50 mg Oral BID    amoxicillin-clavulanate  1 tablet Oral 2 times per day    miconazole   Topical BID    apixaban  5 mg Oral BID    fluticasone  2 puff Inhalation BID    clopidogrel  75 mg Oral Daily    atorvastatin  20 mg Oral Nightly    ipratropium-albuterol  1 vial Inhalation 4x daily     Continuous Infusions:    amiodarone 0.5 mg/min (10/17/21 1201)    sodium chloride       PRN Medicationssodium chloride flush, 5-40 mL, PRN  albuterol, 2.5 mg, Q4H PRN  diphenhydrAMINE, 25 mg, Q6H PRN  sodium chloride, 25 mL, PRN  ondansetron, 4 mg, Q8H PRN   Or  ondansetron, 4 mg, Q6H PRN  polyethylene glycol, 17 g, Daily PRN  acetaminophen, 650 mg, Q6H PRN   Or  acetaminophen, 650 mg, Q6H PRN  Calamine, , PRN        Diagnostic Labs:  CBC:   Recent Labs     10/17/21  6839 10/18/21  0612   WBC 15.7* 14.7*   RBC 4.11 3.98   HGB 12.6 12.1   HCT 39.0 37.3   MCV 94.9 93.7   RDW 12.6 12.3    323     BMP:   Recent Labs     10/17/21  0557 10/17/21  0557 10/17/21  1825 10/17/21  1929 10/18/21  0612   *  --  129*  --  125*   K 3.3*   < > 3.9 4.1 4.7   CL 91*  --  91*  --  88*   CO2 24  --  26  --  27   BUN 13  --  12  --  10   CREATININE 0.70  --  0.66  --  0.61    < > = values in this interval not displayed. BNP: No results for input(s): BNP in the last 72 hours. PT/INR: No results for input(s): PROTIME, INR in the last 72 hours. APTT: No results for input(s): APTT in the last 72 hours. CARDIAC ENZYMES: No results for input(s): CKMB, CKMBINDEX, TROPONINI in the last 72 hours. Invalid input(s): CKTOTAL;3  FASTING LIPID PANEL:  Lab Results   Component Value Date    CHOL 195 06/02/2016    HDL 45 06/02/2016    TRIG 94 06/02/2016     LIVER PROFILE:   No results for input(s): AST, ALT, ALB, BILIDIR, BILITOT, ALKPHOS in the last 72 hours. MICROBIOLOGY:   Lab Results   Component Value Date/Time    CULTURE NO GROWTH 3 DAYS 10/15/2021 02:31 PM       Imaging:    CT CHEST WO CONTRAST    Result Date: 10/15/2021  1. Moderate left pleural effusion with increased debris in the left bronchi and increased consolidation in the left lung. Associated volume loss is most suggestive of atelectasis, but possibility of superimposed pneumonia would be difficult to exclude. 2.  Trace right pleural fluid with right basilar atelectasis. 3.  Improved pericardial effusion. XR CHEST PORTABLE    Result Date: 10/15/2021  No evidence of pneumothorax status post left thoracentesis     XR CHEST PORTABLE    Result Date: 10/15/2021  Large left-sided pleural effusion with marked compressive atelectasis of the left lung. Moderate to severe cardiomegaly. US THORACENTESIS Which side should the procedure be performed? Left    Result Date: 10/15/2021  Successful ultrasound guided left thoracentesis. at 5:41 PM EDT    Please note that this chart was generated using voice recognition Dragon dictation software. Although every effort was made to ensure the accuracy of this automated transcription, some errors in transcription may have occurred.

## 2021-10-18 NOTE — PROGRESS NOTES
Patient in room 8  Pulse ox and BP taken  JAUN CONLEY in room  RS- RDMS tech in room  Site prepped and draped  Access obtained  Draining BLOOD  colored fluid  TOTAL OF 300ML DRAINED AND SENT TO LAB  Access removed  Site covered with 2x2 and tegaderm  Patient tolerated well  REPORT TO FLOOR RN  Stable upon transport.

## 2021-10-19 LAB
ABSOLUTE EOS #: 0.75 K/UL (ref 0–0.44)
ABSOLUTE IMMATURE GRANULOCYTE: 0.07 K/UL (ref 0–0.3)
ABSOLUTE LYMPH #: 1.43 K/UL (ref 1.1–3.7)
ABSOLUTE MONO #: 1.05 K/UL (ref 0.1–1.2)
ANION GAP SERPL CALCULATED.3IONS-SCNC: 11 MMOL/L (ref 9–17)
APPEARANCE FLUID: NORMAL
APPEARANCE FLUID: NORMAL
BASO FLUID: NORMAL %
BASO FLUID: NORMAL %
BASOPHILS # BLD: 1 % (ref 0–2)
BASOPHILS ABSOLUTE: 0.08 K/UL (ref 0–0.2)
BUN BLDV-MCNC: 10 MG/DL (ref 8–23)
BUN/CREAT BLD: ABNORMAL (ref 9–20)
CALCIUM SERPL-MCNC: 8.6 MG/DL (ref 8.6–10.4)
CHLORIDE BLD-SCNC: 93 MMOL/L (ref 98–107)
CO2: 27 MMOL/L (ref 20–31)
COLOR FLUID: NORMAL
COLOR FLUID: NORMAL
CREAT SERPL-MCNC: 0.61 MG/DL (ref 0.5–0.9)
DIFFERENTIAL TYPE: ABNORMAL
EOSINOPHIL FLUID: NORMAL %
EOSINOPHIL FLUID: NORMAL %
EOSINOPHILS RELATIVE PERCENT: 6 % (ref 1–4)
FLUID DIFF COMMENT: NORMAL
FLUID DIFF COMMENT: NORMAL
GFR AFRICAN AMERICAN: >60 ML/MIN
GFR NON-AFRICAN AMERICAN: >60 ML/MIN
GFR SERPL CREATININE-BSD FRML MDRD: ABNORMAL ML/MIN/{1.73_M2}
GFR SERPL CREATININE-BSD FRML MDRD: ABNORMAL ML/MIN/{1.73_M2}
GLUCOSE BLD-MCNC: 99 MG/DL (ref 70–99)
HCT VFR BLD CALC: 36.7 % (ref 36.3–47.1)
HEMOGLOBIN: 11.8 G/DL (ref 11.9–15.1)
IMMATURE GRANULOCYTES: 1 %
LYMPHOCYTES # BLD: 11 % (ref 24–43)
LYMPHOCYTES, BODY FLUID: 11 %
LYMPHOCYTES, BODY FLUID: 43 %
MCH RBC QN AUTO: 30.1 PG (ref 25.2–33.5)
MCHC RBC AUTO-ENTMCNC: 32.2 G/DL (ref 28.4–34.8)
MCV RBC AUTO: 93.6 FL (ref 82.6–102.9)
MONOCYTE, FLUID: NORMAL %
MONOCYTE, FLUID: NORMAL %
MONOCYTES # BLD: 8 % (ref 3–12)
NEUTROPHIL, FLUID: 33 %
NEUTROPHIL, FLUID: 80 %
NRBC AUTOMATED: 0 PER 100 WBC
OTHER CELLS FLUID: NORMAL %
OTHER CELLS FLUID: NORMAL %
PDW BLD-RTO: 12.9 % (ref 11.8–14.4)
PLATELET # BLD: 288 K/UL (ref 138–453)
PLATELET ESTIMATE: ABNORMAL
PMV BLD AUTO: 10 FL (ref 8.1–13.5)
POTASSIUM SERPL-SCNC: 4.2 MMOL/L (ref 3.7–5.3)
RBC # BLD: 3.92 M/UL (ref 3.95–5.11)
RBC # BLD: ABNORMAL 10*6/UL
RBC FLUID: <3000 /MM3
RBC FLUID: NORMAL /MM3
SEG NEUTROPHILS: 73 % (ref 36–65)
SEGMENTED NEUTROPHILS ABSOLUTE COUNT: 9.96 K/UL (ref 1.5–8.1)
SODIUM BLD-SCNC: 131 MMOL/L (ref 135–144)
SPECIMEN TYPE: NORMAL
SPECIMEN TYPE: NORMAL
WBC # BLD: 13.3 K/UL (ref 3.5–11.3)
WBC # BLD: ABNORMAL 10*3/UL
WBC FLUID: 1119 /MM3
WBC FLUID: 262 /MM3

## 2021-10-19 PROCEDURE — 97166 OT EVAL MOD COMPLEX 45 MIN: CPT

## 2021-10-19 PROCEDURE — 6370000000 HC RX 637 (ALT 250 FOR IP): Performed by: NURSE PRACTITIONER

## 2021-10-19 PROCEDURE — 2580000003 HC RX 258: Performed by: STUDENT IN AN ORGANIZED HEALTH CARE EDUCATION/TRAINING PROGRAM

## 2021-10-19 PROCEDURE — 99233 SBSQ HOSP IP/OBS HIGH 50: CPT | Performed by: INTERNAL MEDICINE

## 2021-10-19 PROCEDURE — 85025 COMPLETE CBC W/AUTO DIFF WBC: CPT

## 2021-10-19 PROCEDURE — 6370000000 HC RX 637 (ALT 250 FOR IP): Performed by: STUDENT IN AN ORGANIZED HEALTH CARE EDUCATION/TRAINING PROGRAM

## 2021-10-19 PROCEDURE — 2700000000 HC OXYGEN THERAPY PER DAY

## 2021-10-19 PROCEDURE — 6370000000 HC RX 637 (ALT 250 FOR IP): Performed by: INTERNAL MEDICINE

## 2021-10-19 PROCEDURE — 2060000000 HC ICU INTERMEDIATE R&B

## 2021-10-19 PROCEDURE — 80048 BASIC METABOLIC PNL TOTAL CA: CPT

## 2021-10-19 PROCEDURE — 94761 N-INVAS EAR/PLS OXIMETRY MLT: CPT

## 2021-10-19 PROCEDURE — 97162 PT EVAL MOD COMPLEX 30 MIN: CPT

## 2021-10-19 PROCEDURE — 51702 INSERT TEMP BLADDER CATH: CPT

## 2021-10-19 PROCEDURE — 94640 AIRWAY INHALATION TREATMENT: CPT

## 2021-10-19 PROCEDURE — 36415 COLL VENOUS BLD VENIPUNCTURE: CPT

## 2021-10-19 PROCEDURE — 97535 SELF CARE MNGMENT TRAINING: CPT

## 2021-10-19 PROCEDURE — 97530 THERAPEUTIC ACTIVITIES: CPT

## 2021-10-19 PROCEDURE — 6360000002 HC RX W HCPCS: Performed by: STUDENT IN AN ORGANIZED HEALTH CARE EDUCATION/TRAINING PROGRAM

## 2021-10-19 PROCEDURE — 6370000000 HC RX 637 (ALT 250 FOR IP)

## 2021-10-19 RX ORDER — SODIUM CHLORIDE 0.9 % (FLUSH) 0.9 %
5-40 SYRINGE (ML) INJECTION EVERY 12 HOURS SCHEDULED
Status: CANCELLED | OUTPATIENT
Start: 2021-10-19

## 2021-10-19 RX ORDER — SODIUM CHLORIDE 9 MG/ML
25 INJECTION, SOLUTION INTRAVENOUS PRN
Status: CANCELLED | OUTPATIENT
Start: 2021-10-19

## 2021-10-19 RX ORDER — SODIUM CHLORIDE 0.9 % (FLUSH) 0.9 %
5-40 SYRINGE (ML) INJECTION PRN
Status: CANCELLED | OUTPATIENT
Start: 2021-10-19

## 2021-10-19 RX ADMIN — METOPROLOL TARTRATE 50 MG: 25 TABLET ORAL at 21:00

## 2021-10-19 RX ADMIN — AMIODARONE HYDROCHLORIDE 200 MG: 200 TABLET ORAL at 08:26

## 2021-10-19 RX ADMIN — APIXABAN 5 MG: 5 TABLET, FILM COATED ORAL at 08:26

## 2021-10-19 RX ADMIN — IPRATROPIUM BROMIDE AND ALBUTEROL SULFATE 3 ML: .5; 2.5 SOLUTION RESPIRATORY (INHALATION) at 08:01

## 2021-10-19 RX ADMIN — Medication 5 MG: at 21:00

## 2021-10-19 RX ADMIN — FLUTICASONE PROPIONATE 2 PUFF: 110 AEROSOL, METERED RESPIRATORY (INHALATION) at 08:01

## 2021-10-19 RX ADMIN — FUROSEMIDE 20 MG: 10 INJECTION, SOLUTION INTRAMUSCULAR; INTRAVENOUS at 08:26

## 2021-10-19 RX ADMIN — DIPHENHYDRAMINE HCL 25 MG: 25 TABLET ORAL at 21:00

## 2021-10-19 RX ADMIN — IPRATROPIUM BROMIDE AND ALBUTEROL SULFATE 3 ML: .5; 2.5 SOLUTION RESPIRATORY (INHALATION) at 20:55

## 2021-10-19 RX ADMIN — SODIUM CHLORIDE, PRESERVATIVE FREE 10 ML: 5 INJECTION INTRAVENOUS at 21:01

## 2021-10-19 RX ADMIN — ATORVASTATIN CALCIUM 20 MG: 20 TABLET, FILM COATED ORAL at 21:00

## 2021-10-19 RX ADMIN — ANTI-FUNGAL POWDER MICONAZOLE NITRATE TALC FREE: 1.42 POWDER TOPICAL at 08:29

## 2021-10-19 RX ADMIN — CLOPIDOGREL 75 MG: 75 TABLET, FILM COATED ORAL at 08:25

## 2021-10-19 RX ADMIN — PREDNISONE 20 MG: 20 TABLET ORAL at 08:25

## 2021-10-19 RX ADMIN — DIPHENHYDRAMINE HCL 25 MG: 25 TABLET ORAL at 08:27

## 2021-10-19 RX ADMIN — SODIUM CHLORIDE, PRESERVATIVE FREE 10 ML: 5 INJECTION INTRAVENOUS at 08:29

## 2021-10-19 RX ADMIN — METOPROLOL TARTRATE 50 MG: 25 TABLET ORAL at 08:26

## 2021-10-19 RX ADMIN — IPRATROPIUM BROMIDE AND ALBUTEROL SULFATE 3 ML: .5; 2.5 SOLUTION RESPIRATORY (INHALATION) at 16:44

## 2021-10-19 RX ADMIN — AMOXICILLIN AND CLAVULANATE POTASSIUM 1 TABLET: 875; 125 TABLET, FILM COATED ORAL at 08:26

## 2021-10-19 RX ADMIN — AMOXICILLIN AND CLAVULANATE POTASSIUM 1 TABLET: 875; 125 TABLET, FILM COATED ORAL at 21:00

## 2021-10-19 RX ADMIN — ACETAMINOPHEN 650 MG: 325 TABLET ORAL at 21:00

## 2021-10-19 RX ADMIN — ANTI-FUNGAL POWDER MICONAZOLE NITRATE TALC FREE: 1.42 POWDER TOPICAL at 21:01

## 2021-10-19 RX ADMIN — IPRATROPIUM BROMIDE AND ALBUTEROL SULFATE 3 ML: .5; 2.5 SOLUTION RESPIRATORY (INHALATION) at 11:59

## 2021-10-19 RX ADMIN — FLUTICASONE PROPIONATE 2 PUFF: 110 AEROSOL, METERED RESPIRATORY (INHALATION) at 20:55

## 2021-10-19 ASSESSMENT — PAIN DESCRIPTION - PAIN TYPE
TYPE: ACUTE PAIN

## 2021-10-19 ASSESSMENT — PAIN DESCRIPTION - LOCATION
LOCATION: RIB CAGE
LOCATION: BACK
LOCATION: CHEST;RIB CAGE

## 2021-10-19 ASSESSMENT — PAIN SCALES - GENERAL
PAINLEVEL_OUTOF10: 3
PAINLEVEL_OUTOF10: 3
PAINLEVEL_OUTOF10: 6
PAINLEVEL_OUTOF10: 6

## 2021-10-19 ASSESSMENT — PAIN DESCRIPTION - DESCRIPTORS
DESCRIPTORS: ACHING;DISCOMFORT
DESCRIPTORS: SHARP
DESCRIPTORS: SHARP

## 2021-10-19 ASSESSMENT — PAIN - FUNCTIONAL ASSESSMENT
PAIN_FUNCTIONAL_ASSESSMENT: PREVENTS OR INTERFERES SOME ACTIVE ACTIVITIES AND ADLS
PAIN_FUNCTIONAL_ASSESSMENT: PREVENTS OR INTERFERES SOME ACTIVE ACTIVITIES AND ADLS

## 2021-10-19 ASSESSMENT — PAIN DESCRIPTION - ORIENTATION
ORIENTATION: LEFT
ORIENTATION: LEFT
ORIENTATION: LOWER

## 2021-10-19 NOTE — PROGRESS NOTES
=    [facility]  Internal Medicine Teaching Residency Program  Inpatient Daily Progress Note  ______________________________________________________________________________    Patient: Esha Chino  YOB: 1945   EQB:[id number]    Acct: [de-identified]     Room: 2015/2015-01  Admit date: 10/15/2021  Today's date: 10/19/21  Number of days in the hospital: 4    SUBJECTIVE   Admitting Diagnosis: Recurrent left pleural effusion  CC:   Pt examined at bedside. Chart & results reviewed. Vitals stable  On o2 4L via NC  Pleural effusion S/P thoracocentesis of 300 ml red fluid 10/18/2021,obstruction likely secondary to mucus plug , will need bronchoscopy  Cardiology cardioverted patient, signed off  Amio p.o, eliquis, lasix  Prednisone 20 p.o  Na 131, WBC 13.3  Bronchoscopy tomorrow  N.p.o. from midnight except medication with sip of water  Hold Eliquis    ROS:  Constitutional:  negative for chills, fevers, sweats  Respiratory:  Positive for dyspnea on exertion,   Cardiovascular:  negative for chest pain, chest pressure/discomfort, lower extremity edema, palpitations  Gastrointestinal:  negative for abdominal pain, constipation, diarrhea, nausea, vomiting  Neurological:  negative for dizziness, headache  BRIEF HISTORY     A 76 y.o.  female with PMH of A. fib (on Eliquis), COPD (on home oxygen), SEJAL, HTN, morbid obesity,  Was brought in by EMS from nursing facility with chief complaint of chest pain and shortness of breath. Last night, patient woke up from chest pain- left-sided, non-radiating, dull, associated with nausea, diaphoresis, dizziness and difficulty breathing.  Chest pain lasted about 20/30 minutes. Chest pain is nonreproducible, increased with deep breathing and coughing.   She is on 3 L O2 nasal cannula, denies orthopnea/PND episodes.  She does not ambulate much.   She was recently admitted for similar complaints and was found to have acute hypoxic respiratory failure secondary to left lower lobe pneumonia and pleural effusion. she also had new onset A. fib with RVR. Left chest thoracocentesis was done, 350 cc transudate fluid was removed. She underwent CURT and synchronized cardioversion for A. fib and was discharged on Eliquis, Cardizem 240 mg and Lopressor 70 mg twice daily. She also has indwelling urinary catheter since her last admission.     She has reduced appetite.  Denies any urinary complaints.  Has been constipated for last 3 days. She has chronic left leg edema.     In the ER,  Chest x-ray showed large left-sided pleural effusion with marked compressive atelectasis of left lung with moderate to severe cardiomegaly. Troponins-40 (downtrending) (baseline 60s)  proBNP 1745  Creatinine 1.35 (baseline normal)  WBCs-15.69 lactic acid 1.8  Urine : Cloudy, 20-50 WBC present, will culture.     She received vancomycin and cefepime in ER. OBJECTIVE     Vital Signs:  /74   Pulse 72   Temp 97.5 °F (36.4 °C) (Oral)   Resp 19   Ht 5' 2.52\" (1.588 m)   Wt 250 lb (113.4 kg)   SpO2 97%   BMI 44.97 kg/m²     Temp (24hrs), Av.2 °F (36.8 °C), Min:97.5 °F (36.4 °C), Max:98.6 °F (37 °C)    In: 450   Out: 5650 [Urine:5650]    Physical Exam:  Constitutional: This is a well developed, well nourished,  68y.o. year old female who is alert, oriented, cooperative and in no apparent distress. Head:normocephalic and atraumatic. EENT:  PERRLA. No conjunctival injections. Septum was midline, mucosa was without erythema, exudates or cobblestoning. No thrush was noted. Neck: Supple without thyromegaly. No elevated JVP. Trachea was midline. Respiratory: Chest was symmetrical without dullness to percussion. Breath sounds bilaterally were clear to auscultation. There were no wheezes, rhonchi or rales. There is no intercostal retraction or use of accessory muscles. No egophony noted.    Cardiovascular: Regular without murmur, clicks, gallops or rubs.   Abdomen: Slightly rounded and soft without organomegaly. No rebound, rigidity or guarding was appreciated. Lymphatic: No lymphadenopathy. Musculoskeletal: Normal curvature of the spine. No gross muscle weakness. Extremities:  No lower extremity edema, ulcerations, tenderness, varicosities or erythema. Muscle size, tone and strength are normal.  No involuntary movements are noted. Skin:  Warm and dry. Good color, turgor and pigmentation. No lesions or scars.   No cyanosis or clubbing  Neurological/Psychiatric: The patient's general behavior, level of consciousness, thought content and emotional status is normal.        Medications:  Scheduled Medications:    amiodarone  200 mg Oral Daily    furosemide  20 mg IntraVENous Daily    predniSONE  20 mg Oral Daily    sodium chloride flush  5-40 mL IntraVENous 2 times per day    metoprolol tartrate  50 mg Oral BID    amoxicillin-clavulanate  1 tablet Oral 2 times per day    miconazole   Topical BID    apixaban  5 mg Oral BID    fluticasone  2 puff Inhalation BID    clopidogrel  75 mg Oral Daily    atorvastatin  20 mg Oral Nightly    ipratropium-albuterol  1 vial Inhalation 4x daily     Continuous Infusions:    sodium chloride       PRN Medicationsmelatonin, 5 mg, Nightly PRN  sodium chloride flush, 5-40 mL, PRN  albuterol, 2.5 mg, Q4H PRN  diphenhydrAMINE, 25 mg, Q6H PRN  sodium chloride, 25 mL, PRN  ondansetron, 4 mg, Q8H PRN   Or  ondansetron, 4 mg, Q6H PRN  polyethylene glycol, 17 g, Daily PRN  acetaminophen, 650 mg, Q6H PRN   Or  acetaminophen, 650 mg, Q6H PRN  Calamine, , PRN        Diagnostic Labs:  CBC:   Recent Labs     10/17/21  0557 10/18/21  0612 10/19/21  0627   WBC 15.7* 14.7* 13.3*   RBC 4.11 3.98 3.92*   HGB 12.6 12.1 11.8*   HCT 39.0 37.3 36.7   MCV 94.9 93.7 93.6   RDW 12.6 12.3 12.9    323 288     BMP:   Recent Labs     10/17/21  0557 10/17/21  0557 10/17/21  1825 10/17/21  1929 10/18/21  0612   *  --  129*  -- 125*   K 3.3*   < > 3.9 4.1 4.7   CL 91*  --  91*  --  88*   CO2 24  --  26  --  27   BUN 13  --  12  --  10   CREATININE 0.70  --  0.66  --  0.61    < > = values in this interval not displayed. BNP: No results for input(s): BNP in the last 72 hours. PT/INR: No results for input(s): PROTIME, INR in the last 72 hours. APTT: No results for input(s): APTT in the last 72 hours. CARDIAC ENZYMES: No results for input(s): CKMB, CKMBINDEX, TROPONINI in the last 72 hours. Invalid input(s): CKTOTAL;3  FASTING LIPID PANEL:  Lab Results   Component Value Date    CHOL 195 06/02/2016    HDL 45 06/02/2016    TRIG 94 06/02/2016     LIVER PROFILE: No results for input(s): AST, ALT, ALB, BILIDIR, BILITOT, ALKPHOS in the last 72 hours. MICROBIOLOGY:   Lab Results   Component Value Date/Time    CULTURE PENDING 10/18/2021 12:11 PM       Imaging:    CT CHEST WO CONTRAST    Result Date: 10/15/2021  1. Moderate left pleural effusion with increased debris in the left bronchi and increased consolidation in the left lung. Associated volume loss is most suggestive of atelectasis, but possibility of superimposed pneumonia would be difficult to exclude. 2.  Trace right pleural fluid with right basilar atelectasis. 3.  Improved pericardial effusion. XR CHEST PORTABLE    Result Date: 10/18/2021  Only minimal better aeration in the left upper lung, with near complete opacification of left hemithorax. Persistent large left pleural effusion. Mild congestion right lung. XR CHEST PORTABLE    Result Date: 10/17/2021  Total opacification left hemithorax likely worsening volume loss rather than increasing known moderate left pleural effusion. XR CHEST PORTABLE    Result Date: 10/16/2021  Increasing opacification in the left hemithorax particularly in the left upper lobe likely related to increase left effusion although 10 of left apical airspace disease is not excluded.   Significant opacification in the left mid and lower lung field is noted. Mild perihilar congestive changes are again noted. No extrapleural air is seen. XR CHEST PORTABLE    Result Date: 10/15/2021  No evidence of pneumothorax status post left thoracentesis     XR CHEST PORTABLE    Result Date: 10/15/2021  Large left-sided pleural effusion with marked compressive atelectasis of the left lung. Moderate to severe cardiomegaly. US THORACENTESIS Which side should the procedure be performed? Left    Result Date: 10/18/2021  Successful ultrasound guided thoracentesis. US THORACENTESIS Which side should the procedure be performed? Left    Result Date: 10/15/2021  Successful ultrasound guided left thoracentesis. ASSESSMENT & PLAN     Pleural effusion:  USG guided thoracocentesis revealed 300 cc-red fluid, likely compression of the lung is secondary to mucous plugging. N.p.o. from midnight, take meds with only sips of water, hold Eliquis-bronchoscopy tomorrow a.m. Respiratory culture, Strep pneumoniae antigen, Legionella are unremarkable  Repeat chest xray revealed somewhat persistent pleural effusion with atelectasis present.     Acute on chronic diastolic CHF:  Likely secondary to afib   On lasix 40 mg IV   BP under control   Lopressor 50 mg  Lipitor 20 mg  Plavix 75 mg     CASEY  Resolved.      A. fib  On Eliquis  Continue metoprolol and Cardizem  Cardiology is on board  She is on amiodarone drip     Sepsis  Likely secondary to pneumonia with pleural effusion versus UTI  Cultures so far negative  Received Vanco and cefepime  On  Augmentin      UTI  Has indwelling Goldstein catheter  On Augmentin  Will remove goldstein catheter and try external catheter.       COPD  Continue inhalers as needed  Fluticasone  DuoNeb  On prednisone 20 mg      SEJAL  CPAP during sleep    Hyponatremia- likely due to lung pathology or diuretics. Currently asymptomatic. Free fluid restriction and follow up     DVT ppx:   On Eliquis     GI ppx:  Not indicated     PT/OT/SW:  Ongoing     Discharge Planning:   to assist in discharge planning. London Morrison MD  Internal Medicine Resident, PGY-1  [address], [place]  10/19/2021, 7:15 AM

## 2021-10-19 NOTE — PROGRESS NOTES
Physical Therapy    Facility/Department: [facility] CAR 2  Initial Assessment    NAME: Sandra Winston  : 1945  MRN: [id number]    Date of Service: 10/19/2021  Chief Complaint   Patient presents with    Chest Pain      Discharge Recommendations:  Patient would benefit from continued therapy after discharge   PT Equipment Recommendations  Equipment Needed: No (pt reports owning a RW, writer recommends 24 hour use of RW)    Assessment   Body structures, Functions, Activity limitations: Decreased functional mobility ; Decreased posture;Decreased ROM; Decreased strength;Decreased balance;Decreased endurance  Assessment: Pt with mobility deficits requiring mod-A to perform bed mobility, min-A to perform sit<>stand and CGA to maintain static standing for 2 minutes. Pt most limited secondary to decreased BLE strength and endurance, decreased standing balance, and decreased trunk control. Pt would be unsafe to return to prior living arrangements upon discharge. Pt would benefit from additional therapy upon discharge. Prognosis: Good  Decision Making: Medium Complexity  PT Education: Goals;Transfer Training;PT Role;General Safety; Functional Mobility Training;Energy Conservation  REQUIRES PT FOLLOW UP: Yes  Activity Tolerance  Activity Tolerance: Patient limited by fatigue;Patient limited by endurance  Activity Tolerance: Pt became increasingly dizzy upon performing sit<>stand transfer, unable to tolerate greater than 2 minutes of static standing secondary to dizziness. Patient Diagnosis(es): The primary encounter diagnosis was Acute chest pain. Diagnoses of CASEY (acute kidney injury) ([address] Pleural effusion, and Hyponatremia were also pertinent to this visit. has a past medical history of Asthma, COPD (chronic obstructive pulmonary disease) ([address] Gout, Hyperlipidemia, Hypertension, Mitral insufficiency, and Pneumonia. has a past surgical history that includes Hysterectomy (); knee surgery;  Colonoscopy; Dental surgery; Tonsillectomy and adenoidectomy; Ankle surgery (Left, 03/22/2016); and pr colsc flx w/rmvl of tumor polyp lesion snare tq (N/A, 8/8/2017). Restrictions  Restrictions/Precautions  Restrictions/Precautions: Cardiac  Required Braces or Orthoses?: No  Position Activity Restriction  Other position/activity restrictions: up with assist  Vision/Hearing  Vision: Impaired  Vision Exceptions: Wears glasses for reading  Hearing: Within functional limits     Subjective  General  Patient assessed for rehabilitation services?: Yes  Response To Previous Treatment: Not applicable  Family / Caregiver Present: No  Follows Commands: Within Functional Limits  Subjective  Subjective: Pt supine in bed and agreeable to therapy this morning, RN agreeable to therapy, pt pleasant and cooperative throughout today's session. Pain Screening  Patient Currently in Pain: Yes  Pain Assessment  Pain Assessment: 0-10  Pain Level: 6  Pain Type: Acute pain  Pain Location: Rib cage  Pain Orientation: Left  Pain Descriptors: Sharp  Functional Pain Assessment: Prevents or interferes some active activities and ADLs  Non-Pharmaceutical Pain Intervention(s): Ambulation/Increased Activity; Distraction;Repositioned; Emotional support  Response to Pain Intervention: Patient Satisfied  Vital Signs  Patient Currently in Pain: Yes  Pre Treatment Pain Screening  Intervention List: Patient able to continue with treatment    Social/Functional History  Social/Functional History  Lives With: Son (son and dtr in law, able to assist when they are not working)  Type of Home: House  Home Layout: One level  Home Access: Stairs to enter without rails  Entrance Stairs - Number of Steps: 2  Bathroom Shower/Tub: Tub/Shower unit (reports bathing at sinkside when no one is available to assist)  Bathroom Toilet: Standard  Home Equipment: Rolling walker, Cane (reports using RW at a baseline)  ADL Assistance: [address]: Independent  Homemaking Responsibilities: No (pt reports cleaning her own room, but family performs majority of cooking and cleaning)  Ambulation Assistance: Independent  Transfer Assistance: Independent  Active : No  Patient's  Info: son drives  Mode of Transportation: Car  Occupation: Retired  Type of occupation: nurses aide,  Leisure & Hobbies: bingo  Additional Comments: reports living at [facility] for 2 weeks and living with son prior to that. Cognition   Cognition  Overall Cognitive Status: WFL    Objective     Observation/Palpation  Posture: Fair    AROM RLE (degrees)  RLE AROM: WFL  RLE General AROM: Within limitations of large body habitus  AROM LLE (degrees)  LLE AROM : WFL  LLE General AROM: Within limitations of large body habitus  AROM RUE (degrees)  RUE AROM : WFL  AROM LUE (degrees)  LUE AROM : WFL  Strength RLE  Strength RLE: WFL  Comment: Grossly 4-/5  Strength LLE  Strength LLE: WFL  Comment: Grossly 4-/5  Strength RUE  Strength RUE: WFL  Strength LUE  Strength LUE: WFL     Sensation  Overall Sensation Status: WFL  Bed mobility  Supine to Sit: Moderate assistance (for trunk progression)  Sit to Supine: Moderate assistance;2 Person assistance (1x assist for BLE progression, 1x assist for trunk progression)  Scooting: Minimal assistance  Comment: bed mobility performed with the HOB elevated ~25 degrees without use of handrails. Increased time/effort to perform  Transfers  Sit to Stand: Minimal Assistance  Stand to sit: Minimal Assistance  Comment: pt with uncontrolled descent while performing transfers. Pt sat at the EOB x 7 minutes during today's session.  static standing x2 minutes during today's session  Ambulation  Ambulation?: No (upon performing sit<>stand transfer, pt became increasingly dizzy, required return to sitting after ~2 minutes of static standing.)  Stairs/Curb  Stairs?: No     Balance  Posture: Fair  Sitting - Static: Fair;+  Sitting - Dynamic: Fair  Standing - Static: Fair;-  Comments: standing balance assessed with a RW        Plan   Plan  Times per week: 5-6  Times per day: Daily  Current Treatment Recommendations: Strengthening, Transfer Training, Endurance Training, ROM, Balance Training, Gait Training, Functional Mobility Training, Stair training, Safety Education & Training, Home Exercise Program, Equipment Evaluation, Education, & procurement, Patient/Caregiver Education & Training, Positioning  Safety Devices  Type of devices: Patient at risk for falls, Left in bed, Bed alarm in place, Call light within reach, Gait belt, Nurse notified  Restraints  Initially in place: No                                     AM-PAC Score  AM-PAC Inpatient Mobility Raw Score : 14 (10/19/21 1223)  AM-PAC Inpatient T-Scale Score : 38.1 (10/19/21 1223)  Mobility Inpatient CMS 0-100% Score: 61.29 (10/19/21 1223)  Mobility Inpatient CMS G-Code Modifier : CL (10/19/21 1223)          Goals  Short term goals  Time Frame for Short term goals: 14 visits  Short term goal 1: Pt will ambulate 100 feet with a RW and SBA to increase functional independence. Short term goal 2: Pt will negotiate 2 stairs without a handrail with SBA to allow the pt to enter prior living arrangements. Short term goal 3: Pt will tolerate a 30 minute therapy session to promote increased endurance. Short term goal 4: Pt will demonstrate fair+ standing balance to decrease fall risk. Short term goal 5: Pt will perform sit<>stand, stand<>sit transfers with supervision to increase functional independence.   Patient Goals   Patient goals : none stated       Therapy Time   Individual Concurrent Group Co-treatment   Time In 1100         Time Out 1135         Minutes 35         Timed Code Treatment Minutes: 21 Minutes       Antonio Fofana PT

## 2021-10-19 NOTE — PLAN OF CARE
Problem: Gas Exchange - Impaired:  Goal: Levels of oxygenation will improve  Description: Levels of oxygenation will improve  10/18/2021 2246 by Jun Spence  Outcome: Ongoing  10/18/2021 1749 by Murphy Álvarez RN  Outcome: Ongoing     Problem: Falls - Risk of:  Goal: Will remain free from falls  Description: Will remain free from falls  10/18/2021 2246 by Jun Spence  Outcome: Ongoing  10/18/2021 1749 by Murphy Álvarez RN  Outcome: Ongoing  Goal: Absence of physical injury  Description: Absence of physical injury  10/18/2021 2246 by Jun Spence  Outcome: Ongoing  10/18/2021 1749 by Murphy Álvarez RN  Outcome: Ongoing     Problem:  Activity:  Goal: Ability to tolerate increased activity will improve  Description: Ability to tolerate increased activity will improve  10/18/2021 2246 by Jun Spence  Outcome: Ongoing  10/18/2021 1749 by Murphy Álvarez RN  Outcome: Ongoing     Problem: Pain:  Goal: Pain level will decrease  Description: Pain level will decrease  10/18/2021 2246 by Jun Spence  Outcome: Ongoing  10/18/2021 1749 by Murphy Álvarez RN  Outcome: Ongoing  Goal: Control of acute pain  Description: Control of acute pain  10/18/2021 2246 by Jun Spence  Outcome: Ongoing  10/18/2021 1749 by Murphy Álvarez RN  Outcome: Ongoing  Goal: Control of chronic pain  Description: Control of chronic pain  10/18/2021 2246 by Jun Spence  Outcome: Ongoing  10/18/2021 1749 by Murphy Álvarez RN  Outcome: Ongoing     Problem: Skin Integrity:  Goal: Will show no infection signs and symptoms  Description: Will show no infection signs and symptoms  10/18/2021 2246 by Jun Spence  Outcome: Ongoing  10/18/2021 1749 by Murphy Álvarez RN  Outcome: Ongoing  Goal: Absence of new skin breakdown  Description: Absence of new skin breakdown  10/18/2021 2246 by Jun Spence  Outcome: Ongoing  10/18/2021 1749 by Murphy Álvarez RN  Outcome: Ongoing

## 2021-10-19 NOTE — DISCHARGE SUMMARY
[address]     Department of Internal Medicine - Staff Internal Medicine Teaching Service    INPATIENT DISCHARGE SUMMARY      Patient Identification:  Raheel Gaston is a 68 y.o. female. :  1945  MRN: [id number]     Acct: [de-identified]   PCP: KALYN Gunter CNP  Admit Date:  10/2/2021  Discharge date and time: 10/10/2021  9:15 PM   Attending Provider: No att. providers found                                     [address] Problem Lists:  Principal Problem:    Sepsis with acute hypoxic respiratory failure without septic shock ([address]  Active Problems:    Benign essential HTN    Morbid obesity with BMI of 40.0-44.9, adult ([address]    COPD exacerbation ([address]    Acute respiratory failure with hypoxia ([facility] [address]    Atrial fibrillation with rapid ventricular response ([address]    Pneumonia of left lower lobe due to infectious organism    Acute on chronic diastolic heart failure (HCC)    UTI (urinary tract infection)    Hyponatremia    Hypokalemia    Elevated troponin    Atrial fibrillation status post cardioversion [facility])  Resolved Problems:    * No resolved hospital problems. *      HOSPITAL STAY     Brief Inpatient course:   Raheel Gaston is a 68 y.o. female who was admitted for the management of Sepsis with acute hypoxic respiratory failure without septic shock ([address] presented to the emergency department with shortness of breath and palpitations. She also reported swollen legs, and nonrelief from her inhalers and nebulizers. Because of worsening respiratory status, son called EMS. She was found to be hypoxic and tachycardic with initial rhythm of SVT. EMS put her on nasal cannula, gave 2 doses of adenosine and patient was transferred to [facility] ER. In the ER, she was tachypneic with RR in 30s, HR in 150s-160s and hypertensive. She had elevated proBNP and 3000, also CASEY with creatinine of 1.3 and blood sugars were slightly elevated.   She was Covid negative. UA-moderate leukocyte esterase and many bacteriuria. She was put on BiPAP, her respiratory status was then improving. Initial EKG in ER showed A. fib with RVR, patient received Cardizem and digoxin without any response, she was started on Cardizem drip and heparin drip. For the concern of sepsis, patient received fluid bolus, azithromycin and ceftriaxone. Chest x-ray consistent with CHF, she received treatment with IV Lasix. She was also treated for COPD exacerbation with steroids and bronchodilators. On the day of admission, she received Ativan overnight. Per nursing staff, she became more altered, hypotensive, tachypneic and was eventually transferred to ICU. CT chest without contrast showed left lower lobe consolidation with left loculated pleural effusion and small right effusion. Cardiomegaly with moderate pericardial effusion was also present. Patient was put on BiPAP because of respiratory distress. She had sepsis due to left lower lobe pneumonia with loculated effusion. She was started on IV antibiotics (switched to cefepime) and diagnostic thoracocentesis was planned. 350 mL transudative fluid removed. She continued to be on BiPAP. Cardizem drip was discontinued and switched to p.o. Cardizem, and heparin drip and metoprolol were continued. She eventually underwent transesophageal echocardiography with cardioversion for A. fib with RVR, with resulting normal sinus rhythm. She was eventually weaned off the BiPAP and transitioned to the nasal canula. She completed 5 days of Cefepime, as per the ID. She was transferred out of ICU on 3 L O2 via nasal canula. She continues to use CPAP therapy for sleep apnea. Bronchodilators were given for COPD exacerbation. Cardiology recommended switching heparin drip to Eliquis. Because of tachycardia, cardiology recommended increasing Lopressor dose and discharging the patient on Lopressor and Cardizem.   Pleural fluid culture showed no growth. She did went into A. fib with RVR, with HR in the 150s. She was managed with IV Lopressor and IV fluid bolus. Cardiology recommended increasing Cardizem to 240 mg daily and Lopressor 75 mg twice daily. During her hospital stay, electrolytes were timely replaced. Her medications were optimized and she was discharged to SNF with the instructions as given below.     Procedures/ Significant Interventions:    Thoracocentesis  CURT with cardioversion    Consults:     Consults:     Final Specialist Recommendations/Findings:   IP CONSULT TO CARDIOLOGY  IP CONSULT TO HOSPITALIST  IP CONSULT TO CRITICAL CARE  IP CONSULT TO INFECTIOUS DISEASES  PHARMACY TO DOSE VANCOMYCIN      Any Hospital Acquired Infections: none    Discharge Functional Status:  stable    DISCHARGE PLAN     Disposition: SNF    Patient Instructions:   Discharge Medication List as of 10/10/2021  7:31 PM      CONTINUE these medications which have CHANGED    Details   metoprolol tartrate 75 MG TABS TAKE ONE TABLET BY MOUTH TWICE DAILY, Disp-60 tablet, R-3Normal      dilTIAZem (CARDIZEM CD) 240 MG extended release capsule Take 1 capsule by mouth daily, Disp-30 capsule, R-3Normal      apixaban (ELIQUIS) 5 MG TABS tablet Take 1 tablet by mouth 2 times daily, Disp-180 tablet, R-3Normal      atorvastatin (LIPITOR) 40 MG tablet Take 1 tablet by mouth daily, Disp-90 tablet, R-3Normal      citalopram (CELEXA) 40 MG tablet Take 0.5 tablets by mouth daily, Disp-30 tablet, R-3Normal         CONTINUE these medications which have NOT CHANGED    Details   losartan (COZAAR) 100 MG tablet Take 1 tablet by mouth daily, Disp-30 tablet, R-2Normal      meloxicam (MOBIC) 15 MG tablet Take 15 mg by mouth dailyHistorical Med      clopidogrel (PLAVIX) 75 MG tablet Take 1 tablet by mouth daily, Disp-30 tablet, R-11Normal      cyclobenzaprine (FLEXERIL) 10 MG tablet TAKE ONE TABLET BY MOUTH THREE TIMES DAILY AS NEEDED FOR MUSCLE SPASM, Disp-90 tablet, R-11Normal diphenhydrAMINE (BENADRYL) 25 MG capsule Take 25 mg by mouth every 6 hours as needed for ItchingHistorical Med      zinc oxide (DESITIN) 40 % ointment Apply topically as needed for Dry Skin Apply topically as needed., Topical, PRN, Until Discontinued, Historical Med      furosemide (LASIX) 20 MG tablet Take 1 tablet by mouth daily, Disp-30 tablet, R-11Normal      isosorbide mononitrate (IMDUR) 30 MG extended release tablet Take 1 tablet by mouth daily, Disp-30 tablet, R-11Normal      albuterol sulfate  (90 Base) MCG/ACT inhaler Inhale 2 puffs into the lungs every 6 hours as needed for Wheezing, Disp-1 Inhaler, R-11Normal      Calcium Carb-Cholecalciferol (CALCIUM + D3 PO) Take by mouth dailyHistorical Med      CINNAMON PO Take by mouth dailyHistorical Med      TURMERIC CURCUMIN PO Take by mouth dailyHistorical Med      ipratropium-albuterol (DUONEB) 0.5-2.5 (3) MG/3ML SOLN nebulizer solution Inhale 1 vial into the lungs every 4 hoursHistorical Med      beclomethasone (QVAR) 80 MCG/ACT inhaler Inhale 2 puffs into the lungs 2 times daily, Disp-3 Inhaler, R-3      Acetaminophen 650 MG TABS Take 650 mg by mouth every 4 hours as needed for Pain or Fever, Disp-30 tablet, R-0      clonazePAM (KLONOPIN) 1 MG tablet Take 1 mg by mouth nightly       LYSINE PO Take by mouth daily      vitamin D (CHOLECALCIFEROL) 1000 UNIT TABS tablet Take 1,000 Units by mouth daily      Pyridoxine HCl (VITAMIN B-6) 50 MG tablet Take 100 mg by mouth daily      Cyanocobalamin (VITAMIN B 12 PO) Take by mouth daily      Magnesium 500 MG TABS Take by mouth      Ascorbic Acid (VITAMIN C) 500 MG tablet Take 500 mg by mouth daily         STOP taking these medications       predniSONE (DELTASONE) 20 MG tablet Comments:   Reason for Stopping:         chlorthalidone (HYGROTON) 25 MG tablet Comments:   Reason for Stopping:         Calcium-Magnesium-Zinc (FLAKITO-MAG-ZINC PO) Comments:   Reason for Stopping:               Activity: activity as tolerated    Diet: cardiac diet    Follow-up:    [facility]  [facility]. [facility]  [phone]  On 10/27/2021  at 1:45pm for hospital f/u with cardiology    Ronny Fierro, KALYN - CNP  [address] [facility] [address]  [phone]    Schedule an appointment as soon as possible for a visit in 1 week  Post hospital follow-up. Patient Instructions:   Please follow-up with your PCP and cardiologist after discharge. Please take Eliquis 5 mg twice daily, Cardizem and metoprolol for atrial fibrillation. Please come to the ED if symptoms persist or worsen. Atrial Fibrillation: Care Instructions  Your Care Instructions     Atrial fibrillation is an irregular and often fast heartbeat. Treating this condition is important for several reasons. It can cause blood clots, which can travel from your heart to your brain and cause a stroke. If you have a fast heartbeat, you may feel lightheaded, dizzy, and weak. An irregular heartbeat can also increase your risk for heart failure. Atrial fibrillation is often the result of another heart condition, such as high blood pressure or coronary artery disease. Making changes to improve your heart condition will help you stay healthy and active. Follow-up care is a key part of your treatment and safety. Be sure to make and go to all appointments, and call your doctor if you are having problems. It's also a good idea to know your test results and keep a list of the medicines you take. How can you care for yourself at home? Medicines    · Take your medicines exactly as prescribed. Call your doctor if you think you are having a problem with your medicine. You will get more details on the specific medicines your doctor prescribes.     · If your doctor has given you a blood thinner to prevent a stroke, be sure you get instructions about how to take your medicine safely.  Blood thinners can cause serious bleeding problems.     · Do not take any vitamins, over-the-counter drugs, or herbal products without talking to your doctor first.   Lifestyle changes    · Do not smoke. Smoking can increase your chance of a stroke and heart attack. If you need help quitting, talk to your doctor about stop-smoking programs and medicines. These can increase your chances of quitting for good.     · Eat a heart-healthy diet.     · Stay at a healthy weight. Lose weight if you need to.     · Limit alcohol to 2 drinks a day for men and 1 drink a day for women. Too much alcohol can cause health problems.     · Avoid colds and flu. Get a pneumococcal vaccine shot. If you have had one before, ask your doctor whether you need another dose. Get a flu shot every year. If you must be around people with colds or flu, wash your hands often. Activity    · If your doctor recommends it, get more exercise. Walking is a good choice. Bit by bit, increase the amount you walk every day. Try for at least 30 minutes on most days of the week. You also may want to swim, bike, or do other activities. Your doctor may suggest that you join a cardiac rehabilitation program so that you can have help increasing your physical activity safely.     · Start light exercise if your doctor says it is okay. Even a small amount will help you get stronger, have more energy, and manage stress. Walking is an easy way to get exercise. Start out by walking a little more than you did in the hospital. Gradually increase the amount you walk.     · When you exercise, watch for signs that your heart is working too hard. You are pushing too hard if you cannot talk while you are exercising. If you become short of breath or dizzy or have chest pain, sit down and rest immediately.     · Check your pulse regularly. Place two fingers on the artery at the palm side of your wrist, in line with your thumb. If your heartbeat seems uneven or fast, talk to your doctor.    When should you call for help?   Call 911 anytime you think you may need emergency care. For example, call if:    · You have symptoms of a heart attack. These may include:  ? Chest pain or pressure, or a strange feeling in the chest.  ? Sweating. ? Shortness of breath. ? Nausea or vomiting. ? Pain, pressure, or a strange feeling in the back, neck, jaw, or upper belly or in one or both shoulders or arms. ? Lightheadedness or sudden weakness. ? A fast or irregular heartbeat. After you call 911, the  may tell you to chew 1 adult-strength or 2 to 4 low-dose aspirin. Wait for an ambulance. Do not try to drive yourself.     · You have symptoms of a stroke. These may include:  ? Sudden numbness, tingling, weakness, or loss of movement in your face, arm, or leg, especially on only one side of your body. ? Sudden vision changes. ? Sudden trouble speaking. ? Sudden confusion or trouble understanding simple statements. ? Sudden problems with walking or balance. ? A sudden, severe headache that is different from past headaches.     · You passed out (lost consciousness). Call your doctor now or seek immediate medical care if:    · You have new or increased shortness of breath.     · You feel dizzy or lightheaded, or you feel like you may faint.     · Your heart rate becomes irregular.     · You can feel your heart flutter in your chest or skip heartbeats. Tell your doctor if these symptoms are new or worse. Watch closely for changes in your health, and be sure to contact your doctor if you have any problems. Where can you learn more? Go to https://[employer]. org and sign in to your [employer] account. Enter U020 in the [facility] box to learn more about \"Atrial Fibrillation: Care Instructions. \"     If you do not have an account, please click on the \"Sign Up Now\" link. Current as of: April 29, 2021                 Content Version: 13.0  © 7074-7373 [employer].    Care instructions adapted under license by [facility] ([facility]). If you have questions about a medical condition or this instruction, always ask your healthcare professional. [place] [address] any warranty or liability for your use of this information. Follow up labs: none  Follow up imaging: none    Note that over 30 minutes was spent in preparing discharge papers, discussing discharge with patient, medication review, etc.      Maria Jordan MD,  Internal Medicine Resident, PGY-1  [address], [place]  10/13/2021, 11:24 PM

## 2021-10-19 NOTE — CARE COORDINATION
Transitional planning- called Franco Ellison with [facility] intake-patient will need precert to return.  Will start precert on 07-20 for Lisa [address]ooq 2

## 2021-10-19 NOTE — PROGRESS NOTES
Occupational Therapy   Occupational Therapy Initial Assessment  Date: 10/19/2021   Patient Name: Akosua Powell  MRN: [id number]     : 1945     Chief Complaint   Patient presents with    Chest Pain       Date of Service: 10/19/2021    Discharge Recommendations:  Patient would benefit from continued therapy after discharge  OT Equipment Recommendations  Equipment Needed: Yes  Mobility Devices: ADL Assistive Devices  ADL Assistive Devices: Shower Chair with back;Long-handled Shoe Horn;Long-handled Sponge;Sock-Aid Hard;Reacher    Assessment   Performance deficits / Impairments: Decreased functional mobility ; Decreased ADL status; Decreased endurance;Decreased balance;Decreased high-level IADLs  Assessment: Pt agreeable to OT eval this date. Pt completed bed mobility requiring Mod A X1-2 for trunk and BLE progression. Pt requiring extended rest break while seated EOB prior to attempt to stand. Pt ed on hand placement with functional transfers requiring Min A for sit <> stand with ed on proper hand placement with RW. Pt tolerated standing ~2 min with CGA however pt became increasingly dizzy and SOB with SpO2 dropping to 88%. Pt returned to seated position and performed pursed lip breathing after ed. SpO2 increasing to 94% within ~1 min. Pt returned to supine and left in bed with PT present at OT exit. Pt will require continued OT services to address balance and endurance for improved independence with ADLs/IADLs and functional transfers/mobility  Prognosis: Good  Decision Making: Medium Complexity  Patient Education: Pt ed on OT role, OT POC, safety awareness, bed mobility training, pursed lip breathing, transfer training, DME use, ways to diminish dizziness, and importance of continued OT.  Pt verbalized good understanding  REQUIRES OT FOLLOW UP: Yes  Activity Tolerance  Activity Tolerance: Patient Tolerated treatment well;Patient limited by fatigue  Safety Devices  Safety Devices in place: Yes  Type of devices: Nurse notified; Left in bed;Gait belt;Call light within reach (PT in room at OT exit)  Restraints  Initially in place: No           Patient Diagnosis(es): The primary encounter diagnosis was Acute chest pain. Diagnoses of CASEY (acute kidney injury) ([facility] [address] Pleural effusion, and Hyponatremia were also pertinent to this visit. has a past medical history of Asthma, COPD (chronic obstructive pulmonary disease) ([facility] [address] Gout, Hyperlipidemia, Hypertension, Mitral insufficiency, and Pneumonia. has a past surgical history that includes Hysterectomy (1986); knee surgery; Colonoscopy; Dental surgery; Tonsillectomy and adenoidectomy; Ankle surgery (Left, 03/22/2016); and pr colsc flx w/rmvl of tumor polyp lesion snare tq (N/A, 8/8/2017). Restrictions  Restrictions/Precautions  Restrictions/Precautions: Fall Risk  Required Braces or Orthoses?: No  Position Activity Restriction  Other position/activity restrictions: up with assist, s/p cardioversion 10/18/21    Subjective   General  Patient assessed for rehabilitation services?: Yes  Family / Caregiver Present: No  General Comment  Comments: RN ok'd pt for OT eval this date. Pt agreeable to session and pleasant/cooperative throughout  Patient Currently in Pain: Yes  Pain Assessment  Pain Assessment: 0-10  Pain Level: 6  Pain Type: Acute pain  Pain Location: Chest;Rib cage  Pain Orientation: Left  Pain Descriptors: Sharp  Functional Pain Assessment: Prevents or interferes some active activities and ADLs  Non-Pharmaceutical Pain Intervention(s): Ambulation/Increased Activity; Distraction;Repositioned  Response to Pain Intervention: Patient Satisfied  Pre Treatment Pain Screening  Intervention List: Patient able to continue with treatment  Vital Signs  Patient Currently in Pain: Yes     Social/Functional History  Social/Functional History  Lives With: Son (son and dtr in law, able to assist when they are not working)  Type of Home: [address]: One level  Home Access: Stairs to enter without rails  Entrance Stairs - Number of Steps: 2  Bathroom Shower/Tub: Tub/Shower unit (reports bathing at sinkside when no one is available to assist)  Bathroom Toilet: Standard  Home Equipment: Rolling walker, Cane (reports using RW at a baseline)  ADL Assistance: Independent  Homemaking Assistance: Independent  Homemaking Responsibilities: Yes (pt reports cleaning own room, but family performs majority of cooking and cleaning)  Ambulation Assistance: Independent  Transfer Assistance: Independent  Active : No  Patient's  Info: son drives  Mode of Transportation: Car  Occupation: Retired  Type of occupation: nurses aide  Leisure & Hobbies: bingo  Additional Comments: reports living at [address] [place] [address] for 2 weeks and living with son prior to that       Objective   Vision: Impaired  Vision Exceptions: Wears glasses for reading  Hearing: Within functional limits      Balance  Sitting Balance: Contact guard assistance  Standing Balance: Contact guard assistance  Standing Balance  Time: ~2 min  Activity: static standing at EOB, ed on pursed lip breathing. Pt fatigues rapidly and c/o dizziness throughout static standing requiring return to seated EOB     ADL  Feeding: Modified independent ;Setup; Increased time to complete  Grooming: Modified independent ;Setup; Increased time to complete  UE Bathing: Minimal assistance;Setup; Increased time to complete  LE Bathing: Moderate assistance;Setup; Increased time to complete  UE Dressing: Minimal assistance;Setup; Increased time to complete  LE Dressing: Moderate assistance;Setup; Increased time to complete  Toileting: Moderate assistance;Setup; Increased time to complete  Tone RUE  RUE Tone: Normotonic  Tone LUE  LUE Tone: Normotonic  Coordination  Movements Are Fluid And Coordinated: Yes    Bed mobility  Supine to Sit: Moderate assistance  Sit to Supine:  Moderate assistance;2 Person assistance  Scooting: Minimal assistance  Comment: assistance required for trunk and BLE progression; HOB elevated throughout maneuvers and pt requiring increased time and effort  Transfers  Sit to stand: Minimal assistance  Stand to sit: Minimal assistance  Transfer Comments: uncontrolled descent; ed on safety with good return.  2 VCs for proper hand placement with RW    Cognition  Overall Cognitive Status: WFL      Sensation  Overall Sensation Status: WFL        LUE AROM (degrees)  LUE AROM : WFL  Left Hand AROM (degrees)  Left Hand AROM: WFL  RUE AROM (degrees)  RUE AROM : WFL  Right Hand AROM (degrees)  Right Hand AROM: WFL  LUE Strength  Gross LUE Strength: WFL  L Hand General: 4+/5  LUE Strength Comment: grossly 4+/5  RUE Strength  Gross RUE Strength: WFL  R Hand General: 4+/5  RUE Strength Comment: grossly 4+/5                   Plan   Plan  Times per week: 3-5 x/wk  Current Treatment Recommendations: Balance Training, Functional Mobility Training, Endurance Training, Safety Education & Training, Patient/Caregiver Education & Training, Equipment Evaluation, Education, & procurement, Self-Care / ADL, Home Management Training    AM-PAC Score        AM-[facility] Inpatient Daily Activity Raw Score: 17 (10/19/21 1540)  AM-PAC Inpatient ADL T-Scale Score : 37.26 (10/19/21 1540)  ADL Inpatient CMS 0-100% Score: 50.11 (10/19/21 1540)  ADL Inpatient CMS G-Code Modifier : CK (10/19/21 1540)    Goals  Short term goals  Time Frame for Short term goals: By discharge, pt will:  Short term goal 1: Demo SBA for bed mobility to increase independence with ADLs and decrease risk for pressure injury  Short term goal 2: Demo Mod I with setup for UB ADLs and grooming tasks  Short term goal 3: Demo Min A for LB ADLs and toileting tasks with setup and AE PRN  Short term goal 4: Demo 8+ minutes dynamic standing and reaching outside DONNA with SBA to increase standing balance and tolerance for ADL performance  Short term goal 5: Verbalize/demonstrate 2+ EC/WS tech to incorporate throughout ADL/IADL routines  Short term goal 6: Demo SBA for functional transfers and functional mobility with use of LRD       Therapy Time   Individual Concurrent Group Co-treatment   Time In 1057         Time Out 1124         Minutes 27         Timed Code Treatment Minutes: 9 Minutes       Michelle Cooney OTR/L

## 2021-10-20 ENCOUNTER — ANESTHESIA EVENT (OUTPATIENT)
Dept: ENDOSCOPY | Age: 76
DRG: 871 | End: 2021-10-20
Payer: MEDICARE

## 2021-10-20 ENCOUNTER — ANESTHESIA (OUTPATIENT)
Dept: ENDOSCOPY | Age: 76
DRG: 871 | End: 2021-10-20
Payer: MEDICARE

## 2021-10-20 ENCOUNTER — APPOINTMENT (OUTPATIENT)
Dept: GENERAL RADIOLOGY | Age: 76
DRG: 871 | End: 2021-10-20
Payer: MEDICARE

## 2021-10-20 VITALS
RESPIRATION RATE: 21 BRPM | DIASTOLIC BLOOD PRESSURE: 56 MMHG | SYSTOLIC BLOOD PRESSURE: 113 MMHG | OXYGEN SATURATION: 95 %

## 2021-10-20 PROBLEM — T17.500A MUCUS PLUGGING OF BRONCHI: Status: ACTIVE | Noted: 2021-10-20

## 2021-10-20 PROBLEM — J90 RECURRENT PLEURAL EFFUSION: Status: ACTIVE | Noted: 2021-10-20

## 2021-10-20 PROBLEM — J98.11 ATELECTASIS OF LEFT LUNG: Status: ACTIVE | Noted: 2021-10-20

## 2021-10-20 LAB
ABSOLUTE EOS #: 1.03 K/UL (ref 0–0.44)
ABSOLUTE IMMATURE GRANULOCYTE: 0.04 K/UL (ref 0–0.3)
ABSOLUTE LYMPH #: 1.78 K/UL (ref 1.1–3.7)
ABSOLUTE MONO #: 1.09 K/UL (ref 0.1–1.2)
ANION GAP SERPL CALCULATED.3IONS-SCNC: 12 MMOL/L (ref 9–17)
BASOPHILS # BLD: 1 % (ref 0–2)
BASOPHILS ABSOLUTE: 0.12 K/UL (ref 0–0.2)
BUN BLDV-MCNC: 13 MG/DL (ref 8–23)
BUN/CREAT BLD: ABNORMAL (ref 9–20)
CALCIUM SERPL-MCNC: 9.1 MG/DL (ref 8.6–10.4)
CASE NUMBER:: NORMAL
CHLORIDE BLD-SCNC: 95 MMOL/L (ref 98–107)
CO2: 26 MMOL/L (ref 20–31)
CREAT SERPL-MCNC: 0.55 MG/DL (ref 0.5–0.9)
DIFFERENTIAL TYPE: ABNORMAL
EOSINOPHILS RELATIVE PERCENT: 9 % (ref 1–4)
GFR AFRICAN AMERICAN: >60 ML/MIN
GFR NON-AFRICAN AMERICAN: >60 ML/MIN
GFR SERPL CREATININE-BSD FRML MDRD: ABNORMAL ML/MIN/{1.73_M2}
GFR SERPL CREATININE-BSD FRML MDRD: ABNORMAL ML/MIN/{1.73_M2}
GLUCOSE BLD-MCNC: 99 MG/DL (ref 70–99)
HCT VFR BLD CALC: 39.8 % (ref 36.3–47.1)
HEMOGLOBIN: 12.3 G/DL (ref 11.9–15.1)
IMMATURE GRANULOCYTES: 0 %
LYMPHOCYTES # BLD: 16 % (ref 24–43)
MCH RBC QN AUTO: 31.1 PG (ref 25.2–33.5)
MCHC RBC AUTO-ENTMCNC: 30.9 G/DL (ref 28.4–34.8)
MCV RBC AUTO: 100.8 FL (ref 82.6–102.9)
MONOCYTES # BLD: 10 % (ref 3–12)
NRBC AUTOMATED: 0 PER 100 WBC
OSMOLALITY URINE: 318 MOSM/KG (ref 80–1300)
PDW BLD-RTO: 12.9 % (ref 11.8–14.4)
PLATELET # BLD: 277 K/UL (ref 138–453)
PLATELET ESTIMATE: ABNORMAL
PMV BLD AUTO: 9.7 FL (ref 8.1–13.5)
POTASSIUM SERPL-SCNC: 3.8 MMOL/L (ref 3.7–5.3)
RBC # BLD: 3.95 M/UL (ref 3.95–5.11)
RBC # BLD: ABNORMAL 10*6/UL
SEG NEUTROPHILS: 65 % (ref 36–65)
SEGMENTED NEUTROPHILS ABSOLUTE COUNT: 7.45 K/UL (ref 1.5–8.1)
SODIUM BLD-SCNC: 133 MMOL/L (ref 135–144)
SODIUM,UR: 53 MMOL/L
SPECIMEN DESCRIPTION: NORMAL
WBC # BLD: 11.5 K/UL (ref 3.5–11.3)
WBC # BLD: ABNORMAL 10*3/UL

## 2021-10-20 PROCEDURE — 2580000003 HC RX 258: Performed by: INTERNAL MEDICINE

## 2021-10-20 PROCEDURE — 94761 N-INVAS EAR/PLS OXIMETRY MLT: CPT

## 2021-10-20 PROCEDURE — 84300 ASSAY OF URINE SODIUM: CPT

## 2021-10-20 PROCEDURE — 6360000002 HC RX W HCPCS: Performed by: STUDENT IN AN ORGANIZED HEALTH CARE EDUCATION/TRAINING PROGRAM

## 2021-10-20 PROCEDURE — 99233 SBSQ HOSP IP/OBS HIGH 50: CPT | Performed by: INTERNAL MEDICINE

## 2021-10-20 PROCEDURE — 3609010800 HC BRONCHOSCOPY ALVEOLAR LAVAGE: Performed by: INTERNAL MEDICINE

## 2021-10-20 PROCEDURE — 6370000000 HC RX 637 (ALT 250 FOR IP): Performed by: INTERNAL MEDICINE

## 2021-10-20 PROCEDURE — 2580000003 HC RX 258: Performed by: STUDENT IN AN ORGANIZED HEALTH CARE EDUCATION/TRAINING PROGRAM

## 2021-10-20 PROCEDURE — 3700000001 HC ADD 15 MINUTES (ANESTHESIA): Performed by: INTERNAL MEDICINE

## 2021-10-20 PROCEDURE — 80048 BASIC METABOLIC PNL TOTAL CA: CPT

## 2021-10-20 PROCEDURE — 94640 AIRWAY INHALATION TREATMENT: CPT

## 2021-10-20 PROCEDURE — 87015 SPECIMEN INFECT AGNT CONCNTJ: CPT

## 2021-10-20 PROCEDURE — 2709999900 HC NON-CHARGEABLE SUPPLY: Performed by: INTERNAL MEDICINE

## 2021-10-20 PROCEDURE — 2700000000 HC OXYGEN THERAPY PER DAY

## 2021-10-20 PROCEDURE — 0B9G8ZX DRAINAGE OF LEFT UPPER LUNG LOBE, VIA NATURAL OR ARTIFICIAL OPENING ENDOSCOPIC, DIAGNOSTIC: ICD-10-PCS | Performed by: INTERNAL MEDICINE

## 2021-10-20 PROCEDURE — 87206 SMEAR FLUORESCENT/ACID STAI: CPT

## 2021-10-20 PROCEDURE — 31646 BRNCHSC W/THER ASPIR SBSQ: CPT | Performed by: INTERNAL MEDICINE

## 2021-10-20 PROCEDURE — 85025 COMPLETE CBC W/AUTO DIFF WBC: CPT

## 2021-10-20 PROCEDURE — 2500000003 HC RX 250 WO HCPCS: Performed by: INTERNAL MEDICINE

## 2021-10-20 PROCEDURE — 2500000003 HC RX 250 WO HCPCS: Performed by: NURSE ANESTHETIST, CERTIFIED REGISTERED

## 2021-10-20 PROCEDURE — 6360000002 HC RX W HCPCS: Performed by: NURSE PRACTITIONER

## 2021-10-20 PROCEDURE — 0B9J8ZX DRAINAGE OF LEFT LOWER LUNG LOBE, VIA NATURAL OR ARTIFICIAL OPENING ENDOSCOPIC, DIAGNOSTIC: ICD-10-PCS | Performed by: INTERNAL MEDICINE

## 2021-10-20 PROCEDURE — 87116 MYCOBACTERIA CULTURE: CPT

## 2021-10-20 PROCEDURE — 7100000011 HC PHASE II RECOVERY - ADDTL 15 MIN: Performed by: INTERNAL MEDICINE

## 2021-10-20 PROCEDURE — 87070 CULTURE OTHR SPECIMN AEROBIC: CPT

## 2021-10-20 PROCEDURE — 6360000002 HC RX W HCPCS: Performed by: NURSE ANESTHETIST, CERTIFIED REGISTERED

## 2021-10-20 PROCEDURE — 87205 SMEAR GRAM STAIN: CPT

## 2021-10-20 PROCEDURE — 3700000000 HC ANESTHESIA ATTENDED CARE: Performed by: INTERNAL MEDICINE

## 2021-10-20 PROCEDURE — 2060000000 HC ICU INTERMEDIATE R&B

## 2021-10-20 PROCEDURE — 87102 FUNGUS ISOLATION CULTURE: CPT

## 2021-10-20 PROCEDURE — 7100000010 HC PHASE II RECOVERY - FIRST 15 MIN

## 2021-10-20 PROCEDURE — 88305 TISSUE EXAM BY PATHOLOGIST: CPT

## 2021-10-20 PROCEDURE — 7100000010 HC PHASE II RECOVERY - FIRST 15 MIN: Performed by: INTERNAL MEDICINE

## 2021-10-20 PROCEDURE — 83935 ASSAY OF URINE OSMOLALITY: CPT

## 2021-10-20 PROCEDURE — 7100000011 HC PHASE II RECOVERY - ADDTL 15 MIN

## 2021-10-20 PROCEDURE — 36415 COLL VENOUS BLD VENIPUNCTURE: CPT

## 2021-10-20 PROCEDURE — 88112 CYTOPATH CELL ENHANCE TECH: CPT

## 2021-10-20 PROCEDURE — 71045 X-RAY EXAM CHEST 1 VIEW: CPT

## 2021-10-20 RX ORDER — LIDOCAINE HYDROCHLORIDE 10 MG/ML
INJECTION, SOLUTION INFILTRATION; PERINEURAL PRN
Status: DISCONTINUED | OUTPATIENT
Start: 2021-10-20 | End: 2021-10-20 | Stop reason: ALTCHOICE

## 2021-10-20 RX ORDER — SODIUM CHLORIDE 0.9 % (FLUSH) 0.9 %
5-40 SYRINGE (ML) INJECTION EVERY 12 HOURS SCHEDULED
Status: DISCONTINUED | OUTPATIENT
Start: 2021-10-20 | End: 2021-10-22 | Stop reason: HOSPADM

## 2021-10-20 RX ORDER — METOPROLOL TARTRATE 5 MG/5ML
INJECTION INTRAVENOUS PRN
Status: DISCONTINUED | OUTPATIENT
Start: 2021-10-20 | End: 2021-10-20 | Stop reason: SDUPTHER

## 2021-10-20 RX ORDER — DIGOXIN 0.25 MG/ML
250 INJECTION INTRAMUSCULAR; INTRAVENOUS EVERY 4 HOURS
Status: COMPLETED | OUTPATIENT
Start: 2021-10-20 | End: 2021-10-20

## 2021-10-20 RX ORDER — SODIUM CHLORIDE 0.9 % (FLUSH) 0.9 %
5-40 SYRINGE (ML) INJECTION PRN
Status: DISCONTINUED | OUTPATIENT
Start: 2021-10-20 | End: 2021-10-22 | Stop reason: HOSPADM

## 2021-10-20 RX ORDER — FENTANYL CITRATE 50 UG/ML
INJECTION, SOLUTION INTRAMUSCULAR; INTRAVENOUS PRN
Status: DISCONTINUED | OUTPATIENT
Start: 2021-10-20 | End: 2021-10-20 | Stop reason: SDUPTHER

## 2021-10-20 RX ORDER — SODIUM CHLORIDE 9 MG/ML
25 INJECTION, SOLUTION INTRAVENOUS PRN
Status: DISCONTINUED | OUTPATIENT
Start: 2021-10-20 | End: 2021-10-22 | Stop reason: HOSPADM

## 2021-10-20 RX ORDER — GLYCOPYRROLATE 1 MG/5 ML
SYRINGE (ML) INTRAVENOUS PRN
Status: DISCONTINUED | OUTPATIENT
Start: 2021-10-20 | End: 2021-10-20 | Stop reason: SDUPTHER

## 2021-10-20 RX ORDER — PROPOFOL 10 MG/ML
INJECTION, EMULSION INTRAVENOUS PRN
Status: DISCONTINUED | OUTPATIENT
Start: 2021-10-20 | End: 2021-10-20 | Stop reason: SDUPTHER

## 2021-10-20 RX ORDER — LIDOCAINE HYDROCHLORIDE 40 MG/ML
4 INJECTION, SOLUTION RETROBULBAR; TOPICAL ONCE
Status: COMPLETED | OUTPATIENT
Start: 2021-10-20 | End: 2021-10-20

## 2021-10-20 RX ORDER — MIDAZOLAM HYDROCHLORIDE 1 MG/ML
INJECTION INTRAMUSCULAR; INTRAVENOUS PRN
Status: DISCONTINUED | OUTPATIENT
Start: 2021-10-20 | End: 2021-10-20 | Stop reason: SDUPTHER

## 2021-10-20 RX ADMIN — PROPOFOL 10 MG: 10 INJECTION, EMULSION INTRAVENOUS at 10:52

## 2021-10-20 RX ADMIN — FLUTICASONE PROPIONATE 2 PUFF: 110 AEROSOL, METERED RESPIRATORY (INHALATION) at 21:30

## 2021-10-20 RX ADMIN — FENTANYL CITRATE 25 MCG: 50 INJECTION, SOLUTION INTRAMUSCULAR; INTRAVENOUS at 10:44

## 2021-10-20 RX ADMIN — PROPOFOL 10 MG: 10 INJECTION, EMULSION INTRAVENOUS at 10:47

## 2021-10-20 RX ADMIN — PREDNISONE 20 MG: 20 TABLET ORAL at 13:28

## 2021-10-20 RX ADMIN — SODIUM CHLORIDE: 0.9 INJECTION, SOLUTION INTRAVENOUS at 10:32

## 2021-10-20 RX ADMIN — ANTI-FUNGAL POWDER MICONAZOLE NITRATE TALC FREE: 1.42 POWDER TOPICAL at 13:20

## 2021-10-20 RX ADMIN — ANTI-FUNGAL POWDER MICONAZOLE NITRATE TALC FREE: 1.42 POWDER TOPICAL at 22:00

## 2021-10-20 RX ADMIN — AMOXICILLIN AND CLAVULANATE POTASSIUM 1 TABLET: 875; 125 TABLET, FILM COATED ORAL at 21:59

## 2021-10-20 RX ADMIN — METOPROLOL TARTRATE 50 MG: 25 TABLET ORAL at 21:58

## 2021-10-20 RX ADMIN — IPRATROPIUM BROMIDE AND ALBUTEROL SULFATE 3 ML: .5; 2.5 SOLUTION RESPIRATORY (INHALATION) at 21:29

## 2021-10-20 RX ADMIN — Medication 5 MG: at 21:58

## 2021-10-20 RX ADMIN — FUROSEMIDE 20 MG: 10 INJECTION, SOLUTION INTRAMUSCULAR; INTRAVENOUS at 07:49

## 2021-10-20 RX ADMIN — PROPOFOL 10 MG: 10 INJECTION, EMULSION INTRAVENOUS at 11:07

## 2021-10-20 RX ADMIN — MIDAZOLAM HYDROCHLORIDE 0.5 MG: 1 INJECTION, SOLUTION INTRAMUSCULAR; INTRAVENOUS at 10:55

## 2021-10-20 RX ADMIN — Medication 0.2 MG: at 10:39

## 2021-10-20 RX ADMIN — DIGOXIN 250 MCG: 0.25 INJECTION INTRAMUSCULAR; INTRAVENOUS at 17:59

## 2021-10-20 RX ADMIN — ATORVASTATIN CALCIUM 20 MG: 20 TABLET, FILM COATED ORAL at 21:59

## 2021-10-20 RX ADMIN — PROPOFOL 10 MG: 10 INJECTION, EMULSION INTRAVENOUS at 10:56

## 2021-10-20 RX ADMIN — DIPHENHYDRAMINE HCL 25 MG: 25 TABLET ORAL at 22:06

## 2021-10-20 RX ADMIN — FENTANYL CITRATE 25 MCG: 50 INJECTION, SOLUTION INTRAMUSCULAR; INTRAVENOUS at 11:08

## 2021-10-20 RX ADMIN — PROPOFOL 30 MG: 10 INJECTION, EMULSION INTRAVENOUS at 10:44

## 2021-10-20 RX ADMIN — MIDAZOLAM HYDROCHLORIDE 1 MG: 1 INJECTION, SOLUTION INTRAMUSCULAR; INTRAVENOUS at 10:41

## 2021-10-20 RX ADMIN — LIDOCAINE HYDROCHLORIDE 4 ML: 40 INJECTION, SOLUTION RETROBULBAR; TOPICAL at 10:20

## 2021-10-20 RX ADMIN — IPRATROPIUM BROMIDE AND ALBUTEROL SULFATE 3 ML: .5; 2.5 SOLUTION RESPIRATORY (INHALATION) at 08:50

## 2021-10-20 RX ADMIN — IPRATROPIUM BROMIDE AND ALBUTEROL SULFATE 3 ML: .5; 2.5 SOLUTION RESPIRATORY (INHALATION) at 17:01

## 2021-10-20 RX ADMIN — PROPOFOL 20 MG: 10 INJECTION, EMULSION INTRAVENOUS at 11:00

## 2021-10-20 RX ADMIN — AMOXICILLIN AND CLAVULANATE POTASSIUM 1 TABLET: 875; 125 TABLET, FILM COATED ORAL at 13:28

## 2021-10-20 RX ADMIN — FENTANYL CITRATE 25 MCG: 50 INJECTION, SOLUTION INTRAMUSCULAR; INTRAVENOUS at 11:18

## 2021-10-20 RX ADMIN — SODIUM CHLORIDE, PRESERVATIVE FREE 10 ML: 5 INJECTION INTRAVENOUS at 21:59

## 2021-10-20 RX ADMIN — CLOPIDOGREL 75 MG: 75 TABLET, FILM COATED ORAL at 13:28

## 2021-10-20 RX ADMIN — AMIODARONE HYDROCHLORIDE 200 MG: 200 TABLET ORAL at 13:17

## 2021-10-20 RX ADMIN — FENTANYL CITRATE 25 MCG: 50 INJECTION, SOLUTION INTRAMUSCULAR; INTRAVENOUS at 11:11

## 2021-10-20 RX ADMIN — MIDAZOLAM HYDROCHLORIDE 0.5 MG: 1 INJECTION, SOLUTION INTRAMUSCULAR; INTRAVENOUS at 11:00

## 2021-10-20 RX ADMIN — METOPROLOL TARTRATE 5 MG: 1 INJECTION, SOLUTION INTRAVENOUS at 11:39

## 2021-10-20 RX ADMIN — FLUTICASONE PROPIONATE 2 PUFF: 110 AEROSOL, METERED RESPIRATORY (INHALATION) at 13:50

## 2021-10-20 RX ADMIN — DIGOXIN 250 MCG: 0.25 INJECTION INTRAMUSCULAR; INTRAVENOUS at 14:39

## 2021-10-20 RX ADMIN — METOPROLOL TARTRATE 50 MG: 25 TABLET ORAL at 13:18

## 2021-10-20 RX ADMIN — ACETAMINOPHEN 650 MG: 325 TABLET ORAL at 22:06

## 2021-10-20 ASSESSMENT — PAIN SCALES - GENERAL
PAINLEVEL_OUTOF10: 0
PAINLEVEL_OUTOF10: 3

## 2021-10-20 ASSESSMENT — PAIN - FUNCTIONAL ASSESSMENT
PAIN_FUNCTIONAL_ASSESSMENT: 0-10
PAIN_FUNCTIONAL_ASSESSMENT: 0-10

## 2021-10-20 NOTE — CARE COORDINATION
Transitional planning-call from Natalie Robert at [address] approved for placement at [facility], [facility]. Needs rapid COVID.    [address] on call doctor to notify them that patient is OON.

## 2021-10-20 NOTE — ANESTHESIA POSTPROCEDURE EVALUATION
POST- ANESTHESIA EVALUATION       Pt Name: Guanako Shah  MRN: [id number]  [place]: 1945  Date of evaluation: 10/20/2021  Time:  11:35 AM      /71   Pulse 69   Temp 97.7 °F (36.5 °C) (Infrared)   Resp 18   Ht 5' 4\" (1.626 m)   Wt 172 lb (78 kg)   SpO2 98%   BMI 29.52 kg/m²      Consciousness Level  Awake  Cardiopulmonary Status  Stable  Pain Adequately Treated YES  Nausea / Vomiting  NO  Adequate Hydration  YES  Anesthesia Related Complications NONE      Electronically signed by Maxi Salguero MD on 10/20/2021 at 11:35 AM       Department of Anesthesiology  Postprocedure Note    Patient: Guanako Shah  MRN: [id number]  [place]: 1945  Date of evaluation: 10/20/2021  Time:  11:34 AM     Procedure Summary     Date: 10/20/21 Room / Location: [address] [place] [address] / [address] [place] [address]    Anesthesia Start: 1551 Anesthesia Stop: 1129    Procedure: BRONCHOSCOPY ALVEOLAR LAVAGE (N/A ) Diagnosis: (EVALUATE OBSTRUCTION, PLEURAL EFFUSION)    Surgeons: Norm Sargent MD Responsible Provider: Maix Salguero MD    Anesthesia Type: MAC ASA Status: 3          Anesthesia Type: MAC    Chris Phase I: Chris Score: 9    Chris Phase II:      Last vitals: Reviewed and per EMR flowsheets.        Anesthesia Post Evaluation

## 2021-10-20 NOTE — PLAN OF CARE
Problem: Gas Exchange - Impaired:  Goal: Levels of oxygenation will improve  Description: Levels of oxygenation will improve  10/19/2021 2309 by Jennifer Hilton  Outcome: Ongoing  10/19/2021 1846 by Sabine Vogel RN  Outcome: Ongoing     Problem: Falls - Risk of:  Goal: Will remain free from falls  Description: Will remain free from falls  10/19/2021 2309 by Jennifer Hilton  Outcome: Ongoing  10/19/2021 1846 by Sabine Vogel RN  Outcome: Ongoing  Goal: Absence of physical injury  Description: Absence of physical injury  10/19/2021 2309 by Viola Buys  Outcome: Ongoing  10/19/2021 1846 by Sabine Vogel RN  Outcome: Ongoing     Problem:  Activity:  Goal: Ability to tolerate increased activity will improve  Description: Ability to tolerate increased activity will improve  10/19/2021 2309 by Jennifermaci Daviss  Outcome: Ongoing  10/19/2021 1846 by Sabine Vogel RN  Outcome: Ongoing     Problem: Pain:  Goal: Pain level will decrease  Description: Pain level will decrease  10/19/2021 2309 by Jennifer Hilton  Outcome: Ongoing  10/19/2021 1846 by Sabine Vogel RN  Outcome: Ongoing  Goal: Control of acute pain  Description: Control of acute pain  10/19/2021 2309 by Jennifer Hilton  Outcome: Ongoing  10/19/2021 1846 by Sabine Vogel RN  Outcome: Ongoing  Goal: Control of chronic pain  Description: Control of chronic pain  10/19/2021 2309 by Jennifer Hilton  Outcome: Ongoing  10/19/2021 1846 by Sabine Vogel RN  Outcome: Ongoing     Problem: Skin Integrity:  Goal: Will show no infection signs and symptoms  Description: Will show no infection signs and symptoms  10/19/2021 2309 by Violamaci Hilton  Outcome: Ongoing  10/19/2021 1846 by Sabine Vogel RN  Outcome: Ongoing  Goal: Absence of new skin breakdown  Description: Absence of new skin breakdown  10/19/2021 2309 by Jennifer Hilton  Outcome: Ongoing  10/19/2021 1846 by Sabine Vogel RN  Outcome: Ongoing

## 2021-10-20 NOTE — PROGRESS NOTES
[facility]  Internal Medicine Teaching Residency Program  Inpatient Daily Progress Note  ______________________________________________________________________________    Patient: Irma Mirza  YOB: 1945   EMQ:[id number]    Acct: [de-identified]     Room: 2015/2015-01  Admit date: 10/15/2021  Today's date: 10/20/21  Number of days in the hospital: 5    SUBJECTIVE   Admitting Diagnosis: Pleural effusion  CC:   Pt examined at bedside. Chart & results reviewed. Vital stable  On NC 2 L O2  Cardiology: Continue amiodarone Eliquis and Lasix  Bronchoscopy today, n.p.o. from midnight    ROS:  Constitutional:  negative for chills, fevers, sweats  Respiratory: Positive for dyspnea on exertion  Cardiovascular:  negative for chest pain, chest pressure/discomfort, lower extremity edema, palpitations  Gastrointestinal:  negative for abdominal pain, constipation, diarrhea, nausea, vomiting  Neurological:  negative for dizziness, headache  BRIEF HISTORY     A 76 y.o.  female with PMH of A. fib (on Eliquis), COPD (on home oxygen), SEJAL, HTN, morbid obesity,  Was brought in by EMS from nursing facility with chief complaint of chest pain and shortness of breath. Last night, patient woke up from chest pain- left-sided, non-radiating, dull, associated with nausea, diaphoresis, dizziness and difficulty breathing.  Chest pain lasted about 20/30 minutes. Chest pain is nonreproducible, increased with deep breathing and coughing.   She is on 3 L O2 nasal cannula, denies orthopnea/PND episodes.  She does not ambulate much. She was recently admitted for similar complaints and was found to have acute hypoxic respiratory failure secondary to left lower lobe pneumonia and pleural effusion. she also had new onset A. fib with RVR. Left chest thoracocentesis was done, 350 cc transudate fluid was removed.  She underwent CURT and synchronized cardioversion for A. fib and was discharged on Eliquis, Cardizem 240 mg and Lopressor 70 mg twice daily. She also has indwelling urinary catheter since her last admission.     She has reduced appetite.  Denies any urinary complaints.  Has been constipated for last 3 days. She has chronic left leg edema.     In the ER,  Chest x-ray showed large left-sided pleural effusion with marked compressive atelectasis of left lung with moderate to severe cardiomegaly. Troponins-40 (downtrending) (baseline 60s)  proBNP 1745  Creatinine 1.35 (baseline normal)  WBCs-15.69 lactic acid 1.8  Urine : Cloudy, 20-50 WBC present, will culture.     She received vancomycin and cefepime in ER. OBJECTIVE     Vital Signs:  /67   Pulse 66   Temp 98.4 °F (36.9 °C) (Axillary)   Resp 18   Ht 5' 2.52\" (1.588 m)   Wt 250 lb (113.4 kg)   SpO2 95%   BMI 44.97 kg/m²     Temp (24hrs), Av.1 °F (36.7 °C), Min:97.7 °F (36.5 °C), Max:98.5 °F (36.9 °C)    In: 1275   Out: 4525 [Urine:4525]    Physical Exam:  Constitutional: This is a well developed, well nourished,  68y.o. year old female who is alert, oriented, cooperative and in no apparent distress. Head:normocephalic and atraumatic. EENT:  PERRLA. No conjunctival injections. Septum was midline, mucosa was without erythema, exudates or cobblestoning. No thrush was noted. Neck: Supple without thyromegaly. No elevated JVP. Trachea was midline. Respiratory: Left-sided decreased breath sounds, right side normal air entry  Cardiovascular: Regular without murmur, clicks, gallops or rubs. Abdomen: Slightly rounded and soft without organomegaly. No rebound, rigidity or guarding was appreciated. Lymphatic: No lymphadenopathy. Musculoskeletal: Normal curvature of the spine. No gross muscle weakness. Extremities:  No lower extremity edema, ulcerations, tenderness, varicosities or erythema. Muscle size, tone and strength are normal.  No involuntary movements are noted. Skin:  Warm and dry.   Good color, turgor and pigmentation. No lesions or scars. No cyanosis or clubbing  Neurological/Psychiatric: The patient's general behavior, level of consciousness, thought content and emotional status is normal.        Medications:  Scheduled Medications:    amiodarone  200 mg Oral Daily    furosemide  20 mg IntraVENous Daily    predniSONE  20 mg Oral Daily    sodium chloride flush  5-40 mL IntraVENous 2 times per day    metoprolol tartrate  50 mg Oral BID    amoxicillin-clavulanate  1 tablet Oral 2 times per day    miconazole   Topical BID    [Held by provider] apixaban  5 mg Oral BID    fluticasone  2 puff Inhalation BID    clopidogrel  75 mg Oral Daily    atorvastatin  20 mg Oral Nightly    ipratropium-albuterol  1 vial Inhalation 4x daily     Continuous Infusions:    sodium chloride       PRN Medicationsmelatonin, 5 mg, Nightly PRN  sodium chloride flush, 5-40 mL, PRN  albuterol, 2.5 mg, Q4H PRN  diphenhydrAMINE, 25 mg, Q6H PRN  sodium chloride, 25 mL, PRN  ondansetron, 4 mg, Q8H PRN   Or  ondansetron, 4 mg, Q6H PRN  polyethylene glycol, 17 g, Daily PRN  acetaminophen, 650 mg, Q6H PRN   Or  acetaminophen, 650 mg, Q6H PRN  Calamine, , PRN        Diagnostic Labs:  CBC:   Recent Labs     10/18/21  0612 10/19/21  0627 10/20/21  0529   WBC 14.7* 13.3* 11.5*   RBC 3.98 3.92* 3.95   HGB 12.1 11.8* 12.3   HCT 37.3 36.7 39.8   MCV 93.7 93.6 100.8   RDW 12.3 12.9 12.9    288 277     BMP:   Recent Labs     10/17/21  1825 10/17/21  1825 10/17/21  1929 10/18/21  0612 10/19/21  0627   *  --   --  125* 131*   K 3.9   < > 4.1 4.7 4.2   CL 91*  --   --  88* 93*   CO2 26  --   --  27 27   BUN 12  --   --  10 10   CREATININE 0.66  --   --  0.61 0.61    < > = values in this interval not displayed. BNP: No results for input(s): BNP in the last 72 hours. PT/INR: No results for input(s): PROTIME, INR in the last 72 hours. APTT: No results for input(s): APTT in the last 72 hours.   CARDIAC ENZYMES: No results for input(s): CKMB, CKMBINDEX, TROPONINI in the last 72 hours. Invalid input(s): CKTOTAL;3  FASTING LIPID PANEL:  Lab Results   Component Value Date    CHOL 195 06/02/2016    HDL 45 06/02/2016    TRIG 94 06/02/2016     LIVER PROFILE: No results for input(s): AST, ALT, ALB, BILIDIR, BILITOT, ALKPHOS in the last 72 hours. MICROBIOLOGY:   Lab Results   Component Value Date/Time    CULTURE (A) 10/18/2021 12:11 PM     POSITIVE FLUID CULTURE, RN NOTIFIED Results called to and read back by: Nicci Ahmadi. 10/19 0955    CULTURE  10/18/2021 12:11 PM     DIRECT Garlon Simpler STAIN FROM BOTTLE: GRAM POSITIVE COCCI IN CLUSTERS       Imaging:    CT CHEST WO CONTRAST    Result Date: 10/15/2021  1. Moderate left pleural effusion with increased debris in the left bronchi and increased consolidation in the left lung. Associated volume loss is most suggestive of atelectasis, but possibility of superimposed pneumonia would be difficult to exclude. 2.  Trace right pleural fluid with right basilar atelectasis. 3.  Improved pericardial effusion. XR CHEST PORTABLE    Result Date: 10/18/2021  Only minimal better aeration in the left upper lung, with near complete opacification of left hemithorax. Persistent large left pleural effusion. Mild congestion right lung. XR CHEST PORTABLE    Result Date: 10/17/2021  Total opacification left hemithorax likely worsening volume loss rather than increasing known moderate left pleural effusion. XR CHEST PORTABLE    Result Date: 10/16/2021  Increasing opacification in the left hemithorax particularly in the left upper lobe likely related to increase left effusion although 10 of left apical airspace disease is not excluded. Significant opacification in the left mid and lower lung field is noted. Mild perihilar congestive changes are again noted. No extrapleural air is seen.      XR CHEST PORTABLE    Result Date: 10/15/2021  No evidence of pneumothorax status post left thoracentesis     XR CHEST PORTABLE    Result Date: 10/15/2021  Large left-sided pleural effusion with marked compressive atelectasis of the left lung. Moderate to severe cardiomegaly. US THORACENTESIS Which side should the procedure be performed? Left    Result Date: 10/18/2021  Successful ultrasound guided thoracentesis. US THORACENTESIS Which side should the procedure be performed? Left    Result Date: 10/15/2021  Successful ultrasound guided left thoracentesis. ASSESSMENT & PLAN     Pleural effusion:  USG guided thoracocentesis revealed 300 cc-red fluid, likely compression of the lung is secondary to mucous plugging. N.p.o. from midnight, bronchoscopy today  Respiratory culture, Strep pneumoniae antigen, Legionella are unremarkable  Repeat chest xray revealed somewhat persistent pleural effusion with atelectasis present.     Acute on chronic diastolic CHF:  Likely secondary to afib   On lasix 40 mg IV   BP under control   Lopressor 50 mg  Lipitor 20 mg  Plavix 75 mg     CASEY  Resolved.      A. fib  On Eliquis  Continue metoprolol and Cardizem  Cardiology is on board  She is on amiodarone drip     Sepsis  Likely secondary to pneumonia with pleural effusion versus UTI  Cultures so far negative  Received Vanco and cefepime  On  Augmentin      UTI  Has indwelling Goldstein catheter  On Augmentin  Will remove goldstein catheter and try external catheter.       COPD  Continue inhalers as needed  Fluticasone  DuoNeb  On prednisone 20 mg      SEJAL  CPAP during sleep     Hyponatremia- likely due to lung pathology or diuretics. Currently asymptomatic. Free fluid restriction and follow up     DVT ppx: On Eliquis     GI ppx:  Not indicated     PT/OT/SW:  Ongoing     Discharge Planning:   to assist in discharge planning. Marylee Foley, MD  Internal Medicine Resident, PGY-1  [address] [facility];  [place], [place]  10/20/2021, 6:00 AM

## 2021-10-20 NOTE — PLAN OF CARE
Problem: Gas Exchange - Impaired:  Goal: Levels of oxygenation will improve  Description: Levels of oxygenation will improve  10/20/2021 0739 by Willis King RN  Outcome: Ongoing  10/19/2021 2309 by Uri Diaz  Outcome: Ongoing  10/19/2021 1846 by Claudene Bolster, RN  Outcome: Ongoing     Problem: Falls - Risk of:  Goal: Will remain free from falls  Description: Will remain free from falls  10/20/2021 0739 by Willis King RN  Outcome: Ongoing  10/19/2021 2309 by Uri Diaz  Outcome: Ongoing  10/19/2021 1846 by Claudene Bolster, RN  Outcome: Ongoing  Goal: Absence of physical injury  Description: Absence of physical injury  10/20/2021 0739 by Willis King RN  Outcome: Ongoing  10/19/2021 2309 by Uri Diaz  Outcome: Ongoing  10/19/2021 1846 by Claudene Bolster, RN  Outcome: Ongoing     Problem:  Activity:  Goal: Ability to tolerate increased activity will improve  Description: Ability to tolerate increased activity will improve  10/20/2021 0739 by Willis King RN  Outcome: Ongoing  10/19/2021 2309 by Uri Diaz  Outcome: Ongoing  10/19/2021 1846 by Claudene Bolster, RN  Outcome: Ongoing     Problem: Pain:  Goal: Pain level will decrease  Description: Pain level will decrease  10/20/2021 0739 by Willis King RN  Outcome: Ongoing  10/19/2021 2309 by Uri Diaz  Outcome: Ongoing  10/19/2021 1846 by Claudene Bolster, RN  Outcome: Ongoing  Goal: Control of acute pain  Description: Control of acute pain  10/20/2021 0739 by Willis King RN  Outcome: Ongoing  10/19/2021 2309 by Uri Diaz  Outcome: Ongoing  10/19/2021 1846 by Claudene Bolster, RN  Outcome: Ongoing  Goal: Control of chronic pain  Description: Control of chronic pain  10/20/2021 0739 by Willis King RN  Outcome: Ongoing  10/19/2021 2309 by Uri Diaz  Outcome: Ongoing  10/19/2021 1846 by Claudene Bolster, RN  Outcome: Ongoing     Problem: Skin Integrity:  Goal: Will show no infection signs and symptoms  Description: Will show no infection signs and symptoms  10/20/2021 0739 by Lilo Burden RN  Outcome: Ongoing  10/19/2021 2309 by Radha Clements  Outcome: Ongoing  10/19/2021 1846 by Yesenia Salinas RN  Outcome: Ongoing  Goal: Absence of new skin breakdown  Description: Absence of new skin breakdown  10/20/2021 0739 by Lilo Burden RN  Outcome: Ongoing  10/19/2021 2309 by Radha Clements  Outcome: Ongoing  10/19/2021 1846 by Yesenia Salinas RN  Outcome: Ongoing

## 2021-10-20 NOTE — PROGRESS NOTES
Dr. Lu January notified of pt receiving Eliquis yesterday morning, 10/19.  States he is okay to proceed with bronchoscopy

## 2021-10-20 NOTE — ANESTHESIA PRE PROCEDURE
Wheezing 7/14/17   Kat Gonzalez MD   Calcium Carb-Cholecalciferol (CALCIUM + D3 PO) Take by mouth daily    Historical Provider, MD   CINNAMON PO Take by mouth daily    Historical Provider, MD   TURMERIC CURCUMIN PO Take by mouth daily    Historical Provider, MD   ipratropium-albuterol (DUONEB) 0.5-2.5 (3) MG/3ML SOLN nebulizer solution Inhale 1 vial into the lungs every 4 hours    Historical Provider, MD   beclomethasone (QVAR) 80 MCG/ACT inhaler Inhale 2 puffs into the lungs 2 times daily 6/17/16   Kat Gonzalez MD   Acetaminophen 650 MG TABS Take 650 mg by mouth every 4 hours as needed for Pain or Fever 4/4/16   Adrianne Hardin MD   clonazePAM (KLONOPIN) 1 MG tablet Take 1 mg by mouth nightly     Historical Provider, MD   LYSINE PO Take by mouth daily    Historical Provider, MD   vitamin D (CHOLECALCIFEROL) 1000 UNIT TABS tablet Take 1,000 Units by mouth daily    Historical Provider, MD   Pyridoxine HCl (VITAMIN B-6) 50 MG tablet Take 100 mg by mouth daily    Historical Provider, MD   Cyanocobalamin (VITAMIN B 12 PO) Take by mouth daily    Historical Provider, MD   Magnesium 500 MG TABS Take by mouth    Historical Provider, MD   Ascorbic Acid (VITAMIN C) 500 MG tablet Take 500 mg by mouth daily    Historical Provider, MD       Current medications:    Current Facility-Administered Medications   Medication Dose Route Frequency Provider Last Rate Last Admin    amiodarone (CORDARONE) tablet 200 mg  200 mg Oral Daily KALYN Salgado - CNP   200 mg at 10/19/21 0826    furosemide (LASIX) injection 20 mg  20 mg IntraVENous Daily Fanny Bajwa MD   20 mg at 10/19/21 0826    melatonin tablet 5 mg  5 mg Oral Nightly PRN Rona Walters MD   5 mg at 10/19/21 2100    predniSONE (DELTASONE) tablet 20 mg  20 mg Oral Daily Fanny Bajwa MD   20 mg at 10/19/21 0825    sodium chloride flush 0.9 % injection 5-40 mL  5-40 mL IntraVENous 2 times per day Fanny Bajwa MD   10 mL at 10/19/21 2101    sodium chloride flush 0.9 % injection 5-40 mL  5-40 mL IntraVENous PRN Nahun Hammond MD   10 mL at 10/16/21 1543    albuterol (PROVENTIL) nebulizer solution 2.5 mg  2.5 mg Nebulization Q4H PRN Adrián Brady MD        metoprolol tartrate (LOPRESSOR) tablet 50 mg  50 mg Oral BID Errol Cunningham MD   50 mg at 10/19/21 2100    amoxicillin-clavulanate (AUGMENTIN) 875-125 MG per tablet 1 tablet  1 tablet Oral 2 times per day Nahun Hammond MD   1 tablet at 10/19/21 2100    miconazole (MICOTIN) 2 % powder   Topical BID Conchis Maldonado MD   Given at 10/19/21 2101    diphenhydrAMINE (BENADRYL) tablet 25 mg  25 mg Oral Q6H PRN Conchis Maldonado MD   25 mg at 10/19/21 2100    [Held by provider] apixaban (ELIQUIS) tablet 5 mg  5 mg Oral BID Nahun Hammond MD   5 mg at 10/19/21 0826    fluticasone (FLOVENT HFA) 110 MCG/ACT inhaler 2 puff  2 puff Inhalation BID Nahun Hammond MD   2 puff at 10/19/21 2055    clopidogrel (PLAVIX) tablet 75 mg  75 mg Oral Daily Nahun Hammond MD   75 mg at 10/19/21 0825    0.9 % sodium chloride infusion  25 mL IntraVENous PRN Nahun Hammond MD        ondansetron (ZOFRAN-ODT) disintegrating tablet 4 mg  4 mg Oral Q8H PRN Nahun Hammond MD        Or    ondansetron (ZOFRAN) injection 4 mg  4 mg IntraVENous Q6H PRN Nahun Hammond MD   4 mg at 10/15/21 1337    polyethylene glycol (GLYCOLAX) packet 17 g  17 g Oral Daily PRN Nahun Hammond MD   17 g at 10/17/21 0556    acetaminophen (TYLENOL) tablet 650 mg  650 mg Oral Q6H PRN Nahun Hammond MD   650 mg at 10/19/21 2100    Or    acetaminophen (TYLENOL) suppository 650 mg  650 mg Rectal Q6H PRN Nahun Hammond MD        atorvastatin (LIPITOR) tablet 20 mg  20 mg Oral Nightly Nahun Hammond MD   20 mg at 10/19/21 2100    ipratropium-albuterol (DUONEB) nebulizer solution 3 mL  1 vial Inhalation 4x daily Adrián Brady MD   3 mL at 10/19/21 2055    Calamine 8-8 % lotion   Topical PRN N/A 8/8/2017    COLONOSCOPY POLYPECTOMY SNARE/COLD BIOPSY [facility]          Social History:    Social History     Tobacco Use    Smoking status: Former Smoker    Smokeless tobacco: Never Used    Tobacco comment: over 30 years ago    Substance Use Topics    Alcohol use: Yes     Alcohol/week: 0.0 standard drinks     Comment: occasionally                                 Counseling given: Not Answered  Comment: over 30 years ago       Vital Signs (Current):   Vitals:    10/19/21 2058 10/19/21 2326 10/20/21 0348 10/20/21 0730   BP: (!) 146/76 (!) 140/72 136/67 132/70   Pulse:  71 66 71   Resp: 18 18 18    Temp: 98.5 °F (36.9 °C) 98 °F (36.7 °C) 98.4 °F (36.9 °C)    TempSrc: Axillary Axillary Axillary    SpO2:  96% 95% 95%   Weight:       Height:                                                  BP Readings from Last 3 Encounters:   10/20/21 132/70   10/10/21 (!) 144/102   10/07/21 84/63       NPO Status:                                                                                 BMI:   Wt Readings from Last 3 Encounters:   10/15/21 250 lb (113.4 kg)   10/10/21 254 lb (115.2 kg)   02/27/18 218 lb 6.4 oz (99.1 kg)     Body mass index is 44.97 kg/m².     CBC:   Lab Results   Component Value Date    WBC 11.5 10/20/2021    RBC 3.95 10/20/2021    RBC 3.94 06/02/2016    HGB 12.3 10/20/2021    HCT 39.8 10/20/2021    .8 10/20/2021    RDW 12.9 10/20/2021     10/20/2021       CMP:   Lab Results   Component Value Date     10/20/2021    K 3.8 10/20/2021    CL 95 10/20/2021    CO2 26 10/20/2021    BUN 13 10/20/2021    CREATININE 0.55 10/20/2021    GFRAA >60 10/20/2021    AGRATIO 2.2 05/09/2016    LABGLOM >60 10/20/2021    LABGLOM >90 03/30/2016    GLUCOSE 99 10/20/2021    GLUCOSE 97 05/09/2016    PROT 5.8 10/18/2021    CALCIUM 9.1 10/20/2021    BILITOT 1.26 10/15/2021    ALKPHOS 75 10/15/2021    AST 27 10/15/2021    ALT 23 10/15/2021 POC Tests: No results for input(s): POCGLU, POCNA, POCK, POCCL, POCBUN, POCHEMO, POCHCT in the last 72 hours. Coags:   Lab Results   Component Value Date    PROTIME 11.3 10/04/2021    INR 1.1 10/04/2021    APTT 27.5 10/10/2021    APTT 38.1 03/21/2016       HCG (If Applicable): No results found for: PREGTESTUR, PREGSERUM, HCG, HCGQUANT     ABGs: No results found for: PHART, PO2ART, UBC2JNB, XMS9ISX, BEART, R0STIEMC     Type & Screen (If Applicable):  Lab Results   Component Value Date    LABRH POS 03/21/2016       Drug/Infectious Status (If Applicable):  No results found for: HIV, HEPCAB    COVID-19 Screening (If Applicable):   Lab Results   Component Value Date    COVID19 Not Detected 10/15/2021    COVID19 Not Detected 10/03/2021           Anesthesia Evaluation  Patient summary reviewed and Nursing notes reviewed no history of anesthetic complications:   Airway: Mallampati: II  TM distance: >3 FB   Neck ROM: limited  Mouth opening: > = 3 FB Dental:    (+) edentulous      Pulmonary:   (+) pneumonia:  COPD:  decreased breath sounds,  wheezes,  asthma:                           ROS comment: Home oxygen 2 liters / minute   Cardiovascular:    (+) hypertension:, dysrhythmias: atrial fibrillation, hyperlipidemia                  Neuro/Psych:   (+) depression/anxiety             GI/Hepatic/Renal:   (+) morbid obesity          Endo/Other:                     Abdominal:             Vascular: Other Findings:           Anesthesia Plan      MAC     ASA 3       Induction: intravenous. MIPS: Postoperative opioids intended and Prophylactic antiemetics administered. Anesthetic plan and risks discussed with patient. Plan discussed with CRNA.                   Pedro Pablo Garcia MD   10/20/2021

## 2021-10-20 NOTE — PROGRESS NOTES
Returns to [facility] recovery post bronchoscopy in rapid a fib 120-140's. Pt denies chest pain/dyspnea. Respers easy and nonlabored. T.Ray CRNA at bedside gives Metoprolol 5mg IV for rapid rate. Will monitor.

## 2021-10-20 NOTE — PROGRESS NOTES
Physical Therapy        Physical Therapy Cancel Note      DATE: 10/20/2021    NAME: Mitch Sheppard  MRN: [id number]   : 1945      Patient not seen this date for Physical Therapy due to:    Procedure: BRONCHOSCOPY      Electronically signed by Troy Zaragoza PTA on 10/20/2021 at 10:58 AM

## 2021-10-21 LAB
ABSOLUTE EOS #: 0.37 K/UL (ref 0–0.44)
ABSOLUTE IMMATURE GRANULOCYTE: 0.07 K/UL (ref 0–0.3)
ABSOLUTE LYMPH #: 1.65 K/UL (ref 1.1–3.7)
ABSOLUTE MONO #: 0.92 K/UL (ref 0.1–1.2)
ANION GAP SERPL CALCULATED.3IONS-SCNC: 12 MMOL/L (ref 9–17)
BASOPHILS # BLD: 0 % (ref 0–2)
BASOPHILS ABSOLUTE: 0.06 K/UL (ref 0–0.2)
BUN BLDV-MCNC: 15 MG/DL (ref 8–23)
BUN/CREAT BLD: ABNORMAL (ref 9–20)
CALCIUM SERPL-MCNC: 9.1 MG/DL (ref 8.6–10.4)
CHLORIDE BLD-SCNC: 94 MMOL/L (ref 98–107)
CHOLESTEROL FLUID: 34 MG/DL
CO2: 27 MMOL/L (ref 20–31)
CREAT SERPL-MCNC: 0.58 MG/DL (ref 0.5–0.9)
CULTURE: ABNORMAL
CULTURE: NORMAL
CULTURE: NORMAL
DIFFERENTIAL TYPE: ABNORMAL
DIRECT EXAM: ABNORMAL
DIRECT EXAM: NORMAL
EOSINOPHILS RELATIVE PERCENT: 3 % (ref 1–4)
GFR AFRICAN AMERICAN: >60 ML/MIN
GFR NON-AFRICAN AMERICAN: >60 ML/MIN
GFR SERPL CREATININE-BSD FRML MDRD: ABNORMAL ML/MIN/{1.73_M2}
GFR SERPL CREATININE-BSD FRML MDRD: ABNORMAL ML/MIN/{1.73_M2}
GLUCOSE BLD-MCNC: 104 MG/DL (ref 70–99)
HCT VFR BLD CALC: 40.7 % (ref 36.3–47.1)
HEMOGLOBIN: 13 G/DL (ref 11.9–15.1)
IMMATURE GRANULOCYTES: 1 %
LYMPHOCYTES # BLD: 12 % (ref 24–43)
Lab: ABNORMAL
Lab: ABNORMAL
Lab: NORMAL
Lab: NORMAL
MCH RBC QN AUTO: 30 PG (ref 25.2–33.5)
MCHC RBC AUTO-ENTMCNC: 31.9 G/DL (ref 28.4–34.8)
MCV RBC AUTO: 94 FL (ref 82.6–102.9)
MONOCYTES # BLD: 6 % (ref 3–12)
NRBC AUTOMATED: 0 PER 100 WBC
PDW BLD-RTO: 12.8 % (ref 11.8–14.4)
PLATELET # BLD: 332 K/UL (ref 138–453)
PLATELET ESTIMATE: ABNORMAL
PMV BLD AUTO: 9.8 FL (ref 8.1–13.5)
POTASSIUM SERPL-SCNC: 4.1 MMOL/L (ref 3.7–5.3)
RBC # BLD: 4.33 M/UL (ref 3.95–5.11)
RBC # BLD: ABNORMAL 10*6/UL
SEG NEUTROPHILS: 79 % (ref 36–65)
SEGMENTED NEUTROPHILS ABSOLUTE COUNT: 11.21 K/UL (ref 1.5–8.1)
SODIUM BLD-SCNC: 133 MMOL/L (ref 135–144)
SOURCE: NORMAL
SPECIMEN DESCRIPTION: ABNORMAL
SPECIMEN DESCRIPTION: ABNORMAL
SPECIMEN DESCRIPTION: NORMAL
SPECIMEN DESCRIPTION: NORMAL
SURGICAL PATHOLOGY REPORT: NORMAL
WBC # BLD: 14.3 K/UL (ref 3.5–11.3)
WBC # BLD: ABNORMAL 10*3/UL

## 2021-10-21 PROCEDURE — 2580000003 HC RX 258: Performed by: INTERNAL MEDICINE

## 2021-10-21 PROCEDURE — 94761 N-INVAS EAR/PLS OXIMETRY MLT: CPT

## 2021-10-21 PROCEDURE — 76937 US GUIDE VASCULAR ACCESS: CPT

## 2021-10-21 PROCEDURE — 99222 1ST HOSP IP/OBS MODERATE 55: CPT | Performed by: INTERNAL MEDICINE

## 2021-10-21 PROCEDURE — 6370000000 HC RX 637 (ALT 250 FOR IP): Performed by: INTERNAL MEDICINE

## 2021-10-21 PROCEDURE — 2700000000 HC OXYGEN THERAPY PER DAY

## 2021-10-21 PROCEDURE — 97535 SELF CARE MNGMENT TRAINING: CPT

## 2021-10-21 PROCEDURE — 85025 COMPLETE CBC W/AUTO DIFF WBC: CPT

## 2021-10-21 PROCEDURE — 6370000000 HC RX 637 (ALT 250 FOR IP): Performed by: STUDENT IN AN ORGANIZED HEALTH CARE EDUCATION/TRAINING PROGRAM

## 2021-10-21 PROCEDURE — 36415 COLL VENOUS BLD VENIPUNCTURE: CPT

## 2021-10-21 PROCEDURE — 99233 SBSQ HOSP IP/OBS HIGH 50: CPT | Performed by: INTERNAL MEDICINE

## 2021-10-21 PROCEDURE — 2060000000 HC ICU INTERMEDIATE R&B

## 2021-10-21 PROCEDURE — 80048 BASIC METABOLIC PNL TOTAL CA: CPT

## 2021-10-21 PROCEDURE — 94640 AIRWAY INHALATION TREATMENT: CPT

## 2021-10-21 PROCEDURE — 6360000002 HC RX W HCPCS: Performed by: INTERNAL MEDICINE

## 2021-10-21 RX ORDER — SIMETHICONE 80 MG
80 TABLET,CHEWABLE ORAL EVERY 6 HOURS PRN
Status: DISCONTINUED | OUTPATIENT
Start: 2021-10-21 | End: 2021-10-22 | Stop reason: HOSPADM

## 2021-10-21 RX ADMIN — IPRATROPIUM BROMIDE AND ALBUTEROL SULFATE 3 ML: .5; 2.5 SOLUTION RESPIRATORY (INHALATION) at 08:43

## 2021-10-21 RX ADMIN — POLYETHYLENE GLYCOL 3350 17 G: 17 POWDER, FOR SOLUTION ORAL at 09:44

## 2021-10-21 RX ADMIN — AMIODARONE HYDROCHLORIDE 200 MG: 200 TABLET ORAL at 09:34

## 2021-10-21 RX ADMIN — PREDNISONE 20 MG: 20 TABLET ORAL at 09:34

## 2021-10-21 RX ADMIN — SODIUM CHLORIDE, PRESERVATIVE FREE 10 ML: 5 INJECTION INTRAVENOUS at 21:52

## 2021-10-21 RX ADMIN — METOPROLOL TARTRATE 50 MG: 25 TABLET ORAL at 21:51

## 2021-10-21 RX ADMIN — FLUTICASONE PROPIONATE 2 PUFF: 110 AEROSOL, METERED RESPIRATORY (INHALATION) at 20:56

## 2021-10-21 RX ADMIN — IPRATROPIUM BROMIDE AND ALBUTEROL SULFATE 3 ML: .5; 2.5 SOLUTION RESPIRATORY (INHALATION) at 11:43

## 2021-10-21 RX ADMIN — METOPROLOL TARTRATE 50 MG: 25 TABLET ORAL at 09:34

## 2021-10-21 RX ADMIN — ATORVASTATIN CALCIUM 20 MG: 20 TABLET, FILM COATED ORAL at 21:51

## 2021-10-21 RX ADMIN — SODIUM CHLORIDE, PRESERVATIVE FREE 10 ML: 5 INJECTION INTRAVENOUS at 21:53

## 2021-10-21 RX ADMIN — Medication 5 MG: at 21:52

## 2021-10-21 RX ADMIN — APIXABAN 5 MG: 5 TABLET, FILM COATED ORAL at 21:52

## 2021-10-21 RX ADMIN — FLUTICASONE PROPIONATE 2 PUFF: 110 AEROSOL, METERED RESPIRATORY (INHALATION) at 08:45

## 2021-10-21 RX ADMIN — SODIUM CHLORIDE, PRESERVATIVE FREE 10 ML: 5 INJECTION INTRAVENOUS at 09:34

## 2021-10-21 RX ADMIN — CLOPIDOGREL 75 MG: 75 TABLET, FILM COATED ORAL at 09:34

## 2021-10-21 RX ADMIN — ANTI-FUNGAL POWDER MICONAZOLE NITRATE TALC FREE: 1.42 POWDER TOPICAL at 09:35

## 2021-10-21 RX ADMIN — SIMETHICONE 80 MG: 80 TABLET, CHEWABLE ORAL at 18:41

## 2021-10-21 RX ADMIN — FUROSEMIDE 20 MG: 10 INJECTION, SOLUTION INTRAMUSCULAR; INTRAVENOUS at 09:34

## 2021-10-21 RX ADMIN — ACETAMINOPHEN 650 MG: 325 TABLET ORAL at 22:51

## 2021-10-21 RX ADMIN — DIPHENHYDRAMINE HCL 25 MG: 25 TABLET ORAL at 09:44

## 2021-10-21 RX ADMIN — IPRATROPIUM BROMIDE AND ALBUTEROL SULFATE 3 ML: .5; 2.5 SOLUTION RESPIRATORY (INHALATION) at 20:55

## 2021-10-21 RX ADMIN — ANTI-FUNGAL POWDER MICONAZOLE NITRATE TALC FREE: 1.42 POWDER TOPICAL at 21:52

## 2021-10-21 ASSESSMENT — ENCOUNTER SYMPTOMS
VOMITING: 0
RHINORRHEA: 0
SORE THROAT: 0
SHORTNESS OF BREATH: 0
ABDOMINAL PAIN: 0
COUGH: 1
TROUBLE SWALLOWING: 0
CHOKING: 0
WHEEZING: 0
FACIAL SWELLING: 0
CHEST TIGHTNESS: 0
DIARRHEA: 0
COLOR CHANGE: 0
NAUSEA: 0
APNEA: 0
STRIDOR: 0
EYE PAIN: 0

## 2021-10-21 ASSESSMENT — PAIN SCALES - GENERAL
PAINLEVEL_OUTOF10: 3
PAINLEVEL_OUTOF10: 0

## 2021-10-21 ASSESSMENT — PAIN SCALES - WONG BAKER: WONGBAKER_NUMERICALRESPONSE: 0

## 2021-10-21 NOTE — PROGRESS NOTES
[facility]   Progress Note                   Date:   10/21/2021  Patient name: Shad Rivero  Date of admission:  10/15/2021  4:20 AM  MRN:   [id number]  YOB: 1945  PCP: KALYN Lemon CNP    Reason for Admission: Hyponatremia [E87.1]  Pleural effusion [J90]  Acute chest pain [R07.9]  CASEY (acute kidney injury) ([facility] [address] [N17.9]    Subjective:       Clinical Changes / Abnormalities: Pt seen and examined int he room wioth RN. Pt went back into afib yesterday. Rate stable. Pt denies any CP. Medications:   Scheduled Meds:   sodium chloride flush  5-40 mL IntraVENous 2 times per day    amiodarone  200 mg Oral Daily    furosemide  20 mg IntraVENous Daily    sodium chloride flush  5-40 mL IntraVENous 2 times per day    metoprolol tartrate  50 mg Oral BID    miconazole   Topical BID    [Held by provider] apixaban  5 mg Oral BID    fluticasone  2 puff Inhalation BID    clopidogrel  75 mg Oral Daily    atorvastatin  20 mg Oral Nightly    ipratropium-albuterol  1 vial Inhalation 4x daily     Continuous Infusions:   sodium chloride      sodium chloride       CBC:   Recent Labs     10/19/21  0627 10/20/21  0529 10/21/21  0632   WBC 13.3* 11.5* 14.3*   HGB 11.8* 12.3 13.0    277 332     BMP:    Recent Labs     10/19/21  0627 10/20/21  0529 10/21/21  0632   * 133* 133*   K 4.2 3.8 4.1   CL 93* 95* 94*   CO2 27 26 27   BUN 10 13 15   CREATININE 0.61 0.55 0.58   GLUCOSE 99 99 104*     Hepatic:   No results for input(s): AST, ALT, ALB, BILITOT, ALKPHOS in the last 72 hours. Troponin:   No results for input(s): TROPHS in the last 72 hours. BNP: No results for input(s): BNP in the last 72 hours. Lipids: No results for input(s): CHOL, HDL in the last 72 hours. Invalid input(s): LDLCALCU  INR: No results for input(s): INR in the last 72 hours.     Objective:   Vitals: BP (!) 125/94   Pulse 86   Temp 97.7 °F (36.5 °C) (Oral)   Resp 20   Ht 5' 4\" (1.626 m)   Wt 172 lb (78 kg)   SpO2 92%   BMI 29.52 kg/m²   General appearance: alert and cooperative with exam  HEENT: Head: Normocephalic, no lesions, without obvious abnormality. Neck: no JVD, trachea midline, no adenopathy  Lungs: Clear to auscultation on 02 per NC. Slightly diminished thorughout   Heart: irregular rate and rhythm, s1/s2 auscultated, no murmurs. Abdomen: soft, non-tender, bowel sounds active, obese  Extremities: +1 b/l LE edema  Neurologic: not done    EKG: Afib     ECHO 10/3/201  Left ventricle is normal in size with normal systolic function globally. Calculated ejection fraction is 59% ( Atrial-Fib). Mild mitral regurgitation. Mild tricuspid regurgitation. Estimated right ventricular systolic pressure is 35 mmHg. Anterior clear space noted, most likely adipose tissue vs small loculated  pericardial effusion. No evidence of tamponade. Pleural effusion is seen. CARDIOVERSION 10/18/21     After an adequate level of sedation was achieved  200J in biphasic synchronized delivery was administered. conversion to normal sinus rhythm.      The patient awoke without complications. A post procedure 12 L ECG was ordered and reviewed.     Impression:  Successful Consious Sedation - safely  Successful Cardioversion     Complications: There were no complications encountered. Assessment / Acute Cardiac Problems:   1. Afib s/p CV x2 (10/7 and 10/18)  2. On eliquis  3. Currently in Atrial flutter  4. Sepsis 2/2 UTI and PNA  5. PNA  6. Left sided pleural effusion  7.  COPD    Patient Active Problem List:     Asthma     Benign essential HTN     Morbid obesity with BMI of 40.0-44.9, adult ([facility] [address]     Open bimalleolar fracture of left ankle     Hyperlipidemia     COPD exacerbation (HCC)     Pulmonary emphysema (HCC)     Postoperative confusion     Acute respiratory failure with hypoxia (HCC)     Simple chronic bronchitis (HCC)     Atrial fibrillation with rapid ventricular response (HCC)     Pneumonia of left lower lobe due to infectious organism     Sepsis with acute hypoxic respiratory failure without septic shock (HCC)     Acute on chronic diastolic heart failure (HCC)     UTI (urinary tract infection)     Hyponatremia     Hypokalemia     Elevated troponin     Anemia     Arthritis     At high risk for falls     Colon polyps     Heart murmur     Legally blind in right eye, as defined in [place]     COPD (chronic obstructive pulmonary disease) ([facility] [address]     Atrial fibrillation status post cardioversion ([facility] [address]     Pleural effusion      Plan of Treatment:   1. Afib. Rate stable. Continue PO BB, Eliquis, PO amiodarone. IV dig yesterday. Discussed with pt possible Afib ablation as outpt once acute issues resolve. Pt is unsure if she wants invasive procedure. 2. Post thoracentesis - stable.  Sepsis/PNA treatment per primary      Electronically signed by KALYN Aranda CNP on 10/21/2021 at 10:37 AM  [address]  [phone]

## 2021-10-21 NOTE — CONSULTS
[facility] -   Infectious diseases evaluation  admission date 10/15/2021    reason for consultation:   + BC and pleural fluid cx    Impression :   Current:  · Left pleural exudate  · Post thoracentesis -staph epidermitis - likely contamination 10/18  · Left LL atelectasis  · BAL neg-post 5 days augmentin  · SCN bacteremia contamination  · Leukocytosis resolved    Other:  ·   Discussion / summary of stay / plan of care   · Left recurrent effusion and underlying atelectasis, present at least since 10/3  · Post augmentin 5 days 10/16 -10/20  · BAL cx neg org 10/20  · BC SCN one of 2- pattern repeated twice - likely contamination  Recommendations   · I doubt the SCN of the blood and the pleural are related - I suspect they are double contaminations -   · Yet lab to ID the blood org to ck on specie    · Ok for DC    Infection Control Recommendations   · [place] Precautions  · Contact Isolation   Antimicrobial Stewardship Recommendations   · Off AB      Coordination ofOutpatient Care:   · Estimated Length of IV antimicrobials:  · Patient will need Midline / picc Catheter Insertion:   · Patient will need SNF:  · Patient will need outpatient wound care:     History of Present Illness:   Initial history:  John Vargas is a 68y.o.-year-old female with history of cardiovascular disease presenting with 3-week-long repeat episodes of her atrial fibrillation, atrial flutter. ID was consulted for findings on pleural and blood cx. She states she currently does not have shortness of breath, fever, chills, or headache and that her symptoms have improved to a slight/mild dry cough. She does occasionally have dizziness and headache when she gets up from a sitting/lying position, but this is long-standing. She is currently afebrile.     She was admitted for similar conditions last visit, when she was discharged on 10/10 and had been at a SNF and the [facility] in [place] since discharge. She was cardioverted twice on 10/7 and 10/18. She had shortness of breath and possible pneumonia and pleural effusion on admission. Bronchoscopy on 10/20 revealed a mucus plug in the left main bronchus which cause left obstruction, and was taken out and cultured. Cx revealed likely contamination with Staph hominis and BCx had skin contaminant with another Staph species. She is on Augmentin 875-125mg tablets. She was given Cefepime/Vanco once on 10/15. Mrs Akosua Powell has a past medical history significant for lifelong asthma, which she takes a nebulizer for. She has smoked occasionally in her younger years, but it was lightly/socially and more than 35 years ago; she also has exposure to smoke in her immediate family. She reports a history of hypertension. She has a family history of similar cardiovascular disease in her parents and close relatives. Interval changes  10/21/2021   Patient Vitals for the past 8 hrs:   BP Temp Temp src Pulse Resp SpO2   10/21/21 1255 (!) 143/78 97.7 °F (36.5 °C) Oral 94 21 90 %   10/21/21 0915 (!) 125/94 97.7 °F (36.5 °C) Oral 86 20 92 %       Summary of relevant labs:  Labs:  WBC 14.3  Cr 0.58  BUN 15    Micro:  Pleural cx 10/18 staph hominis, likely contamination introduced by drain or trauma. BCx 10/20 Coagulase negative staph, likely skin contamination      Imaging:  CXR 10/20: resolving left pleural effusion, stable cardiomegaly, mild pulmonary vascular congestion    I have personally reviewed the past medical history, past surgical history, medications, social history, and family history, and I haveupdated the database accordingly. Allergies:   Latex, Betadine [povidone iodine], Bee venom, Dilaudid [hydromorphone hcl], Adhesive tape, and Sulfa antibiotics     Review of Systems:     Review of Systems   Constitutional: Negative for appetite change, chills, diaphoresis, fatigue and fever. HENT: Positive for hearing loss.  Negative for drooling, ear pain, facial swelling, nosebleeds, rhinorrhea, sneezing, sore throat and trouble swallowing. Eyes: Negative for pain and visual disturbance. Respiratory: Positive for cough. Negative for apnea, choking, chest tightness, shortness of breath, wheezing and stridor. Cardiovascular: Negative for chest pain. Gastrointestinal: Negative for abdominal pain, diarrhea, nausea and vomiting. Endocrine: Positive for cold intolerance. Negative for heat intolerance. Genitourinary: Negative for difficulty urinating, flank pain and hematuria. Musculoskeletal: Negative for arthralgias and myalgias. Skin: Negative for color change. Allergic/Immunologic: Negative for immunocompromised state. Neurological: Positive for dizziness, light-headedness and headaches. Negative for tremors, seizures, syncope, facial asymmetry, speech difficulty and numbness. Psychiatric/Behavioral: Negative for agitation, behavioral problems and confusion. Physical Examination :       Physical Exam  Constitutional:       General: She is not in acute distress. Appearance: Normal appearance. She is obese. She is not ill-appearing, toxic-appearing or diaphoretic. HENT:      Head: Normocephalic and atraumatic. Nose: No congestion or rhinorrhea. Mouth/Throat:      Pharynx: No posterior oropharyngeal erythema. Eyes:      General: No scleral icterus. Cardiovascular:      Rate and Rhythm: Normal rate and regular rhythm. Pulses: Normal pulses. Pulmonary:      Effort: Pulmonary effort is normal. No respiratory distress. Breath sounds: Normal breath sounds. No stridor. No wheezing, rhonchi or rales. Abdominal:      General: Abdomen is flat. Tenderness: There is no abdominal tenderness. Musculoskeletal:         General: No swelling, tenderness or signs of injury. Cervical back: No tenderness. Skin:     Coloration: Skin is not jaundiced. Neurological:      General: No focal deficit present. Mental Status: She is alert. Mental status is at baseline. Psychiatric:         Mood and Affect: Mood normal.         Behavior: Behavior normal.         Past Medical History:     Past Medical History:   Diagnosis Date    Asthma     COPD (chronic obstructive pulmonary disease) (HCC)     Gout     Hyperlipidemia     Hypertension     Mitral insufficiency     Pneumonia        Past Surgical  History:     Past Surgical History:   Procedure Laterality Date    ANKLE SURGERY Left 03/22/2016    ORIF    BRONCHOSCOPY N/A 10/20/2021    BRONCHOSCOPY ALVEOLAR LAVAGE performed by Zulema Aburto MD at [address]      HI COLSC FLX W/RMVL OF TUMOR POLYP LESION SNARE TQ N/A 8/8/2017    COLONOSCOPY POLYPECTOMY SNARE/COLD BIOPSY performed by Shara Dodson MD at [facility]    TONSILLECTOMY AND ADENOIDECTOMY         Medications:      sodium chloride flush  5-40 mL IntraVENous 2 times per day    amiodarone  200 mg Oral Daily    furosemide  20 mg IntraVENous Daily    sodium chloride flush  5-40 mL IntraVENous 2 times per day    metoprolol tartrate  50 mg Oral BID    miconazole   Topical BID    apixaban  5 mg Oral BID    fluticasone  2 puff Inhalation BID    clopidogrel  75 mg Oral Daily    atorvastatin  20 mg Oral Nightly    ipratropium-albuterol  1 vial Inhalation 4x daily       Social History:     Social History     Socioeconomic History    Marital status:      Spouse name: Not on file    Number of children: Not on file    Years of education: Not on file    Highest education level: Not on file   Occupational History    Not on file   Tobacco Use    Smoking status: Former Smoker    Smokeless tobacco: Never Used    Tobacco comment: over 30 years ago    Vaping Use    Vaping Use: Never used   Substance and Sexual Activity    Alcohol use:  Yes     Alcohol/week: 0.0 standard drinks     Comment: occasionally     Drug use: No    Sexual activity: Not on file   Other Topics Concern    Not on file   Social History Narrative    Not on file     Social Determinants of Health     Financial Resource Strain:     Difficulty of Paying Living Expenses:    Food Insecurity:     Worried About Running Out of Food in the Last Year:     [address] in the Last Year:    Transportation Needs:     Lack of Transportation (Medical):  Lack of Transportation (Non-Medical):    Physical Activity:     Days of Exercise per Week:     Minutes of Exercise per Session:    Stress:     Feeling of Stress :    Social Connections:     Frequency of Communication with Friends and Family:     Frequency of Social Gatherings with Friends and Family:     Attends Mosque Services:     Active Member of Clubs or Organizations:     Attends Club or Organization Meetings:     Marital Status:    Intimate Partner Violence:     Fear of Current or Ex-Partner:     Emotionally Abused:     Physically Abused:     Sexually Abused:        Family History:     Family History   Problem Relation Age of Onset    High Blood Pressure Mother     Cancer Mother     Asthma Father     Heart Disease Father     Cancer Father       Medical Decision Making:   I have independently reviewed/ordered the following labs:    CBC with Differential:   Recent Labs     10/20/21  0529 10/21/21  0632   WBC 11.5* 14.3*   HGB 12.3 13.0   HCT 39.8 40.7    332   LYMPHOPCT 16* 12*   MONOPCT 10 6     BMP:  Recent Labs     10/20/21  0529 10/21/21  0632   * 133*   K 3.8 4.1   CL 95* 94*   CO2 26 27   BUN 13 15   CREATININE 0.55 0.58     Hepatic Function Panel: No results for input(s): PROT, LABALBU, BILIDIR, IBILI, BILITOT, ALKPHOS, ALT, 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 No results for input(s): RPR in the last 72 hours. No results for input(s): HIV in the last 72 hours. No results for input(s): BC in the last 72 hours.   Lab Results   Component Value Date    CREATININE 0.58 10/21/2021    GLUCOSE 104 10/21/2021    GLUCOSE 97 05/09/2016       Detailed results: Thank you for allowing us to participate in the care of this patient. Please call with questions. This note is created with the assistance of a speech recognition program.  While intending to generate adocument that actually reflects the content of the visit, the document can still have some errors including those of syntax and sound a like substitutions which may escape proof reading. It such instances, actual meaningcan be extrapolated by contextual diversion. Ger Bey  Office: [phone]  Perfect serve / office [phone]        I have discussed the care of the patient, including pertinent history and exam findings,  with the resident. I have seen and examined the patient and the key elements of all parts of the encounter have been performed by me. I agree with the assessment, plan and orders as documented by the resident.     Whit Barnard, Infectious Diseases

## 2021-10-21 NOTE — OP NOTE
Operative Note      Patient: Barbara Campbell  YOB: 1945  MRN: [id number]  Date of Procedure: 10/20/2021    Pre-Op Diagnosis: EVALUATE OBSTRUCTION, PLEURAL EFFUSION  Post-Op Diagnosis: Mucous plugging of left main bronchus       Procedure(s):  BRONCHOSCOPY ALVEOLAR LAVAGE    Surgeon(s):  Brennan Koyanagi, MD    Assistant:   First Assistant: Cameron Robin RN    Implants:  * No implants in log *      Drains:   Urethral Catheter Temperature probe (Active)   $ Urethral catheter insertion $ Not inserted for procedure 10/19/21 0432   Catheter Indications Urinary retention (acute or chronic), continuous bladder irrigation or bladder outlet obstruction 10/20/21 1537   Securement Device Date Changed 10/20/21 10/20/21 1537   Site Assessment No urethral drainage 10/20/21 1537   Urine Color Yellow 10/20/21 1537   Urine Appearance Cloudy 10/20/21 1537   Urine Odor Malodorous 10/20/21 0000   Output (mL) 400 mL 10/20/21 1900   Provider Notified to Remove No 10/19/21 0432       [REMOVED] External Urinary Catheter (Removed)   Output (mL) 0 mL 10/03/21 2000   Suction 40 mmgHg continuous 10/03/21 2000   Placement Initiated 10/03/21 1600   Skin Assessment No Injury 10/03/21 1600     Description of procedure      [X] Fiberoptic Bronchoscopy   [X]  Bronchoalveolar lavage   []  Bronchial washing   []  Bronchial brushing   []  Transbronchial biopsy   []  Endobronchial biopsy   []  Transbronchial needle aspiration   []  Endobronchial ultrasound-guided   []  Pretracheal node   []  Subcarinal node   []  Right hilar node   []  Left hilar node   []  AP window node   []  Mass   []  Interventional procedure  []  With fluoroscopy   []  Balloon dilatation   []  Stent placement   []  Argon plasma coagulation   []  Nd:YAG laser  []  Inspection only   []  With fluoroscopic guidance. []  With ultrasound guidance. Indication for Bronchoscopy     []  Nodule(s).      [] Atelectasis  [] Hemoptysis  [X] Pneumonia/Pulmonary infiltrate  [X] Respiratory failure/Hypoxemia  [] Airway Stenosis/Obstruction  []  Endotracheal mass/obstruction  [X] Endobronchial mass/obstruction  [] Adenopathy. [] Immunocompromised host.     [] Lung cancer  [] Metastatic disease.  [] Lung transplant diagnostic/surveillance. [] Bronchiectasis. [] Therapeutic, to clear airway secretions    Consent    [] Performed emergently  [X] Details of the procedure were explained in detail which included risks and benefits. An opportunity was provided to ask questions. Consent was obtained from the following person(s):   [X] Patient   [] Sibling   [] Spouse              [] Parent   [] Child   [] Guardian    Documentation Review     [X] Patient admission history and physical examination as well as interval hospital course documentation reviewed prior to initiating procedure and/or procedural sedation    Time out     [X] The process of patient care was interrupted for all participants to confirm the correct patient, correct procedure, correct site and the availability of appropriate equipment. Please see nursing documentation for those present. Quick Look     [X] Prior to initiating procedural sedation a \"quick look\" revealed pulse oximetry and vital signs appropriate to proceed    Topical Anesthesia     [X] Cetacaine Spray. [X] Lidocaine.        [X] Topical 4% lidocaine nebulized          [X] Additional topical 1% lidocaine instilled through bronchoscope above & below cords. Sedation: MAC as per anesthesia provider; See MAR for administration time and dose. Airway   [X] Via mouth.     [] Via nares.        Monitoring  [X] Arterial Pressure   [X] NIBP     [X] ECG   [X] Oxygen supplemented as needed   [X] Pulse oximetry  [X] End-tidal capnography       Endobronchial findings  Epiglottis: Edematous mucosa  Vocal cords: Normal mucosa   Trachea: Normal mucosa  Hortensia  Normal mucosa    Right Main Stem Bronchus  Normal mucosa  Right Upper Lobe Bronchi Normal mucosa  Right Middle Lobe Bronchi  Normal mucosa  Right Lower Lobe Bronchi (including the Superior segment)  Normal mucosa    Left Main Stem Bronchus extensive mucus plugging with near complete obstruction of left main bronchus and extending to both left upper lobe and lower lobe bronchi. The thick whitish mucoid secretions were aspirated with difficulty and required multiple passes to obtain clear visualization of segmental/subsegmental bronchi  Left Upper Lobe Bronchus, Upper Division as described above  Left Upper Lobe Bronchus, Lingula as described above  Left Lower Lobe Bronchus (including the Superior segment) as described above    [X] The bronchoscope was withdrawn without difficulty. [X] The patient tolerated the procedure well. Patient transferred to: recovery room/hospital bed. Patient condition: stable    Estimated blood loss: None    Complications: None    Chest radiograph:              [X] None             [] Pending             [] Reviewed                       [] No pneumothorax                      [] No change compared to prior    Specimens Obtained:     Specimen Bronchoalveolar Lavage Bronchial Wash Bronchial [place] Transbronchial Biopsy Endobronchial Biopsy    mL Instilled/Lavaged       Segment        RUL        RML        RLL        NKECHI  100/20       Lingula        LLL          Labs requested:    ID Type Source Tests Collected by Time Destination   A : Left upper lobe BAL  Body Fluid Lung CYTOLOGY, NON-GYN, CULTURE, FUNGUS, CULTURE WITH SMEAR, ACID FAST BACILLIUS, CULTURE, RESPIRATORY Hany Trevino MD 10/20/2021 1127        Final Impression: Near complete obstruction of left main, left upper and left lower lobe bronchi secondary to thick mucous plugging.   No endobronchial lesion seen    Comments   Patient will follow up with Dr. Ferguson [facility] team    Hany Trevino MD  Pulmonary and Critical Care Medicine  10/20/21

## 2021-10-21 NOTE — PLAN OF CARE
Problem: Gas Exchange - Impaired:  Goal: Levels of oxygenation will improve  Description: Levels of oxygenation will improve  10/21/2021 1415 by Merline Keller, RN  Outcome: Ongoing  10/21/2021 0839 by Cayetano Coleman RN  Outcome: Met This Shift     Problem: Falls - Risk of:  Goal: Will remain free from falls  Description: Will remain free from falls  10/21/2021 1415 by Merline Keller, RN  Outcome: Ongoing  10/21/2021 0839 by Cayetano Coleman RN  Outcome: Met This Shift  Goal: Absence of physical injury  Description: Absence of physical injury  Outcome: Ongoing     Problem: Activity:  Goal: Ability to tolerate increased activity will improve  Description: Ability to tolerate increased activity will improve  Outcome: Ongoing     Problem:  Activity:  Goal: Ability to tolerate increased activity will improve  Description: Ability to tolerate increased activity will improve  Outcome: Ongoing     Problem: Pain:  Goal: Pain level will decrease  Description: Pain level will decrease  Outcome: Ongoing  Goal: Control of acute pain  Description: Control of acute pain  Outcome: Ongoing  Goal: Control of chronic pain  Description: Control of chronic pain  Outcome: Ongoing     Problem: Skin Integrity:  Goal: Will show no infection signs and symptoms  Description: Will show no infection signs and symptoms  Outcome: Ongoing  Goal: Absence of new skin breakdown  Description: Absence of new skin breakdown  Outcome: Ongoing

## 2021-10-21 NOTE — PROGRESS NOTES
[facility]  Internal Medicine Teaching Residency Program  Inpatient Daily Progress Note  ______________________________________________________________________________    Patient: Sebastian Shah  YOB: 1945   SHJ:[id number]    Acct: [de-identified]     Room: 2015/2015-01  Admit date: 10/15/2021  Today's date: 10/21/21  Number of days in the hospital: 6    SUBJECTIVE   Admitting Diagnosis: Pleural effusion  CC:   Pt examined at bedside. Chart & results reviewed. Hemodynamically stable  Bronchoscopy: left obstruction due to thick mucous plugging, no mass lesion  Post bronchoscopy A fib with RVR, IV digoxin given, OP ablation once stable  Currently HR stable, continue PO meds per cardio  O2 via NC 3 L  Sodium 133, WBC 14.3  Purulent fluid:  coagulase-negative staph  BC: Coagulase-negative staph  ROS:  Constitutional:  negative for chills, fevers, sweats  Respiratory:  positive for cough, dyspnea on exertion, hemoptysis, shortness of breath, wheezing  Cardiovascular:  negative for chest pain, chest pressure/discomfort, lower extremity edema, palpitations  Gastrointestinal:  negative for abdominal pain, constipation, diarrhea, nausea, vomiting  Neurological:  negative for dizziness, headache  BRIEF HISTORY     A 76 y.o.  female with PMH of A. fib (on Eliquis), COPD (on home oxygen), SEJAL, HTN, morbid obesity,  Was brought in by EMS from nursing facility with chief complaint of chest pain and shortness of breath. Last night, patient woke up from chest pain- left-sided, non-radiating, dull, associated with nausea, diaphoresis, dizziness and difficulty breathing.  Chest pain lasted about 20/30 minutes. Chest pain is nonreproducible, increased with deep breathing and coughing.   She is on 3 L O2 nasal cannula, denies orthopnea/PND episodes.  She does not ambulate much.   She was recently admitted for similar complaints and was found to have acute hypoxic respiratory failure secondary to left lower lobe pneumonia and pleural effusion. she also had new onset A. fib with RVR. Left chest thoracocentesis was done, 350 cc transudate fluid was removed. She underwent CURT and synchronized cardioversion for A. fib and was discharged on Eliquis, Cardizem 240 mg and Lopressor 70 mg twice daily. She also has indwelling urinary catheter since her last admission.     She has reduced appetite.  Denies any urinary complaints.  Has been constipated for last 3 days. She has chronic left leg edema.     In the ER,  Chest x-ray showed large left-sided pleural effusion with marked compressive atelectasis of left lung with moderate to severe cardiomegaly. Troponins-40 (downtrending) (baseline 60s)  proBNP 1745  Creatinine 1.35 (baseline normal)  WBCs-15.69 lactic acid 1.8  Urine : Cloudy, 20-50 WBC present, will culture.     She received vancomycin and cefepime in ER. OBJECTIVE     Vital Signs:  BP (!) 125/94   Pulse 86   Temp 97.7 °F (36.5 °C) (Oral)   Resp 20   Ht 5' 4\" (1.626 m)   Wt 172 lb (78 kg)   SpO2 92%   BMI 29.52 kg/m²     Temp (24hrs), Av.7 °F (36.5 °C), Min:97.5 °F (36.4 °C), Max:97.9 °F (36.6 °C)    In: 370   Out: 1005 [Urine:1005]    Physical Exam:  Constitutional: This is a well developed, well nourished,  68y.o. year old female who is alert, oriented, cooperative and in no apparent distress. Head:normocephalic and atraumatic. Respiratory: Chest was symmetrical without dullness to percussion. Left-sided decreased air entry-improved. No egophony noted. Cardiovascular: Regular without murmur, clicks, gallops or rubs. Abdomen: Slightly rounded and soft without organomegaly. No rebound, rigidity or guarding was appreciated. Lymphatic: No lymphadenopathy. Musculoskeletal: Normal curvature of the spine. No gross muscle weakness. Extremities:  No lower extremity edema, ulcerations, tenderness, varicosities or erythema.   Muscle size, tone and strength are normal.  No involuntary movements are noted. Skin:  Warm and dry. Good color, turgor and pigmentation. No lesions or scars. No cyanosis or clubbing  Neurological/Psychiatric: The patient's general behavior, level of consciousness, thought content and emotional status is normal.        Medications:  Scheduled Medications:    sodium chloride flush  5-40 mL IntraVENous 2 times per day    amiodarone  200 mg Oral Daily    furosemide  20 mg IntraVENous Daily    sodium chloride flush  5-40 mL IntraVENous 2 times per day    metoprolol tartrate  50 mg Oral BID    miconazole   Topical BID    apixaban  5 mg Oral BID    fluticasone  2 puff Inhalation BID    clopidogrel  75 mg Oral Daily    atorvastatin  20 mg Oral Nightly    ipratropium-albuterol  1 vial Inhalation 4x daily     Continuous Infusions:    sodium chloride      sodium chloride       PRN Medicationssodium chloride flush, 5-40 mL, PRN  sodium chloride, 25 mL, PRN  melatonin, 5 mg, Nightly PRN  sodium chloride flush, 5-40 mL, PRN  albuterol, 2.5 mg, Q4H PRN  diphenhydrAMINE, 25 mg, Q6H PRN  sodium chloride, 25 mL, PRN  ondansetron, 4 mg, Q8H PRN   Or  ondansetron, 4 mg, Q6H PRN  polyethylene glycol, 17 g, Daily PRN  acetaminophen, 650 mg, Q6H PRN   Or  acetaminophen, 650 mg, Q6H PRN  Calamine, , PRN        Diagnostic Labs:  CBC:   Recent Labs     10/19/21  0627 10/20/21  0529 10/21/21  0632   WBC 13.3* 11.5* 14.3*   RBC 3.92* 3.95 4.33   HGB 11.8* 12.3 13.0   HCT 36.7 39.8 40.7   MCV 93.6 100.8 94.0   RDW 12.9 12.9 12.8    277 332     BMP:   Recent Labs     10/19/21  0627 10/20/21  0529 10/21/21  0632   * 133* 133*   K 4.2 3.8 4.1   CL 93* 95* 94*   CO2 27 26 27   BUN 10 13 15   CREATININE 0.61 0.55 0.58     BNP: No results for input(s): BNP in the last 72 hours. PT/INR: No results for input(s): PROTIME, INR in the last 72 hours. APTT: No results for input(s): APTT in the last 72 hours.   CARDIAC ENZYMES: No results for input(s): CKMB, CKMBINDEX, TROPONINI in the last 72 hours. Invalid input(s): CKTOTAL;3  FASTING LIPID PANEL:  Lab Results   Component Value Date    CHOL 195 06/02/2016    HDL 45 06/02/2016    TRIG 94 06/02/2016     LIVER PROFILE: No results for input(s): AST, ALT, ALB, BILIDIR, BILITOT, ALKPHOS in the last 72 hours. MICROBIOLOGY:   Lab Results   Component Value Date/Time    CULTURE PENDING 10/20/2021 11:40 AM       Imaging:    XR CHEST (SINGLE VIEW FRONTAL)    Result Date: 10/20/2021  Resolving left pleural effusion Stable cardiomegaly Mild pulmonary vascular congestion     CT CHEST WO CONTRAST    Result Date: 10/15/2021  1. Moderate left pleural effusion with increased debris in the left bronchi and increased consolidation in the left lung. Associated volume loss is most suggestive of atelectasis, but possibility of superimposed pneumonia would be difficult to exclude. 2.  Trace right pleural fluid with right basilar atelectasis. 3.  Improved pericardial effusion. XR CHEST PORTABLE    Result Date: 10/18/2021  Only minimal better aeration in the left upper lung, with near complete opacification of left hemithorax. Persistent large left pleural effusion. Mild congestion right lung. XR CHEST PORTABLE    Result Date: 10/17/2021  Total opacification left hemithorax likely worsening volume loss rather than increasing known moderate left pleural effusion. XR CHEST PORTABLE    Result Date: 10/16/2021  Increasing opacification in the left hemithorax particularly in the left upper lobe likely related to increase left effusion although 10 of left apical airspace disease is not excluded. Significant opacification in the left mid and lower lung field is noted. Mild perihilar congestive changes are again noted. No extrapleural air is seen.      XR CHEST PORTABLE    Result Date: 10/15/2021  No evidence of pneumothorax status post left thoracentesis     XR CHEST PORTABLE    Result Date: 10/15/2021  Large left-sided pleural effusion with marked compressive atelectasis of the left lung. Moderate to severe cardiomegaly. US THORACENTESIS Which side should the procedure be performed? Left    Result Date: 10/18/2021  Successful ultrasound guided thoracentesis. US THORACENTESIS Which side should the procedure be performed? Left    Result Date: 10/15/2021  Successful ultrasound guided left thoracentesis. ASSESSMENT & PLAN     Pleural effusion: Likely due to atelectasis  Pleural fluid culture: Coagulase-negative staph  Received Vanco and cefepime  P.o. Augmentin completed for 5 days    Coagulase-negative staph bacteremia: Follow ID recommendation     UTI  Received p.o. Augmentin for 5 days     COPD  Continue inhalers as needed  Fluticasone  DuoNeb  On prednisone 20 mg      SEJAL  CPAP during sleep     Hyponatremia- likely due to lung pathology or diuretics. Currently asymptomatic. Free fluid restriction and follow up     Leukocytosis-likely due to UTI, completed 5 days of p.o. Augmentin. monitor    DVT ppx: On Eliquis     GI ppx:  Not indicated     PT/OT/SW:  Ongoing     Discharge Planning:   to assist in discharge planning. Barnett Bamberger, MD  Internal Medicine Resident, PGY-1  [address] [facility];  [place], [place]  10/21/2021, 11:47 AM

## 2021-10-21 NOTE — PROGRESS NOTES
Physical Therapy        Physical Therapy Cancel Note      DATE: 10/21/2021    NAME: Lashawn Andres  MRN: [id number]   : 1945      Patient not seen this date for Physical Therapy due to:    Patient Declined: Pt states she's constipated and not feeling well. RN informed.       Electronically signed by Tere James PTA on 10/21/2021 at 3:01 PM

## 2021-10-21 NOTE — PROGRESS NOTES
Occupational Therapy  Facility/Department: [facility]  Daily Treatment Note  NAME: Amadeo Markham  : 1945  MRN: [id number]    Date of Service: 10/21/2021    Discharge Recommendations:  Patient would benefit from continued therapy after discharge  OT Equipment Recommendations  ADL Assistive Devices: Shower Chair with back;Long-handled Shoe Horn;Long-handled Sponge;Sock-Aid Hard;Reacher    Assessment   Performance deficits / Impairments: Decreased functional mobility ; Decreased ADL status; Decreased endurance;Decreased balance;Decreased high-level IADLs  Prognosis: Good  Patient Education: Pt ed on OT role, POC, safety awareness with transfers, proper hand placement, EC/WS, pursed lip breathing, AE/DME use for home, benefits of continued therapy-G return  REQUIRES OT FOLLOW UP: Yes  Activity Tolerance  Activity Tolerance: Patient Tolerated treatment well;Patient limited by fatigue  Safety Devices  Safety Devices in place: Yes  Type of devices: Nurse notified;Gait belt;Call light within reach; Left in chair  Restraints  Initially in place: No         Patient Diagnosis(es): The primary encounter diagnosis was Acute chest pain. Diagnoses of CASEY (acute kidney injury) ([address] Pleural effusion, and Hyponatremia were also pertinent to this visit. has a past medical history of Asthma, COPD (chronic obstructive pulmonary disease) ([address] Gout, Hyperlipidemia, Hypertension, Mitral insufficiency, and Pneumonia. has a past surgical history that includes Hysterectomy (); knee surgery; Colonoscopy; Dental surgery; Tonsillectomy and adenoidectomy; Ankle surgery (Left, 2016); pr colsc flx w/rmvl of tumor polyp lesion snare tq (N/A, 2017); and bronchoscopy (N/A, 10/20/2021).     Restrictions  Restrictions/Precautions  Restrictions/Precautions: Fall Risk  Required Braces or Orthoses?: No  Position Activity Restriction  Other position/activity restrictions: up with assist, s/p cardioversion 10/18/21  Subjective General  Chart Reviewed: Yes  Patient assessed for rehabilitation services?: Yes  Family / Caregiver Present: No  General Comment  Comments: RN ok'd pt for OT tx this date. Pt agreeable to session and pleasant/cooperative throughout  Vital Signs  Patient Currently in Pain: Denies   Orientation  Orientation  Overall Orientation Status: Within Functional Limits  Objective    ADL  Grooming: Modified independent ;Setup; Increased time to complete (Pt completed face washing, denture care seated in chair)  UE Bathing: Setup; Increased time to complete;Stand by assistance (Pt completed UB bathing seated in chair)  UE Dressing: Minimal assistance;Setup; Increased time to complete (Pt Min A to adjust and tie gown sitting in chair d/t decreased unsupported sitting balance)  LE Dressing: Moderate assistance;Setup; Increased time to complete (Pt Mod A to don/doff socks sitting in chair d/t decreased trunk flexion. Pt would benefit from use of sock aid)        Balance  Sitting Balance: Stand by assistance (CGA for initial sitting balance, progressing to SBA with prolonged EOB sitting)  Standing Balance: Contact guard assistance  Standing Balance  Time: Approx 2 min x2  Activity: Static standing EOB, func mobility from EOB to chair. Pt req increased time to complete d/t increased dizziness with standing. Pt ed on pursed lip breathing tech with G return  Functional Mobility  Functional - Mobility Device: Rolling Walker  Activity: Other  Assist Level: Contact guard assistance  Bed mobility  Supine to Sit: Minimal assistance  Sit to Supine: Unable to assess  Scooting: Minimal assistance  Comment: Pt Min A for trunk progression supine to sit.  Retiring to chair at end of session  Transfers  Sit to stand: Minimal assistance  Stand to sit: Minimal assistance  Transfer Comments: Min A for sit <> stand transfers this date, req v/c for safe controlled descent into chair     Cognition  Overall Cognitive Status: [address]  Times per week: 3-5 x/wk  Current Treatment Recommendations: Balance Training, Functional Mobility Training, Endurance Training, Safety Education & Training, Patient/Caregiver Education & Training, Equipment Evaluation, Education, & procurement, Self-Care / ADL, Home Management Training  AM-PAC Score        AM-PAC Inpatient Daily Activity Raw Score: 19 (10/21/21 1449)  AM-PAC Inpatient ADL T-Scale Score : 40.22 (10/21/21 1449)  ADL Inpatient CMS 0-100% Score: 42.8 (10/21/21 1449)  ADL Inpatient CMS G-Code Modifier : CK (10/21/21 1449)    Goals  Short term goals  Time Frame for Short term goals: By discharge, pt will:  Short term goal 1: Demo SBA for bed mobility to increase independence with ADLs and decrease risk for pressure injury  Short term goal 2: Demo Mod I with setup for UB ADLs and grooming tasks  Short term goal 3: Demo Min A for LB ADLs and toileting tasks with setup and AE PRN  Short term goal 4: Demo 8+ minutes dynamic standing and reaching outside DONNA with SBA to increase standing balance and tolerance for ADL performance  Short term goal 5: Verbalize/demonstrate 2+ EC/WS tech to incorporate throughout ADL/IADL routines  Short term goal 6: Demo SBA for functional transfers and functional mobility with use of LRD       Therapy Time   Individual Concurrent Group Co-treatment   Time In 1103         Time Out 1146         Minutes 43         Timed Code Treatment Minutes: 37 Minutes   Pt in bed upon arrival, pleasant and agreeable to tx this date. Pt retired to chair at end of session. Call light within reach, RN notified.      NEHEMIAH Pearce/MICHEAL

## 2021-10-21 NOTE — PLAN OF CARE
Problem: Gas Exchange - Impaired:  Goal: Levels of oxygenation will improve  Outcome: Met This Shift     Problem: Falls - Risk of:  Goal: Will remain free from falls  Outcome: Met This Shift

## 2021-10-21 NOTE — CONSULTS
[facility] -   Infectious diseases evaluation  admission date 10/15/2021    reason for consultation:   + BC and pleural fluid cx    Impression :   Current:  · Left pleural excudate  · Post thoracentesis -staph epidermitis - likely contamination 10/18  · Left LL atelectasis  · BAL neg-post 5 days augmentin  · SCN bacteremia contamination  · Leukocytosis resolved    Other:  ·   Discussion / summary of stay / plan of care   · Left recurrent effusion and underlying atelectasis, present at least since 10/3  · Post augmentin 5 days 10/16 -10/20  · BAL cx neg org 10/20  · BC SCN one of 2- pattern repeated twice - likely contamination  Recommendations   · I doubt the SCN of the blood and the pleural are related - I suspect they are double contaminations -   · Yet lab to ID the blood org to ck on specie    · Ok for DC    Infection Control Recommendations   · [place] Precautions  · Contact Isolation   Antimicrobial Stewardship Recommendations   · Off AB      Coordination ofOutpatient Care:   · Estimated Length of IV antimicrobials:  · Patient will need Midline / picc Catheter Insertion:   · Patient will need SNF:  · Patient will need outpatient wound care:     History of Present Illness:   Initial history:  Irma Mirza is a 68y.o.-year-old female     Interval changes  10/21/2021   Patient Vitals for the past 8 hrs:   BP Temp Temp src Pulse Resp SpO2   10/21/21 0915 (!) 125/94 97.7 °F (36.5 °C) Oral 86 20 92 %   10/21/21 0506 115/60   84  93 %       Summary of relevant labs:  Labs:    Micro:    Imaging:      I have personally reviewed the past medical history, past surgical history, medications, social history, and family history, and I haveupdated the database accordingly.       Allergies:   Latex, Betadine [povidone iodine], Bee venom, Dilaudid [hydromorphone hcl], Adhesive tape, and Sulfa antibiotics     Review of Systems:     Review of Systems    Physical Examination :       Physical Exam    Past Medical History:     Past Medical History:   Diagnosis Date    Asthma     COPD (chronic obstructive pulmonary disease) ([address]     Gout     Hyperlipidemia     Hypertension     Mitral insufficiency     Pneumonia        Past Surgical  History:     Past Surgical History:   Procedure Laterality Date    ANKLE SURGERY Left 03/22/2016    ORIF    BRONCHOSCOPY N/A 10/20/2021    BRONCHOSCOPY ALVEOLAR LAVAGE performed by Dora Lockhart MD at [facility] Endoscopy    COLONOSCOPY     [facility]      OR COLSC FLX W/RMVL OF TUMOR POLYP LESION [address] TQ N/A 8/8/2017    COLONOSCOPY POLYPECTOMY SNARE/COLD BIOPSY performed by Samira Maravilla MD at [facility]    TONSILLECTOMY AND ADENOIDECTOMY         Medications:      sodium chloride flush  5-40 mL IntraVENous 2 times per day    amiodarone  200 mg Oral Daily    furosemide  20 mg IntraVENous Daily    sodium chloride flush  5-40 mL IntraVENous 2 times per day    metoprolol tartrate  50 mg Oral BID    miconazole   Topical BID    apixaban  5 mg Oral BID    fluticasone  2 puff Inhalation BID    clopidogrel  75 mg Oral Daily    atorvastatin  20 mg Oral Nightly    ipratropium-albuterol  1 vial Inhalation 4x daily       Social History:     Social History     Socioeconomic History    Marital status:      Spouse name: Not on file    Number of children: Not on file    Years of education: Not on file    Highest education level: Not on file   Occupational History    Not on file   Tobacco Use    Smoking status: Former Smoker    Smokeless tobacco: Never Used    Tobacco comment: over 30 years ago    Vaping Use    Vaping Use: Never used   Substance and Sexual Activity    Alcohol use:  Yes     Alcohol/week: 0.0 standard drinks     Comment: occasionally     Drug use: No    Sexual activity: Not on file   Other Topics Concern    Not on file   Social History Narrative    Not on file Social Determinants of Health     Financial Resource Strain:     Difficulty of Paying Living Expenses:    Food Insecurity:     Worried About Running Out of Food in the Last Year:     [address] in the Last Year:    Transportation Needs:     Lack of Transportation (Medical):  Lack of Transportation (Non-Medical):    Physical Activity:     Days of Exercise per Week:     Minutes of Exercise per Session:    Stress:     Feeling of Stress :    Social Connections:     Frequency of Communication with Friends and Family:     Frequency of Social Gatherings with Friends and Family:     Attends Cheondoism Services:     Active Member of Clubs or Organizations:     Attends Club or Organization Meetings:     Marital Status:    Intimate Partner Violence:     Fear of Current or Ex-Partner:     Emotionally Abused:     Physically Abused:     Sexually Abused:        Family History:     Family History   Problem Relation Age of Onset    High Blood Pressure Mother     Cancer Mother     Asthma Father     Heart Disease Father     Cancer Father       Medical Decision Making:   I have independently reviewed/ordered the following labs:    CBC with Differential:   Recent Labs     10/20/21  0529 10/21/21  0632   WBC 11.5* 14.3*   HGB 12.3 13.0   HCT 39.8 40.7    332   LYMPHOPCT 16* 12*   MONOPCT 10 6     BMP:  Recent Labs     10/20/21  0529 10/21/21  0632   * 133*   K 3.8 4.1   CL 95* 94*   CO2 26 27   BUN 13 15   CREATININE 0.55 0.58     Hepatic Function Panel: No results for input(s): PROT, LABALBU, BILIDIR, IBILI, BILITOT, ALKPHOS, ALT, 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 No results for input(s): RPR in the last 72 hours. No results for input(s): HIV in the last 72 hours. No results for input(s): BC in the last 72 hours. Lab Results   Component Value Date    CREATININE 0.58 10/21/2021    GLUCOSE 104 10/21/2021    GLUCOSE 97 05/09/2016       Detailed results:         Thank you for allowing us to participate in the care of this patient. Please call with questions. This note is created with the assistance of a speech recognition program.  While intending to generate adocument that actually reflects the content of the visit, the document can still have some errors including those of syntax and sound a like substitutions which may escape proof reading. It such instances, actual meaningcan be extrapolated by contextual diversion.     Aimee Rosa MD  Office: [phone]  Perfect serve / office [phone]

## 2021-10-22 VITALS
TEMPERATURE: 97.7 F | WEIGHT: 172 LBS | DIASTOLIC BLOOD PRESSURE: 78 MMHG | BODY MASS INDEX: 29.37 KG/M2 | OXYGEN SATURATION: 96 % | SYSTOLIC BLOOD PRESSURE: 131 MMHG | HEIGHT: 64 IN | HEART RATE: 102 BPM | RESPIRATION RATE: 22 BRPM

## 2021-10-22 LAB
ABSOLUTE EOS #: 1.04 K/UL (ref 0–0.44)
ABSOLUTE IMMATURE GRANULOCYTE: 0.08 K/UL (ref 0–0.3)
ABSOLUTE LYMPH #: 2.24 K/UL (ref 1.1–3.7)
ABSOLUTE MONO #: 1.13 K/UL (ref 0.1–1.2)
ANION GAP SERPL CALCULATED.3IONS-SCNC: 13 MMOL/L (ref 9–17)
BASOPHILS # BLD: 1 % (ref 0–2)
BASOPHILS ABSOLUTE: 0.1 K/UL (ref 0–0.2)
BUN BLDV-MCNC: 17 MG/DL (ref 8–23)
BUN/CREAT BLD: ABNORMAL (ref 9–20)
CALCIUM SERPL-MCNC: 9.3 MG/DL (ref 8.6–10.4)
CHLORIDE BLD-SCNC: 94 MMOL/L (ref 98–107)
CO2: 27 MMOL/L (ref 20–31)
CREAT SERPL-MCNC: 0.55 MG/DL (ref 0.5–0.9)
DIFFERENTIAL TYPE: ABNORMAL
EOSINOPHILS RELATIVE PERCENT: 7 % (ref 1–4)
GFR AFRICAN AMERICAN: >60 ML/MIN
GFR NON-AFRICAN AMERICAN: >60 ML/MIN
GFR SERPL CREATININE-BSD FRML MDRD: ABNORMAL ML/MIN/{1.73_M2}
GFR SERPL CREATININE-BSD FRML MDRD: ABNORMAL ML/MIN/{1.73_M2}
GLUCOSE BLD-MCNC: 88 MG/DL (ref 70–99)
HCT VFR BLD CALC: 41 % (ref 36.3–47.1)
HEMOGLOBIN: 13.1 G/DL (ref 11.9–15.1)
IMMATURE GRANULOCYTES: 1 %
LYMPHOCYTES # BLD: 15 % (ref 24–43)
MCH RBC QN AUTO: 30.7 PG (ref 25.2–33.5)
MCHC RBC AUTO-ENTMCNC: 32 G/DL (ref 28.4–34.8)
MCV RBC AUTO: 96 FL (ref 82.6–102.9)
MONOCYTES # BLD: 8 % (ref 3–12)
NRBC AUTOMATED: 0 PER 100 WBC
PDW BLD-RTO: 13.1 % (ref 11.8–14.4)
PLATELET # BLD: 347 K/UL (ref 138–453)
PLATELET ESTIMATE: ABNORMAL
PMV BLD AUTO: 10.3 FL (ref 8.1–13.5)
POTASSIUM SERPL-SCNC: 3.6 MMOL/L (ref 3.7–5.3)
RBC # BLD: 4.27 M/UL (ref 3.95–5.11)
RBC # BLD: ABNORMAL 10*6/UL
SARS-COV-2, RAPID: NOT DETECTED
SEG NEUTROPHILS: 68 % (ref 36–65)
SEGMENTED NEUTROPHILS ABSOLUTE COUNT: 9.98 K/UL (ref 1.5–8.1)
SODIUM BLD-SCNC: 134 MMOL/L (ref 135–144)
SPECIMEN DESCRIPTION: NORMAL
WBC # BLD: 14.6 K/UL (ref 3.5–11.3)
WBC # BLD: ABNORMAL 10*3/UL

## 2021-10-22 PROCEDURE — 6370000000 HC RX 637 (ALT 250 FOR IP): Performed by: INTERNAL MEDICINE

## 2021-10-22 PROCEDURE — 2580000003 HC RX 258: Performed by: INTERNAL MEDICINE

## 2021-10-22 PROCEDURE — 94761 N-INVAS EAR/PLS OXIMETRY MLT: CPT

## 2021-10-22 PROCEDURE — 2700000000 HC OXYGEN THERAPY PER DAY

## 2021-10-22 PROCEDURE — 36415 COLL VENOUS BLD VENIPUNCTURE: CPT

## 2021-10-22 PROCEDURE — 6370000000 HC RX 637 (ALT 250 FOR IP): Performed by: STUDENT IN AN ORGANIZED HEALTH CARE EDUCATION/TRAINING PROGRAM

## 2021-10-22 PROCEDURE — 99239 HOSP IP/OBS DSCHRG MGMT >30: CPT | Performed by: INTERNAL MEDICINE

## 2021-10-22 PROCEDURE — 94640 AIRWAY INHALATION TREATMENT: CPT

## 2021-10-22 PROCEDURE — 87635 SARS-COV-2 COVID-19 AMP PRB: CPT

## 2021-10-22 PROCEDURE — 85025 COMPLETE CBC W/AUTO DIFF WBC: CPT

## 2021-10-22 PROCEDURE — 99232 SBSQ HOSP IP/OBS MODERATE 35: CPT | Performed by: INTERNAL MEDICINE

## 2021-10-22 PROCEDURE — 6360000002 HC RX W HCPCS: Performed by: INTERNAL MEDICINE

## 2021-10-22 PROCEDURE — 80048 BASIC METABOLIC PNL TOTAL CA: CPT

## 2021-10-22 RX ORDER — AMIODARONE HYDROCHLORIDE 200 MG/1
200 TABLET ORAL DAILY
Qty: 30 TABLET | Refills: 3 | Status: SHIPPED | OUTPATIENT
Start: 2021-10-22 | End: 2021-11-21

## 2021-10-22 RX ORDER — METOPROLOL TARTRATE 50 MG/1
50 TABLET, FILM COATED ORAL 2 TIMES DAILY
Qty: 60 TABLET | Refills: 3 | Status: SHIPPED | OUTPATIENT
Start: 2021-10-22

## 2021-10-22 RX ORDER — SIMETHICONE 80 MG
80 TABLET,CHEWABLE ORAL EVERY 6 HOURS PRN
Qty: 120 TABLET | Refills: 0 | Status: SHIPPED | OUTPATIENT
Start: 2021-10-22 | End: 2021-11-21

## 2021-10-22 RX ORDER — POTASSIUM CHLORIDE 20 MEQ/1
40 TABLET, EXTENDED RELEASE ORAL ONCE
Status: COMPLETED | OUTPATIENT
Start: 2021-10-22 | End: 2021-10-22

## 2021-10-22 RX ORDER — IODINE/SODIUM IODIDE 2 %
TINCTURE TOPICAL
Qty: 1 EACH | Refills: 1 | Status: SHIPPED | OUTPATIENT
Start: 2021-10-22

## 2021-10-22 RX ORDER — CLONAZEPAM 1 MG/1
1 TABLET ORAL NIGHTLY
Qty: 3 TABLET | Refills: 0 | Status: ON HOLD | OUTPATIENT
Start: 2021-10-22 | End: 2021-11-19 | Stop reason: HOSPADM

## 2021-10-22 RX ADMIN — POTASSIUM CHLORIDE 40 MEQ: 1500 TABLET, EXTENDED RELEASE ORAL at 09:57

## 2021-10-22 RX ADMIN — CLOPIDOGREL 75 MG: 75 TABLET, FILM COATED ORAL at 09:56

## 2021-10-22 RX ADMIN — ANTI-FUNGAL POWDER MICONAZOLE NITRATE TALC FREE: 1.42 POWDER TOPICAL at 09:56

## 2021-10-22 RX ADMIN — AMIODARONE HYDROCHLORIDE 200 MG: 200 TABLET ORAL at 09:57

## 2021-10-22 RX ADMIN — FUROSEMIDE 20 MG: 10 INJECTION, SOLUTION INTRAMUSCULAR; INTRAVENOUS at 09:57

## 2021-10-22 RX ADMIN — IPRATROPIUM BROMIDE AND ALBUTEROL SULFATE 3 ML: .5; 2.5 SOLUTION RESPIRATORY (INHALATION) at 08:06

## 2021-10-22 RX ADMIN — ACETAMINOPHEN 650 MG: 325 TABLET ORAL at 09:57

## 2021-10-22 RX ADMIN — FLUTICASONE PROPIONATE 2 PUFF: 110 AEROSOL, METERED RESPIRATORY (INHALATION) at 08:06

## 2021-10-22 RX ADMIN — METOPROLOL TARTRATE 50 MG: 25 TABLET ORAL at 09:57

## 2021-10-22 RX ADMIN — APIXABAN 5 MG: 5 TABLET, FILM COATED ORAL at 09:57

## 2021-10-22 RX ADMIN — DIPHENHYDRAMINE HCL 25 MG: 25 TABLET ORAL at 09:56

## 2021-10-22 RX ADMIN — SODIUM CHLORIDE, PRESERVATIVE FREE 10 ML: 5 INJECTION INTRAVENOUS at 09:57

## 2021-10-22 RX ADMIN — IPRATROPIUM BROMIDE AND ALBUTEROL SULFATE 3 ML: .5; 2.5 SOLUTION RESPIRATORY (INHALATION) at 11:31

## 2021-10-22 RX ADMIN — POLYETHYLENE GLYCOL 3350 17 G: 17 POWDER, FOR SOLUTION ORAL at 09:57

## 2021-10-22 RX ADMIN — SIMETHICONE 80 MG: 80 TABLET, CHEWABLE ORAL at 09:57

## 2021-10-22 ASSESSMENT — ENCOUNTER SYMPTOMS
STRIDOR: 0
TROUBLE SWALLOWING: 0
ABDOMINAL PAIN: 0
NAUSEA: 0
RHINORRHEA: 0
APNEA: 0
WHEEZING: 0
CHOKING: 0
EYE PAIN: 0
VOMITING: 0
DIARRHEA: 0
SORE THROAT: 0
COLOR CHANGE: 0
FACIAL SWELLING: 0
SHORTNESS OF BREATH: 0
CHEST TIGHTNESS: 0
COUGH: 1

## 2021-10-22 ASSESSMENT — PAIN SCALES - GENERAL
PAINLEVEL_OUTOF10: 5
PAINLEVEL_OUTOF10: 6

## 2021-10-22 ASSESSMENT — PAIN SCALES - WONG BAKER: WONGBAKER_NUMERICALRESPONSE: 0

## 2021-10-22 NOTE — PLAN OF CARE
Problem: Gas Exchange - Impaired:  Goal: Levels of oxygenation will improve  Description: Levels of oxygenation will improve  10/22/2021 1418 by Vangie Izaguirre RN  Outcome: Completed  10/22/2021 0046 by Liang Dickinson RN  Outcome: Ongoing     Problem: Falls - Risk of:  Goal: Will remain free from falls  Description: Will remain free from falls  10/22/2021 1418 by Vangie Izaguirre RN  Outcome: Completed  10/22/2021 0046 by Liang Dickinson RN  Outcome: Ongoing  Goal: Absence of physical injury  Description: Absence of physical injury  10/22/2021 1418 by Vangie Izaguirre RN  Outcome: Completed  10/22/2021 0046 by Liang Dickinson RN  Outcome: Ongoing     Problem:  Activity:  Goal: Ability to tolerate increased activity will improve  Description: Ability to tolerate increased activity will improve  10/22/2021 1418 by Vangie Izaguirre RN  Outcome: Completed  10/22/2021 0046 by Liang Dickinson RN  Outcome: Ongoing     Problem: Pain:  Goal: Pain level will decrease  Description: Pain level will decrease  10/22/2021 1418 by Vangie Izaguirre RN  Outcome: Completed  10/22/2021 0046 by Liang Dickinson RN  Outcome: Ongoing  Goal: Control of acute pain  Description: Control of acute pain  10/22/2021 1418 by Vangie Izaguirre RN  Outcome: Completed  10/22/2021 0046 by Liang Dickinson RN  Outcome: Ongoing  Goal: Control of chronic pain  Description: Control of chronic pain  10/22/2021 1418 by Vangie Izaguirre RN  Outcome: Completed  10/22/2021 0046 by Liang Dickinson RN  Outcome: Ongoing     Problem: Skin Integrity:  Goal: Will show no infection signs and symptoms  Description: Will show no infection signs and symptoms  10/22/2021 1418 by Vangie Izaguirre RN  Outcome: Completed  10/22/2021 0046 by Liang Dickinson RN  Outcome: Ongoing  Goal: Absence of new skin breakdown  Description: Absence of new skin breakdown  10/22/2021 1418 by Vangie Izaguirre RN  Outcome: Completed  10/22/2021 0046 by Liang Dickinson RN  Outcome: Ongoing Problem: Nutrition  Goal: Optimal nutrition therapy  10/22/2021 1418 by Belen Padilla RN  Outcome: Completed  10/22/2021 1218 by Suman Villagomez RD, LD  Outcome: Ongoing  Note: Nutrition Problem #1: Inadequate energy intake  Intervention: Food and/or Nutrient Delivery: Continue Current Diet, Start Oral Nutrition Supplement  Nutritional Goals: Pt to meet % of est'd needs via PO Daily

## 2021-10-22 NOTE — FLOWSHEET NOTE
SPIRITUAL CARE PROGRESS NOTE     Spiritual Assessment:  encountered patient as part of spiritual care rounding. Patient was approachable, receptive of 's presence. Patient shared with  upon entry that she was being discharged at 3:30 pm to a rehabilitation facility and was feeling hopeful/excited. Patient shared the emotional hardship health issue/hospitalization had been on her but attributed her clarita to sustaining her. Patient shared she has children, grandchildren, and great grandchildren all whom mean a lot to her and are support to her. Patient identified being raised Judaism, but later became Bipin Parker.  Today, patient identifies as [place] and not part of any particular denomination. Intervention:  engaged the patient in conversation about her feelings and her clarita and family which sustain her.  nurtured hope to the patient during the encounter and was a spiritual presence to her. Outcome: Chaplains are available for specific request visits at any time and may be paged via [address] [place] [address]. 10/22/21 1326   Encounter Summary   Services provided to: Patient   Referral/Consult From: [facility] Family members; Children   Continue Visiting   (10/22/2021)   Complexity of Encounter Low   Length of Encounter 15 minutes   Spiritual Assessment Completed Yes   Routine   Type Initial   Assessment Calm; Approachable;Tearful;Peaceful; Hopeful   Intervention Active listening;Nurtured hope;Explored coping resources; Explored feelings, thoughts, concerns;Sustaining presence/ Ministry of presence;Prayer;Discussed belief system/Amish practices/clarita;Discussed illness/injury and it's impact; Discussed relationship with God;Discussed meaning/purpose   Outcome Expressed gratitude;Comfort   Spiritual/Anabaptist   Type Spiritual support     Electronically signed by Pamela Bush on 10/22/2021 at 1:35 PM.  [facility] [address]  [phone]

## 2021-10-22 NOTE — PROGRESS NOTES
[facility] -   Infectious diseases evaluation  admission date 10/15/2021    reason for consultation:   + BC and pleural fluid cx    Impression :   Current:  · Left pleural exudate  ? Post thoracentesis -staph epidermitis - likely contamination 10/18  · Left LL atelectasis  ? BAL neg-post 5 days augmentin  · SCN bacteremia contamination  · Leukocytosis resolved    Other:  ·   Discussion / summary of stay / plan of care   · Left recurrent effusion and underlying atelectasis, present at least since 10/3  · Post augmentin 5 days 10/16 -10/20  · BAL cx neg org 10/20  · BC SCN one of 2- pattern repeated twice - likely contamination  Recommendations   · I doubt the SCN of the blood and the pleural are related - I suspect they are double contaminations -   · Yet lab to ID the blood org to ck on specie     · Ok for DC    Infection Control Recommendations   · [place] Precautions  · Contact Isolation       Antimicrobial Stewardship Recommendations   · Off AB    Coordination ofOutpatient Care:   · Estimated Length of IV antimicrobials:  · Patient will need Midline / picc Catheter Insertion:   · Patient will need SNF:  · Patient will need outpatient wound care:     History of Present Illness:   Initial history:  Chandana Read is a 68y.o.-year-old female with history of cardiovascular disease presenting with 3-week-long repeat episodes of her atrial fibrillation, atrial flutter. ID was consulted for findings on pleural and blood cx. She states she currently does not have shortness of breath, fever, chills, or headache and that her symptoms have improved to a slight/mild dry cough. She does occasionally have dizziness and headache when she gets up from a sitting/lying position, but this is long-standing.  She is currently afebrile.     She was admitted for similar conditions last visit, when she was discharged on 10/10 and had been at a SNF and [facility] [place] since discharge. She was cardioverted twice on 10/7 and 10/18. She had shortness of breath and possible pneumonia and pleural effusion on admission. Bronchoscopy on 10/20 revealed a mucus plug in the left main bronchus which cause left obstruction, and was taken out and cultured. Cx revealed likely contamination with Staph hominis and BCx had skin contaminant with another Staph species. She is on Augmentin 875-125mg tablets. She was given Cefepime/Vanco once on 10/15.     Mrs Nedra Malcolm has a past medical history significant for lifelong asthma, which she takes a nebulizer for. She has smoked occasionally in her younger years, but it was lightly/socially and more than 35 years ago; she also has exposure to smoke in her immediate family. She reports a history of hypertension. She has a family history of similar cardiovascular disease in her parents and close relatives. Interval changes  10/22/2021   Patient Vitals for the past 8 hrs:   BP Temp Temp src Pulse Resp SpO2   10/22/21 0806      94 %   10/22/21 0435 130/82 98.1 °F (36.7 °C) Oral 81 (!) 99      10/22  Afebrile, tachypneic but otherwise hemodynamically stable. On nasal cannula 3L satting 94%. Off abx currently, Augmentin not given after 10/20 night. BAL cx 10/20 shows normal respiratory papo. Fungal cx pending  WBC to 14.6  Infection reconciled. Will discuss with Dr. Rickey Hooks regards to next steps. Summary of relevant labs:  Labs:  WBC 14.3-->14.6  Cr 0.58-->0.55  BUN 15    Micro:  Pleural cx 10/18 staph hominis, likely contamination introduced by drain or trauma.   BCx 10/20 Coagulase negative staph, likely skin contamination  BAL cx 10/20 shows normal respiratory papo.     Imaging:  CXR 10/20: resolving left pleural effusion, stable cardiomegaly, mild pulmonary vascular congestion    I have personally reviewed the past medical history, past surgical history, medications, social history, and family history, and I haveupdated the database accordingly. Allergies:   Latex, Betadine [povidone iodine], Bee venom, Dilaudid [hydromorphone hcl], Adhesive tape, and Sulfa antibiotics     Review of Systems:     Review of Systems   Constitutional: Negative for appetite change, chills, diaphoresis, fatigue and fever. HENT: Positive for hearing loss. Negative for drooling, ear pain, facial swelling, nosebleeds, rhinorrhea, sneezing, sore throat and trouble swallowing. Eyes: Negative for pain and visual disturbance. Respiratory: Positive for cough. Negative for apnea, choking, chest tightness, shortness of breath, wheezing and stridor. Cardiovascular: Negative for chest pain. Gastrointestinal: Negative for abdominal pain, diarrhea, nausea and vomiting. Endocrine: Positive for cold intolerance. Negative for heat intolerance. Genitourinary: Negative for difficulty urinating, flank pain and hematuria. Musculoskeletal: Negative for arthralgias and myalgias. Skin: Negative for color change. Allergic/Immunologic: Negative for immunocompromised state. Neurological: Positive for dizziness, light-headedness and headaches. Negative for tremors, seizures, syncope, facial asymmetry, speech difficulty and numbness. Psychiatric/Behavioral: Negative for agitation, behavioral problems and confusion. Physical Examination :       Physical Exam  Constitutional:       General: She is not in acute distress. Appearance: Normal appearance. She is obese. She is not ill-appearing, toxic-appearing or diaphoretic. HENT:      Head: Normocephalic and atraumatic. Nose: No congestion or rhinorrhea. Mouth/Throat:      Pharynx: No posterior oropharyngeal erythema. Eyes:      General: No scleral icterus. Cardiovascular:      Rate and Rhythm: Normal rate and regular rhythm. Pulses: Normal pulses. Pulmonary:      Effort: Pulmonary effort is normal. No respiratory distress. Breath sounds: Normal breath sounds. No stridor.  No wheezing, rhonchi or rales. Abdominal:      General: Abdomen is flat. Tenderness: There is no abdominal tenderness. Musculoskeletal:         General: No swelling, tenderness or signs of injury. Cervical back: No tenderness. Skin:     Coloration: Skin is not jaundiced. Neurological:      General: No focal deficit present. Mental Status: She is alert. Mental status is at baseline. Psychiatric:         Mood and Affect: Mood normal.         Behavior: Behavior normal.         Past Medical History:     Past Medical History:   Diagnosis Date    Asthma     COPD (chronic obstructive pulmonary disease) (HCC)     Gout     Hyperlipidemia     Hypertension     Mitral insufficiency     Pneumonia        Past Surgical  History:     Past Surgical History:   Procedure Laterality Date    ANKLE SURGERY Left 03/22/2016    ORIF    BRONCHOSCOPY N/A 10/20/2021    BRONCHOSCOPY ALVEOLAR LAVAGE performed by Tino Bashir MD at [address]      ID COLSC FLX W/RMVL OF TUMOR POLYP LESION [address] TQ N/A 8/8/2017    COLONOSCOPY POLYPECTOMY SNARE/COLD BIOPSY performed by Johana Gamble MD at [facility]    TONSILLECTOMY AND ADENOIDECTOMY         Medications:      potassium chloride  40 mEq Oral Once    sodium chloride flush  5-40 mL IntraVENous 2 times per day    amiodarone  200 mg Oral Daily    furosemide  20 mg IntraVENous Daily    sodium chloride flush  5-40 mL IntraVENous 2 times per day    metoprolol tartrate  50 mg Oral BID    miconazole   Topical BID    apixaban  5 mg Oral BID    fluticasone  2 puff Inhalation BID    clopidogrel  75 mg Oral Daily    atorvastatin  20 mg Oral Nightly    ipratropium-albuterol  1 vial Inhalation 4x daily       Social History:     Social History     Socioeconomic History    Marital status:       Spouse name: Not on file    Number of children: Not on file    Years of 27 27   BUN 15 17   CREATININE 0.58 0.55     Hepatic Function Panel: No results for input(s): PROT, LABALBU, BILIDIR, IBILI, BILITOT, ALKPHOS, ALT, 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 No results for input(s): RPR in the last 72 hours. No results for input(s): HIV in the last 72 hours. No results for input(s): BC in the last 72 hours. Lab Results   Component Value Date    CREATININE 0.55 10/22/2021    GLUCOSE 88 10/22/2021    GLUCOSE 97 05/09/2016       Detailed results: Thank you for allowing us to participate in the care of this patient. Please call with questions. This note is created with the assistance of a speech recognition program.  While intending to generate adocument that actually reflects the content of the visit, the document can still have some errors including those of syntax and sound a like substitutions which may escape proof reading. It such instances, actual meaningcan be extrapolated by contextual diversion.     Margarita Dupont  Office: [phone]  Perfect serve / office [phone]

## 2021-10-22 NOTE — CARE COORDINATION
PS Dr. Susana Bhardwaj to request klonopin script    [id number] PS again for Klonopin script. Patient aware of discharge and  time. Spoke with Mary Carmen at [facility], she is aware.   136 Jana Okaley RN updated, given number for report    Discharge [facility] Case Management Department  Written by: Vidya Wood RN    Patient Name: Irma Mirza  Attending Provider: Mary Matamoros MD  Admit Date: 10/15/2021  4:20 AM  MRN: [id number]  Account: [de-identified]                     : 1945  Discharge Date:  10/22/2021        Disposition: [place] [address]    Vidya Wood RN

## 2021-10-22 NOTE — PROGRESS NOTES
CHANTELLE Complete. Marquez care complete. New Securement leg tag placed. Pt given Glycolax to help w/ BM. Pt said she is unsure of her last BM. Report called.  Transport will be here to pick her up at 330pm.

## 2021-10-22 NOTE — PLAN OF CARE
Nutrition Problem #1: Inadequate energy intake  Intervention: Food and/or Nutrient Delivery: Continue Current Diet, Start Oral Nutrition Supplement  Nutritional Goals: Pt to meet % of est'd needs via PO Daily

## 2021-10-22 NOTE — PROGRESS NOTES
[facility]   Progress Note                   Date:   10/22/2021  Patient name: Jazmin Crandall  Date of admission:  10/15/2021  4:20 AM  MRN:   [id number]  YOB: 1945  PCP: KALYN Berman CNP    Reason for Admission: Hyponatremia [E87.1]  Pleural effusion [J90]  Acute chest pain [R07.9]  CASEY (acute kidney injury) ([address] [N17.9]    Subjective:       Clinical Changes / Abnormalities: Pt seen and examined int he room wioth RN. Pt denies any CP or SOB       Medications:   Scheduled Meds:   sodium chloride flush  5-40 mL IntraVENous 2 times per day    amiodarone  200 mg Oral Daily    furosemide  20 mg IntraVENous Daily    sodium chloride flush  5-40 mL IntraVENous 2 times per day    metoprolol tartrate  50 mg Oral BID    miconazole   Topical BID    apixaban  5 mg Oral BID    fluticasone  2 puff Inhalation BID    clopidogrel  75 mg Oral Daily    atorvastatin  20 mg Oral Nightly    ipratropium-albuterol  1 vial Inhalation 4x daily     Continuous Infusions:   sodium chloride      sodium chloride       CBC:   Recent Labs     10/20/21  0529 10/21/21  0632 10/22/21  0546   WBC 11.5* 14.3* 14.6*   HGB 12.3 13.0 13.1    332 347     BMP:    Recent Labs     10/20/21  0529 10/21/21  0632 10/22/21  0546   * 133* 134*   K 3.8 4.1 3.6*   CL 95* 94* 94*   CO2 26 27 27   BUN 13 15 17   CREATININE 0.55 0.58 0.55   GLUCOSE 99 104* 88     Hepatic:   No results for input(s): AST, ALT, ALB, BILITOT, ALKPHOS in the last 72 hours. Troponin:   No results for input(s): TROPHS in the last 72 hours. BNP: No results for input(s): BNP in the last 72 hours. Lipids: No results for input(s): CHOL, HDL in the last 72 hours. Invalid input(s): LDLCALCU  INR: No results for input(s): INR in the last 72 hours.     Objective:   Vitals: /82   Pulse 81   Temp 98.1 °F (36.7 °C) (Oral)   Resp (!) 99   Ht 5' 4\" (1.626 m)   Wt 172 lb (78 kg)   SpO2 94%   BMI 29.52 kg/m²   General acute hypoxic respiratory failure without septic shock (HCC)     Acute on chronic diastolic heart failure (HCC)     UTI (urinary tract infection)     Hyponatremia     Hypokalemia     Elevated troponin     Anemia     Arthritis     At high risk for falls     Colon polyps     Heart murmur     Legally blind in right eye, as defined in [place]     COPD (chronic obstructive pulmonary disease) ([facility]     Atrial fibrillation status post cardioversion (HCC)     Pleural effusion      Plan of Treatment:   1. Afib. Rate stable. Continue PO BB, Eliquis, PO amiodarone. Discussed with pt possible Afib ablation as outpt once acute issues resolve. Pt is unsure if she wants invasive procedure. 2. Post thoracentesis - stable. Sepsis/PNA treatment per primary  3. Ok to d/c from cardiology standpoint.   Will follow up as outpt       Electronically signed by KALYN Jiang CNP on 10/22/2021 at 8:34 AM  [address] [address]  [phone]

## 2021-10-22 NOTE — ED PROVIDER NOTES
[address]  Emergency Department  Faculty Attestation       I performed a history and physical examination of the patient and discussed management with the resident. I reviewed the residents note and agree with the documented findings including all diagnostic interpretations and plan of care. Any areas of disagreement are noted on the chart. I was personally present for the key portions of any procedures. I have documented in the chart those procedures where I was not present during the key portions. I have reviewed the emergency nurses triage note. I agree with the chief complaint, past medical history, past surgical history, allergies, medications, social and family history as documented unless otherwise noted below. Documentation of the HPI, Physical Exam and Medical Decision Making performed by scribkathleen is based on my personal performance of the HPI, PE and MDM. For Physician Assistant/ Nurse Practitioner cases/documentation I have personally evaluated this patient and have completed at least one if not all key elements of the E/M (history, physical exam, and MDM). Additional findings are as noted. Pertinent Comments     Primary Care Physician: Ceferino Burciaga, KALYN - CNP    History: This is a 68 y.o. female who presents to the Emergency Department with complaint of sudden onset chest pain from ECF. Patient is currently being treated for UTI. Patient states that the chest pain started when she woke up but she is also feeling very nausea and has been vomiting.      Physical:    ED Triage Vitals   BP Temp Temp Source Pulse Resp SpO2 Height Weight   10/15/21 0425 10/15/21 0443 10/15/21 0443 10/15/21 0425 10/15/21 0423 10/15/21 0425 10/15/21 0700 10/15/21 0443   123/76 98.3 °F (36.8 °C) Oral 90 24 92 % 5' 2.52\" (1.588 m) 250 lb (113.4 kg)        General: alert, appears acutely ill, diaphoretic, flushed in the face and actively vomiting   HENT: normocephalic, dry mucus membranes  Eyes: pupils equal and reactive, extraocular eye movements intact   Neck: normal ROM, trachea midline   Heart:  Irregularly irregular  Chest: tachypnea with poor air entry. Abdomen: soft, nontender, nondistended, no masses or organomegaly  no pulsatile masses   Extremities: Edema of bilateral lower extremite  Neurological: alert, oriented, normal speech, no focal findings or movement disorder noted   Skin: Flushed and diaphoretic      MDM/Plan:   Chest pain and nausea and vomiting. Also has a current UTI and is tachycardic and vomiting. Concern for ACS vs Abdominal pathology vs sepsis vs pneumonia  Cardiac workup  Sepsis workup  Mild fluids   Antibiotics   Plan for admission      EKG Interpretation    Interpreted by emergency department physician    Clinical Impression: Afib with LBBB     Rossy Oh MD     Sepsis Times and Checklist  Vital Signs: BP: 131/78  Pulse: 100  Resp: 22  Temp: 97.7 °F (36.5 °C) SpO2: 96 %  SIRS (>2)   Temp > 38.3C or < 36C   HR > 90   RR > 20   WBC > 12 or < 4 or >10% bands  SIRS (>2) and confirmed or suspected source of infection = Sepsis  Is Sepsis due to likely bacterial infection?: Yes    Sepsis Identified:  Date: 10/15   Time: 5:00 AM  Sepsis Orders:  ·  CBC: Yes  ·  CMP: Yes  ·  PT/PTT: Deferred due to national shortage of blue lab tops  ·  Blood Cultures x2: Yes  ·  Urinalysis and Urine Culture: Yes  ·  Lactate: Yes  ·  Broad Spectrum Antibiotics Given (within 3 hours of sepsis identification,  after blood cultures):  Yes    (If unable to obtain IV access for IV antibiotics within 3 hours of  identification of sepsis, IM antibiotics is acceptable.)  ·             If lactate >2.0 MUST repeat within 6 hours    If elevated, is elevated lactate from a likely infectious source?: Not elevated. IV Fluid Bolus:  Is lactate > 4.0:  No  If lactate >  4.0 OR hypotension (MAP<65 mmHg) (with 2 BP readings) 30ml/kg crystalloid MUST be ordered.   --> FLUID BOLUS NOT INDICATED      Critical Care Time: There was significant risk of life threatening deterioration of patient's condition requiring my direct management. Critical care time 20 minutes, excluding any documented procedures.     Stone Quiroga MD  Attending Emergency Physician        Stone Quiroga MD  10/22/21 0926

## 2021-10-22 NOTE — DISCHARGE INSTR - COC
Continuity of Care Form    Patient Name: Guanako Shah   :    MRN:  [id number]    Admit date:  10/15/2021  Discharge date:  10/22/21    Code Status Order: Full Code   Advance Directives:      Admitting Physician:  Fany Olvera MD  PCP: KALYN Syed CNP    Discharging Nurse: Delores Unit/Room#:   Discharging Unit Phone Number: [phone]    Emergency Contact:   Extended Emergency Contact Information  Primary Emergency Contact: [address] [address] Phone: [phone]  Relation: Child   needed?  No  Secondary Emergency Contact: Danica Tomlinson[address] Phone: [phone]  Relation: Child    Past Surgical History:  Past Surgical History:   Procedure Laterality Date    ANKLE SURGERY Left 2016    ORIF    BRONCHOSCOPY N/A 10/20/2021    BRONCHOSCOPY ALVEOLAR LAVAGE performed by Norm Sargent MD at [address] [place] [address]    KNEE SURGERY      MT COLSC FLX W/RMVL OF TUMOR POLYP LESION SNARE TQ N/A 2017    COLONOSCOPY POLYPECTOMY SNARE/COLD BIOPSY performed by Christine Eisenberg MD at [place] [address]         Immunization History:   Immunization History   Administered Date(s) Administered    COVID-19, Moderna, PF, 100mcg/0.5mL 2021, 04/10/2021    Influenza Vaccine, unspecified formulation 2017    Influenza Virus Vaccine 2017, 2018    Influenza Whole 12/15/2015    Pneumococcal Conjugate 13-valent ([id number]) 10/17/2016    Pneumococcal Polysaccharide ([id number]) 12/15/2015    Td, unspecified formulation 2016       Active Problems:  Patient Active Problem List   Diagnosis Code    Asthma J45.909    Benign essential HTN I10    Morbid obesity with BMI of 40.0-44.9, adult ([facility] [address] E66.01, Z68.41    Open bimalleolar fracture of left ankle S82.848W    Hyperlipidemia E78.5    COPD exacerbation ([facility]) J44.1    Pulmonary emphysema ([facility]) J43.9    Postoperative confusion R41.0    Acute respiratory failure with hypoxia ([facility]) J96.01    Simple chronic bronchitis ([facility]) J41.0    Atrial fibrillation with rapid ventricular response ([facility]) I48.91    Pneumonia of left lower lobe due to infectious organism J18.9    Sepsis with acute hypoxic respiratory failure without septic shock ([facility]) A41.9, R65.20, J96.01    Acute on chronic diastolic heart failure ([facility]) I50.33    UTI (urinary tract infection) N39.0    Hyponatremia E87.1    Hypokalemia E87.6    Elevated troponin R77.8    Anemia D64.9    Arthritis M19.90    At high risk for falls Z91.81    Colon polyps K63.5    Heart murmur R01.1    Legally blind in right eye, as defined in [place] H54.8    COPD (chronic obstructive pulmonary disease) ([facility]) J44.9    Atrial fibrillation status post cardioversion ([facility]) I48.91    Pleural effusion J90    Recurrent pleural effusion J90    Mucus plugging of bronchi T17.500A    Atelectasis of left lung J98.11    Acute chest pain R07.9    Coag negative Staphylococcus bacteremia R78.81, B95.7    Abnormal pleural fluid R89.9       Isolation/Infection:   Isolation            No Isolation          Patient Infection Status       Infection Onset Added Last Indicated Last Indicated By Review Planned Expiration Resolved Resolved By    None active    Resolved    COVID-19 Rule Out 10/03/21 10/03/21 10/03/21 Respiratory Panel, Molecular, with COVID-19 (Restricted: peds pts or suitable admitted adults) (Ordered)   10/03/21 Rule-Out Test Resulted    COVID-19 Rule Out 10/02/21 10/02/21 10/02/21 COVID-19, Rapid (Ordered)   10/02/21 Rule-Out Test Resulted            Nurse Assessment:  Last Vital Signs: /78   Pulse 91   Temp 97.9 °F (36.6 °C) (Oral)   Resp 18   Ht 5' 4\" (1.626 m)   Wt 172 lb (78 kg)   SpO2 95%   BMI 29.52 kg/m²     Last documented pain score (0-10 scale): Pain Level: 6  Last Weight:   Wt Readings from Last 1 Encounters:   10/20/21 172 lb (78 kg)     Mental Status:  oriented and alert    IV Access:  - None    Nursing Mobility/ADLs:  Walking   Assisted  Transfer  Assisted  Bathing  Assisted  Dressing  Assisted  Toileting  Assisted  Feeding  Assisted  Med Admin  Assisted  Med Delivery   whole    Wound Care Documentation and Therapy:  Wound 03/28/16 Blister Foot Left;Medial Blood filled blister (Active)   Number of days: 2033       Wound 03/28/16 Blister Other (Comment) Left;Posterior intact blood filled blister (Active)   Number of days: 2033       Wound 03/28/16 Blister Foot Dorsal Multiple clear fliud filled blisters  (Active)   Number of days: 2033       Incision 03/22/16 Ankle Left;Medial (Active)   Number of days: 2040       Incision 03/28/16 Ankle Left;Lateral (Active)   Number of days: 2033        Elimination:  Continence:   · Bowel: No  · Bladder: Marquez in place for retention  Urinary Catheter: Pt admitted with Chronic Marquez   Colostomy/Ileostomy/Ileal Conduit: No       Date of Last BM: Pt unsure. Medication given to help    Intake/Output Summary (Last 24 hours) at 10/22/2021 1038  Last data filed at 10/22/2021 0957  Gross per 24 hour   Intake 885 ml   Output 2050 ml   Net -1165 ml     I/O last 3 completed shifts: In: 597 [P.O.:875; I.V.:10]  Out: 2050 [Urine:2050]    Safety Concerns: At Risk for Falls    Impairments/Disabilities:      None    Nutrition Therapy:  Current Nutrition Therapy:   - Oral Diet:  Carb Control 4 carbs/meal (1800kcals/day)    Routes of Feeding: Oral  Liquids: No Restrictions  Daily Fluid Restriction: no  Last Modified Barium Swallow with Video (Video Swallowing Test): not done    Treatments at the Time of Hospital Discharge:   Respiratory Treatments: Flovent, Proventil, Duoneb  Oxygen Therapy:  is on oxygen at 3 L/min per nasal cannula.   Ventilator:    - No ventilator support    Rehab Therapies: Physical Therapy and Occupational Therapy  Weight Bearing Status/Restrictions: No weight bearing restirctions  Other Medical Equipment (for information only, NOT a DME order):  walker  Other Treatments: Marquez Care    Patient's personal belongings (please select all that are sent with patient):  Dentures upper and lower    RN SIGNATURE:  Electronically signed by Eugenio Zhou RN on 10/22/21 at 11:26 AM EDT    CASE MANAGEMENT/SOCIAL WORK SECTION    Inpatient Status Date: 10/15/2021  Readmission Risk Assessment Score:  Readmission Risk              Risk of Unplanned Readmission:  17           Discharging to Facility/ Agency   Name:   [address] [place] [address] Leelee Details  FAX            [address] [facility] [address], [address] [facility] [address]       Phone: [phone]       Fax: [phone]        ·   · Address:  · Phone:  · Fax:    Dialysis Facility (if applicable)   · Name:  · Address:  · Dialysis Schedule:  · Phone:  · Fax:    / signature: Electronically signed by Lurdes Nguyễn RN on 10/22/21 at 11:18 AM EDT    PHYSICIAN SECTION    Prognosis: Fair    Condition at Discharge: Stable    Rehab Potential (if transferring to Rehab): Fair    Recommended Labs or Other Treatments After Discharge: ***    Physician Certification: I certify the above information and transfer of Akosua Powell  is necessary for the continuing treatment of the diagnosis listed and that she requires [facility] for less 30 days.      Update Admission H&P: No change in H&P    PHYSICIAN SIGNATURE:  Electronically signed by Angelo Vaughn MD on 10/22/21 at 11:09 AM EDT

## 2021-10-22 NOTE — CARE COORDINATION
Call from BODØ at [facility], [address] expires today, will need rapid covid at discharge, if not disch today will need extension on auth    1100 Dr Dionne Stewart to unit, rapid covid ordered for discharge, [facility] PP BODØ notified pt will be discharging today. 1118 Request for CHANTELLE completion to RN and Dr Dionne Stewart, along with med reconcilliation and written script for Klonopin    1241 Covid result neg, Jennifer from [facility] notified of transport time and negative covid test, RN portion of CHANTELLE completed, still awaiting completion by MD, along with Klonipin script, med reconcilliation complete.  RN notified and updated

## 2021-10-22 NOTE — PROGRESS NOTES
[facility] -   Infectious diseases evaluation  admission date 10/15/2021    reason for consultation:   + BC and pleural fluid cx    Impression :   Current:  · Left pleural exudate  · Post thoracentesis -staph epidermitis - likely contamination 10/18  · Left LL atelectasis  · BAL neg-post 5 days augmentin  · SCN bacteremia contamination  · Leukocytosis resolved    Other:  ·   Discussion / summary of stay / plan of care   · Left recurrent effusion and underlying atelectasis, present at least since 10/3  · Post augmentin 5 days 10/16 -10/20  · BAL cx neg org 10/20  · BC SCN one of 2- pattern repeated twice - likely contamination  Recommendations   · I doubt the SCN of the blood and the pleural are related - I suspect they are double contaminations -   · Yet lab to ID the blood org to ck on specie    · Ok for DC    Infection Control Recommendations   · [place] Precautions  · Contact Isolation   Antimicrobial Stewardship Recommendations   · Off AB      Coordination ofOutpatient Care:   · Estimated Length of IV antimicrobials:  · Patient will need Midline / picc Catheter Insertion:   · Patient will need SNF:  · Patient will need outpatient wound care:     History of Present Illness:   Initial history:  Akosua Powell is a 68y.o.-year-old female with history of cardiovascular disease presenting with 3-week-long repeat episodes of her atrial fibrillation, atrial flutter. ID was consulted for findings on pleural and blood cx. She states she currently does not have shortness of breath, fever, chills, or headache and that her symptoms have improved to a slight/mild dry cough. She does occasionally have dizziness and headache when she gets up from a sitting/lying position, but this is long-standing. She is currently afebrile.     She was admitted for similar conditions last visit, when she was discharged on 10/10 and had been at a SNF and the "[facility] [place] in [place] since discharge. She was cardioverted twice on 10/7 and 10/18. She had shortness of breath and possible pneumonia and pleural effusion on admission. Bronchoscopy on 10/20 revealed a mucus plug in the left main bronchus which cause left obstruction, and was taken out and cultured. Cx revealed likely contamination with Staph hominis and BCx had skin contaminant with another Staph species. She is on Augmentin 875-125mg tablets. She was given Cefepime/Vanco once on 10/15. Mrs Jazmin Crandall has a past medical history significant for lifelong asthma, which she takes a nebulizer for. She has smoked occasionally in her younger years, but it was lightly/socially and more than 35 years ago; she also has exposure to smoke in her immediate family. She reports a history of hypertension. She has a family history of similar cardiovascular disease in her parents and close relatives. Interval changes  10/22/2021   Patient Vitals for the past 8 hrs:   BP Temp Temp src Pulse Resp SpO2   10/22/21 1131      95 %   10/22/21 0943 131/78 97.9 °F (36.6 °C) Oral 91 18 95 %   10/22/21 0806      94 %   10/22/21 0435 130/82 98.1 °F (36.7 °C) Oral 81 (!) 99      Looks better feels better numb feet when she was walking yesterday but today she is much better not short of breath, bronchoscopy is showing BAL negative culture, discharging today      Summary of relevant labs:  Labs:  WBC 14.3  Cr 0.58  BUN 15    Micro:  Pleural cx 10/18 staph hominis, likely contamination introduced by drain or trauma. BCx 10/20 Coagulase negative staph, likely skin contamination      Imaging:  CXR 10/20: resolving left pleural effusion, stable cardiomegaly, mild pulmonary vascular congestion    I have personally reviewed the past medical history, past surgical history, medications, social history, and family history, and I haveupdated the database accordingly.       Allergies:   Latex, Betadine [povidone iodine], Bee venom, Dilaudid [hydromorphone hcl], Adhesive tape, and Sulfa antibiotics     Review of Systems:     Review of Systems   Constitutional: Negative for appetite change, chills, diaphoresis, fatigue and fever. HENT: Positive for hearing loss. Negative for drooling, ear pain, facial swelling, nosebleeds, rhinorrhea, sneezing, sore throat and trouble swallowing. Eyes: Negative for pain and visual disturbance. Respiratory: Positive for cough. Negative for apnea, choking, chest tightness, shortness of breath, wheezing and stridor. Cardiovascular: Negative for chest pain. Gastrointestinal: Negative for abdominal pain, diarrhea, nausea and vomiting. Endocrine: Positive for cold intolerance. Negative for heat intolerance. Genitourinary: Negative for difficulty urinating, flank pain and hematuria. Musculoskeletal: Negative for arthralgias and myalgias. Skin: Negative for color change. Allergic/Immunologic: Negative for immunocompromised state. Neurological: Positive for dizziness, light-headedness and headaches. Negative for tremors, seizures, syncope, facial asymmetry, speech difficulty and numbness. Psychiatric/Behavioral: Negative for agitation, behavioral problems and confusion. Physical Examination :       Physical Exam  Constitutional:       General: She is not in acute distress. Appearance: Normal appearance. She is obese. She is not ill-appearing, toxic-appearing or diaphoretic. HENT:      Head: Normocephalic and atraumatic. Nose: No congestion or rhinorrhea. Mouth/Throat:      Pharynx: No posterior oropharyngeal erythema. Eyes:      General: No scleral icterus. Cardiovascular:      Rate and Rhythm: Normal rate and regular rhythm. Pulses: Normal pulses. Pulmonary:      Effort: Pulmonary effort is normal. No respiratory distress. Breath sounds: Normal breath sounds. No stridor. No wheezing, rhonchi or rales. Abdominal:      General: Abdomen is flat. Tenderness:  There is no abdominal tenderness. Musculoskeletal:         General: No swelling, tenderness or signs of injury. Cervical back: No tenderness. Skin:     Coloration: Skin is not jaundiced. Neurological:      General: No focal deficit present. Mental Status: She is alert. Mental status is at baseline. Psychiatric:         Mood and Affect: Mood normal.         Behavior: Behavior normal.         Past Medical History:     Past Medical History:   Diagnosis Date    Asthma     COPD (chronic obstructive pulmonary disease) (HCC)     Gout     Hyperlipidemia     Hypertension     Mitral insufficiency     Pneumonia        Past Surgical  History:     Past Surgical History:   Procedure Laterality Date    ANKLE SURGERY Left 03/22/2016    ORIF    BRONCHOSCOPY N/A 10/20/2021    BRONCHOSCOPY ALVEOLAR LAVAGE performed by Howard Sarabia MD at [address]      ND COLSC FLX W/RMVL OF TUMOR POLYP LESION SNARE TQ N/A 8/8/2017    COLONOSCOPY POLYPECTOMY SNARE/COLD BIOPSY performed by Darylene Brazier, MD at [facility]    TONSILLECTOMY AND ADENOIDECTOMY         Medications:      sodium chloride flush  5-40 mL IntraVENous 2 times per day    amiodarone  200 mg Oral Daily    furosemide  20 mg IntraVENous Daily    sodium chloride flush  5-40 mL IntraVENous 2 times per day    metoprolol tartrate  50 mg Oral BID    miconazole   Topical BID    apixaban  5 mg Oral BID    fluticasone  2 puff Inhalation BID    clopidogrel  75 mg Oral Daily    atorvastatin  20 mg Oral Nightly    ipratropium-albuterol  1 vial Inhalation 4x daily       Social History:     Social History     Socioeconomic History    Marital status:       Spouse name: Not on file    Number of children: Not on file    Years of education: Not on file    Highest education level: Not on file   Occupational History    Not on file   Tobacco Use    Smoking status: Former ALT, 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 No results for input(s): RPR in the last 72 hours. No results for input(s): HIV in the last 72 hours. No results for input(s): BC in the last 72 hours. Lab Results   Component Value Date    CREATININE 0.55 10/22/2021    GLUCOSE 88 10/22/2021    GLUCOSE 97 05/09/2016       Detailed results: Thank you for allowing us to participate in the care of this patient. Please call with questions. This note is created with the assistance of a speech recognition program.  While intending to generate adocument that actually reflects the content of the visit, the document can still have some errors including those of syntax and sound a like substitutions which may escape proof reading. It such instances, actual meaningcan be extrapolated by contextual diversion. Shea Heller MD  Office: [phone]  Perfect serve / office [phone]        I have discussed the care of the patient, including pertinent history and exam findings,  with the resident. I have seen and examined the patient and the key elements of all parts of the encounter have been performed by me. I agree with the assessment, plan and orders as documented by the resident.     Whit Barnard, Infectious Diseases

## 2021-10-22 NOTE — PLAN OF CARE
Problem: Gas Exchange - Impaired:  Goal: Levels of oxygenation will improve  Description: Levels of oxygenation will improve  10/22/2021 0046 by Calvin Cortez RN  Outcome: Ongoing  10/21/2021 1415 by Deirdre Vaughn RN  Outcome: Ongoing     Problem: Falls - Risk of:  Goal: Will remain free from falls  Description: Will remain free from falls  10/22/2021 0046 by Calvin Cortez RN  Outcome: Ongoing  10/21/2021 1415 by Deirdre Vaughn RN  Outcome: Ongoing  Goal: Absence of physical injury  Description: Absence of physical injury  10/22/2021 0046 by Calvin Cortez RN  Outcome: Ongoing  10/21/2021 1415 by Deirdre Vaughn RN  Outcome: Ongoing     Problem:  Activity:  Goal: Ability to tolerate increased activity will improve  Description: Ability to tolerate increased activity will improve  10/22/2021 0046 by Calvin Cortez RN  Outcome: Ongoing  10/21/2021 1415 by Deirdre Vaughn RN  Outcome: Ongoing     Problem: Pain:  Goal: Pain level will decrease  Description: Pain level will decrease  10/22/2021 0046 by Calvin Cortez RN  Outcome: Ongoing  10/21/2021 1415 by Deirdre Vaughn RN  Outcome: Ongoing  Goal: Control of acute pain  Description: Control of acute pain  10/22/2021 0046 by Calvin Cortez RN  Outcome: Ongoing  10/21/2021 1415 by Deirdre Vaughn RN  Outcome: Ongoing  Goal: Control of chronic pain  Description: Control of chronic pain  10/22/2021 0046 by Calvin Cortez RN  Outcome: Ongoing  10/21/2021 1415 by Deirdre Vaughn RN  Outcome: Ongoing     Problem: Skin Integrity:  Goal: Will show no infection signs and symptoms  Description: Will show no infection signs and symptoms  10/22/2021 0046 by Calvin Cortez RN  Outcome: Ongoing  10/21/2021 1415 by Deirdre Vaughn RN  Outcome: Ongoing  Goal: Absence of new skin breakdown  Description: Absence of new skin breakdown  10/22/2021 0046 by Calvin Cortez RN  Outcome: Ongoing  10/21/2021 1415 by Deirdre Vaughn RN  Outcome: Ongoing

## 2021-10-22 NOTE — PROGRESS NOTES
thoracocentesis was done, 350 cc transudate fluid was removed. She underwent CURT and synchronized cardioversion for A. fib and was discharged on Eliquis, Cardizem 240 mg and Lopressor 70 mg twice daily. She also has indwelling urinary catheter since her last admission.     She has reduced appetite.  Denies any urinary complaints.  Has been constipated for last 3 days. She has chronic left leg edema.     In the ER,  Chest x-ray showed large left-sided pleural effusion with marked compressive atelectasis of left lung with moderate to severe cardiomegaly. Troponins-40 (downtrending) (baseline 60s)  proBNP 1745  Creatinine 1.35 (baseline normal)  WBCs-15.69 lactic acid 1.8  Urine : Cloudy, 20-50 WBC present, will culture.     She received vancomycin and cefepime in ER. OBJECTIVE     Vital Signs:  /82   Pulse 81   Temp 98.1 °F (36.7 °C) (Oral)   Resp (!) 99   Ht 5' 4\" (1.626 m)   Wt 172 lb (78 kg)   SpO2 96%   BMI 29.52 kg/m²     Temp (24hrs), Av.8 °F (36.6 °C), Min:97.5 °F (36.4 °C), Max:98.1 °F (36.7 °C)    In: 875   Out:  [Urine:]    Physical Exam:  Constitutional: This is a well developed, well nourished,  68y.o. year old female who is alert, oriented, cooperative and in no apparent distress. Head:normocephalic and atraumatic. EENT:  PERRLA. No conjunctival injections. Septum was midline, mucosa was without erythema, exudates or cobblestoning. No thrush was noted. Neck: Supple without thyromegaly. No elevated JVP. Trachea was midline. Respiratory: left sided decreased air entry, improved. Air entry normal on right side   Cardiovascular: Regular without murmur, clicks, gallops or rubs. Abdomen: Slightly rounded and soft without organomegaly. No rebound, rigidity or guarding was appreciated. Lymphatic: No lymphadenopathy. Musculoskeletal: Normal curvature of the spine. No gross muscle weakness.     Extremities:  No lower extremity edema, ulcerations, tenderness, for input(s): APTT in the last 72 hours. CARDIAC ENZYMES: No results for input(s): CKMB, CKMBINDEX, TROPONINI in the last 72 hours. Invalid input(s): CKTOTAL;3  FASTING LIPID PANEL:  Lab Results   Component Value Date    CHOL 195 06/02/2016    HDL 45 06/02/2016    TRIG 94 06/02/2016     LIVER PROFILE: No results for input(s): AST, ALT, ALB, BILIDIR, BILITOT, ALKPHOS in the last 72 hours. MICROBIOLOGY:   Lab Results   Component Value Date/Time    CULTURE 1,500 CFU/ML NORMAL RESPIRATORY [facility] 10/20/2021 11:40 AM       Imaging:    XR CHEST (SINGLE VIEW FRONTAL)    Result Date: 10/20/2021  Resolving left pleural effusion Stable cardiomegaly Mild pulmonary vascular congestion     CT CHEST WO CONTRAST    Result Date: 10/15/2021  1. Moderate left pleural effusion with increased debris in the left bronchi and increased consolidation in the left lung. Associated volume loss is most suggestive of atelectasis, but possibility of superimposed pneumonia would be difficult to exclude. 2.  Trace right pleural fluid with right basilar atelectasis. 3.  Improved pericardial effusion. XR CHEST PORTABLE    Result Date: 10/18/2021  Only minimal better aeration in the left upper lung, with near complete opacification of left hemithorax. Persistent large left pleural effusion. Mild congestion right lung. XR CHEST PORTABLE    Result Date: 10/17/2021  Total opacification left hemithorax likely worsening volume loss rather than increasing known moderate left pleural effusion. XR CHEST PORTABLE    Result Date: 10/16/2021  Increasing opacification in the left hemithorax particularly in the left upper lobe likely related to increase left effusion although 10 of left apical airspace disease is not excluded. Significant opacification in the left mid and lower lung field is noted. Mild perihilar congestive changes are again noted. No extrapleural air is seen.      XR CHEST PORTABLE    Result Date: 10/15/2021  No evidence of pneumothorax status post left thoracentesis     US THORACENTESIS Which side should the procedure be performed? Left    Result Date: 10/18/2021  Successful ultrasound guided thoracentesis. US THORACENTESIS Which side should the procedure be performed? Left    Result Date: 10/15/2021  Successful ultrasound guided left thoracentesis. ASSESSMENT & PLAN     Pleural effusion: Likely due to atelectasis  Pleural fluid culture: Coagulase-negative staph   Received Vanco and cefepime  P.o. Augmentin completed for 5 days     Coagulase-negative staph bacteremia: likely contamination per ID     UTI  Received p.o. Augmentin for 5 days     COPD  Continue breathing treatment and incentive spirometry  Continue inhalers as needed  Fluticasone  DuoNeb  On prednisone 20 mg      SEJAL  CPAP during sleep     Hyponatremia-improved. likely due to lung pathology or diuretics. Currently asymptomatic.        DVT ppx: On Eliquis     GI ppx:  Not indicated     PT/OT/SW:  Ongoing     Discharge Planning:   to assist in discharge planning.       Gurwinder Crockett MD  Internal Medicine Resident, PGY-1  [facility];  [place], [place]  10/22/2021, 7:47 AM

## 2021-10-22 NOTE — PROGRESS NOTES
Comprehensive Nutrition Assessment    Type and Reason for Visit:  Initial (LOS Day 7)    Nutrition Recommendations/Plan:   -Continue 4 CHO diabetic diet   -Suggest glucerna supplements BID per pt request   -Please re-weigh pt as able   -Will monitor po intake and weights     Nutrition Assessment:  Pt admitted d/t Pleural effusion. Pt stated that her appetite has improved since admission and is consuming most of her meals. Per EMR, pt has been consuming 25-50% and % of her meals this week. Pt stated that she used to be over 200 lbs and intentionally lost weight by not eating [employer] anymore. Per EMR, pt w/ 32% wt reduction x 3 wks? Will recommend to re-weigh pt as able. Pt agreed to nutritional supplements on her meal trays to compliment her PO intake. Will monitor. Malnutrition Assessment:  Malnutrition Status:  Insufficient data    Context:  Acute Illness     Findings of the 6 clinical characteristics of malnutrition:  Energy Intake:  Mild decrease in energy intake (Comment)  Weight Loss:  Unable to assess (32% wt loss x 3 wks?)     Body Fat Loss:  No significant body fat loss     Muscle Mass Loss:  No significant muscle mass loss    Fluid Accumulation:  1 - Mild Generalized, Extremities   Strength:  Not Performed    Estimated Daily Nutrient Needs:  Energy (kcal):  1.2-1.3 ~> 0645-0997 kcals/d; Weight Used for Energy Requirements:  Current     Protein (g):  1.2-1.3 gm/kg ~> 66-72 gms/d; Weight Used for Protein Requirements:  Ideal        Fluid (ml/day):  25 ~> 1950 mLs/d OR per MD discretion; Method Used for Fluid Requirements:  ml/Kg      Nutrition Related Findings:  active bowel sounds; Na 134, K 3.6; meds reviewed      Wounds:  None       Current Nutrition Therapies:    ADULT DIET;  Regular; 4 carb choices (60 gm/meal)    Anthropometric Measures:  · Height: 5' 4\" (162.6 cm)  · Current Body Weight: 172 lb (78 kg)   · Admission Body Weight: 172 lb (78 kg)    · Usual Body Weight:  (>200 lbs per pt)     · Ideal Body Weight: 120 lbs; % Ideal Body Weight 143.3 %   · BMI: 29.5  · BMI Categories: Overweight (BMI 25.0-29. 9)       Nutrition Diagnosis:   · Inadequate energy intake related to cardiac dysfunction (current medical condition) as evidenced by intake 26-50%, intake 51-75% (variable po intake, possible wt loss, need for ONS)      Nutrition Interventions:   Food and/or Nutrient Delivery:  Continue Current Diet, Start Oral Nutrition Supplement  Nutrition Education/Counseling:  Education not indicated   Coordination of Nutrition Care:  Continue to monitor while inpatient    Goals: Set  Pt to meet % of est'd needs via PO Daily       Nutrition Monitoring and Evaluation:   Food/Nutrient Intake Outcomes:  Food and Nutrient Intake, Supplement Intake  Physical Signs/Symptoms Outcomes:  Biochemical Data, Nutrition Focused Physical Findings, Skin, Weight, GI Status, Fluid Status or Edema     Discharge Planning:     Too soon to determine     Electronically signed by Denys Del Rosario RD, LD on 10/22/21 at 12:05 PM EDT    Contact: [phone]

## 2021-10-22 NOTE — DISCHARGE SUMMARY
[address]     Department of Internal Medicine - Staff Internal Medicine Teaching Service    INPATIENT DISCHARGE SUMMARY      Patient Identification:  Nedra Malcolm is a 68 y.o. female. :  1945  MRN: [id number]     Acct: [de-identified]   PCP: KALYN Carvajal CNP  Admit Date:  10/15/2021  Discharge date and time: No discharge date for patient encounter. Attending Provider: Naomi Oreilly MD                                     [address]  Problem Lists:  Principal Problem:    Pleural effusion  Active Problems:    Asthma    Morbid obesity with BMI of 40.0-44.9, adult (HCC)    COPD exacerbation (HCC)    Pulmonary emphysema (HCC)    Acute respiratory failure with hypoxia (HCC)    Atrial fibrillation with rapid ventricular response (HCC)    Pneumonia of left lower lobe due to infectious organism    Sepsis with acute hypoxic respiratory failure without septic shock (HCC)    Acute on chronic diastolic heart failure (HCC)    Hyponatremia    Recurrent pleural effusion    Mucus plugging of bronchi    Atelectasis of left lung    Acute chest pain    Coag negative Staphylococcus bacteremia    Abnormal pleural fluid  Resolved Problems:    * No resolved hospital problems. *      HOSPITAL STAY     Brief Inpatient course:   Nedra Malcolm is a 68 y.o. female with PMH of COPD/CHF/SEJAL and hypertension who was admitted for the management of Pleural effusion, presented to the emergency department with Shortness of breath and palpitation. She was found to have left lower lobe pneumonia and A. fib with RVR, is admitted to ICU for acute hypoxic respiratory failure. She required pressors and IV antibiotics during ICU stay. Cardiology was consulted for A. fib with RVR started initially on heparin and Cardizem drip. CURT was done which showed no LV thrombus and was cardioverted successfully.   She had recurrent left-sided pleural effusion and decreased air entry. Paracentesis was done which showed no bacterial growth. Bronchoscopy showed left main bronchus obstruction due to thick mucous plugging. No mass lesion was seen. Post bronchoscopy AF with RVR , treated with IV digoxin  and cardiology advised OP ablation once the patient is stable. UTI was treated with p.o. Augmentin for 5 days. COPD is treated with inhalers and steroids. Advised CPAP for SEJAL during sleep. Patient is discharged to SNF.     Procedures/ Significant Interventions:    Bronchoscopy  Successful electrical cardioversion under anesthesia    Consults:     Consults:     Final Specialist Recommendations/Findings:   IP CONSULT TO HOSPITALIST  IP CONSULT TO CASE MANAGEMENT  IP CONSULT TO CARDIOLOGY  IP CONSULT TO IV TEAM  IP CONSULT TO INFECTIOUS DISEASES      Any Hospital Acquired Infections: none    Discharge Functional Status:  stable    DISCHARGE PLAN     Disposition: SNF    Patient Instructions:   Current Discharge Medication List      CONTINUE these medications which have NOT CHANGED    Details   metoprolol tartrate 75 MG TABS TAKE ONE TABLET BY MOUTH TWICE DAILY  Qty: 60 tablet, Refills: 3    Associated Diagnoses: Benign essential HTN      dilTIAZem (CARDIZEM CD) 240 MG extended release capsule Take 1 capsule by mouth daily  Qty: 30 capsule, Refills: 3      apixaban (ELIQUIS) 5 MG TABS tablet Take 1 tablet by mouth 2 times daily  Qty: 180 tablet, Refills: 3      atorvastatin (LIPITOR) 40 MG tablet Take 1 tablet by mouth daily  Qty: 90 tablet, Refills: 3    Associated Diagnoses: Hypercholesterolemia      citalopram (CELEXA) 40 MG tablet Take 0.5 tablets by mouth daily  Qty: 30 tablet, Refills: 3      losartan (COZAAR) 100 MG tablet Take 1 tablet by mouth daily  Qty: 30 tablet, Refills: 2    Associated Diagnoses: Benign essential HTN      meloxicam (MOBIC) 15 MG tablet Take 15 mg by mouth daily      clopidogrel (PLAVIX) 75 MG tablet Take 1 tablet by mouth daily  Qty: 30 tablet, Refills: 11 Associated Diagnoses: Retinal arterial branch occlusion, right      cyclobenzaprine (FLEXERIL) 10 MG tablet TAKE ONE TABLET BY MOUTH THREE TIMES DAILY AS NEEDED FOR MUSCLE SPASM  Qty: 90 tablet, Refills: 11    Associated Diagnoses: Muscle spasm      diphenhydrAMINE (BENADRYL) 25 MG capsule Take 25 mg by mouth every 6 hours as needed for Itching      zinc oxide (DESITIN) 40 % ointment Apply topically as needed for Dry Skin Apply topically as needed.       furosemide (LASIX) 20 MG tablet Take 1 tablet by mouth daily  Qty: 30 tablet, Refills: 11    Associated Diagnoses: Left ankle swelling      isosorbide mononitrate (IMDUR) 30 MG extended release tablet Take 1 tablet by mouth daily  Qty: 30 tablet, Refills: 11    Associated Diagnoses: Benign essential HTN      albuterol sulfate  (90 Base) MCG/ACT inhaler Inhale 2 puffs into the lungs every 6 hours as needed for Wheezing  Qty: 1 Inhaler, Refills: 11    Associated Diagnoses: Chronic obstructive pulmonary disease, unspecified COPD type ([facility])      Calcium Carb-Cholecalciferol (CALCIUM + D3 PO) Take by mouth daily      CINNAMON PO Take by mouth daily      TURMERIC CURCUMIN PO Take by mouth daily      ipratropium-albuterol (DUONEB) 0.5-2.5 (3) MG/3ML SOLN nebulizer solution Inhale 1 vial into the lungs every 4 hours      beclomethasone (QVAR) 80 MCG/ACT inhaler Inhale 2 puffs into the lungs 2 times daily  Qty: 3 Inhaler, Refills: 3    Associated Diagnoses: Chronic obstructive pulmonary disease, unspecified COPD type ([facility])      Acetaminophen 650 MG TABS Take 650 mg by mouth every 4 hours as needed for Pain or Fever  Qty: 30 tablet, Refills: 0      clonazePAM (KLONOPIN) 1 MG tablet Take 1 mg by mouth nightly       LYSINE PO Take by mouth daily      vitamin D (CHOLECALCIFEROL) 1000 UNIT TABS tablet Take 1,000 Units by mouth daily      Pyridoxine HCl (VITAMIN B-6) 50 MG tablet Take 100 mg by mouth daily      Cyanocobalamin (VITAMIN B 12 PO) Take by mouth daily Magnesium 500 MG TABS Take by mouth      Ascorbic Acid (VITAMIN C) 500 MG tablet Take 500 mg by mouth daily             Activity: activity as tolerated    Diet: cardiac diet    Follow-up:    KALYN Vásquez - NP  [address] [place] [address] 6  Elizabeth [facility]  [phone]      Cardiology -- Wednesday, October 27 at 1:45 at [facility]. office      Patient Instructions: Continue taking your medication regularly, use of CPAP during sleep. Follow-up with PCP  Follow up labs: Follow up imaging:     Note that over 30 minutes was spent in preparing discharge papers, discussing discharge with patient, medication review, etc.      Brandi Saucedo MD,  Internal Medicine Resident, PGY-1  [facility];  [address] [place] [address]  10/22/2021, 11:28 AM

## 2021-10-23 LAB
CULTURE: ABNORMAL
Lab: ABNORMAL
SPECIMEN DESCRIPTION: ABNORMAL

## 2021-10-25 ENCOUNTER — TELEPHONE (OUTPATIENT)
Dept: PULMONOLOGY | Age: 76
End: 2021-10-25

## 2021-10-25 NOTE — TELEPHONE ENCOUNTER
Message  Received: Today  Hans Elmore, [address] [facility], please see message from Dr Blaire San and call to schedule. Thank you.           Previous Messages       ----- Message -----   From: Jean Chavarria MD   Sent: 10/23/2021   9:27 AM EDT   To: [facility] Respiratory Spec Clinical Staff     Please have the patient see me in the office in 2 to 3 weeks. Thank you   ----- Message -----   From: Jean Chavarria MD   Sent: 10/22/2021   6:39 PM EDT   To: Jean Chavarria MD       LEFT VOICE MAIL MESSAGE asking pt to call office to schedule 2-3 wk f/u appt.    Mailed letter-

## 2021-11-01 LAB
CULTURE: NORMAL
Lab: NORMAL
SPECIMEN DESCRIPTION: NORMAL

## 2021-11-02 PROBLEM — R77.8 ELEVATED TROPONIN: Status: RESOLVED | Noted: 2021-10-03 | Resolved: 2021-11-02

## 2021-11-02 PROBLEM — N39.0 UTI (URINARY TRACT INFECTION): Status: RESOLVED | Noted: 2021-10-03 | Resolved: 2021-11-02

## 2021-11-03 ENCOUNTER — HOSPITAL ENCOUNTER (INPATIENT)
Age: 76
LOS: 16 days | Discharge: HOME | DRG: 208 | End: 2021-11-19
Attending: EMERGENCY MEDICINE | Admitting: STUDENT IN AN ORGANIZED HEALTH CARE EDUCATION/TRAINING PROGRAM
Payer: MEDICARE

## 2021-11-03 ENCOUNTER — APPOINTMENT (OUTPATIENT)
Dept: GENERAL RADIOLOGY | Age: 76
DRG: 208 | End: 2021-11-03
Payer: MEDICARE

## 2021-11-03 DIAGNOSIS — I50.33 ACUTE ON CHRONIC DIASTOLIC HEART FAILURE (HCC): ICD-10-CM

## 2021-11-03 DIAGNOSIS — N17.9 AKI (ACUTE KIDNEY INJURY) (HCC): ICD-10-CM

## 2021-11-03 DIAGNOSIS — I50.9 ACUTE ON CHRONIC CONGESTIVE HEART FAILURE, UNSPECIFIED HEART FAILURE TYPE (HCC): Primary | ICD-10-CM

## 2021-11-03 PROBLEM — J44.1 CHRONIC OBSTRUCTIVE PULMONARY DISEASE WITH ACUTE EXACERBATION (HCC): Status: ACTIVE | Noted: 2018-09-19

## 2021-11-03 PROBLEM — I48.0 PAROXYSMAL ATRIAL FIBRILLATION (HCC): Status: ACTIVE | Noted: 2021-10-07

## 2021-11-03 LAB
ABSOLUTE EOS #: 0.14 K/UL (ref 0–0.44)
ABSOLUTE IMMATURE GRANULOCYTE: 0.08 K/UL (ref 0–0.3)
ABSOLUTE LYMPH #: 1 K/UL (ref 1.1–3.7)
ABSOLUTE MONO #: 1.32 K/UL (ref 0.1–1.2)
ALBUMIN SERPL-MCNC: 3 G/DL (ref 3.5–5.2)
ALBUMIN/GLOBULIN RATIO: 1 (ref 1–2.5)
ALP BLD-CCNC: 72 U/L (ref 35–104)
ALT SERPL-CCNC: 12 U/L (ref 5–33)
ANION GAP SERPL CALCULATED.3IONS-SCNC: 11 MMOL/L (ref 9–17)
AST SERPL-CCNC: 14 U/L
BASOPHILS # BLD: 1 % (ref 0–2)
BASOPHILS ABSOLUTE: 0.07 K/UL (ref 0–0.2)
BILIRUB SERPL-MCNC: 1.4 MG/DL (ref 0.3–1.2)
BNP INTERPRETATION: ABNORMAL
BUN BLDV-MCNC: 26 MG/DL (ref 8–23)
BUN/CREAT BLD: ABNORMAL (ref 9–20)
CALCIUM SERPL-MCNC: 8.9 MG/DL (ref 8.6–10.4)
CHLORIDE BLD-SCNC: 92 MMOL/L (ref 98–107)
CO2: 26 MMOL/L (ref 20–31)
CREAT SERPL-MCNC: 1.65 MG/DL (ref 0.5–0.9)
DIFFERENTIAL TYPE: ABNORMAL
EOSINOPHILS RELATIVE PERCENT: 1 % (ref 1–4)
GFR AFRICAN AMERICAN: 37 ML/MIN
GFR NON-AFRICAN AMERICAN: 30 ML/MIN
GFR SERPL CREATININE-BSD FRML MDRD: ABNORMAL ML/MIN/{1.73_M2}
GFR SERPL CREATININE-BSD FRML MDRD: ABNORMAL ML/MIN/{1.73_M2}
GLUCOSE BLD-MCNC: 128 MG/DL (ref 70–99)
HCT VFR BLD CALC: 36.8 % (ref 36.3–47.1)
HEMOGLOBIN: 11.5 G/DL (ref 11.9–15.1)
IMMATURE GRANULOCYTES: 1 %
LIPASE: 8 U/L (ref 13–60)
LYMPHOCYTES # BLD: 7 % (ref 24–43)
MCH RBC QN AUTO: 31.3 PG (ref 25.2–33.5)
MCHC RBC AUTO-ENTMCNC: 31.3 G/DL (ref 28.4–34.8)
MCV RBC AUTO: 100 FL (ref 82.6–102.9)
MONOCYTES # BLD: 9 % (ref 3–12)
NRBC AUTOMATED: 0 PER 100 WBC
PDW BLD-RTO: 13.2 % (ref 11.8–14.4)
PLATELET # BLD: 277 K/UL (ref 138–453)
PLATELET ESTIMATE: ABNORMAL
PMV BLD AUTO: 9.7 FL (ref 8.1–13.5)
POTASSIUM SERPL-SCNC: 4.6 MMOL/L (ref 3.7–5.3)
PRO-BNP: 3134 PG/ML
RBC # BLD: 3.68 M/UL (ref 3.95–5.11)
RBC # BLD: ABNORMAL 10*6/UL
SARS-COV-2, RAPID: NOT DETECTED
SEG NEUTROPHILS: 81 % (ref 36–65)
SEGMENTED NEUTROPHILS ABSOLUTE COUNT: 11.93 K/UL (ref 1.5–8.1)
SODIUM BLD-SCNC: 129 MMOL/L (ref 135–144)
SPECIMEN DESCRIPTION: NORMAL
TOTAL PROTEIN: 6.1 G/DL (ref 6.4–8.3)
TROPONIN INTERP: ABNORMAL
TROPONIN INTERP: ABNORMAL
TROPONIN T: ABNORMAL NG/ML
TROPONIN T: ABNORMAL NG/ML
TROPONIN, HIGH SENSITIVITY: 37 NG/L (ref 0–14)
TROPONIN, HIGH SENSITIVITY: 37 NG/L (ref 0–14)
WBC # BLD: 14.5 K/UL (ref 3.5–11.3)
WBC # BLD: ABNORMAL 10*3/UL

## 2021-11-03 PROCEDURE — 1200000000 HC SEMI PRIVATE

## 2021-11-03 PROCEDURE — 85025 COMPLETE CBC W/AUTO DIFF WBC: CPT

## 2021-11-03 PROCEDURE — 99223 1ST HOSP IP/OBS HIGH 75: CPT | Performed by: NURSE PRACTITIONER

## 2021-11-03 PROCEDURE — 93005 ELECTROCARDIOGRAM TRACING: CPT | Performed by: STUDENT IN AN ORGANIZED HEALTH CARE EDUCATION/TRAINING PROGRAM

## 2021-11-03 PROCEDURE — 94640 AIRWAY INHALATION TREATMENT: CPT

## 2021-11-03 PROCEDURE — 83880 ASSAY OF NATRIURETIC PEPTIDE: CPT

## 2021-11-03 PROCEDURE — 80053 COMPREHEN METABOLIC PANEL: CPT

## 2021-11-03 PROCEDURE — 87635 SARS-COV-2 COVID-19 AMP PRB: CPT

## 2021-11-03 PROCEDURE — 6370000000 HC RX 637 (ALT 250 FOR IP): Performed by: STUDENT IN AN ORGANIZED HEALTH CARE EDUCATION/TRAINING PROGRAM

## 2021-11-03 PROCEDURE — 6360000002 HC RX W HCPCS

## 2021-11-03 PROCEDURE — 6360000002 HC RX W HCPCS: Performed by: STUDENT IN AN ORGANIZED HEALTH CARE EDUCATION/TRAINING PROGRAM

## 2021-11-03 PROCEDURE — 6370000000 HC RX 637 (ALT 250 FOR IP): Performed by: NURSE PRACTITIONER

## 2021-11-03 PROCEDURE — 83690 ASSAY OF LIPASE: CPT

## 2021-11-03 PROCEDURE — 84484 ASSAY OF TROPONIN QUANT: CPT

## 2021-11-03 PROCEDURE — 94664 DEMO&/EVAL PT USE INHALER: CPT

## 2021-11-03 PROCEDURE — 99285 EMERGENCY DEPT VISIT HI MDM: CPT

## 2021-11-03 PROCEDURE — 6360000002 HC RX W HCPCS: Performed by: NURSE PRACTITIONER

## 2021-11-03 PROCEDURE — 2500000003 HC RX 250 WO HCPCS

## 2021-11-03 PROCEDURE — 2700000000 HC OXYGEN THERAPY PER DAY

## 2021-11-03 PROCEDURE — 71045 X-RAY EXAM CHEST 1 VIEW: CPT

## 2021-11-03 RX ORDER — ONDANSETRON 2 MG/ML
4 INJECTION INTRAMUSCULAR; INTRAVENOUS EVERY 6 HOURS PRN
Status: DISCONTINUED | OUTPATIENT
Start: 2021-11-03 | End: 2021-11-20 | Stop reason: HOSPADM

## 2021-11-03 RX ORDER — ACETAMINOPHEN 650 MG/1
650 SUPPOSITORY RECTAL EVERY 6 HOURS PRN
Status: DISCONTINUED | OUTPATIENT
Start: 2021-11-03 | End: 2021-11-20 | Stop reason: HOSPADM

## 2021-11-03 RX ORDER — SODIUM CHLORIDE 9 MG/ML
25 INJECTION, SOLUTION INTRAVENOUS PRN
Status: DISCONTINUED | OUTPATIENT
Start: 2021-11-03 | End: 2021-11-12 | Stop reason: SDUPTHER

## 2021-11-03 RX ORDER — SODIUM POLYSTYRENE SULFONATE 15 G/60ML
15 SUSPENSION ORAL; RECTAL
Status: ACTIVE | OUTPATIENT
Start: 2021-11-03 | End: 2021-11-03

## 2021-11-03 RX ORDER — MELOXICAM 7.5 MG/1
15 TABLET ORAL DAILY
Status: CANCELLED | OUTPATIENT
Start: 2021-11-03

## 2021-11-03 RX ORDER — METOPROLOL TARTRATE 50 MG/1
50 TABLET, FILM COATED ORAL 2 TIMES DAILY
Status: DISCONTINUED | OUTPATIENT
Start: 2021-11-03 | End: 2021-11-06

## 2021-11-03 RX ORDER — CITALOPRAM 20 MG/1
20 TABLET ORAL DAILY
Status: DISCONTINUED | OUTPATIENT
Start: 2021-11-04 | End: 2021-11-13

## 2021-11-03 RX ORDER — CYCLOBENZAPRINE HCL 10 MG
5 TABLET ORAL 3 TIMES DAILY PRN
Status: DISCONTINUED | OUTPATIENT
Start: 2021-11-03 | End: 2021-11-12

## 2021-11-03 RX ORDER — SODIUM CHLORIDE 9 MG/ML
INJECTION, SOLUTION INTRAVENOUS CONTINUOUS
Status: DISCONTINUED | OUTPATIENT
Start: 2021-11-03 | End: 2021-11-03

## 2021-11-03 RX ORDER — METHYLPREDNISOLONE SODIUM SUCCINATE 125 MG/2ML
60 INJECTION, POWDER, LYOPHILIZED, FOR SOLUTION INTRAMUSCULAR; INTRAVENOUS EVERY 8 HOURS
Status: DISCONTINUED | OUTPATIENT
Start: 2021-11-04 | End: 2021-11-04

## 2021-11-03 RX ORDER — ALBUTEROL SULFATE 90 UG/1
2 AEROSOL, METERED RESPIRATORY (INHALATION) EVERY 6 HOURS PRN
Status: DISCONTINUED | OUTPATIENT
Start: 2021-11-03 | End: 2021-11-04

## 2021-11-03 RX ORDER — ACETAMINOPHEN 325 MG/1
650 TABLET ORAL EVERY 6 HOURS PRN
Status: DISCONTINUED | OUTPATIENT
Start: 2021-11-03 | End: 2021-11-20 | Stop reason: HOSPADM

## 2021-11-03 RX ORDER — ATORVASTATIN CALCIUM 40 MG/1
40 TABLET, FILM COATED ORAL DAILY
Status: DISCONTINUED | OUTPATIENT
Start: 2021-11-04 | End: 2021-11-13

## 2021-11-03 RX ORDER — SODIUM CHLORIDE 0.9 % (FLUSH) 0.9 %
5-40 SYRINGE (ML) INJECTION EVERY 12 HOURS SCHEDULED
Status: DISCONTINUED | OUTPATIENT
Start: 2021-11-03 | End: 2021-11-12 | Stop reason: SDUPTHER

## 2021-11-03 RX ORDER — ISOSORBIDE MONONITRATE 30 MG/1
30 TABLET, EXTENDED RELEASE ORAL DAILY
Status: DISCONTINUED | OUTPATIENT
Start: 2021-11-04 | End: 2021-11-11

## 2021-11-03 RX ORDER — FLUTICASONE PROPIONATE 110 UG/1
1 AEROSOL, METERED RESPIRATORY (INHALATION) 2 TIMES DAILY
Status: DISCONTINUED | OUTPATIENT
Start: 2021-11-03 | End: 2021-11-12

## 2021-11-03 RX ORDER — CLOPIDOGREL BISULFATE 75 MG/1
75 TABLET ORAL DAILY
Status: DISCONTINUED | OUTPATIENT
Start: 2021-11-04 | End: 2021-11-13

## 2021-11-03 RX ORDER — FUROSEMIDE 10 MG/ML
20 INJECTION INTRAMUSCULAR; INTRAVENOUS ONCE
Status: COMPLETED | OUTPATIENT
Start: 2021-11-03 | End: 2021-11-04

## 2021-11-03 RX ORDER — SODIUM CHLORIDE 0.9 % (FLUSH) 0.9 %
5-40 SYRINGE (ML) INJECTION PRN
Status: DISCONTINUED | OUTPATIENT
Start: 2021-11-03 | End: 2021-11-12 | Stop reason: SDUPTHER

## 2021-11-03 RX ORDER — SODIUM POLYSTYRENE SULFONATE 15 G/60ML
30 SUSPENSION ORAL; RECTAL
Status: ACTIVE | OUTPATIENT
Start: 2021-11-03 | End: 2021-11-03

## 2021-11-03 RX ORDER — LOSARTAN POTASSIUM 50 MG/1
100 TABLET ORAL DAILY
Status: CANCELLED | OUTPATIENT
Start: 2021-11-03

## 2021-11-03 RX ORDER — ALBUTEROL SULFATE 2.5 MG/3ML
15 SOLUTION RESPIRATORY (INHALATION)
Status: DISCONTINUED | OUTPATIENT
Start: 2021-11-03 | End: 2021-11-04

## 2021-11-03 RX ORDER — ALBUTEROL SULFATE 2.5 MG/3ML
2.5 SOLUTION RESPIRATORY (INHALATION)
Status: DISCONTINUED | OUTPATIENT
Start: 2021-11-03 | End: 2021-11-04

## 2021-11-03 RX ORDER — ASCORBIC ACID 500 MG
500 TABLET ORAL DAILY
Status: DISCONTINUED | OUTPATIENT
Start: 2021-11-04 | End: 2021-11-13

## 2021-11-03 RX ORDER — AMIODARONE HYDROCHLORIDE 200 MG/1
200 TABLET ORAL DAILY
Status: DISCONTINUED | OUTPATIENT
Start: 2021-11-04 | End: 2021-11-11

## 2021-11-03 RX ORDER — ONDANSETRON 4 MG/1
4 TABLET, ORALLY DISINTEGRATING ORAL EVERY 8 HOURS PRN
Status: DISCONTINUED | OUTPATIENT
Start: 2021-11-03 | End: 2021-11-20 | Stop reason: HOSPADM

## 2021-11-03 RX ORDER — IPRATROPIUM BROMIDE AND ALBUTEROL SULFATE 2.5; .5 MG/3ML; MG/3ML
1 SOLUTION RESPIRATORY (INHALATION) EVERY 4 HOURS PRN
Status: DISCONTINUED | OUTPATIENT
Start: 2021-11-03 | End: 2021-11-04

## 2021-11-03 RX ORDER — METHYLPREDNISOLONE SODIUM SUCCINATE 125 MG/2ML
125 INJECTION, POWDER, LYOPHILIZED, FOR SOLUTION INTRAMUSCULAR; INTRAVENOUS ONCE
Status: COMPLETED | OUTPATIENT
Start: 2021-11-03 | End: 2021-11-03

## 2021-11-03 RX ADMIN — METOPROLOL TARTRATE 50 MG: 50 TABLET, FILM COATED ORAL at 22:25

## 2021-11-03 RX ADMIN — ALBUTEROL SULFATE 2.5 MG: 2.5 SOLUTION RESPIRATORY (INHALATION) at 22:21

## 2021-11-03 RX ADMIN — METHYLPREDNISOLONE SODIUM SUCCINATE 125 MG: 125 INJECTION, POWDER, FOR SOLUTION INTRAMUSCULAR; INTRAVENOUS at 22:13

## 2021-11-03 RX ADMIN — FLUTICASONE PROPIONATE 1 PUFF: 110 AEROSOL, METERED RESPIRATORY (INHALATION) at 23:49

## 2021-11-03 RX ADMIN — ALBUTEROL SULFATE 2 PUFF: 90 AEROSOL, METERED RESPIRATORY (INHALATION) at 16:56

## 2021-11-03 ASSESSMENT — ENCOUNTER SYMPTOMS
WHEEZING: 1
COUGH: 1
ABDOMINAL PAIN: 1
NAUSEA: 1
VOMITING: 1
COUGH: 0
BLOOD IN STOOL: 0
SHORTNESS OF BREATH: 1
CONSTIPATION: 1
ABDOMINAL DISTENTION: 1

## 2021-11-03 NOTE — ED NOTES
Pt advised of need for urine specimen; pt states unable to void at this time; will reassess      Megan Shanks RN  11/03/21 [id number]

## 2021-11-03 NOTE — ED PROVIDER NOTES
[facility] ED  Emergency Department Encounter  EmergencyMedicine Resident     Pt Willis Dee  MRN: [id number]  I[place]fs[place] 1945  Date of evaluation: 11/3/21  PCP:  KALYN Doyle CNP    CHIEF COMPLAINT       Chief Complaint   Patient presents with    Leg Swelling     increasing BLE edema; pt from SNF; per PA at SNF, pt has fluid retention, needs IV meds that they cannot administer       HISTORY OF PRESENT ILLNESS  (Location/Symptom, Timing/Onset, Context/Setting, Quality, Duration, Modifying Factors, Severity.)    This patient was evaluated in the Emergency Department for symptoms described in the history of present illness. He/she was evaluated in the context of the global COVID-19 pandemic, which necessitated consideration that the patient might be at risk for infection with the SARS-CoV-2 virus that causes COVID-19. Institutional protocols and algorithms that pertain to the evaluation of patients at risk for COVID-19 are in a state of rapid change based on information released by regulatory bodies including the CDC and federal and state organizations. These policies and algorithms were followed during the patient's care in the ED. Shen Ortiz is a 68 y.o. female COPD, hypertension, hyperlipidemia, mitral insufficiency and congestive heart failure present emergency department with complaints of shortness of breath and lower extremity edema. Patient was recently in the hospital on 10/21 after being admitted for shortness of breath, palpitations, pleural effusion and acute hypoxic respiratory failure. No pressors and weaned off. Received IV antibiotics in the ICU for suspected pneumonia. Cardiology was consulted for A. fib with RVR. Is on heparin and Cardizem infusion. Negative CURT and subsequently cardioverted successfully. Thoracentesis was performed at that time for pleural effusion. Bronchoscopy showing left main bronchus obstruction.   Patient went back into A. fib with RVR started on digoxin per cardiology recommendation with plan for outpatient ablation after recovery from acute illness. Discharged home on Augmentin and steroids to cover for pneumonia as well as UTI. Patient states that she has not felt well for the past 3 to 4 days. Worsening shortness of breath most notably with exertion and laying flat. Does note increased peripheral edema. Was seen by a physician assistant at skilled nursing facility who is concerned the patient is retaining fluid. Since recent discharge patient has been using oxygen intermittently, mostly with exertion and physical therapy as well as overnight. Additional oxygen permits but is using more frequently the use of chest pain. Does endorse diffuse abdominal pain and mild constipation. Did have some nausea and vomiting  a few days ago but has since been able to tolerate p.o. intake. Denies any blood in stool. No changes in urination. No previous abdominal surgeries with exception of patient. Still passing flatus but no bowel movement the past 2 days. Reports compliance with all home medications including Eliquis. PAST MEDICAL / SURGICAL / SOCIAL / FAMILY HISTORY      has a past medical history of Asthma, COPD (chronic obstructive pulmonary disease) ([facility] [address] Gout, Hyperlipidemia, Hypertension, Mitral insufficiency, and Pneumonia. has a past surgical history that includes Hysterectomy (1986); knee surgery; Colonoscopy; Dental surgery; Tonsillectomy and adenoidectomy; Ankle surgery (Left, 03/22/2016); pr colsc flx w/rmvl of tumor polyp lesion snare tq (N/A, 8/8/2017); and bronchoscopy (N/A, 10/20/2021). Social History     Socioeconomic History    Marital status:       Spouse name: Not on file    Number of children: Not on file    Years of education: Not on file    Highest education level: Not on file   Occupational History    Not on file   Tobacco Use    Smoking status: Former Smoker    Smokeless tobacco: Never Used    Tobacco comment: over 30 years ago    Vaping Use    Vaping Use: Never used   Substance and Sexual Activity    Alcohol use: Yes     Alcohol/week: 0.0 standard drinks     Comment: occasionally     Drug use: No    Sexual activity: Not on file   Other Topics Concern    Not on file   Social History Narrative    Not on file     Social Determinants of Health     Financial Resource Strain:     Difficulty of Paying Living Expenses:    Food Insecurity:     Worried About Running Out of Food in the Last Year:     [address] in the Last Year:    Transportation Needs:     Lack of Transportation (Medical):  Lack of Transportation (Non-Medical):    Physical Activity:     Days of Exercise per Week:     Minutes of Exercise per Session:    Stress:     Feeling of Stress :    Social Connections:     Frequency of Communication with Friends and Family:     Frequency of Social Gatherings with Friends and Family:     Attends [employer] Services:     Active Member of Clubs or Organizations:     Attends Club or Organization Meetings:     Marital Status:    Intimate Partner Violence:     Fear of Current or Ex-Partner:     Emotionally Abused:     Physically Abused:     Sexually Abused:        Family History   Problem Relation Age of Onset    High Blood Pressure Mother     Cancer Mother     Asthma Father     Heart Disease Father     Cancer Father        Allergies:  Latex, Betadine [povidone iodine], Bee venom, Dilaudid [hydromorphone hcl], Adhesive tape, and Sulfa antibiotics    Home Medications:  Prior to Admission medications    Medication Sig Start Date End Date Taking?  Authorizing Provider   amiodarone (CORDARONE) 200 MG tablet Take 1 tablet by mouth daily 10/22/21 11/21/21  Cecil Hazel MD   metoprolol tartrate (LOPRESSOR) 50 MG tablet Take 1 tablet by mouth 2 times daily 10/22/21   Cecil Hazel MD   Calamine 8-8 % LOTN lotion Apply to rash on legs 10/22/21   Cecil Hazel MD miconazole (MICOTIN) 2 % powder Apply topically 2 times daily. 10/22/21   Brendon Hamilton MD   simethicone (MYLICON) 80 MG chewable tablet Take 1 tablet by mouth every 6 hours as needed for Flatulence (Abdominal Cramps) 10/22/21 11/21/21  Brendon Hamilton MD   clonazePAM (KLONOPIN) 1 MG tablet Take 1 tablet by mouth nightly for 3 days. 10/22/21 10/25/21  Brendon Hamilton MD   apixaban (ELIQUIS) 5 MG TABS tablet Take 1 tablet by mouth 2 times daily 10/8/21   Leo Lala MD   atorvastatin (LIPITOR) 40 MG tablet Take 1 tablet by mouth daily 10/8/21   Leo Lala MD   citalopram (CELEXA) 40 MG tablet Take 0.5 tablets by mouth daily 10/8/21   Leo Lala MD   losartan (COZAAR) 100 MG tablet Take 1 tablet by mouth daily 8/28/18   Corwin Bryson MD   meloxicam (MOBIC) 15 MG tablet Take 15 mg by mouth daily    Historical Provider, MD   clopidogrel (PLAVIX) 75 MG tablet Take 1 tablet by mouth daily 10/5/17   Corwin Bryson MD   cyclobenzaprine (FLEXERIL) 10 MG tablet TAKE ONE TABLET BY MOUTH THREE TIMES DAILY AS NEEDED FOR MUSCLE SPASM 10/5/17   Corwin Bryson MD   diphenhydrAMINE (BENADRYL) 25 MG capsule Take 25 mg by mouth every 6 hours as needed for Itching    Historical Provider, MD   zinc oxide (DESITIN) 40 % ointment Apply topically as needed for Dry Skin Apply topically as needed.     Historical Provider, MD   furosemide (LASIX) 20 MG tablet Take 1 tablet by mouth daily 7/14/17   Corwin Bryson MD   isosorbide mononitrate (IMDUR) 30 MG extended release tablet Take 1 tablet by mouth daily 7/14/17   Corwin Bryson MD   albuterol sulfate  (90 Base) MCG/ACT inhaler Inhale 2 puffs into the lungs every 6 hours as needed for Wheezing 7/14/17   Corwin Bryson MD   Calcium Carb-Cholecalciferol (CALCIUM + D3 PO) Take by mouth daily    Historical Provider, MD   CINNAMON PO Take by mouth daily    Historical Provider, MD   TURMERIC CURCUMIN PO Take by mouth daily    Historical Provider, MD ipratropium-albuterol (DUONEB) 0.5-2.5 (3) MG/3ML SOLN nebulizer solution Inhale 1 vial into the lungs every 4 hours    Historical Provider, MD   beclomethasone (QVAR) 80 MCG/ACT inhaler Inhale 2 puffs into the lungs 2 times daily 6/17/16   Jakub Tanner MD   LYSINE PO Take by mouth daily    Historical Provider, MD   vitamin D (CHOLECALCIFEROL) 1000 UNIT TABS tablet Take 1,000 Units by mouth daily    Historical Provider, MD   Pyridoxine HCl (VITAMIN B-6) 50 MG tablet Take 100 mg by mouth daily    Historical Provider, MD   Cyanocobalamin (VITAMIN B 12 PO) Take by mouth daily    Historical Provider, MD   Magnesium 500 MG TABS Take by mouth    Historical Provider, MD   Ascorbic Acid (VITAMIN C) 500 MG tablet Take 500 mg by mouth daily    Historical Provider, MD       REVIEW OF SYSTEMS    (2-9 systems for level 4, 10 or more for level 5)      Review of Systems   Constitutional: Negative for chills and fever. HENT: Negative for congestion and ear pain. Eyes: Negative for visual disturbance. Respiratory: Positive for shortness of breath and wheezing. Negative for cough. Cardiovascular: Positive for palpitations and leg swelling. Negative for chest pain. Gastrointestinal: Positive for abdominal pain, constipation, nausea and vomiting. Negative for blood in stool. Genitourinary: Negative for dysuria and hematuria. Musculoskeletal: Negative for neck pain and neck stiffness. Skin: Negative for rash. Neurological: Negative for dizziness, weakness, numbness and headaches. Hematological: Bruises/bleeds easily. PHYSICAL EXAM   (up to 7 for level 4, 8 or more for level 5)      INITIAL VITALS:   BP (!) 141/81   Pulse 114   Temp 98.1 °F (36.7 °C) (Oral)   Resp 22   SpO2 93%     Physical Exam  Constitutional:       Appearance: She is obese. She is ill-appearing. She is not toxic-appearing. HENT:      Head: Normocephalic and atraumatic.       Nose: Nose normal.      Mouth/Throat:      Mouth: Mucous membranes are moist.      Pharynx: No oropharyngeal exudate or posterior oropharyngeal erythema. Eyes:      General: No scleral icterus. Extraocular Movements: Extraocular movements intact. Conjunctiva/sclera: Conjunctivae normal.      Pupils: Pupils are equal, round, and reactive to light. Cardiovascular:      Rate and Rhythm: Tachycardia present. Rhythm irregular. Pulses: Normal pulses. Pulmonary:      Breath sounds: No stridor. Wheezing (mild expiratory bilateral) present. No rhonchi or rales. Comments: Tachypnea, mild to moderate. Supplemental oxygen via nasal cannula. Speaking in full sentences. Diminished breath sounds bilateral bases. Abdominal:      General: There is no distension. Palpations: Abdomen is soft. Tenderness: There is abdominal tenderness (mild, epigastric and RUQ). There is no guarding or rebound. Musculoskeletal:         General: No swelling or deformity. Normal range of motion. Cervical back: Normal range of motion. No muscular tenderness. Comments: Bilateral pitting edema left worse than right. Left 2-3+ edema, right 1-2+ pitting edema. Skin:     General: Skin is warm and dry. Coloration: Skin is not jaundiced. Findings: No rash. Neurological:      Mental Status: She is alert and oriented to person, place, and time.    Psychiatric:         Behavior: Behavior normal.         DIFFERENTIAL  DIAGNOSIS     PLAN (LABS / IMAGING / EKG):  Orders Placed This Encounter   Procedures    Culture, Urine    COVID-19, Rapid    XR CHEST PORTABLE    Troponin    Troponin    CBC Auto Differential    Brain Natriuretic Peptide    Comprehensive Metabolic Panel w/ Reflex to MG    Lipase    Urinalysis, reflex to microscopic    Lactic Acid, Plasma    Telemetry monitoring - continuous duration    Inpatient consult to Hospitalist    Initiate ED RT Aerosol protocol    EKG 12 Lead    PATIENT STATUS (FROM ED OR OR/PROCEDURAL) Inpatient MEDICATIONS ORDERED:  Orders Placed This Encounter   Medications    AND Linked Order Group     albuterol (PROVENTIL) nebulizer solution 2.5 mg      Order Specific Question:   Initiate RT Bronchodilator Protocol      Answer: Yes     ipratropium (ATROVENT) 0.02 % nebulizer solution 0.5 mg      Order Specific Question:   Initiate RT Bronchodilator Protocol      Answer: Yes    AND Linked Order Group     albuterol (PROVENTIL) nebulizer solution 15 mg      Order Specific Question:   Initiate RT Bronchodilator Protocol      Answer: Yes     ipratropium (ATROVENT) 0.02 % nebulizer solution 0.25 mg      Order Specific Question:   Initiate RT Bronchodilator Protocol      Answer: Yes    albuterol sulfate  (90 Base) MCG/ACT inhaler 2 puff     Order Specific Question:   Initiate RT Bronchodilator Protocol     Answer: Yes    isosorbide mononitrate (IMDUR) extended release tablet 30 mg    metoprolol tartrate (LOPRESSOR) tablet 50 mg    methylPREDNISolone sodium (SOLU-MEDROL) injection 125 mg         DIAGNOSTIC RESULTS / EMERGENCY DEPARTMENT COURSE / MDM     Results for orders placed or performed during the hospital encounter of 11/03/21   COVID-19, Rapid    Specimen: Nasopharyngeal Swab   Result Value Ref Range    Specimen Description . NASOPHARYNGEAL SWAB     SARS-CoV-2, Rapid Not Detected Not Detected   Troponin   Result Value Ref Range    Troponin, High Sensitivity 37 (H) 0 - 14 ng/L    Troponin T NOT REPORTED <0.03 ng/mL    Troponin Interp NOT REPORTED    Troponin   Result Value Ref Range    Troponin, High Sensitivity 37 (H) 0 - 14 ng/L    Troponin T NOT REPORTED <0.03 ng/mL    Troponin Interp NOT REPORTED    CBC Auto Differential   Result Value Ref Range    WBC 14.5 (H) 3.5 - 11.3 k/uL    RBC 3.68 (L) 3.95 - 5.11 m/uL    Hemoglobin 11.5 (L) 11.9 - 15.1 g/dL    Hematocrit 36.8 36.3 - 47.1 %    .0 82.6 - 102.9 fL    MCH 31.3 25.2 - 33.5 pg    MCHC 31.3 28.4 - 34.8 g/dL    RDW 13.2 11.8 - STEMI    IMPRESSION/MDM/EMERGENCY DEPARTMENT COURSE:  Patient came to emergency department, HPI and physical exam were conducted. All nursing notes were reviewed. 49-year-old female present emergency department for shortness of breath, worse with exertion moderate assistant at patient concern for fluid retention. Recently admitted to ICU and treated for left pleural effusion, congestive heart failure, atrial fibrillation, pneumonia, TIA. Notes that she has had also some constipation, nausea, vomiting. Borderline tachycardia but vitals otherwise within normal limits. Irregularly irregular on monitor. Sitting in bed in no significant distress. Mild kidney, speaking in full sentences, supplemental oxygen via nasal cannula, 2-3+ pitting edema bilateral lower extremities left worse than right. Differential includes CHF exacerbation, pneumonia, ACS, COVID-19 infection, electrolyte abnormality, CASEY, cirrhosis. ,  BNP, chest x-ray, Covid swab, abdominal, lactic acid. Overall low suspicion for ischemic colitis or mesenteric ischemia given patient's compliance with blood thinner but cannot rule out. Abdominal pain/tenderness is minimal at this time. No improvement will consider CT abdomen pelvis which is indicated currently. Continue supplemental oxygen via nasal cannula. No respiratory distress. Do not feel patient needs nitro glycerin infusion or BiPAP at this time. Treat wheezing likely due to chronic COPD. Plan for admission. Covid negative. Concerning for CHF exacerbation. Elevated BNP 3000, was a little over 1002 weeks ago. Troponin is 37 which is down from previous. Patient does have an CASEY with a creatinine of 1.65 from 0.55 12 days ago. Mild hyponatremia. Persistent leukocytosis 14.5. This is unchanged from when patient was discharged. X-ray is relatively unchanged is still does demonstrate a left pulmonary consolidation.   Percent pneumonia but more likely that persistent leukocytosis is due to recent steroid use. ED Course as of Nov 03 2218   Wed Nov 03, 2021   1746 SARS-CoV-2, Rapid: Not Detected [ZT]   1902 Spoke to [facility] who is agreed to admit patient. Will defer diuresis to [facility] team due to patient's current renal function. Patient abdominal pain is remained well controlled and no nausea and vomiting emergency department. Did discuss with [facility] with a low threshold to image if pain persists worsens positive for decision to start antibiotics to [facility] team although feel that majority of patient's respiratory symptoms are due to fluid overloaded state and CHF exacerbation. [ZT]      ED Course User Index  [ZT] Soumya Brody DO       FINAL IMPRESSION      1. Acute on chronic congestive heart failure, unspecified heart failure type ([facility] [address]    2. CASEY (acute kidney injury) ([facility] [address]          DISPOSITION / PLAN     DISPOSITION Admitted 11/03/2021 07:26:36 PM      PATIENT REFERRED TO:  No follow-up provider specified.     DISCHARGE MEDICATIONS:  New Prescriptions    No medications on file       Soumya Brody DO  Emergency Medicine Resident    (Please note that portions of thisnote were completed with a voice recognition program.  Efforts were made to edit the dictations but occasionally words are mis-transcribed.)       Soumya Brody DO  Resident  11/03/21 0910

## 2021-11-03 NOTE — FLOWSHEET NOTE
[facility] [address]  PROGRESS NOTE    Shift date: 11/3/21  Shift day: Wednesday   Shift # 2    Room # 08/08   Name: Lakisha Garcia            Age: 68 y.o. Gender: female          [place]: [address]:     Referral: Routine Visit    Admit Date & Time: 11/3/2021  4:32 PM    PATIENT/EVENT DESCRIPTION:  Lakisha Garcia is a 68 y.o. female         SPIRITUAL ASSESSMENT/INTERVENTION:   arrived and patient appeared to be approachable, anxious and coping. Lisa Harvey engaged in conversation and stated this is her second time in so many months back in the hospital. Lisa Harvey finds comfort from her clarita and has been a blessing in sharing her clarita to others in time of need.  offered prayer which patient accepted and included specific prayers of her own. Patient appeared to be be comforted by prayer and expressed gratitude for  presence. SPIRITUAL CARE FOLLOW-UP PLAN:  Chaplains will remain available to offer spiritual and emotional support as needed. Electronically signed by Laura Eugene Resident, on 11/3/2021 at 6:30 PM.  [address]  [phone]       11/03/21 1301   Encounter Summary   Services provided to: Patient   Referral/Consult From: Sherri   Complexity of Encounter Moderate   Length of Encounter 30 minutes   Spiritual Assessment Completed Yes   Spiritual/Yazidism   Type Spiritual support   Assessment Approachable;Tearful; Anxious; Coping   Intervention Active listening;Explored feelings, thoughts, concerns;Nurtured hope;Prayer;Sustaining presence/ Ministry of presence   Outcome Expressed gratitude;Comfort

## 2021-11-03 NOTE — ED PROVIDER NOTES
[facility] ED     Emergency Department     Faculty Attestation    I performed a history and physical examination of the patient and discussed management with the resident. I reviewed the residents note and agree with the documented findings and plan of care. Any areas of disagreement are noted on the chart. I was personally present for the key portions of any procedures. I have documented in the chart those procedures where I was not present during the key portions. I have reviewed the emergency nurses triage note. I agree with the chief complaint, past medical history, past surgical history, allergies, medications, social and family history as documented unless otherwise noted below. For Physician Assistant/ Nurse Practitioner cases/documentation I have personally evaluated this patient and have completed at least one if not all key elements of the E/M (history, physical exam, and MDM). Additional findings are as noted. Patient with recent admission for pleural effusion, afib RVR, and pneumonia presents with increased shortness of breath and bilateral leg swelling. Patient has been at a skilled nursing facility getting rehab ever since she was discharged from the hospital.  She says over the past couple of days she has had increased shortness of breath and swelling to her legs. She denies chest pain. She says she has had some abdominal pain and has been having only a very small bowel movements. She denies nausea or vomiting. She denies fever or cough. Patient also does have a history of COPD. On exam, patient is lying in the bed and appears to be in mild respiratory distress. There are diminished breath sounds bilaterally. Abdomen is soft with mild periumbilical tenderness. No rebound or guarding is present. Will get EKG, chest x-ray, labs and most likely admit patient.     EKG Interpretation    Interpreted by emergency department physician    Rhythm: atrial fibrillation - controlled  Rate: normal  Axis: left  Ectopy: none  Conduction: normal  ST Segments: nonspecific changes  T Waves: non specific changes  Q Waves: nonspecific    Clinical Impression: non-specific EKG and atrial fibrillation (chronic)    MD Mary Gaspar MD  Attending Emergency  Physician              Jayne Gonzalez MD  11/03/21 0445

## 2021-11-03 NOTE — ED NOTES
Bed: 08  Expected date:   Expected time:   Means of arrival:   Comments:  [address] [facility] dorita Payne RN  11/03/21 7877

## 2021-11-04 ENCOUNTER — APPOINTMENT (OUTPATIENT)
Dept: ULTRASOUND IMAGING | Age: 76
DRG: 208 | End: 2021-11-04
Payer: MEDICARE

## 2021-11-04 PROBLEM — I48.19 PERSISTENT ATRIAL FIBRILLATION (HCC): Status: ACTIVE | Noted: 2021-10-07

## 2021-11-04 LAB
ALBUMIN SERPL-MCNC: 2.6 G/DL (ref 3.5–5.2)
ALBUMIN/GLOBULIN RATIO: 0.7 (ref 1–2.5)
ALP BLD-CCNC: 71 U/L (ref 35–104)
ALT SERPL-CCNC: 12 U/L (ref 5–33)
ANION GAP SERPL CALCULATED.3IONS-SCNC: 12 MMOL/L (ref 9–17)
ANION GAP SERPL CALCULATED.3IONS-SCNC: 13 MMOL/L (ref 9–17)
AST SERPL-CCNC: 13 U/L
BILIRUB SERPL-MCNC: 0.83 MG/DL (ref 0.3–1.2)
BNP INTERPRETATION: ABNORMAL
BUN BLDV-MCNC: 27 MG/DL (ref 8–23)
BUN BLDV-MCNC: 34 MG/DL (ref 8–23)
BUN/CREAT BLD: ABNORMAL (ref 9–20)
BUN/CREAT BLD: ABNORMAL (ref 9–20)
CALCIUM SERPL-MCNC: 8.6 MG/DL (ref 8.6–10.4)
CALCIUM SERPL-MCNC: 8.9 MG/DL (ref 8.6–10.4)
CHLORIDE BLD-SCNC: 91 MMOL/L (ref 98–107)
CHLORIDE BLD-SCNC: 94 MMOL/L (ref 98–107)
CO2: 22 MMOL/L (ref 20–31)
CO2: 22 MMOL/L (ref 20–31)
CREAT SERPL-MCNC: 1.52 MG/DL (ref 0.5–0.9)
CREAT SERPL-MCNC: 1.52 MG/DL (ref 0.5–0.9)
EKG ATRIAL RATE: 122 BPM
EKG Q-T INTERVAL: 332 MS
EKG QRS DURATION: 112 MS
EKG QTC CALCULATION (BAZETT): 423 MS
EKG R AXIS: -38 DEGREES
EKG T AXIS: 131 DEGREES
EKG VENTRICULAR RATE: 98 BPM
GFR AFRICAN AMERICAN: 40 ML/MIN
GFR AFRICAN AMERICAN: 40 ML/MIN
GFR NON-AFRICAN AMERICAN: 33 ML/MIN
GFR NON-AFRICAN AMERICAN: 33 ML/MIN
GFR SERPL CREATININE-BSD FRML MDRD: ABNORMAL ML/MIN/{1.73_M2}
GLUCOSE BLD-MCNC: 156 MG/DL (ref 70–99)
GLUCOSE BLD-MCNC: 195 MG/DL (ref 70–99)
GLUCOSE, FLUID: 196 MG/DL
HCT VFR BLD CALC: 34.8 % (ref 36.3–47.1)
HEMOGLOBIN: 11.3 G/DL (ref 11.9–15.1)
INR BLD: 1.2
LACTATE DEHYDROGENASE, FLUID: 169 U/L
MAGNESIUM: 2.2 MG/DL (ref 1.6–2.6)
MCH RBC QN AUTO: 32.3 PG (ref 25.2–33.5)
MCHC RBC AUTO-ENTMCNC: 32.5 G/DL (ref 28.4–34.8)
MCV RBC AUTO: 99.4 FL (ref 82.6–102.9)
NRBC AUTOMATED: 0 PER 100 WBC
PDW BLD-RTO: 13.1 % (ref 11.8–14.4)
PLATELET # BLD: 341 K/UL (ref 138–453)
PMV BLD AUTO: 9.8 FL (ref 8.1–13.5)
POTASSIUM SERPL-SCNC: 5.1 MMOL/L (ref 3.7–5.3)
POTASSIUM SERPL-SCNC: 5.3 MMOL/L (ref 3.7–5.3)
PRO-BNP: 3657 PG/ML
PROTHROMBIN TIME: 12.3 SEC (ref 9.1–12.3)
RBC # BLD: 3.5 M/UL (ref 3.95–5.11)
SODIUM BLD-SCNC: 126 MMOL/L (ref 135–144)
SODIUM BLD-SCNC: 128 MMOL/L (ref 135–144)
SPECIMEN TYPE: NORMAL
TOTAL PROTEIN, BODY FLUID: 3.4 G/DL
TOTAL PROTEIN: 6.1 G/DL (ref 6.4–8.3)
WBC # BLD: 10.1 K/UL (ref 3.5–11.3)

## 2021-11-04 PROCEDURE — 83615 LACTATE (LD) (LDH) ENZYME: CPT

## 2021-11-04 PROCEDURE — 6370000000 HC RX 637 (ALT 250 FOR IP): Performed by: INTERNAL MEDICINE

## 2021-11-04 PROCEDURE — 1200000000 HC SEMI PRIVATE

## 2021-11-04 PROCEDURE — 87070 CULTURE OTHR SPECIMN AEROBIC: CPT

## 2021-11-04 PROCEDURE — 6370000000 HC RX 637 (ALT 250 FOR IP): Performed by: NURSE PRACTITIONER

## 2021-11-04 PROCEDURE — 36415 COLL VENOUS BLD VENIPUNCTURE: CPT

## 2021-11-04 PROCEDURE — 80053 COMPREHEN METABOLIC PANEL: CPT

## 2021-11-04 PROCEDURE — 88112 CYTOPATH CELL ENHANCE TECH: CPT

## 2021-11-04 PROCEDURE — 2580000003 HC RX 258: Performed by: NURSE PRACTITIONER

## 2021-11-04 PROCEDURE — 99223 1ST HOSP IP/OBS HIGH 75: CPT | Performed by: INTERNAL MEDICINE

## 2021-11-04 PROCEDURE — 89051 BODY FLUID CELL COUNT: CPT

## 2021-11-04 PROCEDURE — 84311 SPECTROPHOTOMETRY: CPT

## 2021-11-04 PROCEDURE — 94640 AIRWAY INHALATION TREATMENT: CPT

## 2021-11-04 PROCEDURE — 82945 GLUCOSE OTHER FLUID: CPT

## 2021-11-04 PROCEDURE — 80048 BASIC METABOLIC PNL TOTAL CA: CPT

## 2021-11-04 PROCEDURE — 84157 ASSAY OF PROTEIN OTHER: CPT

## 2021-11-04 PROCEDURE — 87205 SMEAR GRAM STAIN: CPT

## 2021-11-04 PROCEDURE — 99232 SBSQ HOSP IP/OBS MODERATE 35: CPT | Performed by: INTERNAL MEDICINE

## 2021-11-04 PROCEDURE — 6360000002 HC RX W HCPCS: Performed by: STUDENT IN AN ORGANIZED HEALTH CARE EDUCATION/TRAINING PROGRAM

## 2021-11-04 PROCEDURE — 2709999900 US THORACENTESIS

## 2021-11-04 PROCEDURE — 85610 PROTHROMBIN TIME: CPT

## 2021-11-04 PROCEDURE — 83880 ASSAY OF NATRIURETIC PEPTIDE: CPT

## 2021-11-04 PROCEDURE — 87075 CULTR BACTERIA EXCEPT BLOOD: CPT

## 2021-11-04 PROCEDURE — 76770 US EXAM ABDO BACK WALL COMP: CPT

## 2021-11-04 PROCEDURE — 99222 1ST HOSP IP/OBS MODERATE 55: CPT | Performed by: INTERNAL MEDICINE

## 2021-11-04 PROCEDURE — 85027 COMPLETE CBC AUTOMATED: CPT

## 2021-11-04 PROCEDURE — 83735 ASSAY OF MAGNESIUM: CPT

## 2021-11-04 PROCEDURE — 0W9B3ZZ DRAINAGE OF LEFT PLEURAL CAVITY, PERCUTANEOUS APPROACH: ICD-10-PCS | Performed by: RADIOLOGY

## 2021-11-04 PROCEDURE — 6360000002 HC RX W HCPCS: Performed by: NURSE PRACTITIONER

## 2021-11-04 PROCEDURE — 88305 TISSUE EXAM BY PATHOLOGIST: CPT

## 2021-11-04 RX ORDER — IPRATROPIUM BROMIDE AND ALBUTEROL SULFATE 2.5; .5 MG/3ML; MG/3ML
1 SOLUTION RESPIRATORY (INHALATION) 3 TIMES DAILY
Status: DISCONTINUED | OUTPATIENT
Start: 2021-11-04 | End: 2021-11-12

## 2021-11-04 RX ORDER — FUROSEMIDE 10 MG/ML
20 INJECTION INTRAMUSCULAR; INTRAVENOUS 2 TIMES DAILY
Status: DISCONTINUED | OUTPATIENT
Start: 2021-11-04 | End: 2021-11-06

## 2021-11-04 RX ADMIN — CITALOPRAM 20 MG: 20 TABLET, FILM COATED ORAL at 15:17

## 2021-11-04 RX ADMIN — METHYLPREDNISOLONE SODIUM SUCCINATE 60 MG: 125 INJECTION, POWDER, FOR SOLUTION INTRAMUSCULAR; INTRAVENOUS at 05:56

## 2021-11-04 RX ADMIN — ISOSORBIDE MONONITRATE 30 MG: 30 TABLET ORAL at 15:16

## 2021-11-04 RX ADMIN — FUROSEMIDE 20 MG: 10 INJECTION, SOLUTION INTRAMUSCULAR; INTRAVENOUS at 21:09

## 2021-11-04 RX ADMIN — METHYLPREDNISOLONE SODIUM SUCCINATE 60 MG: 125 INJECTION, POWDER, FOR SOLUTION INTRAMUSCULAR; INTRAVENOUS at 15:14

## 2021-11-04 RX ADMIN — SODIUM CHLORIDE, PRESERVATIVE FREE 10 ML: 5 INJECTION INTRAVENOUS at 21:09

## 2021-11-04 RX ADMIN — METOPROLOL TARTRATE 50 MG: 50 TABLET, FILM COATED ORAL at 15:16

## 2021-11-04 RX ADMIN — FLUTICASONE PROPIONATE 1 PUFF: 110 AEROSOL, METERED RESPIRATORY (INHALATION) at 19:33

## 2021-11-04 RX ADMIN — SODIUM CHLORIDE, PRESERVATIVE FREE 10 ML: 5 INJECTION INTRAVENOUS at 15:15

## 2021-11-04 RX ADMIN — FUROSEMIDE 20 MG: 10 INJECTION, SOLUTION INTRAMUSCULAR; INTRAVENOUS at 00:14

## 2021-11-04 RX ADMIN — FUROSEMIDE 20 MG: 10 INJECTION, SOLUTION INTRAMUSCULAR; INTRAVENOUS at 15:15

## 2021-11-04 RX ADMIN — DESMOPRESSIN ACETATE 40 MG: 0.2 TABLET ORAL at 15:16

## 2021-11-04 RX ADMIN — FLUTICASONE PROPIONATE 1 PUFF: 110 AEROSOL, METERED RESPIRATORY (INHALATION) at 07:34

## 2021-11-04 RX ADMIN — APIXABAN 5 MG: 5 TABLET, FILM COATED ORAL at 21:09

## 2021-11-04 RX ADMIN — APIXABAN 5 MG: 5 TABLET, FILM COATED ORAL at 00:17

## 2021-11-04 RX ADMIN — IPRATROPIUM BROMIDE AND ALBUTEROL SULFATE 1 AMPULE: .5; 2.5 SOLUTION RESPIRATORY (INHALATION) at 19:33

## 2021-11-04 RX ADMIN — SODIUM CHLORIDE, PRESERVATIVE FREE 10 ML: 5 INJECTION INTRAVENOUS at 00:17

## 2021-11-04 RX ADMIN — OXYCODONE HYDROCHLORIDE AND ACETAMINOPHEN 500 MG: 500 TABLET ORAL at 15:16

## 2021-11-04 RX ADMIN — AMIODARONE HYDROCHLORIDE 200 MG: 200 TABLET ORAL at 15:16

## 2021-11-04 RX ADMIN — METOPROLOL TARTRATE 50 MG: 50 TABLET, FILM COATED ORAL at 21:08

## 2021-11-04 ASSESSMENT — PAIN SCALES - GENERAL
PAINLEVEL_OUTOF10: 0

## 2021-11-04 ASSESSMENT — ENCOUNTER SYMPTOMS
WHEEZING: 0
DIARRHEA: 0
BLOOD IN STOOL: 0
NAUSEA: 0
VOMITING: 0
STRIDOR: 0

## 2021-11-04 NOTE — PROGRESS NOTES
[facility]  Office: [address], DO, Ninfa De Jesus, DO, Jenice Duane, DO, Mario Domínguez Blood, DO, Karina Durham MD, Sharan Sanchez MD, Yasmine Saunders MD, Mallika Hernandez MD, Vanessa Jimenez MD, Alejandra Lan MD, Giorgi Lynn MD, Marialuisa Grubbs MD, Ingrid Gregg, DO, Eileen Glover, DO, Michelle Aldridge MD,  Cathleen Alvarado, DO, Liam Arredondo MD, Ana Quinones MD, Michaelle Turner MD, Óscar Jennings MD, Lashell Asencio MD, Marcia Del Rio MD, Zabrina Padilla, [facility], [facility] Jose Juan, CNP, Raina Woods, CNP, Tanda Lesches, CNS, Meera Campuzano, CNP, Garrick Lozada, CNP, Radha Mcginnis, CNP, Leticia Sy, CNP, Valerie Mendoza, CNP, Delbert Aase, CNP, Muriel Moon PA-C, Chey Briggs, [facility], Samantha Fernandes, CNP, Larry Freeman, CNP, Savannah Harkins, CNP, Reynaldo Oden, CNP, Jon Rowley, [facility], Queen Thorp, [facility], Rimma Car, [address] [place] [address]    Progress Note    11/4/2021    3:25 PM    Name:   Eusebia Torres  MRN:     [id number]     Acct:      [de-identified]   Room:   [address] [place] [address] Day:  1  Admit Date:  11/3/2021  4:32 PM    PCP:   KALYN Waddell CNP  Code Status:  Full Code    Subjective:     C/C:   Chief Complaint   Patient presents with    Leg Swelling     increasing BLE edema; pt from SNF; per PA at SNF, pt has fluid retention, needs IV meds that they cannot administer     Interval History Status: improved. Patient seen and examined, no issues overnight. Thoracentesis completed successfully, kidney function slightly improved. Patient feeling unchanged but tired from yesterday    Brief History:     From H&P  Patient presented to the ER with complaints of leg swelling, shortness of breath with a productive cough (thin white sputum) and constipation. She states she chronically has lower extremity edema left side greater than right she thinks that is increased over the last couple days.   Patient was recently discharged on 10/15 after being admitted related to a pleural effusion. At that time she presented with shortness of breath and palpitations. She is found to have a left lower lobe pneumonia and A. fib with RVR and was admitted to the ICU for acute hypoxic respiratory failure. During that visit the patient had a paracentesis, Negative CURT and subsequently cardioverted successfully, and a bronchoscopy that showed left main bronchus obstruction due to thick mucous plugging. At discharge the patient was encouraged to follow-up with cardiology outpatient for an ablation. During that visit she was also treated for UTI and was discharged on Augmentin for 5 days. She was also given inhalers and steroids for COPD.     On arrival to the ER today she complains of not feeling well over the last 3 or 4 days. She describes worsening shortness of breath primarily on exertion and orthopnea. The patient told me that she has not been using oxygen on an outpatient basis but according to the ER note she has been using it intermittently primarily with exertion and therapy and occasionally overnight. Also complains of constipation and mild abdominal bloating. She also reports nausea and vomiting few days ago but nothing current and she has been tolerating p.o. intake. She states she is compliant with her home medications.     Patient was placed on 2 L nasal cannula in the ER. She has been afebrile heart rates been in the 90s to the low 110's A. Fib.     Chest x-ray shows no acute changes from prior study with a has a persistent sizable left effusion with opacity at the left lung base and pulmonary vascular congestion. Sodium is 129 BUN and creatinine are 26/1. 65. Her normal BUN and creatinine are around 15/0.58 and normal sodiums in the mid 130s. BNP is 3134. On her last admission it was 1745 and on her admission in early October it was 3251.    WBCs 14.5 seems to run slightly elevated     Plan to consult nephrology and pulmonary medicine. Continue home Eliquis, beta-blocker and amiodarone. Hold losartan  Plan Solu-Medrol related to IV wheezing throughout. It appears to be in fluid overload related to pitting edema in her lower extremities and increasing shortness of breath. Plan to hold further hydration for now. Give Lasix 20 mg IV x1    Review of Systems:     Constitutional:  negative for chills, fevers, sweats  Respiratory:  negative for cough, dyspnea on exertion, shortness of breath, wheezing  Cardiovascular:  negative for chest pain, chest pressure/discomfort, lower extremity edema, palpitations  Gastrointestinal:  negative for abdominal pain, constipation, diarrhea, nausea, vomiting  Neurological:  negative for dizziness, headache    Medications: Allergies:     Allergies   Allergen Reactions    Latex Hives     Hives from rubber gloves    Betadine [Povidone Iodine] Hives    Bee Venom Swelling    Dilaudid [Hydromorphone Hcl] Other (See Comments)     Severe confusion    Adhesive Tape Rash    Sulfa Antibiotics Rash       Current Meds:   Scheduled Meds:    furosemide  20 mg IntraVENous BID    amiodarone  200 mg Oral Daily    apixaban  5 mg Oral BID    vitamin C  500 mg Oral Daily    atorvastatin  40 mg Oral Daily    fluticasone  1 puff Inhalation BID    citalopram  20 mg Oral Daily    clopidogrel  75 mg Oral Daily    isosorbide mononitrate  30 mg Oral Daily    metoprolol tartrate  50 mg Oral BID    sodium chloride flush  5-40 mL IntraVENous 2 times per day    methylPREDNISolone  60 mg IntraVENous Q8H     Continuous Infusions:    sodium chloride       PRN Meds: albuterol **AND** [] ipratropium, albuterol **AND** [] ipratropium, albuterol sulfate HFA, cyclobenzaprine, ipratropium-albuterol, sodium chloride flush, sodium chloride, ondansetron **OR** ondansetron, acetaminophen **OR** acetaminophen    Data:     Past Medical History:   has a past medical history of Asthma, COPD (chronic obstructive pulmonary disease) ([address] Gout, Hyperlipidemia, Hypertension, Mitral insufficiency, and Pneumonia. Social History:   reports that she has quit smoking. She has never used smokeless tobacco. She reports current alcohol use. She reports that she does not use drugs. Family History:   Family History   Problem Relation Age of Onset    High Blood Pressure Mother     Cancer Mother     Asthma Father     Heart Disease Father     Cancer Father        Vitals:  /75   Pulse 96   Temp 98.5 °F (36.9 °C) (Oral)   Resp 28   Ht 5' 4\" (1.626 m)   Wt 256 lb (116.1 kg)   SpO2 96%   BMI 43.94 kg/m²   Temp (24hrs), Av.6 °F (37 °C), Min:97.9 °F (36.6 °C), Max:99.7 °F (37.6 °C)    No results for input(s): POCGLU in the last 72 hours. I/O (24Hr):     Intake/Output Summary (Last 24 hours) at 2021 1525  Last data filed at 2021 1233  Gross per 24 hour   Intake 270 ml   Output 1050 ml   Net -780 ml       Labs:  Hematology:  Recent Labs     21  16421  0533   WBC 14.5* 10.1   RBC 3.68* 3.50*   HGB 11.5* 11.3*   HCT 36.8 34.8*   .0 99.4   MCH 31.3 32.3   MCHC 31.3 32.5   RDW 13.2 13.1    341   MPV 9.7 9.8   INR  --  1.2     Chemistry:  Recent Labs     21  1641 21  1818 21  0533   *  --  128*   K 4.6  --  5.3   CL 92*  --  94*   CO2 26  --  22   GLUCOSE 128*  --  156*   BUN 26*  --  27*   CREATININE 1.65*  --  1.52*   MG  --   --  2.2   ANIONGAP   --  12   LABGLOM 30*  --  33*   GFRAA 37*  --  40*   CALCIUM 8.9  --  8.6   PROBNP 3,134*  --  3,657*   TROPHS 37* 37*  --      Recent Labs     21  1641 21  0533   PROT 6.1* 6.1*   LABALBU 3.0* 2.6*   AST 14 13   ALT 12 12   ALKPHOS 72 71   BILITOT 1.40* 0.83   LIPASE 8*  --      ABG:  Lab Results   Component Value Date    POCPH 7.429 10/03/2021    PH 7.44 2016    POCPCO2 37.1 10/03/2021    PCO2 36 2016    POCPO2 86.0 10/03/2021    PO2 64 2016    POCHCO3 24.5 10/03/2021    HCO3 24 03/30/2016    NBEA NOT REPORTED 10/03/2021    PBEA 0 10/03/2021    OFQ5LZP NOT REPORTED 10/03/2021    CDVV6XAE 97 10/03/2021    O2SAT 93 03/30/2016    FIO2 40.0 10/03/2021     Lab Results   Component Value Date/Time    SPECIAL NOT REPORTED 10/20/2021 11:40 AM     Lab Results   Component Value Date/Time    CULTURE 1,500 CFU/ML NORMAL RESPIRATORY EVANGELIST 10/20/2021 11:40 AM       Radiology:  XR CHEST PORTABLE    Result Date: 11/3/2021  No change from prior study. Persistent sizable left effusion with opacity at the left lung base and pulmonary vascular congestion. US THORACENTESIS Which side should the procedure be performed? Left    Result Date: 11/4/2021  Successful ultrasound guided thoracentesis. US RETROPERITONEAL COMPLETE    Result Date: 11/4/2021  1. Patient had no urge to void during the exam.  Nonvisualization of ureteral jets. 2. Incidental cholelithiasis. 3. Unremarkable renal ultrasound. No hydronephrosis. Physical Examination:        General appearance:  alert, cooperative and no distress  Mental Status:  oriented to person, place and time and normal affect  Lungs:  clear to auscultation bilaterally, normal effort  Heart:  regular rate and rhythm, no murmur  Abdomen:  soft, nontender, nondistended, normal bowel sounds, no masses, hepatomegaly, splenomegaly  Extremities:  no edema, redness, tenderness in the calves  Skin:  no gross lesions, rashes, induration    Assessment:        Hospital Problems         Last Modified POA    * (Principal) CASEY (acute kidney injury) ([facility] [address] [address] 11/3/2021 Yes    Benign essential HTN 11/3/2021 Yes    Morbid obesity with BMI of 40.0-44.9, adult ([facility] [address] 11/4/2021 Yes    Overview Signed 10/3/2021 12:43 PM by Ashley Spain MD     Last Assessment & Plan:   Formatting of this note might be different from the original.  Obesity is worsening. Discussed the patient's BMI. The BMI is above average; BMI management plan is completed.   General weight loss/lifestyle modification strategies discussed (elicit support from others; identify saboteurs; non-food rewards, etc). Pulmonary emphysema ([address] 11/3/2021 Yes    Acute on chronic diastolic heart failure ([address] 11/4/2021 Yes    Chronic obstructive pulmonary disease with acute exacerbation ([address] 11/3/2021 Yes    Overview Signed 10/3/2021 12:43 PM by Aleksandar Kumar MD     Last Assessment & Plan:   Formatting of this note might be different from the original.  COPD is worsening. Discussed monitoring symptoms and use of quick-relief medications and contacting us early in the course of exacerbations. Warning signs of respiratory distress were reviewed with the patient. Counseled to avoid exposure to cigarette smoke. Persistent atrial fibrillation ([facility] [address] 11/4/2021 Yes    Recurrent pleural effusion 11/3/2021 Yes          Plan:        1. CASEYcreatinine is 1.65 from prior arrival, currently 1.52. Nephrology is following for care. No recent changes to medications  2. Chronic respiratory failurecurrently at home O2, history of chronic diastolic heart failure and COPD  3. Pleural fluidstatus post thoracentesis, pulmonary following  4. COPD  5. Diastolic heart failure  6. Morbid obesity  7. Atrial fibrillation  8. Hypertension  9. Plan:  1. PT OT  2. Nephrology consult  3. Status post thoracentesis, pulmonary to follow on recommendations regarding further drainage of pleural fluid.     Frank Simmonds, DO  11/4/2021  3:25 PM

## 2021-11-04 NOTE — PROGRESS NOTES
Physician Progress Note      PATIENT:               Aquiles Paulson  CSN #:                  [id number]  :                       1945  ADMIT DATE:       11/3/2021 4:32 PM  DISCH DATE:  Rosa Shine  PROVIDER #:        Merced TOURE          QUERY TEXT:    Patient admitted with CASEY, noted to have Persistent atrial fibrillation and is   maintained on Eliquis. If possible, please document in progress notes   and?discharge?summary if you are evaluating and/or treating any of the   following: The medical record reflects the following:?  Risk Factors: Persistent atrial fibrillation ,CHF, COPD, sedentary lifestyle   resides in SNF  Clinical Indicators:  echo on 10/3/2021 showed an EF of 59%. , Cardiovascular:   Rate and Rhythm: Tachycardia present. Rhythm irregular. INR1.2,  Prothrombin   Time 12.3  Treatment: Eliquis, monitoring    Thank you, Please call if questions  Lilli Berry RN [facility]  [phone]  Options provided:  -- Secondary hypercoagulable state in a patient with atrial fibrillation  -- Other - I will add my own diagnosis  -- Disagree - Not applicable / Not valid  -- Disagree - Clinically unable to determine / Unknown  -- Refer to Clinical Documentation Reviewer    PROVIDER RESPONSE TEXT:    Provider is clinically unable to determine a response to this query.     Query created by: Brissa Thomas on 2021 10:46 AM      Electronically signed by:  Merced TOURE 2021 12:44 PM

## 2021-11-04 NOTE — H&P
[facility]  Office: [address], DO, Ninfa De Jesus, DO, Jenice Duane, DO, Mairo [place] Blood, DO, Karina Durham MD, Sharan Sanchez MD, Yasmine Saunders MD, Mallika Hernandez MD, Vanessa Jimenez MD, Alejandra Lan MD, Giorgi Lynn MD, Marialuisa Grubbs MD, Ingrid Gregg DO, Eileen Glover, DO, Michelle Aldridge MD,  Cathleen Alvarado DO, Liam Arredondo MD, Ana Quinones MD, Michaelle Turner MD, Óscar Jennings MD, Lashell Asencio MD, Marcia Del Rio MD, Zabrina Padilla [facility], [facility] Jose Juan, CNP, Raina Woods, CNP, Tanda Lesches, CNS, Meera Campuzano, CNP, Garrick Lozada, CNP, Radha Mcginnis, CNP, Leticia Sy, CNP, Valerie Mendoza, CNP, Delbert Aase, CNP, Muriel Moon PA-C, Chey Briggs, [facility], Samantha Fernandes, CNP, Larry Freeman, CNP, Savannah Harkins, CNP, Reynaldo Oden, CNP, Jon Rowley, [facility], [place], [facility], [place], [facility]   [address] [facility]    HISTORY AND PHYSICAL EXAMINATION            Date:   11/4/2021  Patient name:  Eusebia Torres  Date of admission:  11/3/2021  4:32 PM  MRN:   [id number]  Account:  [de-identified]  YOB: 1945  PCP:    KALYN Waddell CNP  Room:   0157/6234-39  Code Status:    Full Code    Chief Complaint:     Chief Complaint   Patient presents with    Leg Swelling     increasing BLE edema; pt from SNF; per PA at SNF, pt has fluid retention, needs IV meds that they cannot administer       History Obtained From:     patient, electronic medical record    History of Present Illness:     Eusebia Torres is a 68 y.o. Non- / non  female who presents with Leg Swelling (increasing BLE edema; pt from SNF; per PA at [facility], pt has fluid retention, needs IV meds that they cannot administer)   and is admitted to the hospital for the management of CASEY (acute kidney injury) ([facility] [address]     Patient presented to the ER with complaints of leg swelling, shortness of breath with a productive cough (thin white sputum) and constipation. She states she chronically has lower extremity edema left side greater than right she thinks that is increased over the last couple days. Patient was recently discharged on 10/15 after being admitted related to a pleural effusion. At that time she presented with shortness of breath and palpitations. She is found to have a left lower lobe pneumonia and A. fib with RVR and was admitted to the ICU for acute hypoxic respiratory failure. During that visit the patient had a paracentesis, Negative CURT and subsequently cardioverted successfully, and a bronchoscopy that showed left main bronchus obstruction due to thick mucous plugging. At discharge the patient was encouraged to follow-up with cardiology outpatient for an ablation. During that visit she was also treated for UTI and was discharged on Augmentin for 5 days. She was also given inhalers and steroids for COPD. On arrival to the ER today she complains of not feeling well over the last 3 or 4 days. She describes worsening shortness of breath primarily on exertion and orthopnea. The patient told me that she has not been using oxygen on an outpatient basis but according to the ER note she has been using it intermittently primarily with exertion and therapy and occasionally overnight. Also complains of constipation and mild abdominal bloating. She also reports nausea and vomiting few days ago but nothing current and she has been tolerating p.o. intake. She states she is compliant with her home medications. Patient was placed on 2 L nasal cannula in the ER. She has been afebrile heart rates been in the 90s to the low 110's A. Fib. Chest x-ray shows no acute changes from prior study with a has a persistent sizable left effusion with opacity at the left lung base and pulmonary vascular congestion. Sodium is 129 BUN and creatinine are 26/1. 65.   Her normal BUN and creatinine are around 15/0.58 and normal sodiums in the mid 130s.  BNP is 3134. On her last admission it was 1745 and on her admission in early October it was 3251. WBCs 14.5 seems to run slightly elevated    Plan to consult nephrology and pulmonary medicine. Continue home Eliquis, beta-blocker and amiodarone. Hold losartan  Plan Solu-Medrol related to IV wheezing throughout. It appears to be in fluid overload related to pitting edema in her lower extremities and increasing shortness of breath. Plan to hold further hydration for now. Give Lasix 20 mg IV x1    Past Medical History:     Past Medical History:   Diagnosis Date    Asthma     COPD (chronic obstructive pulmonary disease) (HCC)     Gout     Hyperlipidemia     Hypertension     Mitral insufficiency     Pneumonia         Past Surgical History:     Past Surgical History:   Procedure Laterality Date    ANKLE SURGERY Left 03/22/2016    ORIF    BRONCHOSCOPY N/A 10/20/2021    BRONCHOSCOPY ALVEOLAR LAVAGE performed by Jovani Kumar MD at [facility]    KNEE SURGERY      TX COLSC FLX W/RMVL OF TUMOR POLYP LESION [address] [facility] TQ N/A 8/8/2017    COLONOSCOPY POLYPECTOMY SNARE/COLD BIOPSY performed by Stephanie Pastor MD at [place] [address]          Medications Prior to Admission:     Prior to Admission medications    Medication Sig Start Date End Date Taking? Authorizing Provider   amiodarone (CORDARONE) 200 MG tablet Take 1 tablet by mouth daily 10/22/21 11/21/21  Karolina Grover MD   metoprolol tartrate (LOPRESSOR) 50 MG tablet Take 1 tablet by mouth 2 times daily 10/22/21   Karolina Grover MD   Calamine 8-8 % LOTN lotion Apply to rash on legs 10/22/21   Karolina Grover MD   miconazole (MICOTIN) 2 % powder Apply topically 2 times daily.  10/22/21   Karolina Grover MD   simethicone (MYLICON) 80 MG chewable tablet Take 1 tablet by mouth every 6 hours as needed for Flatulence (Abdominal Cramps) 10/22/21 11/21/21  Karolina Grover MD clonazePAM (KLONOPIN) 1 MG tablet Take 1 tablet by mouth nightly for 3 days. 10/22/21 10/25/21  Donelda Section, MD   apixaban (ELIQUIS) 5 MG TABS tablet Take 1 tablet by mouth 2 times daily 10/8/21   Laura Mccullough MD   atorvastatin (LIPITOR) 40 MG tablet Take 1 tablet by mouth daily 10/8/21   Laura Mccullough MD   citalopram (CELEXA) 40 MG tablet Take 0.5 tablets by mouth daily 10/8/21   Laura Mccullough MD   losartan (COZAAR) 100 MG tablet Take 1 tablet by mouth daily 8/28/18   Praveena Downing MD   meloxicam (MOBIC) 15 MG tablet Take 15 mg by mouth daily    Historical Provider, MD   clopidogrel (PLAVIX) 75 MG tablet Take 1 tablet by mouth daily 10/5/17   Praveena Downing MD   cyclobenzaprine (FLEXERIL) 10 MG tablet TAKE ONE TABLET BY MOUTH THREE TIMES DAILY AS NEEDED FOR MUSCLE SPASM 10/5/17   Praveena Downing MD   diphenhydrAMINE (BENADRYL) 25 MG capsule Take 25 mg by mouth every 6 hours as needed for Itching    Historical Provider, MD   zinc oxide (DESITIN) 40 % ointment Apply topically as needed for Dry Skin Apply topically as needed.     Historical Provider, MD   furosemide (LASIX) 20 MG tablet Take 1 tablet by mouth daily 7/14/17   Praveena Downing MD   isosorbide mononitrate (IMDUR) 30 MG extended release tablet Take 1 tablet by mouth daily 7/14/17   Praveena Downing MD   albuterol sulfate  (90 Base) MCG/ACT inhaler Inhale 2 puffs into the lungs every 6 hours as needed for Wheezing 7/14/17   Praveena Downing MD   Calcium Carb-Cholecalciferol (CALCIUM + D3 PO) Take by mouth daily    Historical Provider, MD   CINNAMON PO Take by mouth daily    Historical Provider, MD   TURMERIC CURCUMIN PO Take by mouth daily    Historical Provider, MD   ipratropium-albuterol (DUONEB) 0.5-2.5 (3) MG/3ML SOLN nebulizer solution Inhale 1 vial into the lungs every 4 hours    Historical Provider, MD   beclomethasone (QVAR) 80 MCG/ACT inhaler Inhale 2 puffs into the lungs 2 times daily 6/17/16   Irena Judge MD Sathish   LYSINE PO Take by mouth daily    Historical Provider, MD   vitamin D (CHOLECALCIFEROL) 1000 UNIT TABS tablet Take 1,000 Units by mouth daily    Historical Provider, MD   Pyridoxine HCl (VITAMIN B-6) 50 MG tablet Take 100 mg by mouth daily    Historical Provider, MD   Cyanocobalamin (VITAMIN B 12 PO) Take by mouth daily    Historical Provider, MD   Magnesium 500 MG TABS Take by mouth    Historical Provider, MD   Ascorbic Acid (VITAMIN C) 500 MG tablet Take 500 mg by mouth daily    Historical Provider, MD        Allergies:     Latex, Betadine [povidone iodine], Bee venom, Dilaudid [hydromorphone hcl], Adhesive tape, and Sulfa antibiotics    Social History:     Tobacco:    reports that she has quit smoking. She has never used smokeless tobacco.  Alcohol:      reports current alcohol use. Drug Use:  reports no history of drug use. Family History:     Family History   Problem Relation Age of Onset    High Blood Pressure Mother     Cancer Mother     Asthma Father     Heart Disease Father     Cancer Father        Review of Systems:     Positive and Negative as described in HPI. Review of Systems   Constitutional: Positive for activity change and appetite change. Negative for chills, diaphoresis and fever. HENT: Negative for congestion and hearing loss. Respiratory: Positive for cough and shortness of breath. Negative for wheezing and stridor. Cardiovascular: Negative for chest pain, palpitations and leg swelling. Gastrointestinal: Positive for abdominal distention (bloating), abdominal pain and constipation. Negative for blood in stool, diarrhea, nausea and vomiting. Genitourinary: Negative for dysuria and frequency. Musculoskeletal: Negative for myalgias. Skin: Negative for rash. Neurological: Negative for dizziness, seizures and headaches. Psychiatric/Behavioral: The patient is not nervous/anxious.         Physical Exam:   BP (!) 127/51   Pulse 104   Temp 99.7 °F (37.6 °C) (Oral)   Resp 28   Ht 5' 4\" (1.626 m)   Wt 256 lb 11.2 oz (116.4 kg)   SpO2 95%   BMI 44.06 kg/m²   Temp (24hrs), Av.9 °F (37.2 °C), Min:98.1 °F (36.7 °C), Max:99.7 °F (37.6 °C)    No results for input(s): POCGLU in the last 72 hours. No intake or output data in the 24 hours ending 21 0149    Physical Exam  HENT:      Mouth/Throat:      Mouth: Mucous membranes are moist.   Eyes:      Extraocular Movements: Extraocular movements intact. Cardiovascular:      Rate and Rhythm: Tachycardia present. Rhythm irregular. Pulses:           Dorsalis pedis pulses are 1+ on the right side and 1+ on the left side. Posterior tibial pulses are 1+ on the right side and 1+ on the left side. Heart sounds: Normal heart sounds. Comments: Left lower extremity chronically larger after an injury  Pulmonary:      Effort: Tachypnea present. Breath sounds: Decreased air movement present. Examination of the right-upper field reveals decreased breath sounds. Examination of the left-upper field reveals decreased breath sounds. Examination of the right-middle field reveals decreased breath sounds and wheezing. Examination of the left-middle field reveals decreased breath sounds and wheezing. Examination of the right-lower field reveals decreased breath sounds and wheezing. Examination of the left-lower field reveals decreased breath sounds and wheezing. Decreased breath sounds and wheezing present. Abdominal:      General: Bowel sounds are normal.      Palpations: Abdomen is soft. Musculoskeletal:      Cervical back: Normal range of motion and neck supple. Right lower leg: 3+ Edema present. Left lower leg: 3+ Edema present. Skin:     General: Skin is warm and dry. Capillary Refill: Capillary refill takes less than 2 seconds. Neurological:      General: No focal deficit present. Mental Status: She is alert. GCS: GCS eye subscore is 4. GCS verbal subscore is 5.  GCS motor subscore is 6.    Psychiatric:         Mood and Affect: Mood normal.         Behavior: Behavior normal.         Investigations:      Laboratory Testing:  Recent Results (from the past 24 hour(s))   Troponin    Collection Time: 11/03/21  4:41 PM   Result Value Ref Range    Troponin, High Sensitivity 37 (H) 0 - 14 ng/L    Troponin T NOT REPORTED <0.03 ng/mL    Troponin Interp NOT REPORTED    CBC Auto Differential    Collection Time: 11/03/21  4:41 PM   Result Value Ref Range    WBC 14.5 (H) 3.5 - 11.3 k/uL    RBC 3.68 (L) 3.95 - 5.11 m/uL    Hemoglobin 11.5 (L) 11.9 - 15.1 g/dL    Hematocrit 36.8 36.3 - 47.1 %    .0 82.6 - 102.9 fL    MCH 31.3 25.2 - 33.5 pg    MCHC 31.3 28.4 - 34.8 g/dL    RDW 13.2 11.8 - 14.4 %    Platelets 143 302 - 733 k/uL    MPV 9.7 8.1 - 13.5 fL    NRBC Automated 0.0 0.0 per 100 WBC    Differential Type NOT REPORTED     Seg Neutrophils 81 (H) 36 - 65 %    Lymphocytes 7 (L) 24 - 43 %    Monocytes 9 3 - 12 %    Eosinophils % 1 1 - 4 %    Basophils 1 0 - 2 %    Immature Granulocytes 1 (H) 0 %    Segs Absolute 11.93 (H) 1.50 - 8.10 k/uL    Absolute Lymph # 1.00 (L) 1.10 - 3.70 k/uL    Absolute Mono # 1.32 (H) 0.10 - 1.20 k/uL    Absolute Eos # 0.14 0.00 - 0.44 k/uL    Basophils Absolute 0.07 0.00 - 0.20 k/uL    Absolute Immature Granulocyte 0.08 0.00 - 0.30 k/uL    WBC Morphology NOT REPORTED     RBC Morphology NOT REPORTED     Platelet Estimate NOT REPORTED    Brain Natriuretic Peptide    Collection Time: 11/03/21  4:41 PM   Result Value Ref Range    Pro-BNP 3,134 (H) <300 pg/mL    BNP Interpretation NOT REPORTED    Comprehensive Metabolic Panel w/ Reflex to MG    Collection Time: 11/03/21  4:41 PM   Result Value Ref Range    Glucose 128 (H) 70 - 99 mg/dL    BUN 26 (H) 8 - 23 mg/dL    CREATININE 1.65 (H) 0.50 - 0.90 mg/dL    Bun/Cre Ratio NOT REPORTED 9 - 20    Calcium 8.9 8.6 - 10.4 mg/dL    Sodium 129 (L) 135 - 144 mmol/L    Potassium 4.6 3.7 - 5.3 mmol/L    Chloride 92 (L) 98 - 107 mmol/L CO2 26 20 - 31 mmol/L    Anion Gap 11 9 - 17 mmol/L    Alkaline Phosphatase 72 35 - 104 U/L    ALT 12 5 - 33 U/L    AST 14 <32 U/L    Total Bilirubin 1.40 (H) 0.3 - 1.2 mg/dL    Total Protein 6.1 (L) 6.4 - 8.3 g/dL    Albumin 3.0 (L) 3.5 - 5.2 g/dL    Albumin/Globulin Ratio 1.0 1.0 - 2.5    GFR Non-African American 30 (L) >60 mL/min    GFR  37 (L) >60 mL/min    GFR Comment          GFR Staging NOT REPORTED    Lipase    Collection Time: 11/03/21  4:41 PM   Result Value Ref Range    Lipase 8 (L) 13 - 60 U/L   COVID-19, Rapid    Collection Time: 11/03/21  5:18 PM    Specimen: Nasopharyngeal Swab   Result Value Ref Range    Specimen Description . NASOPHARYNGEAL SWAB     SARS-CoV-2, Rapid Not Detected Not Detected   Troponin    Collection Time: 11/03/21  6:18 PM   Result Value Ref Range    Troponin, High Sensitivity 37 (H) 0 - 14 ng/L    Troponin T NOT REPORTED <0.03 ng/mL    Troponin Interp NOT REPORTED        Imaging/Diagnostics:    XR CHEST PORTABLE    Result Date: 11/3/2021  No change from prior study. Persistent sizable left effusion with opacity at the left lung base and pulmonary vascular congestion. Assessment :      Hospital Problems         Last Modified POA    * (Principal) CASEY (acute kidney injury) ([facility] [address] 11/3/2021 Yes    Benign essential HTN 11/3/2021 Yes    Morbid obesity with BMI of 40.0-44.9, adult ([facility] [address] 11/4/2021 Yes    Overview Signed 10/3/2021 12:43 PM by Marino Dallas MD     Last Assessment & Plan:   Formatting of this note might be different from the original.  Obesity is worsening. Discussed the patient's BMI. The BMI is above average; BMI management plan is completed. General weight loss/lifestyle modification strategies discussed (elicit support from others; identify saboteurs; non-food rewards, etc).          Pulmonary emphysema ([facility] [address] 11/3/2021 Yes    Acute on chronic diastolic heart failure ([facility] [address] 11/4/2021 Yes    Chronic obstructive pulmonary disease with acute exacerbation ([facility] [address] 11/3/2021 Yes    Overview Signed 10/3/2021 12:43 PM by Tory Santiago MD     Last Assessment & Plan:   Formatting of this note might be different from the original.  COPD is worsening. Discussed monitoring symptoms and use of quick-relief medications and contacting us early in the course of exacerbations. Warning signs of respiratory distress were reviewed with the patient. Counseled to avoid exposure to cigarette smoke. Persistent atrial fibrillation ([facility] [address] 11/4/2021 Yes    Recurrent pleural effusion 11/3/2021 Yes          Plan:     Patient status inpatient in the Med/Surge    CASEY superimposed on CKD 3; monitor BMP and I&O. Renal ultrasound nephrology consulted    COPD exacerbation; respiratory treatments as ordered, Solu-Medrol    Recurrent pleural effusion; pulmonary consulted for possible thoracentesis    Acute on chronic diastolic heart failure; echo on 10/3/2021 showed an EF of 59%. Give one-time dose of Lasix 20 mg IV. Monitor and replace electrolytes as needed. Repeat BMP and BNP in a.m. recommend follow-up with CHF clinic     A. fib; continue amiodarone, beta-blocker and Eliquis    Morbid obesity weight loss and lifestyle modification per PCP. Consultations:   IP CONSULT TO HOSPITALIST  IP CONSULT TO NEPHROLOGY  IP CONSULT TO PULMONOLOGY     Patient is admitted as inpatient status because of co-morbidities listed above, severity of signs and symptoms as outlined, requirement for current medical therapies and most importantly because of direct risk to patient if care not provided in a hospital setting. Expected length of stay > 48 hours.     KALYN Gardner CNP  11/4/2021  1:49 AM    Copy sent to KALYN Burr CNP

## 2021-11-04 NOTE — ED PROVIDER NOTES
% sodium chloride infusion    sodium chloride flush 0.9 % injection 5-40 mL    sodium chloride flush 0.9 % injection 5-40 mL    0.9 % sodium chloride infusion    OR Linked Order Group     ondansetron (ZOFRAN-ODT) disintegrating tablet 4 mg     ondansetron (ZOFRAN) injection 4 mg    OR Linked Order Group     acetaminophen (TYLENOL) tablet 650 mg     acetaminophen (TYLENOL) suppository 650 mg    sodium polystyrene (KAYEXALATE) 15 GM/60ML suspension 15 g    sodium polystyrene (KAYEXALATE) 15 GM/60ML suspension 30 g    methylPREDNISolone sodium (SOLU-MEDROL) injection 125 mg    furosemide (LASIX) injection 20 mg    methylPREDNISolone sodium (SOLU-MEDROL) injection 60 mg       LABS / RADIOLOGY:     Labs Reviewed   TROPONIN - Abnormal; Notable for the following components:       Result Value    Troponin, High Sensitivity 37 (*)     All other components within normal limits   TROPONIN - Abnormal; Notable for the following components:    Troponin, High Sensitivity 37 (*)     All other components within normal limits   CBC WITH AUTO DIFFERENTIAL - Abnormal; Notable for the following components:    WBC 14.5 (*)     RBC 3.68 (*)     Hemoglobin 11.5 (*)     Seg Neutrophils 81 (*)     Lymphocytes 7 (*)     Immature Granulocytes 1 (*)     Segs Absolute 11.93 (*)     Absolute Lymph # 1.00 (*)     Absolute Mono # 1.32 (*)     All other components within normal limits   BRAIN NATRIURETIC PEPTIDE - Abnormal; Notable for the following components:    Pro-BNP 3,134 (*)     All other components within normal limits   COMPREHENSIVE METABOLIC PANEL W/ REFLEX TO MG FOR LOW K - Abnormal; Notable for the following components:    Glucose 128 (*)     BUN 26 (*)     CREATININE 1.65 (*)     Sodium 129 (*)     Chloride 92 (*)     Total Bilirubin 1.40 (*)     Total Protein 6.1 (*)     Albumin 3.0 (*)     GFR Non- 30 (*)     GFR  37 (*)     All other components within normal limits   LIPASE - Abnormal; Notable for the following components:    Lipase 8 (*)     All other components within normal limits   COVID-19, RAPID   CULTURE, URINE   URINALYSIS   LACTIC ACID, PLASMA   COMPREHENSIVE METABOLIC PANEL W/ REFLEX TO MG FOR LOW K   MAGNESIUM   CBC   PROTIME-INR   URINALYSIS WITH MICROSCOPIC   SODIUM, URINE, RANDOM   PROTEIN, URINE, RANDOM   BRAIN NATRIURETIC PEPTIDE       XR CHEST (SINGLE VIEW FRONTAL)    Result Date: 10/20/2021  EXAMINATION: ONE XRAY VIEW OF THE CHEST 10/20/2021 6:52 pm COMPARISON: 10/18/2021 HISTORY: ORDERING SYSTEM PROVIDED HISTORY: Left lung atelectasis TECHNOLOGIST PROVIDED HISTORY: Left lung atelectasis Reason for Exam: shortness of breath   upright port FINDINGS: Cardiac size is enlarged. Interval improvement of the previous left pleural effusion with residual underlying left basal infiltrate or atelectasis. .  The pulmonary vascularity is hazy and indistinct. No pneumothorax. Rhenda Katherine Resolving left pleural effusion Stable cardiomegaly Mild pulmonary vascular congestion     CT CHEST WO CONTRAST    Result Date: 10/15/2021  EXAMINATION: CT OF THE CHEST WITHOUT CONTRAST 10/15/2021 8:48 am TECHNIQUE: CT of the chest was performed without the administration of intravenous contrast. Multiplanar reformatted images are provided for review. Dose modulation, iterative reconstruction, and/or weight based adjustment of the mA/kV was utilized to reduce the radiation dose to as low as reasonably achievable. COMPARISON: Chest x-ray dated 10/15/2021; CT chest dated 10/03/2021 HISTORY: ORDERING SYSTEM PROVIDED HISTORY: left pleural effusion TECHNOLOGIST PROVIDED HISTORY: left pleural effusion Reason for Exam: lt pleural effusion, sob Acuity: Unknown Type of Exam: Unknown FINDINGS: Mediastinum: Heart size is enlarged. Pericardial fluid is improved when compared to 10/03/2021. No significant pericardial effusion on today's study. There is extensive coronary artery calcification.   Thoracic aorta and pulmonary arteries are normal in caliber. No mediastinal lymphadenopathy. Assessment of rick is limited by lack of contrast.  No axillary lymphadenopathy. Lungs/pleura: There is a moderate left pleural effusion. There is trace right pleural fluid, improved from the previous study. There is a bandlike opacity in the medial right base, unchanged and most suggestive of atelectasis. There is increased consolidation throughout most of the left lung. There is debris within the left main bronchi, which appears new. No pneumothorax. Upper Abdomen: Limited visualized upper abdominal structures are unremarkable. Soft Tissues/Bones: There is no acute or suspicious osseous abnormality. Visualized superficial soft tissues are within normal limits. 1.  Moderate left pleural effusion with increased debris in the left bronchi and increased consolidation in the left lung. Associated volume loss is most suggestive of atelectasis, but possibility of superimposed pneumonia would be difficult to exclude. 2.  Trace right pleural fluid with right basilar atelectasis. 3.  Improved pericardial effusion. XR CHEST PORTABLE    Result Date: 11/3/2021  EXAMINATION: ONE XRAY VIEW OF THE CHEST 11/3/2021 5:18 pm COMPARISON: 20 October 2021 HISTORY: ORDERING SYSTEM PROVIDED HISTORY: shortness of breath, rule out pneumonia TECHNOLOGIST PROVIDED HISTORY: shortness of breath, rule out pneumonia Reason for Exam: port upright FINDINGS: AP portable view of the chest time stamped at 1711 hours demonstrates overlying cardiac monitoring electrodes. There is no change in cardiac size, left effusion, vascular congestion basilar opacity likely atelectasis. No extrapleural air. No change from prior study. Persistent sizable left effusion with opacity at the left lung base and pulmonary vascular congestion.      XR CHEST PORTABLE    Result Date: 10/18/2021  EXAMINATION: ONE XRAY VIEW OF THE CHEST 10/18/2021 11:25 am COMPARISON: October 17, 2021 HISTORY: ORDERING SYSTEM PROVIDED HISTORY: to monitor progression of pleural effusion TECHNOLOGIST PROVIDED HISTORY: to monitor progression of pleural effusion FINDINGS: Near complete opacification of the left hemithorax, with large pleural effusion, with only minimal better aeration in the left upper lung. Mediastinal shift to the right side. Mild interstitial congestion in the right lung. No pneumothorax. Significant osteopenic changes and degenerative changes noted in the bony structures. Only minimal better aeration in the left upper lung, with near complete opacification of left hemithorax. Persistent large left pleural effusion. Mild congestion right lung. XR CHEST PORTABLE    Result Date: 10/17/2021  EXAMINATION: ONE XRAY VIEW OF THE CHEST 10/17/2021 9:07 am COMPARISON: AP chest from 10/16/21 and CT chest from 10/15/2021. HISTORY: ORDERING SYSTEM PROVIDED HISTORY: left pleural effusion TECHNOLOGIST PROVIDED HISTORY: left pleural effusion FINDINGS: Overlying ECG monitor leads and gown snaps. Slightly rotated to left. Interval complete opacification left hemithorax; slight leftward shift mediastinal structures may reflect rotation, but decreased visualization left mainstem bronchus suggests increasing volume loss. Right lung and right costophrenic angle clear. Cardiac silhouette difficult to assess due to obscuration on left but appears unchanged. Heavy calcification aortic knob again noted. Bones appear unchanged. Total opacification left hemithorax likely worsening volume loss rather than increasing known moderate left pleural effusion.      XR CHEST PORTABLE    Result Date: 10/16/2021  EXAMINATION: ONE XRAY VIEW OF THE CHEST 10/16/2021 11:36 am COMPARISON: 15 October 2021 HISTORY: ORDERING SYSTEM PROVIDED HISTORY: Interval Follow up TECHNOLOGIST PROVIDED HISTORY: Interval Follow up FINDINGS: AP portable view of the chest time stamped at 1111 hours demonstrates overlying cardiac monitoring electrodes. Heart size is stable with calcification aortic arch. There is extensive opacification in the left hemithorax, with decreased aeration in the left upper lung field compared to prior study of 1 day earlier. A sizable left effusion is noted. Mild perihilar congestive changes noted, stable. Osteopenia is present. Increasing opacification in the left hemithorax particularly in the left upper lobe likely related to increase left effusion although 10 of left apical airspace disease is not excluded. Significant opacification in the left mid and lower lung field is noted. Mild perihilar congestive changes are again noted. No extrapleural air is seen. XR CHEST PORTABLE    Result Date: 10/15/2021  EXAMINATION: ONE XRAY VIEW OF THE CHEST 10/15/2021 1:44 pm COMPARISON: 10/15/2021 HISTORY: ORDERING SYSTEM PROVIDED HISTORY: POST THORACENTESIS TECHNOLOGIST PROVIDED HISTORY: POST THORACENTESIS FINDINGS: Improved aeration of the left upper lung with decrease in left pleural effusion status post left thoracentesis without evidence of pneumothorax. Stable cardiomediastinal silhouette with calcific plaque of the thoracic aorta. Mild perihilar congestive changes. Overall decrease in left pleural effusion with moderate residual pleural/parenchymal density left mid and lower chest.  Bones are osteopenic with degenerative changes. No evidence of pneumothorax status post left thoracentesis     XR CHEST PORTABLE    Result Date: 10/15/2021  EXAMINATION: ONE XRAY VIEW OF THE CHEST 10/15/2021 4:46 am COMPARISON: Chest portable October 5, 2021 HISTORY: ORDERING SYSTEM PROVIDED HISTORY: cp TECHNOLOGIST PROVIDED HISTORY: cp Reason for Exam: pot upright FINDINGS: The heart is moderately to severely enlarged with otherwise unremarkable configuration. The mediastinal contours are within normal limits. Large left-sided pleural effusion is noted with adjacent marked compressive atelectasis seen.   Right lung is well aerated. Bones and soft tissues are unremarkable. Large left-sided pleural effusion with marked compressive atelectasis of the left lung. Moderate to severe cardiomegaly. XR CHEST PORTABLE    Result Date: 10/5/2021  EXAMINATION: ONE XRAY VIEW OF THE CHEST 10/5/2021 7:20 am COMPARISON: 10/04/2021, 10/02/2021 HISTORY: ORDERING SYSTEM PROVIDED HISTORY: Increased SOA, L thoracentesis yesterday TECHNOLOGIST PROVIDED HISTORY: Increased SOA, L thoracentesis yesterday FINDINGS: The cardiac and mediastinal contours appear unchanged. Basilar opacities and pleural effusions are again demonstrated without appreciable change. No new airspace disease identified in the interval.  No evidence for pneumothorax. No significant interval change. US THORACENTESIS Which side should the procedure be performed? Left    Result Date: 10/18/2021  PROCEDURE: ULTRASOUNDGUIDED left THORACENTESIS 10/18/2021 HISTORY: ORDERING SYSTEM PROVIDED HISTORY: left plueral effusion TECHNOLOGIST PROVIDED HISTORY: left plueral effusion Which side should the procedure be performed? ->Left Left pleural effusion TECHNIQUE: This procedure was performed by Jacqueline Pat PA-C under indirect supervision of Dr. Hali Mai. A detailed explanation of the procedure including but not limited to risks and benefits of the procedure were discussed. The posterior chest was prepped and draped using universal protocol and maximum sterile barrier technique. While the patient was rolled onto her right side with left side up, after localizing, marking and anesthetizing the posterior left lower chest with one percent lidocaine, a 5 [place] Yudelka Yueh needle was advanced under ultrasound guidance. The catheter was advanced and the needle was removed. The catheter was connected to a Vacutainer bottle and 300 mL of red fluidwas drained. Postprocedural ultrasound demonstrated minimal residual fluid. The catheter was removed and the site was dressed appropriately.  A sample was sent for laboratory analysis. Estimated blood loss was minimum. The patient tolerated the procedure well and left the Department in stable condition. FINDINGS: A total of 300 mL of red fluid was removed. Successful ultrasound guided thoracentesis. US THORACENTESIS Which side should the procedure be performed? Left    Result Date: 10/15/2021  PROCEDURE: Fran Hatchet LEFT THORACENTESIS 10/15/2021 HISTORY: ORDERING SYSTEM PROVIDED HISTORY: pleural effusion TECHNOLOGIST PROVIDED HISTORY: pleural effusion Which side should the procedure be performed? ->Left TECHNIQUE: This procedure was performed by Dr. Satish Ballard. Informed consent was obtained after a detailed explanation of the procedure including risks, benefits, and alternatives. Universal protocol was performed. The left chest was prepped and draped in sterile fashion and local anesthesia was achieved with lidocaine. A 5 Thai needle sheath was advanced under ultrasound guidance into pleural effusion and thoracentesis was performed. The patient tolerated the procedure well. The patient began coughing after approximately 300 mL of fluid was obtained and requested termination of the procedure. Small effusion persists. EBL: None FINDINGS: A total of 300 mL of clear yellow fluid was removed. Sample was sent for the requested studies. Successful ultrasound guided left thoracentesis. RECENT VITALS:     Temp: 99.7 °F (37.6 °C),  Pulse: 75, Resp: 20, BP: (!) 127/51, SpO2: 96 %    This patient is a 68 y.o. Female with past medical history of CHF and new onset CASEY presented emerge department for shortness of breath which is worsened with exertion in addition with peripheral edema. Patient's work-up positive for CHF exacerbation with new onset CASEY. [facility] and [facility].     ED Course as of Nov 04 0516 Wed Nov 03, 2021   1746 SARS-CoV-2, Rapid: Not Detected [ZT]   1902 Spoke to Kash who is agreed to admit patient. [ZT]      ED Course User Index  [ZT] Sheree Vaca DO       OUTSTANDING TASKS / RECOMMENDATIONS:    1. Bed waiting     FINAL IMPRESSION:     1. Acute on chronic congestive heart failure, unspecified heart failure type ([facility] [address]    2. CASEY (acute kidney injury) ([facility] [address]    3. Acute on chronic diastolic heart failure ([facility] [address]        DISPOSITION:         DISPOSITION:  []  Discharge   []  Transfer -    [x]  Admission -     []  Against Medical Advice   []  Eloped   FOLLOW-UP: No follow-up provider specified.    DISCHARGE MEDICATIONS: Current Discharge Medication List              Gustavo Hashimoto, MD  Emergency Medicine Resident  [id number] [facility]        Gustavo Hashimoto, MD  Resident  11/03/21 2024       Gustavo Hashimoto, MD  Resident  11/04/21 [id number]

## 2021-11-04 NOTE — CONSULTS
Renal Consult Note    Patient :  Shen Ortiz; 68 y.o. MRN# [id number]  Location:  0492/8406-21  Attending:  Anjana Baer DO  Admit Date:  11/3/2021   Hospital Day: 1    Reason for Consult:     Asked by Dr Anjana Baer DO to see for CASEY/Elevated Creatinine. History Obtained From:     patient, electronic medical record    History of Present Illness:     Shen Ortiz; 68 y.o. female with PMH of COPD, CHF  presented to the ED from SNF for shortness of breath, worse with exertion, worsening lower extremity edema. Recently admitted to ICU and treated for left pleural effusion, congestive heart failure, atrial fibrillation s/p CURT cardioversion, pneumonia. Notes that she has had also some constipation since a two days, nausea ,vomiting a couple of days before and  Able to tolerate PO intake. Borderline tachycardia but vitals otherwise within normal limits. Irregularly irregular on monitor. Sitting in bed in no significant distress. speaking in full sentences, supplemental oxygen via nasal cannula, 2-3+ pitting edema bilateral lower extremities left worse than right. Patient does have an CASEY with a creatinine of 1.65 from 0.55 12 days ago. Mild hyponatremia. Persistent leukocytosis 14.5. This is unchanged from when patient was discharged. X-ray is relatively unchanged is still does demonstrate a left pulmonary consolidation. Percent pneumonia but more likely that persistent leukocytosis is due to recent steroid use. Renal ultrasound reviewed. Received IV lasix 20 mg overnight. Past History/Allergies? Social History:     Past Medical History:   Diagnosis Date    Asthma     COPD [facility]chronic obstructive pulmonary disease) ([facility] [address]     Gout     Hyperlipidemia     Hypertension     Mitral insufficiency     Pneumonia        Past Surgical History:   Procedure Laterality Date    ANKLE SURGERY Left 03/22/2016    ORIF    BRONCHOSCOPY N/A 10/20/2021    BRONCHOSCOPY ALVEOLAR LAVAGE performed by Robin Organization Meetings:     Marital Status:    Intimate Partner Violence:     Fear of Current or Ex-Partner:     Emotionally Abused:     Physically Abused:     Sexually Abused:        Family History:        Family History   Problem Relation Age of Onset    High Blood Pressure Mother     Cancer Mother     Asthma Father     Heart Disease Father     Cancer Father        Outpatient Medications:     Medications Prior to Admission: amiodarone (CORDARONE) 200 MG tablet, Take 1 tablet by mouth daily  metoprolol tartrate (LOPRESSOR) 50 MG tablet, Take 1 tablet by mouth 2 times daily  Calamine 8-8 % LOTN lotion, Apply to rash on legs  miconazole (MICOTIN) 2 % powder, Apply topically 2 times daily. simethicone (MYLICON) 80 MG chewable tablet, Take 1 tablet by mouth every 6 hours as needed for Flatulence (Abdominal Cramps)  clonazePAM (KLONOPIN) 1 MG tablet, Take 1 tablet by mouth nightly for 3 days. apixaban (ELIQUIS) 5 MG TABS tablet, Take 1 tablet by mouth 2 times daily  atorvastatin (LIPITOR) 40 MG tablet, Take 1 tablet by mouth daily  citalopram (CELEXA) 40 MG tablet, Take 0.5 tablets by mouth daily  losartan (COZAAR) 100 MG tablet, Take 1 tablet by mouth daily  meloxicam (MOBIC) 15 MG tablet, Take 15 mg by mouth daily  clopidogrel (PLAVIX) 75 MG tablet, Take 1 tablet by mouth daily  cyclobenzaprine (FLEXERIL) 10 MG tablet, TAKE ONE TABLET BY MOUTH THREE TIMES DAILY AS NEEDED FOR MUSCLE SPASM  diphenhydrAMINE (BENADRYL) 25 MG capsule, Take 25 mg by mouth every 6 hours as needed for Itching  zinc oxide (DESITIN) 40 % ointment, Apply topically as needed for Dry Skin Apply topically as needed.   furosemide (LASIX) 20 MG tablet, Take 1 tablet by mouth daily  isosorbide mononitrate (IMDUR) 30 MG extended release tablet, Take 1 tablet by mouth daily  albuterol sulfate  (90 Base) MCG/ACT inhaler, Inhale 2 puffs into the lungs every 6 hours as needed for Wheezing  Calcium Carb-Cholecalciferol (CALCIUM + D3 PO), Take by mouth daily  CINNAMON PO, Take by mouth daily  TURMERIC CURCUMIN PO, Take by mouth daily  ipratropium-albuterol (DUONEB) 0.5-2.5 (3) MG/3ML SOLN nebulizer solution, Inhale 1 vial into the lungs every 4 hours  beclomethasone (QVAR) 80 MCG/ACT inhaler, Inhale 2 puffs into the lungs 2 times daily  LYSINE PO, Take by mouth daily  vitamin D (CHOLECALCIFEROL) 1000 UNIT TABS tablet, Take 1,000 Units by mouth daily  Pyridoxine HCl (VITAMIN B-6) 50 MG tablet, Take 100 mg by mouth daily  Cyanocobalamin (VITAMIN B 12 PO), Take by mouth daily  Magnesium 500 MG TABS, Take by mouth  Ascorbic Acid (VITAMIN C) 500 MG tablet, Take 500 mg by mouth daily    Current Medications:     Scheduled Meds:    amiodarone  200 mg Oral Daily    apixaban  5 mg Oral BID    vitamin C  500 mg Oral Daily    atorvastatin  40 mg Oral Daily    fluticasone  1 puff Inhalation BID    citalopram  20 mg Oral Daily    clopidogrel  75 mg Oral Daily    isosorbide mononitrate  30 mg Oral Daily    metoprolol tartrate  50 mg Oral BID    sodium chloride flush  5-40 mL IntraVENous 2 times per day    methylPREDNISolone  60 mg IntraVENous Q8H     Continuous Infusions:    sodium chloride       PRN Meds:  albuterol **AND** [] ipratropium, albuterol **AND** [] ipratropium, albuterol sulfate HFA, cyclobenzaprine, ipratropium-albuterol, sodium chloride flush, sodium chloride, ondansetron **OR** ondansetron, acetaminophen **OR** acetaminophen    Review of Systems:     Constitutional: No fever, no chills, no lethargy, no weakness. HEENT:  No headache, otalgia, itchy eyes, nasal discharge or sore throat. Cardiac:  No chest pain, positive for dyspnea, orthopnea Chest:   No cough, phlegm or wheezing. Abdomen:  Positive for abdominal pain, nausea or vomiting. Neuro:  No focal weakness, abnormal movements or seizure like activity. Skin:   No rashes, no itching. :   No hematuria, no pyuria, no dysuria, no flank pain.   Extremities:  Positive for  swelling  ROS was otherwise negative except as mentioned in the [address] Input/Output:       I/O last 3 completed shifts:  In: -   Out: 200 [Urine:200]    Vital Signs:   Temperature:  Temp: 97.9 °F (36.6 °C)  TMax:   Temp (24hrs), Av.6 °F (37 °C), Min:97.9 °F (36.6 °C), Max:99.7 °F (37.6 °C)    Respirations:  Resp: 29  Pulse:   Pulse: 88  BP:    BP: (!) 153/88  BP Range: Systolic (80XQZ), EUZ:401 , Min:104 , YYS:560       Diastolic (67SNM), OOJ:23, Min:51, Max:88      Physical Examination:     General:  AAO x 3, speaking in full sentences, no accessory muscle use. HEENT: Atraumatic, normocephalic, no throat congestion, moist mucosa. Eyes:   Pupils equal, round and reactive to light, EOMI. Neck:   Supple  Chest:   Bilateral crackles present / wheezes. Cardiac:  S1 S2 RR, no murmurs, gallops or rubs. Abdomen: Soft, non-tender, no masses or organomegaly, BS audible. :   No suprapubic or flank tenderness. Neuro:  AAO x 3, No FND. SKIN:  No rashes, good skin turgor. Extremities:  Bilateral 2+ edema. Labs:       Recent Labs     21  1641 21  0533   WBC 14.5* 10.1   RBC 3.68* 3.50*   HGB 11.5* 11.3*   HCT 36.8 34.8*   .0 99.4   MCH 31.3 32.3   MCHC 31.3 32.5   RDW 13.2 13.1    341   MPV 9.7 9.8      BMP:   Recent Labs     21  1641 21  0533   * 128*   K 4.6 5.3   CL 92* 94*   CO2 26 22   BUN 26* 27*   CREATININE 1.65* 1.52*   GLUCOSE 128* 156*   CALCIUM 8.9 8.6      Phosphorus:   No results for input(s): PHOS in the last 72 hours.   Magnesium:    Recent Labs     21  0533   MG 2.2     Albumin:    Recent Labs     21  1641 21  0533   LABALBU 3.0* 2.6*     BNP:    No results found for: BNP  ARIANNE:      Lab Results   Component Value Date    ARIANNE NEGATIVE 10/03/2021     SPEP:  Lab Results   Component Value Date    PROT 6.1 2021     UPEP:   No results found for: LABPE  C3:   No results found for: C3  C4:   No results found for: C4  MPO ANCA: Lab Results   Component Value Date    MPO <0.3 10/03/2021     PR3 ANCA:     Lab Results   Component Value Date    PR3 <0.7 10/03/2021     Anti-GBM:   No results found for: GBMABIGG  Hep BsAg:       No results found for: HEPBSAG  Hep C AB:        No results found for: HEPCAB    Urinalysis/Chemistries:      Lab Results   Component Value Date    NITRU NEGATIVE 10/15/2021    COLORU Yellow 10/15/2021    PHUR 5.5 10/15/2021    LABCAST 4-8 Hyaline 03/22/2016    LABCAST NONE SEEN 03/22/2016    WBCUA 20 TO 50 10/15/2021    RBCUA 10 TO 20 10/15/2021    RBCUA 25-50 03/22/2016    MUCUS 1+ 10/15/2021    TRICHOMONAS NOT REPORTED 10/15/2021    YEAST MANY 10/15/2021    BACTERIA NOT REPORTED 10/15/2021    SPECGRAV 1.012 10/15/2021    LEUKOCYTESUR SMALL 10/15/2021    UROBILINOGEN Normal 10/15/2021    BILIRUBINUR NEGATIVE 10/15/2021    BLOODU LARGE 03/22/2016    GLUCOSEU NEGATIVE 10/15/2021    KETUA NEGATIVE 10/15/2021    AMORPHOUS NOT REPORTED 10/15/2021     Urine Sodium:     Lab Results   Component Value Date    FELI 53 10/20/2021     Urine Potassium:  No results found for: KUR  Urine Chloride:  No results found for: CLUR  Urine Osmolarity:   Lab Results   Component Value Date    OSMOU 318 10/20/2021     Urine Protein:   No results found for: TPU  Urine Creatinine:   No results found for: LABCREA  UPC:     Urine Eosinophils:  No components found for: UEOS    Radiology:     Reviewed. Assessment:     1. Acute Kidney Injury likely pre-renal secondary to reduced E ABV from CHF and worsen further by concomitant nonsteroidal anti-inflammatory agent use on a chronic basis -improving  Baseline creatinine normal  2. HFpEF vs COPD exacerbation  3. Dilutional hyponatremia  4. Persistant left sided pleural effusion status post thoracocentesis  5. A.fib s/p CURT cardioversion    Plan:   1. Start IV lasix 20 bid. Fluid restriction 1500ml. 2. Check BMP at 6 pm today. BMP tomorrow am.   3. Renal ultrasound reviewed  4.  Check urine studies    Nutrition   Please ensure that patient is on a renal diet/TF. Avoid nephrotoxic drugs/contrast exposure. Thank you for the consultation. Please do not hesitate to contact us for any further questions/concerns. We will continue to follow along with you. Gianni Alas, PGY2  Internal Medicine  Nephrology service    This note is created with the assistance of a speech-recognition program. While intending to generate a document that actually reflects the content of the visit, no guarantees can be provided that every mistake has been identified and corrected by editing. Attending Physician Statement  I have discussed the care of Eusebia Torres, including pertinent history and exam findings,  with the Fellow/Residentt. I have reviewed the key elements of all parts of the encounter with the Fellow/ Resident. I agree with the assessment, plan and orders as documented by the resident.     Lenny Wong MD MD, MRCP Thor Bronson, FACP   11/4/2021 12:36 PM    Nephrology [address] [place] [address]

## 2021-11-04 NOTE — CARE COORDINATION
Case Management Initial Discharge Plan  John Vargas,             Met with: to discuss discharge plans. patient  Information verified: address, contacts, phone number, , insurance Yes  Insurance Provider: [facility]    Emergency Contact/Next of Kin name & number:   Ladonna Short (son) [phone]  Young Cedeño (son) [phone]  Who are involved in patient's support system? sons    PCP: Jose Juan Cardozo, APRN - CNP  Date of last visit: seen by [facility] physician      Discharge Planning    Living Arrangements:  Children, Family Members (previous to going to [facility] 3 weeks ago)    Home has 1 stories  0 stairs to climb to get into front door    Patient able to perform ADL's:Assisted    Current Services (outpatient & in home) [facility]  DME equipment: uses walker and wheelchair at SNF, O2 as needed  DME provider:     Is patient receiving oral anticoagulation therapy? Yes, Eliquis    If indicated:   Physician managing anticoagulation treatment:   Where does patient obtain lab work for ATC treatment? Potential Assistance Needed:  Dave Swanson    Patient agreeable to home care: No  [place] of choice provided:  n/a    Prior SNF/Rehab Placement and Facility: yes  Agreeable to SNF/Rehab: Yes  [place] of choice provided: yes     Evaluation: n/a    Expected Discharge date:  21    Patient expects to be discharged to:  [facility]    If home: is the family and/or caregiver wiling & able to provide support at home? Who will be providing this support? Follow Up Appointment: Best Day/ Time: Monday PM    Transportation provider: will need stretcher per [facility] network  Transportation arrangements needed for discharge: Yes    Readmission Risk              Risk of Unplanned Readmission:  25             Does patient have a readmission risk score greater than 14?: Yes  If yes, follow-up appointment must be made within 7 days of discharge.      Goals of Care:       Educated patient on transitional options, provided freedom of choice and are agreeable with plan      Discharge Plan: Wants to return to [facility]. Need to call and check if patient able to return and if she needs precert.           Electronically signed by Leann Briggs RN on 11/4/21 at 6:20 PM EDT

## 2021-11-04 NOTE — CONSULTS
Patient - Judy Trotter   MRN -  [id number]   Acct # - [de-identified]   - 1945        Date of evaluation -  2021    REASON FOR THE CONSULTATION:  Left pleural effusion  HISTORY OF PRESENT ILLNESS:    Judy Trotter is a 68y.o. year old female here for evaluation of shortness of breath with clear sputum, increased leg swelling. Patient has chronic shortness of breath which has been worsening over the last couple of days along with increased swelling of her lower extremity. She has conversational dyspnea, no wheezing. Chest x-ray shows left pleural effusion and left atelectasis she has had multiple thoracentesis. Complains of abdominal bloating, orthopnea, palpitations. She was found to be in A. fib with heart rate in 110s.           Immunization   Immunization History   Administered Date(s) Administered    Influenza Vaccine, unspecified formulation 2017    Influenza Virus Vaccine 2017, 2018    Influenza Whole 12/15/2015    Pneumococcal Conjugate 13-valent ([id number]) 10/17/2016    Pneumococcal Polysaccharide ([id number]) 12/15/2015    Td, unspecified formulation 2016            PAST MEDICAL HISTORY:       Diagnosis Date    Asthma     COPD (chronic obstructive pulmonary disease) ([address]     Gout     Hyperlipidemia     Hypertension     Mitral insufficiency     Pneumonia          Family History:       Problem Relation Age of Onset    High Blood Pressure Mother     Cancer Mother     Asthma Father     Heart Disease Father     Cancer Father        SURGICAL HISTORY:   Past Surgical History:   Procedure Laterality Date    ANKLE SURGERY Left 2016    ORIF    BRONCHOSCOPY N/A 10/20/2021    BRONCHOSCOPY ALVEOLAR LAVAGE performed by Alfredo Gonzales MD at [address]      IA COLSC FLX W/RMVL OF TUMOR POLYP LESION SNARE TQ N/A 2017    COLONOSCOPY POLYPECTOMY SNARE/COLD BIOPSY performed by Paul Burns MD at [address]:      TOBACCO:   reports that she has quit smoking. She has never used smokeless tobacco.  ETOH:   reports current alcohol use. ALLERGIES:    Allergies   Allergen Reactions    Latex Hives     Hives from rubber gloves    Betadine [Povidone Iodine] Hives    Bee Venom Swelling    Dilaudid [Hydromorphone Hcl] Other (See Comments)     Severe confusion    Adhesive Tape Rash    Sulfa Antibiotics Rash       Home Meds:   Prior to Admission medications    Medication Sig Start Date End Date Taking? Authorizing Provider   amiodarone (CORDARONE) 200 MG tablet Take 1 tablet by mouth daily 10/22/21 11/21/21  Marie Dockery MD   metoprolol tartrate (LOPRESSOR) 50 MG tablet Take 1 tablet by mouth 2 times daily 10/22/21   Marie Dockery MD   Calamine 8-8 % LOTN lotion Apply to rash on legs 10/22/21   Marie Dockery MD   miconazole (MICOTIN) 2 % powder Apply topically 2 times daily. 10/22/21   Marie Dockery MD   simethicone (MYLICON) 80 MG chewable tablet Take 1 tablet by mouth every 6 hours as needed for Flatulence (Abdominal Cramps) 10/22/21 11/21/21  Marie Dockery MD   clonazePAM (KLONOPIN) 1 MG tablet Take 1 tablet by mouth nightly for 3 days.  10/22/21 10/25/21  Marie Dockery MD   apixaban (ELIQUIS) 5 MG TABS tablet Take 1 tablet by mouth 2 times daily 10/8/21   Roberto Kimbrough MD   atorvastatin (LIPITOR) 40 MG tablet Take 1 tablet by mouth daily 10/8/21   Roberto Kimbrough MD   citalopram (CELEXA) 40 MG tablet Take 0.5 tablets by mouth daily 10/8/21   Roberto Kimbrough MD   losartan (COZAAR) 100 MG tablet Take 1 tablet by mouth daily 8/28/18   Brittney Ferreira MD   meloxicam (MOBIC) 15 MG tablet Take 15 mg by mouth daily    Historical Provider, MD   clopidogrel (PLAVIX) 75 MG tablet Take 1 tablet by mouth daily 10/5/17   Brittney Ferreira MD   cyclobenzaprine (FLEXERIL) 10 MG tablet TAKE ONE TABLET BY MOUTH THREE TIMES DAILY AS NEEDED FOR MUSCLE SPASM 10/5/17   Dulce Dumont MD   diphenhydrAMINE (BENADRYL) 25 MG capsule Take 25 mg by mouth every 6 hours as needed for Itching    Historical Provider, MD   zinc oxide (DESITIN) 40 % ointment Apply topically as needed for Dry Skin Apply topically as needed.     Historical Provider, MD   furosemide (LASIX) 20 MG tablet Take 1 tablet by mouth daily 7/14/17   Dulce Dumont MD   isosorbide mononitrate (IMDUR) 30 MG extended release tablet Take 1 tablet by mouth daily 7/14/17   Dulce Dumont MD   albuterol sulfate  (90 Base) MCG/ACT inhaler Inhale 2 puffs into the lungs every 6 hours as needed for Wheezing 7/14/17   Dulce Dumont MD   Calcium Carb-Cholecalciferol (CALCIUM + D3 PO) Take by mouth daily    Historical Provider, MD   CINNAMON PO Take by mouth daily    Historical Provider, MD   TURMERIC CURCUMIN PO Take by mouth daily    Historical Provider, MD   ipratropium-albuterol (DUONEB) 0.5-2.5 (3) MG/3ML SOLN nebulizer solution Inhale 1 vial into the lungs every 4 hours    Historical Provider, MD   beclomethasone (QVAR) 80 MCG/ACT inhaler Inhale 2 puffs into the lungs 2 times daily 6/17/16   Dulce Dumont MD   LYSINE PO Take by mouth daily    Historical Provider, MD   vitamin D (CHOLECALCIFEROL) 1000 UNIT TABS tablet Take 1,000 Units by mouth daily    Historical Provider, MD   Pyridoxine HCl (VITAMIN B-6) 50 MG tablet Take 100 mg by mouth daily    Historical Provider, MD   Cyanocobalamin (VITAMIN B 12 PO) Take by mouth daily    Historical Provider, MD   Magnesium 500 MG TABS Take by mouth    Historical Provider, MD   Ascorbic Acid (VITAMIN C) 500 MG tablet Take 500 mg by mouth daily    Historical Provider, MD     CURRENT MEDS :  Scheduled Meds:   furosemide  20 mg IntraVENous BID    amiodarone  200 mg Oral Daily    apixaban  5 mg Oral BID    vitamin C  500 mg Oral Daily    atorvastatin  40 mg Oral Daily    abnormality, atraumatic      Eyes:    PERRL, conjunctiva/corneas clear, EOM's intact   Ears:    Normal TM's and external ear canals, both ears   Nose:   Nares normal, septum midline, mucosa normal, no drainage        or sinus tenderness   Throat:   Lips, mucosa, and tongue normal; teeth and gums normal   Neck:   Supple, symmetrical, trachea midline, no adenopathy;     thyroid:  no enlargement/tenderness/nodules;    Back:     Symmetric, no curvature, ROM normal, no CVA tenderness   Lungs: No wheezing , dec bs left post and dullness . Dec bs right base , tachypnea    Chest Wall:    No tenderness or deformity      Heart:    Ir S1 and S2                           Abdomen:                                                 Pulses:                                                Lymph nodes:                    Neurologic:                  Soft, non-tender, bowel sounds active all four quadrants,     no masses, no organomegaly         2+ and symmetric all extremities            Cervical, supraclavicular not enlarged or matted or tender      CNII-XII intact, normal strength 5/5 . Clubbing No  Lower ext edema Yes1+   [x] , 2 +  [] , 3+   []  Upper ext edema No                CBC:   Recent Labs     11/03/21  1641 11/04/21  0533   WBC 14.5* 10.1   HGB 11.5* 11.3*    341     BMP:    Recent Labs     11/03/21  1641 11/04/21  0533   * 128*   K 4.6 5.3   CL 92* 94*   CO2 26 22   BUN 26* 27*   CREATININE 1.65* 1.52*   GLUCOSE 128* 156*     Hepatic:   Recent Labs     11/03/21  1641 11/04/21  0533   AST 14 13   ALT 12 12   BILITOT 1.40* 0.83   ALKPHOS 72 71     Amylase:   Lab Results   Component Value Date    AMYLASE 7 10/02/2021     Lipase:   Lab Results   Component Value Date    LIPASE 8 11/03/2021     Troponin: No results for input(s): TROPONINI in the last 72 hours. BNP: No results for input(s): BNP in the last 72 hours. Lipids: No results for input(s): CHOL, HDL in the last 72 hours.     Invalid input(s): LDLCALCU  ABGs: No results found for: PHART, PO2ART, DSR2ZVW  INR:   Recent Labs     11/04/21  0533   INR 1.2     Thyroid:   Lab Results   Component Value Date    TSH 0.35 10/03/2021     Urinalysis: No results for input(s): BACTERIA, BLOODU, CLARITYU, COLORU, PHUR, PROTEINU, RBCUA, SPECGRAV, BILIRUBINUR, NITRU, WBCUA, LEUKOCYTESUR, GLUCOSEU in the last 72 hours. XR CHEST PORTABLE    Result Date: 11/3/2021  No change from prior study. Persistent sizable left effusion with opacity at the left lung base and pulmonary vascular congestion. US THORACENTESIS Which side should the procedure be performed? Left    Result Date: 11/4/2021  Successful ultrasound guided thoracentesis. US RETROPERITONEAL COMPLETE    Result Date: 11/4/2021  1. Patient had no urge to void during the exam.  Nonvisualization of ureteral jets. 2. Incidental cholelithiasis. 3. Unremarkable renal ultrasound. No hydronephrosis. IMPRESSION:      Recurrent left pleural effusion with atelectasis  Acute on chronic diastolic CHF  Obstructive sleep apnea:              Chronic obstructive pulmonary disease  Obesity  A. Fib    Principal Problem:    CASEY (acute kidney injury) ([facility] [address]  Active Problems:    Benign essential HTN    Morbid obesity with BMI of 40.0-44.9, adult (HCC)    Acute on chronic diastolic heart failure (HCC)    Persistent atrial fibrillation (HCC)    Recurrent pleural effusion  Resolved Problems:    * No resolved hospital problems. *       PLAN:      Patient has recurrent left pleural effusion. We will proceed with IR guided thoracentesis and analysis. Acute on chronic diastolic congestive heart failure. Continue diuresis. Fluid restriction. Does not need steroids. Continue bronchodilator therapy        I hope this updates you on my evaluation and clinical thinking. Thank you for allowing me to participate in his care.      Sincerely,      Electronically signed by Chacho Espinosa MD on 11/4/2021 at 5:41 PM

## 2021-11-04 NOTE — PROGRESS NOTES
Thoracentesis: via left  chest    Dr. Trixie Napoles-RAMSEY JENKINS RN    Patient to [address] [facility] room 8, identified, allergies confirmed. Sitting up, monitors on. Stable. Time out complete. Prep to LEFT back  per PALLAVI ZAMORANO    600 mls  Of clear,  rust colored  fluid drawn off. 2x2 with Tegaderm to back puncture. No problems noted. Sample sent to lab per writer. Patient tolerated well. Report called to RN.   Patient stable, off monitor, and awaiting transport in IR holding area

## 2021-11-04 NOTE — PROGRESS NOTES
Comprehensive Nutrition Assessment    Type and Reason for Visit:  Initial, Positive Nutrition Screen (Weight Loss, Poor Appetite/Intake)    Nutrition Recommendations/Plan:   - Continue Regular, Low Sodium Diet with 1500 mL Fluid Restriction as tolerated  - Start high calorie oral nutrition supplement (Ensure Enlive) and provide 2x/d with Breakfast and Dinner  - Monitor/encourage PO intakes     Nutrition Assessment:  Patient seen for weight loss and poor appetite/intake. Spoke with patient who reports that her appetite is [place]. \" Reports a past weight of 280 lb, unsure when she last weighed this. Per EHR, patient weighed 273 lb 3.2 oz on 9/1/2021. Patient has had a 17 lb (6.2%) weight loss x two months, which is not considered significant. Patient denies nausea/vomiting, but reports gagging with large pills. Endorses constipation, was able to have a small BM this morning. Labs reviewed include hyponatremia - 1500 mL fluid restriction initiated today. Patient wanting Ensure oral nutrition supplements. Malnutrition Assessment:  Malnutrition Status: At risk for malnutrition     Context:  Chronic Illness     Findings of the 6 clinical characteristics of malnutrition:  Energy Intake:  7 - 75% or less estimated energy requirements for 1 month or longer  Weight Loss:  1 - Mild weight loss (6.2% x two months)     Body Fat Loss:  No significant body fat loss     Muscle Mass Loss:  No significant muscle mass loss    Fluid Accumulation:   Mild to Moderate- Extremities   Strength:  Not Performed    Estimated Daily Nutrient Needs:  Energy (kcal):  4563-2671 kcal/d (MSJ x 1.2 x 1.1); Weight Used for Energy Requirements:  Admission     Protein (g):  110 g protein/d (2 g/kg); Weight Used for Protein Requirements:  Ideal (55 kg)        Fluid (ml/day):  1500 mL fluid restriction per MD order    Nutrition Related Findings:  Labs: Na 128 (L). Meds: Vitamin C, Lasix, Solu-Medrol. Last BM 11/4 (small).  Non-pitting RLE, +2 pitting LLE edema. Wounds:  Skin Tears       Current Nutrition Therapies:    ADULT DIET; Regular; Low Sodium (2 gm); 1500 ml  ADULT ORAL NUTRITION SUPPLEMENT; Breakfast, Dinner; Standard High Calorie/High Protein Oral Supplement    Anthropometric Measures:  · Height: 5' 4\" (162.6 cm)  · Current Body Weight: 256 lb (116.1 kg)   · Admission Body Weight: 256 lb (116.1 kg)    · Usual Body Weight: 273 lb 3.2 oz (123.9 kg)     · Ideal Body Weight: 120 lbs; % Ideal Body Weight 213.3 %   · BMI: 43.9  · Adjusted Body Weight:  No Adjustment    · BMI Categories: Obese Class 3 (BMI 40.0 or greater)       Nutrition Diagnosis:   · Unintended weight loss related to inadequate protein-energy intake as evidenced by poor intake prior to admission, weight loss    Nutrition Interventions:   Food and/or Nutrient Delivery:  Continue Current Diet, Start Oral Nutrition Supplement  Nutrition Education/Counseling:  No recommendation at this time   Coordination of Nutrition Care:  Continue to monitor while inpatient    Goals:  PO intake to meet > 75% of estimated nutrition needs       Nutrition Monitoring and Evaluation:   Behavioral-Environmental Outcomes:  None Identified   Food/Nutrient Intake Outcomes:  Food and Nutrient Intake, Supplement Intake  Physical Signs/Symptoms Outcomes:  Biochemical Data, Constipation, GI Status, Fluid Status or Edema, Nutrition Focused Physical Findings, Skin, Weight     Discharge Planning:     Too soon to determine     Electronically signed by Pedro Bah RD, LD on 11/4/21 at 1:53 PM EDT    Contact: [phone]

## 2021-11-04 NOTE — PROGRESS NOTES
Congestive Heart Failure Education note:      Discussed 2000mg/day sodium restricted diet; patient verbalized understanding. Moderate daily exercise encouraged as tolerated. Discussed rest breaks as needed; patient verbalized understanding. Patient instructed to weigh self at the same time of each day, using same clothes and same scale; reinforced teaching to monitor for 3-5 lb weight increase over 1-2 days notify physician if charge noted. Patient verbalized understanding. Patient instructed to limit fluid intake to 2 liters per day. Patient verbalized understanding. Risks of smoking discussed with the patient if applicable; patient strongly discouraged to smoke. Patient verbalized understanding. Signs and symptoms of CHF discussed with patient, such as feeling more tired than normal, feeling short of breath, coughing that increases when you lie down, sudden weight gain, swelling of your feet, legs or belly. Patient verbalized understanding to notify physician office if these symptoms occur. Compliance with plan of care and further disease process causes discussed with patient, patient encouraged to keep all follow up appointments. Patient verbalized understanding. Lives in skilled facility, difficult for her to get around. Is not weighed daily. Will contact facility after discharge to see if pt. Appropriate to follow up in CHF Clinic after discharge.

## 2021-11-04 NOTE — BRIEF OP NOTE
Brief Postoperative Note for Thoracentesis    Jennifer Roman  YOB: 1945  [id number]    Pre-operative Diagnosis: left Pleural effusion      Post-operative Diagnosis: Same    Procedure: Ultrasound guided Thoracentesis     Anesthesia: 1% Lidocaine     Surgeons/Assistants: Andreas Teran PA-C    Complications: none    EBL: Minimal    Specimens: were obtained    Ultrasound guided left thoracentesis performed. 600 ml rust colored fluid obtained. Dressing applied.       Electronically signed by Karyle Quirk, PA on 11/4/2021 at 11:26 AM

## 2021-11-04 NOTE — CARE COORDINATION
Readmission Assessment  Number of Days since last admission?: 8-30 days  Previous Disposition: SNF  Who is being Interviewed: Patient  What was the patient's/caregiver's perception as to why they think they needed to return back to the hospital?: Other (Comment) (Short of breath)  Did you visit your Primary Care Physician after you left the hospital, before you returned this time?: No  Why weren't you able to visit your PCP?: Other (Comment) (seen by SNF physician)  Did you see a specialist, such as Cardiac, Pulmonary, Orthopedic Physician, etc. after you left the hospital?: No  Who advised the patient to return to the hospital?: Skilled Unit  Does the patient report anything that got in the way of taking their medications?: No  In our efforts to provide the best possible care to you and others like you, can you think of anything that we could have done to help you after you left the hospital the first time, so that you might not have needed to return so soon?: Other (Comment) (no)

## 2021-11-05 ENCOUNTER — APPOINTMENT (OUTPATIENT)
Dept: GENERAL RADIOLOGY | Age: 76
DRG: 208 | End: 2021-11-05
Payer: MEDICARE

## 2021-11-05 LAB
-: ABNORMAL
AMORPHOUS: ABNORMAL
ANION GAP SERPL CALCULATED.3IONS-SCNC: 13 MMOL/L (ref 9–17)
APPEARANCE FLUID: NORMAL
BACTERIA: ABNORMAL
BASO FLUID: NORMAL %
BILIRUBIN URINE: NEGATIVE
BUN BLDV-MCNC: 38 MG/DL (ref 8–23)
BUN/CREAT BLD: ABNORMAL (ref 9–20)
CALCIUM SERPL-MCNC: 8.7 MG/DL (ref 8.6–10.4)
CASE NUMBER:: NORMAL
CASTS UA: ABNORMAL /LPF (ref 0–2)
CHLORIDE BLD-SCNC: 96 MMOL/L (ref 98–107)
CO2: 24 MMOL/L (ref 20–31)
COLOR FLUID: NORMAL
COLOR: YELLOW
CREAT SERPL-MCNC: 1.72 MG/DL (ref 0.5–0.9)
CRYSTALS, UA: ABNORMAL /HPF
EOSINOPHIL FLUID: NORMAL %
EPITHELIAL CELLS UA: ABNORMAL /HPF (ref 0–5)
FLUID DIFF COMMENT: NORMAL
GFR AFRICAN AMERICAN: 35 ML/MIN
GFR NON-AFRICAN AMERICAN: 29 ML/MIN
GFR SERPL CREATININE-BSD FRML MDRD: ABNORMAL ML/MIN/{1.73_M2}
GFR SERPL CREATININE-BSD FRML MDRD: ABNORMAL ML/MIN/{1.73_M2}
GLUCOSE BLD-MCNC: 141 MG/DL (ref 70–99)
GLUCOSE URINE: NEGATIVE
KETONES, URINE: NEGATIVE
LACTATE DEHYDROGENASE: 269 U/L (ref 135–214)
LEUKOCYTE ESTERASE, URINE: ABNORMAL
LYMPHOCYTES, BODY FLUID: 42 %
MONOCYTE, FLUID: NORMAL %
MUCUS: ABNORMAL
NEUTROPHIL, FLUID: 46 %
NITRITE, URINE: POSITIVE
OSMOLALITY URINE: 312 MOSM/KG (ref 80–1300)
OTHER CELLS FLUID: NORMAL %
OTHER OBSERVATIONS UA: ABNORMAL
PH UA: 5 (ref 5–8)
POTASSIUM SERPL-SCNC: 5.2 MMOL/L (ref 3.7–5.3)
PROTEIN UA: NEGATIVE
RBC FLUID: NORMAL /MM3
RBC UA: ABNORMAL /HPF (ref 0–2)
RENAL EPITHELIAL, UA: ABNORMAL /HPF
SODIUM BLD-SCNC: 133 MMOL/L (ref 135–144)
SODIUM,UR: <20 MMOL/L
SPECIFIC GRAVITY UA: 1.01 (ref 1–1.03)
SPECIMEN DESCRIPTION: NORMAL
SPECIMEN TYPE: NORMAL
TOTAL PROTEIN, URINE: 19 MG/DL
TOTAL PROTEIN: 6.2 G/DL (ref 6.4–8.3)
TRICHOMONAS: ABNORMAL
TURBIDITY: ABNORMAL
URINE HGB: ABNORMAL
UROBILINOGEN, URINE: NORMAL
WBC FLUID: 1180 /MM3
WBC UA: ABNORMAL /HPF (ref 0–5)
YEAST: ABNORMAL

## 2021-11-05 PROCEDURE — 87186 SC STD MICRODIL/AGAR DIL: CPT

## 2021-11-05 PROCEDURE — 6370000000 HC RX 637 (ALT 250 FOR IP): Performed by: NURSE PRACTITIONER

## 2021-11-05 PROCEDURE — 99232 SBSQ HOSP IP/OBS MODERATE 35: CPT | Performed by: INTERNAL MEDICINE

## 2021-11-05 PROCEDURE — 87077 CULTURE AEROBIC IDENTIFY: CPT

## 2021-11-05 PROCEDURE — 2700000000 HC OXYGEN THERAPY PER DAY

## 2021-11-05 PROCEDURE — 36415 COLL VENOUS BLD VENIPUNCTURE: CPT

## 2021-11-05 PROCEDURE — 94761 N-INVAS EAR/PLS OXIMETRY MLT: CPT

## 2021-11-05 PROCEDURE — 84155 ASSAY OF PROTEIN SERUM: CPT

## 2021-11-05 PROCEDURE — 84300 ASSAY OF URINE SODIUM: CPT

## 2021-11-05 PROCEDURE — 94640 AIRWAY INHALATION TREATMENT: CPT

## 2021-11-05 PROCEDURE — 81001 URINALYSIS AUTO W/SCOPE: CPT

## 2021-11-05 PROCEDURE — 71045 X-RAY EXAM CHEST 1 VIEW: CPT

## 2021-11-05 PROCEDURE — 6370000000 HC RX 637 (ALT 250 FOR IP): Performed by: INTERNAL MEDICINE

## 2021-11-05 PROCEDURE — 2580000003 HC RX 258: Performed by: NURSE PRACTITIONER

## 2021-11-05 PROCEDURE — 6360000002 HC RX W HCPCS: Performed by: STUDENT IN AN ORGANIZED HEALTH CARE EDUCATION/TRAINING PROGRAM

## 2021-11-05 PROCEDURE — 87086 URINE CULTURE/COLONY COUNT: CPT

## 2021-11-05 PROCEDURE — 1200000000 HC SEMI PRIVATE

## 2021-11-05 PROCEDURE — 84156 ASSAY OF PROTEIN URINE: CPT

## 2021-11-05 PROCEDURE — 83615 LACTATE (LD) (LDH) ENZYME: CPT

## 2021-11-05 PROCEDURE — 80048 BASIC METABOLIC PNL TOTAL CA: CPT

## 2021-11-05 PROCEDURE — 83935 ASSAY OF URINE OSMOLALITY: CPT

## 2021-11-05 RX ADMIN — METOPROLOL TARTRATE 50 MG: 50 TABLET, FILM COATED ORAL at 21:00

## 2021-11-05 RX ADMIN — APIXABAN 5 MG: 5 TABLET, FILM COATED ORAL at 21:00

## 2021-11-05 RX ADMIN — CLOPIDOGREL 75 MG: 75 TABLET, FILM COATED ORAL at 08:22

## 2021-11-05 RX ADMIN — IPRATROPIUM BROMIDE AND ALBUTEROL SULFATE 1 AMPULE: .5; 2.5 SOLUTION RESPIRATORY (INHALATION) at 19:34

## 2021-11-05 RX ADMIN — OXYCODONE HYDROCHLORIDE AND ACETAMINOPHEN 500 MG: 500 TABLET ORAL at 08:22

## 2021-11-05 RX ADMIN — FLUTICASONE PROPIONATE 1 PUFF: 110 AEROSOL, METERED RESPIRATORY (INHALATION) at 19:34

## 2021-11-05 RX ADMIN — APIXABAN 5 MG: 5 TABLET, FILM COATED ORAL at 08:21

## 2021-11-05 RX ADMIN — FUROSEMIDE 20 MG: 10 INJECTION, SOLUTION INTRAMUSCULAR; INTRAVENOUS at 18:03

## 2021-11-05 RX ADMIN — CITALOPRAM 20 MG: 20 TABLET, FILM COATED ORAL at 08:22

## 2021-11-05 RX ADMIN — DESMOPRESSIN ACETATE 40 MG: 0.2 TABLET ORAL at 08:21

## 2021-11-05 RX ADMIN — METOPROLOL TARTRATE 50 MG: 50 TABLET, FILM COATED ORAL at 08:22

## 2021-11-05 RX ADMIN — FUROSEMIDE 20 MG: 10 INJECTION, SOLUTION INTRAMUSCULAR; INTRAVENOUS at 08:21

## 2021-11-05 RX ADMIN — AMIODARONE HYDROCHLORIDE 200 MG: 200 TABLET ORAL at 08:21

## 2021-11-05 RX ADMIN — FLUTICASONE PROPIONATE 1 PUFF: 110 AEROSOL, METERED RESPIRATORY (INHALATION) at 09:16

## 2021-11-05 RX ADMIN — ISOSORBIDE MONONITRATE 30 MG: 30 TABLET ORAL at 08:22

## 2021-11-05 RX ADMIN — SODIUM CHLORIDE, PRESERVATIVE FREE 10 ML: 5 INJECTION INTRAVENOUS at 08:22

## 2021-11-05 RX ADMIN — IPRATROPIUM BROMIDE AND ALBUTEROL SULFATE 1 AMPULE: .5; 2.5 SOLUTION RESPIRATORY (INHALATION) at 09:15

## 2021-11-05 RX ADMIN — IPRATROPIUM BROMIDE AND ALBUTEROL SULFATE 1 AMPULE: .5; 2.5 SOLUTION RESPIRATORY (INHALATION) at 13:23

## 2021-11-05 RX ADMIN — SODIUM CHLORIDE, PRESERVATIVE FREE 10 ML: 5 INJECTION INTRAVENOUS at 21:00

## 2021-11-05 ASSESSMENT — ENCOUNTER SYMPTOMS
EYES NEGATIVE: 1
SHORTNESS OF BREATH: 1
ALLERGIC/IMMUNOLOGIC NEGATIVE: 1
GASTROINTESTINAL NEGATIVE: 1

## 2021-11-05 NOTE — CARE COORDINATION
Transitional Planning  Pt came from [facility]. Spoke with pt and her plan would be to return there at discharge. Denece Puff with [facility] and they will require precert again.   Maribel served Dr Lajuanda Cranker for PT OT maryjo

## 2021-11-05 NOTE — PROGRESS NOTES
PULMONARY & CRITICAL CARE MEDICINE PROGRESS NOTE     Patient:  Mitch Sheppard  MRN: [id number]  Admit date: 11/3/2021  Primary Care Physician: KALYN Starks CNP  Consulting Physician: Carmen Coronel DO  CODE Status: Full Code  LOS: 2     SUBJECTIVE     Chief Complaint/ Reason for consult:  Left pleural effusion    Hospital Course: The patient is a 68 y.o. female here for evaluation of shortness of breath with clear sputum, increased leg swelling. Patient has chronic shortness of breath which has been worsening over the last couple of days along with increased swelling of her lower extremity. She has conversational dyspnea, no wheezing. Chest x-ray shows left pleural effusion and left atelectasis she has had multiple thoracentesis. Complains of abdominal bloating, orthopnea, palpitations. She was found to be in A. fib with heart rate in 110s. Interval History:  11/05/21  Pt was seen and examined at bedside. Resting comfortably in the bed. She is comfortable after removal of 600 ml of pleural fluid. It appeared rust colored with protein -3.5, LDH-169 and serum protein of 6.1 so pleural fluid appeared more exudative. Saturating appropriately at 2 liter via nasal canula. Vitals stable. Labs reviewed. No acute events overnight. Patient currently on iv  Lasix 20 mg BID with urine output of 1.3 liter  And net fluid balance. Chest xray this am showed improvement in left effusion. Review Of Systems:  Review of Systems   Constitutional: Negative. HENT: Negative. Eyes: Negative. Respiratory: Positive for shortness of breath. Cardiovascular: Negative. Gastrointestinal: Negative. Endocrine: Negative. Genitourinary: Negative. Musculoskeletal: Negative. Skin: Negative. Allergic/Immunologic: Negative. Psychiatric/Behavioral: Negative. OBJECTIVE     PaO2/FiO2 RATIO:  No results for input(s): POCPO2 in the last 72 hours.          VITAL SIGNS:   LAST:  BP 130/73   Pulse 76   Temp 97.2 °F (36.2 °C) (Temporal)   Resp 26   Ht 5' 4\" (1.626 m)   Wt 256 lb (116.1 kg)   SpO2 97%   BMI 43.94 kg/m²   8-24 HR RANGE:  TEMP Temp  Av.6 °F (36.4 °C)  Min: 97.2 °F (36.2 °C)  Max: 97.9 °F (72.1 °C)   BP Systolic (57XBQ), EDIN:754 , Min:130 , NKM:590      Diastolic (81MLS), RDP:88, Min:73, Max:89     PULSE Pulse  Av  Min: 76  Max: 76   RR Resp  Av.7  Min: 26  Max: 43   O2 SAT SpO2  Av.7 %  Min: 95 %  Max: 97 %   OXYGEN DELIVERY O2 Flow Rate (L/min)  Avg: 3 L/min  Min: 3 L/min  Max: 3 L/min        Systemic Examination:   Physical Exam -  Constitutional:  Alert, cooperative and no distress. Mental Status:  Oriented to person, place and time and normal affect. Lungs:  Bilateral air entry present decreased in left side, lung fields clear. Normal effort. Heart:  Regular rate and rhythm, no murmur. Abdomen:  Soft, nontender, nondistended, normal bowel sounds. Extremities:  Bilateral lower extremity edema, negative for redness, tenderness in the calves. Skin:  Warm, dry, no gross lesions or rashes.     DATA REVIEW     Medications: Current Inpatient  Scheduled Meds:   furosemide  20 mg IntraVENous BID    ipratropium-albuterol  1 ampule Inhalation TID    amiodarone  200 mg Oral Daily    apixaban  5 mg Oral BID    vitamin C  500 mg Oral Daily    atorvastatin  40 mg Oral Daily    fluticasone  1 puff Inhalation BID    citalopram  20 mg Oral Daily    clopidogrel  75 mg Oral Daily    isosorbide mononitrate  30 mg Oral Daily    metoprolol tartrate  50 mg Oral BID    sodium chloride flush  5-40 mL IntraVENous 2 times per day     Continuous Infusions:   sodium chloride         INPUT/OUTPUT:  In: -   Out: 450 [Urine:450]  Date 21 0000 - 21 235   Shift 3378-7705 1761-6274 0333-0619 24 Hour Total   INTAKE   Shift Total(mL/kg)       OUTPUT   Urine(mL/kg/hr) 200(0.2)   200   Shift Total(mL/kg) 200(1.7)   200(1.7)   Weight (kg) 116.1 116.1 116.1 116.1        LABS:  ABGs:   No results for input(s): POCPH, POCPCO2, POCPO2, POCHCO3, FECQ0RSD in the last 72 hours. CBC:   Recent Labs     11/03/21 1641 11/04/21  0533   WBC 14.5* 10.1   HGB 11.5* 11.3*   HCT 36.8 34.8*   .0 99.4    341   LYMPHOPCT 7*  --    RBC 3.68* 3.50*   MCH 31.3 32.3   MCHC 31.3 32.5   RDW 13.2 13.1     CRP:   No results for input(s): CRP in the last 72 hours. LDH:   No results for input(s): LDH in the last 72 hours. BMP:   Recent Labs     11/03/21 1641 11/04/21  0533 11/04/21  1816 11/05/21  0645   * 128* 126* 133*   K 4.6 5.3 5.1 5.2   CL 92* 94* 91* 96*   CO2 26 22 22 24   BUN 26* 27* 34* 38*   CREATININE 1.65* 1.52* 1.52* 1.72*   GLUCOSE 128* 156* 195* 141*     Liver Function Test:   Recent Labs     11/03/21 1641 11/04/21  0533   PROT 6.1* 6.1*   LABALBU 3.0* 2.6*   ALT 12 12   AST 14 13   ALKPHOS 72 71   BILITOT 1.40* 0.83     Coagulation Profile:   Recent Labs     11/04/21  0533   INR 1.2   PROTIME 12.3     D-Dimer:  No results for input(s): DDIMER in the last 72 hours. Lactic Acid:  No results for input(s): LACTA in the last 72 hours. Cardiac Enzymes:  No results for input(s): CKTOTAL, CKMB, CKMBINDEX, TROPONINI in the last 72 hours. Invalid input(s): TROPONIN, HSTROP  BNP/ProBNP:   Recent Labs     11/03/21 1641 11/04/21  0533   PROBNP 3,134* 3,657*     Triglycerides:  No results for input(s): TRIG in the last 72 hours. Microbiology:  Urine Culture:  No components found for: CURINE  Blood Culture:  No components found for: CBLOOD, CFUNGUSBL  Sputum Culture:  No components found for: CSPUTUM  Recent Labs     11/04/21  1531   SPECDESC . PLEURAL FLUID   SPECIAL NOT REPORTED   CULTURE NO GROWTH 10 HOURS     Recent Labs     11/03/21  1718 11/04/21  1015 11/04/21  1531   SPECDESC . NASOPHARYNGEAL SWAB NOT REPORTED . PLEURAL FLUID   SPECIAL  --   --  NOT REPORTED   CULTURE  --   --  NO GROWTH 10 HOURS        Pathology:    Radiology Reports:  XR CHEST (SINGLE VIEW FRONTAL)   Final Result   Interval decrease in left effusion. No pneumothorax. Persistent opacity at   the left lung base. Vascularity is slightly prominent. US THORACENTESIS Which side should the procedure be performed? Left   Final Result   Successful ultrasound guided thoracentesis. US RETROPERITONEAL COMPLETE   Final Result   1. Patient had no urge to void during the exam.  Nonvisualization of ureteral   jets. 2. Incidental cholelithiasis. 3. Unremarkable renal ultrasound. No hydronephrosis. XR CHEST PORTABLE   Final Result   No change from prior study. Persistent sizable left effusion with opacity at   the left lung base and pulmonary vascular congestion. Echocardiogram:   Results for orders placed during the hospital encounter of 10/02/21    ECHO Complete 2D W Doppler W Color    Narrative  Transthoracic Echocardiography Report (TTE)    Patient Name MCLEAN   Date of Study               10/03/2021  1 [facility]    Date of      1945  Gender                      Female  Birth    Age          68 year(s)  Race                            Room Number  0124        Height:                     64 inch, 162.56 cm    Corporate ID [id number]    Weight:                     272 pounds, 123.4 kg  #    Patient Acct [de-identified]   BSA:          2.23 m^2      BMI:      46.69  #                                                              kg/m^2    MR #         [id number]     Sonographer                 Jose De Jesus January    Accession #  [id number]  Interpreting Physician      Margarita Handy    Fellow                   Referring Nurse  Practitioner    Interpreting             Referring Physician         Luzmaria Beal MD  Fellow    Additional Comments  Technically difficult study due to body habitus    Type of Study    TTE procedure:2D Echocardiogram, M-Mode, Doppler, Color Doppler.     Procedure Date  Date: 10/03/2021 Start: 01:22 PM    Study Location: [facility]    History / Tech. Comments:  COPD, Pneumonia, Obesity,    Patient Status: Inpatient    Height: 64 inches Weight: 272.01 pounds BSA: 2.23 m^2 BMI: 46.69 kg/m^2    CONCLUSIONS    Summary  Left ventricle is normal in size with normal systolic function globally. Calculated ejection fraction is 59% ( Atrial-Fib). Mild mitral regurgitation. Mild tricuspid regurgitation. Estimated right ventricular systolic pressure is 35 mmHg. Anterior clear space noted, most likely adipose tissue vs small loculated  pericardial effusion. No evidence of tamponade. Pleural effusion is seen. Signature  ----------------------------------------------------------------------------  Electronically signed by Toi Carnes(Sonographer) on 10/03/2021 02:13  PM  ----------------------------------------------------------------------------    ----------------------------------------------------------------------------  Electronically signed by Margarita Handy(Interpreting physician) on  10/03/2021 03:50 PM  ----------------------------------------------------------------------------  FINDINGS  Left Atrium  Left atrium is moderately dilated. Left Ventricle  Left ventricle is normal in size with normal systolic function globally. Calculated ejection fraction is 59% ( Atrial-Fib)  Right Atrium  Right atrial dilatation. Right Ventricle  Dilated right ventricle with systolic function within the range of normal.  Mitral Valve  Mitral valve structure is normal.  Mild mitral regurgitation. Aortic Valve  Aortic valve is trileaflet. Aortic sclerosis without stenosis. No aortic insufficiency. Tricuspid Valve  Tricuspid valve structure is normal.  Mild tricuspid regurgitation. Estimated right ventricular systolic pressure is 35 mmHg. Pulmonic Valve  The pulmonic valve is normal in structure. Pericardial Effusion  Anterior clear space noted, most likely adipose tissue vs small loculated  pericardial effusion.   No evidence of ensure the accuracy of this automated transcription, some errors in transcription may have occurred. Attending Physician Statement  I have discussed the care of Roverto Vásquez, including pertinent history and exam findings,  with the resident. I have seen and examined the patient and the key elements of all parts of the encounter have been performed by me. I agree with the assessment, plan and orders as documented by the resident with additions . Recurrent pleural effusion. Exudate by protein criteria   Culture negative  Follow cytology  Continue diuresis  Treatment plan Discussed with nursing staff in detail , all questions answered . Electronically signed by Shola Schroeder MD on   11/5/21 at 4:59 PM EDT    Please note that this chart was generated using voice recognition Dragon dictation software. Although every effort was made to ensure the accuracy of this automated transcription, some errors in transcription may have occurred.

## 2021-11-05 NOTE — PLAN OF CARE
Problem: Falls - Risk of:  Goal: Will remain free from falls  Description: Will remain free from falls  Outcome: Met This Shift  Goal: Absence of physical injury  Description: Absence of physical injury  Outcome: Met This Shift     Problem: Skin Integrity:  Goal: Will show no infection signs and symptoms  Description: Will show no infection signs and symptoms  Outcome: Ongoing  Goal: Absence of new skin breakdown  Description: Absence of new skin breakdown  Outcome: Ongoing     Problem: OXYGENATION/RESPIRATORY FUNCTION  Goal: Patient will maintain patent airway  Outcome: Ongoing  Goal: Patient will achieve/maintain normal respiratory rate/effort  Description: Respiratory rate and effort will be within normal limits for the patient  Outcome: Ongoing     Problem: HEMODYNAMIC STATUS  Goal: Patient has stable vital signs and fluid balance  Outcome: Ongoing     Problem: FLUID AND ELECTROLYTE IMBALANCE  Goal: Fluid and electrolyte balance are achieved/maintained  Outcome: Ongoing     Problem: ACTIVITY INTOLERANCE/IMPAIRED MOBILITY  Goal: Mobility/activity is maintained at optimum level for patient  Outcome: Ongoing     Problem: Nutrition  Goal: Optimal nutrition therapy  11/5/2021 0314 by Natalie Christian RN  Outcome: Ongoing  11/4/2021 1355 by Neeta Ayala RD, LD  Note: Nutrition Problem #1: Unintended weight loss  Intervention: Food and/or Nutrient Delivery: Continue Current Diet, Start Oral Nutrition Supplement  Nutritional Goals: PO intake to meet > 75% of estimated nutrition needs

## 2021-11-05 NOTE — PROGRESS NOTES
[facility]  Office: [address], DO, Libby Pastor, DO, Courtney Zuniga, DO, [facility], DO, Barron San MD, Elsie Lane MD, Marycruz Hernandez MD, Patricio Valle MD, Carlos Urrutia MD, Key Hinton MD, Artemio Andujar MD, Carmen Spear MD, Richelle Black, DO, Elsy Kibmle, DO, Rachna Walker MD,  Efraín Pickett DO, Letty Wang MD, Kirk Vines MD, Enriqueta Gutiérrez MD, Pamella Darnell MD, Erwin Hernandez MD, Maureen Ford MD, Lelo Soto CNP, [facility], CNP, Marino Barksdale, CNP, Janice Rios, CNS, Sheri Connors, CNP, Katarina Branham, CNP, Kary Mart, CNP, Victor Manuel Ibarra, CNP, Lianne Escobedo, CNP, Christa Ward, CNP, Sylvia Aldridge, PA-C, Ben Galan, [facility], Poppy Brewster, CNP, Kaye Phillips, CNP, Brittny Ugarte, CNP, Jayesh Bronson, CNP, Terrell Luque, CNP, Trell Almaraz, CNP, Vannessa Naylor, [address] [place] [address]    Progress Note    11/5/2021    3:18 PM    Name:   Antonio Cox  MRN:     [id number]     Acct:      [de-identified]   Room:   [address] [place] [address] Day:  2  Admit Date:  11/3/2021  4:32 PM    PCP:   KALYN Pascual CNP  Code Status:  Full Code    Subjective:     C/C:   Chief Complaint   Patient presents with    Leg Swelling     increasing BLE edema; pt from SNF; per PA at SNF, pt has fluid retention, needs IV meds that they cannot administer     Interval History Status: improved. Patient seen this morning, sitting in chair. No complaints overnight, kidney function still maintaining with IV diuretics. Pulmonary medicine    Brief History:     From H&P  Patient presented to the ER with complaints of leg swelling, shortness of breath with a productive cough (thin white sputum) and constipation. She states she chronically has lower extremity edema left side greater than right she thinks that is increased over the last couple days.   Patient was recently discharged on 10/15 after being admitted related to a pleural effusion. At that time she presented with shortness of breath and palpitations. She is found to have a left lower lobe pneumonia and A. fib with RVR and was admitted to the ICU for acute hypoxic respiratory failure. During that visit the patient had a paracentesis, Negative CURT and subsequently cardioverted successfully, and a bronchoscopy that showed left main bronchus obstruction due to thick mucous plugging. At discharge the patient was encouraged to follow-up with cardiology outpatient for an ablation. During that visit she was also treated for UTI and was discharged on Augmentin for 5 days. She was also given inhalers and steroids for COPD.     On arrival to the ER today she complains of not feeling well over the last 3 or 4 days. She describes worsening shortness of breath primarily on exertion and orthopnea. The patient told me that she has not been using oxygen on an outpatient basis but according to the ER note she has been using it intermittently primarily with exertion and therapy and occasionally overnight. Also complains of constipation and mild abdominal bloating. She also reports nausea and vomiting few days ago but nothing current and she has been tolerating p.o. intake. She states she is compliant with her home medications.     Patient was placed on 2 L nasal cannula in the ER. She has been afebrile heart rates been in the 90s to the low 110's A. Fib.     Chest x-ray shows no acute changes from prior study with a has a persistent sizable left effusion with opacity at the left lung base and pulmonary vascular congestion. Sodium is 129 BUN and creatinine are 26/1. 65. Her normal BUN and creatinine are around 15/0.58 and normal sodiums in the mid 130s. BNP is 3134. On her last admission it was 1745 and on her admission in early October it was 3251. WBCs 14.5 seems to run slightly elevated     Plan to consult nephrology and pulmonary medicine.   Continue home Eliquis, beta-blocker and amiodarone. Hold losartan  Plan Solu-Medrol related to IV wheezing throughout. It appears to be in fluid overload related to pitting edema in her lower extremities and increasing shortness of breath. Plan to hold further hydration for now. Give Lasix 20 mg IV x1    Review of Systems:     Constitutional:  negative for chills, fevers, sweats  Respiratory:  negative for cough, dyspnea on exertion, shortness of breath, wheezing  Cardiovascular:  negative for chest pain, chest pressure/discomfort, lower extremity edema, palpitations  Gastrointestinal:  negative for abdominal pain, constipation, diarrhea, nausea, vomiting  Neurological:  negative for dizziness, headache    Medications: Allergies: Allergies   Allergen Reactions    Latex Hives     Hives from rubber gloves    Betadine [Povidone Iodine] Hives    Bee Venom Swelling    Dilaudid [Hydromorphone Hcl] Other (See Comments)     Severe confusion    Adhesive Tape Rash    Sulfa Antibiotics Rash       Current Meds:   Scheduled Meds:    furosemide  20 mg IntraVENous BID    ipratropium-albuterol  1 ampule Inhalation TID    amiodarone  200 mg Oral Daily    apixaban  5 mg Oral BID    vitamin C  500 mg Oral Daily    atorvastatin  40 mg Oral Daily    fluticasone  1 puff Inhalation BID    citalopram  20 mg Oral Daily    clopidogrel  75 mg Oral Daily    isosorbide mononitrate  30 mg Oral Daily    metoprolol tartrate  50 mg Oral BID    sodium chloride flush  5-40 mL IntraVENous 2 times per day     Continuous Infusions:    sodium chloride       PRN Meds: cyclobenzaprine, sodium chloride flush, sodium chloride, ondansetron **OR** ondansetron, acetaminophen **OR** acetaminophen    Data:     Past Medical History:   has a past medical history of Asthma, COPD (chronic obstructive pulmonary disease) ([facility] [address] Gout, Hyperlipidemia, Hypertension, Mitral insufficiency, and Pneumonia.     Social History:   reports that she has quit smoking. She has never used smokeless tobacco. She reports current alcohol use. She reports that she does not use drugs. Family History:   Family History   Problem Relation Age of Onset    High Blood Pressure Mother     Cancer Mother     Asthma Father     Heart Disease Father     Cancer Father        Vitals:  /73   Pulse 76   Temp 97.2 °F (36.2 °C) (Temporal)   Resp (!) 31   Ht 5' 4\" (1.626 m)   Wt 256 lb (116.1 kg)   SpO2 98%   BMI 43.94 kg/m²   Temp (24hrs), Av.6 °F (36.4 °C), Min:97.2 °F (36.2 °C), Max:97.9 °F (36.6 °C)    No results for input(s): POCGLU in the last 72 hours. I/O (24Hr): Intake/Output Summary (Last 24 hours) at 2021 1518  Last data filed at 2021 0445  Gross per 24 hour   Intake    Output 450 ml   Net -450 ml       Labs:  Hematology:  Recent Labs     21  16421  0533   WBC 14.5* 10.1   RBC 3.68* 3.50*   HGB 11.5* 11.3*   HCT 36.8 34.8*   .0 99.4   MCH 31.3 32.3   MCHC 31.3 32.5   RDW 13.2 13.1    341   MPV 9.7 9.8   INR  --  1.2     Chemistry:  Recent Labs     21  1641 21  1641 21  1818 21  0533 21  1816 21  0645   *   < >  --  128* 126* 133*   K 4.6   < >  --  5.3 5.1 5.2   CL 92*   < >  --  94* 91* 96*   CO2 26   < >  --  22 22 24   GLUCOSE 128*   < >  --  156* 195* 141*   BUN 26*   < >  --  27* 34* 38*   CREATININE 1.65*   < >  --  1.52* 1.52* 1.72*   MG  --   --   --  2.2  --   --    ANIONGAP 11   < >  --  12 13 13   LABGLOM 30*   < >  --  33* 33* 29*   GFRAA 37*   < >  --  40* 40* 35*   CALCIUM 8.9   < >  --  8.6 8.9 8.7   PROBNP 3,134*  --   --  3,657*  --   --    TROPHS 37*  --  37*  --   --   --     < > = values in this interval not displayed.      Recent Labs     21  1641 21  0533   PROT 6.1* 6.1*   LABALBU 3.0* 2.6*   AST 14 13   ALT 12 12   ALKPHOS 72 71   BILITOT 1.40* 0.83   LIPASE 8*  --      ABG:  Lab Results   Component Value Date    POCPH 7.429 10/03/2021 PH 7.44 03/30/2016    POCPCO2 37.1 10/03/2021    PCO2 36 03/30/2016    POCPO2 86.0 10/03/2021    PO2 64 03/30/2016    POCHCO3 24.5 10/03/2021    HCO3 24 03/30/2016    NBEA NOT REPORTED 10/03/2021    PBEA 0 10/03/2021    AYE1MHM NOT REPORTED 10/03/2021    EKTO9BPX 97 10/03/2021    O2SAT 93 03/30/2016    FIO2 40.0 10/03/2021     Lab Results   Component Value Date/Time    SPECIAL NOT REPORTED 11/04/2021 03:31 PM     Lab Results   Component Value Date/Time    CULTURE NO GROWTH 10 HOURS 11/04/2021 03:31 PM       Radiology:  XR CHEST PORTABLE    Result Date: 11/3/2021  No change from prior study. Persistent sizable left effusion with opacity at the left lung base and pulmonary vascular congestion. US THORACENTESIS Which side should the procedure be performed? Left    Result Date: 11/4/2021  Successful ultrasound guided thoracentesis. US RETROPERITONEAL COMPLETE    Result Date: 11/4/2021  1. Patient had no urge to void during the exam.  Nonvisualization of ureteral jets. 2. Incidental cholelithiasis. 3. Unremarkable renal ultrasound. No hydronephrosis.        Physical Examination:        General appearance:  alert, cooperative and no distress  Mental Status:  oriented to person, place and time and normal affect  Lungs:  clear to auscultation bilaterally, normal effort  Heart:  regular rate and rhythm, no murmur  Abdomen:  soft, nontender, nondistended, normal bowel sounds, no masses, hepatomegaly, splenomegaly  Extremities:  no edema, redness, tenderness in the calves  Skin:  no gross lesions, rashes, induration    Assessment:        Hospital Problems         Last Modified POA    * (Principal) CASEY (acute kidney injury) ([facility] [address] 11/3/2021 Yes    Benign essential HTN 11/3/2021 Yes    Morbid obesity with BMI of 40.0-44.9, adult ([facility] [address] 11/4/2021 Yes    Overview Signed 10/3/2021 12:43 PM by Ibrahima Miller MD     Last Assessment & Plan:   Formatting of this note might be different from the original.  Obesity is worsening. Discussed the patient's BMI. The BMI is above average; BMI management plan is completed. General weight loss/lifestyle modification strategies discussed (elicit support from others; identify saboteurs; non-food rewards, etc). Pulmonary emphysema ([address] 11/3/2021 Yes    Acute on chronic diastolic heart failure ([address] 11/4/2021 Yes    Chronic obstructive pulmonary disease with acute exacerbation ([address] 11/3/2021 Yes    Overview Signed 10/3/2021 12:43 PM by Soheila Alvarado MD     Last Assessment & Plan:   Formatting of this note might be different from the original.  COPD is worsening. Discussed monitoring symptoms and use of quick-relief medications and contacting us early in the course of exacerbations. Warning signs of respiratory distress were reviewed with the patient. Counseled to avoid exposure to cigarette smoke. Persistent atrial fibrillation ([address] 11/4/2021 Yes    Recurrent pleural effusion 11/3/2021 Yes          Plan:        1. CASEYcontinue diuresis, nephrology following  2. Chronic respiratory failurecurrently at home O2, history of chronic diastolic heart failure and COPD  3. Pleural fluidstatus post thoracentesis, pulmonary following  4. COPD  5. Diastolic heart failure  6. Morbid obesity  7. Atrial fibrillation  8. Hypertension  9. Plan:  1. PT OT  2. Nephrology consult  3. Status post thoracentesis, pulmonary to follow on recommendations regarding further drainage of pleural fluid.   4. Plan for discharge to skilled nursing facility, likely will be Monday or Tuesday before discharge    Jhonatan Pearl DO  11/5/2021  3:18 PM

## 2021-11-05 NOTE — PROGRESS NOTES
NEPHROLOGY PROGRESS NOTE      SUBJECTIVE     Pulmonology review noted. IV Lasix 20 mg 2 times a day. Shortness of breath is improved after thoracocentesis and diuretics. Creatinine hovering in the range of 1.5-1.7. Sodium is improved from 1 26-1 33. Symptomatically she reports no active complaints. Blood pressure is stable. OBJECTIVE     Vitals:    11/04/21 2000 11/05/21 0800 11/05/21 0916 11/05/21 0927   BP: 130/89 130/73     Pulse:  76     Resp:  26 (!) 43 26   Temp: 97.9 °F (36.6 °C) 97.2 °F (36.2 °C)     TempSrc: Oral Temporal     SpO2:  95% 95% 97%   Weight:       Height:         24HR INTAKE/OUTPUT:      Intake/Output Summary (Last 24 hours) at 11/5/2021 1321  Last data filed at 11/5/2021 0445  Gross per 24 hour   Intake    Output 450 ml   Net -450 ml       General appearance:Awake, alert, in no acute distress  HEENT: PERRLA  Respiratory::vesicular breath sounds, reduced air entry  Cardiovascular:S1 S2 normal,no gallop or organic murmur. Abdomen:Non tender/non distended. Bowel sounds present  Extremities: No Cyanosis or Clubbing,Lower extremity edema  Neurological:Alert and oriented. No abnormalities of mood, affect, memory, mentation, or behavior are noted      MEDICATIONS     Scheduled Meds:    furosemide  20 mg IntraVENous BID    ipratropium-albuterol  1 ampule Inhalation TID    amiodarone  200 mg Oral Daily    apixaban  5 mg Oral BID    vitamin C  500 mg Oral Daily    atorvastatin  40 mg Oral Daily    fluticasone  1 puff Inhalation BID    citalopram  20 mg Oral Daily    clopidogrel  75 mg Oral Daily    isosorbide mononitrate  30 mg Oral Daily    metoprolol tartrate  50 mg Oral BID    sodium chloride flush  5-40 mL IntraVENous 2 times per day     Continuous Infusions:    sodium chloride       PRN Meds:  cyclobenzaprine, sodium chloride flush, sodium chloride, ondansetron **OR** ondansetron, acetaminophen **OR** acetaminophen  Home Meds:                Medications Prior to Admission: amiodarone (CORDARONE) 200 MG tablet, Take 1 tablet by mouth daily  metoprolol tartrate (LOPRESSOR) 50 MG tablet, Take 1 tablet by mouth 2 times daily  Calamine 8-8 % LOTN lotion, Apply to rash on legs  miconazole (MICOTIN) 2 % powder, Apply topically 2 times daily. simethicone (MYLICON) 80 MG chewable tablet, Take 1 tablet by mouth every 6 hours as needed for Flatulence (Abdominal Cramps)  clonazePAM (KLONOPIN) 1 MG tablet, Take 1 tablet by mouth nightly for 3 days. apixaban (ELIQUIS) 5 MG TABS tablet, Take 1 tablet by mouth 2 times daily  atorvastatin (LIPITOR) 40 MG tablet, Take 1 tablet by mouth daily  citalopram (CELEXA) 40 MG tablet, Take 0.5 tablets by mouth daily  losartan (COZAAR) 100 MG tablet, Take 1 tablet by mouth daily  meloxicam (MOBIC) 15 MG tablet, Take 15 mg by mouth daily  clopidogrel (PLAVIX) 75 MG tablet, Take 1 tablet by mouth daily  cyclobenzaprine (FLEXERIL) 10 MG tablet, TAKE ONE TABLET BY MOUTH THREE TIMES DAILY AS NEEDED FOR MUSCLE SPASM  diphenhydrAMINE (BENADRYL) 25 MG capsule, Take 25 mg by mouth every 6 hours as needed for Itching  zinc oxide (DESITIN) 40 % ointment, Apply topically as needed for Dry Skin Apply topically as needed.   furosemide (LASIX) 20 MG tablet, Take 1 tablet by mouth daily  isosorbide mononitrate (IMDUR) 30 MG extended release tablet, Take 1 tablet by mouth daily  albuterol sulfate  (90 Base) MCG/ACT inhaler, Inhale 2 puffs into the lungs every 6 hours as needed for Wheezing  Calcium Carb-Cholecalciferol (CALCIUM + D3 PO), Take by mouth daily  CINNAMON PO, Take by mouth daily  TURMERIC CURCUMIN PO, Take by mouth daily  ipratropium-albuterol (DUONEB) 0.5-2.5 (3) MG/3ML SOLN nebulizer solution, Inhale 1 vial into the lungs every 4 hours  beclomethasone (QVAR) 80 MCG/ACT inhaler, Inhale 2 puffs into the lungs 2 times daily  LYSINE PO, Take by mouth daily  vitamin D (CHOLECALCIFEROL) 1000 UNIT TABS tablet, Take 1,000 Units by mouth daily  Pyridoxine HCl (VITAMIN B-6) 50 MG tablet, Take 100 mg by mouth daily  Cyanocobalamin (VITAMIN B 12 PO), Take by mouth daily  Magnesium 500 MG TABS, Take by mouth  Ascorbic Acid (VITAMIN C) 500 MG tablet, Take 500 mg by mouth daily    INVESTIGATIONS     Last 3 CMP:    Recent Labs     11/03/21  1641 11/03/21  1641 11/04/21  0533 11/04/21  1816 11/05/21  0645   *   < > 128* 126* 133*   K 4.6   < > 5.3 5.1 5.2   CL 92*   < > 94* 91* 96*   CO2 26   < > 22 22 24   BUN 26*   < > 27* 34* 38*   CREATININE 1.65*   < > 1.52* 1.52* 1.72*   CALCIUM 8.9   < > 8.6 8.9 8.7   PROT 6.1*  --  6.1*  --   --    LABALBU 3.0*  --  2.6*  --   --    BILITOT 1.40*  --  0.83  --   --    ALKPHOS 72  --  71  --   --    AST 14  --  13  --   --    ALT 12  --  12  --   --     < > = values in this interval not displayed. Last 3 CBC:  Recent Labs     11/03/21  1641 11/04/21  0533   WBC 14.5* 10.1   RBC 3.68* 3.50*   HGB 11.5* 11.3*   HCT 36.8 34.8*   .0 99.4   MCH 31.3 32.3   MCHC 31.3 32.5   RDW 13.2 13.1    341   MPV 9.7 9.8       ASSESSMENT   1. Acute Kidney Injury likely pre-renal secondary to reduced E ABV from CHF and worsen further by concomitant nonsteroidal anti-inflammatory agent use on a chronic basis -improving  Baseline creatinine normal  2. HFpEF   3. Dilutional hyponatremia  4. Persistant left sided pleural effusion status post thoracocentesis  5.  A.fib s/p CURT cardioversion    PLAN     Continue IV Lasix today  Fluid restriction  We will follow    Please do not hesitate to call with questions    This note is created with the assistance of a speech-recognition program. While intending to generate a document that actually reflects the content of the visit, no guarantees can be provided that every mistake has been identified and corrected by editing    Jorge Members, MD [place]UDOL[place], MRCP Freddy Cabrera), [address]   11/5/2021 1:21 PM  [facility]

## 2021-11-06 ENCOUNTER — APPOINTMENT (OUTPATIENT)
Dept: GENERAL RADIOLOGY | Age: 76
DRG: 208 | End: 2021-11-06
Payer: MEDICARE

## 2021-11-06 LAB
ALBUMIN SERPL-MCNC: 2.7 G/DL (ref 3.5–5.2)
ANION GAP SERPL CALCULATED.3IONS-SCNC: 14 MMOL/L (ref 9–17)
ANION GAP SERPL CALCULATED.3IONS-SCNC: 16 MMOL/L (ref 9–17)
BUN BLDV-MCNC: 48 MG/DL (ref 8–23)
BUN/CREAT BLD: ABNORMAL (ref 9–20)
CALCIUM SERPL-MCNC: 8.7 MG/DL (ref 8.6–10.4)
CHLORIDE BLD-SCNC: 93 MMOL/L (ref 98–107)
CHLORIDE BLD-SCNC: 94 MMOL/L (ref 98–107)
CO2: 22 MMOL/L (ref 20–31)
CO2: 22 MMOL/L (ref 20–31)
CREAT SERPL-MCNC: 1.46 MG/DL (ref 0.5–0.9)
GFR AFRICAN AMERICAN: 42 ML/MIN
GFR NON-AFRICAN AMERICAN: 35 ML/MIN
GFR SERPL CREATININE-BSD FRML MDRD: ABNORMAL ML/MIN/{1.73_M2}
GFR SERPL CREATININE-BSD FRML MDRD: ABNORMAL ML/MIN/{1.73_M2}
GLUCOSE BLD-MCNC: 104 MG/DL (ref 70–99)
HCT VFR BLD CALC: 36.7 % (ref 36.3–47.1)
HEMOGLOBIN: 11.5 G/DL (ref 11.9–15.1)
MAGNESIUM: 2 MG/DL (ref 1.6–2.6)
MCH RBC QN AUTO: 30.3 PG (ref 25.2–33.5)
MCHC RBC AUTO-ENTMCNC: 31.3 G/DL (ref 28.4–34.8)
MCV RBC AUTO: 96.8 FL (ref 82.6–102.9)
NRBC AUTOMATED: 0 PER 100 WBC
PDW BLD-RTO: 13 % (ref 11.8–14.4)
PHOSPHORUS: 3.6 MG/DL (ref 2.6–4.5)
PLATELET # BLD: 373 K/UL (ref 138–453)
PMV BLD AUTO: 9.5 FL (ref 8.1–13.5)
POTASSIUM SERPL-SCNC: 4.4 MMOL/L (ref 3.7–5.3)
POTASSIUM SERPL-SCNC: 4.5 MMOL/L (ref 3.7–5.3)
RBC # BLD: 3.79 M/UL (ref 3.95–5.11)
SODIUM BLD-SCNC: 130 MMOL/L (ref 135–144)
SODIUM BLD-SCNC: 131 MMOL/L (ref 135–144)
WBC # BLD: 14.8 K/UL (ref 3.5–11.3)

## 2021-11-06 PROCEDURE — 97535 SELF CARE MNGMENT TRAINING: CPT

## 2021-11-06 PROCEDURE — 97162 PT EVAL MOD COMPLEX 30 MIN: CPT

## 2021-11-06 PROCEDURE — 80069 RENAL FUNCTION PANEL: CPT

## 2021-11-06 PROCEDURE — 94761 N-INVAS EAR/PLS OXIMETRY MLT: CPT

## 2021-11-06 PROCEDURE — 83735 ASSAY OF MAGNESIUM: CPT

## 2021-11-06 PROCEDURE — 6370000000 HC RX 637 (ALT 250 FOR IP): Performed by: INTERNAL MEDICINE

## 2021-11-06 PROCEDURE — 97530 THERAPEUTIC ACTIVITIES: CPT

## 2021-11-06 PROCEDURE — 2500000003 HC RX 250 WO HCPCS: Performed by: NURSE PRACTITIONER

## 2021-11-06 PROCEDURE — 99232 SBSQ HOSP IP/OBS MODERATE 35: CPT | Performed by: INTERNAL MEDICINE

## 2021-11-06 PROCEDURE — 80051 ELECTROLYTE PANEL: CPT

## 2021-11-06 PROCEDURE — 6360000002 HC RX W HCPCS: Performed by: STUDENT IN AN ORGANIZED HEALTH CARE EDUCATION/TRAINING PROGRAM

## 2021-11-06 PROCEDURE — 6370000000 HC RX 637 (ALT 250 FOR IP): Performed by: NURSE PRACTITIONER

## 2021-11-06 PROCEDURE — 6360000002 HC RX W HCPCS: Performed by: INTERNAL MEDICINE

## 2021-11-06 PROCEDURE — 2500000003 HC RX 250 WO HCPCS: Performed by: INTERNAL MEDICINE

## 2021-11-06 PROCEDURE — 2580000003 HC RX 258: Performed by: INTERNAL MEDICINE

## 2021-11-06 PROCEDURE — 2060000000 HC ICU INTERMEDIATE R&B

## 2021-11-06 PROCEDURE — 2700000000 HC OXYGEN THERAPY PER DAY

## 2021-11-06 PROCEDURE — 36415 COLL VENOUS BLD VENIPUNCTURE: CPT

## 2021-11-06 PROCEDURE — 85027 COMPLETE CBC AUTOMATED: CPT

## 2021-11-06 PROCEDURE — 97166 OT EVAL MOD COMPLEX 45 MIN: CPT

## 2021-11-06 PROCEDURE — 71045 X-RAY EXAM CHEST 1 VIEW: CPT

## 2021-11-06 PROCEDURE — 2580000003 HC RX 258: Performed by: NURSE PRACTITIONER

## 2021-11-06 PROCEDURE — 94640 AIRWAY INHALATION TREATMENT: CPT

## 2021-11-06 RX ORDER — METOPROLOL TARTRATE 5 MG/5ML
5 INJECTION INTRAVENOUS ONCE
Status: COMPLETED | OUTPATIENT
Start: 2021-11-06 | End: 2021-11-06

## 2021-11-06 RX ORDER — BUMETANIDE 1 MG/1
1 TABLET ORAL DAILY
Status: DISCONTINUED | OUTPATIENT
Start: 2021-11-06 | End: 2021-11-08

## 2021-11-06 RX ORDER — BUMETANIDE 0.25 MG/ML
1 INJECTION, SOLUTION INTRAMUSCULAR; INTRAVENOUS ONCE
Status: COMPLETED | OUTPATIENT
Start: 2021-11-06 | End: 2021-11-06

## 2021-11-06 RX ORDER — DIPHENHYDRAMINE HYDROCHLORIDE 50 MG/ML
25 INJECTION INTRAMUSCULAR; INTRAVENOUS ONCE
Status: COMPLETED | OUTPATIENT
Start: 2021-11-06 | End: 2021-11-06

## 2021-11-06 RX ORDER — METOPROLOL TARTRATE 50 MG/1
50 TABLET, FILM COATED ORAL 2 TIMES DAILY
Status: DISCONTINUED | OUTPATIENT
Start: 2021-11-06 | End: 2021-11-10

## 2021-11-06 RX ADMIN — DIPHENHYDRAMINE HYDROCHLORIDE 25 MG: 50 INJECTION, SOLUTION INTRAMUSCULAR; INTRAVENOUS at 15:41

## 2021-11-06 RX ADMIN — METOPROLOL TARTRATE 50 MG: 50 TABLET, FILM COATED ORAL at 18:44

## 2021-11-06 RX ADMIN — OXYCODONE HYDROCHLORIDE AND ACETAMINOPHEN 500 MG: 500 TABLET ORAL at 08:28

## 2021-11-06 RX ADMIN — METOPROLOL TARTRATE 50 MG: 50 TABLET, FILM COATED ORAL at 08:28

## 2021-11-06 RX ADMIN — IPRATROPIUM BROMIDE AND ALBUTEROL SULFATE 1 AMPULE: .5; 2.5 SOLUTION RESPIRATORY (INHALATION) at 07:39

## 2021-11-06 RX ADMIN — IPRATROPIUM BROMIDE AND ALBUTEROL SULFATE 1 AMPULE: .5; 2.5 SOLUTION RESPIRATORY (INHALATION) at 15:09

## 2021-11-06 RX ADMIN — ISOSORBIDE MONONITRATE 30 MG: 30 TABLET ORAL at 08:28

## 2021-11-06 RX ADMIN — BUMETANIDE 1 MG: 0.25 INJECTION INTRAMUSCULAR; INTRAVENOUS at 18:50

## 2021-11-06 RX ADMIN — PREDNISONE 30 MG: 20 TABLET ORAL at 17:19

## 2021-11-06 RX ADMIN — APIXABAN 5 MG: 5 TABLET, FILM COATED ORAL at 08:28

## 2021-11-06 RX ADMIN — SODIUM CHLORIDE, PRESERVATIVE FREE 10 ML: 5 INJECTION INTRAVENOUS at 22:57

## 2021-11-06 RX ADMIN — CEFTRIAXONE SODIUM 1000 MG: 1 INJECTION, POWDER, FOR SOLUTION INTRAMUSCULAR; INTRAVENOUS at 16:33

## 2021-11-06 RX ADMIN — FUROSEMIDE 20 MG: 10 INJECTION, SOLUTION INTRAMUSCULAR; INTRAVENOUS at 08:28

## 2021-11-06 RX ADMIN — CITALOPRAM 20 MG: 20 TABLET, FILM COATED ORAL at 08:28

## 2021-11-06 RX ADMIN — Medication 7.5 MG: at 11:39

## 2021-11-06 RX ADMIN — DESMOPRESSIN ACETATE 40 MG: 0.2 TABLET ORAL at 08:28

## 2021-11-06 RX ADMIN — SODIUM CHLORIDE, PRESERVATIVE FREE 10 ML: 5 INJECTION INTRAVENOUS at 08:29

## 2021-11-06 RX ADMIN — FLUTICASONE PROPIONATE 1 PUFF: 110 AEROSOL, METERED RESPIRATORY (INHALATION) at 07:39

## 2021-11-06 RX ADMIN — METOPROLOL TARTRATE 5 MG: 1 INJECTION, SOLUTION INTRAVENOUS at 22:56

## 2021-11-06 RX ADMIN — APIXABAN 5 MG: 5 TABLET, FILM COATED ORAL at 21:38

## 2021-11-06 RX ADMIN — CLOPIDOGREL 75 MG: 75 TABLET, FILM COATED ORAL at 08:28

## 2021-11-06 RX ADMIN — AMIODARONE HYDROCHLORIDE 200 MG: 200 TABLET ORAL at 08:28

## 2021-11-06 ASSESSMENT — PAIN DESCRIPTION - FREQUENCY: FREQUENCY: CONTINUOUS

## 2021-11-06 ASSESSMENT — PAIN DESCRIPTION - DESCRIPTORS: DESCRIPTORS: ACHING;DISCOMFORT

## 2021-11-06 ASSESSMENT — ENCOUNTER SYMPTOMS
ALLERGIC/IMMUNOLOGIC NEGATIVE: 1
EYES NEGATIVE: 1
GASTROINTESTINAL NEGATIVE: 1
SHORTNESS OF BREATH: 1

## 2021-11-06 ASSESSMENT — PAIN DESCRIPTION - PAIN TYPE
TYPE: ACUTE PAIN
TYPE: ACUTE PAIN

## 2021-11-06 ASSESSMENT — PAIN SCALES - GENERAL
PAINLEVEL_OUTOF10: 3
PAINLEVEL_OUTOF10: 3

## 2021-11-06 ASSESSMENT — PAIN DESCRIPTION - LOCATION
LOCATION: ABDOMEN
LOCATION: ABDOMEN

## 2021-11-06 NOTE — PROGRESS NOTES
Occupational Therapy   Occupational Therapy Initial Assessment  Date: 2021   Patient Name: Lashawn Andres  MRN: [id number]     : 1945    Date of Service: 2021  Chief Complaint   Patient presents with    Leg Swelling     increasing BLE edema; pt from SNF; per PA at SNF, pt has fluid retention, needs IV meds that they cannot administer       Discharge Recommendations:    Further therapy recommended at discharge. Assessment   Performance deficits / Impairments: Decreased functional mobility ; Decreased ADL status; Decreased endurance; Decreased high-level IADLs; Decreased balance  Treatment Diagnosis: SOB  Prognosis: Good  Decision Making: Medium Complexity  Patient Education: pt ed on POC, purpose of eval, importance of movement, safety during functional transfers/functional mobility, benefits of being out of bed. goodreturn  REQUIRES OT FOLLOW UP: Yes  Activity Tolerance  Activity Tolerance: Patient Tolerated treatment well; Patient limited by fatigue  Safety Devices  Safety Devices in place: Yes  Type of devices: Gait belt; Call light within reach; Chair alarm in place; Left in chair; Nurse notified; Patient at risk for falls  Restraints  Initially in place: No           Patient Diagnosis(es): The primary encounter diagnosis was Acute on chronic congestive heart failure, unspecified heart failure type ([address] Diagnoses of CASEY (acute kidney injury) ([facility] [address] and Acute on chronic diastolic heart failure ([address] were also pertinent to this visit. has a past medical history of Asthma, COPD (chronic obstructive pulmonary disease) ([address] Gout, Hyperlipidemia, Hypertension, Mitral insufficiency, and Pneumonia. has a past surgical history that includes Hysterectomy (); knee surgery; Colonoscopy; Dental surgery; Tonsillectomy and adenoidectomy; Ankle surgery (Left, 2016); pr colsc flx w/rmvl of tumor polyp lesion snare tq (N/A, 2017); and bronchoscopy (N/A, 10/20/2021).     Treatment Diagnosis: SOB      Restrictions  Restrictions/Precautions  Restrictions/Precautions: Fall Risk  Required Braces or Orthoses?: No  Position Activity Restriction  Other position/activity restrictions: up with assist    Subjective   General  Patient assessed for rehabilitation services?: Yes  Family / Caregiver Present: No  Diagnosis: CASEY  General Comment  Comments: RN ok'd for therapy this morning. pt agreeable to participate in session and coopertive/pleasant throughout  Patient Currently in Pain: Yes  Pain Assessment  Pain Assessment: 0-10  Pain Level: 3  Pain Type: Acute pain  Pain Location: Abdomen  Non-Pharmaceutical Pain Intervention(s): Ambulation/Increased Activity; Distraction; Therapeutic presence  Response to Pain Intervention: Patient Satisfied    Oxygen Therapy  O2 Device: Nasal cannula  O2 Flow Rate (L/min): 3 L/min  Social/Functional History  Social/Functional History  Type of Home: Facility  Home Layout: One level  Home Access: Level entry  Home Equipment: Rolling walker, Wheelchair-manual, Oxygen (pt reports ambulating 45ft with RW with therapy and using wheelchair for longer distances, 2L O2 for ~1 month)  ADL Assistance: Needs assistance  Homemaking Assistance: Needs assistance  Homemaking Responsibilities: No  Ambulation Assistance: Needs assistance (with RW and therapy)  Transfer Assistance: Needs assistance  Active : No  Patient's  Info: son drives  Occupation: Retired  Leisure & Hobbies: Coloring books  Additional Comments: Pt reports residing in SNF for ~2 weeks, plans to return upon discharge and long-term plan is to transition to assisted living facility.  Prior to ~1 month ago, pt was independent with ambulation and ADLs       Objective   Vision: Impaired  Vision Exceptions: Wears glasses at all times  Hearing: Within functional limits    Orientation  Overall Orientation Status: Within Functional Limits     Balance  Sitting Balance: Modified independent  (~15 minutes on EOB and in chair)  Standing Balance: Contact guard assistance (with RW)  Standing Balance  Time: ~2 minutes  Activity: pt took a couple steps in order to transfer from bed to chair  Comment: pt with no LOB, but slow to complete and reported SOB upon completion. Pt O2 93% upon completion  Functional Mobility  Functional - Mobility Device: Rolling Walker  Activity: Other  Assist Level: Contact guard assistance  ADL  Feeding: Modified independent   Grooming: Modified independent   UE Bathing: Stand by assistance  LE Bathing: Moderate assistance  UE Dressing: Stand by assistance;  Moderate assistance  LE Dressing: Minimal assistance  Additional Comments: OT facilitated pt in washing face while sitting chair with mod I  Tone RUE  RUE Tone: Normotonic  Tone LUE  LUE Tone: Normotonic  Coordination  Movements Are Fluid And Coordinated: Yes     Bed mobility  Supine to Sit: Minimal assistance; 2 Person assistance  Sit to Supine:  (pt retired to chair at end of session)  Scooting: Minimal assistance  Transfers  Sit to stand: Minimal assistance  Stand to sit: Minimal assistance  Transfer Comments: with RW     Cognition  Overall Cognitive Status: WFL        Sensation  Overall Sensation Status: WFL        LUE AROM (degrees)  LUE AROM : WFL  Left Hand AROM (degrees)  Left Hand AROM: WFL  RUE AROM (degrees)  RUE AROM : WFL  Right Hand AROM (degrees)  Right Hand AROM: WFL  LUE Strength  Gross LUE Strength: Exceptions to [place] [place]  L Shoulder Flex: 4/5  L Hand General: 4/5  RUE Strength  Gross RUE Strength: Exceptions to [place] [place]  R Shoulder Flex: 4/5  R Hand General: 4/5                   Plan   Plan  Times per week: 3-4x/wk    AM-PAC Score        AM-PAC Inpatient Daily Activity Raw Score: 18 (11/06/21 1305)  AM-PAC Inpatient ADL T-Scale Score : 38.66 (11/06/21 1305)  ADL Inpatient CMS 0-100% Score: 46.65 (11/06/21 1305)  ADL Inpatient CMS G-Code Modifier : CK (11/06/21 1305)    Goals  Short term goals  Time Frame for Short term goals: pt will, by discharge  Short term goal 1: complete LB ADLs with min A, set up and AE, as needed  Short term goal 2: complete UB ADLs with mod I  Short term goal 3: increase activity tolerance to 20+ minutes in order to participate in daily tasks  Short term goal 4: dem SBA During functional transfers/functional mobility with LRD ,as needed  Short term goal 5: dem ~6 minutes static/dynamic standing tolerance with SBA in order to complete functional tasks       Therapy Time   Individual Concurrent Group Co-treatment   Time In 0849         Time Out 0911         Minutes 22      co-eval with PT    Timed Code Treatment Minutes: Jaqueline 2777, OTR/L

## 2021-11-06 NOTE — CARE COORDINATION
Seen early in the afternoon and visibly tachypneic and requiring 6 L nasal cannula. Coughing uncontrollably and complaining of swelling of the throat and shortness of breath and possibly swelling of the face. She was given tolvaptan 5 hours earlier. Patient was given Benadryl, prednisone and Pepcid for possible allergic reaction. She was reevaluated at 630 and continued to have tachycardia but tachypnea is improved. Her swelling is improved and her flushing is improved. Patient however was still complaining of shortness of breath, orthopnea, cough with frothy thin sputum. Patient was given one dose of IV Bumex. Home dose of Lopressor was given early for her rapid ventricular response.      [facility]

## 2021-11-06 NOTE — PROGRESS NOTES
PULMONARY & CRITICAL CARE MEDICINE PROGRESS NOTE     Patient:  Mitch Sheppard  MRN: [id number]  Admit date: 11/3/2021  Primary Care Physician: KALYN Starks CNP  Consulting Physician: Carmen Coornel DO  CODE Status: Full Code  LOS: 3     SUBJECTIVE     Chief Complaint/ Reason for consult:  Left pleural effusion    Hospital Course: The patient is a 68 y.o. female here for evaluation of shortness of breath with clear sputum, increased leg swelling. Patient has chronic shortness of breath which has been worsening over the last couple of days along with increased swelling of her lower extremity. She has conversational dyspnea, no wheezing. Chest x-ray shows left pleural effusion and left atelectasis she has had multiple thoracentesis. Complains of abdominal bloating, orthopnea, palpitations. She was found to be in A. fib with heart rate in 110s. Interval History:  21  Pt was seen and examined at bedside. Patient is chair  Saturating appropriately at 3 liter via nasal canula. Vitals stable. Labs reviewed. No acute events overnight. Review Of Systems:  Review of Systems   Constitutional: Negative. HENT: Negative. Eyes: Negative. Respiratory: Positive for shortness of breath. Cardiovascular: Negative. Gastrointestinal: Negative. Endocrine: Negative. Genitourinary: Negative. Musculoskeletal: Negative. Skin: Negative. Allergic/Immunologic: Negative. Psychiatric/Behavioral: Negative. OBJECTIVE     PaO2/FiO2 RATIO:  No results for input(s): POCPO2 in the last 72 hours.          VITAL SIGNS:   LAST:  BP (!) 161/60   Pulse 105   Temp 97.5 °F (36.4 °C) (Oral)   Resp 16   Ht 5' 4\" (1.626 m)   Wt 256 lb (116.1 kg)   SpO2 93%   BMI 43.94 kg/m²   8-24 HR RANGE:  TEMP Temp  Av.5 °F (36.4 °C)  Min: 97.4 °F (36.3 °C)  Max: 97.5 °F (92.1 °C)   BP Systolic (50QPE), KOV:438 , Min:122 , MATTI:074      Diastolic (35ZDB), PNH:82, Min:54, Max:60     PULSE Pulse  Av  Min: 103  Max: 105   RR Resp  Av  Min: 16  Max: 16   O2 SAT SpO2  Av %  Min: 93 %  Max: 93 %   OXYGEN DELIVERY O2 Flow Rate (L/min)  Av L/min  Min: 2 L/min  Max: 2 L/min        Systemic Examination:   Physical Exam -  Constitutional:  Alert, cooperative and no distress. Mental Status:  Oriented to person, place and time and normal affect. Lungs:  Bilateral air entry present decreased in left side, lung fields clear. Normal effort. Heart:  Regular rate and rhythm, no murmur. Abdomen:  Soft, nontender, nondistended, normal bowel sounds. Extremities:  Bilateral lower extremity edema, negative for redness, tenderness in the calves. Skin:  Warm, dry, no gross lesions or rashes. DATA REVIEW     Medications: Current Inpatient  Scheduled Meds:   furosemide  20 mg IntraVENous BID    ipratropium-albuterol  1 ampule Inhalation TID    amiodarone  200 mg Oral Daily    apixaban  5 mg Oral BID    vitamin C  500 mg Oral Daily    atorvastatin  40 mg Oral Daily    fluticasone  1 puff Inhalation BID    citalopram  20 mg Oral Daily    clopidogrel  75 mg Oral Daily    isosorbide mononitrate  30 mg Oral Daily    metoprolol tartrate  50 mg Oral BID    sodium chloride flush  5-40 mL IntraVENous 2 times per day     Continuous Infusions:   sodium chloride         INPUT/OUTPUT:  In: -   Out: 1150 [Urine:1150]  Date 21 0000 - 21 2359   Shift 6988-6366 4692-8497 3990-4877 24 Hour Total   INTAKE   Shift Total(mL/kg)       OUTPUT   Urine(mL/kg/hr) 500(0.5)   500   Shift Total(mL/kg) 500(4.3)   500(4.3)   Weight (kg) 116.1 116.1 116.1 116.1        LABS:  ABGs:   No results for input(s): POCPH, POCPCO2, POCPO2, POCHCO3, SFWW8LLX in the last 72 hours.   CBC:   Recent Labs     21  1641 21  0533 21  0520   WBC 14.5* 10.1 14.8*   HGB 11.5* 11.3* 11.5*   HCT 36.8 34.8* 36.7   .0 99.4 96.8    341 373   LYMPHOPCT 7*  --   --    RBC 3.68* 3.50* 3.79*   MCH 31.3 32.3 30.3   MCHC 31.3 32.5 31.3   RDW 13.2 13.1 13.0     CRP:   No results for input(s): CRP in the last 72 hours. LDH:   Recent Labs     11/05/21 0645   *     BMP:   Recent Labs     11/03/21  1641 11/04/21 0533 11/04/21  1816 11/05/21 0645 11/06/21 0520   * 128* 126* 133* 130*   K 4.6 5.3 5.1 5.2 4.5   CL 92* 94* 91* 96* 94*   CO2 26 22 22 24 22   BUN 26* 27* 34* 38* 48*   CREATININE 1.65* 1.52* 1.52* 1.72* 1.46*   GLUCOSE 128* 156* 195* 141* 104*   PHOS  --   --   --   --  3.6     Liver Function Test:   Recent Labs     11/03/21 1641 11/04/21 0533 11/05/21 0645 11/06/21 0520   PROT 6.1* 6.1* 6.2*  --    LABALBU 3.0* 2.6*  --  2.7*   ALT 12 12  --   --    AST 14 13  --   --    ALKPHOS 72 71  --   --    BILITOT 1.40* 0.83  --   --      Coagulation Profile:   Recent Labs     11/04/21 0533   INR 1.2   PROTIME 12.3     D-Dimer:  No results for input(s): DDIMER in the last 72 hours. Lactic Acid:  No results for input(s): LACTA in the last 72 hours. Cardiac Enzymes:  No results for input(s): CKTOTAL, CKMB, CKMBINDEX, TROPONINI in the last 72 hours. Invalid input(s): TROPONIN, HSTROP  BNP/ProBNP:   Recent Labs     11/03/21 1641 11/04/21 0533   PROBNP 3,134* 3,657*     Triglycerides:  No results for input(s): TRIG in the last 72 hours. Microbiology:  Urine Culture:  No components found for: CURINE  Blood Culture:  No components found for: CBLOOD, CFUNGUSBL  Sputum Culture:  No components found for: CSPUTUM  Recent Labs     11/05/21  0832   SPECDESC . URINE   SPECIAL NOT REPORTED   CULTURE NON LACTOSE FERMENTING GRAM NEGATIVE RODS >104987 CFU/ML*     Recent Labs     11/04/21  1015 11/04/21  1531 11/05/21  0832   SPECDESC NOT REPORTED . PLEURAL FLUID . URINE   SPECIAL  --  NOT REPORTED NOT REPORTED   CULTURE  --  NO GROWTH 2 DAYS NON LACTOSE FERMENTING GRAM NEGATIVE RODS >426379 CFU/ML*        Pathology:    Radiology Reports:  XR CHEST (SINGLE VIEW FRONTAL)   Final Result   Interval decrease in left effusion. No pneumothorax. Persistent opacity at   the left lung base. Vascularity is slightly prominent. US THORACENTESIS Which side should the procedure be performed? Left   Final Result   Successful ultrasound guided thoracentesis. US RETROPERITONEAL COMPLETE   Final Result   1. Patient had no urge to void during the exam.  Nonvisualization of ureteral   jets. 2. Incidental cholelithiasis. 3. Unremarkable renal ultrasound. No hydronephrosis. XR CHEST PORTABLE   Final Result   No change from prior study. Persistent sizable left effusion with opacity at   the left lung base and pulmonary vascular congestion. Echocardiogram:   Results for orders placed during the hospital encounter of 10/02/21    ECHO Complete 2D W Doppler W Color    Narrative  Transthoracic Echocardiography Report (TTE)    Patient Name MCLEAN   Date of Study               10/03/2021  [facility]    Date of      1945  Gender                      Female  Birth    Age          68 year(s)  Race                            Room Number  0124        Height:                     64 inch, 162.56 cm    Corporate ID [id number]    Weight:                     272 pounds, 123.4 kg  #    Patient Acct [de-identified]   BSA:          2.23 m^2      BMI:      46.69  #                                                              kg/m^2    MR #         [id number]     Sonographer                 Dioni Granda    Accession #  [id number]  Interpreting Physician      Margarita Handy    Fellow                   Referring Nurse  Practitioner    Interpreting             Referring Physician         Bryce Cifuentes MD  Fellow    Additional Comments  Technically difficult study due to body habitus    Type of Study    TTE procedure:2D Echocardiogram, M-Mode, Doppler, Color Doppler. Procedure Date  Date: 10/03/2021 Start: 01:22 PM    Study Location: [facility]    History / Wilburt French.  Comments:  COPD, Pneumonia, Obesity,    Patient Status: Inpatient    Height: 64 inches Weight: 272.01 pounds BSA: 2.23 m^2 BMI: 46.69 kg/m^2    CONCLUSIONS    Summary  Left ventricle is normal in size with normal systolic function globally. Calculated ejection fraction is 59% ( Atrial-Fib). Mild mitral regurgitation. Mild tricuspid regurgitation. Estimated right ventricular systolic pressure is 35 mmHg. Anterior clear space noted, most likely adipose tissue vs small loculated  pericardial effusion. No evidence of tamponade. Pleural effusion is seen. Signature  ----------------------------------------------------------------------------  Electronically signed by Toi Qureshi(Sonographer) on 10/03/2021 02:13  PM  ----------------------------------------------------------------------------    ----------------------------------------------------------------------------  Electronically signed by Margarita Handy(Interpreting physician) on  10/03/2021 03:50 PM  ----------------------------------------------------------------------------  FINDINGS  Left Atrium  Left atrium is moderately dilated. Left Ventricle  Left ventricle is normal in size with normal systolic function globally. Calculated ejection fraction is 59% ( Atrial-Fib)  Right Atrium  Right atrial dilatation. Right Ventricle  Dilated right ventricle with systolic function within the range of normal.  Mitral Valve  Mitral valve structure is normal.  Mild mitral regurgitation. Aortic Valve  Aortic valve is trileaflet. Aortic sclerosis without stenosis. No aortic insufficiency. Tricuspid Valve  Tricuspid valve structure is normal.  Mild tricuspid regurgitation. Estimated right ventricular systolic pressure is 35 mmHg. Pulmonic Valve  The pulmonic valve is normal in structure. Pericardial Effusion  Anterior clear space noted, most likely adipose tissue vs small loculated  pericardial effusion. No evidence of tamponade.     Miscellaneous  Normal aortic root to ensure the accuracy of this automated transcription, some errors in transcription may have occurred.

## 2021-11-06 NOTE — PROGRESS NOTES
Notified Dr. Brenden Thomas- Patient tachycardic (110-130s) and tachypnic (20-25),having a hard time catching her breath. She's sating 94% on 6L NC. Writer will continue to monitor.

## 2021-11-06 NOTE — PROGRESS NOTES
[facility] Dr. Bony Barnard- Breathing has improved. HR still Afib RVR 130s-140s at time. /96. Per Dr. Bony Barnard- give it more time. Writer will continue to monitor.

## 2021-11-06 NOTE — PROGRESS NOTES
Physical Therapy    Facility/Department: [address] ORTHO/MED SURG  Initial Assessment    NAME: Raheel Gaston  : 1945  MRN: [id number]  Chief Complaint   Patient presents with    Leg Swelling     increasing BLE edema; pt from SNF; per PA at SNF, pt has fluid retention, needs IV meds that they cannot administer     Date of Service: 2021    Discharge Recommendations: Further therapy recommended at discharge. PT Equipment Recommendations  Equipment Needed: No    Assessment   Body structures, Functions, Activity limitations: Decreased functional mobility ; Decreased balance; Decreased endurance; Decreased strength  Assessment: The pt required Min A x2 for supine to sit, Min A for transfers and CGA to ambulate 3ft with RW on 3L O2. Recommend continued PT to address deficits and progress mobility. The pt currently requires 24hr assistance for functional mobility due to endurance deficits and increased risk of falls. Prognosis: Good  Decision Making: Medium Complexity  PT Education: Goals; PT Role; Plan of Care; Functional Mobility Training  REQUIRES PT FOLLOW UP: Yes  Activity Tolerance  Activity Tolerance: Patient limited by endurance       Patient Diagnosis(es): The primary encounter diagnosis was Acute on chronic congestive heart failure, unspecified heart failure type ([address] Diagnoses of CASEY (acute kidney injury) ([address] and Acute on chronic diastolic heart failure ([address] were also pertinent to this visit. has a past medical history of Asthma, COPD (chronic obstructive pulmonary disease) ([address] Gout, Hyperlipidemia, Hypertension, Mitral insufficiency, and Pneumonia. has a past surgical history that includes Hysterectomy (); knee surgery; Colonoscopy; Dental surgery; Tonsillectomy and adenoidectomy;  Ankle surgery (Left, 2016); pr colsc flx w/rmvl of tumor polyp lesion snare tq (N/A, 2017); and bronchoscopy (N/A, 10/20/2021). Restrictions  Restrictions/Precautions  Restrictions/Precautions: Fall Risk  Required Braces or Orthoses?: No  Position Activity Restriction  Other position/activity restrictions: up with assist  Vision/Hearing  Vision: Impaired  Vision Exceptions: Wears glasses at all times  Hearing: Within functional limits     Subjective  General  Patient assessed for rehabilitation services?: Yes  Response To Previous Treatment: Not applicable  Family / Caregiver Present: No  Follows Commands: Within Functional Limits  General Comment  Comments: 3L O2 via NC  Subjective  Subjective: RN and pt agreeable to PT. Pt supine in bed upon arrival, pleasant and cooperative throughout. Pain Screening  Patient Currently in Pain: Yes  Pain Assessment  Pain Assessment: 0-10  Pain Level: 3  Pain Type: Acute pain  Pain Location: Abdomen  Pain Descriptors: Aching; Discomfort  Pain Frequency: Continuous  Non-Pharmaceutical Pain Intervention(s): Ambulation/Increased Activity  Response to Pain Intervention: Patient Satisfied  Vital Signs  Patient Currently in Pain: Yes       Orientation  Orientation  Overall Orientation Status: Within Functional Limits  Social/Functional History  Social/Functional History  Type of Home: Facility  Home Layout: One level  Home Access: Level entry  Home Equipment: Rolling walker, Wheelchair-manual, Oxygen (pt reports ambulating 45ft with RW with therapy and using wheelchair for longer distances, 2L O2 for ~1 month)  ADL Assistance: Needs assistance  Homemaking Assistance: Needs assistance  Homemaking Responsibilities: No  Ambulation Assistance: Needs assistance (with RW and therapy)  Transfer Assistance: Needs assistance  Active : No  Patient's  Info: son drives  Occupation: Retired  Leisure & Hobbies: Coloring books  Additional Comments: Pt reports residing in SNF for ~2 weeks, plans to return upon discharge and long-term plan is to transition to assisted living facility.  Prior to ~1 month ago, pt was independent with ambulation and ADLs  Cognition   Cognition  Overall Cognitive Status: Exceptions  Arousal/Alertness: Appropriate responses to stimuli  Insights: Fully aware of deficits  Initiation: Requires cues for some  Sequencing: Requires cues for some    Objective          Joint Mobility  Spine: WFL  ROM RLE: WFL  ROM LLE: WFL  ROM RUE: WFL- AROM shoulder flexion ~100 degrees  ROM LUE: WFL- AROM shoulder flexion ~100 degrees  Strength RLE  Comment: grossly 4-/5  Strength LLE  Comment: grossly 4-/5  Strength RUE  Strength RUE: WFL  Comment: formally assessed by OT  Strength LUE  Strength LUE: WFL  Comment: formally assessed by OT  Tone RLE  RLE Tone: Normotonic  Tone LLE  LLE Tone: Normotonic  Motor Control  Gross Motor?: WFL  Sensation  Overall Sensation Status: WFL (pt denies numbness and tingling)  Bed mobility  Supine to Sit: Minimal assistance; 2 Person assistance  Sit to Supine:  (pt retired to chair at end of session)  Scooting: Minimal assistance  Comment: increased time required for supine to sit, verbal cues for breathing technique  Transfers  Sit to Stand: Minimal Assistance  Stand to sit: Contact guard assistance  Comment: Transfers performed with RW, verbal cues for UE placement with good return. Pt required 2 attempts to achieve upright posture  Ambulation  Ambulation?: Yes  Ambulation 1  Surface: level tile  Device: Rolling Walker  Other Apparatus: O2 (3L)  Assistance: Contact guard assistance  Quality of Gait: flexed posture, high reliance on RW  Gait Deviations: Slow Radha; Increased DONAN;  Decreased step length; Decreased step height  Distance: 5ft  Comments: SPO2 93% and HR increased to 114 bpm  Stairs/Curb  Stairs?: No     Balance  Posture: Fair  Sitting - Static: Good; -  Sitting - Dynamic: Fair; +  Standing - Static: Fair; +  Standing - Dynamic: Fair  Comments: standing balance assessed with RW        Plan   Plan  Times per week: 5x/wk  Current Treatment Recommendations: Strengthening, ROM, Balance Training, Functional Mobility Training, Gait Training, Stair training, Endurance Training, Home Exercise Program, Safety Education & Training, Patient/Caregiver Education & Training, Transfer Training  Safety Devices  Type of devices: Nurse notified, Call light within reach, Gait belt, Left in chair, Chair alarm in place  Restraints  Initially in place: No      AM-PAC Score  AM-PAC Inpatient Mobility Raw Score : 16 (11/06/21 1316)  AM-PAC Inpatient T-Scale Score : 40.78 (11/06/21 1316)  Mobility Inpatient CMS 0-100% Score: 54.16 (11/06/21 1316)  Mobility Inpatient CMS G-Code Modifier : CK (11/06/21 1316)          Goals  Short term goals  Time Frame for Short term goals: 14 visits  Short term goal 1: Perform bed mobility with SBA  Short term goal 2: Perform sit to stand transfer with CGA  Short term goal 3: Ambulate 200ft with RW and CGA  Short term goal 4: Demo Fair+ dynamic standing balance to decrease risk of falls  Short term goal 5: Participate in 30 minutes of therapy to demo increased endurance       Therapy Time   Individual Concurrent Group Co-treatment   Time In 0849         Time Out 0912         Minutes 23         Timed Code Treatment Minutes: 8 Minutes       Matti Chapman PT

## 2021-11-06 NOTE — PLAN OF CARE
Problem: Falls - Risk of:  Goal: Will remain free from falls  Description: Will remain free from falls  11/6/2021 1912 by Javi Arredondo RN  Outcome: Ongoing  11/6/2021 0526 by Colin Nieves RN  Outcome: Met This Shift  Goal: Absence of physical injury  Description: Absence of physical injury  11/6/2021 1912 by Javi Arredondo RN  Outcome: Ongoing  11/6/2021 0526 by Colin Nieves RN  Outcome: Met This Shift     Problem: Skin Integrity:  Goal: Will show no infection signs and symptoms  Description: Will show no infection signs and symptoms  11/6/2021 1912 by Javi Arredondo RN  Outcome: Ongoing  11/6/2021 0526 by Colin Nieves RN  Outcome: Ongoing  Goal: Absence of new skin breakdown  Description: Absence of new skin breakdown  11/6/2021 1912 by Javi Arredondo RN  Outcome: Ongoing  11/6/2021 0526 by Colin Nieves RN  Outcome: Ongoing     Problem: OXYGENATION/RESPIRATORY FUNCTION  Goal: Patient will maintain patent airway  11/6/2021 1912 by Javi Arredondo RN  Outcome: Ongoing  11/6/2021 0526 by Colin Nieves RN  Outcome: Ongoing  Goal: Patient will achieve/maintain normal respiratory rate/effort  Description: Respiratory rate and effort will be within normal limits for the patient  11/6/2021 1912 by Javi Arredondo RN  Outcome: Ongoing  11/6/2021 0526 by Colin Nieves RN  Outcome: Ongoing     Problem: HEMODYNAMIC STATUS  Goal: Patient has stable vital signs and fluid balance  11/6/2021 1912 by Javi Arredondo RN  Outcome: Ongoing  11/6/2021 0526 by Colin Nieves RN  Outcome: Ongoing     Problem: FLUID AND ELECTROLYTE IMBALANCE  Goal: Fluid and electrolyte balance are achieved/maintained  11/6/2021 1912 by Javi Arredondo RN  Outcome: Ongoing  11/6/2021 0526 by Colin Nieves RN  Outcome: Ongoing     Problem: ACTIVITY INTOLERANCE/IMPAIRED MOBILITY  Goal: Mobility/activity is maintained at optimum level for patient  11/6/2021 1912 by Javi Arredondo RN  Outcome: Ongoing  11/6/2021 0526 by Colin Nieves RN  Outcome: Ongoing     Problem: Nutrition  Goal: Optimal nutrition therapy  11/6/2021 1912 by Blaine Rich RN  Outcome: Ongoing  11/6/2021 0526 by Ciro Morales RN  Outcome: Ongoing     Problem: Pain:  Goal: Pain level will decrease  Description: Pain level will decrease  Outcome: Ongoing  Goal: Control of acute pain  Description: Control of acute pain  Outcome: Ongoing  Goal: Control of chronic pain  Description: Control of chronic pain  Outcome: Ongoing

## 2021-11-06 NOTE — PROGRESS NOTES
[facility]  Office: [address], DO, Frantz [place], DO, Kiki Marcial, DO, Gaye Villagomez Blood, DO, Daniel Crowder MD, Ligia Polo MD, Kvng Turpin MD, Desi Jung MD, Adeline Goldmann, MD, Christine Davis MD, Lili Jaime MD, Edouard Shansk MD, Martell Divers, DO, Aleja Rao, DO, Aaliyah Moya MD,  Radha Friday, DO, Shweta Amaya MD, Radha Pelaez MD, Erin Lance MD, Ebony Talamantes MD, Georgina Marion MD, Sveta Diaz MD, Paulino Marie, [facility], [facility], [facility], Chris Hill, CNP, Casimiro Streeter, CNS, Aquiles Chavez, CNP, Aneesh Farley, CNP, Farzana Swartz, CNP, Tao Michael, CNP, Radha Trujillo, CNP, Giovana Domingo, CNP, JAUN Castillo-C, Loan Partida, [facility], Genie Caban, CNP, Joyce Collinselor, CNP, Myke Hsu, CNP, Sunita Edward, CNP, Gabriela Squibb, CNP, Delfin Geiger, [facility], [facility], [address] [place] [address]    Progress Note    11/6/2021    2:21 PM    Name:   Judy Trotter  MRN:     [id number]     Acct:      [de-identified]   Room:   [address] [place] [address] Day:  3  Admit Date:  11/3/2021  4:32 PM    PCP:   KALYN Morales CNP  Code Status:  Full Code    Subjective:     C/C:   Chief Complaint   Patient presents with    Leg Swelling     increasing BLE edema; pt from SNF; per PA at SNF, pt has fluid retention, needs IV meds that they cannot administer     Interval History Status: improved. Diuretics still being ordered by pulmonary and nephrology. Kidney function continues to improve slightly. Discussed discharge to skilled nursing facility. Urine culture with lactose fermenting rods and numerous WBCs on UA. Patient with no complaints though    Brief History:     From H&P  Patient presented to the ER with complaints of leg swelling, shortness of breath with a productive cough (thin white sputum) and constipation.   She states she chronically has lower extremity edema left side greater than right she thinks that is increased over the last couple days. Patient was recently discharged on 10/15 after being admitted related to a pleural effusion. At that time she presented with shortness of breath and palpitations. She is found to have a left lower lobe pneumonia and A. fib with RVR and was admitted to the ICU for acute hypoxic respiratory failure. During that visit the patient had a paracentesis, Negative CURT and subsequently cardioverted successfully, and a bronchoscopy that showed left main bronchus obstruction due to thick mucous plugging. At discharge the patient was encouraged to follow-up with cardiology outpatient for an ablation. During that visit she was also treated for UTI and was discharged on Augmentin for 5 days. She was also given inhalers and steroids for COPD.     On arrival to the ER today she complains of not feeling well over the last 3 or 4 days. She describes worsening shortness of breath primarily on exertion and orthopnea. The patient told me that she has not been using oxygen on an outpatient basis but according to the ER note she has been using it intermittently primarily with exertion and therapy and occasionally overnight. Also complains of constipation and mild abdominal bloating. She also reports nausea and vomiting few days ago but nothing current and she has been tolerating p.o. intake. She states she is compliant with her home medications.     Patient was placed on 2 L nasal cannula in the ER. She has been afebrile heart rates been in the 90s to the low 110's A. Fib.     Chest x-ray shows no acute changes from prior study with a has a persistent sizable left effusion with opacity at the left lung base and pulmonary vascular congestion. Sodium is 129 BUN and creatinine are 26/1. 65. Her normal BUN and creatinine are around 15/0.58 and normal sodiums in the mid 130s. BNP is 3134. On her last admission it was 1745 and on her admission in early October it was 3251. obstructive pulmonary disease) ([facility] [address] Gout, Hyperlipidemia, Hypertension, Mitral insufficiency, and Pneumonia. Social History:   reports that she has quit smoking. She has never used smokeless tobacco. She reports current alcohol use. She reports that she does not use drugs. Family History:   Family History   Problem Relation Age of Onset    High Blood Pressure Mother     Cancer Mother     Asthma Father     Heart Disease Father     Cancer Father        Vitals:  BP (!) 161/60   Pulse 105   Temp 97.5 °F (36.4 °C) (Oral)   Resp 16   Ht 5' 4\" (1.626 m)   Wt 256 lb (116.1 kg)   SpO2 93%   BMI 43.94 kg/m²   Temp (24hrs), Av.5 °F (36.4 °C), Min:97.4 °F (36.3 °C), Max:97.5 °F (36.4 °C)    No results for input(s): POCGLU in the last 72 hours. I/O (24Hr):     Intake/Output Summary (Last 24 hours) at 2021 1421  Last data filed at 2021 1034  Gross per 24 hour   Intake    Output 1550 ml   Net -1550 ml       Labs:  Hematology:  Recent Labs     21  16421  0533 21  0520   WBC 14.5* 10.1 14.8*   RBC 3.68* 3.50* 3.79*   HGB 11.5* 11.3* 11.5*   HCT 36.8 34.8* 36.7   .0 99.4 96.8   MCH 31.3 32.3 30.3   MCHC 31.3 32.5 31.3   RDW 13.2 13.1 13.0    341 373   MPV 9.7 9.8 9.5   INR  --  1.2  --      Chemistry:  Recent Labs     21  1641 21  1641 218 21  0533 21  0533 21  1816 21  0645 21  0520   *   < >  --  128*   < > 126* 133* 130*   K 4.6   < >  --  5.3   < > 5.1 5.2 4.5   CL 92*   < >  --  94*   < > 91* 96* 94*   CO2 26   < >  --  22   < > 22 24 22   GLUCOSE 128*   < >  --  156*   < > 195* 141* 104*   BUN 26*   < >  --  27*   < > 34* 38* 48*   CREATININE 1.65*   < >  --  1.52*   < > 1.52* 1.72* 1.46*   MG  --   --   --  2.2  --   --   --  2.0   ANIONGAP 11   < >  --  12   < > 13 13 14   LABGLOM 30*   < >  --  33*   < > 33* 29* 35*   GFRAA 37*   < >  --  40*   < > 40* 35* 42*   CALCIUM 8.9   < >  --  8.6   < > 8.9 8.7 8.7   PHOS  --   --   --   --   --   --   --  3.6   PROBNP 3,134*  --   --  3,657*  --   --   --   --    TROPHS 37*  --  37*  --   --   --   --   --     < > = values in this interval not displayed. Recent Labs     11/03/21  1641 11/04/21  0533 11/05/21  0645 11/06/21  0520   PROT 6.1* 6.1* 6.2*  --    LABALBU 3.0* 2.6*  --  2.7*   AST 14 13  --   --    ALT 12 12  --   --    LDH  --   --  269*  --    ALKPHOS 72 71  --   --    BILITOT 1.40* 0.83  --   --    LIPASE 8*  --   --   --      ABG:  Lab Results   Component Value Date    POCPH 7.429 10/03/2021    PH 7.44 03/30/2016    POCPCO2 37.1 10/03/2021    PCO2 36 03/30/2016    POCPO2 86.0 10/03/2021    PO2 64 03/30/2016    POCHCO3 24.5 10/03/2021    HCO3 24 03/30/2016    NBEA NOT REPORTED 10/03/2021    PBEA 0 10/03/2021    LPA8GFN NOT REPORTED 10/03/2021    YMDJ6WLI 97 10/03/2021    O2SAT 93 03/30/2016    FIO2 40.0 10/03/2021     Lab Results   Component Value Date/Time    SPECIAL NOT REPORTED 11/05/2021 08:32 AM     Lab Results   Component Value Date/Time    CULTURE (A) 11/05/2021 08:32 AM     NON LACTOSE FERMENTING GRAM NEGATIVE RODS >[id number] CFU/ML       Radiology:  XR CHEST PORTABLE    Result Date: 11/3/2021  No change from prior study. Persistent sizable left effusion with opacity at the left lung base and pulmonary vascular congestion. US THORACENTESIS Which side should the procedure be performed? Left    Result Date: 11/4/2021  Successful ultrasound guided thoracentesis. US RETROPERITONEAL COMPLETE    Result Date: 11/4/2021  1. Patient had no urge to void during the exam.  Nonvisualization of ureteral jets. 2. Incidental cholelithiasis. 3. Unremarkable renal ultrasound. No hydronephrosis.        Physical Examination:        General appearance:  alert, cooperative and no distress  Mental Status:  oriented to person, place and time and normal affect  Lungs:  clear to auscultation bilaterally, normal effort  Heart:  regular rate and rhythm, no murmur  Abdomen:  soft, nontender, nondistended, normal bowel sounds, no masses, hepatomegaly, splenomegaly  Extremities:  no edema, redness, tenderness in the calves  Skin:  no gross lesions, rashes, induration    Assessment:        Hospital Problems           Last Modified POA    * (Principal) CASEY (acute kidney injury) ([facility] [address] 11/3/2021 Yes    Benign essential HTN 11/3/2021 Yes    Morbid obesity with BMI of 40.0-44.9, adult ([facility] [address] 11/4/2021 Yes    Overview Signed 10/3/2021 12:43 PM by Ashley Spain MD     Last Assessment & Plan:   Formatting of this note might be different from the original.  Obesity is worsening. Discussed the patient's BMI. The BMI is above average; BMI management plan is completed. General weight loss/lifestyle modification strategies discussed (elicit support from others; identify saboteurs; non-food rewards, etc). Pulmonary emphysema ([facility] [address] 11/3/2021 Yes    Acute on chronic diastolic heart failure ([facility] [address] 11/4/2021 Yes    Chronic obstructive pulmonary disease with acute exacerbation ([address] 11/3/2021 Yes    Overview Signed 10/3/2021 12:43 PM by Ashley Spain MD     Last Assessment & Plan:   Formatting of this note might be different from the original.  COPD is worsening. Discussed monitoring symptoms and use of quick-relief medications and contacting us early in the course of exacerbations. Warning signs of respiratory distress were reviewed with the patient. Counseled to avoid exposure to cigarette smoke. Persistent atrial fibrillation ([address] 11/4/2021 Yes    Recurrent pleural effusion 11/3/2021 Yes          Plan:        1. CASEYcontinue diuresis, nephrology following  2. Chronic respiratory failurecurrently at home O2, history of chronic diastolic heart failure and COPD  3. Pleural fluidstatus post thoracentesis, pulmonary following  4. COPD  5. Diastolic heart failure  6. Morbid obesity  7. Atrial fibrillation  8. Hypertension  9. Plan:  1. PT OT  2.  Start Rocephin for urinary tract infection, await sensitivities  3. Pulmonary and nephrology following  4.  Discharge planning    Kathy Reynolds DO  11/6/2021  2:21 PM

## 2021-11-06 NOTE — PROGRESS NOTES
Medications Prior to Admission: amiodarone (CORDARONE) 200 MG tablet, Take 1 tablet by mouth daily  metoprolol tartrate (LOPRESSOR) 50 MG tablet, Take 1 tablet by mouth 2 times daily  Calamine 8-8 % LOTN lotion, Apply to rash on legs  miconazole (MICOTIN) 2 % powder, Apply topically 2 times daily. simethicone (MYLICON) 80 MG chewable tablet, Take 1 tablet by mouth every 6 hours as needed for Flatulence (Abdominal Cramps)  clonazePAM (KLONOPIN) 1 MG tablet, Take 1 tablet by mouth nightly for 3 days. apixaban (ELIQUIS) 5 MG TABS tablet, Take 1 tablet by mouth 2 times daily  atorvastatin (LIPITOR) 40 MG tablet, Take 1 tablet by mouth daily  citalopram (CELEXA) 40 MG tablet, Take 0.5 tablets by mouth daily  losartan (COZAAR) 100 MG tablet, Take 1 tablet by mouth daily  meloxicam (MOBIC) 15 MG tablet, Take 15 mg by mouth daily  clopidogrel (PLAVIX) 75 MG tablet, Take 1 tablet by mouth daily  cyclobenzaprine (FLEXERIL) 10 MG tablet, TAKE ONE TABLET BY MOUTH THREE TIMES DAILY AS NEEDED FOR MUSCLE SPASM  diphenhydrAMINE (BENADRYL) 25 MG capsule, Take 25 mg by mouth every 6 hours as needed for Itching  zinc oxide (DESITIN) 40 % ointment, Apply topically as needed for Dry Skin Apply topically as needed.   furosemide (LASIX) 20 MG tablet, Take 1 tablet by mouth daily  isosorbide mononitrate (IMDUR) 30 MG extended release tablet, Take 1 tablet by mouth daily  albuterol sulfate  (90 Base) MCG/ACT inhaler, Inhale 2 puffs into the lungs every 6 hours as needed for Wheezing  Calcium Carb-Cholecalciferol (CALCIUM + D3 PO), Take by mouth daily  CINNAMON PO, Take by mouth daily  TURMERIC CURCUMIN PO, Take by mouth daily  ipratropium-albuterol (DUONEB) 0.5-2.5 (3) MG/3ML SOLN nebulizer solution, Inhale 1 vial into the lungs every 4 hours  beclomethasone (QVAR) 80 MCG/ACT inhaler, Inhale 2 puffs into the lungs 2 times daily  LYSINE PO, Take by mouth daily  vitamin D (CHOLECALCIFEROL) 1000 UNIT TABS tablet, Take 1,000 Units by mouth daily  Pyridoxine HCl (VITAMIN B-6) 50 MG tablet, Take 100 mg by mouth daily  Cyanocobalamin (VITAMIN B 12 PO), Take by mouth daily  Magnesium 500 MG TABS, Take by mouth  Ascorbic Acid (VITAMIN C) 500 MG tablet, Take 500 mg by mouth daily    INVESTIGATIONS     Last 3 CMP:    Recent Labs     11/03/21  1641 11/03/21  1641 11/04/21  0533 11/04/21  0533 11/04/21  1816 11/05/21  0645 11/06/21  0520   *   < > 128*   < > 126* 133* 130*   K 4.6   < > 5.3   < > 5.1 5.2 4.5   CL 92*   < > 94*   < > 91* 96* 94*   CO2 26   < > 22   < > 22 24 22   BUN 26*   < > 27*   < > 34* 38* 48*   CREATININE 1.65*   < > 1.52*   < > 1.52* 1.72* 1.46*   CALCIUM 8.9   < > 8.6   < > 8.9 8.7 8.7   PROT 6.1*  --  6.1*  --   --  6.2*  --    LABALBU 3.0*  --  2.6*  --   --   --  2.7*   BILITOT 1.40*  --  0.83  --   --   --   --    ALKPHOS 72  --  71  --   --   --   --    AST 14  --  13  --   --   --   --    ALT 12  --  12  --   --   --   --     < > = values in this interval not displayed. Last 3 CBC:  Recent Labs     11/03/21  1641 11/04/21  0533 11/06/21  0520   WBC 14.5* 10.1 14.8*   RBC 3.68* 3.50* 3.79*   HGB 11.5* 11.3* 11.5*   HCT 36.8 34.8* 36.7   .0 99.4 96.8   MCH 31.3 32.3 30.3   MCHC 31.3 32.5 31.3   RDW 13.2 13.1 13.0    341 373   MPV 9.7 9.8 9.5       ASSESSMENT     1. Acute Kidney Injury likely pre-renal secondary to reduced E ABV from CHF and worsen further by concomitant nonsteroidal anti-inflammatory agent use on a chronic basis -improving  Baseline creatinine normal  2. HFpEF   3. Dilutional hyponatremia  4. Persistant left sided pleural effusion status post thoracocentesis  5. Urinary tract infection  6. A.fib s/p CURT cardioversion    PLAN     P.o.  Bumex  1 dose of [place]  Recheck electrolytes in the evening  Fluid restriction  Recommend antibiotics for UTI  Hopeful discharge tomorrow    Please do not hesitate to call with questions    This note is created with the assistance of patsy speech-recognition program. While intending to generate a document that actually reflects the content of the visit, no guarantees can be provided that every mistake has been identified and corrected by editing    Kalie Edgar MD MD, [facility] Sheri Ayala   11/6/2021 9:20 AM  [facility]

## 2021-11-07 LAB
ALBUMIN SERPL-MCNC: 3 G/DL (ref 3.5–5.2)
ANION GAP SERPL CALCULATED.3IONS-SCNC: 11 MMOL/L (ref 9–17)
BUN BLDV-MCNC: 38 MG/DL (ref 8–23)
BUN/CREAT BLD: ABNORMAL (ref 9–20)
CALCIUM SERPL-MCNC: 8.7 MG/DL (ref 8.6–10.4)
CHLORIDE BLD-SCNC: 95 MMOL/L (ref 98–107)
CHOLESTEROL FLUID: 67 MG/DL
CO2: 27 MMOL/L (ref 20–31)
CREAT SERPL-MCNC: 0.99 MG/DL (ref 0.5–0.9)
CULTURE: ABNORMAL
CULTURE: ABNORMAL
GFR AFRICAN AMERICAN: >60 ML/MIN
GFR NON-AFRICAN AMERICAN: 55 ML/MIN
GFR SERPL CREATININE-BSD FRML MDRD: ABNORMAL ML/MIN/{1.73_M2}
GFR SERPL CREATININE-BSD FRML MDRD: ABNORMAL ML/MIN/{1.73_M2}
GLUCOSE BLD-MCNC: 101 MG/DL (ref 70–99)
HCT VFR BLD CALC: 36.4 % (ref 36.3–47.1)
HEMOGLOBIN: 11.6 G/DL (ref 11.9–15.1)
Lab: ABNORMAL
MAGNESIUM: 1.6 MG/DL (ref 1.6–2.6)
MCH RBC QN AUTO: 30.6 PG (ref 25.2–33.5)
MCHC RBC AUTO-ENTMCNC: 31.9 G/DL (ref 28.4–34.8)
MCV RBC AUTO: 96 FL (ref 82.6–102.9)
NRBC AUTOMATED: 0 PER 100 WBC
PDW BLD-RTO: 13.2 % (ref 11.8–14.4)
PHOSPHORUS: 3.5 MG/DL (ref 2.6–4.5)
PLATELET # BLD: 345 K/UL (ref 138–453)
PMV BLD AUTO: 9.1 FL (ref 8.1–13.5)
POTASSIUM SERPL-SCNC: 4.5 MMOL/L (ref 3.7–5.3)
RBC # BLD: 3.79 M/UL (ref 3.95–5.11)
SODIUM BLD-SCNC: 133 MMOL/L (ref 135–144)
SOURCE: NORMAL
SPECIMEN DESCRIPTION: ABNORMAL
WBC # BLD: 14.6 K/UL (ref 3.5–11.3)

## 2021-11-07 PROCEDURE — 2500000003 HC RX 250 WO HCPCS: Performed by: STUDENT IN AN ORGANIZED HEALTH CARE EDUCATION/TRAINING PROGRAM

## 2021-11-07 PROCEDURE — 80069 RENAL FUNCTION PANEL: CPT

## 2021-11-07 PROCEDURE — 2500000003 HC RX 250 WO HCPCS: Performed by: NURSE PRACTITIONER

## 2021-11-07 PROCEDURE — 99232 SBSQ HOSP IP/OBS MODERATE 35: CPT | Performed by: INTERNAL MEDICINE

## 2021-11-07 PROCEDURE — 36415 COLL VENOUS BLD VENIPUNCTURE: CPT

## 2021-11-07 PROCEDURE — 6370000000 HC RX 637 (ALT 250 FOR IP): Performed by: NURSE PRACTITIONER

## 2021-11-07 PROCEDURE — 6370000000 HC RX 637 (ALT 250 FOR IP): Performed by: INTERNAL MEDICINE

## 2021-11-07 PROCEDURE — 2060000000 HC ICU INTERMEDIATE R&B

## 2021-11-07 PROCEDURE — 2700000000 HC OXYGEN THERAPY PER DAY

## 2021-11-07 PROCEDURE — 94640 AIRWAY INHALATION TREATMENT: CPT

## 2021-11-07 PROCEDURE — 2580000003 HC RX 258: Performed by: NURSE PRACTITIONER

## 2021-11-07 PROCEDURE — 99233 SBSQ HOSP IP/OBS HIGH 50: CPT | Performed by: INTERNAL MEDICINE

## 2021-11-07 PROCEDURE — 94664 DEMO&/EVAL PT USE INHALER: CPT

## 2021-11-07 PROCEDURE — 83735 ASSAY OF MAGNESIUM: CPT

## 2021-11-07 PROCEDURE — 85027 COMPLETE CBC AUTOMATED: CPT

## 2021-11-07 PROCEDURE — 94761 N-INVAS EAR/PLS OXIMETRY MLT: CPT

## 2021-11-07 PROCEDURE — 94660 CPAP INITIATION&MGMT: CPT

## 2021-11-07 RX ORDER — METOPROLOL TARTRATE 5 MG/5ML
5 INJECTION INTRAVENOUS ONCE
Status: COMPLETED | OUTPATIENT
Start: 2021-11-07 | End: 2021-11-07

## 2021-11-07 RX ORDER — CIPROFLOXACIN 500 MG/1
500 TABLET, FILM COATED ORAL EVERY 12 HOURS SCHEDULED
Status: COMPLETED | OUTPATIENT
Start: 2021-11-07 | End: 2021-11-12

## 2021-11-07 RX ORDER — BUMETANIDE 0.25 MG/ML
1 INJECTION, SOLUTION INTRAMUSCULAR; INTRAVENOUS ONCE
Status: COMPLETED | OUTPATIENT
Start: 2021-11-07 | End: 2021-11-07

## 2021-11-07 RX ADMIN — FLUTICASONE PROPIONATE 1 PUFF: 110 AEROSOL, METERED RESPIRATORY (INHALATION) at 12:49

## 2021-11-07 RX ADMIN — FLUTICASONE PROPIONATE 1 PUFF: 110 AEROSOL, METERED RESPIRATORY (INHALATION) at 19:55

## 2021-11-07 RX ADMIN — METOPROLOL TARTRATE 50 MG: 50 TABLET, FILM COATED ORAL at 20:14

## 2021-11-07 RX ADMIN — CITALOPRAM 20 MG: 20 TABLET, FILM COATED ORAL at 08:00

## 2021-11-07 RX ADMIN — CLOPIDOGREL 75 MG: 75 TABLET, FILM COATED ORAL at 08:00

## 2021-11-07 RX ADMIN — APIXABAN 5 MG: 5 TABLET, FILM COATED ORAL at 20:14

## 2021-11-07 RX ADMIN — CIPROFLOXACIN 500 MG: 500 TABLET, FILM COATED ORAL at 21:12

## 2021-11-07 RX ADMIN — IPRATROPIUM BROMIDE AND ALBUTEROL SULFATE 1 AMPULE: .5; 2.5 SOLUTION RESPIRATORY (INHALATION) at 11:48

## 2021-11-07 RX ADMIN — METOPROLOL TARTRATE 50 MG: 50 TABLET, FILM COATED ORAL at 08:00

## 2021-11-07 RX ADMIN — DESMOPRESSIN ACETATE 40 MG: 0.2 TABLET ORAL at 08:00

## 2021-11-07 RX ADMIN — IPRATROPIUM BROMIDE AND ALBUTEROL SULFATE 1 AMPULE: .5; 2.5 SOLUTION RESPIRATORY (INHALATION) at 08:19

## 2021-11-07 RX ADMIN — CYCLOBENZAPRINE 5 MG: 10 TABLET, FILM COATED ORAL at 21:13

## 2021-11-07 RX ADMIN — SODIUM CHLORIDE, PRESERVATIVE FREE 10 ML: 5 INJECTION INTRAVENOUS at 20:14

## 2021-11-07 RX ADMIN — BUMETANIDE 1 MG: 0.25 INJECTION INTRAMUSCULAR; INTRAVENOUS at 14:10

## 2021-11-07 RX ADMIN — METOPROLOL TARTRATE 5 MG: 1 INJECTION, SOLUTION INTRAVENOUS at 21:53

## 2021-11-07 RX ADMIN — BUMETANIDE 1 MG: 1 TABLET ORAL at 08:00

## 2021-11-07 RX ADMIN — AMIODARONE HYDROCHLORIDE 200 MG: 200 TABLET ORAL at 08:00

## 2021-11-07 RX ADMIN — SODIUM CHLORIDE, PRESERVATIVE FREE 10 ML: 5 INJECTION INTRAVENOUS at 08:00

## 2021-11-07 RX ADMIN — ISOSORBIDE MONONITRATE 30 MG: 30 TABLET ORAL at 08:00

## 2021-11-07 RX ADMIN — APIXABAN 5 MG: 5 TABLET, FILM COATED ORAL at 08:00

## 2021-11-07 RX ADMIN — OXYCODONE HYDROCHLORIDE AND ACETAMINOPHEN 500 MG: 500 TABLET ORAL at 08:00

## 2021-11-07 RX ADMIN — IPRATROPIUM BROMIDE AND ALBUTEROL SULFATE 1 AMPULE: .5; 2.5 SOLUTION RESPIRATORY (INHALATION) at 19:54

## 2021-11-07 ASSESSMENT — ENCOUNTER SYMPTOMS
GASTROINTESTINAL NEGATIVE: 1
ALLERGIC/IMMUNOLOGIC NEGATIVE: 1
EYES NEGATIVE: 1
SHORTNESS OF BREATH: 1

## 2021-11-07 NOTE — PLAN OF CARE
Problem: Falls - Risk of:  Goal: Will remain free from falls  Description: Will remain free from falls  11/7/2021 1826 by Grecia Burr RN  Outcome: Ongoing  11/7/2021 0753 by Fran Kong RN  Outcome: Ongoing  Goal: Absence of physical injury  Description: Absence of physical injury  11/7/2021 1826 by Grecia Burr RN  Outcome: Ongoing  11/7/2021 0753 by Fran Kong RN  Outcome: Ongoing     Problem: Skin Integrity:  Goal: Will show no infection signs and symptoms  Description: Will show no infection signs and symptoms  11/7/2021 1826 by Grecia Burr RN  Outcome: Ongoing  11/7/2021 0753 by Fran Kong RN  Outcome: Ongoing  Goal: Absence of new skin breakdown  Description: Absence of new skin breakdown  11/7/2021 1826 by Grecia Burr RN  Outcome: Ongoing  11/7/2021 0753 by Fran Kong RN  Outcome: Ongoing     Problem: OXYGENATION/RESPIRATORY FUNCTION  Goal: Patient will maintain patent airway  11/7/2021 1826 by Grecia Burr RN  Outcome: Ongoing  11/7/2021 0753 by Fran Kong RN  Outcome: Ongoing  Goal: Patient will achieve/maintain normal respiratory rate/effort  Description: Respiratory rate and effort will be within normal limits for the patient  11/7/2021 1826 by Grecia Burr RN  Outcome: Ongoing  11/7/2021 0753 by Fran Kong RN  Outcome: Ongoing     Problem: HEMODYNAMIC STATUS  Goal: Patient has stable vital signs and fluid balance  11/7/2021 1826 by Grecia Burr RN  Outcome: Ongoing  11/7/2021 0753 by Fran Kong RN  Outcome: Ongoing     Problem: FLUID AND ELECTROLYTE IMBALANCE  Goal: Fluid and electrolyte balance are achieved/maintained  11/7/2021 1826 by Grecia Burr RN  Outcome: Ongoing  11/7/2021 0753 by Fran Kong RN  Outcome: Ongoing     Problem: ACTIVITY INTOLERANCE/IMPAIRED MOBILITY  Goal: Mobility/activity is maintained at optimum level for patient  11/7/2021 1826 by Grecia Burr RN  Outcome: Ongoing  11/7/2021 0753 by Fran Kong RN  Outcome: Ongoing     Problem: Nutrition  Goal: Optimal nutrition therapy  11/7/2021 1826 by Glenn Contreras RN  Outcome: Ongoing  11/7/2021 0753 by Garrick Gutierrez RN  Outcome: Ongoing     Problem: Pain:  Goal: Pain level will decrease  Description: Pain level will decrease  11/7/2021 1826 by Glenn Contreras RN  Outcome: Ongoing  11/7/2021 0753 by Garrick Gutierrez RN  Outcome: Ongoing  Goal: Control of acute pain  Description: Control of acute pain  11/7/2021 1826 by Glenn Contreras RN  Outcome: Ongoing  11/7/2021 0753 by Garrick Gutierrez RN  Outcome: Ongoing  Goal: Control of chronic pain  Description: Control of chronic pain  11/7/2021 1826 by Glenn Contreras RN  Outcome: Ongoing  11/7/2021 0753 by Garrick Gutierrez RN  Outcome: Ongoing

## 2021-11-07 NOTE — PROGRESS NOTES
NEPHROLOGY PROGRESS NOTE      SUBJECTIVE     Patient seen and examined bedside. Hemodynamically stable. Afebrile. No acute events overnight. Creatinine trending down, back to baseline, creatinine 0.99 this morning. Patient explained shortness of breath this morning but currently feels better after breathing treatments. Sodium is improved to 133 from 131  Urine output 600 mL in last 24 hours. Urine cultures positive for Pseudomonas, currently receiving Rocephin    OBJECTIVE     Vitals:    11/07/21 0130 11/07/21 0436 11/07/21 0755 11/07/21 0819   BP:  129/78 (!) 145/75    Pulse: 93 81 96    Resp: 23 20 30 26   Temp:   97.9 °F (36.6 °C)    TempSrc:   Oral    SpO2: 97% 98% 94%    Weight:       Height:         24HR INTAKE/OUTPUT:      Intake/Output Summary (Last 24 hours) at 11/7/2021 1031  Last data filed at 11/7/2021 0830  Gross per 24 hour   Intake 240 ml   Output 600 ml   Net -360 ml       General appearance:Awake, alert, in no acute distress  HEENT: PERRLA  Respiratory::vesicular breath sounds, reduced air entry  Cardiovascular:S1 S2 normal,no gallop or organic murmur. Abdomen:Non tender/non distended. Bowel sounds present  Extremities: No Cyanosis or Clubbing,Lower extremity edema  Neurological:Alert and oriented. No abnormalities of mood, affect, memory, mentation, or behavior are noted      MEDICATIONS     Scheduled Meds:    bumetanide  1 mg Oral Daily    cefTRIAXone (ROCEPHIN) IV  1,000 mg IntraVENous Q24H    metoprolol tartrate  50 mg Oral BID    ipratropium-albuterol  1 ampule Inhalation TID    amiodarone  200 mg Oral Daily    apixaban  5 mg Oral BID    vitamin C  500 mg Oral Daily    atorvastatin  40 mg Oral Daily    fluticasone  1 puff Inhalation BID    citalopram  20 mg Oral Daily    clopidogrel  75 mg Oral Daily    isosorbide mononitrate  30 mg Oral Daily    sodium chloride flush  5-40 mL IntraVENous 2 times per day     Continuous Infusions:    sodium chloride       PRN Meds:  cyclobenzaprine, sodium chloride flush, sodium chloride, ondansetron **OR** ondansetron, acetaminophen **OR** acetaminophen  Home Meds:                Medications Prior to Admission: amiodarone (CORDARONE) 200 MG tablet, Take 1 tablet by mouth daily  metoprolol tartrate (LOPRESSOR) 50 MG tablet, Take 1 tablet by mouth 2 times daily  Calamine 8-8 % LOTN lotion, Apply to rash on legs  miconazole (MICOTIN) 2 % powder, Apply topically 2 times daily. simethicone (MYLICON) 80 MG chewable tablet, Take 1 tablet by mouth every 6 hours as needed for Flatulence (Abdominal Cramps)  clonazePAM (KLONOPIN) 1 MG tablet, Take 1 tablet by mouth nightly for 3 days. apixaban (ELIQUIS) 5 MG TABS tablet, Take 1 tablet by mouth 2 times daily  atorvastatin (LIPITOR) 40 MG tablet, Take 1 tablet by mouth daily  citalopram (CELEXA) 40 MG tablet, Take 0.5 tablets by mouth daily  losartan (COZAAR) 100 MG tablet, Take 1 tablet by mouth daily  meloxicam (MOBIC) 15 MG tablet, Take 15 mg by mouth daily  clopidogrel (PLAVIX) 75 MG tablet, Take 1 tablet by mouth daily  cyclobenzaprine (FLEXERIL) 10 MG tablet, TAKE ONE TABLET BY MOUTH THREE TIMES DAILY AS NEEDED FOR MUSCLE SPASM  diphenhydrAMINE (BENADRYL) 25 MG capsule, Take 25 mg by mouth every 6 hours as needed for Itching  zinc oxide (DESITIN) 40 % ointment, Apply topically as needed for Dry Skin Apply topically as needed.   furosemide (LASIX) 20 MG tablet, Take 1 tablet by mouth daily  isosorbide mononitrate (IMDUR) 30 MG extended release tablet, Take 1 tablet by mouth daily  albuterol sulfate  (90 Base) MCG/ACT inhaler, Inhale 2 puffs into the lungs every 6 hours as needed for Wheezing  Calcium Carb-Cholecalciferol (CALCIUM + D3 PO), Take by mouth daily  CINNAMON PO, Take by mouth daily  TURMERIC CURCUMIN PO, Take by mouth daily  ipratropium-albuterol (DUONEB) 0.5-2.5 (3) MG/3ML SOLN nebulizer solution, Inhale 1 vial into the lungs every 4 hours  beclomethasone (QVAR) 80 MCG/ACT inhaler, Inhale 2 puffs into the lungs 2 times daily  LYSINE PO, Take by mouth daily  vitamin D (CHOLECALCIFEROL) 1000 UNIT TABS tablet, Take 1,000 Units by mouth daily  Pyridoxine HCl (VITAMIN B-6) 50 MG tablet, Take 100 mg by mouth daily  Cyanocobalamin (VITAMIN B 12 PO), Take by mouth daily  Magnesium 500 MG TABS, Take by mouth  Ascorbic Acid (VITAMIN C) 500 MG tablet, Take 500 mg by mouth daily    INVESTIGATIONS     Last 3 CMP:    Recent Labs     11/05/21  0645 11/05/21  0645 11/06/21  0520 11/06/21  1841 11/07/21  0635   *   < > 130* 131* 133*   K 5.2   < > 4.5 4.4 4.5   CL 96*   < > 94* 93* 95*   CO2 24   < > 22 22 27   BUN 38*  --  48*  --  38*   CREATININE 1.72*  --  1.46*  --  0.99*   CALCIUM 8.7  --  8.7  --  8.7   PROT 6.2*  --   --   --   --    LABALBU  --   --  2.7*  --  3.0*    < > = values in this interval not displayed. Last 3 CBC:  Recent Labs     11/06/21  0520 11/07/21  0635   WBC 14.8* 14.6*   RBC 3.79* 3.79*   HGB 11.5* 11.6*   HCT 36.7 36.4   MCV 96.8 96.0   MCH 30.3 30.6   MCHC 31.3 31.9   RDW 13.0 13.2    345   MPV 9.5 9.1       ASSESSMENT   1. Acute Kidney Injury likely pre-renal secondary to reduced E ABV from CHF and worsen further by concomitant nonsteroidal anti-inflammatory agent use on a chronic basis -improving  Baseline creatinine normal  2. HFpEF   3. Dilutional hyponatremia  4. Persistant left sided pleural effusion status post thoracocentesis  5. A.fib s/p CURT cardioversion  6. UTI    PLAN     We will give another dose of Bumex 1mg IV  Fluid restriction  Continue PO Bumex      Please do not hesitate to call with questions    Alejandro Chowdary MD  Internal Medicine Resident, PGY-2  [facility]  11/7/2021,10:32 AM  Attending Physician Statement  I have discussed the care of Sahd Rivero, including pertinent history and exam findings,  with the Fellow/Residentt.  I have reviewed the key elements of all parts of the encounter with the Fellow/ Resident. I agree with the assessment, plan and orders as documented by the resident.     Deep Russell MD MD, MRCP Reyes Zuñiga, FACP   11/7/2021 1:05 PM    Nephrology [address] [place] [address]

## 2021-11-07 NOTE — PROGRESS NOTES
PULMONARY & CRITICAL CARE MEDICINE PROGRESS NOTE     Patient:  Tereza Suarez  MRN: [id number]  Admit date: 11/3/2021  Primary Care Physician: KALYN Cooley - CNP  Consulting Physician: Frank Simmonds, DO  CODE Status: Full Code  LOS: 4     SUBJECTIVE     Chief Complaint/ Reason for consult:  Left pleural effusion    Hospital Course: The patient is a 68 y.o. female here for evaluation of shortness of breath with clear sputum, increased leg swelling. Patient has chronic shortness of breath which has been worsening over the last couple of days along with increased swelling of her lower extremity. She has conversational dyspnea, no wheezing. Chest x-ray shows left pleural effusion and left atelectasis she has had multiple thoracentesis. Complains of abdominal bloating, orthopnea, palpitations. She was found to be in A. fib with heart rate in 110s. Interval History:  11/07/21    Patient was short of breathe this am was wheezing which improved with bronchodilator. Pt was seen and examined at bedside. Saturating appropriately at 5 liter via nasal canula. Vitals stable. Labs reviewed. Urine culture positive for  pesudomonas  Pleural fluid appears exudative with cholestrol in pleural fluid was 67,  and protein 3.4. negative for bacteria. Review Of Systems:  Review of Systems   Constitutional: Negative. HENT: Negative. Eyes: Negative. Respiratory: Positive for shortness of breath. Cardiovascular: Negative. Gastrointestinal: Negative. Endocrine: Negative. Genitourinary: Negative. Musculoskeletal: Negative. Skin: Negative. Allergic/Immunologic: Negative. Psychiatric/Behavioral: Negative. OBJECTIVE     PaO2/FiO2 RATIO:  No results for input(s): POCPO2 in the last 72 hours.          VITAL SIGNS:   LAST:  /65   Pulse 101   Temp 98.4 °F (36.9 °C) (Oral)   Resp 24   Ht 5' 4\" (1.626 m)   Wt 256 lb (116.1 kg)   SpO2 96%   BMI 43.94 kg/m²   8-24 HR RANGE:  TEMP Temp  Av.3 °F (36.8 °C)  Min: 97.9 °F (36.6 °C)  Max: 98.6 °F (37 °C)   BP Systolic (78YJG), XUZ:482 , Min:109 , ZNY:868      Diastolic (66FTE), DFW:24, Min:58, Max:97     PULSE Pulse  Av.1  Min: 81  Max: 133   RR Resp  Av.7  Min: 24  Max: 30   O2 SAT SpO2  Av %  Min: 94 %  Max: 96 %   OXYGEN DELIVERY O2 Flow Rate (L/min)  Av.3 L/min  Min: 4 L/min  Max: 5 L/min        Systemic Examination:   Physical Exam -  Constitutional:  Alert, cooperative and no distress. Mental Status:  Oriented to person, place and time and normal affect. Lungs:  Bilateral air entry present decreased in left side, lung fields clear. Normal effort. Heart:  Regular rate and rhythm, no murmur. Abdomen:  Soft, nontender, nondistended, normal bowel sounds. Extremities:  Bilateral lower extremity edema, negative for redness, tenderness in the calves. Skin:  Warm, dry, no gross lesions or rashes.     DATA REVIEW     Medications: Current Inpatient  Scheduled Meds:   ciprofloxacin  500 mg Oral 2 times per day    bumetanide  1 mg Oral Daily    metoprolol tartrate  50 mg Oral BID    ipratropium-albuterol  1 ampule Inhalation TID    amiodarone  200 mg Oral Daily    apixaban  5 mg Oral BID    vitamin C  500 mg Oral Daily    atorvastatin  40 mg Oral Daily    fluticasone  1 puff Inhalation BID    citalopram  20 mg Oral Daily    clopidogrel  75 mg Oral Daily    isosorbide mononitrate  30 mg Oral Daily    sodium chloride flush  5-40 mL IntraVENous 2 times per day     Continuous Infusions:   sodium chloride         INPUT/OUTPUT:  In: 960 [P.O.:960]  Out: 625 [Urine:625]  Date 21 - 21   Shift 9319-6375 1027-9978 9937-7769 24 Hour Total   INTAKE   P.O.(mL/kg/hr)  960  960   Shift Total(mL/kg)  960(8.3)  960(8.3)   OUTPUT   Urine(mL/kg/hr)  625  625   Shift Total(mL/kg)  625(5.4)  625(5.4)   Weight (kg) 116.1 116.1 116.1 116.1        LABS:  ABGs:   No results for input(s): POCPH, POCPCO2, POCPO2, POCHCO3, QZIV9KCF in the last 72 hours. CBC:   Recent Labs     11/06/21 0520 11/07/21  0635   WBC 14.8* 14.6*   HGB 11.5* 11.6*   HCT 36.7 36.4   MCV 96.8 96.0    345   RBC 3.79* 3.79*   MCH 30.3 30.6   MCHC 31.3 31.9   RDW 13.0 13.2     CRP:   No results for input(s): CRP in the last 72 hours. LDH:   Recent Labs     11/05/21 0645   *     BMP:   Recent Labs     11/04/21  1816 11/05/21 0645 11/06/21 0520 11/06/21  1841 11/07/21  0635   * 133* 130* 131* 133*   K 5.1 5.2 4.5 4.4 4.5   CL 91* 96* 94* 93* 95*   CO2 22 24 22 22 27   BUN 34* 38* 48*  --  38*   CREATININE 1.52* 1.72* 1.46*  --  0.99*   GLUCOSE 195* 141* 104*  --  101*   PHOS  --   --  3.6  --  3.5     Liver Function Test:   Recent Labs     11/05/21 0645 11/06/21 0520 11/07/21 0635   PROT 6.2*  --   --    LABALBU  --  2.7* 3.0*     Coagulation Profile:   No results for input(s): INR, PROTIME, APTT in the last 72 hours. D-Dimer:  No results for input(s): DDIMER in the last 72 hours. Lactic Acid:  No results for input(s): LACTA in the last 72 hours. Cardiac Enzymes:  No results for input(s): CKTOTAL, CKMB, CKMBINDEX, TROPONINI in the last 72 hours. Invalid input(s): TROPONIN, HSTROP  BNP/ProBNP:   No results for input(s): BNP, PROBNP in the last 72 hours. Triglycerides:  No results for input(s): TRIG in the last 72 hours. Microbiology:  Urine Culture:  No components found for: CURINE  Blood Culture:  No components found for: CBLOOD, CFUNGUSBL  Sputum Culture:  No components found for: CSPUTUM  Recent Labs     11/05/21  0832   SPECDESC . URINE   SPECIAL NOT REPORTED   CULTURE PSEUDOMONAS AERUGINOSA >859771 CFU/ML*  LACTOSE FERMENTING GRAM NEGATIVE RODS 10 to 50,000 CFU/ML Susceptibility testing not performed on low colony count organisms. *     Recent Labs     11/04/21  1531 11/05/21  0832   SPECDESC . PLEURAL FLUID . URINE   SPECIAL NOT REPORTED NOT REPORTED   CULTURE NO GROWTH 3 DAYS PSEUDOMONAS AERUGINOSA >548199 CFU/ML*  LACTOSE FERMENTING GRAM NEGATIVE RODS 10 to 50,000 CFU/ML Susceptibility testing not performed on low colony count organisms. *        Pathology:    Radiology Reports:  XR CHEST (SINGLE VIEW FRONTAL)   Final Result   Interval improved aeration of the left lung base. XR CHEST (SINGLE VIEW FRONTAL)   Final Result   Interval decrease in left effusion. No pneumothorax. Persistent opacity at   the left lung base. Vascularity is slightly prominent. US THORACENTESIS Which side should the procedure be performed? Left   Final Result   Successful ultrasound guided thoracentesis. US RETROPERITONEAL COMPLETE   Final Result   1. Patient had no urge to void during the exam.  Nonvisualization of ureteral   jets. 2. Incidental cholelithiasis. 3. Unremarkable renal ultrasound. No hydronephrosis. XR CHEST PORTABLE   Final Result   No change from prior study. Persistent sizable left effusion with opacity at   the left lung base and pulmonary vascular congestion.               Echocardiogram:   Results for orders placed during the hospital encounter of 10/02/21    ECHO Complete 2D W Doppler W Color    Narrative  Transthoracic Echocardiography Report (TTE)    Patient Name MCLEAN   Date of Study               10/03/2021  1 [facility]    Date of      1945  Gender                      Female  Birth    Age          68 year(s)  Race                            Room Number  0124        Height:                     64 inch, 162.56 cm    Corporate ID [id number]    Weight:                     272 pounds, 123.4 kg  #    Patient Acct [de-identified]   BSA:          2.23 m^2      BMI:      46.69  #                                                              kg/m^2    MR #         [id number]     BriseidaToi Casanova    Accession #  [id number]  Interpreting Physician      Margarita Handy    Fellow                   Referring Nurse  Practitioner    Interpreting Referring Physician         Rick Deshpande MD  Fellow    Additional Comments  Technically difficult study due to body habitus    Type of Study    TTE procedure:2D Echocardiogram, M-Mode, Doppler, Color Doppler. Procedure Date  Date: 10/03/2021 Start: 01:22 PM    Study Location: [facility]    Brad / Adriane Lugo. Comments:  COPD, Pneumonia, Obesity,    Patient Status: Inpatient    Height: 64 inches Weight: 272.01 pounds BSA: 2.23 m^2 BMI: 46.69 kg/m^2    CONCLUSIONS    Summary  Left ventricle is normal in size with normal systolic function globally. Calculated ejection fraction is 59% ( Atrial-Fib). Mild mitral regurgitation. Mild tricuspid regurgitation. Estimated right ventricular systolic pressure is 35 mmHg. Anterior clear space noted, most likely adipose tissue vs small loculated  pericardial effusion. No evidence of tamponade. Pleural effusion is seen. Signature  ----------------------------------------------------------------------------  Electronically signed by Rogena Gosselin, RoseMary(Sonographer) on 10/03/2021 02:13  PM  ----------------------------------------------------------------------------    ----------------------------------------------------------------------------  Electronically signed by Margarita Handy(Interpreting physician) on  10/03/2021 03:50 PM  ----------------------------------------------------------------------------  FINDINGS  Left Atrium  Left atrium is moderately dilated. Left Ventricle  Left ventricle is normal in size with normal systolic function globally. Calculated ejection fraction is 59% ( Atrial-Fib)  Right Atrium  Right atrial dilatation. Right Ventricle  Dilated right ventricle with systolic function within the range of normal.  Mitral Valve  Mitral valve structure is normal.  Mild mitral regurgitation. Aortic Valve  Aortic valve is trileaflet. Aortic sclerosis without stenosis. No aortic insufficiency.   Tricuspid Valve  Tricuspid valve structure is normal.  Mild tricuspid regurgitation. Estimated right ventricular systolic pressure is 35 mmHg. Pulmonic Valve  The pulmonic valve is normal in structure. Pericardial Effusion  Anterior clear space noted, most likely adipose tissue vs small loculated  pericardial effusion. No evidence of tamponade. Miscellaneous  Normal aortic root dimension. M-mode / 2D Measurements & Calculations:    LVIDd:5.2 cm(3.7 - 5.6 cm)       Diastolic QGYJHV:26.0 ml  ELFJU:9.5 cm(2.2 - 4.0 cm)       Systolic YNDBVB:66.3 ml  LDCQ:6.3 cm(0.6 - 1.1 cm)        Aortic Root:3.6 cm(2.0 - 3.7 cm)  LVPWd:0.8 cm(0.6 - 1.1 cm)       LA Dimension: 4.5 cm(1.9 - 4.0 cm)  Fractional Shortenin.69 %    LA volume/Index: 61.2 ml /27m^2  Calculated LVEF (%): 59.67 %     LVOT:2.2 cm  RVDd:2.3 cm    Mitral:                                 Aortic    Valve Area (P1/2-Time): 4.23 cm^2       Peak Velocity: 1.69 m/s  Peak E-Wave: 0.95 m/s                   Mean Velocity: 1.21 m/s  Peak Gradient: 11.42 mmHg  Peak Gradient: 3.58 mmHg                Mean Gradient: 7 mmHg  Mean Gradient: 2 mmHg  Deceleration Time: 171 msec  P1/2t: 52 msec                          Area (continuity): 2.86 cm^2  AV VTI: 28.6 cm    Area (continuity): 3.71 cm^2  Mean Velocity: 0.66 m/s    Tricuspid:                              Pulmonic:    Estimated RVSP: 35 mmHg                 Peak Velocity: 1.26 m/s  Peak TR Velocity: 2.52 m/s              Peak Gradient: 6.35 mmHg  Peak TR Gradient: 25.4016 mmHg  Estimated RA Pressure: 10 mmHg    Estimated PASP: 35.4 mmHg       ASSESSMENT AND PLAN     Assessment:    Acute hypoxic respiratory failure secondary to left sided pleural effusion with atelectasis. The pleural effusion is an exudate  Acute on chronic diastolic CHF  Obstructive sleep apnea:              Chronic obstructive pulmonary disease  Obesity  A. Fib    Plan:  Pleural fluid with many neutrophils but no bacteria seen no growth in 2 days  Continue diuresing with lasix. restriction of fluid   Continue oxygen 3 liter and bronchodilator. We will continue to follow. Electronically signed byYoly Perez MD  11/7/2021 2:24 PM      Please note that this chart was generated using voice recognition Dragon dictation software. Although every effort was made to ensure the accuracy of this automated transcription, some errors in transcription may have occurred.

## 2021-11-07 NOTE — PROGRESS NOTES
[facility]  Office: [address], DO, Gwen Nyhan, DO, Marissa Marie, DO, Huber Ernesto Blood, DO, Krishna Costa MD, Julia Palmer MD, Mic Goodson MD, Jarad Phoenix MD, Meera Angela MD, Tank Youssef MD, Cecil Vidal MD, Karina Cartagena MD, Jose Enrique Riojas, DO, Anjana Baer, DO, Tita Castro MD,  Shirley Pfeiffer, DO, Garrett Purdy MD, Cem Asencio MD, Harrison Estevez MD, Khushbu Montes MD, Roberth Fuentes MD, Lacie Camacho MD, Rico Barney, [facility], [facility], CNP, Kaity Shanks, CNP, Fariha Younger, CNS, Mikhail Gutierrez, CNP, Mallika Penny, CNP, Binh Kim, CNP, Jolynn Man, CNP, Kal Jennings, CNP, Nicky Carvajal, CNP, JAUN Martins-C, Mahin Milian, [facility], Sherif Hall, CNP, London Adler, CNP, Mateus Hurt, CNP, Josh Todd, CNP, Saray Dunn, CNP, Mary Ross, CNP, Michelle Rawls, [address] [place] [address]    Progress Note    11/7/2021    12:45 PM    Name:   Shen Ortiz  MRN:     [id number]     Acct:      [de-identified]   Room:   2019/2019-01   Day:  4  Admit Date:  11/3/2021  4:32 PM    PCP:   KALYN Doyle CNP  Code Status:  Full Code    Subjective:     C/C:   Chief Complaint   Patient presents with    Leg Swelling     increasing BLE edema; pt from SNF; per PA at SNF, pt has fluid retention, needs IV meds that they cannot administer     Interval History Status: improved. Patient seen this morning, feeling slightly better than yesterday although still short of breath. Still having frothy clear sputum. No fevers overnight    Brief History:     From H&P  Patient presented to the ER with complaints of leg swelling, shortness of breath with a productive cough (thin white sputum) and constipation. She states she chronically has lower extremity edema left side greater than right she thinks that is increased over the last couple days.   Patient was recently discharged on 10/15 after being admitted related to a pleural effusion. At that time she presented with shortness of breath and palpitations. She is found to have a left lower lobe pneumonia and A. fib with RVR and was admitted to the ICU for acute hypoxic respiratory failure. During that visit the patient had a paracentesis, Negative CURT and subsequently cardioverted successfully, and a bronchoscopy that showed left main bronchus obstruction due to thick mucous plugging. At discharge the patient was encouraged to follow-up with cardiology outpatient for an ablation. During that visit she was also treated for UTI and was discharged on Augmentin for 5 days. She was also given inhalers and steroids for COPD.     On arrival to the ER today she complains of not feeling well over the last 3 or 4 days. She describes worsening shortness of breath primarily on exertion and orthopnea. The patient told me that she has not been using oxygen on an outpatient basis but according to the ER note she has been using it intermittently primarily with exertion and therapy and occasionally overnight. Also complains of constipation and mild abdominal bloating. She also reports nausea and vomiting few days ago but nothing current and she has been tolerating p.o. intake. She states she is compliant with her home medications.     Patient was placed on 2 L nasal cannula in the ER. She has been afebrile heart rates been in the 90s to the low 110's A. Fib.     Chest x-ray shows no acute changes from prior study with a has a persistent sizable left effusion with opacity at the left lung base and pulmonary vascular congestion. Sodium is 129 BUN and creatinine are 26/1. 65. Her normal BUN and creatinine are around 15/0.58 and normal sodiums in the mid 130s. BNP is 3134. On her last admission it was 1745 and on her admission in early October it was 3251. WBCs 14.5 seems to run slightly elevated     Plan to consult nephrology and pulmonary medicine.   Continue home Eliquis, beta-blocker and amiodarone. Hold losartan  Plan Solu-Medrol related to IV wheezing throughout. It appears to be in fluid overload related to pitting edema in her lower extremities and increasing shortness of breath. Plan to hold further hydration for now. Give Lasix 20 mg IV x1    Review of Systems:     Constitutional:  negative for chills, fevers, sweats  Respiratory:  negative for cough, dyspnea on exertion, shortness of breath, wheezing  Cardiovascular:  negative for chest pain, chest pressure/discomfort, lower extremity edema, palpitations  Gastrointestinal:  negative for abdominal pain, constipation, diarrhea, nausea, vomiting  Neurological:  negative for dizziness, headache    Medications: Allergies:     Allergies   Allergen Reactions    Latex Hives     Hives from rubber gloves    Betadine [Povidone Iodine] Hives    Bee Venom Swelling    Dilaudid [Hydromorphone Hcl] Other (See Comments)     Severe confusion    Adhesive Tape Rash    Sulfa Antibiotics Rash       Current Meds:   Scheduled Meds:    ciprofloxacin  500 mg Oral 2 times per day    bumetanide  1 mg IntraVENous Once    bumetanide  1 mg Oral Daily    metoprolol tartrate  50 mg Oral BID    ipratropium-albuterol  1 ampule Inhalation TID    amiodarone  200 mg Oral Daily    apixaban  5 mg Oral BID    vitamin C  500 mg Oral Daily    atorvastatin  40 mg Oral Daily    fluticasone  1 puff Inhalation BID    citalopram  20 mg Oral Daily    clopidogrel  75 mg Oral Daily    isosorbide mononitrate  30 mg Oral Daily    sodium chloride flush  5-40 mL IntraVENous 2 times per day     Continuous Infusions:    sodium chloride       PRN Meds: cyclobenzaprine, sodium chloride flush, sodium chloride, ondansetron **OR** ondansetron, acetaminophen **OR** acetaminophen    Data:     Past Medical History:   has a past medical history of Asthma, COPD (chronic obstructive pulmonary disease) ([facility] [address] Gout, Hyperlipidemia, Hypertension, Mitral insufficiency, and Pneumonia. Social History:   reports that she has quit smoking. She has never used smokeless tobacco. She reports current alcohol use. She reports that she does not use drugs. Family History:   Family History   Problem Relation Age of Onset    High Blood Pressure Mother     Cancer Mother     Asthma Father     Heart Disease Father     Cancer Father        Vitals:  /65   Pulse 101   Temp 98.4 °F (36.9 °C) (Oral)   Resp 24   Ht 5' 4\" (1.626 m)   Wt 256 lb (116.1 kg)   SpO2 96%   BMI 43.94 kg/m²   Temp (24hrs), Av.3 °F (36.8 °C), Min:97.9 °F (36.6 °C), Max:98.6 °F (37 °C)    No results for input(s): POCGLU in the last 72 hours. I/O (24Hr): Intake/Output Summary (Last 24 hours) at 2021 1245  Last data filed at 2021 1105  Gross per 24 hour   Intake 960 ml   Output 200 ml   Net 760 ml       Labs:  Hematology:  Recent Labs     21  0520 21  0635   WBC 14.8* 14.6*   RBC 3.79* 3.79*   HGB 11.5* 11.6*   HCT 36.7 36.4   MCV 96.8 96.0   MCH 30.3 30.6   MCHC 31.3 31.9   RDW 13.0 13.2    345   MPV 9.5 9.1     Chemistry:  Recent Labs     21  0645 21  0645 21  0520 21  1841 21  0635   *   < > 130* 131* 133*   K 5.2   < > 4.5 4.4 4.5   CL 96*   < > 94* 93* 95*   CO2 24   < > 22 22 27   GLUCOSE 141*  --  104*  --  101*   BUN 38*  --  48*  --  38*   CREATININE 1.72*  --  1.46*  --  0.99*   MG  --   --  2.0  --  1.6   ANIONGAP 13   < > 14 16 11   LABGLOM 29*  --  35*  --  55*   GFRAA 35*  --  42*  --  >60   CALCIUM 8.7  --  8.7  --  8.7   PHOS  --   --  3.6  --  3.5    < > = values in this interval not displayed.      Recent Labs     21  0645 21  0520 21  0635   PROT 6.2*  --   --    LABALBU  --  2.7* 3.0*   *  --   --      ABG:  Lab Results   Component Value Date    POCPH 7.429 10/03/2021    PH 7.44 2016    POCPCO2 37.1 10/03/2021    PCO2 36 2016    POCPO2 86.0 10/03/2021    PO2 64 03/30/2016    POCHCO3 24.5 10/03/2021    HCO3 24 03/30/2016    NBEA NOT REPORTED 10/03/2021    PBEA 0 10/03/2021    BSK4PME NOT REPORTED 10/03/2021    DNWO5GXQ 97 10/03/2021    O2SAT 93 03/30/2016    FIO2 40.0 10/03/2021     Lab Results   Component Value Date/Time    SPECIAL NOT REPORTED 11/05/2021 08:32 AM     Lab Results   Component Value Date/Time    CULTURE PSEUDOMONAS AERUGINOSA >831808 CFU/ML (A) 11/05/2021 08:32 AM    CULTURE (A) 11/05/2021 08:32 AM     LACTOSE FERMENTING GRAM NEGATIVE RODS 10 to 50,000 CFU/ML Susceptibility testing not performed on low colony count organisms. Radiology:  XR CHEST PORTABLE    Result Date: 11/3/2021  No change from prior study. Persistent sizable left effusion with opacity at the left lung base and pulmonary vascular congestion. US THORACENTESIS Which side should the procedure be performed? Left    Result Date: 11/4/2021  Successful ultrasound guided thoracentesis. US RETROPERITONEAL COMPLETE    Result Date: 11/4/2021  1. Patient had no urge to void during the exam.  Nonvisualization of ureteral jets. 2. Incidental cholelithiasis. 3. Unremarkable renal ultrasound. No hydronephrosis.        Physical Examination:        General appearance:  alert, cooperative and no distress  Mental Status:  oriented to person, place and time and normal affect  Lungs:  clear to auscultation bilaterally, normal effort  Heart:  regular rate and rhythm, no murmur  Abdomen:  soft, nontender, nondistended, normal bowel sounds, no masses, hepatomegaly, splenomegaly  Extremities:  no edema, redness, tenderness in the calves  Skin:  no gross lesions, rashes, induration    Assessment:        Hospital Problems           Last Modified POA    * (Principal) CASEY (acute kidney injury) ([facility] [address] 11/3/2021 Yes    Benign essential HTN 11/3/2021 Yes    Morbid obesity with BMI of 40.0-44.9, adult ([facility] [address] 11/4/2021 Yes    Overview Signed 10/3/2021 12:43 PM by Merlyn Knox MD     Last Assessment & Plan: Formatting of this note might be different from the original.  Obesity is worsening. Discussed the patient's BMI. The BMI is above average; BMI management plan is completed. General weight loss/lifestyle modification strategies discussed (elicit support from others; identify saboteurs; non-food rewards, etc). Pulmonary emphysema ([address] 11/3/2021 Yes    Acute on chronic diastolic heart failure ([address] 11/4/2021 Yes    Chronic obstructive pulmonary disease with acute exacerbation ([address] 11/3/2021 Yes    Overview Signed 10/3/2021 12:43 PM by Pio De La Cruz MD     Last Assessment & Plan:   Formatting of this note might be different from the original.  COPD is worsening. Discussed monitoring symptoms and use of quick-relief medications and contacting us early in the course of exacerbations. Warning signs of respiratory distress were reviewed with the patient. Counseled to avoid exposure to cigarette smoke. Persistent atrial fibrillation ([address] 11/4/2021 Yes    Recurrent pleural effusion 11/3/2021 Yes          Plan:        1. CASEYcontinue diuresis, nephrology following  2. Chronic respiratory failurecurrently at home O2, history of chronic diastolic heart failure and COPD  3. Pleural fluidstatus post thoracentesis, pulmonary following  4. COPD  5. Diastolic heart failure  6. Morbid obesity  7. Atrial fibrillation  8. Hypertension  9. Plan:  1. Pseudomonas on urine culture, start Cipro  2. Additional dose of Bumex ordered by nephrology today  3. Start BiPAP for hypoxia and pulmonary edema  4.  CASEY resolved    Dyan Schreiber DO  11/7/2021  12:45 PM

## 2021-11-07 NOTE — PLAN OF CARE
Problem: Falls - Risk of:  Goal: Will remain free from falls  Description: Will remain free from falls  11/7/2021 0753 by Justin Sánchez RN  Outcome: Ongoing  11/6/2021 1912 by Blair Castillo RN  Outcome: Ongoing  Goal: Absence of physical injury  Description: Absence of physical injury  11/7/2021 0753 by uJstin Sánchez RN  Outcome: Ongoing  11/6/2021 1912 by Blair Castillo RN  Outcome: Ongoing     Problem: Skin Integrity:  Goal: Will show no infection signs and symptoms  Description: Will show no infection signs and symptoms  11/7/2021 0753 by Justin Sánchez RN  Outcome: Ongoing  11/6/2021 1912 by Blair Castillo RN  Outcome: Ongoing  Goal: Absence of new skin breakdown  Description: Absence of new skin breakdown  11/7/2021 0753 by Justin Sánchez RN  Outcome: Ongoing  11/6/2021 1912 by Blair Castillo RN  Outcome: Ongoing     Problem: OXYGENATION/RESPIRATORY FUNCTION  Goal: Patient will maintain patent airway  11/7/2021 0753 by Justin Sánchez RN  Outcome: Ongoing  11/6/2021 1912 by Blair Castillo RN  Outcome: Ongoing  Goal: Patient will achieve/maintain normal respiratory rate/effort  Description: Respiratory rate and effort will be within normal limits for the patient  11/7/2021 0753 by Justin Sánchez RN  Outcome: Ongoing  11/6/2021 1912 by Blair Castillo RN  Outcome: Ongoing     Problem: HEMODYNAMIC STATUS  Goal: Patient has stable vital signs and fluid balance  11/7/2021 0753 by Justin Sánchez RN  Outcome: Ongoing  11/6/2021 1912 by Blair Castillo RN  Outcome: Ongoing     Problem: FLUID AND ELECTROLYTE IMBALANCE  Goal: Fluid and electrolyte balance are achieved/maintained  11/7/2021 0753 by Justin Sánchez RN  Outcome: Ongoing  11/6/2021 1912 by Blair Castillo RN  Outcome: Ongoing     Problem: ACTIVITY INTOLERANCE/IMPAIRED MOBILITY  Goal: Mobility/activity is maintained at optimum level for patient  11/7/2021 0753 by Justin Sánchez RN  Outcome: Ongoing  11/6/2021 1912 by Blair Castillo RN  Outcome: Ongoing     Problem: Nutrition  Goal: Optimal nutrition therapy  11/7/2021 0753 by Martin Marshall RN  Outcome: Ongoing  11/6/2021 1912 by Ernesto Spence RN  Outcome: Ongoing     Problem: Pain:  Goal: Pain level will decrease  Description: Pain level will decrease  11/7/2021 0753 by Martin Marshall RN  Outcome: Ongoing  11/6/2021 1912 by Ernesto Spence RN  Outcome: Ongoing  Goal: Control of acute pain  Description: Control of acute pain  11/7/2021 0753 by Martin Marshall RN  Outcome: Ongoing  11/6/2021 1912 by Ernesto Spence RN  Outcome: Ongoing  Goal: Control of chronic pain  Description: Control of chronic pain  11/7/2021 0753 by Martin Marshall RN  Outcome: Ongoing  11/6/2021 1912 by Ernesto Spence RN  Outcome: Ongoing

## 2021-11-08 LAB
ALBUMIN SERPL-MCNC: 2.8 G/DL (ref 3.5–5.2)
ANION GAP SERPL CALCULATED.3IONS-SCNC: 11 MMOL/L (ref 9–17)
BUN BLDV-MCNC: 25 MG/DL (ref 8–23)
BUN/CREAT BLD: ABNORMAL (ref 9–20)
CALCIUM SERPL-MCNC: 8.7 MG/DL (ref 8.6–10.4)
CHLORIDE BLD-SCNC: 94 MMOL/L (ref 98–107)
CO2: 28 MMOL/L (ref 20–31)
CREAT SERPL-MCNC: 0.88 MG/DL (ref 0.5–0.9)
EKG ATRIAL RATE: 125 BPM
EKG Q-T INTERVAL: 330 MS
EKG QRS DURATION: 114 MS
EKG QTC CALCULATION (BAZETT): 472 MS
EKG R AXIS: -36 DEGREES
EKG T AXIS: 135 DEGREES
EKG VENTRICULAR RATE: 123 BPM
GFR AFRICAN AMERICAN: >60 ML/MIN
GFR NON-AFRICAN AMERICAN: >60 ML/MIN
GFR SERPL CREATININE-BSD FRML MDRD: ABNORMAL ML/MIN/{1.73_M2}
GFR SERPL CREATININE-BSD FRML MDRD: ABNORMAL ML/MIN/{1.73_M2}
GLUCOSE BLD-MCNC: 98 MG/DL (ref 70–99)
HCT VFR BLD CALC: 38.4 % (ref 36.3–47.1)
HEMOGLOBIN: 11.9 G/DL (ref 11.9–15.1)
MAGNESIUM: 1.4 MG/DL (ref 1.6–2.6)
MCH RBC QN AUTO: 30.7 PG (ref 25.2–33.5)
MCHC RBC AUTO-ENTMCNC: 31 G/DL (ref 28.4–34.8)
MCV RBC AUTO: 99 FL (ref 82.6–102.9)
NRBC AUTOMATED: 0 PER 100 WBC
PDW BLD-RTO: 13.2 % (ref 11.8–14.4)
PHOSPHORUS: 3.1 MG/DL (ref 2.6–4.5)
PLATELET # BLD: 296 K/UL (ref 138–453)
PMV BLD AUTO: 8.8 FL (ref 8.1–13.5)
POTASSIUM SERPL-SCNC: 3.7 MMOL/L (ref 3.7–5.3)
RBC # BLD: 3.88 M/UL (ref 3.95–5.11)
SODIUM BLD-SCNC: 133 MMOL/L (ref 135–144)
SURGICAL PATHOLOGY REPORT: NORMAL
WBC # BLD: 10.2 K/UL (ref 3.5–11.3)

## 2021-11-08 PROCEDURE — 6360000002 HC RX W HCPCS: Performed by: STUDENT IN AN ORGANIZED HEALTH CARE EDUCATION/TRAINING PROGRAM

## 2021-11-08 PROCEDURE — 94640 AIRWAY INHALATION TREATMENT: CPT

## 2021-11-08 PROCEDURE — 93005 ELECTROCARDIOGRAM TRACING: CPT | Performed by: NURSE PRACTITIONER

## 2021-11-08 PROCEDURE — 99233 SBSQ HOSP IP/OBS HIGH 50: CPT | Performed by: INTERNAL MEDICINE

## 2021-11-08 PROCEDURE — 99232 SBSQ HOSP IP/OBS MODERATE 35: CPT | Performed by: INTERNAL MEDICINE

## 2021-11-08 PROCEDURE — 6370000000 HC RX 637 (ALT 250 FOR IP): Performed by: INTERNAL MEDICINE

## 2021-11-08 PROCEDURE — 93010 ELECTROCARDIOGRAM REPORT: CPT | Performed by: INTERNAL MEDICINE

## 2021-11-08 PROCEDURE — 2700000000 HC OXYGEN THERAPY PER DAY

## 2021-11-08 PROCEDURE — 85027 COMPLETE CBC AUTOMATED: CPT

## 2021-11-08 PROCEDURE — 6370000000 HC RX 637 (ALT 250 FOR IP): Performed by: NURSE PRACTITIONER

## 2021-11-08 PROCEDURE — 76937 US GUIDE VASCULAR ACCESS: CPT

## 2021-11-08 PROCEDURE — 2580000003 HC RX 258: Performed by: NURSE PRACTITIONER

## 2021-11-08 PROCEDURE — 2500000003 HC RX 250 WO HCPCS: Performed by: NURSE PRACTITIONER

## 2021-11-08 PROCEDURE — 83735 ASSAY OF MAGNESIUM: CPT

## 2021-11-08 PROCEDURE — 36415 COLL VENOUS BLD VENIPUNCTURE: CPT

## 2021-11-08 PROCEDURE — 2060000000 HC ICU INTERMEDIATE R&B

## 2021-11-08 PROCEDURE — 80069 RENAL FUNCTION PANEL: CPT

## 2021-11-08 RX ORDER — MAGNESIUM SULFATE IN WATER 40 MG/ML
2000 INJECTION, SOLUTION INTRAVENOUS ONCE
Status: DISCONTINUED | OUTPATIENT
Start: 2021-11-08 | End: 2021-11-08

## 2021-11-08 RX ORDER — METOPROLOL TARTRATE 5 MG/5ML
5 INJECTION INTRAVENOUS ONCE
Status: COMPLETED | OUTPATIENT
Start: 2021-11-08 | End: 2021-11-08

## 2021-11-08 RX ORDER — MAGNESIUM SULFATE IN WATER 40 MG/ML
2000 INJECTION, SOLUTION INTRAVENOUS
Status: COMPLETED | OUTPATIENT
Start: 2021-11-08 | End: 2021-11-08

## 2021-11-08 RX ORDER — BUMETANIDE 1 MG/1
1 TABLET ORAL 2 TIMES DAILY
Status: DISCONTINUED | OUTPATIENT
Start: 2021-11-08 | End: 2021-11-13

## 2021-11-08 RX ADMIN — BUMETANIDE 1 MG: 1 TABLET ORAL at 08:11

## 2021-11-08 RX ADMIN — DESMOPRESSIN ACETATE 40 MG: 0.2 TABLET ORAL at 08:10

## 2021-11-08 RX ADMIN — MAGNESIUM SULFATE HEPTAHYDRATE 2000 MG: 40 INJECTION, SOLUTION INTRAVENOUS at 12:15

## 2021-11-08 RX ADMIN — METOPROLOL TARTRATE 50 MG: 50 TABLET, FILM COATED ORAL at 21:21

## 2021-11-08 RX ADMIN — AMIODARONE HYDROCHLORIDE 200 MG: 200 TABLET ORAL at 08:10

## 2021-11-08 RX ADMIN — IPRATROPIUM BROMIDE AND ALBUTEROL SULFATE 1 AMPULE: .5; 2.5 SOLUTION RESPIRATORY (INHALATION) at 22:28

## 2021-11-08 RX ADMIN — FLUTICASONE PROPIONATE 1 PUFF: 110 AEROSOL, METERED RESPIRATORY (INHALATION) at 22:28

## 2021-11-08 RX ADMIN — APIXABAN 5 MG: 5 TABLET, FILM COATED ORAL at 21:21

## 2021-11-08 RX ADMIN — CIPROFLOXACIN 500 MG: 500 TABLET, FILM COATED ORAL at 08:11

## 2021-11-08 RX ADMIN — SODIUM CHLORIDE, PRESERVATIVE FREE 10 ML: 5 INJECTION INTRAVENOUS at 21:21

## 2021-11-08 RX ADMIN — CITALOPRAM 20 MG: 20 TABLET, FILM COATED ORAL at 08:11

## 2021-11-08 RX ADMIN — ACETAMINOPHEN 650 MG: 325 TABLET ORAL at 23:27

## 2021-11-08 RX ADMIN — FLUTICASONE PROPIONATE 1 PUFF: 110 AEROSOL, METERED RESPIRATORY (INHALATION) at 08:48

## 2021-11-08 RX ADMIN — METOPROLOL TARTRATE 50 MG: 50 TABLET, FILM COATED ORAL at 08:11

## 2021-11-08 RX ADMIN — ISOSORBIDE MONONITRATE 30 MG: 30 TABLET ORAL at 08:11

## 2021-11-08 RX ADMIN — IPRATROPIUM BROMIDE AND ALBUTEROL SULFATE 1 AMPULE: .5; 2.5 SOLUTION RESPIRATORY (INHALATION) at 15:13

## 2021-11-08 RX ADMIN — MAGNESIUM SULFATE HEPTAHYDRATE 2000 MG: 40 INJECTION, SOLUTION INTRAVENOUS at 10:53

## 2021-11-08 RX ADMIN — IPRATROPIUM BROMIDE AND ALBUTEROL SULFATE 1 AMPULE: .5; 2.5 SOLUTION RESPIRATORY (INHALATION) at 08:48

## 2021-11-08 RX ADMIN — OXYCODONE HYDROCHLORIDE AND ACETAMINOPHEN 500 MG: 500 TABLET ORAL at 08:10

## 2021-11-08 RX ADMIN — BUMETANIDE 1 MG: 1 TABLET ORAL at 21:21

## 2021-11-08 RX ADMIN — APIXABAN 5 MG: 5 TABLET, FILM COATED ORAL at 08:10

## 2021-11-08 RX ADMIN — CIPROFLOXACIN 500 MG: 500 TABLET, FILM COATED ORAL at 21:21

## 2021-11-08 RX ADMIN — METOPROLOL TARTRATE 5 MG: 5 INJECTION, SOLUTION INTRAVENOUS at 01:47

## 2021-11-08 RX ADMIN — SODIUM CHLORIDE, PRESERVATIVE FREE 10 ML: 5 INJECTION INTRAVENOUS at 08:12

## 2021-11-08 RX ADMIN — CLOPIDOGREL 75 MG: 75 TABLET, FILM COATED ORAL at 08:11

## 2021-11-08 ASSESSMENT — ENCOUNTER SYMPTOMS
GASTROINTESTINAL NEGATIVE: 1
SHORTNESS OF BREATH: 1
ALLERGIC/IMMUNOLOGIC NEGATIVE: 1
EYES NEGATIVE: 1

## 2021-11-08 ASSESSMENT — PAIN SCALES - GENERAL
PAINLEVEL_OUTOF10: 3
PAINLEVEL_OUTOF10: 1

## 2021-11-08 NOTE — PROGRESS NOTES
[facility]  Office: [address], DO, Brock China, DO, Javier Reyes, DO, Catarino Narayanan Blood, DO, Mo Andrews MD, Eliel Rowe MD, Maria De Jesus Hartman MD, Maite Encarnacion MD, Slick Pierce MD, Michael Nickerson MD, Apolonia Ferrari MD, Olga Brian MD, Lauren Domínguez, DO, Gurinder Ceballos, DO, Jhonatan Weiner MD,  Rufina Chadwick, DO, Dominique Suero MD, Anita Cowart MD, Carol Echavarria MD, Reyes Triplett MD, Jennifer Cortez MD, Lesli Salguero MD, Nighat Blas, [facility], [facility], CNP, Flory Martinez, CNP, Margarita Lomax, CNS, Majo Johns, CNP, Aliyah Munson, CNP, Omar Zhang, CNP, Leticia Jack, CNP, Samuel Hernandez, CNP, Cinthia Mason, CNP, Sima Lion PA-C, Charmel Osgood, [facility], Christa Holland, CNP, Gem Khan, CNP, Judy Aguilar, CNP, Sally Chadwick, CNP, Tiney Schilder, CNP, Marlyne Osgood, CNP, Liam Goetz, [address] [place] [address]    Progress Note    11/8/2021    9:47 AM    Name:   Mahesh Barnes  MRN:     [id number]     Acct:      [de-identified]   Room:   2019/2019-01  IP Day:  5  Admit Date:  11/3/2021  4:32 PM    PCP:   KALYN Maldonado CNP  Code Status:  Full Code    Subjective:     C/C:   Chief Complaint   Patient presents with    Leg Swelling     increasing BLE edema; pt from SNF; per PA at SNF, pt has fluid retention, needs IV meds that they cannot administer     Interval History Status: improved. Patient feeling better today, still having frothy sputum this morning, oxygen requirement still high. Discussed diuretic therapy    Brief History:     From H&P  Patient presented to the ER with complaints of leg swelling, shortness of breath with a productive cough (thin white sputum) and constipation. She states she chronically has lower extremity edema left side greater than right she thinks that is increased over the last couple days.   Patient was recently discharged on 10/15 after being admitted related to a pleural effusion. At that time she presented with shortness of breath and palpitations. She is found to have a left lower lobe pneumonia and A. fib with RVR and was admitted to the ICU for acute hypoxic respiratory failure. During that visit the patient had a paracentesis, Negative CURT and subsequently cardioverted successfully, and a bronchoscopy that showed left main bronchus obstruction due to thick mucous plugging. At discharge the patient was encouraged to follow-up with cardiology outpatient for an ablation. During that visit she was also treated for UTI and was discharged on Augmentin for 5 days. She was also given inhalers and steroids for COPD.     On arrival to the ER today she complains of not feeling well over the last 3 or 4 days. She describes worsening shortness of breath primarily on exertion and orthopnea. The patient told me that she has not been using oxygen on an outpatient basis but according to the ER note she has been using it intermittently primarily with exertion and therapy and occasionally overnight. Also complains of constipation and mild abdominal bloating. She also reports nausea and vomiting few days ago but nothing current and she has been tolerating p.o. intake. She states she is compliant with her home medications.     Patient was placed on 2 L nasal cannula in the ER. She has been afebrile heart rates been in the 90s to the low 110's A. Fib.     Chest x-ray shows no acute changes from prior study with a has a persistent sizable left effusion with opacity at the left lung base and pulmonary vascular congestion. Sodium is 129 BUN and creatinine are 26/1. 65. Her normal BUN and creatinine are around 15/0.58 and normal sodiums in the mid 130s. BNP is 3134. On her last admission it was 1745 and on her admission in early October it was 3251. WBCs 14.5 seems to run slightly elevated     Plan to consult nephrology and pulmonary medicine.   Continue home Eliquis, beta-blocker and amiodarone. Hold losartan  Plan Solu-Medrol related to IV wheezing throughout. It appears to be in fluid overload related to pitting edema in her lower extremities and increasing shortness of breath. Plan to hold further hydration for now. Give Lasix 20 mg IV x1    Review of Systems:     Constitutional:  negative for chills, fevers, sweats  Respiratory:  negative for cough, dyspnea on exertion, shortness of breath, wheezing  Cardiovascular:  negative for chest pain, chest pressure/discomfort, lower extremity edema, palpitations  Gastrointestinal:  negative for abdominal pain, constipation, diarrhea, nausea, vomiting  Neurological:  negative for dizziness, headache    Medications: Allergies:     Allergies   Allergen Reactions    Latex Hives     Hives from rubber gloves    Betadine [Povidone Iodine] Hives    Bee Venom Swelling    Dilaudid [Hydromorphone Hcl] Other (See Comments)     Severe confusion    Adhesive Tape Rash    Sulfa Antibiotics Rash       Current Meds:   Scheduled Meds:    magnesium sulfate  2,000 mg IntraVENous Once    ciprofloxacin  500 mg Oral 2 times per day    bumetanide  1 mg Oral Daily    metoprolol tartrate  50 mg Oral BID    ipratropium-albuterol  1 ampule Inhalation TID    amiodarone  200 mg Oral Daily    apixaban  5 mg Oral BID    vitamin C  500 mg Oral Daily    atorvastatin  40 mg Oral Daily    fluticasone  1 puff Inhalation BID    citalopram  20 mg Oral Daily    clopidogrel  75 mg Oral Daily    isosorbide mononitrate  30 mg Oral Daily    sodium chloride flush  5-40 mL IntraVENous 2 times per day     Continuous Infusions:    sodium chloride       PRN Meds: cyclobenzaprine, sodium chloride flush, sodium chloride, ondansetron **OR** ondansetron, acetaminophen **OR** acetaminophen    Data:     Past Medical History:   has a past medical history of Asthma, COPD (chronic obstructive pulmonary disease) ([facility] [address] Gout, Hyperlipidemia, Hypertension, Mitral 86.0 10/03/2021    PO2 64 03/30/2016    POCHCO3 24.5 10/03/2021    HCO3 24 03/30/2016    NBEA NOT REPORTED 10/03/2021    PBEA 0 10/03/2021    RIW3FAK NOT REPORTED 10/03/2021    QQZR8DPI 97 10/03/2021    O2SAT 93 03/30/2016    FIO2 40.0 10/03/2021     Lab Results   Component Value Date/Time    SPECIAL NOT REPORTED 11/05/2021 08:32 AM     Lab Results   Component Value Date/Time    CULTURE PSEUDOMONAS AERUGINOSA >609122 CFU/ML (A) 11/05/2021 08:32 AM    CULTURE (A) 11/05/2021 08:32 AM     LACTOSE FERMENTING GRAM NEGATIVE RODS 10 to 50,000 CFU/ML Susceptibility testing not performed on low colony count organisms. Radiology:  XR CHEST PORTABLE    Result Date: 11/3/2021  No change from prior study. Persistent sizable left effusion with opacity at the left lung base and pulmonary vascular congestion. US THORACENTESIS Which side should the procedure be performed? Left    Result Date: 11/4/2021  Successful ultrasound guided thoracentesis. US RETROPERITONEAL COMPLETE    Result Date: 11/4/2021  1. Patient had no urge to void during the exam.  Nonvisualization of ureteral jets. 2. Incidental cholelithiasis. 3. Unremarkable renal ultrasound. No hydronephrosis.        Physical Examination:        General appearance:  alert, cooperative and no distress  Mental Status:  oriented to person, place and time and normal affect  Lungs:  clear to auscultation bilaterally, normal effort  Heart:  regular rate and rhythm, no murmur  Abdomen:  soft, nontender, nondistended, normal bowel sounds, no masses, hepatomegaly, splenomegaly  Extremities:  no edema, redness, tenderness in the calves  Skin:  no gross lesions, rashes, induration    Assessment:        Hospital Problems           Last Modified POA    * (Principal) CASEY (acute kidney injury) ([facility] [address] 11/3/2021 Yes    Benign essential HTN 11/3/2021 Yes    Morbid obesity with BMI of 40.0-44.9, adult ([facility] [address] 11/4/2021 Yes    Overview Signed 10/3/2021 12:43 PM by Marino Dallas MD Last Assessment & Plan:   Formatting of this note might be different from the original.  Obesity is worsening. Discussed the patient's BMI. The BMI is above average; BMI management plan is completed. General weight loss/lifestyle modification strategies discussed (elicit support from others; identify saboteurs; non-food rewards, etc). Pulmonary emphysema ([address] 11/3/2021 Yes    Acute on chronic diastolic heart failure ([address] 11/4/2021 Yes    Chronic obstructive pulmonary disease with acute exacerbation ([address] 11/3/2021 Yes    Overview Signed 10/3/2021 12:43 PM by Juan Kasper MD     Last Assessment & Plan:   Formatting of this note might be different from the original.  COPD is worsening. Discussed monitoring symptoms and use of quick-relief medications and contacting us early in the course of exacerbations. Warning signs of respiratory distress were reviewed with the patient. Counseled to avoid exposure to cigarette smoke. Persistent atrial fibrillation ([address] 11/4/2021 Yes    Recurrent pleural effusion 11/3/2021 Yes    Acute on chronic congestive heart failure ([address] 11/7/2021 Yes          Plan:        1. CASEYresolved  2. Acute on Chronic respiratory failurepatient still having shortness of breath with cough with sputum production. She has received twice daily diuretics for 2 days at this point. We will increase Bumex to twice a day oral  3. COPD  4. Diastolic heart failure  5. Morbid obesity  6. Atrial fibrillation  7. Hypertension  8. Plan:  1. Pseudomonas on urine culture, start Cipro  2. Bumex twice a day, 1 mg, reevaluate tomorrow  3. Case management for skilled nursing facility placement. Update; Patient still coughing with dyspnea. Thin sputum. Another day of diuretics. Dc when placement found.     Jolynn Montero DO   11/8/2021  9:47 AM

## 2021-11-08 NOTE — PLAN OF CARE
Problem: Falls - Risk of:  Goal: Will remain free from falls  Description: Will remain free from falls  11/8/2021 1003 by Vern San RN  Outcome: Ongoing  11/8/2021 0857 by Climmie Najjar, RN  Outcome: Ongoing  Goal: Absence of physical injury  Description: Absence of physical injury  11/8/2021 1003 by Vern San RN  Outcome: Ongoing  11/8/2021 0857 by Climmie Najjar, RN  Outcome: Ongoing     Problem: Skin Integrity:  Goal: Will show no infection signs and symptoms  Description: Will show no infection signs and symptoms  11/8/2021 1003 by Vern San RN  Outcome: Ongoing  11/8/2021 0857 by Climmie Najjar, RN  Outcome: Ongoing  Goal: Absence of new skin breakdown  Description: Absence of new skin breakdown  11/8/2021 1003 by Vern San RN  Outcome: Ongoing  11/8/2021 0857 by Climmie Najjar, RN  Outcome: Ongoing     Problem: OXYGENATION/RESPIRATORY FUNCTION  Goal: Patient will maintain patent airway  11/8/2021 1003 by Vern San RN  Outcome: Ongoing  11/8/2021 0857 by Climmie Najjar, RN  Outcome: Ongoing  Goal: Patient will achieve/maintain normal respiratory rate/effort  Description: Respiratory rate and effort will be within normal limits for the patient  11/8/2021 1003 by Vern San RN  Outcome: Ongoing  11/8/2021 0857 by Climmie Najjar, RN  Outcome: Ongoing     Problem: HEMODYNAMIC STATUS  Goal: Patient has stable vital signs and fluid balance  11/8/2021 1003 by Vern San RN  Outcome: Ongoing  11/8/2021 0857 by Climmie Najjar, RN  Outcome: Ongoing     Problem: FLUID AND ELECTROLYTE IMBALANCE  Goal: Fluid and electrolyte balance are achieved/maintained  11/8/2021 1003 by Vern San RN  Outcome: Ongoing  11/8/2021 0857 by Climmie Najjar, RN  Outcome: Ongoing     Problem: ACTIVITY INTOLERANCE/IMPAIRED MOBILITY  Goal: Mobility/activity is maintained at optimum level for patient  11/8/2021 1003 by Vern San RN  Outcome: Ongoing  11/8/2021 0857 by Climmie Najjar, RN  Outcome: Ongoing     Problem: Nutrition  Goal: Optimal nutrition therapy  11/8/2021 1003 by Em Uribe RN  Outcome: Ongoing  11/8/2021 0857 by Padmini Claudio RN  Outcome: Ongoing     Problem: Pain:  Goal: Pain level will decrease  Description: Pain level will decrease  11/8/2021 1003 by Em Uribe RN  Outcome: Ongoing  11/8/2021 0857 by Padmini Claudio RN  Outcome: Ongoing  Goal: Control of acute pain  Description: Control of acute pain  11/8/2021 1003 by Em Uribe RN  Outcome: Ongoing  11/8/2021 0857 by Padmini Claudio RN  Outcome: Ongoing  Goal: Control of chronic pain  Description: Control of chronic pain  11/8/2021 1003 by Em Uribe RN  Outcome: Ongoing  11/8/2021 0857 by Padmini Claudio RN  Outcome: Ongoing

## 2021-11-08 NOTE — CARE COORDINATION
Transitional planning-talked with patient-plan is to return to [facility]. Called In[facility] with [facility] intake-patient needs precert to return.  Needs PT/OT

## 2021-11-08 NOTE — PROGRESS NOTES
PULMONARY & CRITICAL CARE MEDICINE PROGRESS NOTE     Patient:  Sandra Winston  MRN: [id number]  Admit date: 11/3/2021  Primary Care Physician: KALYN Vieyra - CNP  Consulting Physician: Fanny Samaniego DO  CODE Status: Full Code  LOS: 5     SUBJECTIVE     Chief Complaint/ Reason for consult:  Left pleural effusion    Hospital Course: The patient is a 68 y.o. female here for evaluation of shortness of breath with clear sputum, increased leg swelling. Patient has chronic shortness of breath which has been worsening over the last couple of days along with increased swelling of her lower extremity. She has conversational dyspnea, no wheezing. Chest x-ray shows left pleural effusion and left atelectasis she has had multiple thoracentesis. Complains of abdominal bloating, orthopnea, palpitations. She was found to be in A. fib with heart rate in 110s. Interval History:  11/08/21    Patient was short of breathe this am was wheezing which improved with bronchodilator. Pt was seen and examined at bedside. Saturating appropriately at 4 liter via nasal canula. Vitals stable. Labs reviewed. Urine culture positive for  pesudomonas on ciprofloxacin  Pleural fluid appears exudative with cholestrol in pleural fluid was 67,  and protein 3.4. negative for bacteria and negative for malignant cell. Serum protein and pleural protein gradient 2.8    Patient currently being diuresed with bumex 1 mg daily with output of 3 liter and net negative of 4 liter      Review Of Systems:  Review of Systems   Constitutional: Negative. HENT: Negative. Eyes: Negative. Respiratory: Positive for shortness of breath. Cardiovascular: Negative. Gastrointestinal: Negative. Endocrine: Negative. Genitourinary: Negative. Musculoskeletal: Negative. Skin: Negative. Allergic/Immunologic: Negative. Psychiatric/Behavioral: Negative.           OBJECTIVE     PaO2/FiO2 RATIO:  No results for input(s): POCPO2 in the last 72 hours. FiO2 : 36 %     VITAL SIGNS:   LAST:  BP (!) 147/85   Pulse 116   Temp 99 °F (37.2 °C) (Oral)   Resp (!) 34   Ht 5' 4\" (1.626 m)   Wt 256 lb (116.1 kg)   SpO2 98%   BMI 43.94 kg/m²   8-24 HR RANGE:  TEMP Temp  Av.9 °F (37.2 °C)  Min: 98.4 °F (36.9 °C)  Max: 99.5 °F (02.8 °C)   BP Systolic (72TBS), JHV:498 , Min:109 , HJI:506      Diastolic (32CYW), GFT:25, Min:65, Max:91     PULSE Pulse  Av.5  Min: 101  Max: 129   RR Resp  Av.3  Min: 27  Max: 34   O2 SAT SpO2  Av.9 %  Min: 95 %  Max: 98 %   OXYGEN DELIVERY O2 Flow Rate (L/min)  Av L/min  Min: 4 L/min  Max: 4 L/min        Systemic Examination:   Physical Exam -  Constitutional:  Alert, cooperative and no distress. Mental Status:  Oriented to person, place and time and normal affect. Lungs:  Bilateral air entry present decreased in left side, lung fields clear. Normal effort. Heart:  Regular rate and rhythm, no murmur. Abdomen:  Soft, nontender, nondistended, normal bowel sounds. Extremities:  Bilateral lower extremity edema, negative for redness, tenderness in the calves. Skin:  Warm, dry, no gross lesions or rashes.     DATA REVIEW     Medications: Current Inpatient  Scheduled Meds:   bumetanide  1 mg Oral BID    magnesium sulfate  2,000 mg IntraVENous Q2H    ciprofloxacin  500 mg Oral 2 times per day    metoprolol tartrate  50 mg Oral BID    ipratropium-albuterol  1 ampule Inhalation TID    amiodarone  200 mg Oral Daily    apixaban  5 mg Oral BID    vitamin C  500 mg Oral Daily    atorvastatin  40 mg Oral Daily    fluticasone  1 puff Inhalation BID    citalopram  20 mg Oral Daily    clopidogrel  75 mg Oral Daily    isosorbide mononitrate  30 mg Oral Daily    sodium chloride flush  5-40 mL IntraVENous 2 times per day     Continuous Infusions:   sodium chloride         INPUT/OUTPUT:  In: 540 [P.O.:540]  Out: 2365 [Urine:2365]  Date 21 0000 - 21 3249   Shift [id number] 2017-9194 24 Hour Total   INTAKE   P.O.(mL/kg/hr) 180(0.2)   180   Shift Total(mL/kg) 180(1.6)   180(1.6)   OUTPUT   Urine(mL/kg/hr) 1100(1.2)   1100   Shift Total(mL/kg) 1100(9.5)   1100(9.5)   Weight (kg) 116.1 116.1 116.1 116.1        LABS:  ABGs:   No results for input(s): POCPH, POCPCO2, POCPO2, POCHCO3, EPKW3OQD in the last 72 hours. CBC:   Recent Labs     11/06/21  0520 11/07/21 0635 11/08/21 0724   WBC 14.8* 14.6* 10.2   HGB 11.5* 11.6* 11.9   HCT 36.7 36.4 38.4   MCV 96.8 96.0 99.0    345 296   RBC 3.79* 3.79* 3.88*   MCH 30.3 30.6 30.7   MCHC 31.3 31.9 31.0   RDW 13.0 13.2 13.2     CRP:   No results for input(s): CRP in the last 72 hours. LDH:   No results for input(s): LDH in the last 72 hours. BMP:   Recent Labs     11/06/21  0520 11/06/21 1841 11/07/21 0635 11/08/21 0724   * 131* 133* 133*   K 4.5 4.4 4.5 3.7   CL 94* 93* 95* 94*   CO2 22 22 27 28   BUN 48*  --  38* 25*   CREATININE 1.46*  --  0.99* 0.88   GLUCOSE 104*  --  101* 98   PHOS 3.6  --  3.5 3.1     Liver Function Test:   Recent Labs     11/06/21  0520 11/07/21 0635 11/08/21 0724   LABALBU 2.7* 3.0* 2.8*     Coagulation Profile:   No results for input(s): INR, PROTIME, APTT in the last 72 hours. D-Dimer:  No results for input(s): DDIMER in the last 72 hours. Lactic Acid:  No results for input(s): LACTA in the last 72 hours. Cardiac Enzymes:  No results for input(s): CKTOTAL, CKMB, CKMBINDEX, TROPONINI in the last 72 hours. Invalid input(s): TROPONIN, HSTROP  BNP/ProBNP:   No results for input(s): BNP, PROBNP in the last 72 hours. Triglycerides:  No results for input(s): TRIG in the last 72 hours.      Microbiology:  Urine Culture:  No components found for: CURINE  Blood Culture:  No components found for: CBLOOD, CFUNGUSBL  Sputum Culture:  No components found for: CSPUTUM  No results for input(s): SPECDESC, SPECIAL, CULTURE, STATUS, ORG, CDIFFTOXPCR, CAMPYLOBPCR, SALMONELLAPC, SHIGAPCR, SHIGELLAPCR, MPNEUG, MPNEUM, LACTOQL in the last 72 hours. No results for input(s): SPUTUM, SPECDESC, SPECIAL, CULTURE, STATUS, ORG, CDIFFTOXPCR, MPNEUM, MPNEUG in the last 72 hours. Invalid input(s): CURINE, CBLOOD, CFUNGUSBL     Pathology:    Radiology Reports:  XR CHEST (SINGLE VIEW FRONTAL)   Final Result   Interval improved aeration of the left lung base. XR CHEST (SINGLE VIEW FRONTAL)   Final Result   Interval decrease in left effusion. No pneumothorax. Persistent opacity at   the left lung base. Vascularity is slightly prominent. US THORACENTESIS Which side should the procedure be performed? Left   Final Result   Successful ultrasound guided thoracentesis. US RETROPERITONEAL COMPLETE   Final Result   1. Patient had no urge to void during the exam.  Nonvisualization of ureteral   jets. 2. Incidental cholelithiasis. 3. Unremarkable renal ultrasound. No hydronephrosis. XR CHEST PORTABLE   Final Result   No change from prior study. Persistent sizable left effusion with opacity at   the left lung base and pulmonary vascular congestion.               Echocardiogram:   Results for orders placed during the hospital encounter of 10/02/21    ECHO Complete 2D W Doppler W Color    Narrative  Transthoracic Echocardiography Report (TTE)    Patient Name MCLEAN   Date of Study               10/03/2021  [facility]    Date of      1945  Gender                      Female  Birth    Age          68 year(s)  Race                            Room Number  0124        Height:                     64 inch, 162.56 cm    Corporate ID [id number]    Weight:                     272 pounds, 123.4 kg  #    Patient Acct [de-identified]   BSA:          2.23 m^2      BMI:      46.69  #                                                              kg/m^2    MR #         [id number]     Toi Estrella    Accession #  [id number]  Interpreting Physician      Margarita Handy    Fellow Referring Nurse  Practitioner    Interpreting             Referring Physician         Jeff Beach MD  Fellow    Additional Comments  Technically difficult study due to body habitus    Type of Study    TTE procedure:2D Echocardiogram, M-Mode, Doppler, Color Doppler. Procedure Date  Date: 10/03/2021 Start: 01:22 PM    Study Location: [facility]    Brad / Dinora Loja. Comments:  COPD, Pneumonia, Obesity,    Patient Status: Inpatient    Height: 64 inches Weight: 272.01 pounds BSA: 2.23 m^2 BMI: 46.69 kg/m^2    CONCLUSIONS    Summary  Left ventricle is normal in size with normal systolic function globally. Calculated ejection fraction is 59% ( Atrial-Fib). Mild mitral regurgitation. Mild tricuspid regurgitation. Estimated right ventricular systolic pressure is 35 mmHg. Anterior clear space noted, most likely adipose tissue vs small loculated  pericardial effusion. No evidence of tamponade. Pleural effusion is seen. Signature  ----------------------------------------------------------------------------  Electronically signed by Toi Haddad(Sonographer) on 10/03/2021 02:13  PM  ----------------------------------------------------------------------------    ----------------------------------------------------------------------------  Electronically signed by Margarita Handy(Interpreting physician) on  10/03/2021 03:50 PM  ----------------------------------------------------------------------------  FINDINGS  Left Atrium  Left atrium is moderately dilated. Left Ventricle  Left ventricle is normal in size with normal systolic function globally. Calculated ejection fraction is 59% ( Atrial-Fib)  Right Atrium  Right atrial dilatation. Right Ventricle  Dilated right ventricle with systolic function within the range of normal.  Mitral Valve  Mitral valve structure is normal.  Mild mitral regurgitation. Aortic Valve  Aortic valve is trileaflet.   Aortic sclerosis without stenosis. No aortic insufficiency. Tricuspid Valve  Tricuspid valve structure is normal.  Mild tricuspid regurgitation. Estimated right ventricular systolic pressure is 35 mmHg. Pulmonic Valve  The pulmonic valve is normal in structure. Pericardial Effusion  Anterior clear space noted, most likely adipose tissue vs small loculated  pericardial effusion. No evidence of tamponade. Miscellaneous  Normal aortic root dimension. M-mode / 2D Measurements & Calculations:    LVIDd:5.2 cm(3.7 - 5.6 cm)       Diastolic RKAQPW:88.2 ml  ALFTI:6.5 cm(2.2 - 4.0 cm)       Systolic NQGSTI:34.4 ml  RCGF:0.8 cm(0.6 - 1.1 cm)        Aortic Root:3.6 cm(2.0 - 3.7 cm)  LVPWd:0.8 cm(0.6 - 1.1 cm)       LA Dimension: 4.5 cm(1.9 - 4.0 cm)  Fractional Shortenin.69 %    LA volume/Index: 61.2 ml /27m^2  Calculated LVEF (%): 59.67 %     LVOT:2.2 cm  RVDd:2.3 cm    Mitral:                                 Aortic    Valve Area (P1/2-Time): 4.23 cm^2       Peak Velocity: 1.69 m/s  Peak E-Wave: 0.95 m/s                   Mean Velocity: 1.21 m/s  Peak Gradient: 11.42 mmHg  Peak Gradient: 3.58 mmHg                Mean Gradient: 7 mmHg  Mean Gradient: 2 mmHg  Deceleration Time: 171 msec  P1/2t: 52 msec                          Area (continuity): 2.86 cm^2  AV VTI: 28.6 cm    Area (continuity): 3.71 cm^2  Mean Velocity: 0.66 m/s    Tricuspid:                              Pulmonic:    Estimated RVSP: 35 mmHg                 Peak Velocity: 1.26 m/s  Peak TR Velocity: 2.52 m/s              Peak Gradient: 6.35 mmHg  Peak TR Gradient: 25.4016 mmHg  Estimated RA Pressure: 10 mmHg    Estimated PASP: 35.4 mmHg       ASSESSMENT AND PLAN     Assessment:    Acute hypoxic respiratory failure secondary to left sided pleural effusion with atelectasis. The pleural effusion is an exudate  Acute on chronic diastolic CHF  Obstructive sleep apnea:              Chronic obstructive pulmonary disease  Obesity  A.  Fib    Plan:  Pleural fluid with many neutrophils but no bacteria seen no growth in 2 days  Continue diuresing with lasix. restriction of fluid   Continue oxygen 3 liter and bronchodilator. We will continue to follow. Electronically signed byIsa Vicente MD  11/8/2021 11:05 AM      Please note that this chart was generated using voice recognition Dragon dictation software. Although every effort was made to ensure the accuracy of this automated transcription, some errors in transcription may have occurred.

## 2021-11-08 NOTE — PLAN OF CARE
Problem: Falls - Risk of:  Goal: Will remain free from falls  Description: Will remain free from falls  Outcome: Ongoing  Goal: Absence of physical injury  Description: Absence of physical injury  Outcome: Ongoing     Problem: Skin Integrity:  Goal: Will show no infection signs and symptoms  Description: Will show no infection signs and symptoms  Outcome: Ongoing  Goal: Absence of new skin breakdown  Description: Absence of new skin breakdown  Outcome: Ongoing     Problem: OXYGENATION/RESPIRATORY FUNCTION  Goal: Patient will maintain patent airway  Outcome: Ongoing  Goal: Patient will achieve/maintain normal respiratory rate/effort  Description: Respiratory rate and effort will be within normal limits for the patient  Outcome: Ongoing     Problem: HEMODYNAMIC STATUS  Goal: Patient has stable vital signs and fluid balance  Outcome: Ongoing     Problem: FLUID AND ELECTROLYTE IMBALANCE  Goal: Fluid and electrolyte balance are achieved/maintained  Outcome: Ongoing     Problem: ACTIVITY INTOLERANCE/IMPAIRED MOBILITY  Goal: Mobility/activity is maintained at optimum level for patient  Outcome: Ongoing     Problem: Nutrition  Goal: Optimal nutrition therapy  Outcome: Ongoing     Problem: Pain:  Goal: Pain level will decrease  Description: Pain level will decrease  Outcome: Ongoing  Goal: Control of acute pain  Description: Control of acute pain  Outcome: Ongoing  Goal: Control of chronic pain  Description: Control of chronic pain  Outcome: Ongoing

## 2021-11-08 NOTE — PROGRESS NOTES
NEPHROLOGY PROGRESS NOTE      SUBJECTIVE     Patient seen and examined bedside. Hemodynamically stable. Afebrile. No acute events overnight. Creatinine trending down back to baseline. CASEY resolved. Patient was given additional dose of Bumex yesterday. Urine output about 3 L in last 24 hours. Sodium at 133. Patient states shortness of breath is improved this morning. Patient currently on ciprofloxacin for Pseudomonas UTI. OBJECTIVE     Vitals:    11/08/21 0800 11/08/21 0849 11/08/21 0850 11/08/21 0858   BP: (!) 147/85      Pulse: 116      Resp: 28 28 30 (!) 34   Temp: 99 °F (37.2 °C)      TempSrc: Oral      SpO2: 96% 97% 95% 98%   Weight:       Height:         24HR INTAKE/OUTPUT:      Intake/Output Summary (Last 24 hours) at 11/8/2021 0921  Last data filed at 11/8/2021 0600  Gross per 24 hour   Intake 1260 ml   Output 3140 ml   Net -1880 ml       General appearance:Awake, alert, in no acute distress  HEENT: PERRLA  Respiratory::vesicular breath sounds, reduced air entry  Cardiovascular:S1 S2 normal,no gallop or organic murmur. Abdomen:Non tender/non distended. Bowel sounds present  Extremities: No Cyanosis or Clubbing,Lower extremity edema  Neurological:Alert and oriented. No abnormalities of mood, affect, memory, mentation, or behavior are noted      MEDICATIONS     Scheduled Meds:    magnesium sulfate  2,000 mg IntraVENous Once    ciprofloxacin  500 mg Oral 2 times per day    bumetanide  1 mg Oral Daily    metoprolol tartrate  50 mg Oral BID    ipratropium-albuterol  1 ampule Inhalation TID    amiodarone  200 mg Oral Daily    apixaban  5 mg Oral BID    vitamin C  500 mg Oral Daily    atorvastatin  40 mg Oral Daily    fluticasone  1 puff Inhalation BID    citalopram  20 mg Oral Daily    clopidogrel  75 mg Oral Daily    isosorbide mononitrate  30 mg Oral Daily    sodium chloride flush  5-40 mL IntraVENous 2 times per day     Continuous Infusions:    sodium chloride       PRN Meds:  cyclobenzaprine, sodium chloride flush, sodium chloride, ondansetron **OR** ondansetron, acetaminophen **OR** acetaminophen  Home Meds:                Medications Prior to Admission: amiodarone (CORDARONE) 200 MG tablet, Take 1 tablet by mouth daily  metoprolol tartrate (LOPRESSOR) 50 MG tablet, Take 1 tablet by mouth 2 times daily  Calamine 8-8 % LOTN lotion, Apply to rash on legs  miconazole (MICOTIN) 2 % powder, Apply topically 2 times daily. simethicone (MYLICON) 80 MG chewable tablet, Take 1 tablet by mouth every 6 hours as needed for Flatulence (Abdominal Cramps)  clonazePAM (KLONOPIN) 1 MG tablet, Take 1 tablet by mouth nightly for 3 days. apixaban (ELIQUIS) 5 MG TABS tablet, Take 1 tablet by mouth 2 times daily  atorvastatin (LIPITOR) 40 MG tablet, Take 1 tablet by mouth daily  citalopram (CELEXA) 40 MG tablet, Take 0.5 tablets by mouth daily  losartan (COZAAR) 100 MG tablet, Take 1 tablet by mouth daily  meloxicam (MOBIC) 15 MG tablet, Take 15 mg by mouth daily  clopidogrel (PLAVIX) 75 MG tablet, Take 1 tablet by mouth daily  cyclobenzaprine (FLEXERIL) 10 MG tablet, TAKE ONE TABLET BY MOUTH THREE TIMES DAILY AS NEEDED FOR MUSCLE SPASM  diphenhydrAMINE (BENADRYL) 25 MG capsule, Take 25 mg by mouth every 6 hours as needed for Itching  zinc oxide (DESITIN) 40 % ointment, Apply topically as needed for Dry Skin Apply topically as needed.   furosemide (LASIX) 20 MG tablet, Take 1 tablet by mouth daily  isosorbide mononitrate (IMDUR) 30 MG extended release tablet, Take 1 tablet by mouth daily  albuterol sulfate  (90 Base) MCG/ACT inhaler, Inhale 2 puffs into the lungs every 6 hours as needed for Wheezing  Calcium Carb-Cholecalciferol (CALCIUM + D3 PO), Take by mouth daily  CINNAMON PO, Take by mouth daily  TURMERIC CURCUMIN PO, Take by mouth daily  ipratropium-albuterol (DUONEB) 0.5-2.5 (3) MG/3ML SOLN nebulizer solution, Inhale 1 vial into the lungs every 4 hours  beclomethasone (QVAR) 80 MCG/ACT inhaler, Inhale 2 puffs into the lungs 2 times daily  LYSINE PO, Take by mouth daily  vitamin D (CHOLECALCIFEROL) 1000 UNIT TABS tablet, Take 1,000 Units by mouth daily  Pyridoxine HCl (VITAMIN B-6) 50 MG tablet, Take 100 mg by mouth daily  Cyanocobalamin (VITAMIN B 12 PO), Take by mouth daily  Magnesium 500 MG TABS, Take by mouth  Ascorbic Acid (VITAMIN C) 500 MG tablet, Take 500 mg by mouth daily    INVESTIGATIONS     Last 3 CMP:    Recent Labs     11/06/21  0520 11/06/21  0520 11/06/21  1841 11/07/21  0635 11/08/21  0724   *   < > 131* 133* 133*   K 4.5   < > 4.4 4.5 3.7   CL 94*   < > 93* 95* 94*   CO2 22   < > 22 27 28   BUN 48*  --   --  38* 25*   CREATININE 1.46*  --   --  0.99* 0.88   CALCIUM 8.7  --   --  8.7 8.7   LABALBU 2.7*  --   --  3.0* 2.8*    < > = values in this interval not displayed. Last 3 CBC:  Recent Labs     11/06/21  0520 11/07/21  0635 11/08/21  0724   WBC 14.8* 14.6* 10.2   RBC 3.79* 3.79* 3.88*   HGB 11.5* 11.6* 11.9   HCT 36.7 36.4 38.4   MCV 96.8 96.0 99.0   MCH 30.3 30.6 30.7   MCHC 31.3 31.9 31.0   RDW 13.0 13.2 13.2    345 296   MPV 9.5 9.1 8.8       ASSESSMENT   1. Acute Kidney Injury likely pre-renal secondary to reduced E ABV from CHF and worsen further by concomitant nonsteroidal anti-inflammatory agent use on a chronic basis - Resolved. 2. HFpEF   3. Dilutional hyponatremia - improving  4. Persistant left sided pleural effusion status post thoracocentesis  5. A.fib s/p CURT cardioversion  6. UTI    PLAN     Fluid restriction to continue for hyponatremia  Continue PO Bumex  We will sign off. Please do not hesitate to call with questions    German Adam MD  Internal Medicine Resident, PGY-2  [address], [place]  11/8/2021,9:21 AM     Attending Physician Statement  I have discussed the care of John Vargas, including pertinent history and exam findings with the resident/fellow.  I have reviewed the key elements of all parts of the encounter with the resident/fellow. I have seen and examined the patient with the resident/fellow. I agree with the assessment and plan and status of the problem list as documented. Addiitionally I recommend Nephrology signing off.     Hermelindo Rider MD   Nephrology Attending Physician  [facility]  11/8/2021

## 2021-11-08 NOTE — PLAN OF CARE
BRONCHOSPASM/BRONCHOCONSTRICTION     [x]         IMPROVE AERATION/BREATH SOUNDS  [x]   ADMINISTER BRONCHODILATOR THERAPY AS APPROPRIATE  [x]   ASSESS BREATH SOUNDS  []   IMPLEMENT AEROSOL/MDI PROTOCOL  [x]   PATIENT EDUCATION AS NEEDED   PROVIDE ADEQUATE OXYGENATION WITH ACCEPTABLE SP02/ABG'S    [x]  IDENTIFY APPROPRIATE OXYGEN THERAPY  [x]   MONITOR SP02/ABG'S AS NEEDED   [x]   PATIENT EDUCATION AS NEEDED   NONINVASIVE VENTILATION    PROVIDE OPTIMAL VENTILATION/ACCEPTABLE SPO2   IMPLEMENT NONINVASIVE VENTILATION PROTOCOL   MAINTAIN ACCEPTABLE SPO2   ASSESS SKIN INTEGRITY/BREAKDOWN SCORE   PATIENT EDUCATION AS NEEDED   BIPAP AS NEEDED

## 2021-11-09 ENCOUNTER — APPOINTMENT (OUTPATIENT)
Dept: GENERAL RADIOLOGY | Age: 76
DRG: 208 | End: 2021-11-09
Payer: MEDICARE

## 2021-11-09 LAB
ALBUMIN SERPL-MCNC: 2.7 G/DL (ref 3.5–5.2)
ANION GAP SERPL CALCULATED.3IONS-SCNC: 10 MMOL/L (ref 9–17)
BUN BLDV-MCNC: 20 MG/DL (ref 8–23)
BUN/CREAT BLD: ABNORMAL (ref 9–20)
CALCIUM SERPL-MCNC: 8.4 MG/DL (ref 8.6–10.4)
CHLORIDE BLD-SCNC: 93 MMOL/L (ref 98–107)
CO2: 32 MMOL/L (ref 20–31)
CREAT SERPL-MCNC: 0.81 MG/DL (ref 0.5–0.9)
GFR AFRICAN AMERICAN: >60 ML/MIN
GFR NON-AFRICAN AMERICAN: >60 ML/MIN
GFR SERPL CREATININE-BSD FRML MDRD: ABNORMAL ML/MIN/{1.73_M2}
GFR SERPL CREATININE-BSD FRML MDRD: ABNORMAL ML/MIN/{1.73_M2}
GLUCOSE BLD-MCNC: 99 MG/DL (ref 70–99)
HCT VFR BLD CALC: 35.2 % (ref 36.3–47.1)
HEMOGLOBIN: 11.3 G/DL (ref 11.9–15.1)
MAGNESIUM: 1.7 MG/DL (ref 1.6–2.6)
MCH RBC QN AUTO: 30.6 PG (ref 25.2–33.5)
MCHC RBC AUTO-ENTMCNC: 32.1 G/DL (ref 28.4–34.8)
MCV RBC AUTO: 95.4 FL (ref 82.6–102.9)
NRBC AUTOMATED: 0 PER 100 WBC
PDW BLD-RTO: 13.1 % (ref 11.8–14.4)
PHOSPHORUS: 3.4 MG/DL (ref 2.6–4.5)
PLATELET # BLD: 276 K/UL (ref 138–453)
PMV BLD AUTO: 8.9 FL (ref 8.1–13.5)
POTASSIUM SERPL-SCNC: 3.3 MMOL/L (ref 3.7–5.3)
RBC # BLD: 3.69 M/UL (ref 3.95–5.11)
SODIUM BLD-SCNC: 135 MMOL/L (ref 135–144)
TROPONIN INTERP: ABNORMAL
TROPONIN T: ABNORMAL NG/ML
TROPONIN, HIGH SENSITIVITY: 22 NG/L (ref 0–14)
WBC # BLD: 8.3 K/UL (ref 3.5–11.3)

## 2021-11-09 PROCEDURE — 2580000003 HC RX 258: Performed by: NURSE PRACTITIONER

## 2021-11-09 PROCEDURE — 84132 ASSAY OF SERUM POTASSIUM: CPT

## 2021-11-09 PROCEDURE — 97530 THERAPEUTIC ACTIVITIES: CPT

## 2021-11-09 PROCEDURE — 84484 ASSAY OF TROPONIN QUANT: CPT

## 2021-11-09 PROCEDURE — 85027 COMPLETE CBC AUTOMATED: CPT

## 2021-11-09 PROCEDURE — 83735 ASSAY OF MAGNESIUM: CPT

## 2021-11-09 PROCEDURE — 2500000003 HC RX 250 WO HCPCS: Performed by: INTERNAL MEDICINE

## 2021-11-09 PROCEDURE — 94761 N-INVAS EAR/PLS OXIMETRY MLT: CPT

## 2021-11-09 PROCEDURE — 6370000000 HC RX 637 (ALT 250 FOR IP): Performed by: NURSE PRACTITIONER

## 2021-11-09 PROCEDURE — 2060000000 HC ICU INTERMEDIATE R&B

## 2021-11-09 PROCEDURE — 99233 SBSQ HOSP IP/OBS HIGH 50: CPT | Performed by: INTERNAL MEDICINE

## 2021-11-09 PROCEDURE — 97535 SELF CARE MNGMENT TRAINING: CPT

## 2021-11-09 PROCEDURE — 94640 AIRWAY INHALATION TREATMENT: CPT

## 2021-11-09 PROCEDURE — 36415 COLL VENOUS BLD VENIPUNCTURE: CPT

## 2021-11-09 PROCEDURE — 6370000000 HC RX 637 (ALT 250 FOR IP): Performed by: INTERNAL MEDICINE

## 2021-11-09 PROCEDURE — 6370000000 HC RX 637 (ALT 250 FOR IP): Performed by: STUDENT IN AN ORGANIZED HEALTH CARE EDUCATION/TRAINING PROGRAM

## 2021-11-09 PROCEDURE — 80069 RENAL FUNCTION PANEL: CPT

## 2021-11-09 PROCEDURE — 97110 THERAPEUTIC EXERCISES: CPT

## 2021-11-09 PROCEDURE — 71045 X-RAY EXAM CHEST 1 VIEW: CPT

## 2021-11-09 RX ORDER — METOPROLOL TARTRATE 5 MG/5ML
5 INJECTION INTRAVENOUS ONCE
Status: COMPLETED | OUTPATIENT
Start: 2021-11-09 | End: 2021-11-09

## 2021-11-09 RX ORDER — SENNA AND DOCUSATE SODIUM 50; 8.6 MG/1; MG/1
2 TABLET, FILM COATED ORAL DAILY PRN
Status: DISCONTINUED | OUTPATIENT
Start: 2021-11-09 | End: 2021-11-13

## 2021-11-09 RX ORDER — PREDNISONE 20 MG/1
20 TABLET ORAL DAILY
Status: DISCONTINUED | OUTPATIENT
Start: 2021-11-09 | End: 2021-11-12

## 2021-11-09 RX ADMIN — BUMETANIDE 1 MG: 1 TABLET ORAL at 09:06

## 2021-11-09 RX ADMIN — PREDNISONE 20 MG: 20 TABLET ORAL at 16:33

## 2021-11-09 RX ADMIN — OXYCODONE HYDROCHLORIDE AND ACETAMINOPHEN 500 MG: 500 TABLET ORAL at 09:06

## 2021-11-09 RX ADMIN — METOPROLOL TARTRATE 5 MG: 1 INJECTION, SOLUTION INTRAVENOUS at 15:29

## 2021-11-09 RX ADMIN — IPRATROPIUM BROMIDE AND ALBUTEROL SULFATE 1 AMPULE: .5; 2.5 SOLUTION RESPIRATORY (INHALATION) at 08:34

## 2021-11-09 RX ADMIN — APIXABAN 5 MG: 5 TABLET, FILM COATED ORAL at 20:32

## 2021-11-09 RX ADMIN — ISOSORBIDE MONONITRATE 30 MG: 30 TABLET ORAL at 09:07

## 2021-11-09 RX ADMIN — FLUTICASONE PROPIONATE 1 PUFF: 110 AEROSOL, METERED RESPIRATORY (INHALATION) at 08:34

## 2021-11-09 RX ADMIN — CITALOPRAM 20 MG: 20 TABLET, FILM COATED ORAL at 09:07

## 2021-11-09 RX ADMIN — METOPROLOL TARTRATE 50 MG: 50 TABLET, FILM COATED ORAL at 20:32

## 2021-11-09 RX ADMIN — SODIUM CHLORIDE, PRESERVATIVE FREE 10 ML: 5 INJECTION INTRAVENOUS at 20:33

## 2021-11-09 RX ADMIN — CIPROFLOXACIN 500 MG: 500 TABLET, FILM COATED ORAL at 09:06

## 2021-11-09 RX ADMIN — METOPROLOL TARTRATE 50 MG: 50 TABLET, FILM COATED ORAL at 09:07

## 2021-11-09 RX ADMIN — METOPROLOL TARTRATE 5 MG: 1 INJECTION, SOLUTION INTRAVENOUS at 12:57

## 2021-11-09 RX ADMIN — APIXABAN 5 MG: 5 TABLET, FILM COATED ORAL at 09:06

## 2021-11-09 RX ADMIN — IPRATROPIUM BROMIDE AND ALBUTEROL SULFATE 1 AMPULE: .5; 2.5 SOLUTION RESPIRATORY (INHALATION) at 14:12

## 2021-11-09 RX ADMIN — BUMETANIDE 1 MG: 1 TABLET ORAL at 20:32

## 2021-11-09 RX ADMIN — FLUTICASONE PROPIONATE 1 PUFF: 110 AEROSOL, METERED RESPIRATORY (INHALATION) at 21:34

## 2021-11-09 RX ADMIN — CIPROFLOXACIN 500 MG: 500 TABLET, FILM COATED ORAL at 20:32

## 2021-11-09 RX ADMIN — DESMOPRESSIN ACETATE 40 MG: 0.2 TABLET ORAL at 09:06

## 2021-11-09 RX ADMIN — SODIUM CHLORIDE, PRESERVATIVE FREE 10 ML: 5 INJECTION INTRAVENOUS at 09:08

## 2021-11-09 RX ADMIN — CLOPIDOGREL 75 MG: 75 TABLET, FILM COATED ORAL at 09:07

## 2021-11-09 RX ADMIN — IPRATROPIUM BROMIDE AND ALBUTEROL SULFATE 1 AMPULE: .5; 2.5 SOLUTION RESPIRATORY (INHALATION) at 21:34

## 2021-11-09 RX ADMIN — AMIODARONE HYDROCHLORIDE 200 MG: 200 TABLET ORAL at 09:06

## 2021-11-09 ASSESSMENT — PAIN SCALES - WONG BAKER
WONGBAKER_NUMERICALRESPONSE: 0

## 2021-11-09 ASSESSMENT — PAIN SCALES - GENERAL
PAINLEVEL_OUTOF10: 3
PAINLEVEL_OUTOF10: 0
PAINLEVEL_OUTOF10: 1
PAINLEVEL_OUTOF10: 0
PAINLEVEL_OUTOF10: 0
PAINLEVEL_OUTOF10: 2

## 2021-11-09 ASSESSMENT — PAIN DESCRIPTION - DESCRIPTORS
DESCRIPTORS: CRAMPING
DESCRIPTORS: CRAMPING
DESCRIPTORS: ACHING;DISCOMFORT

## 2021-11-09 ASSESSMENT — PAIN DESCRIPTION - LOCATION
LOCATION: LEG
LOCATION: LEG

## 2021-11-09 ASSESSMENT — PAIN DESCRIPTION - ONSET: ONSET: ON-GOING

## 2021-11-09 ASSESSMENT — PAIN DESCRIPTION - FREQUENCY: FREQUENCY: CONTINUOUS

## 2021-11-09 ASSESSMENT — PAIN DESCRIPTION - PAIN TYPE: TYPE: ACUTE PAIN

## 2021-11-09 ASSESSMENT — PAIN DESCRIPTION - ORIENTATION: ORIENTATION: RIGHT;LEFT

## 2021-11-09 NOTE — PROGRESS NOTES
Physical Therapy  Facility/Department: [facility] CAR 2  Daily Treatment Note  NAME: Tino Fuentes  : 1945  MRN: [id number]    Date of Service: 2021    Discharge Recommendations: Further therapy recommended at discharge. PT Equipment Recommendations  Equipment Needed: No    Assessment   Body structures, Functions, Activity limitations: Decreased functional mobility ; Decreased balance; Decreased endurance; Decreased strength  Assessment: The pt required Kalyn for supine to sit, Min A for transfers and CGA to ambulate 3ft with RW. Recommend continued PT to address deficits and progress mobility. The pt currently requires 24hr assistance for functional mobility due to endurance deficits and increased risk of falls. Prognosis: Good  Decision Making: Medium Complexity  PT Education: Goals; PT Role; Plan of Care; Functional Mobility Training; Home Exercise Program; General Safety; Energy Conservation  REQUIRES PT FOLLOW UP: Yes  Activity Tolerance  Activity Tolerance: Patient limited by endurance     Patient Diagnosis(es): The primary encounter diagnosis was Acute on chronic congestive heart failure, unspecified heart failure type ([facility] [address] Diagnoses of CASEY (acute kidney injury) ([address] and Acute on chronic diastolic heart failure ([address] were also pertinent to this visit. has a past medical history of Asthma, COPD (chronic obstructive pulmonary disease) ([address] Gout, Hyperlipidemia, Hypertension, Mitral insufficiency, and Pneumonia. has a past surgical history that includes Hysterectomy (); knee surgery; Colonoscopy; Dental surgery; Tonsillectomy and adenoidectomy; Ankle surgery (Left, 2016); pr colsc flx w/rmvl of tumor polyp lesion snare tq (N/A, 2017); and bronchoscopy (N/A, 10/20/2021).     Restrictions  Restrictions/Precautions  Restrictions/Precautions: Fall Risk  Required Braces or Orthoses?: No  Position Activity Restriction  Other position/activity restrictions: up with assist  Subjective   General  Response To Previous Treatment: Patient with no complaints from previous session. Family / Caregiver Present: No  Subjective  Subjective: RN and pt agreeable to PT. pt agreeable and pleasant. Pt supine in bed at start of session. Pain Screening  Patient Currently in Pain: Yes  Pain Assessment  Pain Assessment: 0-10  Pain Level: 3  Pain Type: Acute pain  Pain Location: Leg  Pain Orientation: Right; Left  Pain Descriptors: Aching; Discomfort  Pain Frequency: Continuous  Pain Onset: On-going  Non-Pharmaceutical Pain Intervention(s): Repositioned; Ambulation/Increased Activity  Response to Pain Intervention: Patient Satisfied  Vital Signs  Patient Currently in Pain: Yes       Orientation  Orientation  Overall Orientation Status: Within Functional Limits  Cognition   Cognition  Overall Cognitive Status: WFL  Objective   Bed mobility  Supine to Sit: Minimal assistance (HHA for trunk progression)  Sit to Supine: Unable to assess  Scooting: Contact guard assistance  Comment: HOB elevated. Increased time and effort for task  Transfers  Sit to Stand: Minimal Assistance  Stand to sit: Minimal Assistance  Comment: Transfers performed with RW, verbal cues for UE placement with good return. Ambulation  Ambulation?: Yes  Ambulation 1  Surface: level tile  Device: Rolling Walker  Other Apparatus: O2 (4L per NC)  Assistance: Contact guard assistance  Quality of Gait: flexed posture, high reliance on RW  Gait Deviations: Slow Radha; Increased DONNA; Decreased step length; Decreased step height  Distance: 3 ft  Comments: Minimally unsteady, good progression of task. Stairs/Curb  Stairs?: No     Balance  Posture: Fair  Sitting - Static: Good; -  Sitting - Dynamic: Fair; +  Standing - Static: Fair  Standing - Dynamic: Fair  Comments: standing balance assessed with RW              Seated LE exercise program: Long Arc Quads, hip abduction/adduction, heel/toe raises, and marches.  Reps: *10-15x AROM BLE  Multiple rest breaks d/t decreased endurance with education on pursed lip breathing d/t increased RR, highest noted was 52 BPM. Recovers to below 30 with 1 minute rest breaks.                                                           AM-PAC Score  AM-PAC Inpatient Mobility Raw Score : 17 (11/09/21 1054)  AM-PAC Inpatient T-Scale Score : 42.13 (11/09/21 1054)  Mobility Inpatient CMS 0-100% Score: 50.57 (11/09/21 1054)  Mobility Inpatient CMS G-Code Modifier : CK (11/09/21 1054)          Goals  Short term goals  Time Frame for Short term goals: 14 visits  Short term goal 1: Perform bed mobility with SBA  Short term goal 2: Perform sit to stand transfer with CGA  Short term goal 3: Ambulate 200ft with RW and CGA  Short term goal 4: Demo Fair+ dynamic standing balance to decrease risk of falls  Short term goal 5: Participate in 30 minutes of therapy to demo increased endurance    Plan    Plan  Times per week: 5x/wk  Current Treatment Recommendations: Strengthening, ROM, Balance Training, Functional Mobility Training, Gait Training, Stair training, Endurance Training, Home Exercise Program, Safety Education & Training, Patient/Caregiver Education & Training, Transfer Training  Safety Devices  Type of devices: Nurse notified, Gait belt, Left in chair, Chair alarm in place, All fall risk precautions in place, Call light within reach  Restraints  Initially in place: No     Therapy Time   Individual Concurrent Group Co-treatment   Time In 0855         Time Out 0925         Minutes 30         Timed Code Treatment Minutes: [facility], PT

## 2021-11-09 NOTE — CARE COORDINATION
Transitional planning- called Ines with [facility] intake.  She started precert for return to [facility]

## 2021-11-09 NOTE — PROGRESS NOTES
Risk  Required Braces or Orthoses?: No  Position Activity Restriction  Other position/activity restrictions: up with assist  Subjective   General  Chart Reviewed: Yes  Patient assessed for rehabilitation services?: Yes  Family / Caregiver Present: No  Diagnosis: CASEY  General Comment  Comments: RN ok'd for therapy this morning. pt agreeable to participate in session and coopertive/pleasant throughout  Vital Signs  Patient Currently in Pain: Denies   Orientation  Orientation  Overall Orientation Status: Within Functional Limits  Objective    ADL  Grooming: Modified independent ; Setup; Increased time to complete (Pt completed denture care and washed face seated in chair)  UE Dressing: Moderate assistance (Mod A to adjust and tie gown sitting in chair d/t limited reach)  LE Dressing: Minimal assistance (Min A to adjust socks sitting in chair d/t limited reach)  Additional Comments: ADLs limited this date. Pt HR fluctuating from 100-155 seated in chair.  RN notified and aware        Balance  Sitting Balance: Modified independent  (Seated in chair)  Standing Balance: Unable to assess(comment)  Standing Balance  Comment: WANG, pt HR fluctuating between 100-155 seated in chair  Functional Mobility  Functional Mobility Comments: WANG  Bed mobility  Supine to Sit: Unable to assess  Sit to Supine: Unable to assess  Comment: Pt seated in chair during tx this date  Transfers  Sit to stand: Unable to assess  Stand to sit: Unable to assess  Transfer Comments: Pt not appropriate to attempt standing this date d/t increased HR seated in chair, RN notified and aware     Cognition  Overall Cognitive Status: [address] [place] [address]  Times per week: 3-4x/wk  AM-PAC Score        AM-[facility] Inpatient Daily Activity Raw Score: 18 (11/09/21 1535)  AM-PAC Inpatient ADL T-Scale Score : 38.66 (11/09/21 1535)  ADL Inpatient CMS 0-100% Score: 46.65 (11/09/21 1535)  ADL Inpatient CMS G-Code Modifier : CK (11/09/21 1535)    Goals  Short term goals  Time Frame for Short term goals: pt will, by discharge  Short term goal 1: complete LB ADLs with min A, set up and AE, as needed  Short term goal 2: complete UB ADLs with mod I  Short term goal 3: increase activity tolerance to 20+ minutes in order to participate in daily tasks  Short term goal 4: dem SBA During functional transfers/functional mobility with LRD ,as needed  Short term goal 5: dem ~6 minutes static/dynamic standing tolerance with SBA in order to complete functional tasks       Therapy Time   Individual Concurrent Group Co-treatment   Time In 1044         Time Out 1108         Minutes 24         Timed Code Treatment Minutes: 24 Minutes   Pt in chair upon arrival, pleasant and agreeable to tx this date.  Pt limited this date d/t increased HR while seated in chair, RN notified and aware     NEHEMIAH Bowens/MICHEAL

## 2021-11-09 NOTE — PROGRESS NOTES
PULMONARY & CRITICAL CARE MEDICINE PROGRESS NOTE     Patient:  Nadine Beltran  MRN: [id number]  Admit date: 11/3/2021  Primary Care Physician: Ceferino Burciaga, APRN - CNP  Consulting Physician: Terence Atkinson DO  CODE Status: Full Code  LOS: 6     SUBJECTIVE     Chief Complaint/ Reason for consult:  Left pleural effusion    Hospital Course: The patient is a 68 y.o. female here for evaluation of shortness of breath with clear sputum, increased leg swelling. Patient has chronic shortness of breath which has been worsening over the last couple of days along with increased swelling of her lower extremity. She has conversational dyspnea, no wheezing. Chest x-ray shows left pleural effusion and left atelectasis she has had multiple thoracentesis. Complains of abdominal bloating, orthopnea, palpitations. She was found to be in A. fib with heart rate in 110s. Interval History:  11/09/21  NO new acute event overnight. Pt was seen and examined at bedside. Saturating appropriately at 4 liter via nasal canula. Vitals stable. Patient was on a fib with rate stable. As per cardiology she was planned for a fib ablation  As outpatient in last visit. Labs reviewed. Urine culture positive for  pesudomonas on ciprofloxacin  Pleural fluid appears exudative with cholestrol in pleural fluid was 67,  and protein 3.4. negative for bacteria and negative for malignant cell. Serum protein and pleural protein gradient 2.8 which appear transudative. Patient currently being diuresed with bumex 1 mg daily with output of 1500 ml and net negative of 4.8 liter . HCO3 coming up will keep eye on it. As per primary one more day of diuresis and then plan for facility placement. Review Of Systems:  Review of Systems   Constitutional: Negative. HENT: Negative. Eyes: Negative. Respiratory: Positive for shortness of breath. Cardiovascular: Negative. Gastrointestinal: Negative.     Endocrine: Negative. Genitourinary: Negative. Musculoskeletal: Negative. Skin: Negative. Allergic/Immunologic: Negative. Psychiatric/Behavioral: Negative. OBJECTIVE     PaO2/FiO2 RATIO:  No results for input(s): POCPO2 in the last 72 hours. FiO2 : 36 %     VITAL SIGNS:   LAST:  BP (!) 133/90   Pulse 98   Temp 97.7 °F (36.5 °C) (Oral)   Resp 18   Ht 5' 4\" (1.626 m)   Wt 253 lb 15.5 oz (115.2 kg)   SpO2 96%   BMI 43.59 kg/m²   8-24 HR RANGE:  TEMP Temp  Av.1 °F (36.7 °C)  Min: 97.7 °F (36.5 °C)  Max: 98.7 °F (29.2 °C)   BP Systolic (83ZSQ), PPC:835 , Min:109 , HF      Diastolic (33BXP), DNR:50, Min:65, Max:99     PULSE Pulse  Av.7  Min: 86  Max: 107   RR Resp  Av.5  Min: 18  Max: 21   O2 SAT SpO2  Av %  Min: 96 %  Max: 96 %   OXYGEN DELIVERY O2 Flow Rate (L/min)  Av L/min  Min: 4 L/min  Max: 4 L/min        Systemic Examination:   Physical Exam -  Constitutional:  Alert, cooperative and no distress. Mental Status:  Oriented to person, place and time and normal affect. Lungs:  Bilateral air entry present decreased in left side, lung fields clear. Normal effort. Heart:  Regular rate and rhythm, no murmur. Abdomen:  Soft, nontender, nondistended, normal bowel sounds. Extremities:  Bilateral lower extremity edema, negative for redness, tenderness in the calves. Skin:  Warm, dry, no gross lesions or rashes.     DATA REVIEW     Medications: Current Inpatient  Scheduled Meds:   bumetanide  1 mg Oral BID    ciprofloxacin  500 mg Oral 2 times per day    metoprolol tartrate  50 mg Oral BID    ipratropium-albuterol  1 ampule Inhalation TID    amiodarone  200 mg Oral Daily    apixaban  5 mg Oral BID    vitamin C  500 mg Oral Daily    atorvastatin  40 mg Oral Daily    fluticasone  1 puff Inhalation BID    citalopram  20 mg Oral Daily    clopidogrel  75 mg Oral Daily    isosorbide mononitrate  30 mg Oral Daily    sodium chloride flush  5-40 mL IntraVENous 2 times per day     Continuous Infusions:   sodium chloride         INPUT/OUTPUT:  In: 4819 [P.O.:1190; I.V.:175]  Out: 1550 [Urine:1550]  Date 11/09/21 0000 - 11/09/21 2359   Shift 5099-0411 7989-0495 5695-9261 24 Hour Total   INTAKE   P.O.(mL/kg/hr) 250(0.3)   250   Shift Total(mL/kg) 250(2.2)   250(2.2)   OUTPUT   Urine(mL/kg/hr) 600(0.7)   600   Shift Total(mL/kg) 600(5.2)   600(5.2)   Weight (kg) 115.2 115.2 115.2 115.2        LABS:  ABGs:   No results for input(s): POCPH, POCPCO2, POCPO2, POCHCO3, ZFPO4CWL in the last 72 hours. CBC:   Recent Labs     11/07/21  0635 11/08/21 0724 11/09/21 0629   WBC 14.6* 10.2 8.3   HGB 11.6* 11.9 11.3*   HCT 36.4 38.4 35.2*   MCV 96.0 99.0 95.4    296 276   RBC 3.79* 3.88* 3.69*   MCH 30.6 30.7 30.6   MCHC 31.9 31.0 32.1   RDW 13.2 13.2 13.1     CRP:   No results for input(s): CRP in the last 72 hours. LDH:   No results for input(s): LDH in the last 72 hours. BMP:   Recent Labs     11/06/21  1841 11/07/21 0635 11/08/21 0724 11/09/21 0629   * 133* 133* 135   K 4.4 4.5 3.7 3.3*   CL 93* 95* 94* 93*   CO2 22 27 28 32*   BUN  --  38* 25* 20   CREATININE  --  0.99* 0.88 0.81   GLUCOSE  --  101* 98 99   PHOS  --  3.5 3.1 3.4     Liver Function Test:   Recent Labs     11/07/21  0635 11/08/21 0724 11/09/21 0629   LABALBU 3.0* 2.8* 2.7*     Coagulation Profile:   No results for input(s): INR, PROTIME, APTT in the last 72 hours. D-Dimer:  No results for input(s): DDIMER in the last 72 hours. Lactic Acid:  No results for input(s): LACTA in the last 72 hours. Cardiac Enzymes:  No results for input(s): CKTOTAL, CKMB, CKMBINDEX, TROPONINI in the last 72 hours. Invalid input(s): TROPONIN, HSTROP  BNP/ProBNP:   No results for input(s): BNP, PROBNP in the last 72 hours. Triglycerides:  No results for input(s): TRIG in the last 72 hours.      Microbiology:  Urine Culture:  No components found for: CURINE  Blood Culture:  No components found for: CBLOOD, CFUNGUSBL  Sputum Culture:  No components found for: CSPUTUM  No results for input(s): SPECDESC, SPECIAL, CULTURE, STATUS, ORG, CDIFFTOXPCR, CAMPYLOBPCR, SALMONELLAPC, SHIGAPCR, SHIGELLAPCR, MPNEUG, MPNEUM, LACTOQL in the last 72 hours. No results for input(s): SPUTUM, SPECDESC, SPECIAL, CULTURE, STATUS, ORG, CDIFFTOXPCR, MPNEUM, MPNEUG in the last 72 hours. Invalid input(s): CURINE, CBLOOD, CFUNGUSBL     Pathology:    Radiology Reports:  XR CHEST (SINGLE VIEW FRONTAL)   Final Result   Interval improved aeration of the left lung base. XR CHEST (SINGLE VIEW FRONTAL)   Final Result   Interval decrease in left effusion. No pneumothorax. Persistent opacity at   the left lung base. Vascularity is slightly prominent. US THORACENTESIS Which side should the procedure be performed? Left   Final Result   Successful ultrasound guided thoracentesis. US RETROPERITONEAL COMPLETE   Final Result   1. Patient had no urge to void during the exam.  Nonvisualization of ureteral   jets. 2. Incidental cholelithiasis. 3. Unremarkable renal ultrasound. No hydronephrosis. XR CHEST PORTABLE   Final Result   No change from prior study. Persistent sizable left effusion with opacity at   the left lung base and pulmonary vascular congestion.               Echocardiogram:   Results for orders placed during the hospital encounter of 10/02/21    ECHO Complete 2D W Doppler W Color    Narrative  Transthoracic Echocardiography Report (TTE)    Patient Name MCLEAN   Date of Study               10/03/2021  1 [facility]    Date of      1945  Gender                      Female  Birth    Age          68 year(s)  Race                            Room Number  0124        Height:                     64 inch, 162.56 cm    Corporate ID [id number]    Weight:                     272 pounds, 123.4 kg  #    Patient Acct [de-identified]   BSA:          2.23 m^2      BMI:      46.69  # kg/m^2    MR #         [id number]     Sonographer                 Marilee Goyal    Accession #  [id number]  Interpreting Physician      Margarita Handy    Fellow                   Referring Nurse  Practitioner    Interpreting             Referring Physician         Erma Sandhu MD  Fellow    Additional Comments  Technically difficult study due to body habitus    Type of Study    TTE procedure:2D Echocardiogram, M-Mode, Doppler, Color Doppler. Procedure Date  Date: 10/03/2021 Start: 01:22 PM    Study Location: [facility]    Brad / Akira Hammond. Comments:  COPD, Pneumonia, Obesity,    Patient Status: Inpatient    Height: 64 inches Weight: 272.01 pounds BSA: 2.23 m^2 BMI: 46.69 kg/m^2    CONCLUSIONS    Summary  Left ventricle is normal in size with normal systolic function globally. Calculated ejection fraction is 59% ( Atrial-Fib). Mild mitral regurgitation. Mild tricuspid regurgitation. Estimated right ventricular systolic pressure is 35 mmHg. Anterior clear space noted, most likely adipose tissue vs small loculated  pericardial effusion. No evidence of tamponade. Pleural effusion is seen. Signature  ----------------------------------------------------------------------------  Electronically signed by Toi Mccloud(Sonographer) on 10/03/2021 02:13  PM  ----------------------------------------------------------------------------    ----------------------------------------------------------------------------  Electronically signed by Margarita Handy(Interpreting physician) on  10/03/2021 03:50 PM  ----------------------------------------------------------------------------  FINDINGS  Left Atrium  Left atrium is moderately dilated. Left Ventricle  Left ventricle is normal in size with normal systolic function globally. Calculated ejection fraction is 59% ( Atrial-Fib)  Right Atrium  Right atrial dilatation.   Right Ventricle  Dilated right ventricle with systolic function within the range of normal.  Mitral Valve  Mitral valve structure is normal.  Mild mitral regurgitation. Aortic Valve  Aortic valve is trileaflet. Aortic sclerosis without stenosis. No aortic insufficiency. Tricuspid Valve  Tricuspid valve structure is normal.  Mild tricuspid regurgitation. Estimated right ventricular systolic pressure is 35 mmHg. Pulmonic Valve  The pulmonic valve is normal in structure. Pericardial Effusion  Anterior clear space noted, most likely adipose tissue vs small loculated  pericardial effusion. No evidence of tamponade. Miscellaneous  Normal aortic root dimension. M-mode / 2D Measurements & Calculations:    LVIDd:5.2 cm(3.7 - 5.6 cm)       Diastolic ICYUMU:09.2 ml  PFSQU:5.2 cm(2.2 - 4.0 cm)       Systolic DXGOZS:60.9 ml  XEFY:4.1 cm(0.6 - 1.1 cm)        Aortic Root:3.6 cm(2.0 - 3.7 cm)  LVPWd:0.8 cm(0.6 - 1.1 cm)       LA Dimension: 4.5 cm(1.9 - 4.0 cm)  Fractional Shortenin.69 %    LA volume/Index: 61.2 ml /27m^2  Calculated LVEF (%): 59.67 %     LVOT:2.2 cm  RVDd:2.3 cm    Mitral:                                 Aortic    Valve Area (P1/2-Time): 4.23 cm^2       Peak Velocity: 1.69 m/s  Peak E-Wave: 0.95 m/s                   Mean Velocity: 1.21 m/s  Peak Gradient: 11.42 mmHg  Peak Gradient: 3.58 mmHg                Mean Gradient: 7 mmHg  Mean Gradient: 2 mmHg  Deceleration Time: 171 msec  P1/2t: 52 msec                          Area (continuity): 2.86 cm^2  AV VTI: 28.6 cm    Area (continuity): 3.71 cm^2  Mean Velocity: 0.66 m/s    Tricuspid:                              Pulmonic:    Estimated RVSP: 35 mmHg                 Peak Velocity: 1.26 m/s  Peak TR Velocity: 2.52 m/s              Peak Gradient: 6.35 mmHg  Peak TR Gradient: 25.4016 mmHg  Estimated RA Pressure: 10 mmHg    Estimated PASP: 35.4 mmHg       ASSESSMENT AND PLAN     Assessment:    Acute hypoxic respiratory failure secondary to left sided pleural effusion with atelectasis.   The pleural effusion is an exudate  Acute on chronic diastolic CHF  Obstructive sleep apnea:              Chronic obstructive pulmonary disease  Obesity  A. Fib    Plan:  Pleural fluid with many neutrophils but no bacteria seen no growth in 2 days  Continue diuresing with bumex 1 mg BID. restriction of fluid. Continue oxygen 4 liter and bronchodilator. Prednisone 20 mg daily for 4 days. On ciprofloxacin for pseudomonas UTI. Will continue amiodarone,lopressor and eliquis for control of A. Fib. Awaiting placement. We will continue to follow. Electronically signed byJulieth Self MD  11/9/2021 8:27 AM      Please note that this chart was generated using voice recognition Dragon dictation software. Although every effort was made to ensure the accuracy of this automated transcription, some errors in transcription may have occurred. Attending Physician Statement  I have discussed the care of [facility], including pertinent history and exam findings,  with the resident. I have seen and examined the patient and the key elements of all parts of the encounter have been performed by me. I agree with the assessment, plan and orders as documented by the resident with additions . Treatment plan Discussed with nursing staff in detail , all questions answered . Electronically signed by John Naylor MD on   11/9/21 at 5:13 PM EST    Please note that this chart was generated using voice recognition Dragon dictation software. Although every effort was made to ensure the accuracy of this automated transcription, some errors in transcription may have occurred.

## 2021-11-09 NOTE — PLAN OF CARE
Problem: Falls - Risk of:  Goal: Will remain free from falls  Description: Will remain free from falls  Outcome: Ongoing     Problem: Skin Integrity:  Goal: Will show no infection signs and symptoms  Description: Will show no infection signs and symptoms  Outcome: Ongoing     Problem: OXYGENATION/RESPIRATORY FUNCTION  Goal: Patient will maintain patent airway  Outcome: Ongoing     Problem: Pain:  Goal: Control of acute pain  Description: Control of acute pain  Outcome: Ongoing

## 2021-11-09 NOTE — PLAN OF CARE
Problem: Falls - Risk of:  Goal: Will remain free from falls  Description: Will remain free from falls  11/9/2021 0938 by Francisco Avila RN  Outcome: Ongoing  11/9/2021 0330 by Tom Golden RN  Outcome: Ongoing  Goal: Absence of physical injury  Description: Absence of physical injury  Outcome: Ongoing     Problem: Skin Integrity:  Goal: Will show no infection signs and symptoms  Description: Will show no infection signs and symptoms  11/9/2021 0938 by Francisco Avila RN  Outcome: Ongoing  11/9/2021 0330 by Tom Golden RN  Outcome: Ongoing  Goal: Absence of new skin breakdown  Description: Absence of new skin breakdown  Outcome: Ongoing     Problem: OXYGENATION/RESPIRATORY FUNCTION  Goal: Patient will maintain patent airway  11/9/2021 0938 by Francisco Avila RN  Outcome: Ongoing  11/9/2021 0330 by Tom Golden RN  Outcome: Ongoing  Goal: Patient will achieve/maintain normal respiratory rate/effort  Description: Respiratory rate and effort will be within normal limits for the patient  Outcome: Ongoing     Problem: HEMODYNAMIC STATUS  Goal: Patient has stable vital signs and fluid balance  Outcome: Ongoing     Problem: FLUID AND ELECTROLYTE IMBALANCE  Goal: Fluid and electrolyte balance are achieved/maintained  Outcome: Ongoing     Problem: ACTIVITY INTOLERANCE/IMPAIRED MOBILITY  Goal: Mobility/activity is maintained at optimum level for patient  Outcome: Ongoing     Problem: Nutrition  Goal: Optimal nutrition therapy  Outcome: Ongoing     Problem: Pain:  Goal: Pain level will decrease  Description: Pain level will decrease  Outcome: Ongoing  Goal: Control of acute pain  Description: Control of acute pain  11/9/2021 0938 by Francisco Avila RN  Outcome: Ongoing  11/9/2021 0330 by Tom Golden RN  Outcome: Ongoing  Goal: Control of chronic pain  Description: Control of chronic pain  Outcome: Ongoing

## 2021-11-10 LAB
ALBUMIN SERPL-MCNC: 2.9 G/DL (ref 3.5–5.2)
ANION GAP SERPL CALCULATED.3IONS-SCNC: 10 MMOL/L (ref 9–17)
BUN BLDV-MCNC: 20 MG/DL (ref 8–23)
BUN/CREAT BLD: ABNORMAL (ref 9–20)
CALCIUM SERPL-MCNC: 8.6 MG/DL (ref 8.6–10.4)
CHLORIDE BLD-SCNC: 91 MMOL/L (ref 98–107)
CO2: 33 MMOL/L (ref 20–31)
CREAT SERPL-MCNC: 0.76 MG/DL (ref 0.5–0.9)
CULTURE: ABNORMAL
DIRECT EXAM: ABNORMAL
EKG ATRIAL RATE: 125 BPM
EKG Q-T INTERVAL: 320 MS
EKG QRS DURATION: 122 MS
EKG QTC CALCULATION (BAZETT): 410 MS
EKG R AXIS: -38 DEGREES
EKG T AXIS: -153 DEGREES
EKG VENTRICULAR RATE: 99 BPM
GFR AFRICAN AMERICAN: >60 ML/MIN
GFR NON-AFRICAN AMERICAN: >60 ML/MIN
GFR SERPL CREATININE-BSD FRML MDRD: ABNORMAL ML/MIN/{1.73_M2}
GFR SERPL CREATININE-BSD FRML MDRD: ABNORMAL ML/MIN/{1.73_M2}
GLUCOSE BLD-MCNC: 131 MG/DL (ref 70–99)
HCT VFR BLD CALC: 36.1 % (ref 36.3–47.1)
HEMOGLOBIN: 11.4 G/DL (ref 11.9–15.1)
Lab: ABNORMAL
MAGNESIUM: 1.3 MG/DL (ref 1.6–2.6)
MAGNESIUM: 1.8 MG/DL (ref 1.6–2.6)
MCH RBC QN AUTO: 29.8 PG (ref 25.2–33.5)
MCHC RBC AUTO-ENTMCNC: 31.6 G/DL (ref 28.4–34.8)
MCV RBC AUTO: 94.5 FL (ref 82.6–102.9)
NRBC AUTOMATED: 0 PER 100 WBC
PDW BLD-RTO: 12.7 % (ref 11.8–14.4)
PHOSPHORUS: 3.1 MG/DL (ref 2.6–4.5)
PLATELET # BLD: 286 K/UL (ref 138–453)
PMV BLD AUTO: 9.3 FL (ref 8.1–13.5)
POTASSIUM SERPL-SCNC: 3.5 MMOL/L (ref 3.7–5.3)
POTASSIUM SERPL-SCNC: 3.6 MMOL/L (ref 3.7–5.3)
RBC # BLD: 3.82 M/UL (ref 3.95–5.11)
SODIUM BLD-SCNC: 134 MMOL/L (ref 135–144)
SPECIMEN DESCRIPTION: ABNORMAL
WBC # BLD: 9.1 K/UL (ref 3.5–11.3)

## 2021-11-10 PROCEDURE — 36415 COLL VENOUS BLD VENIPUNCTURE: CPT

## 2021-11-10 PROCEDURE — 6360000002 HC RX W HCPCS: Performed by: NURSE PRACTITIONER

## 2021-11-10 PROCEDURE — 94761 N-INVAS EAR/PLS OXIMETRY MLT: CPT

## 2021-11-10 PROCEDURE — 97116 GAIT TRAINING THERAPY: CPT

## 2021-11-10 PROCEDURE — 93010 ELECTROCARDIOGRAM REPORT: CPT | Performed by: INTERNAL MEDICINE

## 2021-11-10 PROCEDURE — 6370000000 HC RX 637 (ALT 250 FOR IP): Performed by: INTERNAL MEDICINE

## 2021-11-10 PROCEDURE — 99232 SBSQ HOSP IP/OBS MODERATE 35: CPT | Performed by: INTERNAL MEDICINE

## 2021-11-10 PROCEDURE — 2700000000 HC OXYGEN THERAPY PER DAY

## 2021-11-10 PROCEDURE — 6370000000 HC RX 637 (ALT 250 FOR IP): Performed by: NURSE PRACTITIONER

## 2021-11-10 PROCEDURE — 2580000003 HC RX 258: Performed by: NURSE PRACTITIONER

## 2021-11-10 PROCEDURE — 85027 COMPLETE CBC AUTOMATED: CPT

## 2021-11-10 PROCEDURE — 94640 AIRWAY INHALATION TREATMENT: CPT

## 2021-11-10 PROCEDURE — 6370000000 HC RX 637 (ALT 250 FOR IP): Performed by: STUDENT IN AN ORGANIZED HEALTH CARE EDUCATION/TRAINING PROGRAM

## 2021-11-10 PROCEDURE — 2060000000 HC ICU INTERMEDIATE R&B

## 2021-11-10 PROCEDURE — 97110 THERAPEUTIC EXERCISES: CPT

## 2021-11-10 PROCEDURE — 93005 ELECTROCARDIOGRAM TRACING: CPT | Performed by: NURSE PRACTITIONER

## 2021-11-10 PROCEDURE — 97535 SELF CARE MNGMENT TRAINING: CPT

## 2021-11-10 PROCEDURE — 80069 RENAL FUNCTION PANEL: CPT

## 2021-11-10 PROCEDURE — 83735 ASSAY OF MAGNESIUM: CPT

## 2021-11-10 PROCEDURE — 99233 SBSQ HOSP IP/OBS HIGH 50: CPT | Performed by: INTERNAL MEDICINE

## 2021-11-10 RX ORDER — LANOLIN ALCOHOL/MO/W.PET/CERES
3 CREAM (GRAM) TOPICAL NIGHTLY PRN
Status: DISCONTINUED | OUTPATIENT
Start: 2021-11-10 | End: 2021-11-20 | Stop reason: HOSPADM

## 2021-11-10 RX ORDER — POTASSIUM BICARBONATE 25 MEQ/1
50 TABLET, EFFERVESCENT ORAL PRN
Status: DISCONTINUED | OUTPATIENT
Start: 2021-11-10 | End: 2021-11-20 | Stop reason: HOSPADM

## 2021-11-10 RX ORDER — MAGNESIUM SULFATE 1 G/100ML
1000 INJECTION INTRAVENOUS PRN
Status: DISCONTINUED | OUTPATIENT
Start: 2021-11-10 | End: 2021-11-20 | Stop reason: HOSPADM

## 2021-11-10 RX ORDER — LOSARTAN POTASSIUM 25 MG/1
25 TABLET ORAL DAILY
Status: DISCONTINUED | OUTPATIENT
Start: 2021-11-11 | End: 2021-11-10

## 2021-11-10 RX ORDER — POTASSIUM CHLORIDE 20 MEQ/1
40 TABLET, EXTENDED RELEASE ORAL PRN
Status: DISCONTINUED | OUTPATIENT
Start: 2021-11-10 | End: 2021-11-20 | Stop reason: HOSPADM

## 2021-11-10 RX ORDER — POTASSIUM CHLORIDE 7.45 MG/ML
10 INJECTION INTRAVENOUS PRN
Status: DISCONTINUED | OUTPATIENT
Start: 2021-11-10 | End: 2021-11-20 | Stop reason: HOSPADM

## 2021-11-10 RX ORDER — LOSARTAN POTASSIUM 25 MG/1
25 TABLET ORAL DAILY
Status: DISCONTINUED | OUTPATIENT
Start: 2021-11-10 | End: 2021-11-10

## 2021-11-10 RX ADMIN — CLOPIDOGREL 75 MG: 75 TABLET, FILM COATED ORAL at 08:02

## 2021-11-10 RX ADMIN — IPRATROPIUM BROMIDE AND ALBUTEROL SULFATE 1 AMPULE: .5; 2.5 SOLUTION RESPIRATORY (INHALATION) at 08:05

## 2021-11-10 RX ADMIN — CIPROFLOXACIN 500 MG: 500 TABLET, FILM COATED ORAL at 08:02

## 2021-11-10 RX ADMIN — MAGNESIUM HYDROXIDE 30 ML: 400 SUSPENSION ORAL at 09:07

## 2021-11-10 RX ADMIN — AMIODARONE HYDROCHLORIDE 200 MG: 200 TABLET ORAL at 08:02

## 2021-11-10 RX ADMIN — BUMETANIDE 1 MG: 1 TABLET ORAL at 20:57

## 2021-11-10 RX ADMIN — METOPROLOL TARTRATE 75 MG: 25 TABLET ORAL at 20:56

## 2021-11-10 RX ADMIN — METOPROLOL TARTRATE 50 MG: 25 TABLET, FILM COATED ORAL at 09:07

## 2021-11-10 RX ADMIN — IPRATROPIUM BROMIDE AND ALBUTEROL SULFATE 1 AMPULE: .5; 2.5 SOLUTION RESPIRATORY (INHALATION) at 21:29

## 2021-11-10 RX ADMIN — DESMOPRESSIN ACETATE 40 MG: 0.2 TABLET ORAL at 08:02

## 2021-11-10 RX ADMIN — APIXABAN 5 MG: 5 TABLET, FILM COATED ORAL at 20:57

## 2021-11-10 RX ADMIN — MAGNESIUM SULFATE HEPTAHYDRATE 1000 MG: 1 INJECTION, SOLUTION INTRAVENOUS at 02:41

## 2021-11-10 RX ADMIN — CITALOPRAM 20 MG: 20 TABLET, FILM COATED ORAL at 08:02

## 2021-11-10 RX ADMIN — POTASSIUM CHLORIDE 40 MEQ: 1500 TABLET, EXTENDED RELEASE ORAL at 08:06

## 2021-11-10 RX ADMIN — BUMETANIDE 1 MG: 1 TABLET ORAL at 08:02

## 2021-11-10 RX ADMIN — FLUTICASONE PROPIONATE 1 PUFF: 110 AEROSOL, METERED RESPIRATORY (INHALATION) at 08:04

## 2021-11-10 RX ADMIN — ISOSORBIDE MONONITRATE 30 MG: 30 TABLET ORAL at 08:02

## 2021-11-10 RX ADMIN — CIPROFLOXACIN 500 MG: 500 TABLET, FILM COATED ORAL at 20:57

## 2021-11-10 RX ADMIN — MAGNESIUM SULFATE HEPTAHYDRATE 1000 MG: 1 INJECTION, SOLUTION INTRAVENOUS at 05:16

## 2021-11-10 RX ADMIN — OXYCODONE HYDROCHLORIDE AND ACETAMINOPHEN 500 MG: 500 TABLET ORAL at 08:02

## 2021-11-10 RX ADMIN — APIXABAN 5 MG: 5 TABLET, FILM COATED ORAL at 08:02

## 2021-11-10 RX ADMIN — FLUTICASONE PROPIONATE 1 PUFF: 110 AEROSOL, METERED RESPIRATORY (INHALATION) at 21:29

## 2021-11-10 RX ADMIN — PREDNISONE 20 MG: 20 TABLET ORAL at 08:02

## 2021-11-10 RX ADMIN — METOPROLOL TARTRATE 50 MG: 50 TABLET, FILM COATED ORAL at 08:02

## 2021-11-10 RX ADMIN — Medication 3 MG: at 20:56

## 2021-11-10 RX ADMIN — SODIUM CHLORIDE, PRESERVATIVE FREE 10 ML: 5 INJECTION INTRAVENOUS at 20:56

## 2021-11-10 RX ADMIN — IPRATROPIUM BROMIDE AND ALBUTEROL SULFATE 1 AMPULE: .5; 2.5 SOLUTION RESPIRATORY (INHALATION) at 15:36

## 2021-11-10 RX ADMIN — SODIUM CHLORIDE, PRESERVATIVE FREE 10 ML: 5 INJECTION INTRAVENOUS at 08:02

## 2021-11-10 ASSESSMENT — PAIN SCALES - WONG BAKER
WONGBAKER_NUMERICALRESPONSE: 0

## 2021-11-10 ASSESSMENT — PAIN SCALES - GENERAL
PAINLEVEL_OUTOF10: 0
PAINLEVEL_OUTOF10: 0

## 2021-11-10 ASSESSMENT — ENCOUNTER SYMPTOMS
SHORTNESS OF BREATH: 1
EYES NEGATIVE: 1
GASTROINTESTINAL NEGATIVE: 1
ALLERGIC/IMMUNOLOGIC NEGATIVE: 1

## 2021-11-10 ASSESSMENT — PAIN DESCRIPTION - PAIN TYPE: TYPE: ACUTE PAIN

## 2021-11-10 NOTE — PROGRESS NOTES
Comprehensive Nutrition Assessment    Type and Reason for Visit:  Reassess    Nutrition Recommendations/Plan:   -Continue 2 gm Na diet w/ 1500 mL FR   -Continue ensure enlive supplements BID   -Will continue to monitor po intake and weights     Nutrition Assessment:  Pt currently w/ therapy during time of visit. Per EMR, pt has been consuming 1-25% and 50-75% of her meals along w/ % of her ensure enlive supplements. No significant wt loss noted x 1 mo per EMR. Will continue nutritional supplements to compliment po intake and will monitor wt trends. Malnutrition Assessment:  Malnutrition Status: At risk for malnutrition (Comment)    Context:  Chronic Illness     Findings of the 6 clinical characteristics of malnutrition:  Energy Intake:  7 - 75% or less estimated energy requirements for 1 month or longer  Weight Loss:  No significant weight loss     Body Fat Loss:  No significant body fat loss     Muscle Mass Loss:  No significant muscle mass loss    Fluid Accumulation:  1 - Mild Extremities, Generalized   Strength:  Not Performed    Estimated Daily Nutrient Needs:  Energy (kcal):  1914-8162 kcal/d (MSJ x 1.2 x 1.1); Weight Used for Energy Requirements:  Admission     Protein (g):  1.3-1.4 gm/kg ~> 72-77 gms/d; Weight Used for Protein Requirements:  Ideal        Fluid (ml/day):  1500 mL fluid restriction per MD order;    Nutrition Related Findings:  BM 11/7; Na 134, K 3.5; meds reviewed      Wounds:  None       Current Nutrition Therapies:    ADULT DIET; Regular;  Low Sodium (2 gm); 1500 ml  ADULT ORAL NUTRITION SUPPLEMENT; Breakfast, Dinner; Standard High Calorie/High Protein Oral Supplement    Anthropometric Measures:  · Height: 5' 4\" (162.6 cm)  · Current Body Weight: 253 lb (114.8 kg)   · Admission Body Weight: 256 lb (116.1 kg)    · Usual Body Weight: 273 lb 3.2 oz (123.9 kg)     · Ideal Body Weight: 120 lbs; % Ideal Body Weight 210.8 %   · BMI: 43.4  · BMI Categories: Obese Class 3 (BMI 40.0 or greater)       Nutrition Diagnosis:   · Inadequate oral intake related to  (current medical conditions) as evidenced by intake 0-25%, intake 51-75% (variable po intake, need for ONS)      Nutrition Interventions:   Food and/or Nutrient Delivery:  Continue Current Diet, Continue Oral Nutrition Supplement  Nutrition Education/Counseling:  Education not indicated   Coordination of Nutrition Care:  Continue to monitor while inpatient    Goals: Progressing   PO intake to meet > 75% of estimated nutrition needs       Nutrition Monitoring and Evaluation:   Food/Nutrient Intake Outcomes:  Food and Nutrient Intake, Supplement Intake  Physical Signs/Symptoms Outcomes:  Biochemical Data, Nutrition Focused Physical Findings, Skin, Weight, GI Status, Fluid Status or Edema     Discharge Planning:     Too soon to determine     Electronically signed by Laura Garrison RD, LD on 11/10/21 at 11:24 AM EST    Contact: [phone]

## 2021-11-10 NOTE — CARE COORDINATION
Transitional planning-called Brown Etienne at [facility] intake-waiting on precert.     1600 call from Brown Etienne at [place] before going to [facility]

## 2021-11-10 NOTE — PROGRESS NOTES
Physical Therapy  Facility/Department: [facility] CAR 2  Daily Treatment Note  NAME: Judy Trotter  : 1945  MRN: [id number]    Date of Service: 11/10/2021    Discharge Recommendations: Further therapy recommended at discharge. PT Equipment Recommendations  Equipment Needed: No       Assessment     Body structures, Functions, Activity limitations: Decreased functional mobility ; Decreased balance; Decreased endurance; Decreased strength  Assessment: The pt required Kalyn for supine to sit, Min A for transfers and CGA to ambulate 25' x 1 with RW. Recommend continued PT to address deficits and progress mobility. The pt currently requires 24hr assistance for functional mobility due to endurance deficits and increased risk of falls. Prognosis: Good  Decision Making: Medium Complexity  PT Education: Goals; PT Role; Plan of Care; Functional Mobility Training; Home Exercise Program; General Safety; Energy Conservation  REQUIRES PT FOLLOW UP: Yes  Activity Tolerance  Activity Tolerance: Patient limited by endurance         Patient Diagnosis(es): The primary encounter diagnosis was Acute on chronic congestive heart failure, unspecified heart failure type ([address] Diagnoses of CASEY (acute kidney injury) ([address] and Acute on chronic diastolic heart failure ([address] were also pertinent to this visit. has a past medical history of Asthma, COPD (chronic obstructive pulmonary disease) ([address] Gout, Hyperlipidemia, Hypertension, Mitral insufficiency, and Pneumonia. has a past surgical history that includes Hysterectomy (); knee surgery; Colonoscopy; Dental surgery; Tonsillectomy and adenoidectomy; Ankle surgery (Left, 2016); pr colsc flx w/rmvl of tumor polyp lesion snare tq (N/A, 2017); and bronchoscopy (N/A, 10/20/2021).     Restrictions  Restrictions/Precautions  Restrictions/Precautions: Fall Risk  Required Braces or Orthoses?: No  Position Activity Restriction  Other position/activity restrictions: up with assist  Subjective   Pt sitting up in her chair. Pt has no c/o pain. Orientation    Overall Orientation Status: Within Functional Limits    Objective     Transfers  Sit to Stand: Minimal Assistance  Stand to sit: Minimal Assistance  Comment: Transfers performed with RW, verbal cues for UE placement with good return. Ambulation  Ambulation?: Yes  Ambulation 1  Surface: level tile  Device: Rolling Walker  Other Apparatus: O2 (4L per NC)  Assistance: Contact guard assistance  Quality of Gait: flexed posture, high reliance on RW  Gait Deviations: Slow Radha; Increased DONNA; Decreased step length; Decreased step height  Distance: 25 ft Chair to follow. Comments: Minimally unsteady, good progression of task. Stairs/Curb  Stairs?: No  Balance  Posture: Fair  Sitting - Static: Good; -  Sitting - Dynamic: Fair; +  Standing - Static: Fair  Standing - Dynamic: Fair  Comments: standing balance assessed with RW    Ex's  Upper extremity exercises: Bicep curl, shoulder flexion/extension, punches. Reps: 10 x each with 2 lb wt on a SPC. 4lbs with Biceps. Seated LE exercise program: Long Arc Quads,heel/toe raises, and marches. Reps: 2 x 10 with 2 lb wt on B LE's.    AM-PAC Score  AM-PAC Inpatient Mobility Raw Score : 17 (11/10/21 1538)  AM-PAC Inpatient T-Scale Score : 42.13 (11/10/21 1538)  Mobility Inpatient CMS 0-100% Score: 50.57 (11/10/21 1538)  Mobility Inpatient CMS G-Code Modifier : CK (11/10/21 1538)      Goals  Short term goals  Time Frame for Short term goals: 14 visits  Short term goal 1: Perform bed mobility with SBA  Short term goal 2: Perform sit to stand transfer with CGA  Short term goal 3: Ambulate 200ft with RW and CGA  Short term goal 4: Demo Fair+ dynamic standing balance to decrease risk of falls  Short term goal 5: Participate in 30 minutes of therapy to demo increased endurance    Plan    Plan  Times per week: 5x/wk  Current Treatment Recommendations: Strengthening, ROM, Balance Training, Functional Mobility Training, Gait Training, Stair training, Endurance Training, Home Exercise Program, Safety Education & Training, Patient/Caregiver Education & Training, Transfer Training  Safety Devices  Type of devices: Nurse notified, Gait belt, Left in chair, Chair alarm in place, All fall risk precautions in place, Call light within reach  Restraints  Initially in place: No     Therapy Time   Individual Concurrent Group Co-treatment   Time In  300         Time Out  330         Minutes  [address] [place] [address]

## 2021-11-10 NOTE — PROGRESS NOTES
PULMONARY & CRITICAL CARE MEDICINE PROGRESS NOTE     Patient:  Raheel Gaston  MRN: [id number]  Admit date: 11/3/2021  Primary Care Physician: KALYN Gunter CNP  Consulting Physician: Dione Carter DO  CODE Status: Full Code  LOS: 7     SUBJECTIVE     Chief Complaint/ Reason for consult:  Left pleural effusion    Hospital Course: The patient is a 68 y.o. female here for evaluation of shortness of breath with clear sputum, increased leg swelling. Patient has chronic shortness of breath which has been worsening over the last couple of days along with increased swelling of her lower extremity. She has conversational dyspnea, no wheezing. Chest x-ray shows left pleural effusion and left atelectasis she has had multiple thoracentesis. Complains of abdominal bloating, orthopnea, palpitations. She was found to be in A. fib with heart rate in 110s. Interval History:  11/10/21  NO new acute event overnight. Pt was seen and examined at bedside. Saturating appropriately at 2  liter via nasal canula. Vitals stable. Patient was on a fib with rate stable. As per cardiology she was planned for a fib ablation  As outpatient in last visit. Labs reviewed. Urine culture positive for  pesudomonas on ciprofloxacin  Pleural fluid appears exudative with cholestrol in pleural fluid was 67,  and protein 3.4. negative for bacteria and negative for malignant cell. Serum protein and pleural protein gradient 2.8 which appear transudative. Patient currently being diuresed with bumex 1 mg BID with output of 600 ml and net negative of 4.2 liter . HCO3 coming up will keep eye on it. As per primary one more day of diuresis and then plan for facility placement. Review Of Systems:  Review of Systems   Constitutional: Negative. HENT: Negative. Eyes: Negative. Respiratory: Positive for shortness of breath. Cardiovascular: Negative. Gastrointestinal: Negative.     Endocrine: Negative. Genitourinary: Negative. Musculoskeletal: Negative. Skin: Negative. Allergic/Immunologic: Negative. Psychiatric/Behavioral: Negative. OBJECTIVE     PaO2/FiO2 RATIO:  No results for input(s): POCPO2 in the last 72 hours. FiO2 : 36 %     VITAL SIGNS:   LAST:  BP (!) 140/86   Pulse 90   Temp 97.6 °F (36.4 °C) (Oral)   Resp 27   Ht 5' 4\" (1.626 m)   Wt 253 lb 15.5 oz (115.2 kg)   SpO2 95%   BMI 43.59 kg/m²   8-24 HR RANGE:  TEMP Temp  Av °F (36.7 °C)  Min: 97.6 °F (36.4 °C)  Max: 98.3 °F (76.6 °C)   BP Systolic (07APT), MHF:635 , Min:113 , WQN:991      Diastolic (38EXQ), SRP:30, Min:67, Max:93     PULSE Pulse  Av.4  Min: 89  Max: 119   RR Resp  Av  Min: 18  Max: 32   O2 SAT SpO2  Av.2 %  Min: 95 %  Max: 97 %   OXYGEN DELIVERY O2 Flow Rate (L/min)  Av.5 L/min  Min: 2 L/min  Max: 3 L/min        Systemic Examination:   Physical Exam -  Constitutional:  Alert, cooperative and no distress. Mental Status:  Oriented to person, place and time and normal affect. Lungs:  Bilateral air entry present decreased in left side, lung fields clear. Normal effort. Heart:  Regular rate and rhythm, no murmur. Abdomen:  Soft, nontender, nondistended, normal bowel sounds. Extremities:  Bilateral lower extremity edema, negative for redness, tenderness in the calves. Skin:  Warm, dry, no gross lesions or rashes.     DATA REVIEW     Medications: Current Inpatient  Scheduled Meds:   metoprolol tartrate  75 mg Oral BID    predniSONE  20 mg Oral Daily    bumetanide  1 mg Oral BID    ciprofloxacin  500 mg Oral 2 times per day    ipratropium-albuterol  1 ampule Inhalation TID    amiodarone  200 mg Oral Daily    apixaban  5 mg Oral BID    vitamin C  500 mg Oral Daily    atorvastatin  40 mg Oral Daily    fluticasone  1 puff Inhalation BID    citalopram  20 mg Oral Daily    clopidogrel  75 mg Oral Daily    isosorbide mononitrate  30 mg Oral Daily    sodium chloride flush  5-40 mL IntraVENous 2 times per day     Continuous Infusions:   sodium chloride         INPUT/OUTPUT:  In: 540 [P.O.:540]  Out: 600 [Urine:600]  Date 11/10/21 0000 - 11/10/21 2359   Shift 8900-5963 4288-0470 4910-5915 24 Hour Total   INTAKE   P.O.(mL/kg/hr)  240  240   Shift Total(mL/kg)  240(2.1)  240(2.1)   OUTPUT   Urine(mL/kg/hr) 300(0.3)   300   Shift Total(mL/kg) 300(2.6)   300(2.6)   Weight (kg) 115.2 115.2 115.2 115.2        LABS:  ABGs:   No results for input(s): POCPH, POCPCO2, POCPO2, POCHCO3, BJZK2UBP in the last 72 hours. CBC:   Recent Labs     11/08/21  0724 11/09/21  0629 11/10/21  0459   WBC 10.2 8.3 9.1   HGB 11.9 11.3* 11.4*   HCT 38.4 35.2* 36.1*   MCV 99.0 95.4 94.5    276 286   RBC 3.88* 3.69* 3.82*   MCH 30.7 30.6 29.8   MCHC 31.0 32.1 31.6   RDW 13.2 13.1 12.7     CRP:   No results for input(s): CRP in the last 72 hours. LDH:   No results for input(s): LDH in the last 72 hours. BMP:   Recent Labs     11/08/21  0724 11/09/21  0629 11/09/21  2251 11/10/21  0459   * 135  --  134*   K 3.7 3.3* 3.6* 3.5*   CL 94* 93*  --  91*   CO2 28 32*  --  33*   BUN 25* 20  --  20   CREATININE 0.88 0.81  --  0.76   GLUCOSE 98 99  --  131*   PHOS 3.1 3.4  --  3.1     Liver Function Test:   Recent Labs     11/08/21  0724 11/09/21  0629 11/10/21  0459   LABALBU 2.8* 2.7* 2.9*     Coagulation Profile:   No results for input(s): INR, PROTIME, APTT in the last 72 hours. D-Dimer:  No results for input(s): DDIMER in the last 72 hours. Lactic Acid:  No results for input(s): LACTA in the last 72 hours. Cardiac Enzymes:  No results for input(s): CKTOTAL, CKMB, CKMBINDEX, TROPONINI in the last 72 hours. Invalid input(s): TROPONIN, HSTROP  BNP/ProBNP:   No results for input(s): BNP, PROBNP in the last 72 hours. Triglycerides:  No results for input(s): TRIG in the last 72 hours.      Microbiology:  Urine Culture:  No components found for: CURINE  Blood Culture:  No components found for: CBLOOD, [id number]    Weight:                     272 pounds, 123.4 kg  #    Patient Acct [de-identified]   BSA:          2.23 m^2      BMI:      46.69  #                                                              kg/m^2    MR #         [id number]     Sonographer                 [facility]    Accession #  [id number]  Interpreting Physician      Margarita Handy    Fellow                   Referring Nurse  Practitioner    Interpreting             Referring Physician         Rick Deshpande MD  Fellow    Additional Comments  Technically difficult study due to body habitus    Type of Study    TTE procedure:2D Echocardiogram, M-Mode, Doppler, Color Doppler. Procedure Date  Date: 10/03/2021 Start: 01:22 PM    Study Location: [facility]    History / Adriane Lugo. Comments:  COPD, Pneumonia, Obesity,    Patient Status: Inpatient    Height: 64 inches Weight: 272.01 pounds BSA: 2.23 m^2 BMI: 46.69 kg/m^2    CONCLUSIONS    Summary  Left ventricle is normal in size with normal systolic function globally. Calculated ejection fraction is 59% ( Atrial-Fib). Mild mitral regurgitation. Mild tricuspid regurgitation. Estimated right ventricular systolic pressure is 35 mmHg. Anterior clear space noted, most likely adipose tissue vs small loculated  pericardial effusion. No evidence of tamponade. Pleural effusion is seen. Signature  ----------------------------------------------------------------------------  Electronically signed by Rogena Gosselin, RoseMary(Sonographer) on 10/03/2021 02:13  PM  ----------------------------------------------------------------------------    ----------------------------------------------------------------------------  Electronically signed by Ezekiel HandyInterpreting physician) on  10/03/2021 03:50 PM  ----------------------------------------------------------------------------  FINDINGS  Left Atrium  Left atrium is moderately dilated.   Left Ventricle  Left ventricle is normal in size with normal systolic function globally. Calculated ejection fraction is 59% ( Atrial-Fib)  Right Atrium  Right atrial dilatation. Right Ventricle  Dilated right ventricle with systolic function within the range of normal.  Mitral Valve  Mitral valve structure is normal.  Mild mitral regurgitation. Aortic Valve  Aortic valve is trileaflet. Aortic sclerosis without stenosis. No aortic insufficiency. Tricuspid Valve  Tricuspid valve structure is normal.  Mild tricuspid regurgitation. Estimated right ventricular systolic pressure is 35 mmHg. Pulmonic Valve  The pulmonic valve is normal in structure. Pericardial Effusion  Anterior clear space noted, most likely adipose tissue vs small loculated  pericardial effusion. No evidence of tamponade. Miscellaneous  Normal aortic root dimension.     M-mode / 2D Measurements & Calculations:    LVIDd:5.2 cm(3.7 - 5.6 cm)       Diastolic PUYUEW:85.1 ml  CNURZ:5.7 cm(2.2 - 4.0 cm)       Systolic MPUNOF:21.4 ml  VSDH:6.9 cm(0.6 - 1.1 cm)        Aortic Root:3.6 cm(2.0 - 3.7 cm)  LVPWd:0.8 cm(0.6 - 1.1 cm)       LA Dimension: 4.5 cm(1.9 - 4.0 cm)  Fractional Shortenin.69 %    LA volume/Index: 61.2 ml /27m^2  Calculated LVEF (%): 59.67 %     LVOT:2.2 cm  RVDd:2.3 cm    Mitral:                                 Aortic    Valve Area (P1/2-Time): 4.23 cm^2       Peak Velocity: 1.69 m/s  Peak E-Wave: 0.95 m/s                   Mean Velocity: 1.21 m/s  Peak Gradient: 11.42 mmHg  Peak Gradient: 3.58 mmHg                Mean Gradient: 7 mmHg  Mean Gradient: 2 mmHg  Deceleration Time: 171 msec  P1/2t: 52 msec                          Area (continuity): 2.86 cm^2  AV VTI: 28.6 cm    Area (continuity): 3.71 cm^2  Mean Velocity: 0.66 m/s    Tricuspid:                              Pulmonic:    Estimated RVSP: 35 mmHg                 Peak Velocity: 1.26 m/s  Peak TR Velocity: 2.52 m/s              Peak Gradient: 6.35 mmHg  Peak TR Gradient: 25.4016 mmHg  Estimated RA Pressure: 10 mmHg    Estimated

## 2021-11-10 NOTE — PROGRESS NOTES
rehabilitation services?: Yes  Family / Caregiver Present: No  Diagnosis: CASEY  General Comment  Comments: RN ok'd for therapy this morning. pt agreeable to participate in session and coopertive/pleasant throughout  Vital Signs  Patient Currently in Pain: No   Orientation  Orientation  Overall Orientation Status: Within Functional Limits  Objective    ADL  Grooming: Modified independent ; Setup; Increased time to complete (Pt washed face sitting in chair)  UE Dressing: Moderate assistance; Setup; Increased time to complete (Mod A to adjust and tie gown sitting EOB d/t limited reach)  LE Dressing: Minimal assistance (Min A to don socks sitting EOB d/t limited reach)  Toileting: Moderate assistance; Increased time to complete; Setup (Pt on bed pan upon arrival, completed bed rolling for bed win removal, Max A for bottom/bryant care. Writer returned after tx session to assist pt to [facility], Mod a for bottom/bryant care while standing d/t limited reach. Pt with large BM this date while on [facility])        Balance  Sitting Balance: Modified independent  (Seated EOB, in chair)  Standing Balance: Contact guard assistance  Standing Balance  Time: Approx 2 min x2  Activity: Func mobility EOB > chair > BSC > chair  Comment: With RW, pt req v/c for safe descent into chair with G return. Pt demo proper hand placement for safe transfers this date  Functional Mobility  Functional - Mobility Device: Rolling Walker  Activity: Other  Assist Level: Contact guard assistance  Functional Mobility Comments: No LOB noted this date  Toilet Transfers  Toilet - Technique: Ambulating  Equipment Used: Standard bedside commode  Toilet Transfer: Contact guard assistance  Bed mobility  Supine to Sit: Minimal assistance  Sit to Supine: Unable to assess  Scooting: Minimal assistance  Comment: Pt Min A for trunk progression this date, HOB elevated, utilizing bed rails to complete.  Pt retired to chair at end of session  Transfers  Sit to stand: Minimal assistance  Stand to sit: Minimal assistance     Cognition  Overall Cognitive Status: [facility]     Plan   Plan  Times per week: 3-4x/wk  AM-PAC Score        AM-PAC Inpatient Daily Activity Raw Score: 18 (11/10/21 1525)  AM-PAC Inpatient ADL T-Scale Score : 38.66 (11/10/21 1525)  ADL Inpatient CMS 0-100% Score: 46.65 (11/10/21 1525)  ADL Inpatient CMS G-Code Modifier : CK (11/10/21 1525)    Goals  Short term goals  Time Frame for Short term goals: pt will, by discharge  Short term goal 1: complete LB ADLs with min A, set up and AE, as needed  Short term goal 2: complete UB ADLs with mod I  Short term goal 3: increase activity tolerance to 20+ minutes in order to participate in daily tasks  Short term goal 4: dem SBA During functional transfers/functional mobility with LRD ,as needed  Short term goal 5: dem ~6 minutes static/dynamic standing tolerance with SBA in order to complete functional tasks       Therapy Time   Individual Concurrent Group Co-treatment   Time In 1006     1101         Time Out 1049     1118         Minutes 43     17         Timed Code Treatment Minutes: 53 Minutes (60 min total, 7 min to other medical staff)   Pt in bed upon arrival, pleasant and agreeable to tx this date. Pt retired to chair at end of tx session, call light within reach, RN notified.  Writer returned to room to assist pt To/From [facility], returned pt to chair    NEHEMIAH Clancy/MICHEAL

## 2021-11-10 NOTE — PLAN OF CARE
Problem: Falls - Risk of:  Goal: Will remain free from falls  Description: Will remain free from falls  11/10/2021 0920 by Andrzej Hernández RN  Outcome: Ongoing  11/10/2021 0142 by Man Samson RN  Outcome: Ongoing  Goal: Absence of physical injury  Description: Absence of physical injury  11/10/2021 0920 by Andrzej Hernández RN  Outcome: Ongoing  11/10/2021 0142 by Man Samson RN  Outcome: Ongoing     Problem: Skin Integrity:  Goal: Will show no infection signs and symptoms  Description: Will show no infection signs and symptoms  11/10/2021 0920 by Andrzej Hernández RN  Outcome: Ongoing  11/10/2021 0142 by Man Samson RN  Outcome: Ongoing  Goal: Absence of new skin breakdown  Description: Absence of new skin breakdown  11/10/2021 0920 by Andrzej Hernández RN  Outcome: Ongoing  11/10/2021 0142 by Man Samson RN  Outcome: Ongoing     Problem: OXYGENATION/RESPIRATORY FUNCTION  Goal: Patient will maintain patent airway  11/10/2021 0920 by Andrzej Hernández RN  Outcome: Ongoing  11/10/2021 0142 by Man Samson RN  Outcome: Ongoing  Goal: Patient will achieve/maintain normal respiratory rate/effort  Description: Respiratory rate and effort will be within normal limits for the patient  11/10/2021 0920 by Andrzej Hernández RN  Outcome: Ongoing  11/10/2021 0142 by Man Samson RN  Outcome: Ongoing     Problem: HEMODYNAMIC STATUS  Goal: Patient has stable vital signs and fluid balance  11/10/2021 0920 by Andrzej Hernández RN  Outcome: Ongoing  11/10/2021 0142 by Man Samson RN  Outcome: Ongoing     Problem: FLUID AND ELECTROLYTE IMBALANCE  Goal: Fluid and electrolyte balance are achieved/maintained  11/10/2021 0920 by Andrzej Hernández RN  Outcome: Ongoing  11/10/2021 0142 by Man Samson RN  Outcome: Ongoing     Problem: ACTIVITY INTOLERANCE/IMPAIRED MOBILITY  Goal: Mobility/activity is maintained at optimum level for patient  11/10/2021 0920 by Andrzej Hernández RN  Outcome: Ongoing  11/10/2021 0142 by Maria L Doss Mary Lee RN  Outcome: Ongoing     Problem: Nutrition  Goal: Optimal nutrition therapy  11/10/2021 0920 by Steve Nickerson RN  Outcome: Ongoing  11/10/2021 0142 by Regis Ireland RN  Outcome: Ongoing     Problem: Pain:  Goal: Pain level will decrease  Description: Pain level will decrease  11/10/2021 0920 by Steve Nickerson RN  Outcome: Ongoing  11/10/2021 0142 by Regis Ireland RN  Outcome: Ongoing  Goal: Control of acute pain  Description: Control of acute pain  11/10/2021 0920 by Steve Nickerson RN  Outcome: Ongoing  11/10/2021 0142 by Regis Ireland RN  Outcome: Ongoing  Goal: Control of chronic pain  Description: Control of chronic pain  11/10/2021 0920 by Steve Nickerson RN  Outcome: Ongoing  11/10/2021 0142 by Regis Ireland RN  Outcome: Ongoing

## 2021-11-10 NOTE — PROGRESS NOTES
[facility]  Office: [address], DO, Abraham Natalia, DO, Shea Shy, DO, Memo Briceñoer Blood, DO, Annika Bonilla MD, González Dove MD, Marie Bolton MD, Siomara Christopher MD, Viet Umaña MD, Marimar Ramirez MD, Glo Moritz, MD, Primitivo Wolfe, DO, Kenrick Ornelas, DO, Mary Davis MD,  Amberly Barney, DO, Jonathon Rowe MD, Elaine Cisneros MD, Alyce Pacheco MD, Ford Weber MD, Sanjay Cardona MD, Esther Yanes MD, Luisana Abdullahi MD, Ashley Haile, [facility], [facility], CNP, Анна Jenkins, CNP, Rubens Womack, CNS, Jaswinder Cat, CNP, Cortney Leavitt, CNP, Rich Keller, CNP, Mariaa Perea, CNP, Jessica Day, CNP, SPIKE OropezaC, Rico Sanders, [facility], John Ballard, CNP, Judith Fierro, CNP, Colonel Ngo, CNP, Charlette Donohue, CNP, Jesse River, CNP, Suleman Stark, CNP, Nicole Vcikers, [address] [place] [address]    Progress Note    11/10/2021    8:49 AM    Name:   Nedra Malcolm  MRN:     [id number]     [place]:      [de-identified]   Room:   2019/2019-01  IP Day:  7  Admit Date:  11/3/2021  4:32 PM    PCP:   KALYN Carvajal CNP  Code Status:  Full Code    Subjective:     C/C:   Chief Complaint   Patient presents with    Leg Swelling     increasing BLE edema; pt from SNF; per PA at SNF, pt has fluid retention, needs IV meds that they cannot administer     Interval History Status: improved    Pt seen and examined this morning. No acute events overnight. Remains in a-flutter. HR has been between  since admission. She states that her breathing is getting better. She is down to 2L of oxygen via NC. Tolerating diet. Feels better.     Brief History:     Patient presented to the ER with complaints of leg swelling, shortness of breath with a productive cough (thin white sputum) and constipation.  She states she chronically has lower extremity edema left side greater than right she thinks that is increased over the last couple days. Ezequiel Hernandez was recently discharged on 10/15 after being admitted related to a pleural effusion.  At that time she presented with shortness of breath and palpitations. Mia Najera is found to have a left lower lobe pneumonia and A. fib with RVR and was admitted to the ICU for acute hypoxic respiratory failure.  During that visit the patient had a paracentesis, Negative CURT and subsequently cardioverted successfully, and a bronchoscopy that showed left main bronchus obstruction due to thick mucous plugging.  At discharge the patient was encouraged to follow-up with cardiology outpatient for an ablation.  During that visit she was also treated for UTI and was discharged on Augmentin for 5 days. Mia Najera was also given inhalers and steroids for COPD.     On arrival to the ER today she complains of not feeling well over the last 3 or 4 days. Mia Najera describes worsening shortness of breath primarily on exertion and orthopnea.  The patient told me that she has not been using oxygen on an outpatient basis but according to the ER note she has been using it intermittently primarily with exertion and therapy and occasionally overnight.  Also complains of constipation and mild abdominal bloating.  She also reports nausea and vomiting few days ago but nothing current and she has been tolerating p.o. intake.  She states she is compliant with her home medications.     Patient was placed on 2 L nasal cannula in the ER. Mia Najera has been afebrile heart rates been in the 90s to the low 110's A. Fib.     Chest x-ray shows no acute changes from prior study with a has a persistent sizable left effusion with opacity at the left lung base and pulmonary vascular congestion. Sodium is 129 BUN and creatinine are 26/1. 72.  Her normal BUN and creatinine are around 15/0.58 and normal sodiums in the mid 130s.   BNP is 3134.  On her last admission it HEI 5019 and on her admission in early October it was 3251.   WBCs 14.5 seems to run slightly elevated     Plan to consult nephrology and pulmonary medicine. Continue home Eliquis, beta-blocker and amiodarone.  Hold losartan  Plan Solu-Medrol related to IV wheezing throughout. It appears to be in fluid overload related to pitting edema in her lower extremities and increasing shortness of breath.  Plan to hold further hydration for now.  Give Lasix 20 mg IV x1    Review of Systems:     Constitutional:  negative for chills, fevers, sweats  Respiratory: Denies shortness of breath; negative for cough, dyspnea on exertion, wheezing  Cardiovascular:  negative for chest pain, chest pressure/discomfort, lower extremity edema, palpitations  Gastrointestinal:  negative for abdominal pain, constipation, diarrhea, nausea, vomiting  Neurological:  negative for dizziness, headache  Extremities: Reports improvement of leg swelling    Medications: Allergies:     Allergies   Allergen Reactions    Latex Hives     Hives from rubber gloves    Betadine [Povidone Iodine] Hives    Bee Venom Swelling    Dilaudid [Hydromorphone Hcl] Other (See Comments)     Severe confusion    Adhesive Tape Rash    Sulfa Antibiotics Rash       Current Meds:   Scheduled Meds:    predniSONE  20 mg Oral Daily    bumetanide  1 mg Oral BID    ciprofloxacin  500 mg Oral 2 times per day    metoprolol tartrate  50 mg Oral BID    ipratropium-albuterol  1 ampule Inhalation TID    amiodarone  200 mg Oral Daily    apixaban  5 mg Oral BID    vitamin C  500 mg Oral Daily    atorvastatin  40 mg Oral Daily    fluticasone  1 puff Inhalation BID    citalopram  20 mg Oral Daily    clopidogrel  75 mg Oral Daily    isosorbide mononitrate  30 mg Oral Daily    sodium chloride flush  5-40 mL IntraVENous 2 times per day     Continuous Infusions:    sodium chloride       PRN Meds: magnesium sulfate, potassium chloride **OR** potassium alternative oral replacement **OR** potassium chloride, sennosides-docusate sodium, cyclobenzaprine, sodium chloride flush, sodium chloride, ondansetron **OR** ondansetron, acetaminophen **OR** acetaminophen    Data:     Past Medical History:   has a past medical history of Asthma, COPD (chronic obstructive pulmonary disease) ([address] Gout, Hyperlipidemia, Hypertension, Mitral insufficiency, and Pneumonia. Social History:   reports that she has quit smoking. She has never used smokeless tobacco. She reports current alcohol use. She reports that she does not use drugs. Family History:   Family History   Problem Relation Age of Onset    High Blood Pressure Mother     Cancer Mother     Asthma Father     Heart Disease Father     Cancer Father        Vitals:  BP (!) 140/86   Pulse 90   Temp 97.6 °F (36.4 °C) (Oral)   Resp 27   Ht 5' 4\" (1.626 m)   Wt 253 lb 15.5 oz (115.2 kg)   SpO2 95%   BMI 43.59 kg/m²   Temp (24hrs), Av °F (36.7 °C), Min:97.6 °F (36.4 °C), Max:98.3 °F (36.8 °C)    No results for input(s): POCGLU in the last 72 hours. I/O (24Hr):     Intake/Output Summary (Last 24 hours) at 11/10/2021 0849  Last data filed at 11/10/2021 0813  Gross per 24 hour   Intake 1145 ml   Output 600 ml   Net 545 ml       Labs:  Hematology:  Recent Labs     21  0724 21  0629 11/10/21  0459   WBC 10.2 8.3 9.1   RBC 3.88* 3.69* 3.82*   HGB 11.9 11.3* 11.4*   HCT 38.4 35.2* 36.1*   MCV 99.0 95.4 94.5   MCH 30.7 30.6 29.8   MCHC 31.0 32.1 31.6   RDW 13.2 13.1 12.7    276 286   MPV 8.8 8.9 9.3     Chemistry:  Recent Labs     21  0724 21  0724 21  0629 21  2251 11/10/21  0459   *  --  135  --  134*   K 3.7   < > 3.3* 3.6* 3.5*   CL 94*  --  93*  --  91*   CO2 28  --  32*  --  33*   GLUCOSE 98  --  99  --  131*   BUN 25*  --  20  --  20   CREATININE 0.88  --  0.81  --  0.76   MG 1.4*   < > 1.7 1.3* 1.8   ANIONGAP 11  --  10  --  10   LABGLOM >60  --  >60  --  >60   GFRAA >60  --  >60  --  >60   CALCIUM 8.7  --  8.4*  --  8.6   PHOS 3.1  --  3.4  --  3.1   TROPHS  --   --   --  22* --     < > = values in this interval not displayed. Recent Labs     11/08/21  0724 11/09/21  0629 11/10/21  0459   LABALBU 2.8* 2.7* 2.9*     ABG:  Lab Results   Component Value Date    POCPH 7.429 10/03/2021    PH 7.44 03/30/2016    POCPCO2 37.1 10/03/2021    PCO2 36 03/30/2016    POCPO2 86.0 10/03/2021    PO2 64 03/30/2016    POCHCO3 24.5 10/03/2021    HCO3 24 03/30/2016    NBEA NOT REPORTED 10/03/2021    PBEA 0 10/03/2021    ZLD8BUH NOT REPORTED 10/03/2021    LPNS0CCH 97 10/03/2021    O2SAT 93 03/30/2016    FIO2 40.0 10/03/2021     Lab Results   Component Value Date/Time    SPECIAL NOT REPORTED 11/05/2021 08:32 AM     Lab Results   Component Value Date/Time    CULTURE PSEUDOMONAS AERUGINOSA >129335 CFU/ML (A) 11/05/2021 08:32 AM    CULTURE (A) 11/05/2021 08:32 AM     LACTOSE FERMENTING GRAM NEGATIVE RODS 10 to 50,000 CFU/ML Susceptibility testing not performed on low colony count organisms. Radiology:  XR CHEST (SINGLE VIEW FRONTAL)    Result Date: 11/6/2021  Interval improved aeration of the left lung base. XR CHEST (SINGLE VIEW FRONTAL)    Result Date: 11/5/2021  Interval decrease in left effusion. No pneumothorax. Persistent opacity at the left lung base. Vascularity is slightly prominent. XR CHEST PORTABLE    Result Date: 11/3/2021  No change from prior study. Persistent sizable left effusion with opacity at the left lung base and pulmonary vascular congestion. US THORACENTESIS Which side should the procedure be performed? Left    Result Date: 11/4/2021  Successful ultrasound guided thoracentesis. US RETROPERITONEAL COMPLETE    Result Date: 11/4/2021  1. Patient had no urge to void during the exam.  Nonvisualization of ureteral jets. 2. Incidental cholelithiasis. 3. Unremarkable renal ultrasound. No hydronephrosis.        Physical Examination:        General appearance:  alert, cooperative and no distress, obese  female lying supine in bed  Mental Status:  oriented to

## 2021-11-10 NOTE — PROGRESS NOTES
Pt reporting minor chest pain, Anjelica CHIN NP notified. EKG done and labs drawn. Will continue to monitor closely.

## 2021-11-11 LAB
ABSOLUTE EOS #: 0.06 K/UL (ref 0–0.44)
ABSOLUTE IMMATURE GRANULOCYTE: 0.17 K/UL (ref 0–0.3)
ABSOLUTE LYMPH #: 0.97 K/UL (ref 1.1–3.7)
ABSOLUTE MONO #: 1.38 K/UL (ref 0.1–1.2)
ANION GAP SERPL CALCULATED.3IONS-SCNC: 14 MMOL/L (ref 9–17)
BASOPHILS # BLD: 0 % (ref 0–2)
BASOPHILS ABSOLUTE: 0.03 K/UL (ref 0–0.2)
BUN BLDV-MCNC: 20 MG/DL (ref 8–23)
BUN/CREAT BLD: ABNORMAL (ref 9–20)
C-REACTIVE PROTEIN: 31.2 MG/L (ref 0–5)
CALCIUM SERPL-MCNC: 8.7 MG/DL (ref 8.6–10.4)
CHLORIDE BLD-SCNC: 91 MMOL/L (ref 98–107)
CO2: 30 MMOL/L (ref 20–31)
CREAT SERPL-MCNC: 0.88 MG/DL (ref 0.5–0.9)
DIFFERENTIAL TYPE: ABNORMAL
EOSINOPHILS RELATIVE PERCENT: 1 % (ref 1–4)
FERRITIN: 452 UG/L (ref 13–150)
GFR AFRICAN AMERICAN: >60 ML/MIN
GFR NON-AFRICAN AMERICAN: >60 ML/MIN
GFR SERPL CREATININE-BSD FRML MDRD: ABNORMAL ML/MIN/{1.73_M2}
GFR SERPL CREATININE-BSD FRML MDRD: ABNORMAL ML/MIN/{1.73_M2}
GLUCOSE BLD-MCNC: 125 MG/DL (ref 70–99)
HCT VFR BLD CALC: 37.5 % (ref 36.3–47.1)
HEMOGLOBIN: 11.8 G/DL (ref 11.9–15.1)
IMMATURE GRANULOCYTES: 1 %
LACTATE DEHYDROGENASE: 214 U/L (ref 135–214)
LYMPHOCYTES # BLD: 8 % (ref 24–43)
MCH RBC QN AUTO: 29.9 PG (ref 25.2–33.5)
MCHC RBC AUTO-ENTMCNC: 31.5 G/DL (ref 28.4–34.8)
MCV RBC AUTO: 95.2 FL (ref 82.6–102.9)
MONOCYTES # BLD: 11 % (ref 3–12)
NRBC AUTOMATED: 0 PER 100 WBC
PDW BLD-RTO: 12.7 % (ref 11.8–14.4)
PLATELET # BLD: 325 K/UL (ref 138–453)
PLATELET ESTIMATE: ABNORMAL
PMV BLD AUTO: 9.4 FL (ref 8.1–13.5)
POTASSIUM SERPL-SCNC: 3.9 MMOL/L (ref 3.7–5.3)
PROCALCITONIN: 0.09 NG/ML
RBC # BLD: 3.94 M/UL (ref 3.95–5.11)
RBC # BLD: ABNORMAL 10*6/UL
SARS-COV-2, RAPID: DETECTED
SEG NEUTROPHILS: 79 % (ref 36–65)
SEGMENTED NEUTROPHILS ABSOLUTE COUNT: 9.47 K/UL (ref 1.5–8.1)
SODIUM BLD-SCNC: 135 MMOL/L (ref 135–144)
SPECIMEN DESCRIPTION: ABNORMAL
WBC # BLD: 12.1 K/UL (ref 3.5–11.3)
WBC # BLD: ABNORMAL 10*3/UL

## 2021-11-11 PROCEDURE — 82728 ASSAY OF FERRITIN: CPT

## 2021-11-11 PROCEDURE — 2700000000 HC OXYGEN THERAPY PER DAY

## 2021-11-11 PROCEDURE — 2500000003 HC RX 250 WO HCPCS: Performed by: INTERNAL MEDICINE

## 2021-11-11 PROCEDURE — 99233 SBSQ HOSP IP/OBS HIGH 50: CPT | Performed by: INTERNAL MEDICINE

## 2021-11-11 PROCEDURE — 2580000003 HC RX 258: Performed by: INTERNAL MEDICINE

## 2021-11-11 PROCEDURE — 94640 AIRWAY INHALATION TREATMENT: CPT

## 2021-11-11 PROCEDURE — 2060000000 HC ICU INTERMEDIATE R&B

## 2021-11-11 PROCEDURE — 85025 COMPLETE CBC W/AUTO DIFF WBC: CPT

## 2021-11-11 PROCEDURE — 99232 SBSQ HOSP IP/OBS MODERATE 35: CPT | Performed by: INTERNAL MEDICINE

## 2021-11-11 PROCEDURE — 6360000002 HC RX W HCPCS: Performed by: INTERNAL MEDICINE

## 2021-11-11 PROCEDURE — 6370000000 HC RX 637 (ALT 250 FOR IP): Performed by: INTERNAL MEDICINE

## 2021-11-11 PROCEDURE — 6370000000 HC RX 637 (ALT 250 FOR IP): Performed by: STUDENT IN AN ORGANIZED HEALTH CARE EDUCATION/TRAINING PROGRAM

## 2021-11-11 PROCEDURE — 94761 N-INVAS EAR/PLS OXIMETRY MLT: CPT

## 2021-11-11 PROCEDURE — 6370000000 HC RX 637 (ALT 250 FOR IP): Performed by: NURSE PRACTITIONER

## 2021-11-11 PROCEDURE — 99222 1ST HOSP IP/OBS MODERATE 55: CPT | Performed by: INTERNAL MEDICINE

## 2021-11-11 PROCEDURE — 84145 PROCALCITONIN (PCT): CPT

## 2021-11-11 PROCEDURE — 87635 SARS-COV-2 COVID-19 AMP PRB: CPT

## 2021-11-11 PROCEDURE — 36415 COLL VENOUS BLD VENIPUNCTURE: CPT

## 2021-11-11 PROCEDURE — 2580000003 HC RX 258: Performed by: NURSE PRACTITIONER

## 2021-11-11 PROCEDURE — 83615 LACTATE (LD) (LDH) ENZYME: CPT

## 2021-11-11 PROCEDURE — 86140 C-REACTIVE PROTEIN: CPT

## 2021-11-11 PROCEDURE — 80048 BASIC METABOLIC PNL TOTAL CA: CPT

## 2021-11-11 PROCEDURE — 6360000002 HC RX W HCPCS: Performed by: NURSE PRACTITIONER

## 2021-11-11 RX ORDER — POTASSIUM CHLORIDE 20 MEQ/1
40 TABLET, EXTENDED RELEASE ORAL 2 TIMES DAILY WITH MEALS
Status: COMPLETED | OUTPATIENT
Start: 2021-11-11 | End: 2021-11-11

## 2021-11-11 RX ORDER — DIGOXIN 0.25 MG/ML
250 INJECTION INTRAMUSCULAR; INTRAVENOUS EVERY 6 HOURS
Status: COMPLETED | OUTPATIENT
Start: 2021-11-11 | End: 2021-11-12

## 2021-11-11 RX ORDER — METOPROLOL TARTRATE 5 MG/5ML
5 INJECTION INTRAVENOUS ONCE
Status: COMPLETED | OUTPATIENT
Start: 2021-11-11 | End: 2021-11-11

## 2021-11-11 RX ORDER — LORAZEPAM 2 MG/ML
0.5 INJECTION INTRAMUSCULAR ONCE
Status: COMPLETED | OUTPATIENT
Start: 2021-11-11 | End: 2021-11-11

## 2021-11-11 RX ORDER — METOPROLOL TARTRATE 50 MG/1
100 TABLET, FILM COATED ORAL 2 TIMES DAILY
Status: DISCONTINUED | OUTPATIENT
Start: 2021-11-11 | End: 2021-11-13

## 2021-11-11 RX ORDER — DIGOXIN 125 MCG
125 TABLET ORAL DAILY
Status: DISCONTINUED | OUTPATIENT
Start: 2021-11-12 | End: 2021-11-13

## 2021-11-11 RX ADMIN — LORAZEPAM 0.5 MG: 2 INJECTION INTRAMUSCULAR; INTRAVENOUS at 23:23

## 2021-11-11 RX ADMIN — METOPROLOL TARTRATE 5 MG: 1 INJECTION, SOLUTION INTRAVENOUS at 11:43

## 2021-11-11 RX ADMIN — PREDNISONE 20 MG: 20 TABLET ORAL at 08:32

## 2021-11-11 RX ADMIN — AMIODARONE HYDROCHLORIDE 150 MG: 50 INJECTION, SOLUTION INTRAVENOUS at 13:59

## 2021-11-11 RX ADMIN — APIXABAN 5 MG: 5 TABLET, FILM COATED ORAL at 20:47

## 2021-11-11 RX ADMIN — CASIRIVIMAB AND IMDEVIMAB: 600; 600 INJECTION, SOLUTION, CONCENTRATE INTRAVENOUS at 14:19

## 2021-11-11 RX ADMIN — POTASSIUM CHLORIDE 40 MEQ: 1500 TABLET, EXTENDED RELEASE ORAL at 16:12

## 2021-11-11 RX ADMIN — DIGOXIN 250 MCG: 0.25 INJECTION INTRAMUSCULAR; INTRAVENOUS at 14:00

## 2021-11-11 RX ADMIN — OXYCODONE HYDROCHLORIDE AND ACETAMINOPHEN 500 MG: 500 TABLET ORAL at 08:33

## 2021-11-11 RX ADMIN — FLUTICASONE PROPIONATE 1 PUFF: 110 AEROSOL, METERED RESPIRATORY (INHALATION) at 08:00

## 2021-11-11 RX ADMIN — POTASSIUM CHLORIDE 40 MEQ: 1500 TABLET, EXTENDED RELEASE ORAL at 09:53

## 2021-11-11 RX ADMIN — CIPROFLOXACIN 500 MG: 500 TABLET, FILM COATED ORAL at 20:47

## 2021-11-11 RX ADMIN — DIGOXIN 250 MCG: 0.25 INJECTION INTRAMUSCULAR; INTRAVENOUS at 20:00

## 2021-11-11 RX ADMIN — BUMETANIDE 1 MG: 1 TABLET ORAL at 20:45

## 2021-11-11 RX ADMIN — CYCLOBENZAPRINE 5 MG: 10 TABLET, FILM COATED ORAL at 08:34

## 2021-11-11 RX ADMIN — SODIUM CHLORIDE, PRESERVATIVE FREE 10 ML: 5 INJECTION INTRAVENOUS at 08:33

## 2021-11-11 RX ADMIN — METOPROLOL TARTRATE 75 MG: 25 TABLET ORAL at 08:32

## 2021-11-11 RX ADMIN — SODIUM CHLORIDE, PRESERVATIVE FREE 10 ML: 5 INJECTION INTRAVENOUS at 21:00

## 2021-11-11 RX ADMIN — IPRATROPIUM BROMIDE AND ALBUTEROL SULFATE 1 AMPULE: .5; 2.5 SOLUTION RESPIRATORY (INHALATION) at 08:01

## 2021-11-11 RX ADMIN — CITALOPRAM 20 MG: 20 TABLET, FILM COATED ORAL at 08:32

## 2021-11-11 RX ADMIN — BUMETANIDE 1 MG: 1 TABLET ORAL at 08:33

## 2021-11-11 RX ADMIN — CYCLOBENZAPRINE 5 MG: 10 TABLET, FILM COATED ORAL at 20:45

## 2021-11-11 RX ADMIN — ISOSORBIDE MONONITRATE 30 MG: 30 TABLET ORAL at 08:32

## 2021-11-11 RX ADMIN — DESMOPRESSIN ACETATE 40 MG: 0.2 TABLET ORAL at 08:33

## 2021-11-11 RX ADMIN — APIXABAN 5 MG: 5 TABLET, FILM COATED ORAL at 08:33

## 2021-11-11 RX ADMIN — Medication 3 MG: at 20:45

## 2021-11-11 RX ADMIN — CLOPIDOGREL 75 MG: 75 TABLET, FILM COATED ORAL at 08:32

## 2021-11-11 RX ADMIN — AMIODARONE HYDROCHLORIDE 1 MG/MIN: 50 INJECTION, SOLUTION INTRAVENOUS at 15:13

## 2021-11-11 RX ADMIN — AMIODARONE HYDROCHLORIDE 200 MG: 200 TABLET ORAL at 08:33

## 2021-11-11 RX ADMIN — CIPROFLOXACIN 500 MG: 500 TABLET, FILM COATED ORAL at 08:33

## 2021-11-11 RX ADMIN — METOPROLOL TARTRATE 25 MG: 25 TABLET ORAL at 09:27

## 2021-11-11 ASSESSMENT — ENCOUNTER SYMPTOMS
SHORTNESS OF BREATH: 1
ABDOMINAL DISTENTION: 0
APNEA: 0
GASTROINTESTINAL NEGATIVE: 1
COLOR CHANGE: 0
EYES NEGATIVE: 1
ALLERGIC/IMMUNOLOGIC NEGATIVE: 1
EYE DISCHARGE: 0

## 2021-11-11 ASSESSMENT — PAIN SCALES - GENERAL
PAINLEVEL_OUTOF10: 0
PAINLEVEL_OUTOF10: 0

## 2021-11-11 NOTE — PROGRESS NOTES
PULMONARY & CRITICAL CARE MEDICINE PROGRESS NOTE     Patient:  Antonio Cox  MRN: [id number]  Admit date: 11/3/2021  Primary Care Physician: KALYN Pascual - CNP  Consulting Physician: Jimmy Childress DO  CODE Status: Full Code  LOS: 8     SUBJECTIVE     Chief Complaint/ Reason for consult:  Left pleural effusion    Hospital Course: The patient is a 68 y.o. female here for evaluation of shortness of breath with clear sputum, increased leg swelling. Patient has chronic shortness of breath which has been worsening over the last couple of days along with increased swelling of her lower extremity. She has conversational dyspnea, no wheezing. Chest x-ray shows left pleural effusion and left atelectasis she has had multiple thoracentesis. Complains of abdominal bloating, orthopnea, palpitations. She was found to be in A. fib with heart rate in 110s. Interval History:  11/11/21  Patient was on a fib rvr overnight. cardiolgy consulted and started on iv amio and IV digoxin. Planned for EPS/ ablation once covid pneumonia resolved. Pt was seen and examined at bedside. Saturating appropriately at 2  liter via nasal canula. Vitals stable. Labs reviewed. Urine culture positive for  pesudomonas on ciprofloxacin  Pleural fluid appears exudative with cholestrol in pleural fluid was 67,  and protein 3.4. negative for bacteria and negative for malignant cell. Serum protein and pleural protein gradient 2.8 which appear transudative. Patient currently being diuresed with bumex 1 mg BID with output of 600 ml and net negative of 4.2 liter . HCO3 coming up will keep eye on it. Covid positive but patient denied any symptoms. As per primary one more day of diuresis and then plan for facility placement. Review Of Systems:  Review of Systems   Constitutional: Negative. HENT: Negative. Eyes: Negative. Respiratory: Positive for shortness of breath. Cardiovascular: Negative. Gastrointestinal: Negative. Endocrine: Negative. Genitourinary: Negative. Musculoskeletal: Negative. Skin: Negative. Allergic/Immunologic: Negative. Psychiatric/Behavioral: Negative. OBJECTIVE     PaO2/FiO2 RATIO:  No results for input(s): POCPO2 in the last 72 hours. FiO2 : 36 %     VITAL SIGNS:   LAST:  /64   Pulse 113   Temp 97.7 °F (36.5 °C) (Axillary)   Resp 28   Ht 5' 4\" (1.626 m)   Wt 253 lb 15.5 oz (115.2 kg)   SpO2 90%   BMI 43.59 kg/m²   8-24 HR RANGE:  TEMP Temp  Av.9 °F (36.6 °C)  Min: 97.5 °F (36.4 °C)  Max: 98.3 °F (18.5 °C)   BP Systolic (87ZHW), OKU:637 , Min:104 , VQC:888      Diastolic (25OWF), GXS:78, Min:64, Max:97     PULSE Pulse  Av.8  Min: 88  Max: 113   RR Resp  Av  Min: 20  Max: 28   O2 SAT SpO2  Av %  Min: 90 %  Max: 92 %   OXYGEN DELIVERY O2 Flow Rate (L/min)  Av L/min  Min: 2 L/min  Max: 2 L/min        Systemic Examination:   Physical Exam -  Constitutional:  Alert, cooperative and no distress. Mental Status:  Oriented to person, place and time and normal affect. Lungs:  Bilateral air entry present decreased in left side, lung fields clear. Normal effort. Heart:  Regular rate and rhythm, no murmur. Abdomen:  Soft, nontender, nondistended, normal bowel sounds. Extremities:  Bilateral lower extremity edema, negative for redness, tenderness in the calves. Skin:  Warm, dry, no gross lesions or rashes.     DATA REVIEW     Medications: Current Inpatient  Scheduled Meds:   potassium chloride  40 mEq Oral BID WC    metoprolol tartrate  100 mg Oral BID    digoxin  250 mcg IntraVENous Q6H    [START ON 2021] digoxin  125 mcg Oral Daily    predniSONE  20 mg Oral Daily    bumetanide  1 mg Oral BID    ciprofloxacin  500 mg Oral 2 times per day    ipratropium-albuterol  1 ampule Inhalation TID    apixaban  5 mg Oral BID    vitamin C  500 mg Oral Daily    atorvastatin  40 mg Oral Daily    fluticasone  1 puff Inhalation BID    citalopram  20 mg Oral Daily    clopidogrel  75 mg Oral Daily    sodium chloride flush  5-40 mL IntraVENous 2 times per day     Continuous Infusions:   amiodarone      Followed by    amiodarone      sodium chloride         INPUT/OUTPUT:  In: 10 [I.V.:10]  Out: 450 [Urine:450]  Date 11/11/21 0000 - 11/11/21 2359   Shift [id number] 5640-5599 7587-3069 24 Hour Total   INTAKE   I.V.(mL/kg)  10(0.1)  10(0.1)   Shift Total(mL/kg)  10(0.1)  10(0.1)   OUTPUT   Shift Total(mL/kg)       Weight (kg) 115.2 115.2 115.2 115.2        LABS:  ABGs:   No results for input(s): POCPH, POCPCO2, POCPO2, POCHCO3, SVZX3AGZ in the last 72 hours. CBC:   Recent Labs     11/09/21  0629 11/10/21  0459 11/11/21  1157   WBC 8.3 9.1 12.1*   HGB 11.3* 11.4* 11.8*   HCT 35.2* 36.1* 37.5   MCV 95.4 94.5 95.2    286 325   LYMPHOPCT  --   --  8*   RBC 3.69* 3.82* 3.94*   MCH 30.6 29.8 29.9   MCHC 32.1 31.6 31.5   RDW 13.1 12.7 12.7     CRP:   Recent Labs     11/11/21  1157   CRP 31.2*     LDH:   Recent Labs     11/11/21  1157        BMP:   Recent Labs     11/09/21  0629 11/09/21  2251 11/10/21  0459 11/11/21  1157     --  134* 135   K 3.3* 3.6* 3.5* 3.9   CL 93*  --  91* 91*   CO2 32*  --  33* 30   BUN 20  --  20 20   CREATININE 0.81  --  0.76 0.88   GLUCOSE 99  --  131* 125*   PHOS 3.4  --  3.1  --      Liver Function Test:   Recent Labs     11/09/21  0629 11/10/21  0459   LABALBU 2.7* 2.9*     Coagulation Profile:   No results for input(s): INR, PROTIME, APTT in the last 72 hours. D-Dimer:  No results for input(s): DDIMER in the last 72 hours. Lactic Acid:  No results for input(s): LACTA in the last 72 hours. Cardiac Enzymes:  No results for input(s): CKTOTAL, CKMB, CKMBINDEX, TROPONINI in the last 72 hours. Invalid input(s): TROPONIN, HSTROP  BNP/ProBNP:   No results for input(s): BNP, PROBNP in the last 72 hours. Triglycerides:  No results for input(s): TRIG in the last 72 hours. Microbiology:  Urine Culture:  No components found for: CURINE  Blood Culture:  No components found for: CBLOOD, CFUNGUSBL  Sputum Culture:  No components found for: CSPUTUM  Recent Labs     11/11/21  1000   SPECDESC . NASOPHARYNGEAL SWAB     Recent Labs     11/11/21  1000   SPECDESC . NASOPHARYNGEAL SWAB        Pathology:    Radiology Reports:  XR CHEST (SINGLE VIEW FRONTAL)   Final Result   Stable volume loss in the left chest with left basilar atelectasis or   pneumonia. Possible small left effusion. XR CHEST (SINGLE VIEW FRONTAL)   Final Result   Interval improved aeration of the left lung base. XR CHEST (SINGLE VIEW FRONTAL)   Final Result   Interval decrease in left effusion. No pneumothorax. Persistent opacity at   the left lung base. Vascularity is slightly prominent. US THORACENTESIS Which side should the procedure be performed? Left   Final Result   Successful ultrasound guided thoracentesis. US RETROPERITONEAL COMPLETE   Final Result   1. Patient had no urge to void during the exam.  Nonvisualization of ureteral   jets. 2. Incidental cholelithiasis. 3. Unremarkable renal ultrasound. No hydronephrosis. XR CHEST PORTABLE   Final Result   No change from prior study. Persistent sizable left effusion with opacity at   the left lung base and pulmonary vascular congestion.               Echocardiogram:   Results for orders placed during the hospital encounter of 10/02/21    ECHO Complete 2D W Doppler W Color    Narrative  Transthoracic Echocardiography Report (TTE)    Patient Name MCLEAN   Date of Study               10/03/2021  1 [facility]    Date of      1945  Gender                      Female  Birth    Age          68 year(s)  Race                            Room Number  0124        Height:                     64 inch, 162.56 cm    Corporate ID [id number]    Weight:                     272 pounds, 123.4 kg  #    Patient Acct [de-identified]   BSA: 2.23 m^2      BMI:      46.69  #                                                              kg/m^2    MR #         [id number]     Sonographer                 Nellie Foote    Accession #  [id number]  Interpreting Physician      Margarita Handy    Fellow                   Referring Nurse  Practitioner    Interpreting             Referring Physician         Layo Radford MD  Fellow    Additional Comments  Technically difficult study due to body habitus    Type of Study    TTE procedure:2D Echocardiogram, M-Mode, Doppler, Color Doppler. Procedure Date  Date: 10/03/2021 Start: 01:22 PM    Study Location: [facility]    History / Jose Milian. Comments:  COPD, Pneumonia, Obesity,    Patient Status: Inpatient    Height: 64 inches Weight: 272.01 pounds BSA: 2.23 m^2 BMI: 46.69 kg/m^2    CONCLUSIONS    Summary  Left ventricle is normal in size with normal systolic function globally. Calculated ejection fraction is 59% ( Atrial-Fib). Mild mitral regurgitation. Mild tricuspid regurgitation. Estimated right ventricular systolic pressure is 35 mmHg. Anterior clear space noted, most likely adipose tissue vs small loculated  pericardial effusion. No evidence of tamponade. Pleural effusion is seen. Signature  ----------------------------------------------------------------------------  Electronically signed by Toi Cespedes(Sonographer) on 10/03/2021 02:13  PM  ----------------------------------------------------------------------------    ----------------------------------------------------------------------------  Electronically signed by Margarita Handy(Interpreting physician) on  10/03/2021 03:50 PM  ----------------------------------------------------------------------------  FINDINGS  Left Atrium  Left atrium is moderately dilated. Left Ventricle  Left ventricle is normal in size with normal systolic function globally.   Calculated ejection fraction is 59% ( Atrial-Fib)  Right Atrium  Right atrial dilatation. Right Ventricle  Dilated right ventricle with systolic function within the range of normal.  Mitral Valve  Mitral valve structure is normal.  Mild mitral regurgitation. Aortic Valve  Aortic valve is trileaflet. Aortic sclerosis without stenosis. No aortic insufficiency. Tricuspid Valve  Tricuspid valve structure is normal.  Mild tricuspid regurgitation. Estimated right ventricular systolic pressure is 35 mmHg. Pulmonic Valve  The pulmonic valve is normal in structure. Pericardial Effusion  Anterior clear space noted, most likely adipose tissue vs small loculated  pericardial effusion. No evidence of tamponade. Miscellaneous  Normal aortic root dimension.     M-mode / 2D Measurements & Calculations:    LVIDd:5.2 cm(3.7 - 5.6 cm)       Diastolic BEIDVX:00.9 ml  EPLTE:2.1 cm(2.2 - 4.0 cm)       Systolic BIVZIF:28.9 ml  YDPI:1.9 cm(0.6 - 1.1 cm)        Aortic Root:3.6 cm(2.0 - 3.7 cm)  LVPWd:0.8 cm(0.6 - 1.1 cm)       LA Dimension: 4.5 cm(1.9 - 4.0 cm)  Fractional Shortenin.69 %    LA volume/Index: 61.2 ml /27m^2  Calculated LVEF (%): 59.67 %     LVOT:2.2 cm  RVDd:2.3 cm    Mitral:                                 Aortic    Valve Area (P1/2-Time): 4.23 cm^2       Peak Velocity: 1.69 m/s  Peak E-Wave: 0.95 m/s                   Mean Velocity: 1.21 m/s  Peak Gradient: 11.42 mmHg  Peak Gradient: 3.58 mmHg                Mean Gradient: 7 mmHg  Mean Gradient: 2 mmHg  Deceleration Time: 171 msec  P1/2t: 52 msec                          Area (continuity): 2.86 cm^2  AV VTI: 28.6 cm    Area (continuity): 3.71 cm^2  Mean Velocity: 0.66 m/s    Tricuspid:                              Pulmonic:    Estimated RVSP: 35 mmHg                 Peak Velocity: 1.26 m/s  Peak TR Velocity: 2.52 m/s              Peak Gradient: 6.35 mmHg  Peak TR Gradient: 25.4016 mmHg  Estimated RA Pressure: 10 mmHg    Estimated PASP: 35.4 mmHg       ASSESSMENT AND PLAN     Assessment:    Acute hypoxic respiratory failure secondary to left sided pleural effusion with atelectasis. The pleural effusion is an exudate  Acute on chronic diastolic CHF  Obstructive sleep apnea:              Chronic obstructive pulmonary disease  Obesity  A. Fib'  Covid Positive    Plan:  Received one dose of regeneron as per ID  Pleural fluid with many neutrophils but no bacteria seen no growth in 2 days  Continue diuresing with bumex 1 mg BID. restriction of fluid. In lab increased bicarbonate noted. Continue oxygen 4 liter and bronchodilator. Prednisone 20 mg daily for 4 days. On ciprofloxacin for pseudomonas UTI. Will continue amiodarone,lopressor and eliquis for control of A. Fib. Will recommend to cut down bumex dose as patient developing contraction alkalosis. Awaiting placement. We will continue to follow. Electronically signed Jamarcus Billy MD  11/11/2021 2:54 PM      Please note that this chart was generated using voice recognition Dragon dictation software. Although every effort was made to ensure the accuracy of this automated transcription, some errors in transcription may have occurred.

## 2021-11-11 NOTE — CONSULTS
[facility] -   Infectious diseases evaluation  admission date 11/3/2021    reason for consultation:   covid +    Impression :   Current:  · covid + rapid 11/11  · Recurrent L pleural effusion- many thoracentesis  · Repeat thoracentesis 11/4  · Acute on chronic CHF  · Pseudomonas UTI   · SEJAL  · Obese     Other:  ·   Discussion / summary of stay / plan of care   · Was rapid covid neg at admission -  · Then tested + rapid 11/11  · fully vaccinated  · Might be asymptomatic for covid, no clear pneumonia   Recommendations   · regeneron x 1  · Get CRP and ferritin to follow infl markers  · cipro for pseudomonas UTI 11/7 -11/12    Infection Control Recommendations   · [place] Precautions  · Contact Isolation   · Airborne isolation  · Droplet Isolation      Antimicrobial Stewardship Recommendations   · Simplification of therapy  · Targeted therapy      Coordination ofOutpatient Care:   · Estimated Length of IV antimicrobials:  · Patient will need Midline / picc Catheter Insertion:   · Patient will need SNF:  · Patient will need outpatient wound care:     History of Present Illness:   Initial history:  Sandra Winston is a 68y.o.-year-old female w recurrent left pleural effusion who comes in again for shortness of breath and was found to have a chronic left effusion had a thoracentesis 11/4, was also found to have a Pseudomonas UTI and started on Cipro on 11/7. Had initial rapid Covid test at admission that was negative, repeat 111/11 came back positive rapid Covid. Infectious consulted, reviewed the chest x-ray she does not seem to be having Covid pneumonia. No inflammatory markers at this point.     Interval changes  11/11/2021   Patient Vitals for the past 8 hrs:   BP Temp Temp src Pulse Resp SpO2   11/11/21 0805     28 90 %   11/11/21 0800 118/64 97.7 °F (36.5 °C) Axillary 113 20 92 %    11.11.21  No nausea and no lost of appetite  Cough same and old  Used 2L NC  abd soft    Summary of relevant labs:  Labs:  Creat 0.76   WBC  9.1    Micro;  covid rapid neg 11/3  covid rapid + 11/11    Imaging:  CXR 11/9  Stable volume loss in the left chest with left basilar atelectasis or pneumonia. Possible small left effusion. CXR 11/3  Persistent sizable left effusion with opacity at the left lung base and pulmonary vascular congestion. I have personally reviewed the past medical history, past surgical history, medications, social history, and family history, and I haveupdated the database accordingly. Allergies:   Latex, Betadine [povidone iodine], Bee venom, Dilaudid [hydromorphone hcl], Adhesive tape, and Sulfa antibiotics     Review of Systems:     Review of Systems   Constitutional: Negative for activity change. HENT: Negative for congestion. Eyes: Negative for discharge. Respiratory: Positive for shortness of breath. Negative for apnea. Cardiovascular: Negative for chest pain. Gastrointestinal: Negative for abdominal distention. Endocrine: Negative for cold intolerance. Genitourinary: Negative for dysuria. Musculoskeletal: Negative for arthralgias. Skin: Negative for color change. Allergic/Immunologic: Negative for immunocompromised state. Neurological: Negative for dizziness. Hematological: Negative for adenopathy. Psychiatric/Behavioral: Negative for agitation. Physical Examination :       Physical Exam  Constitutional:       General: She is not in acute distress. Appearance: Normal appearance. HENT:      Head: Atraumatic. Nose: Nose normal.      Mouth/Throat:      Mouth: Mucous membranes are moist.   Eyes:      General: No scleral icterus. Conjunctiva/sclera: Conjunctivae normal.   Cardiovascular:      Rate and Rhythm: Normal rate and regular rhythm. Heart sounds: Normal heart sounds. No murmur heard. Abdominal:      General: There is no distension. Palpations: Abdomen is soft.       Tenderness: There is no abdominal tenderness. Genitourinary:     Comments: No goldstein  Musculoskeletal:         General: No swelling or deformity. Cervical back: Neck supple. No rigidity. Skin:     General: Skin is dry. Coloration: Skin is not jaundiced. Neurological:      General: No focal deficit present. Mental Status: She is alert and oriented to person, place, and time. Psychiatric:         Mood and Affect: Mood normal.         Thought Content:  Thought content normal.         Past Medical History:     Past Medical History:   Diagnosis Date    Asthma     COPD (chronic obstructive pulmonary disease) (HCC)     Gout     Hyperlipidemia     Hypertension     Mitral insufficiency     Pneumonia        Past Surgical  History:     Past Surgical History:   Procedure Laterality Date    ANKLE SURGERY Left 03/22/2016    ORIF    BRONCHOSCOPY N/A 10/20/2021    BRONCHOSCOPY ALVEOLAR LAVAGE performed by Rosita Harris MD at [address]      TN COLSC FLX W/RMVL OF TUMOR POLYP LESION SNARE TQ N/A 8/8/2017    COLONOSCOPY POLYPECTOMY SNARE/COLD BIOPSY performed by Adrián Morris MD at [facility]    TONSILLECTOMY AND ADENOIDECTOMY         Medications:      potassium chloride  40 mEq Oral BID WC    metoprolol tartrate  100 mg Oral BID    metoprolol  5 mg IntraVENous Once    predniSONE  20 mg Oral Daily    bumetanide  1 mg Oral BID    ciprofloxacin  500 mg Oral 2 times per day    ipratropium-albuterol  1 ampule Inhalation TID    amiodarone  200 mg Oral Daily    apixaban  5 mg Oral BID    vitamin C  500 mg Oral Daily    atorvastatin  40 mg Oral Daily    fluticasone  1 puff Inhalation BID    citalopram  20 mg Oral Daily    clopidogrel  75 mg Oral Daily    isosorbide mononitrate  30 mg Oral Daily    sodium chloride flush  5-40 mL IntraVENous 2 times per day       Social History:     Social History     Socioeconomic History    Marital status:      Spouse name: Not on file    Number of children: Not on file    Years of education: Not on file    Highest education level: Not on file   Occupational History    Not on file   Tobacco Use    Smoking status: Former Smoker    Smokeless tobacco: Never Used    Tobacco comment: over 30 years ago    Vaping Use    Vaping Use: Never used   Substance and Sexual Activity    Alcohol use: Yes     Alcohol/week: 0.0 standard drinks     Comment: occasionally     Drug use: No    Sexual activity: Not on file   Other Topics Concern    Not on file   Social History Narrative    Not on file     Social Determinants of Health     Financial Resource Strain:     Difficulty of Paying Living Expenses: Not on file   Food Insecurity:     Worried About Running Out of Food in the Last Year: Not on file    Geronimo of Food in the Last Year: Not on file   Transportation Needs:     Lack of Transportation (Medical): Not on file    Lack of Transportation (Non-Medical):  Not on file   Physical Activity:     Days of Exercise per Week: Not on file    Minutes of Exercise per Session: Not on file   Stress:     Feeling of Stress : Not on file   Social Connections:     Frequency of Communication with Friends and Family: Not on file    Frequency of Social Gatherings with Friends and Family: Not on file    Attends Yarsani Services: Not on file    Active Member of [address] [place] [address] [employer] or Organizations: Not on file    Attends Club or Organization Meetings: Not on file    Marital Status: Not on file   Intimate Partner Violence:     Fear of Current or Ex-Partner: Not on file    Emotionally Abused: Not on file    Physically Abused: Not on file    Sexually Abused: Not on file   Housing Stability:     Unable to Pay for Housing in the Last Year: Not on file    Number of [place] in the Last Year: Not on file    Unstable Housing in the Last Year: Not on file       Family History:     Family History   Problem Relation Age of Onset    High Blood Pressure Mother     Cancer Mother     Asthma Father     Heart Disease Father     Cancer Father       Medical Decision Making:   I have independently reviewed/ordered the following labs:    CBC with Differential:   Recent Labs     11/09/21  0629 11/10/21  0459   WBC 8.3 9.1   HGB 11.3* 11.4*   HCT 35.2* 36.1*    286     BMP:  Recent Labs     11/09/21  0629 11/09/21  0629 11/09/21  2251 11/10/21  0459     --   --  134*   K 3.3*   < > 3.6* 3.5*   CL 93*  --   --  91*   CO2 32*  --   --  33*   BUN 20  --   --  20   CREATININE 0.81  --   --  0.76   MG 1.7   < > 1.3* 1.8    < > = values in this interval not displayed. Hepatic Function Panel:   Recent Labs     11/09/21  0629 11/10/21  0459   LABALBU 2.7* 2.9*     No results for input(s): RPR in the last 72 hours. No results for input(s): HIV in the last 72 hours. No results for input(s): BC in the last 72 hours. Lab Results   Component Value Date    CREATININE 0.76 11/10/2021    GLUCOSE 131 11/10/2021    GLUCOSE 97 05/09/2016       Detailed results: Thank you for allowing us to participate in the care of this patient. Please call with questions. This note is created with the assistance of a speech recognition program.  While intending to generate adocument that actually reflects the content of the visit, the document can still have some errors including those of syntax and sound a like substitutions which may escape proof reading. It such instances, actual meaningcan be extrapolated by contextual diversion.     Simon Ruggiero MD  Office: [phone]  Perfect serve / office [phone]

## 2021-11-11 NOTE — PLAN OF CARE
Ongoing     Problem: Nutrition  Goal: Optimal nutrition therapy  11/10/2021 2220 by Calvin Cortez RN  Outcome: Ongoing  11/10/2021 1127 by Serge Zarate RD, LD  Outcome: Ongoing  Note: Nutrition Problem #1: Inadequate oral intake  Intervention: Food and/or Nutrient Delivery: Continue Current Diet, Continue Oral Nutrition Supplement  Nutritional Goals: PO intake to meet > 75% of estimated nutrition needs   11/10/2021 0920 by Charline Galloway RN  Outcome: Ongoing     Problem: Pain:  Goal: Pain level will decrease  Description: Pain level will decrease  11/10/2021 2220 by Calvin Cortez RN  Outcome: Ongoing  11/10/2021 0920 by Charline Galloway RN  Outcome: Ongoing  Goal: Control of acute pain  Description: Control of acute pain  11/10/2021 2220 by Calvin Cortez RN  Outcome: Ongoing  11/10/2021 0920 by Charline Galloway RN  Outcome: Ongoing  Goal: Control of chronic pain  Description: Control of chronic pain  11/10/2021 2220 by Calvin Cortez RN  Outcome: Ongoing  11/10/2021 0920 by Charline Galloway RN  Outcome: Ongoing

## 2021-11-11 NOTE — PROGRESS NOTES
[facility]  Office: [address], DO, Emmanuel Lani, DO, Gregg Toribio, DO, Abran Luu, DO, Sunita Corrales MD, Abigail Dooley MD, Abimael Milian MD, Karyna Shirley MD, Redd Alonzo MD, Qing Castellon MD, Stacia Wolff MD, Goldy Curry, DO, Lory Nixon, DO, Nilay Alba MD,  Kelvin Staff, DO, Wilbert Mccoy MD, Don Hart MD, Coral Johnson MD, Doug Juarez MD, Debra Milian MD, Sandra Perez MD, Tino Dupont MD, Anny Edgar, [facility], [facility] Marco Aosso, CNP, Marisol Cancel, CNP, Ruthie Fret, CNS, Luh Cardenas, CNP, Daisy Brain, CNP, Betty Harden, CNP, Emilia White, CNP, Andrea Collier, CNP, [place] Ayala PA-C, Caity Ardon, [facility], Nigel Vale, CNP, Iwona Ghosh, CNP, Ashli Li, CNP, Eric Klein, CNP, Tarik Carrasco, CNP, Elisabeth Foote, CNP, Marie Santana, [address] [place] [address]    Progress Note    11/11/2021    8:52 AM    Name:   Angélica Tomlin  MRN:     [id number]     [place]:      [de-identified]   Room:   2019/2019-01   Day:  8  Admit Date:  11/3/2021  4:32 PM    PCP:   KALYN Freeman CNP  Code Status:  Full Code    Subjective:     C/C:   Chief Complaint   Patient presents with    Leg Swelling     increasing BLE edema; pt from SNF; per PA at SNF, pt has fluid retention, needs IV meds that they cannot administer     Interval History Status: improved    Pt seen and examined this morning. No acute events overnight. This morning, patient is somewhat agitated after being in pain throughout the night and taking her meds. The slightest of movement increases her heart rate. After increasing her Lopressor yesterday, her heart rate actually had been improved to acceptable rate. She continues to feel well. Had a bowel movement yesterday. Denies any shortness of breath. She is excited to get back to her skilled nursing facility.     Brief History:     Patient presented to the ER with complaints of leg swelling, shortness of breath with a productive cough (thin white sputum) and constipation.  She states she chronically has lower extremity edema left side greater than right she thinks that is increased over the last couple days.  Patient was recently discharged on 10/15 after being admitted related to a pleural effusion.  At that time she presented with shortness of breath and palpitations. Анна Lo is found to have a left lower lobe pneumonia and A. fib with RVR and was admitted to the ICU for acute hypoxic respiratory failure.  During that visit the patient had a paracentesis, Negative CURT and subsequently cardioverted successfully, and a bronchoscopy that showed left main bronchus obstruction due to thick mucous plugging.  At discharge the patient was encouraged to follow-up with cardiology outpatient for an ablation.  During that visit she was also treated for UTI and was discharged on Augmentin for 5 days. Анна Lo was also given inhalers and steroids for COPD.     On arrival to the ER today she complains of not feeling well over the last 3 or 4 days. Анна Lo describes worsening shortness of breath primarily on exertion and orthopnea.  The patient told me that she has not been using oxygen on an outpatient basis but according to the ER note she has been using it intermittently primarily with exertion and therapy and occasionally overnight.  Also complains of constipation and mild abdominal bloating.  She also reports nausea and vomiting few days ago but nothing current and she has been tolerating p.o. intake.  She states she is compliant with her home medications.     Patient was placed on 2 L nasal cannula in the ER. Анна Lo has been afebrile heart rates been in the 90s to the low 110's A. Fib.     Chest x-ray shows no acute changes from prior study with a has a persistent sizable left effusion with opacity at the left lung base and pulmonary vascular congestion.    Sodium is 129 BUN and creatinine are 26/1.65.  Her normal BUN and creatinine are around 15/0.58 and normal sodiums in the mid 130s. BNP is 3134.  On her last admission it FMK 8470 and on her admission in early October it was 3251.   WBCs 14.5 seems to run slightly elevated     Plan to consult nephrology and pulmonary medicine. Continue home Eliquis, beta-blocker and amiodarone.  Hold losartan  Plan Solu-Medrol related to IV wheezing throughout. It appears to be in fluid overload related to pitting edema in her lower extremities and increasing shortness of breath.  Plan to hold further hydration for now.  Give Lasix 20 mg IV x1    Review of Systems:     Constitutional:  negative for chills, fevers, sweats  Respiratory: Denies shortness of breath; negative for cough, dyspnea on exertion, wheezing  Cardiovascular:  negative for chest pain, chest pressure/discomfort, lower extremity edema, palpitations  Gastrointestinal:  negative for abdominal pain, constipation, diarrhea, nausea, vomiting  Neurological:  negative for dizziness, headache  Extremities: Reports neck pain, back pain    Medications: Allergies:     Allergies   Allergen Reactions    Latex Hives     Hives from rubber gloves    Betadine [Povidone Iodine] Hives    Bee Venom Swelling    Dilaudid [Hydromorphone Hcl] Other (See Comments)     Severe confusion    Adhesive Tape Rash    Sulfa Antibiotics Rash       Current Meds:   Scheduled Meds:    potassium chloride  40 mEq Oral BID     metoprolol tartrate  100 mg Oral BID    metoprolol tartrate  25 mg Oral Once    predniSONE  20 mg Oral Daily    bumetanide  1 mg Oral BID    ciprofloxacin  500 mg Oral 2 times per day    ipratropium-albuterol  1 ampule Inhalation TID    amiodarone  200 mg Oral Daily    apixaban  5 mg Oral BID    vitamin C  500 mg Oral Daily    atorvastatin  40 mg Oral Daily    fluticasone  1 puff Inhalation BID    citalopram  20 mg Oral Daily    clopidogrel  75 mg Oral Daily    isosorbide mononitrate  30 mg Oral Daily    sodium chloride flush  5-40 mL IntraVENous 2 times per day     Continuous Infusions:    sodium chloride       PRN Meds: magnesium sulfate, potassium chloride **OR** potassium alternative oral replacement **OR** potassium chloride, magnesium hydroxide, melatonin, sennosides-docusate sodium, cyclobenzaprine, sodium chloride flush, sodium chloride, ondansetron **OR** ondansetron, acetaminophen **OR** acetaminophen    Data:     Past Medical History:   has a past medical history of Asthma, COPD (chronic obstructive pulmonary disease) ([address] Gout, Hyperlipidemia, Hypertension, Mitral insufficiency, and Pneumonia. Social History:   reports that she has quit smoking. She has never used smokeless tobacco. She reports current alcohol use. She reports that she does not use drugs. Family History:   Family History   Problem Relation Age of Onset    High Blood Pressure Mother     Cancer Mother     Asthma Father     Heart Disease Father     Cancer Father        Vitals:  /64   Pulse 113   Temp 97.7 °F (36.5 °C) (Axillary)   Resp 28   Ht 5' 4\" (1.626 m)   Wt 253 lb 15.5 oz (115.2 kg)   SpO2 90%   BMI 43.59 kg/m²   Temp (24hrs), Av.8 °F (36.6 °C), Min:97.3 °F (36.3 °C), Max:98.3 °F (36.8 °C)    No results for input(s): POCGLU in the last 72 hours. I/O (24Hr):     Intake/Output Summary (Last 24 hours) at 2021 0852  Last data filed at 2021 1462  Gross per 24 hour   Intake 10 ml   Output 450 ml   Net -440 ml       Labs:  Hematology:  Recent Labs     21  0629 11/10/21  0459   WBC 8.3 9.1   RBC 3.69* 3.82*   HGB 11.3* 11.4*   HCT 35.2* 36.1*   MCV 95.4 94.5   MCH 30.6 29.8   MCHC 32.1 31.6   RDW 13.1 12.7    286   MPV 8.9 9.3     Chemistry:  Recent Labs     21  0629 211 11/10/21  0459     --  134*   K 3.3* 3.6* 3.5*   CL 93*  --  91*   CO2 32*  --  33*   GLUCOSE 99  --  131*   BUN 20  --  20   CREATININE 0.81  --  0.76   MG 1.7 1.3* 1.8 failure with hypoxia - currently resting on 2 L of oxygen via NC. CXR from 11/09 reviewed. Continue oral diuretic - Bumex 1 mg BID. Wean supplemental oxygen at SNF  3. Afib with RVR / a flutter - patient currently on amiodarone 200 mg QD; anticoagulated with eliuqis. Continue Lopressor. Increase dose to 100 mg BID today. Give extra dose of 25 mg this morning as she has already received her morning dose of 75 mg. I have also asked cardiology to weigh in on the patient's care, as her A-flutter appears to be somewhat uncontrolled, despite interventions. 4. COPD - continue alburterol, flovent  5. Pseudomonal UTI - continue ciprofloxacin. 6. HFpEF - continue diuretic, lopressor BID. 7. Essential hypertension - on Bumex and lopressor. Will hold losartan at discharge while we are uptitrating her Lopressor. 8. Morbid obesity - BMI 43.59.  9. Constipation - continue senna-s. And as needed milk of mag. She did have 2 bowel movements yesterday. 10. PT/OT  11. Disposition: Await cardiology evaluation. Hopeful for discharge later today versus tomorrow. Addendum:  Patient now with COVID-19 detected on rapid test  Transfer to car 3  ID consult  Uncertain as to how this is playing in her clinical presentation at this time  Hold off on discharge until cardiology and ID both see. Precert is still pending.     33 Fabiana Marion DO  11/11/2021  8:52 AM

## 2021-11-11 NOTE — CARE COORDINATION
Transitional planning-1015-patient COVID+, called Zoë at [facility] intake-cannot take her back until 10 days post diagnosis(11-11). 1110 called eduardo Ellis in [place] He is wanting this RN to call son Matha Gilford. Called and left message with Matha Gilford to call me back for choices for new SNF.    1240 call from eduardo Tobias-gave SNF choices to him-he will discuss with Matha Gilford and let us know. 1400 call from eduardo [employer] and [address] [employer]. Faxed referral to [address] [employer] and talked with URMILA([phone]). Faxed referral to [address] and left message. Attempted to call patient-phone busy. 1440 return call from son Matha Gilford. Informed him that her insurance is OON with [facility].  He is wanting to keep her at [facility]

## 2021-11-11 NOTE — CONSULTS
Attestation signed by      Attending Physician Statement:    I have discussed the care of  Akosua Powell , including pertinent history and exam findings, with the Cardiology fellow/resident. I have seen and examined the patient and the key elements of all parts of the encounter have been performed by me. I agree with the assessment, plan and orders as documented by the fellow/resident, after I modified exam findings and plan of treatments, and the final version is my approved version of the assessment. Additional comments: For careful stewardship of limited PPE during COVID-19 pandemic my physical exam was deferred. For physical exam, please see today's physical from primary team or ICU team.      AF with RVR- recurrent  Resp failure  COPD  COVID 19  Borderline hypotension  - agree with IV amio for rate control given hypotension  - start IV dig  - as outpatient will arrange for EPS/Ablation once out of Sameer Grey [address], DO, [facility], [address], [address], [place] [address] [facility]  [email or web address]  [phone]     [facility]   Consultation Note               Today's Date: 11/11/2021  Patient Name: Akosua Powell  Date of admission: 11/3/2021  4:32 PM  Patient's age: 68 y.o., 1945  Admission Dx: CASEY (acute kidney injury) ([facility] [N17.9]  Acute on chronic congestive heart failure, unspecified heart failure type ([facility] [facility] [I50.9]    Reason for Consult:  Afib with RVR    Requesting Physician: Celso Rios DO    CHIEF COMPLAINT:    Shortness of breath and leg swelling    History Obtained From:  patient    HISTORY OF PRESENT ILLNESS:      The patient is a 68 y.o. initially admitted with shortness of breath being treated by primary with diuretics. Patient does have a hx of Afib S/P unsuccessful CV X 2 and was admitted last month with similar complaints. Cards was consulted for Afib with RVR.  HR has been in the 130s this am and patient is on home metoprolol 100 BID and amiodarone. EKG showing Afib with RVR/Flutter. Past Medical History:   has a past medical history of Asthma, COPD (chronic obstructive pulmonary disease) ([address] Gout, Hyperlipidemia, Hypertension, Mitral insufficiency, and Pneumonia. Past Surgical History:   has a past surgical history that includes Hysterectomy (1986); knee surgery; Colonoscopy; Dental surgery; Tonsillectomy and adenoidectomy; Ankle surgery (Left, 03/22/2016); pr colsc flx w/rmvl of tumor polyp lesion snare tq (N/A, 8/8/2017); and bronchoscopy (N/A, 10/20/2021). Home Medications:    Prior to Admission medications    Medication Sig Start Date End Date Taking? Authorizing Provider   amiodarone (CORDARONE) 200 MG tablet Take 1 tablet by mouth daily 10/22/21 11/21/21  Poppy Rubalcava MD   metoprolol tartrate (LOPRESSOR) 50 MG tablet Take 1 tablet by mouth 2 times daily 10/22/21   Poppy Rubalcava MD   Calamine 8-8 % LOTN lotion Apply to rash on legs 10/22/21   Poppy Rubalcava MD   miconazole (MICOTIN) 2 % powder Apply topically 2 times daily. 10/22/21   Poppy Rubalcava MD   simethicone (MYLICON) 80 MG chewable tablet Take 1 tablet by mouth every 6 hours as needed for Flatulence (Abdominal Cramps) 10/22/21 11/21/21  Poppy Rubalcava MD   clonazePAM (KLONOPIN) 1 MG tablet Take 1 tablet by mouth nightly for 3 days.  10/22/21 10/25/21  Poppy Rubalcava MD   apixaban (ELIQUIS) 5 MG TABS tablet Take 1 tablet by mouth 2 times daily 10/8/21   Honey Lorenzo MD   atorvastatin (LIPITOR) 40 MG tablet Take 1 tablet by mouth daily 10/8/21   Honey Lorenzo MD   citalopram (CELEXA) 40 MG tablet Take 0.5 tablets by mouth daily 10/8/21   Honey Lorenzo MD   losartan (COZAAR) 100 MG tablet Take 1 tablet by mouth daily 8/28/18   Flora Lacey MD   meloxicam (MOBIC) 15 MG tablet Take 15 mg by mouth daily    Historical Provider, MD   clopidogrel (PLAVIX) 75 MG tablet Take 1 tablet by mouth daily 10/5/17   Flora Lacey MD   cyclobenzaprine (FLEXERIL) 10 MG tablet TAKE ONE TABLET BY MOUTH THREE TIMES DAILY AS NEEDED FOR MUSCLE SPASM 10/5/17   Darek Barreto MD   diphenhydrAMINE (BENADRYL) 25 MG capsule Take 25 mg by mouth every 6 hours as needed for Itching    Historical Provider, MD   zinc oxide (DESITIN) 40 % ointment Apply topically as needed for Dry Skin Apply topically as needed.     Historical Provider, MD   furosemide (LASIX) 20 MG tablet Take 1 tablet by mouth daily 7/14/17   Darek Barreto MD   isosorbide mononitrate (IMDUR) 30 MG extended release tablet Take 1 tablet by mouth daily 7/14/17   Darek Barreto MD   albuterol sulfate  (90 Base) MCG/ACT inhaler Inhale 2 puffs into the lungs every 6 hours as needed for Wheezing 7/14/17   Darek Barreto MD   Calcium Carb-Cholecalciferol (CALCIUM + D3 PO) Take by mouth daily    Historical Provider, MD   CINNAMON PO Take by mouth daily    Historical Provider, MD   TURMERIC CURCUMIN PO Take by mouth daily    Historical Provider, MD   ipratropium-albuterol (DUONEB) 0.5-2.5 (3) MG/3ML SOLN nebulizer solution Inhale 1 vial into the lungs every 4 hours    Historical Provider, MD   beclomethasone (QVAR) 80 MCG/ACT inhaler Inhale 2 puffs into the lungs 2 times daily 6/17/16   Darek Barreto MD   LYSINE PO Take by mouth daily    Historical Provider, MD   vitamin D (CHOLECALCIFEROL) 1000 UNIT TABS tablet Take 1,000 Units by mouth daily    Historical Provider, MD   Pyridoxine HCl (VITAMIN B-6) 50 MG tablet Take 100 mg by mouth daily    Historical Provider, MD   Cyanocobalamin (VITAMIN B 12 PO) Take by mouth daily    Historical Provider, MD   Magnesium 500 MG TABS Take by mouth    Historical Provider, MD   Ascorbic Acid (VITAMIN C) 500 MG tablet Take 500 mg by mouth daily    Historical Provider, MD      Current Facility-Administered Medications: potassium chloride (KLOR-CON M) extended release tablet 40 mEq, 40 mEq, Oral, BID WC  metoprolol tartrate (LOPRESSOR) tablet 100 mg, 100 mg, Oral, BID  magnesium results for input(s): BNP in the last 72 hours. PT/INR: No results for input(s): PROTIME, INR in the last 72 hours. APTT:No results for input(s): APTT in the last 72 hours. CARDIAC ENZYMES:  Recent Labs     11/09/21  2251   TROPHS 22*     No results for input(s): CKTOTAL, CKMB, CKMBINDEX, TROPONINI in the last 72 hours. Invalid input(s):  [address]  Recent Labs     11/09/21 2251   TROPONINT NOT REPORTED     FASTING LIPID PANEL:  Lab Results   Component Value Date    HDL 45 06/02/2016    [address] 131 06/02/2016    TRIG 94 06/02/2016     LIVER PROFILE:  Recent Labs     11/09/21  0629 11/10/21  0459   LABALBU 2.7* 2.9*         EKG: Afib    ECHO 10/3/201  Left ventricle is normal in size with normal systolic function globally. Calculated ejection fraction is 59% ( Atrial-Fib). Mild mitral regurgitation. Mild tricuspid regurgitation. Estimated right ventricular systolic pressure is 35 mmHg. Anterior clear space noted, most likely adipose tissue vs small loculated  pericardial effusion. No evidence of tamponade. Pleural effusion is seen. CV 10/18/2021: Successful CV to NSR. CURT/CV 10/7/2021: EF 55%, no thrombus/veg. Successful CV to NSR. IMPRESSION:    Recurrent Atrial Fibrillation/FLutter previous twice unsuccessful CV recent in October 2021. Currently on Eliquis, Metoprolol and Amioidarone. Acute respiratory failure secondary to COPD and Diastolic CHF, being diuresed by Primary team/   Morbid obesity. UTI secondry to pseudomonas. RECOMMENDATIONS:  1. Continue Metoprolol at current rate. Will start on Amiodarone bolus with infusion and give Digoxin X 2. Start PO digoxin tomorrow. 2. Will continue to follow.       Graciela Ayala MD       Cardiovascular Fellow PGY-4  11/11/2021, 10:30 AM

## 2021-11-11 NOTE — PROGRESS NOTES
[place] [facility]  Documentation Note                Admission Dx: CASEY (acute kidney injury) ([facility] [N17.9]  Acute on chronic congestive heart failure, unspecified heart failure type ([facility] [facility] [I50.9]    Past Medical History:   has a past medical history of Asthma, COPD (chronic obstructive pulmonary disease) ([facility] [facility] Gout, Hyperlipidemia, Hypertension, Mitral insufficiency, and Pneumonia. Previous Testing:     CV 10/18/2021: Successful CV to NSR. CURT/CV 10/7/2021: EF 55%, no thrombus/veg. Successful CV to NSR.     ECHO 10/3/2021: EF 59%, mild MR/TR, RVSP is 35 mmHg, pleural effusion is seen. Previous office/hospital visit:   Shirley Swartz NP 10/22/2021:   1. Afib s/p CV x2 (10/7 and 10/18)  2. On eliquis  3. Currently in Atrial flutter  4. Sepsis 2/2 UTI and PNA  5. PNA  6. Left sided pleural effusion  7. COPD    Plan --   1. Afib. Rate stable. Continue PO BB, Eliquis, PO amiodarone. Discussed with pt possible Afib ablation as outpt once acute issues resolve. Pt is unsure if she wants invasive procedure. 2. Post thoracentesis - stable. Sepsis/PNA treatment per primary  3. Ok to d/c from cardiology standpoint.   Will follow up as outpt     Flora Vincent [facility]

## 2021-11-11 NOTE — PLAN OF CARE
Problem: Falls - Risk of:  Goal: Will remain free from falls  Description: Will remain free from falls  Outcome: Ongoing  Goal: Absence of physical injury  Description: Absence of physical injury  Outcome: Ongoing     Problem: Skin Integrity:  Goal: Will show no infection signs and symptoms  Description: Will show no infection signs and symptoms  Outcome: Ongoing  Goal: Absence of new skin breakdown  Description: Absence of new skin breakdown  Outcome: Ongoing     Problem: OXYGENATION/RESPIRATORY FUNCTION  Goal: Patient will maintain patent airway  Outcome: Ongoing  Goal: Patient will achieve/maintain normal respiratory rate/effort  Description: Respiratory rate and effort will be within normal limits for the patient  Outcome: Ongoing     Problem: HEMODYNAMIC STATUS  Goal: Patient has stable vital signs and fluid balance  Outcome: Ongoing     Problem: FLUID AND ELECTROLYTE IMBALANCE  Goal: Fluid and electrolyte balance are achieved/maintained  Outcome: Ongoing     Problem: ACTIVITY INTOLERANCE/IMPAIRED MOBILITY  Goal: Mobility/activity is maintained at optimum level for patient  Outcome: Ongoing     Problem: Nutrition  Goal: Optimal nutrition therapy  Outcome: Ongoing     Problem: Pain:  Goal: Pain level will decrease  Description: Pain level will decrease  Outcome: Ongoing  Goal: Control of acute pain  Description: Control of acute pain  Outcome: Ongoing  Goal: Control of chronic pain  Description: Control of chronic pain  Outcome: Ongoing     Problem: Airway Clearance - Ineffective  Goal: Achieve or maintain patent airway  Outcome: Ongoing     Problem: Gas Exchange - Impaired  Goal: Absence of hypoxia  Outcome: Ongoing  Goal: Promote optimal lung function  Outcome: Ongoing     Problem: Breathing Pattern - Ineffective  Goal: Ability to achieve and maintain a regular respiratory rate  Outcome: Ongoing     Problem:  Body Temperature -  Risk of, Imbalanced  Goal: Ability to maintain a body temperature within defined limits  Outcome: Ongoing  Goal: Will regain or maintain usual level of consciousness  Outcome: Ongoing  Goal: Complications related to the disease process, condition or treatment will be avoided or minimized  Outcome: Ongoing     Problem: Isolation Precautions - Risk of Spread of Infection  Goal: Prevent transmission of infection  Outcome: Ongoing     Problem: Nutrition Deficits  Goal: Optimize nutritional status  Outcome: Ongoing     Problem: Risk for Fluid Volume Deficit  Goal: Maintain normal heart rhythm  Outcome: Ongoing  Goal: Maintain absence of muscle cramping  Outcome: Ongoing  Goal: Maintain normal serum potassium, sodium, calcium, phosphorus, and pH  Outcome: Ongoing     Problem: Fatigue  Goal: Verbalize increase energy and improved vitality  Outcome: Ongoing     Problem: Loneliness or Risk for Loneliness  Goal: Demonstrate positive use of time alone when socialization is not possible  Outcome: Ongoing     Problem: Patient Education: Go to Patient Education Activity  Goal: Patient/Family Education  Outcome: Ongoing

## 2021-11-11 NOTE — PLAN OF CARE
Problem: RESPIRATORY  Intervention: Respiratory assessment  Note:   PROVIDE ADEQUATE OXYGENATION WITH ACCEPTABLE SP02/ABG'S    [x]  IDENTIFY APPROPRIATE OXYGEN THERAPY  [x]   MONITOR SP02/ABG'S AS NEEDED   [x]   PATIENT EDUCATION AS NEEDED    BRONCHOSPASM/BRONCHOCONSTRICTION     [x]         IMPROVE AERATION/BREATH SOUNDS  [x]   ADMINISTER BRONCHODILATOR THERAPY AS APPROPRIATE  [x]   ASSESS BREATH SOUNDS  []   IMPLEMENT AEROSOL/MDI PROTOCOL  [x]   PATIENT EDUCATION AS NEEDED      PATIENT REFUSES TO WEAR BIPAP     [x] Risks and benefits explained to patient   [x] Patient refuses to wear Bipap stating No thank you  [x] Patient verbalizes understanding of information presented.

## 2021-11-12 ENCOUNTER — APPOINTMENT (OUTPATIENT)
Dept: GENERAL RADIOLOGY | Age: 76
DRG: 208 | End: 2021-11-12
Payer: MEDICARE

## 2021-11-12 ENCOUNTER — APPOINTMENT (OUTPATIENT)
Dept: CT IMAGING | Age: 76
DRG: 208 | End: 2021-11-12
Payer: MEDICARE

## 2021-11-12 PROBLEM — J96.01 ACUTE RESPIRATORY FAILURE WITH HYPOXEMIA (HCC): Status: ACTIVE | Noted: 2021-11-12

## 2021-11-12 PROBLEM — E87.3 RESPIRATORY ALKALOSIS: Status: ACTIVE | Noted: 2021-11-12

## 2021-11-12 PROBLEM — J12.82 PNEUMONIA DUE TO COVID-19 VIRUS: Status: ACTIVE | Noted: 2021-11-12

## 2021-11-12 PROBLEM — I48.92 ATRIAL FLUTTER WITH RAPID VENTRICULAR RESPONSE (HCC): Status: ACTIVE | Noted: 2021-11-12

## 2021-11-12 PROBLEM — U07.1 COVID-19 VIRUS INFECTION: Status: ACTIVE | Noted: 2021-11-12

## 2021-11-12 PROBLEM — R57.9 SHOCK (HCC): Status: ACTIVE | Noted: 2021-11-12

## 2021-11-12 LAB
ABSOLUTE EOS #: <0.03 K/UL (ref 0–0.44)
ABSOLUTE IMMATURE GRANULOCYTE: 0.22 K/UL (ref 0–0.3)
ABSOLUTE LYMPH #: 0.92 K/UL (ref 1.1–3.7)
ABSOLUTE MONO #: 0.94 K/UL (ref 0.1–1.2)
ACTION: NORMAL
ALLEN TEST: ABNORMAL
ALLEN TEST: POSITIVE
ALLEN TEST: POSITIVE
ANION GAP SERPL CALCULATED.3IONS-SCNC: 13 MMOL/L (ref 9–17)
BASOPHILS # BLD: 0 % (ref 0–2)
BASOPHILS ABSOLUTE: 0.03 K/UL (ref 0–0.2)
BUN BLDV-MCNC: 18 MG/DL (ref 8–23)
BUN/CREAT BLD: ABNORMAL (ref 9–20)
C-REACTIVE PROTEIN: 50.2 MG/L (ref 0–5)
CALCIUM SERPL-MCNC: 8.7 MG/DL (ref 8.6–10.4)
CHLORIDE BLD-SCNC: 91 MMOL/L (ref 98–107)
CO2: 31 MMOL/L (ref 20–31)
CORTISOL COLLECTION INFO: NORMAL
CORTISOL: 9.3 UG/DL (ref 2.7–18.4)
CREAT SERPL-MCNC: 0.87 MG/DL (ref 0.5–0.9)
DATE AND TIME: NORMAL
DIFFERENTIAL TYPE: ABNORMAL
EOSINOPHILS RELATIVE PERCENT: 0 % (ref 1–4)
FIO2: 40
FIO2: 40
FIO2: 45
GFR AFRICAN AMERICAN: >60 ML/MIN
GFR NON-AFRICAN AMERICAN: >60 ML/MIN
GFR SERPL CREATININE-BSD FRML MDRD: ABNORMAL ML/MIN/{1.73_M2}
GFR SERPL CREATININE-BSD FRML MDRD: ABNORMAL ML/MIN/{1.73_M2}
GLUCOSE BLD-MCNC: 109 MG/DL (ref 74–100)
GLUCOSE BLD-MCNC: 116 MG/DL (ref 70–99)
GLUCOSE BLD-MCNC: 124 MG/DL (ref 74–100)
GLUCOSE BLD-MCNC: 166 MG/DL (ref 74–100)
HCT VFR BLD CALC: 37.8 % (ref 36.3–47.1)
HEMOGLOBIN: 12.3 G/DL (ref 11.9–15.1)
IMMATURE GRANULOCYTES: 2 %
LYMPHOCYTES # BLD: 6 % (ref 24–43)
MCH RBC QN AUTO: 30.5 PG (ref 25.2–33.5)
MCHC RBC AUTO-ENTMCNC: 32.5 G/DL (ref 28.4–34.8)
MCV RBC AUTO: 93.8 FL (ref 82.6–102.9)
MODE: ABNORMAL
MONOCYTES # BLD: 7 % (ref 3–12)
NEGATIVE BASE EXCESS, ART: ABNORMAL (ref 0–2)
NOTIFY: NORMAL
NRBC AUTOMATED: 0 PER 100 WBC
O2 DEVICE/FLOW/%: ABNORMAL
PATIENT TEMP: ABNORMAL
PDW BLD-RTO: 13 % (ref 11.8–14.4)
PLATELET # BLD: 320 K/UL (ref 138–453)
PLATELET ESTIMATE: ABNORMAL
PMV BLD AUTO: 9.4 FL (ref 8.1–13.5)
POC HCO3: 31.1 MMOL/L (ref 21–28)
POC HCO3: 36.2 MMOL/L (ref 21–28)
POC HCO3: 37 MMOL/L (ref 21–28)
POC LACTIC ACID: 0.91 MMOL/L (ref 0.56–1.39)
POC LACTIC ACID: 1.22 MMOL/L (ref 0.56–1.39)
POC LACTIC ACID: 1.26 MMOL/L (ref 0.56–1.39)
POC O2 SATURATION: 96 % (ref 94–98)
POC O2 SATURATION: 96 % (ref 94–98)
POC O2 SATURATION: 97 % (ref 94–98)
POC PCO2 TEMP: ABNORMAL MM HG
POC PCO2: 30.5 MM HG (ref 35–48)
POC PCO2: 46.3 MM HG (ref 35–48)
POC PCO2: 47.6 MM HG (ref 35–48)
POC PH TEMP: ABNORMAL
POC PH: 7.49 (ref 7.35–7.45)
POC PH: 7.51 (ref 7.35–7.45)
POC PH: 7.62 (ref 7.35–7.45)
POC PO2 TEMP: ABNORMAL MM HG
POC PO2: 71.1 MM HG (ref 83–108)
POC PO2: 75.9 MM HG (ref 83–108)
POC PO2: 76.1 MM HG (ref 83–108)
POSITIVE BASE EXCESS, ART: 10 (ref 0–3)
POSITIVE BASE EXCESS, ART: 11 (ref 0–3)
POSITIVE BASE EXCESS, ART: 12 (ref 0–3)
POTASSIUM SERPL-SCNC: 4.6 MMOL/L (ref 3.7–5.3)
RBC # BLD: 4.03 M/UL (ref 3.95–5.11)
RBC # BLD: ABNORMAL 10*6/UL
READ BACK: YES
SAMPLE SITE: ABNORMAL
SEG NEUTROPHILS: 85 % (ref 36–65)
SEGMENTED NEUTROPHILS ABSOLUTE COUNT: 12.18 K/UL (ref 1.5–8.1)
SODIUM BLD-SCNC: 135 MMOL/L (ref 135–144)
TCO2 (CALC), ART: ABNORMAL MMOL/L (ref 22–29)
WBC # BLD: 14.3 K/UL (ref 3.5–11.3)
WBC # BLD: ABNORMAL 10*3/UL

## 2021-11-12 PROCEDURE — 51702 INSERT TEMP BLADDER CATH: CPT

## 2021-11-12 PROCEDURE — 80048 BASIC METABOLIC PNL TOTAL CA: CPT

## 2021-11-12 PROCEDURE — 37799 UNLISTED PX VASCULAR SURGERY: CPT

## 2021-11-12 PROCEDURE — 5A1945Z RESPIRATORY VENTILATION, 24-96 CONSECUTIVE HOURS: ICD-10-PCS | Performed by: STUDENT IN AN ORGANIZED HEALTH CARE EDUCATION/TRAINING PROGRAM

## 2021-11-12 PROCEDURE — 71045 X-RAY EXAM CHEST 1 VIEW: CPT

## 2021-11-12 PROCEDURE — 6360000002 HC RX W HCPCS

## 2021-11-12 PROCEDURE — 6370000000 HC RX 637 (ALT 250 FOR IP): Performed by: INTERNAL MEDICINE

## 2021-11-12 PROCEDURE — 94003 VENT MGMT INPAT SUBQ DAY: CPT

## 2021-11-12 PROCEDURE — 86140 C-REACTIVE PROTEIN: CPT

## 2021-11-12 PROCEDURE — 2500000003 HC RX 250 WO HCPCS: Performed by: INTERNAL MEDICINE

## 2021-11-12 PROCEDURE — 6360000002 HC RX W HCPCS: Performed by: INTERNAL MEDICINE

## 2021-11-12 PROCEDURE — 2700000000 HC OXYGEN THERAPY PER DAY

## 2021-11-12 PROCEDURE — 82803 BLOOD GASES ANY COMBINATION: CPT

## 2021-11-12 PROCEDURE — 94640 AIRWAY INHALATION TREATMENT: CPT

## 2021-11-12 PROCEDURE — 36600 WITHDRAWAL OF ARTERIAL BLOOD: CPT

## 2021-11-12 PROCEDURE — 99291 CRITICAL CARE FIRST HOUR: CPT | Performed by: INTERNAL MEDICINE

## 2021-11-12 PROCEDURE — 87040 BLOOD CULTURE FOR BACTERIA: CPT

## 2021-11-12 PROCEDURE — 2580000003 HC RX 258: Performed by: INTERNAL MEDICINE

## 2021-11-12 PROCEDURE — 83605 ASSAY OF LACTIC ACID: CPT

## 2021-11-12 PROCEDURE — 71250 CT THORAX DX C-: CPT

## 2021-11-12 PROCEDURE — 82533 TOTAL CORTISOL: CPT

## 2021-11-12 PROCEDURE — 85025 COMPLETE CBC W/AUTO DIFF WBC: CPT

## 2021-11-12 PROCEDURE — 99233 SBSQ HOSP IP/OBS HIGH 50: CPT | Performed by: INTERNAL MEDICINE

## 2021-11-12 PROCEDURE — 6370000000 HC RX 637 (ALT 250 FOR IP): Performed by: NURSE PRACTITIONER

## 2021-11-12 PROCEDURE — 94660 CPAP INITIATION&MGMT: CPT

## 2021-11-12 PROCEDURE — 31720 CLEARANCE OF AIRWAYS: CPT

## 2021-11-12 PROCEDURE — 94002 VENT MGMT INPAT INIT DAY: CPT

## 2021-11-12 PROCEDURE — 0BH18EZ INSERTION OF ENDOTRACHEAL AIRWAY INTO TRACHEA, VIA NATURAL OR ARTIFICIAL OPENING ENDOSCOPIC: ICD-10-PCS | Performed by: STUDENT IN AN ORGANIZED HEALTH CARE EDUCATION/TRAINING PROGRAM

## 2021-11-12 PROCEDURE — 36620 INSERTION CATHETER ARTERY: CPT

## 2021-11-12 PROCEDURE — 36415 COLL VENOUS BLD VENIPUNCTURE: CPT

## 2021-11-12 PROCEDURE — 2000000000 HC ICU R&B

## 2021-11-12 PROCEDURE — 94761 N-INVAS EAR/PLS OXIMETRY MLT: CPT

## 2021-11-12 PROCEDURE — 82947 ASSAY GLUCOSE BLOOD QUANT: CPT

## 2021-11-12 PROCEDURE — 2580000003 HC RX 258: Performed by: NURSE PRACTITIONER

## 2021-11-12 RX ORDER — BUMETANIDE 0.25 MG/ML
1 INJECTION, SOLUTION INTRAMUSCULAR; INTRAVENOUS 2 TIMES DAILY
Status: DISCONTINUED | OUTPATIENT
Start: 2021-11-12 | End: 2021-11-16

## 2021-11-12 RX ORDER — IPRATROPIUM BROMIDE AND ALBUTEROL SULFATE 2.5; .5 MG/3ML; MG/3ML
1 SOLUTION RESPIRATORY (INHALATION) 4 TIMES DAILY
Status: DISCONTINUED | OUTPATIENT
Start: 2021-11-12 | End: 2021-11-15

## 2021-11-12 RX ORDER — SODIUM CHLORIDE 9 MG/ML
25 INJECTION, SOLUTION INTRAVENOUS PRN
Status: DISCONTINUED | OUTPATIENT
Start: 2021-11-12 | End: 2021-11-20 | Stop reason: HOSPADM

## 2021-11-12 RX ORDER — ALBUTEROL SULFATE 90 UG/1
2 AEROSOL, METERED RESPIRATORY (INHALATION) EVERY 6 HOURS PRN
Status: DISCONTINUED | OUTPATIENT
Start: 2021-11-12 | End: 2021-11-12

## 2021-11-12 RX ORDER — METOPROLOL TARTRATE 5 MG/5ML
5 INJECTION INTRAVENOUS EVERY 6 HOURS
Status: DISCONTINUED | OUTPATIENT
Start: 2021-11-12 | End: 2021-11-12

## 2021-11-12 RX ORDER — SODIUM CHLORIDE 0.9 % (FLUSH) 0.9 %
5-40 SYRINGE (ML) INJECTION PRN
Status: DISCONTINUED | OUTPATIENT
Start: 2021-11-12 | End: 2021-11-20 | Stop reason: HOSPADM

## 2021-11-12 RX ORDER — PROPOFOL 10 MG/ML
5-50 INJECTION, EMULSION INTRAVENOUS
Status: DISCONTINUED | OUTPATIENT
Start: 2021-11-12 | End: 2021-11-16

## 2021-11-12 RX ORDER — FENTANYL CITRATE 50 UG/ML
50 INJECTION, SOLUTION INTRAMUSCULAR; INTRAVENOUS
Status: DISCONTINUED | OUTPATIENT
Start: 2021-11-12 | End: 2021-11-20 | Stop reason: HOSPADM

## 2021-11-12 RX ORDER — METHYLPREDNISOLONE SODIUM SUCCINATE 40 MG/ML
40 INJECTION, POWDER, LYOPHILIZED, FOR SOLUTION INTRAMUSCULAR; INTRAVENOUS EVERY 8 HOURS
Status: DISCONTINUED | OUTPATIENT
Start: 2021-11-12 | End: 2021-11-14

## 2021-11-12 RX ORDER — LIDOCAINE HYDROCHLORIDE 10 MG/ML
5 INJECTION, SOLUTION INFILTRATION; PERINEURAL ONCE
Status: DISCONTINUED | OUTPATIENT
Start: 2021-11-12 | End: 2021-11-20 | Stop reason: HOSPADM

## 2021-11-12 RX ORDER — PROPOFOL 10 MG/ML
INJECTION, EMULSION INTRAVENOUS
Status: COMPLETED
Start: 2021-11-12 | End: 2021-11-12

## 2021-11-12 RX ORDER — ALBUTEROL SULFATE 90 UG/1
2 AEROSOL, METERED RESPIRATORY (INHALATION) 4 TIMES DAILY
Status: DISCONTINUED | OUTPATIENT
Start: 2021-11-12 | End: 2021-11-12

## 2021-11-12 RX ORDER — NOREPINEPHRINE BIT/0.9 % NACL 16MG/250ML
2-100 INFUSION BOTTLE (ML) INTRAVENOUS CONTINUOUS
Status: DISCONTINUED | OUTPATIENT
Start: 2021-11-12 | End: 2021-11-16

## 2021-11-12 RX ORDER — LORAZEPAM 2 MG/ML
1 INJECTION INTRAMUSCULAR EVERY 4 HOURS PRN
Status: DISCONTINUED | OUTPATIENT
Start: 2021-11-12 | End: 2021-11-12

## 2021-11-12 RX ORDER — ALBUTEROL SULFATE 90 UG/1
2 AEROSOL, METERED RESPIRATORY (INHALATION) EVERY 4 HOURS PRN
Status: DISCONTINUED | OUTPATIENT
Start: 2021-11-12 | End: 2021-11-12

## 2021-11-12 RX ORDER — LORAZEPAM 2 MG/ML
INJECTION INTRAMUSCULAR
Status: COMPLETED
Start: 2021-11-12 | End: 2021-11-12

## 2021-11-12 RX ORDER — SODIUM CHLORIDE 0.9 % (FLUSH) 0.9 %
5-40 SYRINGE (ML) INJECTION EVERY 12 HOURS SCHEDULED
Status: DISCONTINUED | OUTPATIENT
Start: 2021-11-12 | End: 2021-11-20 | Stop reason: HOSPADM

## 2021-11-12 RX ADMIN — METHYLPREDNISOLONE SODIUM SUCCINATE 40 MG: 40 INJECTION, POWDER, FOR SOLUTION INTRAMUSCULAR; INTRAVENOUS at 01:54

## 2021-11-12 RX ADMIN — Medication 25 MCG/HR: at 05:00

## 2021-11-12 RX ADMIN — METHYLPREDNISOLONE SODIUM SUCCINATE 40 MG: 40 INJECTION, POWDER, FOR SOLUTION INTRAMUSCULAR; INTRAVENOUS at 16:46

## 2021-11-12 RX ADMIN — DIGOXIN 250 MCG: 0.25 INJECTION INTRAMUSCULAR; INTRAVENOUS at 01:10

## 2021-11-12 RX ADMIN — PROPOFOL 30 MCG/KG/MIN: 10 INJECTION, EMULSION INTRAVENOUS at 05:00

## 2021-11-12 RX ADMIN — APIXABAN 5 MG: 5 TABLET, FILM COATED ORAL at 21:07

## 2021-11-12 RX ADMIN — METHYLPREDNISOLONE SODIUM SUCCINATE 40 MG: 40 INJECTION, POWDER, FOR SOLUTION INTRAMUSCULAR; INTRAVENOUS at 09:49

## 2021-11-12 RX ADMIN — OXYCODONE HYDROCHLORIDE AND ACETAMINOPHEN 500 MG: 500 TABLET ORAL at 09:26

## 2021-11-12 RX ADMIN — CLOPIDOGREL 75 MG: 75 TABLET, FILM COATED ORAL at 09:26

## 2021-11-12 RX ADMIN — BUMETANIDE 1 MG: 1 TABLET ORAL at 21:07

## 2021-11-12 RX ADMIN — LORAZEPAM 1 MG: 2 INJECTION INTRAMUSCULAR at 01:09

## 2021-11-12 RX ADMIN — AMIODARONE HYDROCHLORIDE 0.5 MG/MIN: 50 INJECTION, SOLUTION INTRAVENOUS at 20:44

## 2021-11-12 RX ADMIN — CITALOPRAM 20 MG: 20 TABLET, FILM COATED ORAL at 09:26

## 2021-11-12 RX ADMIN — DESMOPRESSIN ACETATE 40 MG: 0.2 TABLET ORAL at 09:26

## 2021-11-12 RX ADMIN — SODIUM CHLORIDE, PRESERVATIVE FREE 10 ML: 5 INJECTION INTRAVENOUS at 21:07

## 2021-11-12 RX ADMIN — IPRATROPIUM BROMIDE AND ALBUTEROL SULFATE 1 AMPULE: .5; 2.5 SOLUTION RESPIRATORY (INHALATION) at 20:31

## 2021-11-12 RX ADMIN — BUMETANIDE 1 MG: 0.25 INJECTION, SOLUTION INTRAMUSCULAR; INTRAVENOUS at 02:22

## 2021-11-12 RX ADMIN — SODIUM CHLORIDE, PRESERVATIVE FREE 10 ML: 5 INJECTION INTRAVENOUS at 16:46

## 2021-11-12 RX ADMIN — PROPOFOL 20 MCG/KG/MIN: 10 INJECTION, EMULSION INTRAVENOUS at 16:03

## 2021-11-12 RX ADMIN — PROPOFOL 20 MCG/KG/MIN: 10 INJECTION, EMULSION INTRAVENOUS at 23:00

## 2021-11-12 RX ADMIN — CIPROFLOXACIN 500 MG: 500 TABLET, FILM COATED ORAL at 09:26

## 2021-11-12 RX ADMIN — PROPOFOL 25 MCG/KG/MIN: 10 INJECTION, EMULSION INTRAVENOUS at 09:24

## 2021-11-12 RX ADMIN — SODIUM CHLORIDE, PRESERVATIVE FREE 10 ML: 5 INJECTION INTRAVENOUS at 09:00

## 2021-11-12 RX ADMIN — APIXABAN 5 MG: 5 TABLET, FILM COATED ORAL at 09:26

## 2021-11-12 RX ADMIN — BUMETANIDE 1 MG: 1 TABLET ORAL at 09:26

## 2021-11-12 RX ADMIN — ALBUTEROL SULFATE 2 PUFF: 90 AEROSOL, METERED RESPIRATORY (INHALATION) at 01:24

## 2021-11-12 RX ADMIN — DIGOXIN 125 MCG: 125 TABLET ORAL at 09:26

## 2021-11-12 RX ADMIN — Medication 2 PUFF: at 01:25

## 2021-11-12 RX ADMIN — LORAZEPAM 1 MG: 2 INJECTION INTRAMUSCULAR; INTRAVENOUS at 01:09

## 2021-11-12 RX ADMIN — TOCILIZUMAB 810 MG: 180 INJECTION, SOLUTION SUBCUTANEOUS at 23:30

## 2021-11-12 ASSESSMENT — PULMONARY FUNCTION TESTS
PIF_VALUE: 24
PIF_VALUE: 31
PIF_VALUE: 27
PIF_VALUE: 30
PIF_VALUE: 26

## 2021-11-12 ASSESSMENT — PAIN SCALES - GENERAL: PAINLEVEL_OUTOF10: 0

## 2021-11-12 NOTE — PROGRESS NOTES
Physician Progress Note      PATIENT:               Jeremiah Rivas  CSN #:                  [id number]  :                       1945  ADMIT DATE:       11/3/2021 4:32 PM  DISCH DATE:  RESPONDING  PROVIDER #:        Kajal ACHARYA DO          QUERY TEXT:    Patient admitted with Acute respiratory failure secondary to COPD and   Diastolic CHF. Noted initial testing negative for COVID-19 on 11/3/2021. If   possible, please document in the progress notes and discharge summary if   COVID-19 was: The medical record reflects the following:  Risk Factors: COPD, CHF, public exposure  Clinical Indicators: resp 44>47, pulse 128, temp 101.3, BP   165//150>160/106<SpO2 88%, FiO2 45 %, ABG with CO2 of 76.1 with bicarb of   36.2. CRP:31.2, Ferritin 452  , WBC 12.1, Procalcitonin:0.09,  CC  note   She does have bilateral wheezing. CXR with right mid to lower lobe opacity,   concern for pneumonia,  SARS-CoV-2, Rapid   Detected, 11/3 SARS-CoV-2   Rapid Not Detected  Treatment: BiPAP , fluticasone 110 MCG/ACT inhaler, Duoneb nebulizer,   predniSONE tablet 20 mg, methylPREDNISolone [id number] mg IV, ETT (adult)Placed   8923 2021. put on ventilator     Note: Possible (or similar verbiage reflecting an uncertain diagnosis)   COVID-19 infection may not be reported as confirmed. However, a diagnosis may   be considered as confirmed based on the clinical impression of the provider,   regardless of the presence of a confirmatory test or availability of a test   result. At present, a negative test result does not exclude the diagnosis of   COVID-19 infection. Thank you, Please call if questions  Lilli Castillo RN CDS  [phone]  Options provided:  -- COVID-19 confirmed POA with initial testing suspected to be false negative  -- COVID-19 acquired during admission  -- Other - I will add my own diagnosis  -- Disagree - Not applicable / Not valid  -- Disagree - Clinically unable to determine / Unknown  -- Refer to Clinical Documentation Reviewer    PROVIDER RESPONSE TEXT:    COVID-19 confirmed POA with initial testing suspected to be false negative. Query created by: Ruby Nieto on 11/12/2021 1:45 PM      QUERY TEXT:    Pt admitted with Acute respiratory failure secondary to COPD and Diastolic CHF   and noted to have  for Covid-19 with mention of possible PNA on Chest X-ray. Please document in progress notes and discharge summary if you are evaluating   or treating any of the following: The medical record reflects the following:  Risk Factors: COPD, CHF, public exposure  Clinical Indicators: resp 44>47, pulse 128, temp 101.3, BP   165//150>160/106<SpO2 88%, FiO2 45 %, ABG with CO2 of 76.1 with bicarb of   36.2. CRP:31.2, Ferritin 452  , WBC 12.1, Procalcitonin:0.09,  CC 11/12 note   She does have bilateral wheezing. CXR with right mid to lower lobe opacity,   concern for pneumonia, 11/11 SARS-CoV-2, Rapid   Detected, 11/3 SARS-CoV-2   Rapid Not Detected  Treatment: BiPAP , fluticasone 110 MCG/ACT inhaler, Duoneb nebulizer,   predniSONE tablet 20 mg, methylPREDNISolone [id number] mg IV, ETT adult Placed   0453 11/12/2021. put on ventilator 11/12    Thank you, Please call if questions  Lilli Lion RN CDS  [phone]  Options provided:  -- Respond - Create new note now  -- Disagree - Not applicable / Not valid  -- Disagree - Clinically unable to determine / Unknown  -- Refer to Clinical Documentation Reviewer    PROVIDER RESPONSE TEXT:    Provider is clinically unable to determine a response to this query.     Query created by: Ruby Nieto on 11/12/2021 1:45 PM      Electronically signed by:  Laurie Darnell DO 11/12/2021 4:03 PM

## 2021-11-12 NOTE — PROCEDURES
PROCEDURE NOTE - EMERGENCY INTUBATION    PATIENT NAME: Charleen [facility] RECORD NO. [id number]  DATE: 11/12/2021  ATTENDING PHYSICIAN: Dr Michael Heredia DIAGNOSIS:  Acute Respiratory Failure  POSTOPERATIVE DIAGNOSIS:  Same  PROCEDURE PERFORMED:  Emergency endotracheal intubation  PERFORMING PHYSICIAN: Sarah King MD  COMPLICATIONS:  None    MEDICATIONS: etomidate intravenously, propofol intravenously and rocuronium intravenously    DISCUSSION:  John Vargas is a 68y.o.-year-old female who requires intubation and ventilatory support due to Respiratory failure. The history and physical examination were reviewed and confirmed. CONSENT: The patient provided verbal consent for this procedure. PROCEDURE:  A timeout was initiated by the bedside nurse and was confirmed by those present. The patient was placed in the appropriate position. Cricoid pressure was not utilized. Intubation was performed by video laryngoscopy using a videoscope and a 7.5 cuffed endotracheal tube. The cuff was then inflated and the tube was secured appropriately at a distance of 23 cm to the dental ridge. Initial confirmation of placement included bilateral breath sounds, an end tidal CO2 detector and adequate chest rise. A chest x-ray to verify correct placement of the tube has been ordered but is still pending. The patient tolerated the procedure well. Procedure was done in the presence of anesthesia Attending.      Sarah King MD  5:14 AM, 11/12/21

## 2021-11-12 NOTE — PLAN OF CARE
Problem: Falls - Risk of:  Goal: Will remain free from falls  Description: Will remain free from falls  Outcome: Ongoing  Goal: Absence of physical injury  Description: Absence of physical injury  Outcome: Ongoing     Problem: Skin Integrity:  Goal: Will show no infection signs and symptoms  Description: Will show no infection signs and symptoms  Outcome: Ongoing  Goal: Absence of new skin breakdown  Description: Absence of new skin breakdown  Outcome: Ongoing     Problem: OXYGENATION/RESPIRATORY FUNCTION  Goal: Patient will maintain patent airway  11/12/2021 0711 by Tracie Govea RN  Outcome: Ongoing     Problem: OXYGENATION/RESPIRATORY FUNCTION  Goal: Patient will achieve/maintain normal respiratory rate/effort  Description: Respiratory rate and effort will be within normal limits for the patient  11/12/2021 0711 by Tracie Govea RN  Outcome: Ongoing     Problem: HEMODYNAMIC STATUS  Goal: Patient has stable vital signs and fluid balance  Outcome: Ongoing     Problem: FLUID AND ELECTROLYTE IMBALANCE  Goal: Fluid and electrolyte balance are achieved/maintained  Outcome: Ongoing     Problem: ACTIVITY INTOLERANCE/IMPAIRED MOBILITY  Goal: Mobility/activity is maintained at optimum level for patient  11/12/2021 0711 by Tracie Govea RN  Outcome: Ongoing     Problem: Nutrition  Goal: Optimal nutrition therapy  Outcome: Ongoing     Problem: Pain:  Goal: Pain level will decrease  Description: Pain level will decrease  Outcome: Ongoing  Goal: Control of acute pain  Description: Control of acute pain  Outcome: Ongoing  Goal: Control of chronic pain  Description: Control of chronic pain  Outcome: Ongoing     Problem: Airway Clearance - Ineffective  Goal: Achieve or maintain patent airway  11/12/2021 0711 by Tracie Govea RN  Outcome: Ongoing     Problem: Gas Exchange - Impaired  Goal: Absence of hypoxia  11/12/2021 0711 by Tracie Govea RN  Outcome: Ongoing     Problem: Gas Exchange - Impaired  Goal: Promote optimal lung function  11/12/2021 0711 by Brenda Ivy RN  Outcome: Ongoing     Problem: Breathing Pattern - Ineffective  Goal: Ability to achieve and maintain a regular respiratory rate  11/12/2021 0711 by Brenda Ivy RN  Outcome: Ongoing     Problem:  Body Temperature -  Risk of, Imbalanced  Goal: Ability to maintain a body temperature within defined limits  Outcome: Ongoing  Goal: Will regain or maintain usual level of consciousness  Outcome: Ongoing  Goal: Complications related to the disease process, condition or treatment will be avoided or minimized  Outcome: Ongoing     Problem: Isolation Precautions - Risk of Spread of Infection  Goal: Prevent transmission of infection  Outcome: Ongoing     Problem: Nutrition Deficits  Goal: Optimize nutritional status  Outcome: Ongoing     Problem: Risk for Fluid Volume Deficit  Goal: Maintain normal heart rhythm  Outcome: Ongoing  Goal: Maintain absence of muscle cramping  Outcome: Ongoing  Goal: Maintain normal serum potassium, sodium, calcium, phosphorus, and pH  Outcome: Ongoing     Problem: Loneliness or Risk for Loneliness  Goal: Demonstrate positive use of time alone when socialization is not possible  Outcome: Ongoing     Problem: Fatigue  Goal: Verbalize increase energy and improved vitality  Outcome: Ongoing     Problem: Patient Education: Go to Patient Education Activity  Goal: Patient/Family Education  Outcome: Ongoing     Problem: MECHANICAL VENTILATION  Goal: Patient will maintain patent airway  11/12/2021 0711 by Brenda Ivy RN  Outcome: Ongoing     Problem: MECHANICAL VENTILATION  Goal: Mobility/activity is maintained at optimum level for patient  11/12/2021 0711 by Brenda Ivy RN  Outcome: Ongoing     Problem: MECHANICAL VENTILATION  Goal: Oral health is maintained or improved  11/12/2021 0711 by Brenda Ivy RN  Outcome: Ongoing     Problem: MECHANICAL VENTILATION  Goal: ET tube will be managed safely  11/12/2021 0711 by Sabrina Hooks RN  Outcome: Ongoing     Problem: MECHANICAL VENTILATION  Goal: Ability to express needs and understand communication  11/12/2021 0711 by Sabrina Hooks RN  Outcome: Ongoing     Problem: Non-Violent Restraints  Goal: Removal from restraints as soon as assessed to be safe  Outcome: Ongoing  Goal: No harm/injury to patient while restraints in use  Outcome: Ongoing  Goal: Patient's dignity will be maintained  Outcome: Ongoing

## 2021-11-12 NOTE — PROGRESS NOTES
Date: 11/12/2021  Time: 4:53 am  Patient identity confirmed:  Yes  Indications: Respiratory Failure  Preoxygenation: yes    Laryngoscope size and type Glidescope  Airway introducer used: No  ETT size:a 7.5 cuffed  Number of attempts:1   Cords visualized:  [x] Clearly  [] Poorly  Breath sounds present bilaterally: Yes   ETCO2   [x] Positive   ETT secured at  22 cm   ETT secured with charlotte  Chest x-ray ordered: Yes     Difficult airway:    No       If yes, was red tape placed around ETT:   No    Was this a Code Situation:    No             BP: (!) 130/117        Procedure performed by:  MD Sofía Saxena, P  5:30 AM

## 2021-11-12 NOTE — PROGRESS NOTES
[facility] -   Infectious diseases evaluation  admission date 11/3/2021    reason for consultation:   covid +    Impression :   Current:  · covid + rapid 11/11  · Recurrent L pleural effusion- many thoracentesis  · Repeat thoracentesis 11/4  · resp failure and intubated 11/11  · Acute on chronic CHF  · Pseudomonas UTI   · crp elevation  · SEJAL  · Obese     Other:  ·   Discussion / summary of stay / plan of care   · Was rapid covid neg at admission -  · Then tested + rapid 11/11  · fully vaccinated  · Might be asymptomatic for covid, no clear pneumonia   Recommendations   · regeneron x 1 on 11/11  · Get CRP and ferritin   · Get CT chest look for signs of covid pneumonia and address accordingly  · cipro for pseudomonas UTI 11/7 -11/12    Addendum:  11/12, CT scan suggestive of Covid pneumonia, Actemra ordered 11/12    Infection Control Recommendations   · [place] Precautions  · Contact Isolation   · Airborne isolation  · Droplet Isolation      Antimicrobial Stewardship Recommendations   · Simplification of therapy  · Targeted therapy      Coordination ofOutpatient Care:   · Estimated Length of IV antimicrobials:  · Patient will need Midline / picc Catheter Insertion:   · Patient will need SNF:  · Patient will need outpatient wound care:     History of Present Illness:   Initial history:  Lakisha Garcia is a 68y.o.-year-old female w recurrent left pleural effusion who comes in again for shortness of breath and was found to have a chronic left effusion had a thoracentesis 11/4, was also found to have a Pseudomonas UTI and started on Cipro on 11/7. Had initial rapid Covid test at admission that was negative, repeat 111/11 came back positive rapid Covid. Infectious consulted, reviewed the chest x-ray she does not seem to be having Covid pneumonia. No inflammatory markers at this point.     Interval changes  11/12/2021   Patient Vitals for the past 8 hrs:   BP Temp Temp src Pulse Resp SpO2   11/12/21 1155    77 26 95 %   11/12/21 0844    89 26 96 %   11/12/21 0800  98.6 °F (37 °C) Bladder      11/12/21 0700 97/66   112 26 97 %   11/12/21 0645 89/60   115 26 97 %   11/12/21 0630 (!) 127/108   117 26 95 %   11/12/21 0615 106/71   121 26 96 %   11/12/21 0600 88/72   117 26 95 %   11/12/21 0545 99/73   118 26 94 %   11/12/21 0530 116/69   121 27 96 %   11/12/21 0515    120 26 96 %   11/12/21 0500 96/66   128 26 93 %   11/12/21 0458    128 19 91 %   11/12/21 0445 138/89   119 (!) 34 92 %    11.11.21  No nausea and no lost of appetite  Cough same and old  Used 2L NC  abd soft    11/12  Intubated over night for resp distress - on 45 fio2 and 8 peep sedated  CXr shows R effusion and infiltrate and CRp a little higher    Summary of relevant labs:  Labs:  Creat 0.76   WBC  9.1  CRP 31-50  Ferritin  452      Micro;  covid rapid neg 11/3  covid rapid + 11/11    Imaging:  CXR 11/12      CXR 11/9  Stable volume loss in the left chest with left basilar atelectasis or pneumonia. Possible small left effusion. CXR 11/3  Persistent sizable left effusion with opacity at the left lung base and pulmonary vascular congestion. I have personally reviewed the past medical history, past surgical history, medications, social history, and family history, and I haveupdated the database accordingly. Allergies:   Latex, Betadine [povidone iodine], Bee venom, Dilaudid [hydromorphone hcl], Adhesive tape, and Sulfa antibiotics     Review of Systems:     Review of Systems    Physical Examination :       Physical Exam  Constitutional:       General: She is not in acute distress. Appearance: She is ill-appearing. HENT:      Head: Atraumatic. Nose: Nose normal.      Mouth/Throat:      Mouth: Mucous membranes are moist.   Eyes:      General: No scleral icterus.      Conjunctiva/sclera: Conjunctivae normal.   Cardiovascular:      Rate and Rhythm: Normal rate and regular rhythm. Heart sounds: Normal heart sounds. No murmur heard. Abdominal:      General: There is no distension. Palpations: Abdomen is soft. Tenderness: There is no abdominal tenderness. Genitourinary:     Comments: No goldstein  Musculoskeletal:         General: No swelling or deformity. Cervical back: Neck supple. No rigidity. Skin:     General: Skin is dry. Coloration: Skin is not jaundiced.    Neurological:      Comments: intubated   Psychiatric:      Comments: Intubated          Past Medical History:     Past Medical History:   Diagnosis Date    Asthma     COPD (chronic obstructive pulmonary disease) ([address]     Gout     Hyperlipidemia     Hypertension     Mitral insufficiency     Pneumonia        Past Surgical  History:     Past Surgical History:   Procedure Laterality Date    ANKLE SURGERY Left 03/22/2016    ORIF    BRONCHOSCOPY N/A 10/20/2021    BRONCHOSCOPY ALVEOLAR LAVAGE performed by Alfredo Gonzales MD at [facility]    COLONOSCOPY     [facility]      CA COLSC FLX W/RMVL OF TUMOR POLYP LESION [address] TQ N/A 8/8/2017    COLONOSCOPY POLYPECTOMY SNARE/COLD BIOPSY performed by Earnest Hodgkin, MD at [facility]    TONSILLECTOMY AND ADENOIDECTOMY         Medications:      methylPREDNISolone  40 mg IntraVENous Q8H    [Held by provider] bumetanide  1 mg IntraVENous BID    ipratropium-albuterol  1 ampule Inhalation 4x daily    lidocaine 1 % injection  5 mL IntraDERmal Once    sodium chloride flush  5-40 mL IntraVENous 2 times per day    [Held by provider] metoprolol tartrate  100 mg Oral BID    digoxin  125 mcg Oral Daily    bumetanide  1 mg Oral BID    apixaban  5 mg Oral BID    vitamin C  500 mg Oral Daily    atorvastatin  40 mg Oral Daily    fluticasone  1 puff Inhalation BID    citalopram  20 mg Oral Daily    clopidogrel  75 mg Oral Daily       Social History:     Social History Socioeconomic History    Marital status:      Spouse name: Not on file    Number of children: Not on file    Years of education: Not on file    Highest education level: Not on file   Occupational History    Not on file   Tobacco Use    Smoking status: Former Smoker    Smokeless tobacco: Never Used    Tobacco comment: over 30 years ago    Vaping Use    Vaping Use: Never used   Substance and Sexual Activity    Alcohol use: Yes     Alcohol/week: 0.0 standard drinks     Comment: occasionally     Drug use: No    Sexual activity: Not on file   Other Topics Concern    Not on file   Social History Narrative    Not on file     Social Determinants of Health     Financial Resource Strain:     Difficulty of Paying Living Expenses: Not on file   Food Insecurity:     Worried About Running Out of Food in the Last Year: Not on file    Geronimo of Food in the Last Year: Not on file   Transportation Needs:     Lack of Transportation (Medical): Not on file    Lack of Transportation (Non-Medical):  Not on file   Physical Activity:     Days of Exercise per Week: Not on file    Minutes of Exercise per Session: Not on file   Stress:     Feeling of Stress : Not on file   Social Connections:     Frequency of Communication with Friends and Family: Not on file    Frequency of Social Gatherings with Friends and Family: Not on file    Attends Sikhism Services: Not on file    Active Member of [address] [place] [address] [employer] or Organizations: Not on file    Attends Club or Organization Meetings: Not on file    Marital Status: Not on file   Intimate Partner Violence:     Fear of Current or Ex-Partner: Not on file    Emotionally Abused: Not on file    Physically Abused: Not on file    Sexually Abused: Not on file   Housing Stability:     Unable to Pay for Housing in the Last Year: Not on file    Number of [place] in the Last Year: Not on file    Unstable Housing in the Last Year: Not on file       Family History:     Family

## 2021-11-12 NOTE — PROGRESS NOTES
[facility]   Progress Note                   Date:   11/12/2021  Patient name: Antonio Cox  Date of admission:  11/3/2021  4:32 PM  MRN:   [id number]  YOB: 1945  PCP: KALYN Pascual CNP    Reason for Admission: CASEY (acute kidney injury) ([facility] [N17.9]  Acute on chronic congestive heart failure, unspecified heart failure type ([facility] [I50.9]    Subjective:       Clinical Changes / Abnormalities: Pt intubated. Vitals, medications and labs reviewed. Medications:   Scheduled Meds:   methylPREDNISolone  40 mg IntraVENous Q8H    [Held by provider] bumetanide  1 mg IntraVENous BID    ipratropium-albuterol  1 ampule Inhalation 4x daily    lidocaine 1 % injection  5 mL IntraDERmal Once    sodium chloride flush  5-40 mL IntraVENous 2 times per day    [Held by provider] metoprolol tartrate  100 mg Oral BID    digoxin  125 mcg Oral Daily    bumetanide  1 mg Oral BID    apixaban  5 mg Oral BID    vitamin C  500 mg Oral Daily    atorvastatin  40 mg Oral Daily    fluticasone  1 puff Inhalation BID    citalopram  20 mg Oral Daily    clopidogrel  75 mg Oral Daily     Continuous Infusions:   propofol 20 mcg/kg/min (11/12/21 1355)    fentaNYL Stopped (11/12/21 1150)    norepinephrine      sodium chloride      amiodarone 0.5 mg/min (11/11/21 2100)     CBC:   Recent Labs     11/10/21  0459 11/11/21 1157 11/12/21  0405   WBC 9.1 12.1* 14.3*   HGB 11.4* 11.8* 12.3    325 320     BMP:    Recent Labs     11/10/21  0459 11/11/21  1157 11/12/21  0405   * 135 135   K 3.5* 3.9 4.6   CL 91* 91* 91*   CO2 33* 30 31   BUN 20 20 18   CREATININE 0.76 0.88 0.87   GLUCOSE 131* 125* 116*     Hepatic: No results for input(s): AST, ALT, ALB, BILITOT, ALKPHOS in the last 72 hours. Troponin:   Recent Labs     11/09/21  2251   TROPHS 22*     BNP: No results for input(s): BNP in the last 72 hours.   Lipids: No results for input(s): CHOL, HDL in the last 72 hours.    Invalid input(s): LDLCALCU  INR: No results for input(s): INR in the last 72 hours. Objective:   Vitals: BP 97/66   Pulse 77   Temp 98.6 °F (37 °C) (Bladder)   Resp 26   Ht 5' 4\" (1.626 m)   Wt 245 lb 8 oz (111.4 kg)   SpO2 95%   BMI 42.14 kg/m²      For careful stewardship of limited PPE during COVID-19 pandemic my physical exam was deferred.  For physical exam, please see today's physical from primary team or ICU team.       Assessment / Acute Cardiac Problems:     AF with RVR- recurrent  Resp failure  COPD  COVID 19  Borderline hypotension    Patient Active Problem List:     Asthma     Benign essential HTN     Morbid obesity with BMI of 40.0-44.9, adult ([address]     Open bimalleolar fracture of left ankle     Hyperlipidemia     COPD exacerbation (HCC)     Pulmonary emphysema (HCC)     Postoperative confusion     Acute on chronic respiratory failure with hypoxia (HCC)     Simple chronic bronchitis (HCC)     Atrial fibrillation with rapid ventricular response (HCC)     Pneumonia of left lower lobe due to infectious organism     Sepsis with acute hypoxic respiratory failure without septic shock (HCC)     Acute on chronic diastolic heart failure (HCC)     Hyponatremia     Hypokalemia     Elevated troponin     Anemia     Arthritis     At high risk for falls     Colon polyps     Heart murmur     Legally blind in right eye, as defined in [place]     Chronic obstructive pulmonary disease with acute exacerbation (HCC)     Persistent atrial fibrillation (HCC)     Pleural effusion     Recurrent pleural effusion     Mucus plugging of bronchi     Atelectasis of left lung     Acute chest pain     Coag negative Staphylococcus bacteremia     Abnormal pleural fluid     CASEY (acute kidney injury) ([address]     Acute on chronic congestive heart failure (HCC)     Atrial flutter with rapid ventricular response ([address]     COVID-19 virus infection     Acute respiratory failure with hypoxemia (HCC)     Respiratory alkalosis Shock ([facility]      Plan of Treatment:   1. Afib/fluter. Continue amiodarone gtt. On dig. BB on hold. Rate stable. On eliquis    2. HTN. Medications on hold due to hypotension/shock. Levo as needed for BP support   3.  Will need Ablation as oupt once acute issues resolve    Electronically signed by KALYN Mauricio CNP on 11/12/2021 at 2:11 PM  [address]  [phone]

## 2021-11-12 NOTE — PLAN OF CARE
Problem: OXYGENATION/RESPIRATORY FUNCTION  Goal: Patient will maintain patent airway  Outcome: Ongoing  Goal: Patient will achieve/maintain normal respiratory rate/effort  Description: Respiratory rate and effort will be within normal limits for the patient  Outcome: Ongoing     Problem: ACTIVITY INTOLERANCE/IMPAIRED MOBILITY  Goal: Mobility/activity is maintained at optimum level for patient  Outcome: Ongoing     Problem: Airway Clearance - Ineffective  Goal: Achieve or maintain patent airway  Outcome: Ongoing     Problem: Gas Exchange - Impaired  Goal: Absence of hypoxia  Outcome: Ongoing  Goal: Promote optimal lung function  Outcome: Ongoing     Problem: Breathing Pattern - Ineffective  Goal: Ability to achieve and maintain a regular respiratory rate  Outcome: Ongoing     Problem: MECHANICAL VENTILATION  Goal: Patient will maintain patent airway  Outcome: Ongoing  Goal: Mobility/activity is maintained at optimum level for patient  Outcome: Ongoing  Goal: Oral health is maintained or improved  Outcome: Ongoing  Goal: ET tube will be managed safely  Outcome: Ongoing  Goal: Ability to express needs and understand communication  Outcome: Ongoing

## 2021-11-12 NOTE — FLOWSHEET NOTE
Attempted to call the pt's son (POA), Malathi Chavez, on status. Will continue to monitor.     Electronically signed by Gisele Castle RN on 11/12/2021 at 5:59 AM

## 2021-11-12 NOTE — PROGRESS NOTES
Leann Pereyra was recently discharged on 10/15 after being admitted related to a pleural effusion.  At that time she presented with shortness of breath and palpitations. Cynthia aBer is found to have a left lower lobe pneumonia and A. fib with RVR and was admitted to the ICU for acute hypoxic respiratory failure.  During that visit the patient had a paracentesis, Negative CURT and subsequently cardioverted successfully, and a bronchoscopy that showed left main bronchus obstruction due to thick mucous plugging.  At discharge the patient was encouraged to follow-up with cardiology outpatient for an ablation.  During that visit she was also treated for UTI and was discharged on Augmentin for 5 days. Cynthia Baer was also given inhalers and steroids for COPD.     On arrival to the ER today she complains of not feeling well over the last 3 or 4 days. Cynthia Baer describes worsening shortness of breath primarily on exertion and orthopnea.  The patient told me that she has not been using oxygen on an outpatient basis but according to the ER note she has been using it intermittently primarily with exertion and therapy and occasionally overnight.  Also complains of constipation and mild abdominal bloating.  She also reports nausea and vomiting few days ago but nothing current and she has been tolerating p.o. intake.  She states she is compliant with her home medications.     Patient was placed on 2 L nasal cannula in the ER. Cynthia Baer has been afebrile heart rates been in the 90s to the low 110's A. Fib.     Chest x-ray shows no acute changes from prior study with a has a persistent sizable left effusion with opacity at the left lung base and pulmonary vascular congestion. Sodium is 129 BUN and creatinine are 26/1. 72.  Her normal BUN and creatinine are around 15/0.58 and normal sodiums in the mid 130s.   BNP is 3134.  On her last admission it [facility] 2947 and on her admission in early October it was 3251.   WBCs 14.5 seems to run slightly elevated     Plan to consult nephrology and pulmonary medicine. Continue home Eliquis, beta-blocker and amiodarone.  Hold losartan  Plan Solu-Medrol related to IV wheezing throughout. It appears to be in fluid overload related to pitting edema in her lower extremities and increasing shortness of breath.  Plan to hold further hydration for now.  Give Lasix 20 mg IV x1    Patient was being transitioned to SNF. Unfortunately was discovered to be Covid positive. Approximately 12 hours later, patient was in respiratory distress  Intubated later that morning (11/12/2021)      Review of Systems:     Unable to obtain as patient is currently intubated and sedated    Medications: Allergies:     Allergies   Allergen Reactions    Latex Hives     Hives from rubber gloves    Betadine [Povidone Iodine] Hives    Bee Venom Swelling    Dilaudid [Hydromorphone Hcl] Other (See Comments)     Severe confusion    Adhesive Tape Rash    Sulfa Antibiotics Rash       Current Meds:   Scheduled Meds:    methylPREDNISolone  40 mg IntraVENous Q8H    [Held by provider] bumetanide  1 mg IntraVENous BID    ipratropium-albuterol  1 ampule Inhalation 4x daily    [Held by provider] metoprolol tartrate  100 mg Oral BID    digoxin  125 mcg Oral Daily    bumetanide  1 mg Oral BID    ciprofloxacin  500 mg Oral 2 times per day    apixaban  5 mg Oral BID    vitamin C  500 mg Oral Daily    atorvastatin  40 mg Oral Daily    fluticasone  1 puff Inhalation BID    citalopram  20 mg Oral Daily    clopidogrel  75 mg Oral Daily    sodium chloride flush  5-40 mL IntraVENous 2 times per day     Continuous Infusions:    propofol 25 mcg/kg/min (11/12/21 0811)    fentaNYL 25 mcg/hr (11/12/21 0500)    norepinephrine      amiodarone 0.5 mg/min (11/11/21 2100)    sodium chloride       PRN Meds: LORazepam, albuterol, magnesium sulfate, potassium chloride **OR** potassium alternative oral replacement **OR** potassium chloride, magnesium hydroxide, melatonin, sennosides-docusate sodium, cyclobenzaprine, sodium chloride flush, sodium chloride, ondansetron **OR** ondansetron, acetaminophen **OR** acetaminophen    Data:     Past Medical History:   has a past medical history of Asthma, COPD (chronic obstructive pulmonary disease) ([address] Gout, Hyperlipidemia, Hypertension, Mitral insufficiency, and Pneumonia. Social History:   reports that she has quit smoking. She has never used smokeless tobacco. She reports current alcohol use. She reports that she does not use drugs. Family History:   Family History   Problem Relation Age of Onset    High Blood Pressure Mother     Cancer Mother     Asthma Father     Heart Disease Father     Cancer Father        Vitals:  BP 97/66   Pulse 112   Temp 101.3 °F (38.5 °C) (Axillary)   Resp 26   Ht 5' 4\" (1.626 m)   Wt 245 lb 8 oz (111.4 kg)   SpO2 97%   BMI 42.14 kg/m²   Temp (24hrs), Av.7 °F (37.6 °C), Min:97.7 °F (36.5 °C), Max:101.3 °F (38.5 °C)    Recent Labs     21  0048 21  0401   POCGLU 109* 124*       I/O (24Hr):     Intake/Output Summary (Last 24 hours) at 2021 0828  Last data filed at 2021 0545  Gross per 24 hour   Intake 420 ml   Output 1225 ml   Net -805 ml       Labs:  Hematology:  Recent Labs     11/10/21  0459 11/11/21  1157 21  0405   WBC 9.1 12.1* 14.3*   RBC 3.82* 3.94* 4.03   HGB 11.4* 11.8* 12.3   HCT 36.1* 37.5 37.8   MCV 94.5 95.2 93.8   MCH 29.8 29.9 30.5   MCHC 31.6 31.5 32.5   RDW 12.7 12.7 13.0    325 320   MPV 9.3 9.4 9.4   CRP  --  31.2* 50.2*     Chemistry:  Recent Labs     21  2251 21  2251 11/10/21  0459 11/11/21  1157 21  0405   NA  --   --  134* 135 135   K 3.6*   < > 3.5* 3.9 4.6   CL  --   --  91* 91* 91*   CO2  --   --  33* 30 31   GLUCOSE  --   --  131* 125* 116*   BUN  --   --  20 20 18   CREATININE  --   --  0.76 0.88 0.87   MG 1.3*  --  1.8  --   --    ANIONGAP  --   --  10 14 13   LABGLOM  --   --  >60 >60 >60   GFRAA  --   -- >60 >60 >60   CALCIUM  --   --  8.6 8.7 8.7   PHOS  --   --  3.1  --   --    TROPHS 22*  --   --   --   --     < > = values in this interval not displayed. Recent Labs     11/10/21  0459 11/11/21  1157 11/12/21  0048 11/12/21  0401   LABALBU 2.9*  --   --   --    LDH  --  214  --   --    POCGLU  --   --  109* 124*     ABG:  Lab Results   Component Value Date    POCPH 7.511 11/12/2021    PH 7.44 03/30/2016    POCPCO2 46.3 11/12/2021    PCO2 36 03/30/2016    POCPO2 75.9 11/12/2021    PO2 64 03/30/2016    POCHCO3 37.0 11/12/2021    HCO3 24 03/30/2016    NBEA NOT REPORTED 11/12/2021    PBEA 12 11/12/2021    WNZ9VBT NOT REPORTED 11/12/2021    EYUY0CIW 96 11/12/2021    O2SAT 93 03/30/2016    FIO2 40.0 11/12/2021     Lab Results   Component Value Date/Time    SPECIAL NOT REPORTED 11/05/2021 08:32 AM     Lab Results   Component Value Date/Time    CULTURE PSEUDOMONAS AERUGINOSA >[id number] CFU/ML (A) 11/05/2021 08:32 AM    CULTURE (A) 11/05/2021 08:32 AM     LACTOSE FERMENTING GRAM NEGATIVE RODS 10 to 50,000 CFU/ML Susceptibility testing not performed on low colony count organisms. Radiology:  XR CHEST (SINGLE VIEW FRONTAL)    Result Date: 11/6/2021  Interval improved aeration of the left lung base. XR CHEST (SINGLE VIEW FRONTAL)    Result Date: 11/5/2021  Interval decrease in left effusion. No pneumothorax. Persistent opacity at the left lung base. Vascularity is slightly prominent. XR CHEST PORTABLE    Result Date: 11/3/2021  No change from prior study. Persistent sizable left effusion with opacity at the left lung base and pulmonary vascular congestion. US THORACENTESIS Which side should the procedure be performed? Left    Result Date: 11/4/2021  Successful ultrasound guided thoracentesis. US RETROPERITONEAL COMPLETE    Result Date: 11/4/2021  1. Patient had no urge to void during the exam.  Nonvisualization of ureteral jets. 2. Incidental cholelithiasis. 3. Unremarkable renal ultrasound.   No loss/lifestyle modification strategies discussed (elicit support from others; identify saboteurs; non-food rewards, etc). Plan:        1. Acute respiratory failure with hypoxiapreviously requiring 2 L of oxygen. Now intubated (11/12/21) and requiring 45% FiO2. CXR reviewed from this morning. Worsening RLL and LLL infiltrates/effusions. Appreciate pulmonology input regarding these effusions. 2. Respiratory alkalosis - vent settings to be adjusted. 3. COVID-19 infectioninfectious disease following. To receive Regeneron x1. Inflammatory markers are uptrending. 4. CASEY - resolved  5. Shock - likely related to sedation. Check blood cultures x2. Check cortisol. Levophed added. 6. CXR from 11/09 reviewed. Continue oral diuretic - Bumex 1 mg BID. Wean supplemental oxygen at [facility]  7. Afib with RVR / a flutter - cardiology consulted yesterday. Patient previously on oral amiodarone, but changed to IV dig and IV amio yesterday afternoon. Now with hypotension. Holding lopressor. Continue amiodarone infusion. 8. COPD - continue alburterol, flovent  9. Pseudomonal UTI - continue ciprofloxacin. Today's last dose of Cipro. 10. HFpEF - hold diuretic and Lopressor secondary to hypotension. 11. Essential hypertension - holding all antihypertensive secondary to shock/hypotension  12. Morbid obesity - BMI 43.59.  13. Constipation - continue senna-s  14. PT/OT  15.  Need to update family today      Larry Joseph,   11/12/2021  8:28 AM

## 2021-11-12 NOTE — FLOWSHEET NOTE
Intubation     Critical care resident, Dr. Evelyn Ohara and anesthesia at the bedside to intubate. 0451 16mg Etomidate given  0452 50mcg Propofol and 100 Rocuronium given  0453 7.5 ETT 23cm at the teeth    Bilateral breath sounds/positive color change. Chest XR ordered to confirm placement. Propofol and Fentanyl gtts started.      Electronically signed by Ronny Orta RN on 11/12/2021 at 5:41 AM

## 2021-11-12 NOTE — FLOWSHEET NOTE
Pt's son, Karla Bonilla, updated on status - No new questions at this time.      Electronically signed by Roge Jackson RN on 11/12/2021 at 6:18 AM

## 2021-11-12 NOTE — PROGRESS NOTES
Progress Note    Patient - Wagner Lamb  Date of Admission -  11/3/2021  4:32 PM  Date of Evaluation -  2021  Room and Bed Number -  4871/9244-94   Hospital Day - 9    CHIEF COMPLAINT : ACUTE HYPOXIC RESPIRATORY FAILURE DUE TO COVID -19 PNEUMONIA   SUBJECTIVE:     OVERNIGHT EVENTS:         Noted   covid -19 dx yest and received regeneron 21   Overnight went into afib and rvr and respiratory distress .    Intubated   Sedated           SECRETIONS  -Amount:  [x] Small [] Moderate  [] Large    [] None  Color:     [x] White [] Colored  [] Bloody    SEDATION:    [x] Propofol gtt  [] Versed gtt  [x] FENTANYL  gtt  gtt   [] No Sedation    PARALYZED:  [x] No    [] Yes    VASOPRESSORS:  [x] No    [] Yes  [] Levophed [] Dopamine [] Vasopressin  [] Dobutamine [] Phenylephrine [] Epinephrine      Ros unable to perform due to intubation and sedation    OBJECTIVE:     VITAL SIGNS:  BP 97/66   Pulse 89   Temp 101.3 °F (38.5 °C) (Axillary)   Resp 26   Ht 5' 4\" (1.626 m)   Wt 245 lb 8 oz (111.4 kg)   SpO2 96%   BMI 42.14 kg/m²   Tmax over 24 hours:  Temp (24hrs), Av.7 °F (37.6 °C), Min:97.7 °F (36.5 °C), Max:101.3 °F (38.5 °C)      Patient Vitals for the past 8 hrs:   BP Temp Temp src Pulse Resp SpO2 Weight   21 0844    89 26 96 %    21 0700 97/66   112 26 97 %    21 0645 89/60   115 26 97 %    21 0630 (!) 127/108   117 26 95 %    21 0615 106/71   121 26 96 %    21 0600 88/72   117 26 95 %    21 0545 99/73   118 26 94 %    21 0530 116/69   121 27 96 %    21 0515    120 26 96 %    21 0500 96/66   128 26 93 %    21 0458    128 19 91 %    21 0445 138/89   119 (!) 34 92 %    21 0430    119 (!) 31 97 %    21 0415    118 (!) 31 96 %    21 0400    119 (!) 34 95 % 245 lb 8 oz (111.4 kg)   21 0345    115 (!) 35 96 %    21 0330 (!) 134/99   122 (!) 41 96 %  11/12/21 0320  101.3 °F (38.5 °C) Axillary       11/12/21 0319     25     11/12/21 0300 (!) 130/117   117 (!) 37 95 %    11/12/21 0200 (!) 150/90   121 29 95 %          Intake/Output Summary (Last 24 hours) at 11/12/2021 0952  Last data filed at 11/12/2021 0948  Gross per 24 hour   Intake 667.9 ml   Output 1435 ml   Net -767.1 ml     Date 11/12/21 0000 - 11/12/21 2359   Shift 4446-0358 8003-6061 6051-9808 24 Hour Total   INTAKE   I.V.(mL/kg)  207.9(1.9)  207.9(1.9)   NG/GT(mL/kg)  50(0.4)  50(0.4)   Shift Total(mL/kg)  257.9(2.3)  257.9(2.3)   OUTPUT   Urine(mL/kg/hr) 735(0.8) 100  835   Shift Total(mL/kg) 735(6.6) 100(0.9)  835(7.5)   Weight (kg) 111.4 111.4 111.4 111.4     Wt Readings from Last 3 Encounters:   11/12/21 245 lb 8 oz (111.4 kg)   10/20/21 172 lb (78 kg)   10/10/21 254 lb (115.2 kg)     Body mass index is 42.14 kg/m².         PHYSICAL EXAM:  Sedated   Ill   Lungs rales b/l   cvs ir   abd nt nd bs +  Le edema       MEDICATIONS:  Scheduled Meds:   methylPREDNISolone  40 mg IntraVENous Q8H    [Held by provider] bumetanide  1 mg IntraVENous BID    ipratropium-albuterol  1 ampule Inhalation 4x daily    [Held by provider] metoprolol tartrate  100 mg Oral BID    digoxin  125 mcg Oral Daily    bumetanide  1 mg Oral BID    apixaban  5 mg Oral BID    vitamin C  500 mg Oral Daily    atorvastatin  40 mg Oral Daily    fluticasone  1 puff Inhalation BID    citalopram  20 mg Oral Daily    clopidogrel  75 mg Oral Daily    sodium chloride flush  5-40 mL IntraVENous 2 times per day     Continuous Infusions:   propofol 20 mcg/kg/min (11/12/21 0945)    fentaNYL 25 mcg/hr (11/12/21 0500)    norepinephrine      amiodarone 0.5 mg/min (11/11/21 2100)    sodium chloride       PRN Meds:   LORazepam, 1 mg, Q4H PRN  albuterol, 2.5 mg, Q6H PRN  magnesium sulfate, 1,000 mg, PRN  potassium chloride, 40 mEq, PRN   Or  potassium alternative oral replacement, 50 mEq, PRN   Or  potassium chloride, 10 mEq, PRN  magnesium hydroxide, 30 mL, TID PRN  melatonin, 3 mg, Nightly PRN  sennosides-docusate sodium, 2 tablet, Daily PRN  cyclobenzaprine, 5 mg, TID PRN  sodium chloride flush, 5-40 mL, PRN  sodium chloride, 25 mL, PRN  ondansetron, 4 mg, Q8H PRN   Or  ondansetron, 4 mg, Q6H PRN  acetaminophen, 650 mg, Q6H PRN   Or  acetaminophen, 650 mg, Q6H PRN        SUPPORT DEVICES: [x] Ventilator [] BIPAP  [] Nasal Cannula [] Room Air      ABGs:     Lab Results   Component Value Date    PH 7.44 03/30/2016    PCO2 36 03/30/2016    PO2 64 03/30/2016    HCO3 24 03/30/2016    PAY9RCD NOT REPORTED 11/12/2021    O2SAT 93 03/30/2016    FIO2 40.0 11/12/2021       DATA:  Complete Blood Count:   Recent Labs     11/10/21  0459 11/11/21  1157 11/12/21  0405   WBC 9.1 12.1* 14.3*   RBC 3.82* 3.94* 4.03   HGB 11.4* 11.8* 12.3   HCT 36.1* 37.5 37.8   MCV 94.5 95.2 93.8   MCH 29.8 29.9 30.5   MCHC 31.6 31.5 32.5   RDW 12.7 12.7 13.0    325 320   MPV 9.3 9.4 9.4        Last 3 Blood Glucose:   Recent Labs     11/10/21  0459 11/11/21  1157 11/12/21  0405   GLUCOSE 131* 125* 116*        PT/INR:    Lab Results   Component Value Date    PROTIME 12.3 11/04/2021    INR 1.2 11/04/2021     PTT:    Lab Results   Component Value Date    APTT 27.5 10/10/2021    APTT 38.1 03/21/2016       Comprehensive Metabolic Profile:   Recent Labs     11/10/21  0459 11/11/21  1157 11/12/21  0405   * 135 135   K 3.5* 3.9 4.6   CL 91* 91* 91*   CO2 33* 30 31   BUN 20 20 18   CREATININE 0.76 0.88 0.87   GLUCOSE 131* 125* 116*   CALCIUM 8.6 8.7 8.7   LABALBU 2.9*  --   --       Magnesium:   Lab Results   Component Value Date    MG 1.8 11/10/2021    MG 1.3 11/09/2021    MG 1.7 11/09/2021     Phosphorus:   Lab Results   Component Value Date    PHOS 3.1 11/10/2021    PHOS 3.4 11/09/2021    PHOS 3.1 11/08/2021     Ionized Calcium:   Lab Results   Component Value Date    CAION 1.13 03/25/2016        Urinalysis:   Lab Results   Component Value Date    NITRU POSITIVE 11/05/2021    COLORU Yellow 11/05/2021    PHUR 5.0 11/05/2021    LABCAST 4-8 Hyaline 03/22/2016    LABCAST NONE SEEN 03/22/2016    WBCUA TOO NUMEROUS TO COUNT 11/05/2021    RBCUA 5 TO 10 11/05/2021    RBCUA 25-50 03/22/2016    MUCUS NOT REPORTED 11/05/2021    TRICHOMONAS NOT REPORTED 11/05/2021    YEAST MODERATE 11/05/2021    BACTERIA MODERATE 11/05/2021    SPECGRAV 1.012 11/05/2021    LEUKOCYTESUR LARGE 11/05/2021    UROBILINOGEN Normal 11/05/2021    BILIRUBINUR NEGATIVE 11/05/2021    BLOODU LARGE 03/22/2016    GLUCOSEU NEGATIVE 11/05/2021    KETUA NEGATIVE 11/05/2021    AMORPHOUS NOT REPORTED 11/05/2021               XR CHEST (SINGLE VIEW FRONTAL)    Result Date: 11/12/2021  Small bilateral pleural effusions. Slightly increased airspace opacity in the right mid to lower lung, which may reflect pneumonia in the appropriate clinical setting. XR CHEST (SINGLE VIEW FRONTAL)    Result Date: 11/9/2021  Stable volume loss in the left chest with left basilar atelectasis or pneumonia. Possible small left effusion. XR CHEST (SINGLE VIEW FRONTAL)    Result Date: 11/6/2021  Interval improved aeration of the left lung base. XR CHEST PORTABLE    Result Date: 11/12/2021  1. Endotracheal and esophageal catheters placed as described 2.  More conspicuous lower to mid level opacification consistent with pleural effusions and at least atelectasis            VENTILATOR SETTINGS:  Vent Information  $Ventilation: $Subsequent Day  Skin Assessment: Clean, dry, & intact  Vent Type: Servo i  Vent Mode: PRVC  Vt Ordered: 430 mL  Rate Set: 26 bmp  FiO2 : 45 %  SpO2: 96 %  SpO2/FiO2 ratio: 213.33  Sensitivity: 5  PEEP/CPAP: 8  I Time/ I Time %: 0.7 s  Humidification Source: HME  Nitric Oxide/Epoprostenol In Use?: No  Mask Type: Full face mask  Mask Size: Medium     PaO2/FiO2 RATIO:  Recent Labs     11/12/21 0401   POCPO2 75.9*      FiO2 : 45 %       LABS:  ABGs:   Recent Labs     11/12/21  0048 11/12/21 0401   POCPH 7.489* 7.511*   POCPCO2 47.6 46.3   POCPO2 76.1* 75.9*   POCHCO3 36.2* 37.0*   [id number] 96 96            ASSESSMENT:     Principal Problem:    CASEY (acute kidney injury) ([facility] [address]  Active Problems:    Benign essential HTN    Morbid obesity with BMI of 40.0-44.9, adult (HCC)    Pulmonary emphysema (HCC)    Acute on chronic diastolic heart failure (HCC)    Chronic obstructive pulmonary disease with acute exacerbation (HCC)    Persistent atrial fibrillation (HCC)    Recurrent pleural effusion    Acute on chronic congestive heart failure (HCC)  Resolved Problems:    * No resolved hospital problems. *              Acute hypoxic respiratory failure    Bilateral pulmonary infiltrates - acute cardiogenic pulmonary edema / covid -19 pneumonia   Post regeneron 11/11/21, actemra-11/12/21  afib on amiodaron and eliquis     LOS: 9  PLAN:    D/w RT  D/w RN   Vent setting reviewed - peep is 8 and fio2 45 %   Sedation reviewed- dc fentanyl gtt - use prn fentanyl - continue propofol   Airborne isolation and droplet precautions to be continued  Continue supportive care   Minimize iv fluids to keep the patient on the dry side  Cont tube feeding  Monitor endotracheal secretions   Follow ct chest   picc line   WEAN PER PROTOCOL:  [] No   [x] Yes  [] N/A      ICU PROPHYLAXIS:  Stress ulcer:  [] PPI Agent  [x] [id number] [] Sucralfate  [] Other:  VTE:   [] Enoxaparin  [] Unfract. Heparin Subcut  [] EPC Cuffs    NUTRITION:  [] NPO [x] Tube Feeding (Specify: ) [] TPN  [] PO    INSULIN DRIP:   [] No   [] Yes    FAMILY UPDATED:    [] No   [x] Atif henry   TRANSFER OUT OF ICU:   [x] No   [] Yes    Treatment plan Discussed with nursing staff in detail , all questions answered .      Total critical care time caring for this patient with life threatening, unstable organ failure, including direct patient contact, management of life support systems, review of data including imaging and labs, discussions with other team members and physicians at least 28  Min so far today, excluding procedures. Electronically signed by Lukas Bernard MD on 11/12/2021 at 9:52 AM       This patient was evaluated in the context of the global SARS-CoV-2 (COVID-19) pandemic, which necessitated considerations that the patient either has COVID-19 infection or is at risk of infection with COVID-19. Institutional protocols and algorithms that pertain to the evaluation & management of patients with COVID-19 or those at risk for COVID-19 are in a state of rapid changes based on information released by regulatory bodies including the CDC and federal and state organizations. These policies and algorithms were followed during the patient's care. Please note that this chart was generated using voice recognition Dragon dictation software. Although every effort was made to ensure the accuracy of this automated transcription, some errors in transcription may have occurred.

## 2021-11-12 NOTE — PLAN OF CARE
[facility]  Office: [address], DO, Frantz Dany, DO, Kiki Marcial, DO, Gayejong Villagomez Blood, DO, Daniel Crowder MD, Ligia Polo MD, Kvng Turpin MD, Desi Jung MD, Christine Davis MD, Arron Anders MD, Edouard Sahnks MD, Martell iLao, DO, Aleja Rao, DO, Aaliyah Moya MD,  Radha Friday, DO, Shweta Amaya MD, Radha Pelaez MD, Erin Lance MD, Ebony Talamantes MD, Georgina Marion MD, Sveta Diaz MD, Adeline Goldmann, MD, Paulino Marie, [facility], [facility], [facility], Chris Nurse, CNP, Casimiro Streeter, CNS, Aquiles Chavez, CNP, Farzana Swartz, CNP, Tao Michael, CNP, Radha Trujillo, CNP, Giovana Domingo, CNP, Ramy Clayton PA-C, Loan Partida, MILLIE, Joyce Collinselor, CNP, Myke Hsu, CNP, Sunita Dawson, CNP, Gabriela Squibb, CNP, Genie Caban, CNP, Delfin Geiger, [facility], [facility], [address] [place] [address]    [facility]ist Note      11/12/2021    1:16 AM    Name:   Judy Trotter  MRN:     [id number]     Acct:      [de-identified]   Room:   2019/2019-01  IP Day:  9  Admit Date:  11/3/2021  4:32 PM    PCP:   KALYN Morales CNP  Code Status:  Full Code      Called by nurse to see patient at the bedside. Pt recently diagnosed with A.fib and Covid 19. She is in acute respiratory distress. She had 2-3 L oxygen NC on earlier today. She was given Bumex 1mg po earlier and did not have much urine with that. She started having more anxiety and increased work of breathing tonight. Her 's, A.fib with RVR, RR 40-50. A & O x 3, eyes closed, in resp distress  Tachy  Reduced BS bilat, inspiratory wheezes throughout  S, Obese      Updated plan :     1. Acute respiratory failure- from Covid 19 pneumonia, bilat effusions? Change po Prednisone to IV Solumedrol 40mg TID, Albuterol and Atrovent inhalers x 1, Bumex 1mg IV x 1.    2. Anxiety- 1mg x 1  3. COPD- IV steriods, inhalers  4.  A.fib with RVR- con't Amio gtt, given Digoxin. Cardiology following, check EKG    Transfer to CAR 3, Covid unit     nurse and Critical care resident, after Dr. Efrem Garcia requested them to see her    Repeat ABG in 2 hrs on Bipap, Critical care will follow up on results    Called son, Mary Alice Chilel (Kong Diaz), who didn't answer. Called other son, Lane Reyes, who states that his mom would not want to be intubated. I encouraged Jatinder to wake up his brother so we may be able to change her code status if that is true. Addendum:  I asked patient if she would want to be intubated if she was not oxygenating well enough with Bipap and she is agreeable.   She is also okay with CPR    Bella Puckett MD  11/12/2021  1:16 AM

## 2021-11-13 LAB
ABSOLUTE EOS #: <0.03 K/UL (ref 0–0.44)
ABSOLUTE IMMATURE GRANULOCYTE: 0.11 K/UL (ref 0–0.3)
ABSOLUTE LYMPH #: 0.95 K/UL (ref 1.1–3.7)
ABSOLUTE MONO #: 0.46 K/UL (ref 0.1–1.2)
ALBUMIN SERPL-MCNC: 3 G/DL (ref 3.5–5.2)
ALLEN TEST: ABNORMAL
ANION GAP SERPL CALCULATED.3IONS-SCNC: 14 MMOL/L (ref 9–17)
BASOPHILS # BLD: 0 % (ref 0–2)
BASOPHILS ABSOLUTE: <0.03 K/UL (ref 0–0.2)
BUN BLDV-MCNC: 29 MG/DL (ref 8–23)
BUN/CREAT BLD: ABNORMAL (ref 9–20)
CALCIUM SERPL-MCNC: 8.5 MG/DL (ref 8.6–10.4)
CHLORIDE BLD-SCNC: 91 MMOL/L (ref 98–107)
CO2: 27 MMOL/L (ref 20–31)
CREAT SERPL-MCNC: 0.87 MG/DL (ref 0.5–0.9)
DIFFERENTIAL TYPE: ABNORMAL
EOSINOPHILS RELATIVE PERCENT: 0 % (ref 1–4)
FIO2: 45
GFR AFRICAN AMERICAN: >60 ML/MIN
GFR NON-AFRICAN AMERICAN: >60 ML/MIN
GFR SERPL CREATININE-BSD FRML MDRD: ABNORMAL ML/MIN/{1.73_M2}
GFR SERPL CREATININE-BSD FRML MDRD: ABNORMAL ML/MIN/{1.73_M2}
GLUCOSE BLD-MCNC: 144 MG/DL (ref 65–105)
GLUCOSE BLD-MCNC: 155 MG/DL (ref 70–99)
GLUCOSE BLD-MCNC: 159 MG/DL (ref 74–100)
HCT VFR BLD CALC: 35 % (ref 36.3–47.1)
HEMOGLOBIN: 11.6 G/DL (ref 11.9–15.1)
IMMATURE GRANULOCYTES: 1 %
LYMPHOCYTES # BLD: 9 % (ref 24–43)
MCH RBC QN AUTO: 30.5 PG (ref 25.2–33.5)
MCHC RBC AUTO-ENTMCNC: 33.1 G/DL (ref 28.4–34.8)
MCV RBC AUTO: 92.1 FL (ref 82.6–102.9)
MODE: ABNORMAL
MONOCYTES # BLD: 4 % (ref 3–12)
NEGATIVE BASE EXCESS, ART: ABNORMAL (ref 0–2)
NRBC AUTOMATED: 0 PER 100 WBC
O2 DEVICE/FLOW/%: ABNORMAL
PATIENT TEMP: ABNORMAL
PDW BLD-RTO: 12.5 % (ref 11.8–14.4)
PHOSPHORUS: 4.2 MG/DL (ref 2.6–4.5)
PLATELET # BLD: 303 K/UL (ref 138–453)
PLATELET ESTIMATE: ABNORMAL
PMV BLD AUTO: 9.6 FL (ref 8.1–13.5)
POC HCO3: 32.6 MMOL/L (ref 21–28)
POC O2 SATURATION: 98 % (ref 94–98)
POC PCO2 TEMP: ABNORMAL MM HG
POC PCO2: 37.9 MM HG (ref 35–48)
POC PH TEMP: ABNORMAL
POC PH: 7.54 (ref 7.35–7.45)
POC PO2 TEMP: ABNORMAL MM HG
POC PO2: 100.4 MM HG (ref 83–108)
POSITIVE BASE EXCESS, ART: 9 (ref 0–3)
POTASSIUM SERPL-SCNC: 3.6 MMOL/L (ref 3.7–5.3)
RBC # BLD: 3.8 M/UL (ref 3.95–5.11)
RBC # BLD: ABNORMAL 10*6/UL
SAMPLE SITE: ABNORMAL
SEG NEUTROPHILS: 85 % (ref 36–65)
SEGMENTED NEUTROPHILS ABSOLUTE COUNT: 9.03 K/UL (ref 1.5–8.1)
SODIUM BLD-SCNC: 132 MMOL/L (ref 135–144)
TCO2 (CALC), ART: ABNORMAL MMOL/L (ref 22–29)
TOTAL CK: 13 U/L (ref 26–192)
TRIGL SERPL-MCNC: 215 MG/DL
WBC # BLD: 10.6 K/UL (ref 3.5–11.3)
WBC # BLD: ABNORMAL 10*3/UL

## 2021-11-13 PROCEDURE — 82947 ASSAY GLUCOSE BLOOD QUANT: CPT

## 2021-11-13 PROCEDURE — 6370000000 HC RX 637 (ALT 250 FOR IP): Performed by: INTERNAL MEDICINE

## 2021-11-13 PROCEDURE — 99233 SBSQ HOSP IP/OBS HIGH 50: CPT | Performed by: INTERNAL MEDICINE

## 2021-11-13 PROCEDURE — 2580000003 HC RX 258: Performed by: INTERNAL MEDICINE

## 2021-11-13 PROCEDURE — 94003 VENT MGMT INPAT SUBQ DAY: CPT

## 2021-11-13 PROCEDURE — 99232 SBSQ HOSP IP/OBS MODERATE 35: CPT | Performed by: INTERNAL MEDICINE

## 2021-11-13 PROCEDURE — 6360000002 HC RX W HCPCS: Performed by: INTERNAL MEDICINE

## 2021-11-13 PROCEDURE — 82803 BLOOD GASES ANY COMBINATION: CPT

## 2021-11-13 PROCEDURE — 2700000000 HC OXYGEN THERAPY PER DAY

## 2021-11-13 PROCEDURE — 82550 ASSAY OF CK (CPK): CPT

## 2021-11-13 PROCEDURE — 37799 UNLISTED PX VASCULAR SURGERY: CPT

## 2021-11-13 PROCEDURE — APPSS30 APP SPLIT SHARED TIME 16-30 MINUTES: Performed by: NURSE PRACTITIONER

## 2021-11-13 PROCEDURE — 85025 COMPLETE CBC W/AUTO DIFF WBC: CPT

## 2021-11-13 PROCEDURE — 94761 N-INVAS EAR/PLS OXIMETRY MLT: CPT

## 2021-11-13 PROCEDURE — 80069 RENAL FUNCTION PANEL: CPT

## 2021-11-13 PROCEDURE — 2000000000 HC ICU R&B

## 2021-11-13 PROCEDURE — 94640 AIRWAY INHALATION TREATMENT: CPT

## 2021-11-13 PROCEDURE — 6370000000 HC RX 637 (ALT 250 FOR IP): Performed by: NURSE PRACTITIONER

## 2021-11-13 PROCEDURE — 99291 CRITICAL CARE FIRST HOUR: CPT | Performed by: INTERNAL MEDICINE

## 2021-11-13 PROCEDURE — 84478 ASSAY OF TRIGLYCERIDES: CPT

## 2021-11-13 PROCEDURE — 2500000003 HC RX 250 WO HCPCS

## 2021-11-13 RX ORDER — ASCORBIC ACID 500 MG
500 TABLET ORAL DAILY
Status: DISCONTINUED | OUTPATIENT
Start: 2021-11-14 | End: 2021-11-15

## 2021-11-13 RX ORDER — METOPROLOL TARTRATE 5 MG/5ML
INJECTION INTRAVENOUS
Status: COMPLETED
Start: 2021-11-13 | End: 2021-11-13

## 2021-11-13 RX ORDER — BUMETANIDE 1 MG/1
1 TABLET ORAL 2 TIMES DAILY
Status: DISCONTINUED | OUTPATIENT
Start: 2021-11-13 | End: 2021-11-15

## 2021-11-13 RX ORDER — METOPROLOL TARTRATE 5 MG/5ML
5 INJECTION INTRAVENOUS EVERY 6 HOURS PRN
Status: DISCONTINUED | OUTPATIENT
Start: 2021-11-13 | End: 2021-11-20 | Stop reason: HOSPADM

## 2021-11-13 RX ORDER — ATORVASTATIN CALCIUM 40 MG/1
40 TABLET, FILM COATED ORAL DAILY
Status: DISCONTINUED | OUTPATIENT
Start: 2021-11-14 | End: 2021-11-15

## 2021-11-13 RX ORDER — CLOPIDOGREL BISULFATE 75 MG/1
75 TABLET ORAL DAILY
Status: DISCONTINUED | OUTPATIENT
Start: 2021-11-14 | End: 2021-11-15

## 2021-11-13 RX ORDER — SENNA AND DOCUSATE SODIUM 50; 8.6 MG/1; MG/1
2 TABLET, FILM COATED ORAL DAILY
Status: DISCONTINUED | OUTPATIENT
Start: 2021-11-13 | End: 2021-11-15

## 2021-11-13 RX ORDER — DIGOXIN 125 MCG
125 TABLET ORAL DAILY
Status: DISCONTINUED | OUTPATIENT
Start: 2021-11-14 | End: 2021-11-20 | Stop reason: HOSPADM

## 2021-11-13 RX ORDER — CITALOPRAM 20 MG/1
20 TABLET ORAL DAILY
Status: DISCONTINUED | OUTPATIENT
Start: 2021-11-14 | End: 2021-11-15

## 2021-11-13 RX ADMIN — APIXABAN 5 MG: 5 TABLET, FILM COATED ORAL at 20:35

## 2021-11-13 RX ADMIN — DESMOPRESSIN ACETATE 40 MG: 0.2 TABLET ORAL at 09:37

## 2021-11-13 RX ADMIN — PROPOFOL 40 MCG/KG/MIN: 10 INJECTION, EMULSION INTRAVENOUS at 20:41

## 2021-11-13 RX ADMIN — SODIUM CHLORIDE, PRESERVATIVE FREE 10 ML: 5 INJECTION INTRAVENOUS at 17:42

## 2021-11-13 RX ADMIN — SODIUM CHLORIDE, PRESERVATIVE FREE 10 ML: 5 INJECTION INTRAVENOUS at 09:41

## 2021-11-13 RX ADMIN — OXYCODONE HYDROCHLORIDE AND ACETAMINOPHEN 500 MG: 500 TABLET ORAL at 09:37

## 2021-11-13 RX ADMIN — PROPOFOL 20 MCG/KG/MIN: 10 INJECTION, EMULSION INTRAVENOUS at 09:23

## 2021-11-13 RX ADMIN — METHYLPREDNISOLONE SODIUM SUCCINATE 40 MG: 40 INJECTION, POWDER, FOR SOLUTION INTRAMUSCULAR; INTRAVENOUS at 00:30

## 2021-11-13 RX ADMIN — METOPROLOL TARTRATE 5 MG: 1 INJECTION, SOLUTION INTRAVENOUS at 14:03

## 2021-11-13 RX ADMIN — MAGNESIUM HYDROXIDE 30 ML: 400 SUSPENSION ORAL at 17:39

## 2021-11-13 RX ADMIN — CLOPIDOGREL 75 MG: 75 TABLET, FILM COATED ORAL at 09:37

## 2021-11-13 RX ADMIN — MAGNESIUM HYDROXIDE 30 ML: 400 SUSPENSION ORAL at 09:39

## 2021-11-13 RX ADMIN — IPRATROPIUM BROMIDE AND ALBUTEROL SULFATE 1 AMPULE: .5; 2.5 SOLUTION RESPIRATORY (INHALATION) at 12:57

## 2021-11-13 RX ADMIN — CITALOPRAM 20 MG: 20 TABLET, FILM COATED ORAL at 09:37

## 2021-11-13 RX ADMIN — FENTANYL CITRATE 50 MCG: 50 INJECTION, SOLUTION INTRAMUSCULAR; INTRAVENOUS at 14:20

## 2021-11-13 RX ADMIN — METHYLPREDNISOLONE SODIUM SUCCINATE 40 MG: 40 INJECTION, POWDER, FOR SOLUTION INTRAMUSCULAR; INTRAVENOUS at 17:39

## 2021-11-13 RX ADMIN — POTASSIUM BICARBONATE 40 MEQ: 782 TABLET, EFFERVESCENT ORAL at 20:35

## 2021-11-13 RX ADMIN — BUMETANIDE 1 MG: 1 TABLET ORAL at 20:36

## 2021-11-13 RX ADMIN — METHYLPREDNISOLONE SODIUM SUCCINATE 40 MG: 40 INJECTION, POWDER, FOR SOLUTION INTRAMUSCULAR; INTRAVENOUS at 09:39

## 2021-11-13 RX ADMIN — PROPOFOL 25 MCG/KG/MIN: 10 INJECTION, EMULSION INTRAVENOUS at 02:47

## 2021-11-13 RX ADMIN — IPRATROPIUM BROMIDE AND ALBUTEROL SULFATE 1 AMPULE: .5; 2.5 SOLUTION RESPIRATORY (INHALATION) at 07:40

## 2021-11-13 RX ADMIN — IPRATROPIUM BROMIDE AND ALBUTEROL SULFATE 1 AMPULE: .5; 2.5 SOLUTION RESPIRATORY (INHALATION) at 16:35

## 2021-11-13 RX ADMIN — PROPOFOL 30 MCG/KG/MIN: 10 INJECTION, EMULSION INTRAVENOUS at 14:20

## 2021-11-13 RX ADMIN — BUMETANIDE 1 MG: 1 TABLET ORAL at 09:37

## 2021-11-13 RX ADMIN — PROPOFOL 40 MCG/KG/MIN: 10 INJECTION, EMULSION INTRAVENOUS at 22:21

## 2021-11-13 RX ADMIN — DIGOXIN 125 MCG: 125 TABLET ORAL at 09:37

## 2021-11-13 RX ADMIN — IPRATROPIUM BROMIDE AND ALBUTEROL SULFATE 1 AMPULE: .5; 2.5 SOLUTION RESPIRATORY (INHALATION) at 19:37

## 2021-11-13 RX ADMIN — APIXABAN 5 MG: 5 TABLET, FILM COATED ORAL at 09:37

## 2021-11-13 RX ADMIN — SODIUM CHLORIDE, PRESERVATIVE FREE 10 ML: 5 INJECTION INTRAVENOUS at 21:00

## 2021-11-13 RX ADMIN — AMIODARONE HYDROCHLORIDE 0.5 MG/MIN: 50 INJECTION, SOLUTION INTRAVENOUS at 14:20

## 2021-11-13 RX ADMIN — DOCUSATE SODIUM 50MG AND SENNOSIDES 8.6MG 2 TABLET: 8.6; 5 TABLET, FILM COATED ORAL at 20:36

## 2021-11-13 RX ADMIN — METOPROLOL TARTRATE 25 MG: 25 TABLET ORAL at 20:36

## 2021-11-13 ASSESSMENT — PAIN DESCRIPTION - FREQUENCY: FREQUENCY: CONTINUOUS

## 2021-11-13 ASSESSMENT — PULMONARY FUNCTION TESTS
PIF_VALUE: 21
PIF_VALUE: 23
PIF_VALUE: 24
PIF_VALUE: 21
PIF_VALUE: 24
PIF_VALUE: 23

## 2021-11-13 ASSESSMENT — PAIN SCALES - GENERAL: PAINLEVEL_OUTOF10: 0

## 2021-11-13 ASSESSMENT — PAIN DESCRIPTION - ONSET: ONSET: ON-GOING

## 2021-11-13 ASSESSMENT — PAIN SCALES - WONG BAKER: WONGBAKER_NUMERICALRESPONSE: 0

## 2021-11-13 NOTE — PROGRESS NOTES
[facility] -   Infectious diseases evaluation  admission date 11/3/2021    reason for consultation:   covid +    Impression :   Current:  · covid + rapid 11/11  · Recurrent L pleural effusion- many thoracentesis  · Repeat thoracentesis 11/4  · resp failure and intubated 11/11  · Acute on chronic CHF  · Pseudomonas UTI   · crp elevation  · SEJAL  · Obese     Other:  ·   Discussion / summary of stay / plan of care   · Was rapid covid neg at admission -  · Then tested + rapid 11/11  · fully vaccinated  · Might be asymptomatic for covid, no clear pneumonia   Recommendations   · regeneron x 1 on 11/11  · Get CRP and ferritin   · Get CT chest look for signs of covid pneumonia and address accordingly  · cipro for pseudomonas UTI 11/7 -11/12    Addendum:  11/12, CT scan suggestive of Covid pneumonia, Actemra ordered 11/12    Infection Control Recommendations   · [place] Precautions  · Contact Isolation   · Airborne isolation  · Droplet Isolation      Antimicrobial Stewardship Recommendations   · Simplification of therapy  · Targeted therapy      Coordination ofOutpatient Care:   · Estimated Length of IV antimicrobials:  · Patient will need Midline / picc Catheter Insertion:   · Patient will need SNF:  · Patient will need outpatient wound care:     History of Present Illness:   Initial history:  Roverto Vásquez is a 68y.o.-year-old female w recurrent left pleural effusion who comes in again for shortness of breath and was found to have a chronic left effusion had a thoracentesis 11/4, was also found to have a Pseudomonas UTI and started on Cipro on 11/7. Had initial rapid Covid test at admission that was negative, repeat 111/11 came back positive rapid Covid. Infectious consulted, reviewed the chest x-ray she does not seem to be having Covid pneumonia. No inflammatory markers at this point.     Interval changes  11/13/2021   Patient Vitals for the past 8 hrs:   BP Temp Temp src Pulse Resp SpO2 Weight   11/13/21 0700    77 20 99 %    11/13/21 0645    68 20 99 %    11/13/21 0630    69 20 99 %    11/13/21 0615    70 20 99 %    11/13/21 0600 114/63   71 20 99 %    11/13/21 0545    70 20 99 %    11/13/21 0530    71 20 99 %    11/13/21 0515    70 20 99 %    11/13/21 0500    71 20 99 %    11/13/21 0445    70 20 99 %    11/13/21 0430    73 20 99 %    11/13/21 0415    75 20 99 %    11/13/21 0400 113/70 97.9 °F (36.6 °C) Bladder 77 20 99 %    11/13/21 0345    77 20 99 %    11/13/21 0335    77 20 99 %    11/13/21 0330    79 20 99 %    11/13/21 0315    80 20 99 %    11/13/21 0300    76 20 99 %    11/13/21 0254       238 lb 11.2 oz (108.3 kg)   11/13/21 0245    79 20 98 %    11/13/21 0230    78 20 98 %    11/13/21 0215    79 20 98 %    11/13/21 0200 127/60   78 20 98 %    11/13/21 0145    80 20 98 %     11.11.21  No nausea and no lost of appetite  Cough same and old  Used 2L NC  abd soft    11/12  Intubated over night for resp distress - on 45 fio2 and 8 peep sedated  CXr shows R effusion and infiltrate and CRp a little higher    11/13/21:    Afebrile  VS stable    Patient continues on mechanical ventilation with 45% FiO2 peep 6  Blood cultures show no growth yet    Actemra administered 11/12  PICC placed 11/12    CT chest 11/12 Impression   Intervally developed perihilar and bibasilar ground-glass and dense airspace   consolidations, concerning for infection including atypical entities such as   COVID pneumonia.       Similar small bilateral pleural effusions         Summary of relevant labs: 11/13/2021    Labs:  Creat 0.76-->0.87   WBC  9.1-->10.6  CRP 31-50  Ferritin  452      Micro;  covid rapid neg 11/3  covid rapid + 11/11    Imaging:  CXR 11/12      CXR 11/9  Stable volume loss in the left chest with left basilar atelectasis or pneumonia. Possible small left effusion.      CXR 11/3  Persistent sizable left effusion with opacity at the left lung base and pulmonary vascular congestion. I have personally reviewed the past medical history, past surgical history, medications, social history, and family history, and I haveupdated the database accordingly. Allergies:   Latex, Betadine [povidone iodine], Bee venom, Dilaudid [hydromorphone hcl], Adhesive tape, and Sulfa antibiotics     Review of Systems:     Review of Systems   Unable to perform ROS: Intubated       Physical Examination :       Physical Exam  Constitutional:       General: She is not in acute distress. Appearance: She is ill-appearing. HENT:      Head: Atraumatic. Nose: Nose normal.      Mouth/Throat:      Mouth: Mucous membranes are moist.   Eyes:      General: No scleral icterus. Conjunctiva/sclera: Conjunctivae normal.   Cardiovascular:      Rate and Rhythm: Normal rate. Rhythm irregular. Heart sounds: Normal heart sounds. No murmur heard. Abdominal:      General: There is no distension. Palpations: Abdomen is soft. Tenderness: There is no abdominal tenderness. Genitourinary:     Comments: No goldstein  Musculoskeletal:         General: No swelling or deformity. Cervical back: Neck supple. No rigidity. Skin:     General: Skin is dry. Coloration: Skin is not jaundiced.    Neurological:      Comments: intubated   Psychiatric:      Comments: Intubated          Past Medical History:     Past Medical History:   Diagnosis Date    Asthma     COPD [facility]chronic obstructive pulmonary disease) ([facility] [address]     Gout     Hyperlipidemia     Hypertension     Mitral insufficiency     Pneumonia        Past Surgical  History:     Past Surgical History:   Procedure Laterality Date    ANKLE SURGERY Left 03/22/2016    ORIF    BRONCHOSCOPY N/A 10/20/2021    BRONCHOSCOPY ALVEOLAR LAVAGE performed by Samantha Perry MD at [address] [place] [address]  OR COLSC FLX W/RMVL OF TUMOR POLYP LESION SNARE TQ N/A 8/8/2017    COLONOSCOPY POLYPECTOMY SNARE/COLD BIOPSY performed by Narayan Lopez MD at [facility]    TONSILLECTOMY AND ADENOIDECTOMY         Medications:      methylPREDNISolone  40 mg IntraVENous Q8H    [Held by provider] bumetanide  1 mg IntraVENous BID    ipratropium-albuterol  1 ampule Inhalation 4x daily    lidocaine 1 % injection  5 mL IntraDERmal Once    sodium chloride flush  5-40 mL IntraVENous 2 times per day    [Held by provider] metoprolol tartrate  100 mg Oral BID    digoxin  125 mcg Oral Daily    bumetanide  1 mg Oral BID    apixaban  5 mg Oral BID    vitamin C  500 mg Oral Daily    atorvastatin  40 mg Oral Daily    citalopram  20 mg Oral Daily    clopidogrel  75 mg Oral Daily       Social History:     Social History     Socioeconomic History    Marital status:      Spouse name: Not on file    Number of children: Not on file    Years of education: Not on file    Highest education level: Not on file   Occupational History    Not on file   Tobacco Use    Smoking status: Former Smoker    Smokeless tobacco: Never Used    Tobacco comment: over 30 years ago    Vaping Use    Vaping Use: Never used   Substance and Sexual Activity    Alcohol use: Yes     Alcohol/week: 0.0 standard drinks     Comment: occasionally     Drug use: No    Sexual activity: Not on file   Other Topics Concern    Not on file   Social History Narrative    Not on file     Social Determinants of Health     Financial Resource Strain:     Difficulty of Paying Living Expenses: Not on file   Food Insecurity:     Worried About Running Out of Food in the Last Year: Not on file    Geronimo of Food in the Last Year: Not on file   Transportation Needs:     Lack of Transportation (Medical): Not on file    Lack of Transportation (Non-Medical):  Not on file   Physical Activity:     Days of Exercise per Week: Not on file    Minutes of Exercise per Session: Not on file   Stress:     Feeling of Stress : Not on file   Social Connections:     Frequency of Communication with Friends and Family: Not on file    Frequency of Social Gatherings with Friends and Family: Not on file    Attends Latter day Services: Not on file    Active Member of [address] [place] [address] [employer] or Organizations: Not on file    Attends Club or Organization Meetings: Not on file    Marital Status: Not on file   Intimate Partner Violence:     Fear of Current or Ex-Partner: Not on file    Emotionally Abused: Not on file    Physically Abused: Not on file    Sexually Abused: Not on file   Housing Stability:     Unable to Pay for Housing in the Last Year: Not on file    Number of [place] in the Last Year: Not on file    Unstable Housing in the Last Year: Not on file       Family History:     Family History   Problem Relation Age of Onset    High Blood Pressure Mother     Cancer Mother     Asthma Father     Heart Disease Father     Cancer Father       Medical Decision Making:   I have independently reviewed/ordered the following labs:    CBC with Differential:   Recent Labs     11/12/21  0405 11/13/21  0444   WBC 14.3* 10.6   HGB 12.3 11.6*   HCT 37.8 35.0*    303   LYMPHOPCT 6* 9*   MONOPCT 7 4     BMP:  Recent Labs     11/12/21  0405 11/13/21  0444    132*   K 4.6 3.6*   CL 91* 91*   CO2 31 27   BUN 18 29*   CREATININE 0.87 0.87     Hepatic Function Panel:   Recent Labs     11/13/21  0444   LABALBU 3.0*     No results for input(s): RPR in the last 72 hours. No results for input(s): HIV in the last 72 hours. No results for input(s): BC in the last 72 hours. Lab Results   Component Value Date    CREATININE 0.87 11/13/2021    GLUCOSE 155 11/13/2021    GLUCOSE 97 05/09/2016       Detailed results: Thank you for allowing us to participate in the care of this patient. Please call with questions.     This note is created with the assistance of a speech recognition program.  While intending to generate adocument that actually reflects the content of the visit, the document can still have some errors including those of syntax and sound a like substitutions which may escape proof reading. It such instances, actual meaningcan be extrapolated by contextual diversion. Gisselle Workman, APRN - CNP     ATTESTATION:    I have discussed the case, including pertinent history and exam findings with the APRN. I have evaluated the  History, physical findings and pictures of the patient and the key elements of the encounter have been performed by me. I have reviewed the laboratory data, other diagnostic studies and discussed them with the APRN. I have updated the medical record where necessary. I agree with the assessment, plan and orders as documented by the APRN.     Rocío Mendoza MD.      Office: [phone]  Perfect serve / office [phone]

## 2021-11-13 NOTE — PLAN OF CARE
Problem: Falls - Risk of:  Goal: Will remain free from falls  Description: Will remain free from falls  11/13/2021 0323 by Juan Frank RN  Outcome: Ongoing     Problem: Falls - Risk of:  Goal: Absence of physical injury  Description: Absence of physical injury  11/13/2021 0323 by Juan Frank RN  Outcome: Ongoing     Problem: Skin Integrity:  Goal: Will show no infection signs and symptoms  Description: Will show no infection signs and symptoms  11/13/2021 0323 by Juan Frank RN  Outcome: Ongoing     Problem: Skin Integrity:  Goal: Absence of new skin breakdown  Description: Absence of new skin breakdown  11/13/2021 0323 by Juan Frank RN  Outcome: Ongoing     Problem: OXYGENATION/RESPIRATORY FUNCTION  Goal: Patient will maintain patent airway  11/13/2021 0323 by Juan Frank RN  Outcome: Ongoing     Problem: OXYGENATION/RESPIRATORY FUNCTION  Goal: Patient will achieve/maintain normal respiratory rate/effort  Description: Respiratory rate and effort will be within normal limits for the patient  11/13/2021 0323 by Juan Frank RN  Outcome: Ongoing     Problem: HEMODYNAMIC STATUS  Goal: Patient has stable vital signs and fluid balance  11/13/2021 0323 by Juan Frank RN  Outcome: Ongoing     Problem: FLUID AND ELECTROLYTE IMBALANCE  Goal: Fluid and electrolyte balance are achieved/maintained  11/13/2021 0323 by Juan Frank RN  Outcome: Ongoing     Problem: ACTIVITY INTOLERANCE/IMPAIRED MOBILITY  Goal: Mobility/activity is maintained at optimum level for patient  11/13/2021 0323 by Juan Frank RN  Outcome: Ongoing     Problem: Nutrition  Goal: Optimal nutrition therapy  11/13/2021 0323 by Juan Frank RN  Outcome: Ongoing     Problem: Pain:  Goal: Pain level will decrease  Description: Pain level will decrease  11/13/2021 0323 by Juan Frank RN  Outcome: Ongoing     Problem: Pain:  Goal: Control of acute pain  Description: Control of acute pain  11/13/2021 2467 by Yaima Wilson RN  Outcome: Ongoing     Problem: Pain:  Goal: Control of chronic pain  Description: Control of chronic pain  11/13/2021 0323 by Yaima Wilson RN  Outcome: Ongoing     Problem: Airway Clearance - Ineffective  Goal: Achieve or maintain patent airway  11/13/2021 0323 by Yaima Wilson RN  Outcome: Ongoing     Problem: Gas Exchange - Impaired  Goal: Absence of hypoxia  11/13/2021 0323 by Yaima Wilson RN  Outcome: Ongoing     Problem: Gas Exchange - Impaired  Goal: Promote optimal lung function  11/13/2021 0323 by Yaima Wilson RN  Outcome: Ongoing     Problem: Breathing Pattern - Ineffective  Goal: Ability to achieve and maintain a regular respiratory rate  11/13/2021 0323 by Yaima Wilson RN  Outcome: Ongoing     Problem: Body Temperature -  Risk of, Imbalanced  Goal: Ability to maintain a body temperature within defined limits  11/13/2021 0323 by Yaima Wilson RN  Outcome: Ongoing     Problem: Body Temperature -  Risk of, Imbalanced  Goal: Will regain or maintain usual level of consciousness  11/13/2021 0323 by Yaima Wilson RN  Outcome: Ongoing     Problem:  Body Temperature -  Risk of, Imbalanced  Goal: Complications related to the disease process, condition or treatment will be avoided or minimized  11/13/2021 0323 by Yaima Wilson RN  Outcome: Ongoing     Problem: Isolation Precautions - Risk of Spread of Infection  Goal: Prevent transmission of infection  11/13/2021 0323 by Yaima Wilson RN  Outcome: Ongoing     Problem: Nutrition Deficits  Goal: Optimize nutritional status  11/13/2021 0323 by Yaima Wilson RN  Outcome: Ongoing     Problem: Risk for Fluid Volume Deficit  Goal: Maintain normal heart rhythm  11/13/2021 0323 by Yaima Wilson RN  Outcome: Ongoing     Problem: Risk for Fluid Volume Deficit  Goal: Maintain absence of muscle cramping  11/13/2021 0323 by Yaima Wilson RN  Outcome: Ongoing     Problem: Risk for Fluid Volume Deficit  Goal: Maintain normal serum potassium, sodium, calcium, phosphorus, and pH  11/13/2021 0323 by Arron Rico RN  Outcome: Ongoing     Problem: Loneliness or Risk for Loneliness  Goal: Demonstrate positive use of time alone when socialization is not possible  11/13/2021 0323 by Arron Rico RN  Outcome: Ongoing     Problem: Fatigue  Goal: Verbalize increase energy and improved vitality  Outcome: Ongoing     Problem: Patient Education: Go to Patient Education Activity  Goal: Patient/Family Education  11/13/2021 0323 by Arron Rico RN  Outcome: Ongoing     Problem: MECHANICAL VENTILATION  Goal: Patient will maintain patent airway  11/13/2021 0323 by Arron Rico RN  Outcome: Ongoing     Problem: MECHANICAL VENTILATION  Goal: Mobility/activity is maintained at optimum level for patient  11/13/2021 0323 by Arron Rico RN  Outcome: Ongoing     Problem: MECHANICAL VENTILATION  Goal: Oral health is maintained or improved  11/13/2021 0323 by Arron Rico RN  Outcome: Ongoing     Problem: MECHANICAL VENTILATION  Goal: ET tube will be managed safely  11/13/2021 0323 by Arron Rico RN  Outcome: Ongoing     Problem: MECHANICAL VENTILATION  Goal: Ability to express needs and understand communication  11/13/2021 0323 by Arron Rico RN  Outcome: Ongoing     Problem: Non-Violent Restraints  Goal: Removal from restraints as soon as assessed to be safe  11/13/2021 0323 by Arron Rico RN  Outcome: Ongoing     Problem: Non-Violent Restraints  Goal: No harm/injury to patient while restraints in use  11/13/2021 0323 by Arron Rico RN  Outcome: Ongoing     Problem: Non-Violent Restraints  Goal: Patient's dignity will be maintained  11/13/2021 0323 by Arron Rico RN  Outcome: Ongoing

## 2021-11-13 NOTE — PROGRESS NOTES
[facility]   Progress Note                   Date:   11/13/2021  Patient name: Shen Ortiz  Date of admission:  11/3/2021  4:32 PM  MRN:   [id number]  YOB: 1945  PCP: KALYN Doyle CNP    Reason for Admission: CASEY (acute kidney injury) ([facility] [address] [N17.9]  Acute on chronic congestive heart failure, unspecified heart failure type ([facility] [address] [I50.9]    Subjective:       Clinical Changes / Abnormalities: Pt intubated. On amiodarone gtt. Vitals, medications and labs reviewed. Medications:   Scheduled Meds:   methylPREDNISolone  40 mg IntraVENous Q8H    [Held by provider] bumetanide  1 mg IntraVENous BID    ipratropium-albuterol  1 ampule Inhalation 4x daily    lidocaine 1 % injection  5 mL IntraDERmal Once    sodium chloride flush  5-40 mL IntraVENous 2 times per day    [Held by provider] metoprolol tartrate  100 mg Oral BID    digoxin  125 mcg Oral Daily    bumetanide  1 mg Oral BID    apixaban  5 mg Oral BID    vitamin C  500 mg Oral Daily    atorvastatin  40 mg Oral Daily    citalopram  20 mg Oral Daily    clopidogrel  75 mg Oral Daily     Continuous Infusions:   propofol 20 mcg/kg/min (11/13/21 0923)    norepinephrine      sodium chloride      amiodarone 0.5 mg/min (11/12/21 2044)     CBC:   Recent Labs     11/11/21  1157 11/12/21  0405 11/13/21  0444   WBC 12.1* 14.3* 10.6   HGB 11.8* 12.3 11.6*    320 303     BMP:    Recent Labs     11/11/21  1157 11/12/21  0405 11/13/21  0444    135 132*   K 3.9 4.6 3.6*   CL 91* 91* 91*   CO2 30 31 27   BUN 20 18 29*   CREATININE 0.88 0.87 0.87   GLUCOSE 125* 116* 155*     Hepatic: No results for input(s): AST, ALT, ALB, BILITOT, ALKPHOS in the last 72 hours. Troponin:   No results for input(s): TROPHS in the last 72 hours. BNP: No results for input(s): BNP in the last 72 hours. Lipids: No results for input(s): CHOL, HDL in the last 72 hours.     Invalid input(s): LDLCALCU  INR: No results for input(s): INR in the last 72 hours. Objective:   Vitals: /63   Pulse 77   Temp 97.9 °F (36.6 °C) (Bladder)   Resp 20   Ht 5' 4\" (1.626 m)   Wt 238 lb 11.2 oz (108.3 kg)   SpO2 99%   BMI 40.97 kg/m²      For careful stewardship of limited PPE during COVID-19 pandemic my physical exam was deferred. For physical exam, please see today's physical from primary team or ICU team.       Assessment / Acute Cardiac Problems:     AF with RVR- recurrent  Resp failure  COPD  COVID 19  Borderline hypotension    Patient Active Problem List:     Asthma     Benign essential HTN     Morbid obesity with BMI of 40.0-44.9, adult ([address]     Open bimalleolar fracture of left ankle     Hyperlipidemia     COPD exacerbation (HCC)     Pulmonary emphysema (HCC)     Postoperative confusion     Acute on chronic respiratory failure with hypoxia (HCC)     Simple chronic bronchitis (HCC)     Atrial fibrillation with rapid ventricular response (HCC)     Pneumonia of left lower lobe due to infectious organism     Sepsis with acute hypoxic respiratory failure without septic shock (HCC)     Acute on chronic diastolic heart failure (HCC)     Hyponatremia     Hypokalemia     Elevated troponin     Anemia     Arthritis     At high risk for falls     Colon polyps     Heart murmur     Legally blind in right eye, as defined in [place]     Chronic obstructive pulmonary disease with acute exacerbation (HCC)     Persistent atrial fibrillation (HCC)     Pleural effusion     Recurrent pleural effusion     Mucus plugging of bronchi     Atelectasis of left lung     Acute chest pain     Coag negative Staphylococcus bacteremia     Abnormal pleural fluid     CASEY (acute kidney injury) ([address]     Acute on chronic congestive heart failure (HCC)     Atrial flutter with rapid ventricular response ([address]     COVID-19 virus infection     Acute respiratory failure with hypoxemia (HCC)     Respiratory alkalosis     Shock ([address]      Plan of Treatment:   1. Afib/fluter. Continue amiodarone gtt. On dig. BB on hold. Rate stable. On eliquis    2. HTN. Medications on hold due to hypotension/shock. Levo as needed for BP support   3.  Will likely need Ablation as oupt once acute issues resolve    Electronically signed by KALYN Preston CNP on 11/13/2021 at 10:28 [id number] [facility].  [phone]

## 2021-11-13 NOTE — PLAN OF CARE
Problem: OXYGENATION/RESPIRATORY FUNCTION  Goal: Patient will maintain patent airway  11/12/2021 2107 by Graham Oconnor RCP  Outcome: Ongoing     Problem: OXYGENATION/RESPIRATORY FUNCTION  Goal: Patient will achieve/maintain normal respiratory rate/effort  Description: Respiratory rate and effort will be within normal limits for the patient  11/12/2021 2107 by Graham Oconnor RCP  Outcome: Ongoing     Problem: MECHANICAL VENTILATION  Goal: Patient will maintain patent airway  11/12/2021 2107 by Graham Oconnor RCP  Outcome: Ongoing     Problem: MECHANICAL VENTILATION  Goal: Oral health is maintained or improved  11/12/2021 2107 by Graham Oconnor RCP  Outcome: Ongoing     Problem: MECHANICAL VENTILATION  Goal: ET tube will be managed safely  11/12/2021 2107 by Graham Oconnor RCP  Outcome: Ongoing     Problem: MECHANICAL VENTILATION  Goal: Ability to express needs and understand communication  11/12/2021 2107 by Graham Oconnor RCP  Outcome: Ongoing

## 2021-11-13 NOTE — PROGRESS NOTES
PULMONARY & CRITICAL CARE MEDICINE PROGRESS NOTE     Patient:  Laura Whitman  MRN: [id number]  Admit date: 11/3/2021  Primary Care Physician: KALYN Barney CNP  Consulting Physician: Alisia Colin DO  CODE Status: Full Code  LOS: 10     SUBJECTIVE     CHIEF COMPLAINT/REASON FOR INITIAL CONSULT: Acute respiratory failure/COVID-19 pneumonia    BRIEF HOSPITAL COURSE:   The patient is a 68 y.o. female here for evaluation of shortness of breath with clear sputum, increased leg swelling.  Patient has chronic shortness of breath which has been worsening over the last couple of days along with increased swelling of her lower extremity.  She has conversational dyspnea, no wheezing.  Chest x-ray shows left pleural effusion and left atelectasis she has had multiple thoracentesis.   I performed a bronchoscopic evaluation on due to persistent left lower lobe atelectasis and she had extensive mucus plugging on the left side    INTERVAL HISTORY:  11/13/21  Patient had worsening respiratory status and was intubated and started on mechanical ventilation  She tested positive for COVID-19 on 11/11  CT shows new groundglass infiltrates on the right side  Sedated with propofol  Hemodynamically stable  On PRVC 380/20/5/45%  She is on amiodarone infusion  Urine culture from 11/5 are positive for Pseudomonas (pansensitive)  She had been on Cipro, white count 10.6    REVIEW OF SYSTEMS:  Unobtainable from patient due to sedation/mechanical ventilation    OBJECTIVE     VENTILATOR SETTINGS:  Vent Information  $Ventilation: $Subsequent Day  Skin Assessment: Clean, dry, & intact  Suction Catheter Diameter: 14  Equipment Changed: HME  Vent Type: Servo i  Vent Mode: PRVC  Vt Ordered: 430 mL  Rate Set: 20 bmp  FiO2 : 45 %  SpO2: 98 %  SpO2/FiO2 ratio: 220  Sensitivity: 5  PEEP/CPAP: 6  I Time/ I Time %: 0.7 s  Humidification Source: HME  Nitric Oxide/Epoprostenol In Use?: No  Mask Type: Full face mask  Mask Size: Medium PaO2/FiO2 RATIO:  Recent Labs     21  0253   POCPO2 100.4      FiO2 : 45 %     VITAL SIGNS:   LAST:  BP (!) 144/81   Pulse 106   Temp 99 °F (37.2 °C) (Bladder)   Resp 25   Ht 5' 4\" (1.626 m)   Wt 238 lb 11.2 oz (108.3 kg)   SpO2 98%   BMI 40.97 kg/m²   8-24 HR RANGE:  TEMP Temp  Av.6 °F (37 °C)  Min: 97.9 °F (36.6 °C)  Max: 99.3 °F (76.7 °C)   BP Systolic (61QYJ), GOU:336 , Min:113 , QFU:457      Diastolic (49BML), CURTIS:92, Min:60, Max:108     PULSE Pulse  Av.9  Min: 68  Max: 123   RR Resp  Av.1  Min: 20  Max: 26   O2 SAT SpO2  Av.8 %  Min: 98 %  Max: 99 %   OXYGEN DELIVERY No data recorded        SYSTEMIC EXAMINATION:   General appearance - Mechanically ventilated, ill-appearing  Mental status - sedated  Eyes - pupils equal and reactive, sclera anicteric  Mouth - mucous membranes moist  Neck - supple, no significant adenopathy, carotids upstroke normal bilaterally, no bruits  Chest - Breath sounds bilaterally were dimnished to auscultation at bases. There were no wheezes, rhonchi or rales.    Heart - normal rate, regular rhythm, normal S1, S2, no murmurs, rubs, clicks or gallops  Abdomen - soft, nontender, nondistended, no masses or organomegaly  Neurological - DTR's normal and symmetric, motor and sensory grossly normal bilaterally  Extremities - peripheral pulses normal, 1+ pedal edema, no clubbing or cyanosis  Skin - normal coloration and turgor, no rashes, no suspicious skin lesions noted     DATA REVIEW     Medications:  Scheduled Meds:   metoprolol tartrate  25 mg Oral BID    methylPREDNISolone  40 mg IntraVENous Q8H    [Held by provider] bumetanide  1 mg IntraVENous BID    ipratropium-albuterol  1 ampule Inhalation 4x daily    lidocaine 1 % injection  5 mL IntraDERmal Once    sodium chloride flush  5-40 mL IntraVENous 2 times per day    digoxin  125 mcg Oral Daily    bumetanide  1 mg Oral BID    apixaban  5 mg Oral BID    vitamin C  500 mg Oral Daily    atorvastatin  40 mg Oral Daily    citalopram  20 mg Oral Daily    clopidogrel  75 mg Oral Daily     Continuous Infusions:   propofol 30 mcg/kg/min (11/13/21 1420)    norepinephrine      sodium chloride      amiodarone 0.5 mg/min (11/13/21 1420)       INPUT/OUTPUT:  In: 937 [I.V.:827; NG/GT:110]  Out: 1191 [Urine:1141]  Date 11/13/21 0000 - 11/13/21 2359   Shift 6574-2698 6336-9358 6533-5848 24 Hour Total   INTAKE   I.V.(mL/kg) 370(3.4) 223.8(2.1)  593.8(5.5)   NG/GT(mL/kg)  110(1)  110(1)   Shift Total(mL/kg) 370(3.4) 333. 8(3.1)  703. 8(6.5)   OUTPUT   Urine(mL/kg/hr) 550(0.6) 340  890   Emesis/NG output(mL/kg)  50(0.5)  50(0.5)   Shift Total(mL/kg) 550(5.1) 390(3.6)  940(8.7)   Weight (kg) 108. 3 108. 3 108. 3 108.3        LABS:  ABGs:   Recent Labs     11/12/21  0048 11/12/21  0401 11/12/21  1159 11/13/21  0253   POCPH 7.489* 7.511* 7.616* 7.542*   POCPCO2 47.6 46.3 30.5* 37.9   POCPO2 76.1* 75.9* 71.1* 100.4   POCHCO3 36.2* 37.0* 31.1* 32.6*   PRVE3ROX 96 96 97 98     CBC:   Recent Labs     11/11/21  1157 11/12/21  0405 11/13/21  0444   WBC 12.1* 14.3* 10.6   HGB 11.8* 12.3 11.6*   HCT 37.5 37.8 35.0*   MCV 95.2 93.8 92.1    320 303   LYMPHOPCT 8* 6* 9*   RBC 3.94* 4.03 3.80*   MCH 29.9 30.5 30.5   MCHC 31.5 32.5 33.1   RDW 12.7 13.0 12.5     CRP:   Recent Labs     11/11/21  1157 11/12/21  0405   CRP 31.2* 50.2*     LDH:   Recent Labs     11/11/21  1157        BMP:   Recent Labs     11/11/21  1157 11/12/21  0405 11/13/21  0444    135 132*   K 3.9 4.6 3.6*   CL 91* 91* 91*   CO2 30 31 27   BUN 20 18 29*   CREATININE 0.88 0.87 0.87   GLUCOSE 125* 116* 155*   PHOS  --   --  4.2     Liver Function Test:   Recent Labs     11/13/21  0444   LABALBU 3.0*     Coagulation Profile:   No results for input(s): INR, PROTIME, APTT in the last 72 hours. D-Dimer:  No results for input(s): DDIMER in the last 72 hours.   Ferritin:    Recent Labs     11/11/21  1157   FERRITIN 452*     Lactic Acid:  No results for input(s): LACTA in the last 72 hours. Cardiac Enzymes:  Recent Labs     11/13/21  0444   CKTOTAL 13*     BNP/ProBNP:   No results for input(s): BNP, PROBNP in the last 72 hours. Triglycerides:  Recent Labs     11/13/21  0444   TRIG 215*        Microbiology:  Urine Culture:  No components found for: CURINE  Blood Culture:  No components found for: CBLOOD, CFUNGUSBL  Sputum Culture:  No components found for: CSPUTUM  Recent Labs     11/12/21  1216   SPECDESC . BLOOD  . BLOOD   SPECIAL LH 20 ML  R H 6 ML   CULTURE NO GROWTH 23 HOURS  NO GROWTH 23 HOURS     Recent Labs     11/11/21  1000 11/12/21  1216   SPECDESC . NASOPHARYNGEAL SWAB . BLOOD  . BLOOD   SPECIAL  --  LH 20 ML  R H 6 ML   CULTURE  --  NO GROWTH 23 HOURS  NO GROWTH 23 HOURS        Pathology:    Radiology Reports:  CT CHEST WO CONTRAST   Final Result   Intervally developed perihilar and bibasilar ground-glass and dense airspace   consolidations, concerning for infection including atypical entities such as   COVID pneumonia. Similar small bilateral pleural effusions. XR CHEST PORTABLE   Final Result   1. Endotracheal and esophageal catheters placed as described   2. More conspicuous lower to mid level opacification consistent with pleural   effusions and at least atelectasis         XR CHEST (SINGLE VIEW FRONTAL)   Final Result   Small bilateral pleural effusions. Slightly increased airspace opacity in   the right mid to lower lung, which may reflect pneumonia in the appropriate   clinical setting. XR CHEST (SINGLE VIEW FRONTAL)   Final Result   Stable volume loss in the left chest with left basilar atelectasis or   pneumonia. Possible small left effusion. XR CHEST (SINGLE VIEW FRONTAL)   Final Result   Interval improved aeration of the left lung base. XR CHEST (SINGLE VIEW FRONTAL)   Final Result   Interval decrease in left effusion. No pneumothorax. Persistent opacity at   the left lung base.   Vascularity is slightly prominent. US THORACENTESIS Which side should the procedure be performed? Left   Final Result   Successful ultrasound guided thoracentesis. US RETROPERITONEAL COMPLETE   Final Result   1. Patient had no urge to void during the exam.  Nonvisualization of ureteral   jets. 2. Incidental cholelithiasis. 3. Unremarkable renal ultrasound. No hydronephrosis. XR CHEST PORTABLE   Final Result   No change from prior study. Persistent sizable left effusion with opacity at   the left lung base and pulmonary vascular congestion. Echocardiogram:   Results for orders placed during the hospital encounter of 10/02/21    ECHO Complete 2D W Doppler W Color    Summary  Left ventricle is normal in size with normal systolic function globally. Calculated ejection fraction is 59% ( Atrial-Fib). Mild mitral regurgitation. Mild tricuspid regurgitation. Estimated right ventricular systolic pressure is 35 mmHg. Anterior clear space noted, most likely adipose tissue vs small loculated  pericardial effusion. No evidence of tamponade. Pleural effusion is seen. ASSESSMENT AND PLAN     Assessment:    // Acute hypoxic respiratory failure, requiring nasal mechanical ventilation  // Bilateral lower lobe atelectasis (left greater than right)  // COVID-19 pneumonia  // Sepsis  // A. fib with RVR  // Acute kidney injury, improved  // Chronic obstructive pulmonary disease  // UTI, secondary to Pseudomonas  // CHF  // Essential hypertension  // Morbid obesity    Plan:    I personally interviewed/examined the patient; reviewed interval history, interpreted all available radiographic and laboratory data at the time of service.      Patient currently sedated with propofol   Patient remains hemodynamically stable, continues on amiodarone infusion   Continue lung protective mechanical ventilation as per ARDS protocol   Wean FiO2/PEEP as tolerated   Monitor endotracheal secretions    Obtain X-ray chest as needed    Continue pulmonary toilet, aspiration precautions and bronchodilators   Continue to monitor I/O with a goal of even/negative fluid balance   Tolerating tube feeds   Stress ulcer prophylaxis   Continue to monitor CBC, coagulation profile, transfuse as indicated   Chemical DVT prophylaxis not needed, patient on Eliquis   Antimicrobials reviewed; continue Cipro as per ID recommendations   Monitor CRP, LDH, AST/ALT, D-Dimer, Ferritin    Glycemic control appropriate   Currently on Solu-Medrol   Skin/wound care reviewed with the nursing staff   Physical/occupational therapy    Critical care time of 35 minutes was spent (excluding procedures), in coordination of care during bedside rounds and discussion of patient care in detail, and recommendations of the team were adopted in the plan. Necessity of all invasive devices was also confirmed. Matias Lockwood MD  Pulmonary and Critical Care Medicine           11/13/2021, 2:55 PM    This patient was evaluated in the context of the global SARS-CoV-2 (COVID-19) pandemic, which necessitated considerations that the patient either has COVID-19 infection or is at risk of infection with COVID-19. Institutional protocols and algorithms that pertain to the evaluation & management of patients with COVID-19 or those at risk for COVID-19 are in a state of rapid changes based on information released by regulatory bodies including the CDC and federal and state organizations. These policies and algorithms were followed during the patient's care. Please note that this chart was generated using voice recognition Dragon dictation software. Although every effort was made to ensure the accuracy of this automated transcription, some errors in transcription may have occurred.

## 2021-11-13 NOTE — PLAN OF CARE
Problem: Falls - Risk of:  Goal: Absence of physical injury  Description: Absence of physical injury  11/12/2021 2001 by Valentín Harmon RN  Outcome: Met This Shift  11/12/2021 0711 by Isaac Pruitt RN  Outcome: Ongoing     Problem: Skin Integrity:  Goal: Absence of new skin breakdown  Description: Absence of new skin breakdown  11/12/2021 2001 by Valentín Harmon RN  Outcome: Met This Shift  11/12/2021 0711 by Isaac Pruitt RN  Outcome: Ongoing     Problem: OXYGENATION/RESPIRATORY FUNCTION  Goal: Patient will maintain patent airway  11/12/2021 2001 by Valentín Harmon RN  Outcome: Met This Shift  11/12/2021 0711 by Isaac Pruitt RN  Outcome: Ongoing  Goal: Patient will achieve/maintain normal respiratory rate/effort  Description: Respiratory rate and effort will be within normal limits for the patient  11/12/2021 2001 by Valentín Harmon RN  Outcome: Met This Shift  11/12/2021 0711 by Isaac Pruitt RN  Outcome: Ongoing     Problem: HEMODYNAMIC STATUS  Goal: Patient has stable vital signs and fluid balance  11/12/2021 2001 by Valentín Harmon RN  Outcome: Met This Shift  11/12/2021 0711 by Isaac Pruitt RN  Outcome: Ongoing     Problem: FLUID AND ELECTROLYTE IMBALANCE  Goal: Fluid and electrolyte balance are achieved/maintained  11/12/2021 2001 by Valentín Harmon RN  Outcome: Met This Shift  11/12/2021 0711 by Isaac Pruitt RN  Outcome: Ongoing     Problem: Airway Clearance - Ineffective  Goal: Achieve or maintain patent airway  11/12/2021 2001 by Valentín Harmon RN  Outcome: Met This Shift  11/12/2021 0711 by Isaac Pruitt RN  Outcome: Ongoing     Problem: Patient Education: Go to Patient Education Activity  Goal: Patient/Family Education  11/12/2021 2001 by Valentín Harmon RN  Outcome: Met This Shift  11/12/2021 0711 by Isaac Pruitt RN  Outcome: Ongoing     Problem: MECHANICAL VENTILATION  Goal: Patient will maintain patent airway  11/12/2021 2001 by Valentín Harmon RN  Outcome:  Met This Shift  11/12/2021 0711 by Merissa Dixon RN  Outcome: Ongoing     Problem: Non-Violent Restraints  Goal: No harm/injury to patient while restraints in use  11/12/2021 2001 by Lashanda Alarcon RN  Outcome: Met This Shift  11/12/2021 0711 by Merissa Dixon RN  Outcome: Ongoing  Goal: Patient's dignity will be maintained  11/12/2021 2001 by Lashanda Alarcon RN  Outcome: Met This Shift  11/12/2021 0711 by Merissa Dixon RN  Outcome: Ongoing     Problem: Falls - Risk of:  Goal: Will remain free from falls  Description: Will remain free from falls  11/12/2021 2001 by Lashanda Alarcon RN  Outcome: Ongoing  11/12/2021 0711 by eMrissa Dixon RN  Outcome: Ongoing     Problem: Skin Integrity:  Goal: Will show no infection signs and symptoms  Description: Will show no infection signs and symptoms  11/12/2021 2001 by Lashanda Alarcon RN  Outcome: Ongoing  11/12/2021 0711 by Merissa Dixon RN  Outcome: Ongoing     Problem: ACTIVITY INTOLERANCE/IMPAIRED MOBILITY  Goal: Mobility/activity is maintained at optimum level for patient  11/12/2021 2001 by Lashanda Alarcon RN  Outcome: Ongoing  11/12/2021 0711 by Merissa Dixon RN  Outcome: Ongoing     Problem: Pain:  Goal: Pain level will decrease  Description: Pain level will decrease  11/12/2021 2001 by Lashanda Alarcon RN  Outcome: Ongoing  11/12/2021 0711 by Merissa Dixon RN  Outcome: Ongoing  Goal: Control of acute pain  Description: Control of acute pain  11/12/2021 2001 by Lashanda Alarcon RN  Outcome: Ongoing  11/12/2021 0711 by Merissa Dixon RN  Outcome: Ongoing  Goal: Control of chronic pain  Description: Control of chronic pain  11/12/2021 2001 by Lashanda Alarcon RN  Outcome: Ongoing  11/12/2021 0711 by Merissa Dixon RN  Outcome: Ongoing     Problem: Gas Exchange - Impaired  Goal: Absence of hypoxia  11/12/2021 2001 by Lashanda Alarcon RN  Outcome: Ongoing  11/12/2021 0711 by Merissa Dixon RN  Outcome: Ongoing  Goal: Promote optimal lung function  11/12/2021 2001 by Yuliya Phillips RN  Outcome: Ongoing  11/12/2021 0711 by Raimundo Carpenter RN  Outcome: Ongoing     Problem: Breathing Pattern - Ineffective  Goal: Ability to achieve and maintain a regular respiratory rate  11/12/2021 2001 by Yuliya Phillips RN  Outcome: Ongoing  11/12/2021 0711 by Raimundo Carpenter RN  Outcome: Ongoing     Problem:  Body Temperature -  Risk of, Imbalanced  Goal: Ability to maintain a body temperature within defined limits  11/12/2021 2001 by Yuliya Phillips RN  Outcome: Ongoing  11/12/2021 0711 by Raimundo Carpenter RN  Outcome: Ongoing  Goal: Will regain or maintain usual level of consciousness  11/12/2021 2001 by Yuliya Phillips RN  Outcome: Ongoing  11/12/2021 0711 by Raimundo Carpenter RN  Outcome: Ongoing  Goal: Complications related to the disease process, condition or treatment will be avoided or minimized  11/12/2021 2001 by Yuliya Phillips RN  Outcome: Ongoing  11/12/2021 0711 by Raimundo Carpenter RN  Outcome: Ongoing     Problem: Isolation Precautions - Risk of Spread of Infection  Goal: Prevent transmission of infection  11/12/2021 2001 by Yuliya Phillips RN  Outcome: Ongoing  11/12/2021 0711 by Raimundo Carpenter RN  Outcome: Ongoing     Problem: Nutrition Deficits  Goal: Optimize nutritional status  11/12/2021 2001 by Yuliya Phillips RN  Outcome: Ongoing  11/12/2021 0711 by Raimundo Carpenter RN  Outcome: Ongoing     Problem: Risk for Fluid Volume Deficit  Goal: Maintain normal heart rhythm  11/12/2021 2001 by Yuliya Phillips RN  Outcome: Ongoing  11/12/2021 0711 by Raimundo Carpenter RN  Outcome: Ongoing  Goal: Maintain absence of muscle cramping  11/12/2021 2001 by Yuliya Phillips RN  Outcome: Ongoing  11/12/2021 0711 by Raimundo Carpenter RN  Outcome: Ongoing  Goal: Maintain normal serum potassium, sodium, calcium, phosphorus, and pH  11/12/2021 2001 by Yuliya Phillips RN  Outcome: Ongoing  11/12/2021 0711 by Raimundo Carpenter RN  Outcome: Ongoing     Problem: Loneliness or Risk for Loneliness  Goal: Demonstrate positive use of time alone when socialization is not possible  11/12/2021 2001 by Nely Haq RN  Outcome: Ongoing  11/12/2021 0711 by Sabrina Hooks RN  Outcome: Ongoing     Problem: Fatigue  Goal: Verbalize increase energy and improved vitality  11/12/2021 0711 by Sabrina Hooks RN  Outcome: Ongoing     Problem: MECHANICAL VENTILATION  Goal: Mobility/activity is maintained at optimum level for patient  11/12/2021 2001 by Nely Haq RN  Outcome: Ongoing  11/12/2021 0711 by Sabrina Hooks RN  Outcome: Ongoing  Goal: Oral health is maintained or improved  11/12/2021 2001 by Nely Haq RN  Outcome: Ongoing  11/12/2021 0711 by Sabrina Hooks RN  Outcome: Ongoing  Goal: ET tube will be managed safely  11/12/2021 2001 by Nely Haq RN  Outcome: Ongoing  11/12/2021 0711 by Sabrina Hooks RN  Outcome: Ongoing  Goal: Ability to express needs and understand communication  11/12/2021 2001 by Nely Haq RN  Outcome: Ongoing  11/12/2021 0711 by Sabrina Hooks RN  Outcome: Ongoing     Problem: Non-Violent Restraints  Goal: Removal from restraints as soon as assessed to be safe  11/12/2021 2001 by Nely Haq RN  Outcome: Ongoing  11/12/2021 0711 by Sabrina Hooks RN  Outcome: Ongoing     Problem: Nutrition  Goal: Optimal nutrition therapy  11/12/2021 2001 by Nely Haq RN  Outcome: Not Met This Shift  11/12/2021 0711 by Sabrina Hooks RN  Outcome: Ongoing     Electronically signed by Nely Haq RN on 11/12/2021 at 8:02 PM

## 2021-11-13 NOTE — PLAN OF CARE
Problem: Falls - Risk of:  Goal: Absence of physical injury  Description: Absence of physical injury  11/13/2021 1608 by Melinda Quiñones RN  Outcome: Met This Shift  11/13/2021 0323 by Lorenzo Lopez RN  Outcome: Ongoing     Problem: Skin Integrity:  Goal: Absence of new skin breakdown  Description: Absence of new skin breakdown  11/13/2021 1608 by Melinda Quiñoens RN  Outcome: Met This Shift  11/13/2021 0323 by Lorenzo Lopez RN  Outcome: Ongoing     Problem: OXYGENATION/RESPIRATORY FUNCTION  Goal: Patient will maintain patent airway  11/13/2021 1608 by Melinda Quiñones RN  Outcome: Met This Shift  11/13/2021 0323 by Lorenzo Lopez RN  Outcome: Ongoing  Goal: Patient will achieve/maintain normal respiratory rate/effort  Description: Respiratory rate and effort will be within normal limits for the patient  11/13/2021 1608 by Melinda Quiñones RN  Outcome: Met This Shift  11/13/2021 0323 by Lorenzo Lopez RN  Outcome: Ongoing     Problem: Airway Clearance - Ineffective  Goal: Achieve or maintain patent airway  11/13/2021 1608 by Melinda Quiñones RN  Outcome: Met This Shift  11/13/2021 0323 by Lorenzo Lopez RN  Outcome: Ongoing     Problem: MECHANICAL VENTILATION  Goal: Patient will maintain patent airway  11/13/2021 1608 by Melinda Quiñones RN  Outcome: Met This Shift  11/13/2021 0323 by Lorenzo Lopez RN  Outcome: Ongoing  Goal: Oral health is maintained or improved  11/13/2021 1608 by Melinda Quiñones RN  Outcome: Met This Shift  11/13/2021 0323 by Lorenzo Lopez RN  Outcome: Ongoing     Problem: Non-Violent Restraints  Goal: No harm/injury to patient while restraints in use  11/13/2021 1608 by Melinda Quiñones RN  Outcome: Met This Shift  11/13/2021 0323 by Lorenzo Lopez RN  Outcome: Ongoing  Goal: Patient's dignity will be maintained  11/13/2021 1608 by Melinda Quiñones RN  Outcome: Met This Shift  11/13/2021 0323 by Lorenzo Lopez RN  Outcome: Ongoing     Problem: Falls - Risk of:  Goal: Will remain free from falls  Description: Will remain free from falls  11/13/2021 1608 by Yuliya Phillips RN  Outcome: Ongoing  11/13/2021 0323 by Raimundo Carpenter RN  Outcome: Ongoing     Problem: Skin Integrity:  Goal: Will show no infection signs and symptoms  Description: Will show no infection signs and symptoms  11/13/2021 1608 by Yuliya Phillips RN  Outcome: Ongoing  11/13/2021 0323 by Raimundo Carpenter RN  Outcome: Ongoing     Problem: HEMODYNAMIC STATUS  Goal: Patient has stable vital signs and fluid balance  11/13/2021 1608 by Yuliya Phillips RN  Outcome: Ongoing  11/13/2021 0323 by Raimundo Carpenter RN  Outcome: Ongoing     Problem: FLUID AND ELECTROLYTE IMBALANCE  Goal: Fluid and electrolyte balance are achieved/maintained  11/13/2021 1608 by Yuliya Phillips RN  Outcome: Ongoing  11/13/2021 0323 by Raimundo Carpenter RN  Outcome: Ongoing     Problem: ACTIVITY INTOLERANCE/IMPAIRED MOBILITY  Goal: Mobility/activity is maintained at optimum level for patient  11/13/2021 1608 by Yuliya Phillips RN  Outcome: Ongoing  11/13/2021 0323 by Raimundo Carpenter RN  Outcome: Ongoing     Problem: Nutrition  Goal: Optimal nutrition therapy  11/13/2021 1608 by Yuliya Phillips RN  Outcome: Ongoing  11/13/2021 0323 by Raimundo Carpenter RN  Outcome: Ongoing     Problem: Pain:  Goal: Pain level will decrease  Description: Pain level will decrease  11/13/2021 1608 by Yuliya Phillips RN  Outcome: Ongoing  11/13/2021 0323 by Raimundo Carpenter RN  Outcome: Ongoing  Goal: Control of acute pain  Description: Control of acute pain  11/13/2021 1608 by Yuliya Phillips RN  Outcome: Ongoing  11/13/2021 0323 by Raimundo Carpenter RN  Outcome: Ongoing  Goal: Control of chronic pain  Description: Control of chronic pain  11/13/2021 1608 by Yuliya Phillips RN  Outcome: Ongoing  11/13/2021 0323 by Raimundo Carpenter RN  Outcome: Ongoing     Problem: Gas Exchange - Impaired  Goal: Absence of hypoxia  11/13/2021 1608 by Yuliya Phillips RN  Outcome: Ongoing  11/13/2021 0323 by Mckay Chapin RN  Outcome: Ongoing  Goal: Promote optimal lung function  11/13/2021 1608 by Alyse Chun RN  Outcome: Ongoing  11/13/2021 0323 by Mckay Chapin RN  Outcome: Ongoing     Problem: Breathing Pattern - Ineffective  Goal: Ability to achieve and maintain a regular respiratory rate  11/13/2021 1608 by Alyse Chun RN  Outcome: Ongoing  11/13/2021 0323 by Mckay Chapin RN  Outcome: Ongoing     Problem:  Body Temperature -  Risk of, Imbalanced  Goal: Ability to maintain a body temperature within defined limits  11/13/2021 1608 by Alyse Chun RN  Outcome: Ongoing  11/13/2021 0323 by Mckay Chapin RN  Outcome: Ongoing  Goal: Will regain or maintain usual level of consciousness  11/13/2021 1608 by Alyse Chun RN  Outcome: Ongoing  11/13/2021 0323 by Mckay Chapin RN  Outcome: Ongoing  Goal: Complications related to the disease process, condition or treatment will be avoided or minimized  11/13/2021 1608 by Alyse Chun RN  Outcome: Ongoing  11/13/2021 0323 by Mckay Chapin RN  Outcome: Ongoing     Problem: Isolation Precautions - Risk of Spread of Infection  Goal: Prevent transmission of infection  11/13/2021 1608 by Alyse Chun RN  Outcome: Ongoing  11/13/2021 0323 by Mckay Chapin RN  Outcome: Ongoing     Problem: Nutrition Deficits  Goal: Optimize nutritional status  11/13/2021 1608 by Alyse Chun RN  Outcome: Ongoing  11/13/2021 0323 by Mckay Chapin RN  Outcome: Ongoing     Problem: Risk for Fluid Volume Deficit  Goal: Maintain normal heart rhythm  11/13/2021 1608 by Alyse Chun RN  Outcome: Ongoing  11/13/2021 0323 by Mckay Chapin RN  Outcome: Ongoing  Goal: Maintain absence of muscle cramping  11/13/2021 1608 by Alyse Chun RN  Outcome: Ongoing  11/13/2021 0323 by Mckay Chapin RN  Outcome: Ongoing  Goal: Maintain normal serum potassium, sodium, calcium, phosphorus, and pH  11/13/2021 1608 by Tania Saavedra RN  Outcome: Ongoing  11/13/2021 0323 by Nicky Eugene RN  Outcome: Ongoing     Problem: Loneliness or Risk for Loneliness  Goal: Demonstrate positive use of time alone when socialization is not possible  11/13/2021 1608 by Tania Saavedra RN  Outcome: Ongoing  11/13/2021 0323 by Nicky Eugene RN  Outcome: Ongoing     Problem: Fatigue  Goal: Verbalize increase energy and improved vitality  11/13/2021 0323 by Nicky Eugene RN  Outcome: Ongoing     Problem: Patient Education: Go to Patient Education Activity  Goal: Patient/Family Education  11/13/2021 1608 by Tania Saavedra RN  Outcome: Ongoing  11/13/2021 0323 by Nicky Eugene RN  Outcome: Ongoing     Problem: MECHANICAL VENTILATION  Goal: Mobility/activity is maintained at optimum level for patient  11/13/2021 1608 by Tania Saavedra RN  Outcome: Ongoing  11/13/2021 0323 by Nicky Eugene RN  Outcome: Ongoing  Goal: ET tube will be managed safely  11/13/2021 1608 by Tania Saavedra RN  Outcome: Ongoing  11/13/2021 0323 by Nicky Eugene RN  Outcome: Ongoing  Goal: Ability to express needs and understand communication  11/13/2021 1608 by Tania Saavedra RN  Outcome: Ongoing  11/13/2021 0323 by Nicky Eugene RN  Outcome: Ongoing     Problem: Non-Violent Restraints  Goal: Removal from restraints as soon as assessed to be safe  11/13/2021 1608 by Tania Saavedra RN  Outcome: Ongoing  11/13/2021 0323 by Nicky Eugene RN  Outcome: Ongoing     Electronically signed by Tania Saavedra RN on 11/13/2021 at 4:08 PM

## 2021-11-14 PROBLEM — E44.0 MODERATE MALNUTRITION (HCC): Status: ACTIVE | Noted: 2021-11-14

## 2021-11-14 LAB
ABSOLUTE EOS #: <0.03 K/UL (ref 0–0.44)
ABSOLUTE IMMATURE GRANULOCYTE: 0.45 K/UL (ref 0–0.3)
ABSOLUTE LYMPH #: 0.86 K/UL (ref 1.1–3.7)
ABSOLUTE MONO #: 0.58 K/UL (ref 0.1–1.2)
ALBUMIN SERPL-MCNC: 3.1 G/DL (ref 3.5–5.2)
ALLEN TEST: ABNORMAL
ALLEN TEST: ABNORMAL
ANION GAP SERPL CALCULATED.3IONS-SCNC: 13 MMOL/L (ref 9–17)
BASOPHILS # BLD: 0 % (ref 0–2)
BASOPHILS ABSOLUTE: 0.04 K/UL (ref 0–0.2)
BUN BLDV-MCNC: 42 MG/DL (ref 8–23)
BUN/CREAT BLD: ABNORMAL (ref 9–20)
CALCIUM SERPL-MCNC: 8.6 MG/DL (ref 8.6–10.4)
CHLORIDE BLD-SCNC: 85 MMOL/L (ref 98–107)
CO2: 29 MMOL/L (ref 20–31)
CREAT SERPL-MCNC: 1.18 MG/DL (ref 0.5–0.9)
DIFFERENTIAL TYPE: ABNORMAL
EOSINOPHILS RELATIVE PERCENT: 0 % (ref 1–4)
FIO2: 40
FIO2: 40
GFR AFRICAN AMERICAN: 54 ML/MIN
GFR NON-AFRICAN AMERICAN: 45 ML/MIN
GFR SERPL CREATININE-BSD FRML MDRD: ABNORMAL ML/MIN/{1.73_M2}
GFR SERPL CREATININE-BSD FRML MDRD: ABNORMAL ML/MIN/{1.73_M2}
GLUCOSE BLD-MCNC: 152 MG/DL (ref 65–105)
GLUCOSE BLD-MCNC: 155 MG/DL (ref 70–99)
GLUCOSE BLD-MCNC: 158 MG/DL (ref 74–100)
GLUCOSE BLD-MCNC: 160 MG/DL (ref 74–100)
HCT VFR BLD CALC: 35.5 % (ref 36.3–47.1)
HEMOGLOBIN: 11.9 G/DL (ref 11.9–15.1)
IMMATURE GRANULOCYTES: 2 %
LYMPHOCYTES # BLD: 5 % (ref 24–43)
MCH RBC QN AUTO: 31.2 PG (ref 25.2–33.5)
MCHC RBC AUTO-ENTMCNC: 33.5 G/DL (ref 28.4–34.8)
MCV RBC AUTO: 92.9 FL (ref 82.6–102.9)
MODE: ABNORMAL
MODE: ABNORMAL
MONOCYTES # BLD: 3 % (ref 3–12)
NEGATIVE BASE EXCESS, ART: ABNORMAL (ref 0–2)
NEGATIVE BASE EXCESS, ART: ABNORMAL (ref 0–2)
NRBC AUTOMATED: 0 PER 100 WBC
O2 DEVICE/FLOW/%: ABNORMAL
O2 DEVICE/FLOW/%: ABNORMAL
PATIENT TEMP: ABNORMAL
PATIENT TEMP: ABNORMAL
PDW BLD-RTO: 12.6 % (ref 11.8–14.4)
PHOSPHORUS: 3.3 MG/DL (ref 2.6–4.5)
PLATELET # BLD: 347 K/UL (ref 138–453)
PLATELET ESTIMATE: ABNORMAL
PMV BLD AUTO: 9.7 FL (ref 8.1–13.5)
POC HCO3: 34.5 MMOL/L (ref 21–28)
POC HCO3: 34.9 MMOL/L (ref 21–28)
POC O2 SATURATION: 97 % (ref 94–98)
POC O2 SATURATION: 98 % (ref 94–98)
POC PCO2 TEMP: ABNORMAL MM HG
POC PCO2 TEMP: ABNORMAL MM HG
POC PCO2: 39.4 MM HG (ref 35–48)
POC PCO2: 42.6 MM HG (ref 35–48)
POC PH TEMP: ABNORMAL
POC PH TEMP: ABNORMAL
POC PH: 7.52 (ref 7.35–7.45)
POC PH: 7.55 (ref 7.35–7.45)
POC PO2 TEMP: ABNORMAL MM HG
POC PO2 TEMP: ABNORMAL MM HG
POC PO2: 85.3 MM HG (ref 83–108)
POC PO2: 86 MM HG (ref 83–108)
POSITIVE BASE EXCESS, ART: 11 (ref 0–3)
POSITIVE BASE EXCESS, ART: 11 (ref 0–3)
POTASSIUM SERPL-SCNC: 4.2 MMOL/L (ref 3.7–5.3)
RBC # BLD: 3.82 M/UL (ref 3.95–5.11)
RBC # BLD: ABNORMAL 10*6/UL
SAMPLE SITE: ABNORMAL
SAMPLE SITE: ABNORMAL
SEG NEUTROPHILS: 90 % (ref 36–65)
SEGMENTED NEUTROPHILS ABSOLUTE COUNT: 17.04 K/UL (ref 1.5–8.1)
SODIUM BLD-SCNC: 127 MMOL/L (ref 135–144)
TCO2 (CALC), ART: ABNORMAL MMOL/L (ref 22–29)
TCO2 (CALC), ART: ABNORMAL MMOL/L (ref 22–29)
WBC # BLD: 19 K/UL (ref 3.5–11.3)
WBC # BLD: ABNORMAL 10*3/UL

## 2021-11-14 PROCEDURE — 6370000000 HC RX 637 (ALT 250 FOR IP): Performed by: INTERNAL MEDICINE

## 2021-11-14 PROCEDURE — 82803 BLOOD GASES ANY COMBINATION: CPT

## 2021-11-14 PROCEDURE — 6370000000 HC RX 637 (ALT 250 FOR IP): Performed by: NURSE PRACTITIONER

## 2021-11-14 PROCEDURE — 6360000002 HC RX W HCPCS: Performed by: INTERNAL MEDICINE

## 2021-11-14 PROCEDURE — 82947 ASSAY GLUCOSE BLOOD QUANT: CPT

## 2021-11-14 PROCEDURE — 99232 SBSQ HOSP IP/OBS MODERATE 35: CPT | Performed by: INTERNAL MEDICINE

## 2021-11-14 PROCEDURE — 36415 COLL VENOUS BLD VENIPUNCTURE: CPT

## 2021-11-14 PROCEDURE — 94761 N-INVAS EAR/PLS OXIMETRY MLT: CPT

## 2021-11-14 PROCEDURE — 2000000000 HC ICU R&B

## 2021-11-14 PROCEDURE — 99291 CRITICAL CARE FIRST HOUR: CPT | Performed by: INTERNAL MEDICINE

## 2021-11-14 PROCEDURE — 37799 UNLISTED PX VASCULAR SURGERY: CPT

## 2021-11-14 PROCEDURE — 2500000003 HC RX 250 WO HCPCS: Performed by: STUDENT IN AN ORGANIZED HEALTH CARE EDUCATION/TRAINING PROGRAM

## 2021-11-14 PROCEDURE — 85025 COMPLETE CBC W/AUTO DIFF WBC: CPT

## 2021-11-14 PROCEDURE — 99233 SBSQ HOSP IP/OBS HIGH 50: CPT | Performed by: INTERNAL MEDICINE

## 2021-11-14 PROCEDURE — 2580000003 HC RX 258: Performed by: INTERNAL MEDICINE

## 2021-11-14 PROCEDURE — 94003 VENT MGMT INPAT SUBQ DAY: CPT

## 2021-11-14 PROCEDURE — 2700000000 HC OXYGEN THERAPY PER DAY

## 2021-11-14 PROCEDURE — APPSS30 APP SPLIT SHARED TIME 16-30 MINUTES: Performed by: NURSE PRACTITIONER

## 2021-11-14 PROCEDURE — 87040 BLOOD CULTURE FOR BACTERIA: CPT

## 2021-11-14 PROCEDURE — 94640 AIRWAY INHALATION TREATMENT: CPT

## 2021-11-14 PROCEDURE — 6360000002 HC RX W HCPCS: Performed by: NURSE PRACTITIONER

## 2021-11-14 PROCEDURE — 80069 RENAL FUNCTION PANEL: CPT

## 2021-11-14 RX ORDER — CIPROFLOXACIN 2 MG/ML
400 INJECTION, SOLUTION INTRAVENOUS EVERY 12 HOURS
Status: DISCONTINUED | OUTPATIENT
Start: 2021-11-14 | End: 2021-11-18

## 2021-11-14 RX ORDER — LACTULOSE 10 G/15ML
10 SOLUTION ORAL 3 TIMES DAILY
Status: DISCONTINUED | OUTPATIENT
Start: 2021-11-14 | End: 2021-11-20 | Stop reason: HOSPADM

## 2021-11-14 RX ADMIN — PROPOFOL 25 MCG/KG/MIN: 10 INJECTION, EMULSION INTRAVENOUS at 20:48

## 2021-11-14 RX ADMIN — PROPOFOL 40 MCG/KG/MIN: 10 INJECTION, EMULSION INTRAVENOUS at 02:26

## 2021-11-14 RX ADMIN — DEXAMETHASONE 6 MG: 4 TABLET ORAL at 17:00

## 2021-11-14 RX ADMIN — DESMOPRESSIN ACETATE 40 MG: 0.2 TABLET ORAL at 09:26

## 2021-11-14 RX ADMIN — METOPROLOL TARTRATE 5 MG: 1 INJECTION, SOLUTION INTRAVENOUS at 14:18

## 2021-11-14 RX ADMIN — METHYLPREDNISOLONE SODIUM SUCCINATE 40 MG: 40 INJECTION, POWDER, FOR SOLUTION INTRAMUSCULAR; INTRAVENOUS at 01:30

## 2021-11-14 RX ADMIN — BUMETANIDE 1 MG: 1 TABLET ORAL at 21:11

## 2021-11-14 RX ADMIN — APIXABAN 5 MG: 5 TABLET, FILM COATED ORAL at 21:11

## 2021-11-14 RX ADMIN — IPRATROPIUM BROMIDE AND ALBUTEROL SULFATE 1 AMPULE: .5; 2.5 SOLUTION RESPIRATORY (INHALATION) at 20:04

## 2021-11-14 RX ADMIN — APIXABAN 5 MG: 5 TABLET, FILM COATED ORAL at 09:26

## 2021-11-14 RX ADMIN — OXYCODONE HYDROCHLORIDE AND ACETAMINOPHEN 500 MG: 500 TABLET ORAL at 09:27

## 2021-11-14 RX ADMIN — DIGOXIN 125 MCG: 125 TABLET ORAL at 09:26

## 2021-11-14 RX ADMIN — METOPROLOL TARTRATE 25 MG: 25 TABLET ORAL at 21:12

## 2021-11-14 RX ADMIN — SODIUM CHLORIDE, PRESERVATIVE FREE 10 ML: 5 INJECTION INTRAVENOUS at 09:27

## 2021-11-14 RX ADMIN — PROPOFOL 25 MCG/KG/MIN: 10 INJECTION, EMULSION INTRAVENOUS at 14:29

## 2021-11-14 RX ADMIN — PROPOFOL 40 MCG/KG/MIN: 10 INJECTION, EMULSION INTRAVENOUS at 05:43

## 2021-11-14 RX ADMIN — DOCUSATE SODIUM 50MG AND SENNOSIDES 8.6MG 2 TABLET: 8.6; 5 TABLET, FILM COATED ORAL at 09:26

## 2021-11-14 RX ADMIN — METHYLPREDNISOLONE SODIUM SUCCINATE 40 MG: 40 INJECTION, POWDER, FOR SOLUTION INTRAMUSCULAR; INTRAVENOUS at 09:26

## 2021-11-14 RX ADMIN — CIPROFLOXACIN 400 MG: 2 INJECTION, SOLUTION INTRAVENOUS at 14:18

## 2021-11-14 RX ADMIN — METOPROLOL TARTRATE 25 MG: 25 TABLET ORAL at 09:26

## 2021-11-14 RX ADMIN — SODIUM CHLORIDE, PRESERVATIVE FREE 10 ML: 5 INJECTION INTRAVENOUS at 14:23

## 2021-11-14 RX ADMIN — AMIODARONE HYDROCHLORIDE 0.5 MG/MIN: 50 INJECTION, SOLUTION INTRAVENOUS at 20:47

## 2021-11-14 RX ADMIN — LACTULOSE 10 G: 20 SOLUTION ORAL at 21:12

## 2021-11-14 RX ADMIN — POTASSIUM BICARBONATE 40 MEQ: 782 TABLET, EFFERVESCENT ORAL at 09:26

## 2021-11-14 RX ADMIN — BUMETANIDE 1 MG: 1 TABLET ORAL at 09:26

## 2021-11-14 RX ADMIN — CLOPIDOGREL 75 MG: 75 TABLET, FILM COATED ORAL at 09:26

## 2021-11-14 RX ADMIN — PROPOFOL 20 MCG/KG/MIN: 10 INJECTION, EMULSION INTRAVENOUS at 09:46

## 2021-11-14 RX ADMIN — CITALOPRAM 20 MG: 20 TABLET, FILM COATED ORAL at 09:26

## 2021-11-14 RX ADMIN — LACTULOSE 10 G: 20 SOLUTION ORAL at 17:00

## 2021-11-14 RX ADMIN — AMIODARONE HYDROCHLORIDE 0.5 MG/MIN: 50 INJECTION, SOLUTION INTRAVENOUS at 03:46

## 2021-11-14 RX ADMIN — IPRATROPIUM BROMIDE AND ALBUTEROL SULFATE 1 AMPULE: .5; 2.5 SOLUTION RESPIRATORY (INHALATION) at 13:02

## 2021-11-14 RX ADMIN — Medication 3 MG: at 21:11

## 2021-11-14 ASSESSMENT — PULMONARY FUNCTION TESTS
PIF_VALUE: 17
PIF_VALUE: 21
PIF_VALUE: 17
PIF_VALUE: 24
PIF_VALUE: 16

## 2021-11-14 ASSESSMENT — PAIN SCALES - GENERAL: PAINLEVEL_OUTOF10: 0

## 2021-11-14 NOTE — PLAN OF CARE
Problem: Non-Violent Restraints  Goal: Removal from restraints as soon as assessed to be safe  11/13/2021 2027 by Sierra Martinez RN  Outcome: Ongoing     Problem: Non-Violent Restraints  Goal: No harm/injury to patient while restraints in use  11/13/2021 2027 by Sierra Martinez RN  Outcome: Ongoing     Problem: Non-Violent Restraints  Goal: Patient's dignity will be maintained  11/13/2021 2027 by Sierra Martinez RN  Outcome: Ongoing

## 2021-11-14 NOTE — PROGRESS NOTES
[facility]  Office: [address], DO, Libby Pastor, DO, Courtney Zuniga, DO, [facility] Blood, DO, Barron San MD, Elsie Lane MD, Marycruz Hernandez MD, Patricio Valle MD, Key Hinton MD, Artemio Andujar MD, Carmen Spear MD, Richelle Black, DO, Elsy Kimble, DO, Rachna Walker MD,  Efraín Pickett DO, Letty Wang MD, Kirk Vines MD, Enriqueta Gutiérrez MD, Pamella Darnell MD, Erwin Hernandez MD, Maureen Ford MD, Carlos Urrutia MD, Lelo Soto [facility], [facility], CNP, Marino Barksdale, CNP, Janice Rios, CNS, Sheri Connors, CNP, Kary Mart, CNP, Victor Manuel Ibarra, CNP, Lianne Escobedo, CNP, Christa Ward, CNP, Sylvia Aldridge PA-C, Ben Galan, [facility], Kaye Phillips, CNP, Brittny Ugarte, CNP, Jayesh Bronson, CNP, Terrell Luque, CNP, Poppy Brewster, CNP, Trell Almaraz, CNP, Vannessa Naylor, [address] [place] [address]    Progress Note    11/14/2021    1:16 PM    Name:   Antonio Cox  MRN:     [id number]     Acct:      [de-identified]   Room:   [address] [place] [address] Day:  11  Admit Date:  11/3/2021  4:32 PM    PCP:   KALYN Pascual CNP  Code Status:  Full Code    Subjective:     C/C:   Chief Complaint   Patient presents with    Leg Swelling     increasing BLE edema; pt from SNF; per PA at SNF, pt has fluid retention, needs IV meds that they cannot administer     Interval History Status: improved    Pt seen and examined this morning. No acute events overnight. Patient has been CPAPing all morning. She is doing well, not tiring out. She is awake and on propofol. Following commands. Answering questions yes and no appropriately. Tube feeds running. HR remains improved. Still without a BM.     Brief History:     Patient presented to the ER with complaints of leg swelling, shortness of breath with a productive cough (thin white sputum) and constipation.  She states she chronically has lower extremity edema left side greater than right she thinks that is increased over the last couple days. Ezequiel Hernandez was recently discharged on 10/15 after being admitted related to a pleural effusion.  At that time she presented with shortness of breath and palpitations. Mia Najera is found to have a left lower lobe pneumonia and A. fib with RVR and was admitted to the ICU for acute hypoxic respiratory failure.  During that visit the patient had a paracentesis, Negative CURT and subsequently cardioverted successfully, and a bronchoscopy that showed left main bronchus obstruction due to thick mucous plugging.  At discharge the patient was encouraged to follow-up with cardiology outpatient for an ablation.  During that visit she was also treated for UTI and was discharged on Augmentin for 5 days. Mia Najera was also given inhalers and steroids for COPD.     On arrival to the ER today she complains of not feeling well over the last 3 or 4 days. Mia Najera describes worsening shortness of breath primarily on exertion and orthopnea.  The patient told me that she has not been using oxygen on an outpatient basis but according to the ER note she has been using it intermittently primarily with exertion and therapy and occasionally overnight.  Also complains of constipation and mild abdominal bloating.  She also reports nausea and vomiting few days ago but nothing current and she has been tolerating p.o. intake.  She states she is compliant with her home medications.     Patient was placed on 2 L nasal cannula in the ER. Mia Najera has been afebrile heart rates been in the 90s to the low 110's A. Fib.     Chest x-ray shows no acute changes from prior study with a has a persistent sizable left effusion with opacity at the left lung base and pulmonary vascular congestion. Sodium is 129 BUN and creatinine are 26/1. 72.  Her normal BUN and creatinine are around 15/0.58 and normal sodiums in the mid 130s.   BNP is 3134.  On her last admission it QUC 3441 and on her admission in early October it was 3251.   WBCs 14.5 seems to run slightly elevated     Plan to consult nephrology and pulmonary medicine. Continue home Eliquis, beta-blocker and amiodarone.  Hold losartan  Plan Solu-Medrol related to IV wheezing throughout. It appears to be in fluid overload related to pitting edema in her lower extremities and increasing shortness of breath.  Plan to hold further hydration for now.  Give Lasix 20 mg IV x1    Patient was being transitioned to SNF. Unfortunately was discovered to be Covid positive. Approximately 12 hours later, patient was in respiratory distress  Intubated later that morning (11/12/2021)      Review of Systems:     Unable to obtain as patient is currently intubated and sedated  She says she is comfortable and is not in pain  She denies being short of breath     Medications: Allergies:     Allergies   Allergen Reactions    Latex Hives     Hives from rubber gloves    Betadine [Povidone Iodine] Hives    Bee Venom Swelling    Dilaudid [Hydromorphone Hcl] Other (See Comments)     Severe confusion    Adhesive Tape Rash    Sulfa Antibiotics Rash       Current Meds:   Scheduled Meds:    ciprofloxacin  400 mg IntraVENous Q12H    sennosides-docusate sodium  2 tablet Per NG tube Daily    metoprolol tartrate  25 mg Per NG tube BID    digoxin  125 mcg Per NG tube Daily    clopidogrel  75 mg Per NG tube Daily    citalopram  20 mg Per NG tube Daily    bumetanide  1 mg Per NG tube BID    atorvastatin  40 mg Per NG tube Daily    vitamin C  500 mg Per NG tube Daily    apixaban  5 mg Per NG tube BID    methylPREDNISolone  40 mg IntraVENous Q8H    [Held by provider] bumetanide  1 mg IntraVENous BID    ipratropium-albuterol  1 ampule Inhalation 4x daily    lidocaine 1 % injection  5 mL IntraDERmal Once    sodium chloride flush  5-40 mL IntraVENous 2 times per day     Continuous Infusions:    propofol 20 mcg/kg/min (11/14/21 1453)    norepinephrine      sodium chloride      amiodarone 0.5 mg/min (21 0346)     PRN Meds: metoprolol, albuterol, sodium chloride flush, sodium chloride, fentanNYL, magnesium sulfate, potassium chloride **OR** potassium alternative oral replacement **OR** potassium chloride, magnesium hydroxide, melatonin, ondansetron **OR** ondansetron, acetaminophen **OR** acetaminophen    Data:     Past Medical History:   has a past medical history of Asthma, COPD (chronic obstructive pulmonary disease) ([address] Gout, Hyperlipidemia, Hypertension, Mitral insufficiency, and Pneumonia. Social History:   reports that she has quit smoking. She has never used smokeless tobacco. She reports current alcohol use. She reports that she does not use drugs. Family History:   Family History   Problem Relation Age of Onset    High Blood Pressure Mother     Cancer Mother     Asthma Father     Heart Disease Father     Cancer Father        Vitals:  BP (!) 147/75   Pulse 79   Temp 98.8 °F (37.1 °C) (Bladder)   Resp 29   Ht 5' 4\" (1.626 m)   Wt 238 lb 11.2 oz (108.3 kg)   SpO2 98%   BMI 40.97 kg/m²   Temp (24hrs), Av.9 °F (37.2 °C), Min:98.8 °F (37.1 °C), Max:99 °F (37.2 °C)    Recent Labs     21  0253 21  1804 21  0308 21  0459   POCGLU 159* 144* 158* 160*       I/O (24Hr):     Intake/Output Summary (Last 24 hours) at 2021 1316  Last data filed at 2021 1015  Gross per 24 hour   Intake 1157.87 ml   Output 1351 ml   Net -193.13 ml       Labs:  Hematology:  Recent Labs     21  0405 21  0444 21  0350   WBC 14.3* 10.6 19.0*   RBC 4.03 3.80* 3.82*   HGB 12.3 11.6* 11.9   HCT 37.8 35.0* 35.5*   MCV 93.8 92.1 92.9   MCH 30.5 30.5 31.2   MCHC 32.5 33.1 33.5   RDW 13.0 12.5 12.6    303 347   MPV 9.4 9.6 9.7   CRP 50.2*  --   --      Chemistry:  Recent Labs     21  0405 21  0444 21  0350    132* 127*   K 4.6 3.6* 4.2   CL 91* 91* 85*   CO2 31 27 29   GLUCOSE 116* 155* 155*   BUN 18 29* 42*   CREATININE 0. 87 0.87 1.18*   ANIONGAP 13 14 13   LABGLOM >60 >60 45*   GFRAA >60 >60 54*   CALCIUM 8.7 8.5* 8.6   PHOS  --  4.2 3.3   CKTOTAL  --  13*  --      Recent Labs     11/12/21  0401 11/12/21  1159 11/13/21  0253 11/13/21  0444 11/13/21  1804 11/14/21  0308 11/14/21  0350 11/14/21  0459   LABALBU  --   --   --  3.0*  --   --  3.1*  --    TRIG  --   --   --  215*  --   --   --   --    POCGLU 124* 166* 159*  --  144* 158*  --  160*     ABG:  Lab Results   Component Value Date    POCPH 7.521 11/14/2021    PH 7.44 03/30/2016    POCPCO2 42.6 11/14/2021    PCO2 36 03/30/2016    POCPO2 86.0 11/14/2021    PO2 64 03/30/2016    POCHCO3 34.9 11/14/2021    HCO3 24 03/30/2016    NBEA NOT REPORTED 11/14/2021    PBEA 11 11/14/2021    LTR3BEX NOT REPORTED 11/14/2021    LYSU3LKE 97 11/14/2021    O2SAT 93 03/30/2016    FIO2 40.0 11/14/2021     Lab Results   Component Value Date/Time    SPECIAL R H 6 ML 11/12/2021 12:16 PM    SPECIAL LH 20 ML 11/12/2021 12:16 PM     Lab Results   Component Value Date/Time    CULTURE NO GROWTH 2 DAYS 11/12/2021 12:16 PM    CULTURE NO GROWTH 2 DAYS 11/12/2021 12:16 PM       Radiology:  XR CHEST (SINGLE VIEW FRONTAL)    Result Date: 11/6/2021  Interval improved aeration of the left lung base. XR CHEST (SINGLE VIEW FRONTAL)    Result Date: 11/5/2021  Interval decrease in left effusion. No pneumothorax. Persistent opacity at the left lung base. Vascularity is slightly prominent. XR CHEST PORTABLE    Result Date: 11/3/2021  No change from prior study. Persistent sizable left effusion with opacity at the left lung base and pulmonary vascular congestion. US THORACENTESIS Which side should the procedure be performed? Left    Result Date: 11/4/2021  Successful ultrasound guided thoracentesis. US RETROPERITONEAL COMPLETE    Result Date: 11/4/2021  1. Patient had no urge to void during the exam.  Nonvisualization of ureteral jets. 2. Incidental cholelithiasis. 3. Unremarkable renal ultrasound. No hydronephrosis. Physical Examination:        General appearance: Intubated, sedated, obese  female, awake  HEENT: ET and NG tubes present  Lungs:  Globally diminished lung sounds, mechanical breath sounds, with poor air movement  Heart: Controlled and irregular rhythm, no murmur  Abdomen:  soft, nontender, nondistended, normal bowel sounds, no masses, hepatomegaly, splenomegaly  : goldstein present  Extremities:  no edema, redness, tenderness in the calves; left radial art line present  Skin:  no gross lesions, rashes, induration    Assessment:        Hospital Problems           Last Modified POA    * (Principal) CASEY (acute kidney injury) ([address] 11/12/2021 Yes    Acute on chronic diastolic heart failure ([address] 11/12/2021 Yes    Persistent atrial fibrillation ([address] 11/12/2021 Yes    Recurrent pleural effusion 11/12/2021 Yes    Atrial flutter with rapid ventricular response ([address] 11/12/2021 Yes    Pneumonia due to COVID-19 virus 11/12/2021 Yes    Acute respiratory failure with hypoxemia ([address] 11/12/2021 Yes    Respiratory alkalosis 11/12/2021 No    Shock ([address] 11/12/2021 No    Benign essential HTN 11/12/2021 Yes    Pulmonary emphysema ([address] 11/12/2021 Yes    Chronic obstructive pulmonary disease with acute exacerbation ([address] 11/12/2021 Yes    Overview Signed 10/3/2021 12:43 PM by Norbert Blankenship MD     Last Assessment & Plan:   Formatting of this note might be different from the original.  COPD is worsening. Discussed monitoring symptoms and use of quick-relief medications and contacting us early in the course of exacerbations. Warning signs of respiratory distress were reviewed with the patient. Counseled to avoid exposure to cigarette smoke. Morbid obesity with BMI of 40.0-44.9, adult ([address] 11/12/2021 Yes    Overview Signed 10/3/2021 12:43 PM by Norbert Blankenship MD     Last Assessment & Plan:   Formatting of this note might be different from the original.  Obesity is worsening.   Discussed the patient's BMI. The BMI is above average; BMI management plan is completed. General weight loss/lifestyle modification strategies discussed (elicit support from others; identify saboteurs; non-food rewards, etc). Plan:        1. Acute respiratory failure with hypoxiapreviously requiring 2 L of oxygen. Now intubated (11/12/21) and requiring 45% FiO2. SBT going well today. Possible extubation in the next 24 hours. Appreciate pulmonology input regarding these effusions. CT scan of the chest was also reviewed. 2. Respiratory alkalosis - improving with adjustment   3. COVID-19 infectioninfectious disease following. Received Regeneron x1. Inflammatory markers are uptrending. Given Actemra. Check solumedrol to decadron  4. New leukocytosis - restarted on cipro. Discussed with ID. Recheck cultures today. 5. CASEY - resolved  6. Shock - likely related to sedation. Appears to have been resolved. Blood cultures with no growth to date yet. Cortisol at noon yesterday was 9.3.    7. CXR from 11/09 reviewed. Continue oral diuretic - Bumex 1 mg BID. Wean supplemental oxygen at [facility] Check CXR tomorrow morning  8. Afib with RVR / a flutter - cardiology following. Remains on amiodarone infusion. Remains on digoxin. Uptitrating/restarting metoprolol due to improvement of blood pressure. 9. COPD - continue alburterol, flovent  10. Pseudomonal UTI -completed course of ciprofloxacin  11. HFpEF - continue Bumex and Lopressor as her blood pressure is now able to tolerate. 12. Essential hypertension - restarted Bumex and Lopressor. 13. Morbid obesity - BMI 43.59.  14. Constipation - continue senna-s. Scheduled daily. Milk of mag also available. Add lactulose today  15. PT/OT  16. Updated Krystle Pringle, patient's son on 11/13.       [address],   11/14/2021  1:16 PM

## 2021-11-14 NOTE — PROGRESS NOTES
[facility] -   Infectious diseases evaluation  admission date 11/3/2021    reason for consultation:   covid +    Impression :   Current:  · covid + rapid 11/11  · Recurrent L pleural effusion- many thoracentesis  · Repeat thoracentesis 11/4  · resp failure and intubated 11/11  · Acute on chronic CHF  · Pseudomonas UTI   · crp elevation  · SEJAL  · Obese     Other:  ·   Discussion / summary of stay / plan of care   · Was rapid covid neg at admission -  · Then tested + rapid 11/11  · fully vaccinated  · Might be asymptomatic for covid, no clear pneumonia   Recommendations   · regeneron x 1 on 11/11  · Get CRP and ferritin   · Get CT chest look for signs of covid pneumonia and address accordingly  · cipro for pseudomonas UTI 11/7 -11/12    Addendum:  11/12, CT scan suggestive of Covid pneumonia, Actemra ordered 11/12    Infection Control Recommendations   · [place] Precautions  · Contact Isolation   · Airborne isolation  · Droplet Isolation      Antimicrobial Stewardship Recommendations   · Simplification of therapy  · Targeted therapy      Coordination ofOutpatient Care:   · Estimated Length of IV antimicrobials:  · Patient will need Midline / picc Catheter Insertion:   · Patient will need SNF:  · Patient will need outpatient wound care:     History of Present Illness:   Initial history:  Nadine Beltran is a 68y.o.-year-old female w recurrent left pleural effusion who comes in again for shortness of breath and was found to have a chronic left effusion had a thoracentesis 11/4, was also found to have a Pseudomonas UTI and started on Cipro on 11/7. Had initial rapid Covid test at admission that was negative, repeat 111/11 came back positive rapid Covid. Infectious consulted, reviewed the chest x-ray she does not seem to be having Covid pneumonia. No inflammatory markers at this point.      11.11.21  No nausea and no lost of appetite  Cough same and old  Used 2L NC  abd soft    11/12  Intubated over night for resp distress - on 45 fio2 and 8 peep sedated  CXr shows R effusion and infiltrate and CRp a little higher    11/13/21:    Afebrile  VS stable    Patient continues on mechanical ventilation with 45% FiO2 peep 6  Blood cultures show no growth yet    Actemra administered 11/12  PICC placed 11/12    Interval changes  11/14/2021   Patient Vitals for the past 8 hrs:   BP Pulse Resp SpO2   11/14/21 0630  99 26    11/14/21 0600 (!) 147/75 94 22 98 %   11/14/21 0500  95 23 98 %     Afebrile  VS stable    The patient is tolerating a weaning trial    Hyponatremia 127  Creatinine increased to 1.18  WBC increased to 19.0  BC no growth  Cipro re-initiated  May be beneficial to remove Marquez and attempt external cath    Mechanical ventilation with 40% FiO2 CPAP  SpO2: 98%    Discussed with Dr. Dennis Vines    CT chest 11/12 Impression   Intervally developed perihilar and bibasilar ground-glass and dense airspace   consolidations, concerning for infection including atypical entities such as   COVID pneumonia.       Similar small bilateral pleural effusions         Summary of relevant labs: 11/14/2021    Labs:  Creat 0.76-->0.87   WBC  9.1-->10.6  CRP 31-50  Ferritin  452      Micro;  covid rapid neg 11/3  covid rapid + 11/11    Imaging:  CXR 11/12      CXR 11/9  Stable volume loss in the left chest with left basilar atelectasis or pneumonia. Possible small left effusion. CXR 11/3  Persistent sizable left effusion with opacity at the left lung base and pulmonary vascular congestion. I have personally reviewed the past medical history, past surgical history, medications, social history, and family history, and I haveupdated the database accordingly.       Allergies:   Latex, Betadine [povidone iodine], Bee venom, Dilaudid [hydromorphone hcl], Adhesive tape, and Sulfa antibiotics     Review of Systems:     Review of Systems   Unable to perform ROS: Intubated       Physical Examination :       Physical Exam  Constitutional:       General: She is not in acute distress. Appearance: She is ill-appearing. HENT:      Head: Atraumatic. Nose: Nose normal.      Mouth/Throat:      Mouth: Mucous membranes are moist.   Eyes:      General: No scleral icterus. Conjunctiva/sclera: Conjunctivae normal.   Cardiovascular:      Rate and Rhythm: Normal rate. Rhythm irregular. Heart sounds: Normal heart sounds. No murmur heard. Abdominal:      General: There is no distension. Palpations: Abdomen is soft. Tenderness: There is no abdominal tenderness. Genitourinary:     Comments: No goldstein  Musculoskeletal:         General: No swelling or deformity. Cervical back: Neck supple. No rigidity. Skin:     General: Skin is dry. Coloration: Skin is not jaundiced.    Neurological:      Comments: intubated   Psychiatric:      Comments: Intubated          Past Medical History:     Past Medical History:   Diagnosis Date    Asthma     COPD (chronic obstructive [facility] disease) [address]     Gout     Hyperlipidemia     Hypertension     Mitral insufficiency     Pneumonia        Past Surgical  History:     Past Surgical History:   Procedure Laterality Date    ANKLE SURGERY Left 03/22/2016    ORIF    BRONCHOSCOPY N/A 10/20/2021    BRONCHOSCOPY ALVEOLAR LAVAGE performed by Emely Thomas MD at [facility]    COLONOSCOPY     [address]    KNEE SURGERY      DC COLSC FLX W/RMVL OF TUMOR POLYP LESION [address] TQ N/A 8/8/2017    COLONOSCOPY POLYPECTOMY SNARE/COLD BIOPSY performed by Jenna Cummins MD at [facility]    TONSILLECTOMY AND ADENOIDECTOMY         Medications:      sennosides-docusate sodium  2 tablet Per NG tube Daily    metoprolol tartrate  25 mg Per NG tube BID    digoxin  125 mcg Per NG tube Daily    clopidogrel  75 mg Per NG tube Daily    citalopram  20 mg Per NG tube Daily    bumetanide  1 mg Per NG tube BID    atorvastatin  40 mg Per NG tube Daily    vitamin C  500 mg Per NG tube Daily    apixaban  5 mg Per NG tube BID    methylPREDNISolone  40 mg IntraVENous Q8H    [Held by provider] bumetanide  1 mg IntraVENous BID    ipratropium-albuterol  1 ampule Inhalation 4x daily    lidocaine 1 % injection  5 mL IntraDERmal Once    sodium chloride flush  5-40 mL IntraVENous 2 times per day       Social History:     Social History     Socioeconomic History    Marital status:      Spouse name: Not on file    Number of children: Not on file    Years of education: Not on file    Highest education level: Not on file   Occupational History    Not on file   Tobacco Use    Smoking status: Former Smoker    Smokeless tobacco: Never Used    Tobacco comment: over 30 years ago    Vaping Use    Vaping Use: Never used   Substance and Sexual Activity    Alcohol use: Yes     Alcohol/week: 0.0 standard drinks     Comment: occasionally     Drug use: No    Sexual activity: Not on file   Other Topics Concern    Not on file   Social History Narrative    Not on file     Social Determinants of Health     Financial Resource Strain:     Difficulty of Paying Living Expenses: Not on file   Food Insecurity:     Worried About Running Out of Food in the Last Year: Not on file    Geronimo of Food in the Last Year: Not on file   Transportation Needs:     Lack of Transportation (Medical): Not on file    Lack of Transportation (Non-Medical):  Not on file   Physical Activity:     Days of Exercise per Week: Not on file    Minutes of Exercise per Session: Not on file   Stress:     Feeling of Stress : Not on file   Social Connections:     Frequency of Communication with Friends and Family: Not on file    Frequency of Social Gatherings with Friends and Family: Not on file    Attends Hoahaoism Services: Not on file    Active Member of Clubs or Organizations: Not on file    Attends Club or Organization Meetings: Not on file   [address] [place] [address] Marital Status: Not on file   Intimate Partner Violence:     Fear of Current or Ex-Partner: Not on file    Emotionally Abused: Not on file    Physically Abused: Not on file    Sexually Abused: Not on file   Housing Stability:     Unable to Pay for Housing in the Last Year: Not on file    Number of Places Lived in the Last Year: Not on file    Unstable Housing in the Last Year: Not on file       Family History:     Family History   Problem Relation Age of Onset    High Blood Pressure Mother     Cancer Mother     Asthma Father     Heart Disease Father     Cancer Father       Medical Decision Making:   I have independently reviewed/ordered the following labs:    CBC with Differential:   Recent Labs     11/13/21 0444 11/14/21 0350   WBC 10.6 19.0*   HGB 11.6* 11.9   HCT 35.0* 35.5*    347   LYMPHOPCT 9* 5*   MONOPCT 4 3     BMP:  Recent Labs     11/13/21 0444 11/14/21 0350   * 127*   K 3.6* 4.2   CL 91* 85*   CO2 27 29   BUN 29* 42*   CREATININE 0.87 1.18*     Hepatic Function Panel:   Recent Labs     11/13/21 0444 11/14/21 0350   LABALBU 3.0* 3.1*     No results for input(s): RPR in the last 72 hours. No results for input(s): HIV in the last 72 hours. No results for input(s): BC in the last 72 hours. Lab Results   Component Value Date    CREATININE 1.18 11/14/2021    GLUCOSE 155 11/14/2021    GLUCOSE 97 05/09/2016       Detailed results: Thank you for allowing us to participate in the care of this patient. Please call with questions. This note is created with the assistance of a speech recognition program.  While intending to generate adocument that actually reflects the content of the visit, the document can still have some errors including those of syntax and sound a like substitutions which may escape proof reading. It such instances, actual meaningcan be extrapolated by contextual diversion.     Jerrell Grande, KALYN - CNP       Office: [phone]  Perfect serve / office [phone]    ATTESTATION:    I have discussed the case, including pertinent history and exam findings with the APRN. I have evaluated the  History, physical findings and pictures of the patient and the key elements of the encounter have been performed by me. I have reviewed the laboratory data, other diagnostic studies and discussed them with the APRN. I have updated the medical record where necessary. I agree with the assessment, plan and orders as documented by the APRN.     Yuly Farmer MD.

## 2021-11-14 NOTE — PLAN OF CARE
Problem: Falls - Risk of:  Goal: Will remain free from falls  Description: Will remain free from falls  11/14/2021 0505 by Benton Smith RN  Outcome: Ongoing     Problem: Falls - Risk of:  Goal: Absence of physical injury  Description: Absence of physical injury  11/14/2021 0505 by Benton Smith RN  Outcome: Ongoing     Problem: Skin Integrity:  Goal: Will show no infection signs and symptoms  Description: Will show no infection signs and symptoms  11/14/2021 0505 by Benton Smith RN  Outcome: Ongoing     Problem: Skin Integrity:  Goal: Absence of new skin breakdown  Description: Absence of new skin breakdown  11/14/2021 0505 by Benton Smith RN  Outcome: Ongoing     Problem: OXYGENATION/RESPIRATORY FUNCTION  Goal: Patient will maintain patent airway  11/14/2021 0505 by Benton Smith RN  Outcome: Ongoing     Problem: ACTIVITY INTOLERANCE/IMPAIRED MOBILITY  Goal: Mobility/activity is maintained at optimum level for patient  11/14/2021 0505 by Benton Smith RN  Outcome: Ongoing     Problem: MECHANICAL VENTILATION  Goal: Patient will maintain patent airway  11/14/2021 0505 by Benton Smith RN  Outcome: Ongoing     Problem: MECHANICAL VENTILATION  Goal: Mobility/activity is maintained at optimum level for patient  11/14/2021 0505 by Benton Smith RN  Outcome: Ongoing     Problem: MECHANICAL VENTILATION  Goal: Oral health is maintained or improved  11/14/2021 0505 by Benton Smith RN  Outcome: Ongoing     Problem: MECHANICAL VENTILATION  Goal: ET tube will be managed safely  11/14/2021 0505 by Benton Smith RN  Outcome: Ongoing     Problem: Non-Violent Restraints  Goal: Removal from restraints as soon as assessed to be safe  11/14/2021 0505 by Benton Smith RN  Outcome: Ongoing     Problem: Non-Violent Restraints  Goal: No harm/injury to patient while restraints in use  11/14/2021 0505 by Benton Smith RN  Outcome: Ongoing     Problem: Non-Violent Restraints  Goal: Patient's dignity will be maintained  11/14/2021 0505 by Charla Amato, RN  Outcome: Ongoing

## 2021-11-14 NOTE — PROGRESS NOTES
Comprehensive Nutrition Assessment    Type and Reason for Visit:  Consult (TF)    Nutrition Recommendations/Plan:   - Will order Standard w/ Fiber (Jevity 1.5) at goal rate 35 mL/hr continuous. - If proning, 20 mL/hr x 16 hrs while prone, 65 mL/hr x 8 hrs while supine  - Monitor for tolerance    Nutrition Assessment:  Consulted for TF recommendations. Pt found to be COVID+ and intubated on 11/11, w/ COVID pneumonia on 11/12. Propofol running at 13.8 mL/hr at visit. TF running at 10 mL/hr at visit. Moderate malnutrition identified. Malnutrition Assessment:  Malnutrition Status: Moderate malnutrition    Context:  Chronic Illness     Findings of the 6 clinical characteristics of malnutrition:  Energy Intake:  7 - 75% or less estimated energy requirements for 1 month or longer  Weight Loss:  7 - 5% over 1 month     Body Fat Loss:  Unable to assess     Muscle Mass Loss:  Unable to assess    Fluid Accumulation:  1 - Mild Extremities, Generalized   Strength:  Not Performed    Estimated Daily Nutrient Needs:  Energy (kcal):  0839-5809 kcal/day; Weight Used for Energy Requirements:  Admission     Protein (g):  1.3-1.4 gm/kg ~> 72-77 gms/d; Weight Used for Protein Requirements:  Ideal        Fluid (ml/day):  1 mL/kcal or per MD; Method Used for Fluid Requirements:  1 ml/kcal      Nutrition Related Findings:  LBM 11/7, bowel prep ordered. Labs/meds reviewed.       Wounds:  None       Current Nutrition Therapies:    ADULT TUBE FEEDING; Nasogastric; Standard with Fiber; Continuous; 10; No; 200; Q 4 hours  Current Tube Feeding (TF) Orders:  · Feeding Route: Nasogastric  · Formula: Standard with Fiber  · Schedule: Continuous  · Additives/Modulars:    · Water Flushes: 30 mL Q4H  · Current TF & Flush Orders Provides:    · Goal TF & Flush Orders Provides: 840 mL total volume, 1260 kcal, 54 g PRO, 818 mL free water/flush      Anthropometric Measures:  · Height: 5' 4\" (162.6 cm)  · Current Body Weight: 238 lb 11.2 oz

## 2021-11-14 NOTE — PLAN OF CARE
Problem: OXYGENATION/RESPIRATORY FUNCTION  Goal: Patient will maintain patent airway  11/13/2021 2057 by Can Conde RCP  Outcome: Ongoing     Problem: OXYGENATION/RESPIRATORY FUNCTION  Goal: Patient will achieve/maintain normal respiratory rate/effort  Description: Respiratory rate and effort will be within normal limits for the patient  11/13/2021 2057 by Can Conde RCP  Outcome: Ongoing     Problem: MECHANICAL VENTILATION  Goal: Patient will maintain patent airway  11/13/2021 2057 by Can Conde RCP  Outcome: Ongoing     Problem: MECHANICAL VENTILATION  Goal: Mobility/activity is maintained at optimum level for patient  11/13/2021 2057 by Can Conde RCP  Outcome: Ongoing     Problem: MECHANICAL VENTILATION  Goal: Oral health is maintained or improved  11/13/2021 2057 by Can Conde RCP  Outcome: Ongoing     Problem: MECHANICAL VENTILATION  Goal: ET tube will be managed safely  11/13/2021 2057 by Can Conde RCP  Outcome: Ongoing     Problem: MECHANICAL VENTILATION  Goal: Ability to express needs and understand communication  11/13/2021 2057 by Can Conde RCP  Outcome: Ongoing

## 2021-11-14 NOTE — PROGRESS NOTES
[facility]  Office: [address], DO, Karlos Kauffman, DO, Luciano [facility], DO, Roland Gillespie Blood, DO, China Cook MD, Wilson Kennedy MD, Rito Tenorio MD, Alysha Martinez MD, Keron Stokes MD, Yue Guerrero MD, Deborah Call MD, Ezio Hernández, DO, Jacquie Brown, DO, Raegan Munson MD,  Andrews Martins, DO, Nadine Gray MD, Pam Pate MD, Staci Witt MD, Matthias Bender MD, Deja Navarro MD, Michael Dela Cruz MD, Verna Bonilla MD, Janice Villegas, [facility], [facility], [facility], Piter Argueta, CNP, Michael Carrasco, [facility], Thom Gentile, CNP, Maki King, CNP, Baron Lesch, CNP, Jovani Stevens, [facility], Niki Wu, CNP, Irish Barney PA-C, Denver Parks, [facility], Martinez Wang, CNP, Khushboo Baker, CNP, Ankush Avila, CNP, Michael Curtis, CNP, Jesse Aguirre, CNP, Roxanne Pascual, CNP, Corby Dorsey, [address] [place] [address]    Progress Note    11/13/2021    7:00 PM    Name:   Antony Cruz  MRN:     [id number]     Acct:      [de-identified]   Room:   [address] [place] [address] Day:  10  Admit Date:  11/3/2021  4:32 PM    PCP:   KALYN Galvez CNP  Code Status:  Full Code    Subjective:     C/C:   Chief Complaint   Patient presents with    Leg Swelling     increasing BLE edema; pt from SNF; per PA at SNF, pt has fluid retention, needs IV meds that they cannot administer     Interval History Status: improved    Pt seen and examined this afternoon. No acute events overnight. She continues to be intubated and sedated. She is following commands for nursing staff. She did not really follow any commands for me during our examination. She is maintained on propofol. Amiodarone infusion continues. Blood pressure has improved today and she has been started on Lopressor. Heart rate is improved after Lopressor was given. Has not had any bowel movements.     Brief History:     Patient presented to the ER with complaints of leg swelling, shortness of breath with a productive cough (thin white sputum) and constipation.  She states she chronically has lower extremity edema left side greater than right she thinks that is increased over the last couple days.  Patient was recently discharged on 10/15 after being admitted related to a pleural effusion.  At that time she presented with shortness of breath and palpitations. Rachael Palmer is found to have a left lower lobe pneumonia and A. fib with RVR and was admitted to the ICU for acute hypoxic respiratory failure.  During that visit the patient had a paracentesis, Negative CURT and subsequently cardioverted successfully, and a bronchoscopy that showed left main bronchus obstruction due to thick mucous plugging.  At discharge the patient was encouraged to follow-up with cardiology outpatient for an ablation.  During that visit she was also treated for UTI and was discharged on Augmentin for 5 days. Rachael Palmer was also given inhalers and steroids for COPD.     On arrival to the ER today she complains of not feeling well over the last 3 or 4 days. Rachael Palmer describes worsening shortness of breath primarily on exertion and orthopnea.  The patient told me that she has not been using oxygen on an outpatient basis but according to the ER note she has been using it intermittently primarily with exertion and therapy and occasionally overnight.  Also complains of constipation and mild abdominal bloating.  She also reports nausea and vomiting few days ago but nothing current and she has been tolerating p.o. intake.  She states she is compliant with her home medications.     Patient was placed on 2 L nasal cannula in the ER. Rachael Palmer has been afebrile heart rates been in the 90s to the low 110's A. Fib.     Chest x-ray shows no acute changes from prior study with a has a persistent sizable left effusion with opacity at the left lung base and pulmonary vascular congestion. Sodium is 129 BUN and creatinine are 26/1. 65.  Her normal BUN and creatinine are around 15/0.58 and normal sodiums in the mid 130s. BNP is 3134.  On her last admission it UII 5290 and on her admission in early October it was 3251.   WBCs 14.5 seems to run slightly elevated     Plan to consult nephrology and pulmonary medicine. Continue home Eliquis, beta-blocker and amiodarone.  Hold losartan  Plan Solu-Medrol related to IV wheezing throughout. It appears to be in fluid overload related to pitting edema in her lower extremities and increasing shortness of breath.  Plan to hold further hydration for now.  Give Lasix 20 mg IV x1    Patient was being transitioned to SNF. Unfortunately was discovered to be Covid positive. Approximately 12 hours later, patient was in respiratory distress  Intubated later that morning (11/12/2021)      Review of Systems:     Unable to obtain as patient is currently intubated and sedated    Medications: Allergies:     Allergies   Allergen Reactions    Latex Hives     Hives from rubber gloves    Betadine [Povidone Iodine] Hives    Bee Venom Swelling    Dilaudid [Hydromorphone Hcl] Other (See Comments)     Severe confusion    Adhesive Tape Rash    Sulfa Antibiotics Rash       Current Meds:   Scheduled Meds:    metoprolol tartrate  25 mg Oral BID    potassium bicarb-citric acid  40 mEq Per NG tube BID    methylPREDNISolone  40 mg IntraVENous Q8H    [Held by provider] bumetanide  1 mg IntraVENous BID    ipratropium-albuterol  1 ampule Inhalation 4x daily    lidocaine 1 % injection  5 mL IntraDERmal Once    sodium chloride flush  5-40 mL IntraVENous 2 times per day    digoxin  125 mcg Oral Daily    bumetanide  1 mg Oral BID    apixaban  5 mg Oral BID    vitamin C  500 mg Oral Daily    atorvastatin  40 mg Oral Daily    citalopram  20 mg Oral Daily    clopidogrel  75 mg Oral Daily     Continuous Infusions:    propofol 30 mcg/kg/min (11/13/21 1420)    norepinephrine      sodium chloride      amiodarone 0.5 mg/min (11/13/21 1420)     PRN Meds: metoprolol, albuterol, sodium chloride flush, sodium chloride, fentanNYL, magnesium sulfate, potassium chloride **OR** potassium alternative oral replacement **OR** potassium chloride, magnesium hydroxide, melatonin, sennosides-docusate sodium, ondansetron **OR** ondansetron, acetaminophen **OR** acetaminophen    Data:     Past Medical History:   has a past medical history of Asthma, COPD (chronic obstructive pulmonary disease) ([address] Gout, Hyperlipidemia, Hypertension, Mitral insufficiency, and Pneumonia. Social History:   reports that she has quit smoking. She has never used smokeless tobacco. She reports current alcohol use. She reports that she does not use drugs. Family History:   Family History   Problem Relation Age of Onset    High Blood Pressure Mother     Cancer Mother     Asthma Father     Heart Disease Father     Cancer Father        Vitals:  BP (!) 139/98   Pulse 86   Temp 98.8 °F (37.1 °C) (Bladder)   Resp 20   Ht 5' 4\" (1.626 m)   Wt 238 lb 11.2 oz (108.3 kg)   SpO2 98%   BMI 40.97 kg/m²   Temp (24hrs), Av.5 °F (36.9 °C), Min:97.9 °F (36.6 °C), Max:99 °F (37.2 °C)    Recent Labs     21  0401 21  1159 21  0253 21  1804   POCGLU 124* 166* 159* 144*       I/O (24Hr):     Intake/Output Summary (Last 24 hours) at 2021 1900  Last data filed at 2021 1800  Gross per 24 hour   Intake 1059.3 ml   Output 1383 ml   Net -323.7 ml       Labs:  Hematology:  Recent Labs     21  1157 21  0405 21  0444   WBC 12.1* 14.3* 10.6   RBC 3.94* 4.03 3.80*   HGB 11.8* 12.3 11.6*   HCT 37.5 37.8 35.0*   MCV 95.2 93.8 92.1   MCH 29.9 30.5 30.5   MCHC 31.5 32.5 33.1   RDW 12.7 13.0 12.5    320 303   MPV 9.4 9.4 9.6   CRP 31.2* 50.2*  --      Chemistry:  Recent Labs     21  1157 21  0405 21  0444    135 132*   K 3.9 4.6 3.6*   CL 91* 91* 91*   CO2 30 31 27   GLUCOSE 125* 116* 155*   BUN 20 18 29*   CREATININE 0.88 0.87 0.87 ANIONGAP 14 13 14   LABGLOM >60 >60 >60   GFRAA >60 >60 >60   CALCIUM 8.7 8.7 8.5*   PHOS  --   --  4.2   CKTOTAL  --   --  13*     Recent Labs     11/11/21  1157 11/12/21  0048 11/12/21  0401 11/12/21  1159 11/13/21  0253 11/13/21  0444 11/13/21  1804   LABALBU  --   --   --   --   --  3.0*  --      --   --   --   --   --   --    TRIG  --   --   --   --   --  215*  --    POCGLU  --  109* 124* 166* 159*  --  144*     ABG:  Lab Results   Component Value Date    POCPH 7.542 11/13/2021    PH 7.44 03/30/2016    POCPCO2 37.9 11/13/2021    PCO2 36 03/30/2016    POCPO2 100.4 11/13/2021    PO2 64 03/30/2016    POCHCO3 32.6 11/13/2021    HCO3 24 03/30/2016    NBEA NOT REPORTED 11/13/2021    PBEA 9 11/13/2021    MJS7VRV NOT REPORTED 11/13/2021    FIDS8NEV 98 11/13/2021    O2SAT 93 03/30/2016    FIO2 45.0 11/13/2021     Lab Results   Component Value Date/Time    SPECIAL R H 6 ML 11/12/2021 12:16 PM    SPECIAL LH 20 ML 11/12/2021 12:16 PM     Lab Results   Component Value Date/Time    CULTURE NO GROWTH 23 HOURS 11/12/2021 12:16 PM    CULTURE NO GROWTH 23 HOURS 11/12/2021 12:16 PM       Radiology:  XR CHEST (SINGLE VIEW FRONTAL)    Result Date: 11/6/2021  Interval improved aeration of the left lung base. XR CHEST (SINGLE VIEW FRONTAL)    Result Date: 11/5/2021  Interval decrease in left effusion. No pneumothorax. Persistent opacity at the left lung base. Vascularity is slightly prominent. XR CHEST PORTABLE    Result Date: 11/3/2021  No change from prior study. Persistent sizable left effusion with opacity at the left lung base and pulmonary vascular congestion. US THORACENTESIS Which side should the procedure be performed? Left    Result Date: 11/4/2021  Successful ultrasound guided thoracentesis. US RETROPERITONEAL COMPLETE    Result Date: 11/4/2021  1. Patient had no urge to void during the exam.  Nonvisualization of ureteral jets. 2. Incidental cholelithiasis. 3. Unremarkable renal ultrasound.   No hydronephrosis. Physical Examination:        General appearance: Intubated, sedated, obese  female   HEENT: ET and NG tubes present  Lungs:  Globally diminished lung sounds, mechanical breath sounds, with poor air movement  Heart: Controlled and irregular rhythm, no murmur  Abdomen:  soft, nontender, nondistended, normal bowel sounds, no masses, hepatomegaly, splenomegaly  Extremities:  no edema, redness, tenderness in the calves  Skin:  no gross lesions, rashes, induration    Assessment:        Hospital Problems           Last Modified POA    * (Principal) CASEY (acute kidney injury) ([address] 11/12/2021 Yes    Acute on chronic diastolic heart failure ([address] 11/12/2021 Yes    Persistent atrial fibrillation ([address] 11/12/2021 Yes    Recurrent pleural effusion 11/12/2021 Yes    Atrial flutter with rapid ventricular response ([address] 11/12/2021 Yes    Pneumonia due to COVID-19 virus 11/12/2021 Yes    Acute respiratory failure with hypoxemia ([address] 11/12/2021 Yes    Respiratory alkalosis 11/12/2021 No    Shock ([address] 11/12/2021 No    Benign essential HTN 11/12/2021 Yes    Pulmonary emphysema ([address] 11/12/2021 Yes    Chronic obstructive pulmonary disease with acute exacerbation ([address] 11/12/2021 Yes    Overview Signed 10/3/2021 12:43 PM by Jes Guadalupe MD     Last Assessment & Plan:   Formatting of this note might be different from the original.  COPD is worsening. Discussed monitoring symptoms and use of quick-relief medications and contacting us early in the course of exacerbations. Warning signs of respiratory distress were reviewed with the patient. Counseled to avoid exposure to cigarette smoke. Morbid obesity with BMI of 40.0-44.9, adult ([address] 11/12/2021 Yes    Overview Signed 10/3/2021 12:43 PM by Jes Guadalupe MD     Last Assessment & Plan:   Formatting of this note might be different from the original.  Obesity is worsening. Discussed the patient's BMI.   The BMI is above average; BMI management plan is completed. General weight loss/lifestyle modification strategies discussed (elicit support from others; identify saboteurs; non-food rewards, etc). Plan:        1. Acute respiratory failure with hypoxiapreviously requiring 2 L of oxygen. Now intubated (11/12/21) and requiring 45% FiO2. CXR reviewed from this morning. Worsening RLL and LLL infiltrates/effusions. Appreciate pulmonology input regarding these effusions. CT scan of the chest was also reviewed. 2. Respiratory alkalosis - vent settings to be adjusted. 3. COVID-19 infectioninfectious disease following. To receive Regeneron x1. Inflammatory markers are uptrending. Given Actemra. On Solu-Medrol. 4. CASEY - resolved  5. Shock - likely related to sedation. Appears to have been resolved. Blood cultures with no growth to date yet. Cortisol at noon yesterday was 9.3. 6. CXR from 11/09 reviewed. Continue oral diuretic - Bumex 1 mg BID. Wean supplemental oxygen at SNF  7. Afib with RVR / a flutter - cardiology following. Remains on amiodarone infusion. Remains on digoxin. Uptitrating/restarting metoprolol due to improvement of blood pressure. 8. COPD - continue alburterol, flovent  9. Pseudomonal UTI -completed course of ciprofloxacin  10. HFpEF - continue Bumex and Lopressor as her blood pressure is now able to tolerate. 11. Essential hypertension -restarted Bumex and Lopressor. 12. Morbid obesity - BMI 43.59.  13. Constipation - continue senna-s. Scheduled daily. Milk of mag also available. 14. PT/OT  15. Updated Shane Conroy, patient's son on 11/13. 16. Replace potassium today  17. Start trickle feeds. Appreciate dietitian input.       [address],   11/13/2021  7:00 PM

## 2021-11-15 LAB
ABSOLUTE EOS #: <0.03 K/UL (ref 0–0.44)
ABSOLUTE IMMATURE GRANULOCYTE: 0.44 K/UL (ref 0–0.3)
ABSOLUTE LYMPH #: 0.73 K/UL (ref 1.1–3.7)
ABSOLUTE MONO #: 0.91 K/UL (ref 0.1–1.2)
ALBUMIN SERPL-MCNC: 3.2 G/DL (ref 3.5–5.2)
ALLEN TEST: ABNORMAL
ANION GAP SERPL CALCULATED.3IONS-SCNC: 16 MMOL/L (ref 9–17)
BASOPHILS # BLD: 0 % (ref 0–2)
BASOPHILS ABSOLUTE: 0.03 K/UL (ref 0–0.2)
BUN BLDV-MCNC: 46 MG/DL (ref 8–23)
BUN/CREAT BLD: ABNORMAL (ref 9–20)
CALCIUM SERPL-MCNC: 8.8 MG/DL (ref 8.6–10.4)
CHLORIDE BLD-SCNC: 91 MMOL/L (ref 98–107)
CO2: 29 MMOL/L (ref 20–31)
CREAT SERPL-MCNC: 1 MG/DL (ref 0.5–0.9)
DIFFERENTIAL TYPE: ABNORMAL
EOSINOPHILS RELATIVE PERCENT: 0 % (ref 1–4)
FIO2: 40
GFR AFRICAN AMERICAN: >60 ML/MIN
GFR NON-AFRICAN AMERICAN: 54 ML/MIN
GFR SERPL CREATININE-BSD FRML MDRD: ABNORMAL ML/MIN/{1.73_M2}
GFR SERPL CREATININE-BSD FRML MDRD: ABNORMAL ML/MIN/{1.73_M2}
GLUCOSE BLD-MCNC: 167 MG/DL (ref 70–99)
GLUCOSE BLD-MCNC: 194 MG/DL (ref 74–100)
HCT VFR BLD CALC: 38.1 % (ref 36.3–47.1)
HEMOGLOBIN: 12.1 G/DL (ref 11.9–15.1)
IMMATURE GRANULOCYTES: 2 %
LYMPHOCYTES # BLD: 4 % (ref 24–43)
MCH RBC QN AUTO: 29.9 PG (ref 25.2–33.5)
MCHC RBC AUTO-ENTMCNC: 31.8 G/DL (ref 28.4–34.8)
MCV RBC AUTO: 94.1 FL (ref 82.6–102.9)
MODE: ABNORMAL
MONOCYTES # BLD: 5 % (ref 3–12)
NEGATIVE BASE EXCESS, ART: ABNORMAL (ref 0–2)
NRBC AUTOMATED: 0 PER 100 WBC
O2 DEVICE/FLOW/%: ABNORMAL
PATIENT TEMP: ABNORMAL
PDW BLD-RTO: 12.5 % (ref 11.8–14.4)
PHOSPHORUS: 3.9 MG/DL (ref 2.6–4.5)
PLATELET # BLD: 388 K/UL (ref 138–453)
PLATELET ESTIMATE: ABNORMAL
PMV BLD AUTO: 9.5 FL (ref 8.1–13.5)
POC HCO3: 32.8 MMOL/L (ref 21–28)
POC O2 SATURATION: 98 % (ref 94–98)
POC PCO2 TEMP: ABNORMAL MM HG
POC PCO2: 44.2 MM HG (ref 35–48)
POC PH TEMP: ABNORMAL
POC PH: 7.48 (ref 7.35–7.45)
POC PO2 TEMP: ABNORMAL MM HG
POC PO2: 97.8 MM HG (ref 83–108)
POSITIVE BASE EXCESS, ART: 8 (ref 0–3)
POTASSIUM SERPL-SCNC: 4.2 MMOL/L (ref 3.7–5.3)
PROCALCITONIN: 0.06 NG/ML
RBC # BLD: 4.05 M/UL (ref 3.95–5.11)
RBC # BLD: ABNORMAL 10*6/UL
SAMPLE SITE: ABNORMAL
SEG NEUTROPHILS: 89 % (ref 36–65)
SEGMENTED NEUTROPHILS ABSOLUTE COUNT: 17.94 K/UL (ref 1.5–8.1)
SODIUM BLD-SCNC: 136 MMOL/L (ref 135–144)
TCO2 (CALC), ART: ABNORMAL MMOL/L (ref 22–29)
TOTAL CK: 14 U/L (ref 26–192)
TRIGL SERPL-MCNC: 417 MG/DL
WBC # BLD: 20.1 K/UL (ref 3.5–11.3)
WBC # BLD: ABNORMAL 10*3/UL

## 2021-11-15 PROCEDURE — 99291 CRITICAL CARE FIRST HOUR: CPT | Performed by: INTERNAL MEDICINE

## 2021-11-15 PROCEDURE — 6360000002 HC RX W HCPCS: Performed by: INTERNAL MEDICINE

## 2021-11-15 PROCEDURE — 76937 US GUIDE VASCULAR ACCESS: CPT

## 2021-11-15 PROCEDURE — 6370000000 HC RX 637 (ALT 250 FOR IP): Performed by: INTERNAL MEDICINE

## 2021-11-15 PROCEDURE — 2000000000 HC ICU R&B

## 2021-11-15 PROCEDURE — 84478 ASSAY OF TRIGLYCERIDES: CPT

## 2021-11-15 PROCEDURE — 85025 COMPLETE CBC W/AUTO DIFF WBC: CPT

## 2021-11-15 PROCEDURE — 94640 AIRWAY INHALATION TREATMENT: CPT

## 2021-11-15 PROCEDURE — 2700000000 HC OXYGEN THERAPY PER DAY

## 2021-11-15 PROCEDURE — 82803 BLOOD GASES ANY COMBINATION: CPT

## 2021-11-15 PROCEDURE — 6370000000 HC RX 637 (ALT 250 FOR IP): Performed by: NURSE PRACTITIONER

## 2021-11-15 PROCEDURE — 2500000003 HC RX 250 WO HCPCS: Performed by: INTERNAL MEDICINE

## 2021-11-15 PROCEDURE — 94761 N-INVAS EAR/PLS OXIMETRY MLT: CPT

## 2021-11-15 PROCEDURE — 94003 VENT MGMT INPAT SUBQ DAY: CPT

## 2021-11-15 PROCEDURE — 82947 ASSAY GLUCOSE BLOOD QUANT: CPT

## 2021-11-15 PROCEDURE — 37799 UNLISTED PX VASCULAR SURGERY: CPT

## 2021-11-15 PROCEDURE — 0BJ08ZZ INSPECTION OF TRACHEOBRONCHIAL TREE, VIA NATURAL OR ARTIFICIAL OPENING ENDOSCOPIC: ICD-10-PCS | Performed by: INTERNAL MEDICINE

## 2021-11-15 PROCEDURE — 6360000002 HC RX W HCPCS: Performed by: NURSE PRACTITIONER

## 2021-11-15 PROCEDURE — 82550 ASSAY OF CK (CPK): CPT

## 2021-11-15 PROCEDURE — 31622 DX BRONCHOSCOPE/WASH: CPT | Performed by: INTERNAL MEDICINE

## 2021-11-15 PROCEDURE — 2580000003 HC RX 258: Performed by: INTERNAL MEDICINE

## 2021-11-15 PROCEDURE — 99232 SBSQ HOSP IP/OBS MODERATE 35: CPT | Performed by: INTERNAL MEDICINE

## 2021-11-15 PROCEDURE — 80069 RENAL FUNCTION PANEL: CPT

## 2021-11-15 PROCEDURE — 84145 PROCALCITONIN (PCT): CPT

## 2021-11-15 RX ORDER — ATORVASTATIN CALCIUM 40 MG/1
40 TABLET, FILM COATED ORAL DAILY
Status: DISCONTINUED | OUTPATIENT
Start: 2021-11-16 | End: 2021-11-20 | Stop reason: HOSPADM

## 2021-11-15 RX ORDER — ALBUTEROL SULFATE 90 UG/1
2 AEROSOL, METERED RESPIRATORY (INHALATION) 4 TIMES DAILY
Status: DISCONTINUED | OUTPATIENT
Start: 2021-11-16 | End: 2021-11-20 | Stop reason: HOSPADM

## 2021-11-15 RX ORDER — ASCORBIC ACID 500 MG
500 TABLET ORAL DAILY
Status: DISCONTINUED | OUTPATIENT
Start: 2021-11-16 | End: 2021-11-20 | Stop reason: HOSPADM

## 2021-11-15 RX ORDER — SENNA AND DOCUSATE SODIUM 50; 8.6 MG/1; MG/1
2 TABLET, FILM COATED ORAL DAILY
Status: DISCONTINUED | OUTPATIENT
Start: 2021-11-16 | End: 2021-11-20 | Stop reason: HOSPADM

## 2021-11-15 RX ORDER — CLOPIDOGREL BISULFATE 75 MG/1
75 TABLET ORAL DAILY
Status: DISCONTINUED | OUTPATIENT
Start: 2021-11-16 | End: 2021-11-20 | Stop reason: HOSPADM

## 2021-11-15 RX ORDER — CITALOPRAM 20 MG/1
20 TABLET ORAL DAILY
Status: DISCONTINUED | OUTPATIENT
Start: 2021-11-16 | End: 2021-11-20 | Stop reason: HOSPADM

## 2021-11-15 RX ORDER — ALBUTEROL SULFATE 90 UG/1
2 AEROSOL, METERED RESPIRATORY (INHALATION) EVERY 4 HOURS PRN
Status: DISCONTINUED | OUTPATIENT
Start: 2021-11-15 | End: 2021-11-20 | Stop reason: HOSPADM

## 2021-11-15 RX ORDER — LIDOCAINE HYDROCHLORIDE 10 MG/ML
6 INJECTION, SOLUTION EPIDURAL; INFILTRATION; INTRACAUDAL; PERINEURAL ONCE
Status: COMPLETED | OUTPATIENT
Start: 2021-11-15 | End: 2021-11-15

## 2021-11-15 RX ORDER — BUMETANIDE 1 MG/1
1 TABLET ORAL 2 TIMES DAILY
Status: DISCONTINUED | OUTPATIENT
Start: 2021-11-16 | End: 2021-11-20 | Stop reason: HOSPADM

## 2021-11-15 RX ADMIN — CITALOPRAM 20 MG: 20 TABLET, FILM COATED ORAL at 09:58

## 2021-11-15 RX ADMIN — PROPOFOL 30 MCG/KG/MIN: 10 INJECTION, EMULSION INTRAVENOUS at 01:15

## 2021-11-15 RX ADMIN — BUMETANIDE 1 MG: 1 TABLET ORAL at 09:58

## 2021-11-15 RX ADMIN — APIXABAN 5 MG: 5 TABLET, FILM COATED ORAL at 22:44

## 2021-11-15 RX ADMIN — DOCUSATE SODIUM 50MG AND SENNOSIDES 8.6MG 2 TABLET: 8.6; 5 TABLET, FILM COATED ORAL at 09:59

## 2021-11-15 RX ADMIN — DEXAMETHASONE 6 MG: 4 TABLET ORAL at 09:58

## 2021-11-15 RX ADMIN — DIGOXIN 125 MCG: 125 TABLET ORAL at 09:58

## 2021-11-15 RX ADMIN — METOPROLOL TARTRATE 25 MG: 25 TABLET ORAL at 22:46

## 2021-11-15 RX ADMIN — DESMOPRESSIN ACETATE 40 MG: 0.2 TABLET ORAL at 09:58

## 2021-11-15 RX ADMIN — OXYCODONE HYDROCHLORIDE AND ACETAMINOPHEN 500 MG: 500 TABLET ORAL at 09:58

## 2021-11-15 RX ADMIN — CIPROFLOXACIN 400 MG: 2 INJECTION, SOLUTION INTRAVENOUS at 13:17

## 2021-11-15 RX ADMIN — PROPOFOL 30 MCG/KG/MIN: 10 INJECTION, EMULSION INTRAVENOUS at 09:57

## 2021-11-15 RX ADMIN — SODIUM CHLORIDE, PRESERVATIVE FREE 10 ML: 5 INJECTION INTRAVENOUS at 22:46

## 2021-11-15 RX ADMIN — LIDOCAINE HYDROCHLORIDE 6 ML: 10 INJECTION, SOLUTION EPIDURAL; INFILTRATION; INTRACAUDAL; PERINEURAL at 12:23

## 2021-11-15 RX ADMIN — Medication 3 MG: at 22:45

## 2021-11-15 RX ADMIN — SODIUM CHLORIDE, PRESERVATIVE FREE 10 ML: 5 INJECTION INTRAVENOUS at 10:00

## 2021-11-15 RX ADMIN — LACTULOSE 10 G: 20 SOLUTION ORAL at 14:21

## 2021-11-15 RX ADMIN — FENTANYL CITRATE 50 MCG: 50 INJECTION, SOLUTION INTRAMUSCULAR; INTRAVENOUS at 12:18

## 2021-11-15 RX ADMIN — LACTULOSE 10 G: 20 SOLUTION ORAL at 22:45

## 2021-11-15 RX ADMIN — IPRATROPIUM BROMIDE AND ALBUTEROL SULFATE 1 AMPULE: .5; 2.5 SOLUTION RESPIRATORY (INHALATION) at 16:28

## 2021-11-15 RX ADMIN — CLOPIDOGREL 75 MG: 75 TABLET, FILM COATED ORAL at 09:59

## 2021-11-15 RX ADMIN — LACTULOSE 10 G: 20 SOLUTION ORAL at 09:59

## 2021-11-15 RX ADMIN — IPRATROPIUM BROMIDE AND ALBUTEROL SULFATE 1 AMPULE: .5; 2.5 SOLUTION RESPIRATORY (INHALATION) at 08:20

## 2021-11-15 RX ADMIN — AMIODARONE HYDROCHLORIDE 0.5 MG/MIN: 50 INJECTION, SOLUTION INTRAVENOUS at 13:20

## 2021-11-15 RX ADMIN — CIPROFLOXACIN 400 MG: 2 INJECTION, SOLUTION INTRAVENOUS at 02:00

## 2021-11-15 RX ADMIN — PROPOFOL 30 MCG/KG/MIN: 10 INJECTION, EMULSION INTRAVENOUS at 06:25

## 2021-11-15 RX ADMIN — METOPROLOL TARTRATE 25 MG: 25 TABLET ORAL at 09:58

## 2021-11-15 RX ADMIN — APIXABAN 5 MG: 5 TABLET, FILM COATED ORAL at 09:58

## 2021-11-15 ASSESSMENT — PAIN SCALES - WONG BAKER

## 2021-11-15 ASSESSMENT — PULMONARY FUNCTION TESTS
PIF_VALUE: 20
PIF_VALUE: 14
PIF_VALUE: 14
PIF_VALUE: 19
PIF_VALUE: 23

## 2021-11-15 ASSESSMENT — PAIN SCALES - GENERAL
PAINLEVEL_OUTOF10: 0
PAINLEVEL_OUTOF10: 3
PAINLEVEL_OUTOF10: 7
PAINLEVEL_OUTOF10: 0

## 2021-11-15 ASSESSMENT — PAIN DESCRIPTION - PAIN TYPE: TYPE: ACUTE PAIN

## 2021-11-15 ASSESSMENT — ENCOUNTER SYMPTOMS: TACHYPNEA: 1

## 2021-11-15 NOTE — PROGRESS NOTES
Physical Therapy        Physical Therapy Cancel Note      DATE: 11/15/2021    NAME: Wagner Lamb  MRN: [id number]   : 1945      Patient not seen this date for Physical Therapy due to: Other: Per RN pt inappropriate to participate in PT this date, will check back tomorrow.       Electronically signed by Bharath Renner PTA on 11/15/2021 at 3:00 PM

## 2021-11-15 NOTE — PROGRESS NOTES
Physician Progress Note      PATIENT:               Jeremiah Rivas  CSN #:                  [id number]  :                       1945  ADMIT DATE:       11/3/2021 4:32 PM  DISCH DATE:  RESPONDING  PROVIDER #:        Jatinder Mann DO          QUERY TEXT:    Pt admitted with Acute respiratory failure secondary to COPD and Diastolic CHF   and noted to have  for Covid-19 with mention of possible PNA on Chest X-ray. Please document in progress notes and discharge summary if you are evaluating   or treating any of the following: The medical record reflects the following:  Risk Factors: COPD, CHF, public exposure  Clinical Indicators: resp 44>47, pulse 128, temp 101.3, BP   165//150>160/106<SpO2 88%, FiO2 45 %, ABG with CO2 of 76.1 with bicarb of   36.2. CRP:31.2, Ferritin 452  , WBC 12.1, Procalcitonin:0.09,  CC  note   She does have bilateral wheezing. CXR with right mid to lower lobe opacity,   concern for pneumonia,  SARS-CoV-2, Rapid   Detected, 11/3 SARS-CoV-2   Rapid Not Detected  Treatment: BiPAP , fluticasone 110 MCG/ACT inhaler, Duoneb nebulizer,   predniSONE tablet 20 mg, methylPREDNISolone relikv08 mg IV, ETT adult Placed   0453 2021. put on ventilator     Thank you, Please call if questions  Lilli Castillo RN [facility]  [phone]  Options provided:  -- Pneumonia due to COVID-19  -- covid -23 with out pneumonia  -- Other - I will add my own diagnosis  -- Disagree - Not applicable / Not valid  -- Disagree - Clinically unable to determine / Unknown  -- Refer to Clinical Documentation Reviewer    PROVIDER RESPONSE TEXT:    This patient has pneumonia due to COVID-19.     Query created by: Kdaen Palma on 11/15/2021 12:47 PM      Electronically signed by:  Jatinder Mann DO 11/15/2021 12:59 PM

## 2021-11-15 NOTE — PROGRESS NOTES
[facility]  Office: [address], DO, Dieudonnemarcuspatsy Tomeka, DO, Silvano Jensen, DO, Mary Haideranne Luu, DO, Anna Dang MD, José Manuel Pelaez MD, Yuliet Hylton MD, Tom Hernández MD, Kath Granados MD, Kvng Pagan MD, Elsa Sol MD, Joyce Martinez, DO, Venice Welch, DO, Gayathri Lang MD,  Mia Rahman DO, Dietrich Halsted, MD, Lindsay Lu MD, Dejon Britt MD, Rufina Hines MD, Alana Spear MD, Javier Macias MD, Lashanda Kidd MD, Lauren Giles [facility], [facility] Louiemiesha, CNP, Flip Sunshine, CNP, Mara Burgos, CNS, Genna Burger, CNP, Donnell Martinez, CNP, Eric Montenegro, CNP, Jabari Rogers, CNP, Laurie Wagoner, CNP, Anand Wallace, PA-C, Vu Mcconnell DNP, Janneth Overton, CNP, Torrey Ghosh, CNP, Pamela Echavarria CNP, Nolberto Garrido, CNP, Jhony Valencia, CNP, Jasmian Ellison, CNP, Shaun Leyva, [address] [place] [address]    Progress Note    11/15/2021    1:00 PM    Name:   Judy Jimenez  MRN:     [id number]     Acct:      [de-identified]   Room:   [address] [place] [address] Day:  12  Admit Date:  11/3/2021  4:32 PM    PCP:   KALYN Randolph CNP  Code Status:  Full Code    Subjective:     C/C:   Chief Complaint   Patient presents with    Leg Swelling     increasing BLE edema; pt from SNF; per PA at SNF, pt has fluid retention, needs IV meds that they cannot administer     Interval History Status: improved    Pt seen and examined this morning. No acute events overnight. Underwent spontaneous breathing trials without difficulty yesterday afternoon. Undergoing bronchoscopy this morning with pulmonology at bedside. She is still awake and following commands. Reports that she is comfortable. Planning for extubation later today.     Brief History:     Patient presented to the ER with complaints of leg swelling, shortness of breath with a productive cough (thin white sputum) and constipation.  She states she chronically has lower extremity edema Problem: Knowledge Deficit  Goal: Patient/family/caregiver demonstrates understanding of disease process, treatment plan, medications, and discharge instructions  Complete learning assessment and assess knowledge base    Interventions:  - Provide teaching at level of understanding  - Provide teaching via preferred learning methods  Outcome: Progressing left side greater than right she thinks that is increased over the last couple days. [place] was recently discharged on 10/15 after being admitted related to a pleural effusion.  At that time she presented with shortness of breath and palpitations. Cora Guzman is found to have a left lower lobe pneumonia and A. fib with RVR and was admitted to the ICU for acute hypoxic respiratory failure.  During that visit the patient had a paracentesis, Negative CURT and subsequently cardioverted successfully, and a bronchoscopy that showed left main bronchus obstruction due to thick mucous plugging.  At discharge the patient was encouraged to follow-up with cardiology outpatient for an ablation.  During that visit she was also treated for UTI and was discharged on Augmentin for 5 days. Cora Guzman was also given inhalers and steroids for COPD.     On arrival to the ER today she complains of not feeling well over the last 3 or 4 days. Cora Guzman describes worsening shortness of breath primarily on exertion and orthopnea.  The patient told me that she has not been using oxygen on an outpatient basis but according to the ER note she has been using it intermittently primarily with exertion and therapy and occasionally overnight.  Also complains of constipation and mild abdominal bloating.  She also reports nausea and vomiting few days ago but nothing current and she has been tolerating p.o. intake.  She states she is compliant with her home medications.     Patient was placed on 2 L nasal cannula in the ER. Coar Guzman has been afebrile heart rates been in the 90s to the low 110's A. Fib.     Chest x-ray shows no acute changes from prior study with a has a persistent sizable left effusion with opacity at the left lung base and pulmonary vascular congestion. Sodium is 129 BUN and creatinine are 26/1. 72.  Her normal BUN and creatinine are around 15/0.58 and normal sodiums in the mid 130s.   BNP is 3134.  On her last admission it MARGAUX 2672 and on her admission in early October it was 3251.   WBCs 14.5 seems to run slightly elevated     Plan to consult nephrology and pulmonary medicine. Continue home Eliquis, beta-blocker and amiodarone.  Hold losartan  Plan Solu-Medrol related to IV wheezing throughout. It appears to be in fluid overload related to pitting edema in her lower extremities and increasing shortness of breath.  Plan to hold further hydration for now.  Give Lasix 20 mg IV x1    Patient was being transitioned to SNF. Unfortunately was discovered to be Covid positive. Approximately 12 hours later, patient was in respiratory distress  Intubated later that morning (11/12/2021)    Underwent bronchoscopy at bedside on 11/15. Planning for extubation later today on 11/15. Review of Systems:     Unable to obtain as patient is currently intubated and sedated  She says she is comfortable and is not in pain  She denies being short of breath     Medications: Allergies:     Allergies   Allergen Reactions    Latex Hives     Hives from rubber gloves    Betadine [Povidone Iodine] Hives    Bee Venom Swelling    Dilaudid [Hydromorphone Hcl] Other (See Comments)     Severe confusion    Adhesive Tape Rash    Sulfa Antibiotics Rash       Current Meds:   Scheduled Meds:    ciprofloxacin  400 mg IntraVENous Q12H    dexamethasone  6 mg Oral Daily    lactulose  10 g Oral TID    sennosides-docusate sodium  2 tablet Per NG tube Daily    metoprolol tartrate  25 mg Per NG tube BID    digoxin  125 mcg Per NG tube Daily    clopidogrel  75 mg Per NG tube Daily    citalopram  20 mg Per NG tube Daily    bumetanide  1 mg Per NG tube BID    atorvastatin  40 mg Per NG tube Daily    vitamin C  500 mg Per NG tube Daily    apixaban  5 mg Per NG tube BID    [Held by provider] bumetanide  1 mg IntraVENous BID    ipratropium-albuterol  1 ampule Inhalation 4x daily    lidocaine 1 % injection  5 mL IntraDERmal Once    sodium chloride flush  5-40 mL IntraVENous 2 times per day     Continuous Infusions:    propofol 20 mcg/kg/min (11/15/21 1100)    norepinephrine      sodium chloride      amiodarone 0.5 mg/min (21)     PRN Meds: metoprolol, albuterol, sodium chloride flush, sodium chloride, fentanNYL, magnesium sulfate, potassium chloride **OR** potassium alternative oral replacement **OR** potassium chloride, magnesium hydroxide, melatonin, ondansetron **OR** ondansetron, acetaminophen **OR** acetaminophen    Data:     Past Medical History:   has a past medical history of Asthma, COPD (chronic obstructive pulmonary disease) ([facility] Gout, Hyperlipidemia, Hypertension, Mitral insufficiency, and Pneumonia. Social History:   reports that she has quit smoking. She has never used smokeless tobacco. She reports current alcohol use. She reports that she does not use drugs. Family History:   Family History   Problem Relation Age of Onset    High Blood Pressure Mother     Cancer Mother     Asthma Father     Heart Disease Father     Cancer Father        Vitals:  BP (!) 142/78   Pulse 92   Temp 96.8 °F (36 °C) (Axillary)   Resp (!) 35   Ht 5' 4\" (1.626 m)   Wt 238 lb 11.2 oz (108.3 kg)   SpO2 96%   BMI 40.97 kg/m²   Temp (24hrs), Av.4 °F (36.3 °C), Min:96.8 °F (36 °C), Max:98.6 °F (37 °C)    Recent Labs     21  0308 21  0459 21  1410 11/15/21  0336   POCGLU 158* 160* 152* 194*       I/O (24Hr):     Intake/Output Summary (Last 24 hours) at 11/15/2021 1300  Last data filed at 11/15/2021 0400  Gross per 24 hour   Intake 1428.76 ml   Output 1480 ml   Net -51.24 ml       Labs:  Hematology:  Recent Labs     21  0444 21  0350 11/15/21  0356   WBC 10.6 19.0* 20.1*   RBC 3.80* 3.82* 4.05   HGB 11.6* 11.9 12.1   HCT 35.0* 35.5* 38.1   MCV 92.1 92.9 94.1   MCH 30.5 31.2 29.9   MCHC 33.1 33.5 31.8   RDW 12.5 12.6 12.5    347 388   MPV 9.6 9.7 9.5     Chemistry:  Recent Labs     21  0444 21  0350 11/15/21  0356   * 127* 136   K 3.6* 4.2 4.2   CL 91* 85* 91*   CO2 27 29 29   GLUCOSE 155* 155* 167*   BUN 29* 42* 46*   CREATININE 0.87 1.18* 1.00*   ANIONGAP 14 13 16   LABGLOM >60 45* 54*   GFRAA >60 54* >60   CALCIUM 8.5* 8.6 8.8   PHOS 4.2 3.3 3.9   CKTOTAL 13*  --  14*     Recent Labs     11/13/21  0253 11/13/21  0444 11/13/21  0444 11/13/21  1804 11/14/21  0308 11/14/21  0350 11/14/21  0459 11/14/21  1410 11/15/21  0336 11/15/21  0356   LABALBU  --  3.0*  --   --   --  3.1*  --   --   --  3.2*   TRIG  --  215*   < >  --   --   --   --   --   --  417*   POCGLU 159*  --   --  144* 158*  --  160* 152* 194*  --     < > = values in this interval not displayed. ABG:  Lab Results   Component Value Date    POCPH 7.479 11/15/2021    PH 7.44 03/30/2016    POCPCO2 44.2 11/15/2021    PCO2 36 03/30/2016    POCPO2 97.8 11/15/2021    PO2 64 03/30/2016    POCHCO3 32.8 11/15/2021    HCO3 24 03/30/2016    NBEA NOT REPORTED 11/15/2021    PBEA 8 11/15/2021    AMH5XHE NOT REPORTED 11/15/2021    QZUX2CFQ 98 11/15/2021    O2SAT 93 03/30/2016    FIO2 40.0 11/15/2021     Lab Results   Component Value Date/Time    SPECIAL LT HAND 2 ML 11/14/2021 07:26 PM     Lab Results   Component Value Date/Time    CULTURE NO GROWTH 14 HOURS 11/14/2021 07:26 PM       Radiology:  XR CHEST (SINGLE VIEW FRONTAL)    Result Date: 11/6/2021  Interval improved aeration of the left lung base. XR CHEST (SINGLE VIEW FRONTAL)    Result Date: 11/5/2021  Interval decrease in left effusion. No pneumothorax. Persistent opacity at the left lung base. Vascularity is slightly prominent. XR CHEST PORTABLE    Result Date: 11/3/2021  No change from prior study. Persistent sizable left effusion with opacity at the left lung base and pulmonary vascular congestion. US THORACENTESIS Which side should the procedure be performed? Left    Result Date: 11/4/2021  Successful ultrasound guided thoracentesis. US RETROPERITONEAL COMPLETE    Result Date: 11/4/2021  1.  Patient had no urge to void during the exam.  Nonvisualization of ureteral jets. 2. Incidental cholelithiasis. 3. Unremarkable renal ultrasound. No hydronephrosis. Physical Examination:        General appearance: Intubated, sedated, obese  female, alert  HEENT: ET and NG tubes present  Lungs:  Globally diminished lung sounds, mechanical breath sounds, with poor air movement  Heart: Controlled and irregular rhythm, no murmur  Abdomen:  soft, nontender, nondistended, normal bowel sounds, no masses, hepatomegaly, splenomegaly  : goldstein present  Extremities:  no edema, redness, tenderness in the calves; left radial art line present  Skin:  no gross lesions, rashes, induration    Assessment:        Hospital Problems           Last Modified POA    * (Principal) CASEY (acute kidney injury) ([address] 11/12/2021 Yes    Acute on chronic diastolic heart failure ([address] 11/12/2021 Yes    Persistent atrial fibrillation ([address] 11/12/2021 Yes    Recurrent pleural effusion 11/12/2021 Yes    Atrial flutter with rapid ventricular response ([address] 11/12/2021 Yes    Pneumonia due to COVID-19 virus 11/12/2021 Yes    Acute respiratory failure with hypoxemia ([address] 11/12/2021 Yes    Respiratory alkalosis 11/12/2021 No    Shock ([address] 11/12/2021 No    Benign essential HTN 11/12/2021 Yes    Pulmonary emphysema ([address] 11/12/2021 Yes    Chronic obstructive pulmonary disease with acute exacerbation ([address] 11/12/2021 Yes    Overview Signed 10/3/2021 12:43 PM by Pio De La Cruz MD     Last Assessment & Plan:   Formatting of this note might be different from the original.  COPD is worsening. Discussed monitoring symptoms and use of quick-relief medications and contacting us early in the course of exacerbations. Warning signs of respiratory distress were reviewed with the patient. Counseled to avoid exposure to cigarette smoke.          Morbid obesity with BMI of 40.0-44.9, adult ([address] 11/12/2021 Yes    Overview Signed 10/3/2021 12:43 PM by Pio De La Cruz MD Last Assessment & Plan:   Formatting of this note might be different from the original.  Obesity is worsening. Discussed the patient's BMI. The BMI is above average; BMI management plan is completed. General weight loss/lifestyle modification strategies discussed (elicit support from others; identify saboteurs; non-food rewards, etc). Moderate malnutrition ([address] 11/14/2021 Clinically Undetermined          Plan:        1. Acute respiratory failure with hypoxiapreviously requiring 2 L of oxygen. Intubated (11/12/21) planning for extubation later today. Discussed with pulmonology today. CT scan of the chest was also reviewed. 2. Respiratory alkalosis - improving with adjustment of vent settings  3. COVID-19 infectioninfectious disease following. Received Regeneron x1. Inflammatory markers are uptrending. Given Actemra. Continue Decadron  4. New leukocytosis - restarted on cipro. Recheck cultures. No growth x14 hours. Await infectious disease input. Recheck procalcitonin  5. CASEY - resolved. Creatinine uptrending slightly. Monitor. 6. Shock - likely related to sedation. Appears to have been resolved. Blood cultures with no growth to date yet. Cortisol at noon yesterday was 9.3.    7. Hypertriglyceridemialikely secondary to propofol administration. Patient likely to be extubated later today. Stop propofol at that time. 8. CXR from 11/09 reviewed. Continue oral diuretic - Bumex 1 mg BID. 9. Afib with RVR / a flutter - cardiology following. Remains on amiodarone infusion. Remains on digoxin. Uptitrating/restarting metoprolol due to improvement of blood pressure. Appreciate further titration/management via cardiology's recommendations  10. COPD - continue alburterol, flovent  11. Pseudomonal UTI - completed course of ciprofloxacin; restarted on ciprofloxacin secondary to new leukocytosis.   12. HFpEF - continue Bumex and Lopressor as her blood pressure is now able to tolerate. 13. Essential hypertension -continue Bumex and Lopressor. 14. Morbid obesity - BMI 43.59.  15. Constipation - continue senna-s. Scheduled daily. Milk of mag also available. Add lactulose today  16. PT/OT  17. Updated Sherry Laky, patient's son on 11/13.  18. Disposition: Hopefully SNF by the end of the week.       [address] Fabiana Marion DO  11/15/2021  1:00 PM

## 2021-11-15 NOTE — PROGRESS NOTES
[facility]   Progress Note                   Date:   11/15/2021  Patient name: Doni Dunaway  Date of admission:  11/3/2021  4:32 PM  MRN:   [id number]  YOB: 1945  PCP: KALYN Abdullahi CNP    Reason for Admission: CASEY (acute kidney injury) ([facility] [N17.9]  Acute on chronic congestive heart failure, unspecified heart failure type ([facility] [I50.9]    Subjective:       Clinical Changes / Abnormalities: Discussed with RN. No acute CV issues/concerns. Remains intubated on vent. Plans for possible extubation if remains calm. Remains Afib with rate 90s. Medications:   Scheduled Meds:   ciprofloxacin  400 mg IntraVENous Q12H    dexamethasone  6 mg Oral Daily    lactulose  10 g Oral TID    sennosides-docusate sodium  2 tablet Per NG tube Daily    metoprolol tartrate  25 mg Per NG tube BID    digoxin  125 mcg Per NG tube Daily    clopidogrel  75 mg Per NG tube Daily    citalopram  20 mg Per NG tube Daily    bumetanide  1 mg Per NG tube BID    atorvastatin  40 mg Per NG tube Daily    vitamin C  500 mg Per NG tube Daily    apixaban  5 mg Per NG tube BID    [Held by provider] bumetanide  1 mg IntraVENous BID    ipratropium-albuterol  1 ampule Inhalation 4x daily    lidocaine 1 % injection  5 mL IntraDERmal Once    sodium chloride flush  5-40 mL IntraVENous 2 times per day     Continuous Infusions:   propofol 15 mcg/kg/min (11/15/21 1321)    norepinephrine      sodium chloride      amiodarone 0.5 mg/min (11/15/21 1320)     CBC:   Recent Labs     11/13/21  0444 11/14/21  0350 11/15/21  0356   WBC 10.6 19.0* 20.1*   HGB 11.6* 11.9 12.1    347 388     BMP:    Recent Labs     11/13/21  0444 11/14/21  0350 11/15/21  0356   * 127* 136   K 3.6* 4.2 4.2   CL 91* 85* 91*   CO2 27 29 29   BUN 29* 42* 46*   CREATININE 0.87 1.18* 1.00*   GLUCOSE 155* 155* 167*     Hepatic: No results for input(s): AST, ALT, ALB, BILITOT, ALKPHOS in the last 72 hours.   Troponin: No results for input(s): TROPHS in the last 72 hours. BNP: No results for input(s): BNP in the last 72 hours. Lipids: No results for input(s): CHOL, HDL in the last 72 hours. Invalid input(s): LDLCALCU  INR: No results for input(s): INR in the last 72 hours. Objective:   Vitals: BP (!) 142/78   Pulse 85   Temp 97.2 °F (36.2 °C) (Axillary)   Resp 19   Ht 5' 4\" (1.626 m)   Wt 238 lb 11.2 oz (108.3 kg)   SpO2 96%   BMI 40.97 kg/m²      For careful stewardship of limited PPE during COVID-19 pandemic my physical exam was deferred.  For physical exam, please see today's physical from primary team or ICU team.       Assessment / Acute Cardiac Problems:     AF with RVR- recurrent  Resp failure  COPD  COVID 19  Borderline hypotension    Patient Active Problem List:     Asthma     Benign essential HTN     Morbid obesity with BMI of 40.0-44.9, adult ([address]     Open bimalleolar fracture of left ankle     Hyperlipidemia     COPD exacerbation (HCC)     Pulmonary emphysema (HCC)     Postoperative confusion     Acute on chronic respiratory failure with hypoxia (HCC)     Simple chronic bronchitis (HCC)     Atrial fibrillation with rapid ventricular response ([address]     Pneumonia of left lower lobe due to infectious organism     Sepsis with acute hypoxic respiratory failure without septic shock (HCC)     Acute on chronic diastolic heart failure (HCC)     Hyponatremia     Hypokalemia     Elevated troponin     Anemia     Arthritis     At high risk for falls     Colon polyps     Heart murmur     Legally blind in right eye, as defined in [place]     Chronic obstructive pulmonary disease with acute exacerbation (HCC)     Persistent atrial fibrillation (HCC)     Pleural effusion     Recurrent pleural effusion     Mucus plugging of bronchi     Atelectasis of left lung     Acute chest pain     Coag negative Staphylococcus bacteremia     Abnormal pleural fluid     CASEY (acute kidney injury) ([address]     Acute on chronic congestive heart failure (HCC)     Atrial flutter with rapid ventricular response (HCC)     COVID-19 virus infection     Acute respiratory failure with hypoxemia (HCC)     Respiratory alkalosis     Shock ([facility]      Plan of Treatment:   1. Afib/fluter. Continue amiodarone gtt. On dig. Rate stable. On eliquis  2. HTN- stable. Off pressors. BP stable. Continue low dose BB.    3. Will likely need Ablation as oupt once acute issues resolve    Electronically signed by KALYN Salgado CNP on 11/15/2021 at [address].  [phone] Additional Safety/Bands:

## 2021-11-15 NOTE — PROGRESS NOTES
[facility]upational [address]  Occupational Therapy Not Seen Note    DATE: 11/15/2021    NAME: Atilio Sargent  MRN: [id number]   : 1945      Patient not seen this date for Occupational Therapy due to: Other: Pt intubated at this time, not appropriate for therapy. Re-chk 18.         Electronically signed by KRIS Gerard on 11/15/2021 at 3:00 PM

## 2021-11-15 NOTE — PLAN OF CARE
Problem: OXYGENATION/RESPIRATORY FUNCTION  Goal: Patient will maintain patent airway  11/15/2021 0543 by Lessie Nageotte, RN  Outcome: Ongoing     Problem: ACTIVITY INTOLERANCE/IMPAIRED MOBILITY  Goal: Mobility/activity is maintained at optimum level for patient  11/15/2021 0543 by Lessie Nageotte, RN  Outcome: Ongoing     Problem: MECHANICAL VENTILATION  Goal: Patient will maintain patent airway  11/15/2021 0543 by Lessie Nageotte, RN  Outcome: Ongoing     Problem: MECHANICAL VENTILATION  Goal: Mobility/activity is maintained at optimum level for patient  11/15/2021 0543 by Lessie Nageotte, RN  Outcome: Ongoing     Problem: Non-Violent Restraints  Goal: Removal from restraints as soon as assessed to be safe  11/15/2021 0543 by Lessie Nageotte, RN  Outcome: Ongoing     Problem: Non-Violent Restraints  Goal: No harm/injury to patient while restraints in use  11/15/2021 0543 by Lessie Nageotte, RN  Outcome: Ongoing     Problem: Non-Violent Restraints  Goal: Patient's dignity will be maintained  11/15/2021 0543 by Lessie Nageotte, RN  Outcome: Ongoing

## 2021-11-15 NOTE — PLAN OF CARE
Patient will remain free from injury, turning every 2 hours and prn to maintain skin integrity, patient will remain free from pain, oral care and incontinent care Q 2 hours and prn

## 2021-11-15 NOTE — PROGRESS NOTES
Order obtain for extubation. SpO2 of 97 on 30% FiO2. Patient extubated and placed on 6 liters/min via nasal cannula. Post extubation SpO2 is 96% with HR  113 bpm and RR 27 breaths/min. Patient had moderate cough that was non-productive. Extubation Well tolerated by patient. .   Breath Sounds: Diminished throughout.      Nathan Soliz RCP   6:47 PM

## 2021-11-15 NOTE — PROGRESS NOTES
PULMONARY & CRITICAL CARE MEDICINE PROGRESS NOTE     Patient:  Sebastian Shah  MRN: [id number]  Admit date: 11/3/2021  Primary Care Physician: KALYN Newton CNP  Consulting Physician: Estefani Cardona DO  CODE Status: Full Code  LOS: 12     SUBJECTIVE     CHIEF COMPLAINT/REASON FOR INITIAL CONSULT: Acute respiratory failure/COVID-19 pneumonia    BRIEF HOSPITAL COURSE:   The patient is a 68 y.o. female here for evaluation of shortness of breath with clear sputum, increased leg swelling.  Patient has chronic shortness of breath which has been worsening over the last couple of days along with increased swelling of her lower extremity.  She has conversational dyspnea, no wheezing.  Chest x-ray shows left pleural effusion and left atelectasis she has had multiple thoracentesis.   I performed a bronchoscopic evaluation on due to persistent left lower lobe atelectasis and she had extensive mucus plugging on the left side    INTERVAL HISTORY:  11/15/21  Remains on mechanical ventilation  Bedside bronchoscopy done to clear secretions in the dependent lobes  Hemodynamically stable, on amiodarone infusion  Tolerating spontaneous mode of ventilation  Extubated without any immediate complications    REVIEW OF SYSTEMS:  Unobtainable from patient due to sedation/mechanical ventilation    OBJECTIVE     VENTILATOR SETTINGS:  Vent Information  $Ventilation: $Subsequent Day  Skin Assessment: Clean, dry, & intact  Suction Catheter Diameter: 14  Equipment Changed: HME  Vent Type: Servo i  Vent Mode: CPAP  Vt Ordered: 380 mL  Rate Set: 20 bmp  Pressure Support: 8 cmH20  FiO2 : 30 %  SpO2: 98 %  SpO2/FiO2 ratio: 323.33  Sensitivity: 5  PEEP/CPAP: 5  I Time/ I Time %: 0.7 s  Humidification Source: HME  Nitric Oxide/Epoprostenol In Use?: No  Mask Type: Full face mask  Mask Size: Medium     VITAL SIGNS:   LAST:  BP (!) 142/78   Pulse 94   Temp 97.2 °F (36.2 °C) (Axillary)   Resp 21   Ht 5' 4\" (1.626 m)   Wt 238 lb 11.2 oz (108.3 kg)   SpO2 98%   BMI 40.97 kg/m²   8-24 HR RANGE:  TEMP Temp  Av.2 °F (36.2 °C)  Min: 96.8 °F (36 °C)  Max: 97.9 °F (83.2 °C)   BP Systolic (88FEF), TAS:168 , Min:140 , VZE:898      Diastolic (45GDA), XCQ:42, Min:78, Max:84     PULSE Pulse  Av.1  Min: 72  Max: 104   RR Resp  Av.5  Min: 18  Max: 35   O2 SAT SpO2  Av.6 %  Min: 94 %  Max: 98 %   OXYGEN DELIVERY No data recorded        SYSTEMIC EXAMINATION:   General appearance - Mechanically ventilated  Mental status - sedated, following commands  Eyes - pupils equal and reactive, sclera anicteric  Mouth - mucous membranes moist, pharynx normal without lesions  Neck - supple  Chest - Breath sounds bilaterally were dimnished to auscultation at bases. There were no wheezes, rhonchi or rales.     Heart - normal rate, regular rhythm, normal S1, S2, no murmurs, rubs, clicks or gallops  Abdomen - soft, nontender, nondistended, no masses or organomegaly  Neurological - DTR's normal and symmetric, motor and sensory grossly normal bilaterally  Extremities - peripheral pulses normal, 1+ pedal edema, no clubbing or cyanosis  Skin - normal coloration and turgor, no rashes, no suspicious skin lesions noted     DATA REVIEW     Medications:  Scheduled Meds:   ciprofloxacin  400 mg IntraVENous Q12H    dexamethasone  6 mg Oral Daily    lactulose  10 g Oral TID    sennosides-docusate sodium  2 tablet Per NG tube Daily    metoprolol tartrate  25 mg Per NG tube BID    digoxin  125 mcg Per NG tube Daily    clopidogrel  75 mg Per NG tube Daily    citalopram  20 mg Per NG tube Daily    bumetanide  1 mg Per NG tube BID    atorvastatin  40 mg Per NG tube Daily    vitamin C  500 mg Per NG tube Daily    apixaban  5 mg Per NG tube BID    [Held by provider] bumetanide  1 mg IntraVENous BID    ipratropium-albuterol  1 ampule Inhalation 4x daily    lidocaine 1 % injection  5 mL IntraDERmal Once    sodium chloride flush  5-40 mL IntraVENous 2 times per day Continuous Infusions:   propofol 15 mcg/kg/min (11/15/21 1321)    norepinephrine      sodium chloride      amiodarone 0.5 mg/min (11/15/21 1320)       INPUT/OUTPUT:  In: 1151.4 [I.V.:718.4; NG/GT:233]  Out: 1150 [Urine:1150]  Date 11/15/21 0000 - 11/15/21 2359   Shift 9864-9056 1261-4951 5640-2071 24 Hour Total   INTAKE   I.V.(mL/kg) 341.4(3.2) 377(3.5)  718.4(6.6)   NG/GT(mL/kg) 233(2.2)   233(2.2)   IV Piggyback(mL/kg) 200(1.8)   200(1.8)   Shift Total(mL/kg) 774.4(7.2) 377(3.5)  1151. 4(10.6)   OUTPUT   Urine(mL/kg/hr) 500(0.6) 650(0.8)  1150   Shift Total(mL/kg) 500(4.6) 650(6)  1150(10.6)   Weight (kg) 108. 3 108. 3 108. 3 108.3        LABS:  ABGs:   Recent Labs     11/13/21  0253 11/14/21  0308 11/14/21  0459 11/15/21  0336   POCPH 7.542* 7.549* 7.521* 7.479*   POCPCO2 37.9 39.4 42.6 44.2   POCPO2 100.4 85.3 86.0 97.8   POCHCO3 32.6* 34.5* 34.9* 32.8*   [id number] 98 98 97 98     CBC:   Recent Labs     11/13/21  0444 11/14/21  0350 11/15/21  0356   WBC 10.6 19.0* 20.1*   HGB 11.6* 11.9 12.1   HCT 35.0* 35.5* 38.1   MCV 92.1 92.9 94.1    347 388   LYMPHOPCT 9* 5* 4*   RBC 3.80* 3.82* 4.05   MCH 30.5 31.2 29.9   MCHC 33.1 33.5 31.8   RDW 12.5 12.6 12.5     CRP:   No results for input(s): CRP in the last 72 hours. LDH:   No results for input(s): LDH in the last 72 hours. BMP:   Recent Labs     11/13/21  0444 11/14/21  0350 11/15/21  0356   * 127* 136   K 3.6* 4.2 4.2   CL 91* 85* 91*   CO2 27 29 29   BUN 29* 42* 46*   CREATININE 0.87 1.18* 1.00*   GLUCOSE 155* 155* 167*   PHOS 4.2 3.3 3.9     Liver Function Test:   Recent Labs     11/13/21  0444 11/14/21  0350 11/15/21  0356   LABALBU 3.0* 3.1* 3.2*     Coagulation Profile:   No results for input(s): INR, PROTIME, APTT in the last 72 hours. D-Dimer:  No results for input(s): DDIMER in the last 72 hours. Ferritin:    No results for input(s): FERRITIN in the last 72 hours.   Lactic Acid:  No results for input(s): LACTA in the last 72 hours. Cardiac Enzymes:  Recent Labs     11/13/21  0444 11/15/21  0356   CKTOTAL 13* 14*     BNP/ProBNP:   No results for input(s): BNP, PROBNP in the last 72 hours. Triglycerides:  Recent Labs     11/13/21  0444 11/15/21  0356   TRIG 215* 417*        Microbiology:  Urine Culture:  No components found for: CURINE  Blood Culture:  No components found for: CBLOOD, CFUNGUSBL  Sputum Culture:  No components found for: CSPUTUM  Recent Labs     11/14/21 1926   SPECDESC . BLOOD   SPECIAL LT HAND 2 ML   CULTURE NO GROWTH 14 HOURS     Recent Labs     11/14/21 1926   SPECDESC . BLOOD   SPECIAL LT HAND 2 ML   CULTURE NO GROWTH 14 HOURS        Pathology:    Radiology Reports:  CT CHEST WO CONTRAST   Final Result   Intervally developed perihilar and bibasilar ground-glass and dense airspace   consolidations, concerning for infection including atypical entities such as   COVID pneumonia. Similar small bilateral pleural effusions. XR CHEST PORTABLE   Final Result   1. Endotracheal and esophageal catheters placed as described   2. More conspicuous lower to mid level opacification consistent with pleural   effusions and at least atelectasis         XR CHEST (SINGLE VIEW FRONTAL)   Final Result   Small bilateral pleural effusions. Slightly increased airspace opacity in   the right mid to lower lung, which may reflect pneumonia in the appropriate   clinical setting. XR CHEST (SINGLE VIEW FRONTAL)   Final Result   Stable volume loss in the left chest with left basilar atelectasis or   pneumonia. Possible small left effusion. XR CHEST (SINGLE VIEW FRONTAL)   Final Result   Interval improved aeration of the left lung base. XR CHEST (SINGLE VIEW FRONTAL)   Final Result   Interval decrease in left effusion. No pneumothorax. Persistent opacity at   the left lung base. Vascularity is slightly prominent. US THORACENTESIS Which side should the procedure be performed?  Left   Final Result Successful ultrasound guided thoracentesis. US RETROPERITONEAL COMPLETE   Final Result   1. Patient had no urge to void during the exam.  Nonvisualization of ureteral   jets. 2. Incidental cholelithiasis. 3. Unremarkable renal ultrasound. No hydronephrosis. XR CHEST PORTABLE   Final Result   No change from prior study. Persistent sizable left effusion with opacity at   the left lung base and pulmonary vascular congestion. Echocardiogram:   Results for orders placed during the hospital encounter of 10/02/21    ECHO Complete 2D W Doppler W Color    Summary  Left ventricle is normal in size with normal systolic function globally. Calculated ejection fraction is 59% ( Atrial-Fib). Mild mitral regurgitation. Mild tricuspid regurgitation. Estimated right ventricular systolic pressure is 35 mmHg. Anterior clear space noted, most likely adipose tissue vs small loculated  pericardial effusion. No evidence of tamponade. Pleural effusion is seen.        ASSESSMENT AND PLAN     Assessment:    // Acute hypoxic respiratory failure, s/p extubation  // Bilateral lower lobe atelectasis (left greater than right)  // COVID-19 pneumonia  // Sepsis  // A. fib with RVR  // Acute kidney injury, improved  // Chronic obstructive pulmonary disease  // UTI, secondary to Pseudomonas  // CHF  // Essential hypertension  // Morbid obesity     Plan:     I personally interviewed/examined the patient; reviewed interval history, interpreted all available radiographic and laboratory data at the time of service.     · Patient was sedated and mechanically ventilated  · She was following commands and tolerated spontaneous mode of ventilation  · Extubated without any immediate complications  · Remains hemodynamically stable, continues on amiodarone infusion  · Continue supplemental oxygen to keep oxygen saturation greater than 90%  · Continue pulmonary toilet, aspiration precautions and bronchodilators  · Continue to monitor I/O with a goal of even/negative fluid balance  · Stress ulcer prophylaxis  · Chemical DVT prophylaxis not needed, patient on Eliquis  · Antimicrobials reviewed; continue as per ID recommendations  · Monitor CRP, LDH, AST/ALT, D-Dimer, Ferritin   · Glycemic control appropriate  · Continue IV Decadron  · Skin/wound care reviewed with the nursing staff  · Physical/occupational therapy     Critical care time of 35 minutes was spent (excluding procedures), in coordination of care during bedside rounds and discussion of patient care in detail, and recommendations of the team were adopted in the plan. Necessity of all invasive devices was also confirmed. Kamlesh Carter MD  Pulmonary and Critical Care Medicine           11/15/2021     This patient was evaluated in the context of the global SARS-CoV-2 (COVID-19) pandemic, which necessitated considerations that the patient either has COVID-19 infection or is at risk of infection with COVID-19. Institutional protocols and algorithms that pertain to the evaluation & management of patients with COVID-19 or those at risk for COVID-19 are in a state of rapid changes based on information released by regulatory bodies including the CDC and federal and state organizations. These policies and algorithms were followed during the patient's care. Please note that this chart was generated using voice recognition Dragon dictation software. Although every effort was made to ensure the accuracy of this automated transcription, some errors in transcription may have occurred.

## 2021-11-15 NOTE — PROGRESS NOTES
PULMONARY & CRITICAL CARE MEDICINE PROGRESS NOTE     Patient:  Guanako Shah  MRN: [id number]  Admit date: 11/3/2021  Primary Care Physician: KALYN Syed - CNP  Consulting Physician: Bladimir Perez DO  CODE Status: Full Code  LOS: 11     SUBJECTIVE     CHIEF COMPLAINT/REASON FOR INITIAL CONSULT: Acute respiratory failure/COVID-19 pneumonia    BRIEF HOSPITAL COURSE:   The patient is a 68 y.o. female here for evaluation of shortness of breath with clear sputum, increased leg swelling.  Patient has chronic shortness of breath which has been worsening over the last couple of days along with increased swelling of her lower extremity.  She has conversational dyspnea, no wheezing.  Chest x-ray shows left pleural effusion and left atelectasis she has had multiple thoracentesis.   I performed a bronchoscopic evaluation on due to persistent left lower lobe atelectasis and she had extensive mucus plugging on the left side    INTERVAL HISTORY:  11/14/21  She tested positive for COVID-19 on 11/11 and was put on mechanical ventilation due to worsening respiratory status  CT shows new groundglass infiltrates on the right side  Sedated with propofol  Hemodynamically stable, on amiodarone infusion  On PRVC 380/20/5/45%  Tolerating spontaneous mode of ventilation  Urine culture from 11/5 are positive for Pseudomonas (pansensitive)  She had been on Cipro, white count 10.6    REVIEW OF SYSTEMS:  Unobtainable from patient due to sedation/mechanical ventilation    OBJECTIVE     VENTILATOR SETTINGS:  Vent Information  $Ventilation: $Subsequent Day  Skin Assessment: Clean, dry, & intact  Suction Catheter Diameter: 14  Equipment Changed: Expiratory Filter (& stock)  Vent Type: Servo i  Vent Mode: PRVC  Vt Ordered: 380 mL  Rate Set: 20 bmp  Pressure Support: 12 cmH20  FiO2 : 40 %  SpO2: 98 %  SpO2/FiO2 ratio: 245  Sensitivity: 5  PEEP/CPAP: 5  I Time/ I Time %: 0.7 s  Humidification Source: HME  Nitric Oxide/Epoprostenol In Use?: No  Mask Type: Full face mask  Mask Size: Medium     VITAL SIGNS:   LAST:  BP (!) 142/78   Pulse 87   Temp 97.9 °F (36.6 °C) (Oral)   Resp 21   Ht 5' 4\" (1.626 m)   Wt 238 lb 11.2 oz (108.3 kg)   SpO2 98%   BMI 40.97 kg/m²   8-24 HR RANGE:  TEMP Temp  Av.2 °F (36.8 °C)  Min: 97.7 °F (36.5 °C)  Max: 98.6 °F (37 °C)   BP Systolic (89DGU), NPB:790 , Min:118 , AAO:631      Diastolic (43EXB), BRENDA:23, Min:73, Max:96     PULSE Pulse  Av.6  Min: 75  Max: 108   RR Resp  Av.3  Min: 20  Max: 24   O2 SAT SpO2  Av.9 %  Min: 97 %  Max: 99 %   OXYGEN DELIVERY No data recorded        SYSTEMIC EXAMINATION:   General appearance - Mechanically ventilated, ill-appearing  Mental status - sedated  Eyes - pupils equal and reactive, sclera anicteric  Mouth - mucous membranes moist, pharynx normal without lesions  Neck - supple  Chest - Breath sounds bilaterally were dimnished to auscultation at bases. There were no wheezes, rhonchi or rales.     Heart - normal rate, regular rhythm, normal S1, S2, no murmurs, rubs, clicks or gallops  Abdomen - soft, nontender, nondistended, no masses or organomegaly  Neurological - DTR's normal and symmetric, motor and sensory grossly normal bilaterally  Extremities - peripheral pulses normal, 1+ pedal edema, no clubbing or cyanosis  Skin - normal coloration and turgor, no rashes, no suspicious skin lesions noted     DATA REVIEW     Medications:  Scheduled Meds:   ciprofloxacin  400 mg IntraVENous Q12H    dexamethasone  6 mg Oral Daily    lactulose  10 g Oral TID    sennosides-docusate sodium  2 tablet Per NG tube Daily    metoprolol tartrate  25 mg Per NG tube BID    digoxin  125 mcg Per NG tube Daily    clopidogrel  75 mg Per NG tube Daily    citalopram  20 mg Per NG tube Daily    bumetanide  1 mg Per NG tube BID    atorvastatin  40 mg Per NG tube Daily    vitamin C  500 mg Per NG tube Daily    apixaban  5 mg Per NG tube BID    [Held by provider] bumetanide 1 mg IntraVENous BID    ipratropium-albuterol  1 ampule Inhalation 4x daily    lidocaine 1 % injection  5 mL IntraDERmal Once    sodium chloride flush  5-40 mL IntraVENous 2 times per day     Continuous Infusions:   propofol 25 mcg/kg/min (11/14/21 2048)    norepinephrine      sodium chloride      amiodarone 0.5 mg/min (11/14/21 2047)       INPUT/OUTPUT:  In: 1414.4 [I.V.:1079.4; NG/GT:335]  Out: 7556 [CRLIT:3220]  Date 11/14/21 0000 - 11/14/21 2359   Shift 4238-7177 0460-9648 8110-4244 24 Hour Total   INTAKE   I.V.(mL/kg) 326.1(3) 406.4(3.8) 346. 9(3.2) 1079.4(10)   NG/GT(mL/kg) 75(0.7) 160(1.5) 100(0.9) 335(3.1)   Shift Total(mL/kg) 401.1(3.7) 566.4(5.2) 446. 9(4.1) 1414.4(13.1)   OUTPUT   Urine(mL/kg/hr) 725(0.8) 743(0.9) 330 1798   Emesis/NG output(mL/kg)   0(0) 0(0)   Shift Total(mL/kg) 725(6.7) 743(6.9) 330(3) 1798(16.6)   Weight (kg) 108. 3 108. 3 108. 3 108.3        LABS:  ABGs:   Recent Labs     11/12/21  0401 11/12/21  1159 11/13/21  0253 11/14/21  0308 11/14/21  0459   POCPH 7.511* 7.616* 7.542* 7.549* 7.521*   POCPCO2 46.3 30.5* 37.9 39.4 42.6   POCPO2 75.9* 71.1* 100.4 85.3 86.0   POCHCO3 37.0* 31.1* 32.6* 34.5* 34.9*   UNIH2SXO 96 97 98 98 97     CBC:   Recent Labs     11/12/21 0405 11/13/21 0444 11/14/21  0350   WBC 14.3* 10.6 19.0*   HGB 12.3 11.6* 11.9   HCT 37.8 35.0* 35.5*   MCV 93.8 92.1 92.9    303 347   LYMPHOPCT 6* 9* 5*   RBC 4.03 3.80* 3.82*   MCH 30.5 30.5 31.2   MCHC 32.5 33.1 33.5   RDW 13.0 12.5 12.6     CRP:   Recent Labs     11/12/21 0405   CRP 50.2*     LDH:   No results for input(s): LDH in the last 72 hours.   BMP:   Recent Labs     11/12/21 0405 11/13/21 0444 11/14/21 0350    132* 127*   K 4.6 3.6* 4.2   CL 91* 91* 85*   CO2 31 27 29   BUN 18 29* 42*   CREATININE 0.87 0.87 1.18*   GLUCOSE 116* 155* 155*   PHOS  --  4.2 3.3     Liver Function Test:   Recent Labs     11/13/21 0444 11/14/21  0350   LABALBU 3.0* 3.1*     Coagulation Profile:   No results for input(s): INR, PROTIME, APTT in the last 72 hours. D-Dimer:  No results for input(s): DDIMER in the last 72 hours. Ferritin:    No results for input(s): FERRITIN in the last 72 hours. Lactic Acid:  No results for input(s): LACTA in the last 72 hours. Cardiac Enzymes:  Recent Labs     11/13/21  0444   CKTOTAL 13*     BNP/ProBNP:   No results for input(s): BNP, PROBNP in the last 72 hours. Triglycerides:  Recent Labs     11/13/21  0444   TRIG 215*        Microbiology:  Urine Culture:  No components found for: CURINE  Blood Culture:  No components found for: CBLOOD, CFUNGUSBL  Sputum Culture:  No components found for: CSPUTUM  Recent Labs     11/12/21  1216   SPECDESC . BLOOD  . BLOOD   SPECIAL LH 20 ML  R H 6 ML   CULTURE NO GROWTH 2 DAYS  NO GROWTH 2 DAYS     Recent Labs     11/12/21  1216   SPECDESC . BLOOD  . BLOOD   SPECIAL LH 20 ML  R H 6 ML   CULTURE NO GROWTH 2 DAYS  NO GROWTH 2 DAYS        Pathology:    Radiology Reports:  CT CHEST WO CONTRAST   Final Result   Intervally developed perihilar and bibasilar ground-glass and dense airspace   consolidations, concerning for infection including atypical entities such as   COVID pneumonia. Similar small bilateral pleural effusions. XR CHEST PORTABLE   Final Result   1. Endotracheal and esophageal catheters placed as described   2. More conspicuous lower to mid level opacification consistent with pleural   effusions and at least atelectasis         XR CHEST (SINGLE VIEW FRONTAL)   Final Result   Small bilateral pleural effusions. Slightly increased airspace opacity in   the right mid to lower lung, which may reflect pneumonia in the appropriate   clinical setting. XR CHEST (SINGLE VIEW FRONTAL)   Final Result   Stable volume loss in the left chest with left basilar atelectasis or   pneumonia. Possible small left effusion. XR CHEST (SINGLE VIEW FRONTAL)   Final Result   Interval improved aeration of the left lung base.          XR CHEST (SINGLE VIEW FRONTAL)   Final Result   Interval decrease in left effusion. No pneumothorax. Persistent opacity at   the left lung base. Vascularity is slightly prominent. US THORACENTESIS Which side should the procedure be performed? Left   Final Result   Successful ultrasound guided thoracentesis. US RETROPERITONEAL COMPLETE   Final Result   1. Patient had no urge to void during the exam.  Nonvisualization of ureteral   jets. 2. Incidental cholelithiasis. 3. Unremarkable renal ultrasound. No hydronephrosis. XR CHEST PORTABLE   Final Result   No change from prior study. Persistent sizable left effusion with opacity at   the left lung base and pulmonary vascular congestion. Echocardiogram:   Results for orders placed during the hospital encounter of 10/02/21    ECHO Complete 2D W Doppler W Color    Summary  Left ventricle is normal in size with normal systolic function globally. Calculated ejection fraction is 59% ( Atrial-Fib). Mild mitral regurgitation. Mild tricuspid regurgitation. Estimated right ventricular systolic pressure is 35 mmHg. Anterior clear space noted, most likely adipose tissue vs small loculated  pericardial effusion. No evidence of tamponade. Pleural effusion is seen.        ASSESSMENT AND PLAN     Assessment:    // Acute hypoxic respiratory failure, requiring nasal mechanical ventilation  // Bilateral lower lobe atelectasis (left greater than right)  // COVID-19 pneumonia  // Sepsis  // A. fib with RVR  // Acute kidney injury, improved  // Chronic obstructive pulmonary disease  // UTI, secondary to Pseudomonas  // CHF  // Essential hypertension  // Morbid obesity     Plan:     I personally interviewed/examined the patient; reviewed interval history, interpreted all available radiographic and laboratory data at the time of service.     · Patient currently sedated with propofol  · Patient remains hemodynamically stable, continues on amiodarone infusion  · Continue lung protective mechanical ventilation as per ARDS protocol  · Tolerating pressure support trial  · Plan for bronchoscopic clearance of secretions tomorrow morning before extubation  · Monitor endotracheal secretions   · Obtain X-ray chest as needed   · Continue pulmonary toilet, aspiration precautions and bronchodilators  · Continue to monitor I/O with a goal of even/negative fluid balance  · Tolerating tube feeds  · Stress ulcer prophylaxis  · Continue to monitor CBC, coagulation profile, transfuse as indicated  · Chemical DVT prophylaxis not needed, patient on Eliquis  · Antimicrobials reviewed; continue Cipro as per ID recommendations  · Monitor CRP, LDH, AST/ALT, D-Dimer, Ferritin   · Glycemic control appropriate  · Continue IV Decadron  · Skin/wound care reviewed with the nursing staff  · Physical/occupational therapy     Critical care time of 35 minutes was spent (excluding procedures), in coordination of care during bedside rounds and discussion of patient care in detail, and recommendations of the team were adopted in the plan. Necessity of all invasive devices was also confirmed. Ria Almanzar MD  Pulmonary and Critical Care Medicine           11/14/2021     This patient was evaluated in the context of the global SARS-CoV-2 (COVID-19) pandemic, which necessitated considerations that the patient either has COVID-19 infection or is at risk of infection with COVID-19. Institutional protocols and algorithms that pertain to the evaluation & management of patients with COVID-19 or those at risk for COVID-19 are in a state of rapid changes based on information released by regulatory bodies including the CDC and federal and state organizations. These policies and algorithms were followed during the patient's care. Please note that this chart was generated using voice recognition Dragon dictation software.   Although every effort was made to ensure the accuracy of this automated transcription, some errors in transcription may have occurred.

## 2021-11-15 NOTE — PLAN OF CARE
Ongoing  11/15/2021 0543 by Nathan Haq RN  Outcome: Ongoing  11/14/2021 2222 by Danica Flowers RN  Outcome: Ongoing     Problem: Nutrition  Goal: Optimal nutrition therapy  11/15/2021 0912 by Ashley Dukes RN  Outcome: Ongoing  11/14/2021 2222 by Danica Flowers RN  Outcome: Ongoing     Problem: Pain:  Description: Pain management should include both nonpharmacologic and pharmacologic interventions. Goal: Pain level will decrease  Description: Pain level will decrease  11/15/2021 0912 by Ashley Dukes RN  Outcome: Ongoing  11/14/2021 2222 by Danica Flowers RN  Outcome: Ongoing  Goal: Control of acute pain  Description: Control of acute pain  11/15/2021 0912 by Ashley Dukes RN  Outcome: Ongoing  11/14/2021 2222 by Danica Flowers RN  Outcome: Ongoing  Goal: Control of chronic pain  Description: Control of chronic pain  11/15/2021 0912 by Ashley Dukes RN  Outcome: Ongoing  11/14/2021 2222 by Danica Flowers RN  Outcome: Ongoing     Problem: Airway Clearance - Ineffective  Goal: Achieve or maintain patent airway  11/15/2021 0912 by Aslhey Dukes RN  Outcome: Ongoing  11/14/2021 2222 by Danica Flowers RN  Outcome: Ongoing     Problem: Gas Exchange - Impaired  Goal: Absence of hypoxia  11/15/2021 0912 by Ashley Dukes RN  Outcome: Ongoing  11/14/2021 2222 by Danica Flowers RN  Outcome: Ongoing  Goal: Promote optimal lung function  11/15/2021 0912 by Ashley Dukes RN  Outcome: Ongoing  11/14/2021 2222 by Danica Flowers RN  Outcome: Ongoing     Problem: Breathing Pattern - Ineffective  Goal: Ability to achieve and maintain a regular respiratory rate  11/15/2021 0912 by Ashley Dukes RN  Outcome: Ongoing  11/14/2021 2222 by Danica Flowers RN  Outcome: Ongoing     Problem:  Body Temperature -  Risk of, Imbalanced  Goal: Ability to maintain a body temperature within defined limits  11/15/2021 0912 by Ashley Dukes RN  Outcome: Ongoing  11/14/2021 2222 by Danica Flowers RN  Outcome: Ongoing  Goal: Will regain or maintain VENTILATION  Goal: Patient will maintain patent airway  11/15/2021 0912 by Barbara Ramsey RN  Outcome: Ongoing  11/15/2021 0543 by Zandra Krishna RN  Outcome: Ongoing  11/14/2021 2222 by Duran Kolb RN  Outcome: Ongoing  Goal: Mobility/activity is maintained at optimum level for patient  11/15/2021 0912 by Barbara Ramsey RN  Outcome: Ongoing  11/15/2021 0543 by Zandra Krishna RN  Outcome: Ongoing  11/14/2021 2222 by Duran Kolb RN  Outcome: Ongoing  Goal: Oral health is maintained or improved  11/15/2021 0912 by Barbara Ramsey RN  Outcome: Ongoing  11/14/2021 2222 by Duran Kolb RN  Outcome: Ongoing  Goal: ET tube will be managed safely  11/15/2021 0912 by Barbara Ramsey RN  Outcome: Ongoing  11/14/2021 2222 by Duran Kolb RN  Outcome: Ongoing  Goal: Ability to express needs and understand communication  11/15/2021 0912 by Barbara Ramsey RN  Outcome: Ongoing  11/14/2021 2222 by Duran Kolb RN  Outcome: Ongoing     Problem: Non-Violent Restraints  Goal: Removal from restraints as soon as assessed to be safe  11/15/2021 0912 by Barbara Ramsey RN  Outcome: Ongoing  11/15/2021 0543 by Zandra Krishna RN  Outcome: Ongoing  11/14/2021 2222 by Duran Kolb RN  Outcome: Ongoing  Goal: No harm/injury to patient while restraints in use  11/15/2021 0912 by Barbara Ramsey RN  Outcome: Ongoing  11/15/2021 0543 by Zandra Krishna RN  Outcome: Ongoing  11/14/2021 2222 by Duran Kolb RN  Outcome: Ongoing  Goal: Patient's dignity will be maintained  11/15/2021 0912 by Barbara Ramsey RN  Outcome: Ongoing  11/15/2021 0543 by Zandra Krishna RN  Outcome: Ongoing  11/14/2021 2222 by Duran Kolb RN  Outcome: Ongoing

## 2021-11-16 LAB
ABSOLUTE EOS #: <0.03 K/UL (ref 0–0.44)
ABSOLUTE IMMATURE GRANULOCYTE: 0.17 K/UL (ref 0–0.3)
ABSOLUTE LYMPH #: 0.97 K/UL (ref 1.1–3.7)
ABSOLUTE MONO #: 1.11 K/UL (ref 0.1–1.2)
ALBUMIN SERPL-MCNC: 3.1 G/DL (ref 3.5–5.2)
ANION GAP SERPL CALCULATED.3IONS-SCNC: 9 MMOL/L (ref 9–17)
BASOPHILS # BLD: 0 % (ref 0–2)
BASOPHILS ABSOLUTE: 0.03 K/UL (ref 0–0.2)
BUN BLDV-MCNC: 39 MG/DL (ref 8–23)
BUN/CREAT BLD: ABNORMAL (ref 9–20)
C-REACTIVE PROTEIN: 9.4 MG/L (ref 0–5)
CALCIUM SERPL-MCNC: 8.9 MG/DL (ref 8.6–10.4)
CHLORIDE BLD-SCNC: 96 MMOL/L (ref 98–107)
CO2: 33 MMOL/L (ref 20–31)
CREAT SERPL-MCNC: 0.81 MG/DL (ref 0.5–0.9)
DIFFERENTIAL TYPE: ABNORMAL
EOSINOPHILS RELATIVE PERCENT: 0 % (ref 1–4)
GFR AFRICAN AMERICAN: >60 ML/MIN
GFR NON-AFRICAN AMERICAN: >60 ML/MIN
GFR SERPL CREATININE-BSD FRML MDRD: ABNORMAL ML/MIN/{1.73_M2}
GFR SERPL CREATININE-BSD FRML MDRD: ABNORMAL ML/MIN/{1.73_M2}
GLUCOSE BLD-MCNC: 108 MG/DL (ref 70–99)
HCT VFR BLD CALC: 36.1 % (ref 36.3–47.1)
HEMOGLOBIN: 11.8 G/DL (ref 11.9–15.1)
IMMATURE GRANULOCYTES: 1 %
LYMPHOCYTES # BLD: 7 % (ref 24–43)
MCH RBC QN AUTO: 30.5 PG (ref 25.2–33.5)
MCHC RBC AUTO-ENTMCNC: 32.7 G/DL (ref 28.4–34.8)
MCV RBC AUTO: 93.3 FL (ref 82.6–102.9)
MONOCYTES # BLD: 8 % (ref 3–12)
NRBC AUTOMATED: 0 PER 100 WBC
PDW BLD-RTO: 12.6 % (ref 11.8–14.4)
PHOSPHORUS: 3.2 MG/DL (ref 2.6–4.5)
PLATELET # BLD: 359 K/UL (ref 138–453)
PLATELET ESTIMATE: ABNORMAL
PMV BLD AUTO: 9.4 FL (ref 8.1–13.5)
POTASSIUM SERPL-SCNC: 3.8 MMOL/L (ref 3.7–5.3)
RBC # BLD: 3.87 M/UL (ref 3.95–5.11)
RBC # BLD: ABNORMAL 10*6/UL
SEG NEUTROPHILS: 84 % (ref 36–65)
SEGMENTED NEUTROPHILS ABSOLUTE COUNT: 12.38 K/UL (ref 1.5–8.1)
SODIUM BLD-SCNC: 138 MMOL/L (ref 135–144)
WBC # BLD: 14.7 K/UL (ref 3.5–11.3)
WBC # BLD: ABNORMAL 10*3/UL

## 2021-11-16 PROCEDURE — 99232 SBSQ HOSP IP/OBS MODERATE 35: CPT | Performed by: STUDENT IN AN ORGANIZED HEALTH CARE EDUCATION/TRAINING PROGRAM

## 2021-11-16 PROCEDURE — 94761 N-INVAS EAR/PLS OXIMETRY MLT: CPT

## 2021-11-16 PROCEDURE — 1200000000 HC SEMI PRIVATE

## 2021-11-16 PROCEDURE — 6370000000 HC RX 637 (ALT 250 FOR IP): Performed by: INTERNAL MEDICINE

## 2021-11-16 PROCEDURE — 6370000000 HC RX 637 (ALT 250 FOR IP): Performed by: NURSE PRACTITIONER

## 2021-11-16 PROCEDURE — 6360000002 HC RX W HCPCS: Performed by: NURSE PRACTITIONER

## 2021-11-16 PROCEDURE — 86140 C-REACTIVE PROTEIN: CPT

## 2021-11-16 PROCEDURE — 94660 CPAP INITIATION&MGMT: CPT

## 2021-11-16 PROCEDURE — 99233 SBSQ HOSP IP/OBS HIGH 50: CPT | Performed by: INTERNAL MEDICINE

## 2021-11-16 PROCEDURE — 94664 DEMO&/EVAL PT USE INHALER: CPT

## 2021-11-16 PROCEDURE — 94640 AIRWAY INHALATION TREATMENT: CPT

## 2021-11-16 PROCEDURE — 2700000000 HC OXYGEN THERAPY PER DAY

## 2021-11-16 PROCEDURE — 80069 RENAL FUNCTION PANEL: CPT

## 2021-11-16 PROCEDURE — 2580000003 HC RX 258: Performed by: INTERNAL MEDICINE

## 2021-11-16 PROCEDURE — 6360000002 HC RX W HCPCS: Performed by: INTERNAL MEDICINE

## 2021-11-16 PROCEDURE — 85025 COMPLETE CBC W/AUTO DIFF WBC: CPT

## 2021-11-16 RX ORDER — AMIODARONE HYDROCHLORIDE 200 MG/1
200 TABLET ORAL DAILY
Status: DISCONTINUED | OUTPATIENT
Start: 2021-11-16 | End: 2021-11-20 | Stop reason: HOSPADM

## 2021-11-16 RX ORDER — ALPRAZOLAM 0.5 MG/1
0.5 TABLET ORAL ONCE
Status: COMPLETED | OUTPATIENT
Start: 2021-11-16 | End: 2021-11-16

## 2021-11-16 RX ADMIN — CITALOPRAM 20 MG: 20 TABLET, FILM COATED ORAL at 09:20

## 2021-11-16 RX ADMIN — APIXABAN 5 MG: 5 TABLET, FILM COATED ORAL at 20:34

## 2021-11-16 RX ADMIN — CIPROFLOXACIN 400 MG: 2 INJECTION, SOLUTION INTRAVENOUS at 01:58

## 2021-11-16 RX ADMIN — Medication 2 PUFF: at 21:11

## 2021-11-16 RX ADMIN — DIGOXIN 125 MCG: 125 TABLET ORAL at 09:20

## 2021-11-16 RX ADMIN — SODIUM CHLORIDE, PRESERVATIVE FREE 10 ML: 5 INJECTION INTRAVENOUS at 09:20

## 2021-11-16 RX ADMIN — DOCUSATE SODIUM 50MG AND SENNOSIDES 8.6MG 2 TABLET: 8.6; 5 TABLET, FILM COATED ORAL at 09:19

## 2021-11-16 RX ADMIN — ALPRAZOLAM 0.5 MG: 0.5 TABLET ORAL at 22:04

## 2021-11-16 RX ADMIN — DEXAMETHASONE 6 MG: 4 TABLET ORAL at 09:20

## 2021-11-16 RX ADMIN — LACTULOSE 10 G: 20 SOLUTION ORAL at 14:39

## 2021-11-16 RX ADMIN — APIXABAN 5 MG: 5 TABLET, FILM COATED ORAL at 09:20

## 2021-11-16 RX ADMIN — OXYCODONE HYDROCHLORIDE AND ACETAMINOPHEN 500 MG: 500 TABLET ORAL at 09:20

## 2021-11-16 RX ADMIN — METOPROLOL TARTRATE 25 MG: 25 TABLET ORAL at 20:34

## 2021-11-16 RX ADMIN — BUMETANIDE 1 MG: 1 TABLET ORAL at 20:34

## 2021-11-16 RX ADMIN — LACTULOSE 10 G: 20 SOLUTION ORAL at 22:04

## 2021-11-16 RX ADMIN — AMIODARONE HYDROCHLORIDE 0.5 MG/MIN: 50 INJECTION, SOLUTION INTRAVENOUS at 02:41

## 2021-11-16 RX ADMIN — DESMOPRESSIN ACETATE 40 MG: 0.2 TABLET ORAL at 09:20

## 2021-11-16 RX ADMIN — BUMETANIDE 1 MG: 1 TABLET ORAL at 09:20

## 2021-11-16 RX ADMIN — CLOPIDOGREL 75 MG: 75 TABLET, FILM COATED ORAL at 09:20

## 2021-11-16 RX ADMIN — AMIODARONE HYDROCHLORIDE 200 MG: 200 TABLET ORAL at 13:51

## 2021-11-16 RX ADMIN — SODIUM CHLORIDE, PRESERVATIVE FREE 10 ML: 5 INJECTION INTRAVENOUS at 20:35

## 2021-11-16 RX ADMIN — Medication 3 MG: at 20:34

## 2021-11-16 RX ADMIN — LACTULOSE 10 G: 20 SOLUTION ORAL at 09:21

## 2021-11-16 RX ADMIN — CIPROFLOXACIN 400 MG: 2 INJECTION, SOLUTION INTRAVENOUS at 13:51

## 2021-11-16 RX ADMIN — METOPROLOL TARTRATE 25 MG: 25 TABLET ORAL at 09:20

## 2021-11-16 ASSESSMENT — PAIN SCALES - WONG BAKER

## 2021-11-16 ASSESSMENT — PAIN SCALES - GENERAL: PAINLEVEL_OUTOF10: 0

## 2021-11-16 NOTE — PLAN OF CARE
Problem: Falls - Risk of:  Goal: Will remain free from falls  Description: Will remain free from falls  Outcome: Ongoing  Goal: Absence of physical injury  Description: Absence of physical injury  Outcome: Ongoing     Problem: Skin Integrity:  Goal: Will show no infection signs and symptoms  Description: Will show no infection signs and symptoms  Outcome: Ongoing  Goal: Absence of new skin breakdown  Description: Absence of new skin breakdown  Outcome: Ongoing     Problem: OXYGENATION/RESPIRATORY FUNCTION  Goal: Patient will maintain patent airway  Outcome: Ongoing  Goal: Patient will achieve/maintain normal respiratory rate/effort  Description: Respiratory rate and effort will be within normal limits for the patient  Outcome: Ongoing     Problem: HEMODYNAMIC STATUS  Goal: Patient has stable vital signs and fluid balance  Outcome: Ongoing     Problem: FLUID AND ELECTROLYTE IMBALANCE  Goal: Fluid and electrolyte balance are achieved/maintained  Outcome: Ongoing     Problem: ACTIVITY INTOLERANCE/IMPAIRED MOBILITY  Goal: Mobility/activity is maintained at optimum level for patient  Outcome: Ongoing     Problem: Nutrition  Goal: Optimal nutrition therapy  Outcome: Ongoing     Problem: Pain:  Description: Pain management should include both nonpharmacologic and pharmacologic interventions.   Goal: Pain level will decrease  Description: Pain level will decrease  Outcome: Ongoing  Goal: Control of acute pain  Description: Control of acute pain  Outcome: Ongoing  Goal: Control of chronic pain  Description: Control of chronic pain  Outcome: Ongoing     Problem: Airway Clearance - Ineffective  Goal: Achieve or maintain patent airway  Outcome: Ongoing     Problem: Gas Exchange - Impaired  Goal: Absence of hypoxia  Outcome: Ongoing  Goal: Promote optimal lung function  Outcome: Ongoing     Problem: Breathing Pattern - Ineffective  Goal: Ability to achieve and maintain a regular respiratory rate  Outcome: Ongoing     Problem:

## 2021-11-16 NOTE — PROGRESS NOTES
Comprehensive Nutrition Assessment    Type and Reason for Visit:  Reassess    Nutrition Recommendations/Plan:   -Continue 2 gm Na diet   -Suggest ensure enlive supplements BID   -Recommend d/c tube feed order   -Will monitor po intake and weights     Nutrition Assessment:  Pt was extubated yesterday and is now started on a PO diet. RN reports that pt has a good appetite - will d/c tube feed order d/t initiation of PO diet. Pt remains nutritionally compromised aeb moderate malnutrition. Will start nutritional supplements to compliment po intake and will monitor. Malnutrition Assessment:  Malnutrition Status: Moderate malnutrition    Context:  Chronic Illness     Findings of the 6 clinical characteristics of malnutrition:  Energy Intake:  7 - 75% or less estimated energy requirements for 1 month or longer  Weight Loss:  7 - 5% over 1 month     Body Fat Loss:  Unable to assess     Muscle Mass Loss:  Unable to assess    Fluid Accumulation:  1 - Mild Extremities, Generalized   Strength:  Not Performed    Estimated Daily Nutrient Needs:  Energy (kcal):  1.2-1.3 ~> 3888-0006 kcals/d; Weight Used for Energy Requirements:  Current     Protein (g):  1.2-1.3 gm/kg ~> 66-72 gms/d; Weight Used for Protein Requirements:  Ideal        Fluid (ml/day):  25 ~> 1400 mLs/d OR per MD discretion; Method Used for Fluid Requirements:  ml/Kg      Nutrition Related Findings:  BM 11/7; labs/meds reviewed      Wounds:  None       Current Nutrition Therapies:    ADULT TUBE FEEDING; Nasogastric; Standard with Fiber; Continuous; 20; Yes; 15; Q 4 hours; 35; 30; Q 4 hours  ADULT DIET; Regular;  Low Sodium (2 gm)    Anthropometric Measures:  · Height: 5' 4\" (162.6 cm)  · Current Body Weight: 238 lb 11.2 oz (108.3 kg) (11/13, [facility])   · Admission Body Weight: 256 lb (116.1 kg)    · Usual Body Weight: 273 lb 3.2 oz (123.9 kg)     · Ideal Body Weight: 120 lbs; % Ideal Body Weight 198.9 %   · BMI: 41  · BMI Categories: Obese Class 3 (BMI 40.0 or greater)       Nutrition Diagnosis:   · Inadequate energy intake related to  (recent extubation) as evidenced by intake 26-50%, intake 51-75% (Need for ONS)      Nutrition Interventions:   Food and/or Nutrient Delivery:  Continue Current Diet, Start Oral Nutrition Supplement, Discontinue Tube Feeding  Nutrition Education/Counseling:  Education not indicated   Coordination of Nutrition Care:  Continue to monitor while inpatient    Goals: Progressing   PO intake to meet > 75% of estimated nutrition needs       Nutrition Monitoring and Evaluation:   Food/Nutrient Intake Outcomes:  Food and Nutrient Intake, Supplement Intake  Physical Signs/Symptoms Outcomes:  Biochemical Data, Chewing or Swallowing, Nutrition Focused Physical Findings, Skin, Weight, GI Status, Fluid Status or Edema     Discharge Planning:     Too soon to determine     Electronically signed by Paras Verdin RD, LD on 11/16/21 at 11:55 AM EST    Contact: [phone]

## 2021-11-16 NOTE — CARE COORDINATION
[facility] stating they can accommodate patient. Spoke with patient's son Jovany Varela, he is agreeable to pursuing [facility]. Left Vm with admissions asking to start precert    0943 received call from [facility] at [facility] stating Yared Arriola is out of network.   Spoke with patient's son Franko Vang over the phone, he is agreeable to referral to referral to [facility], referral faxed

## 2021-11-16 NOTE — PROGRESS NOTES
[facility]  Office: [address], DO, Brock Leila, DO, Javier Reyes, DO, Catarino Narayanan Blood, DO, Mo Andrews MD, Eliel Rowe MD, Maria De Jesus Hartman MD, Maite Encarnacion MD, Michael Nickerson MD, Apolonia Ferrari MD, Olga Brian MD, Lauren Domínguez, DO, Gurinder Ceballos, DO, Jhonatan Weiner MD,  Rufina Chadwick, DO, Dominique Suero MD, Anita Cowart MD, Carol Echavarria MD, Reyes Triplett MD, Jennifer Cortez MD, Lesli Salguero MD, Slick Pierce MD, Nighat Blas [facility], CNP, Flory Martinez, CNP, Margarita Lomax, CNS, Majo Johns, CNP, Omar Zhang, CNP, Leticia Jack, CNP, Samuel Hernandez, CNP, Cinthia Mason, CNP, SPIKE LevinC, Charmel Osgood, [facility], Gem Khan, CNP, Judy Aguilar, CNP, Sally Chadwick, CNP, Tiney Schilder, CNP, Christa Holland, CNP, Marlyne Osgood, CNP, Liam Goetz, [address] [place] [address]    Progress Note    11/16/2021    3:23 PM    Name:   Mahesh Barnes  MRN:     [id number]     Acct:      [de-identified]   Room:   [address] [place] [address] Day:  15  Admit Date:  11/3/2021  4:32 PM    PCP:   KALYN Maldonado CNP  Code Status:  Full Code    Subjective:     C/C:   Chief Complaint   Patient presents with    Leg Swelling     increasing BLE edema; pt from SNF; per PA at SNF, pt has fluid retention, needs IV meds that they cannot administer     Interval History Status: significantly improved. Patient was seen and examined at bedside this afternoon. She was successfully extubated yesterday afternoon. She reports feeling much better and is without complaint. Denies fever/chills, nausea/vomiting, shortness of breath or chest pain. Awaiting pre-cert for SNF placement.      Brief History:     (per my colleague)    Patient presented to the ER with complaints of leg swelling, shortness of breath with a productive cough (thin white sputum) and constipation.  She states she chronically has lower extremity edema left side greater than right she thinks that is increased over the last couple days. Adriana Tidwell was recently discharged on 10/15 after being admitted related to a pleural effusion.  At that time she presented with shortness of breath and palpitations. Conner Garza is found to have a left lower lobe pneumonia and A. fib with RVR and was admitted to the ICU for acute hypoxic respiratory failure.  During that visit the patient had a paracentesis, Negative CURT and subsequently cardioverted successfully, and a bronchoscopy that showed left main bronchus obstruction due to thick mucous plugging.  At discharge the patient was encouraged to follow-up with cardiology outpatient for an ablation.  During that visit she was also treated for UTI and was discharged on Augmentin for 5 days. Conner Garza was also given inhalers and steroids for COPD.     On arrival to the ER today she complains of not feeling well over the last 3 or 4 days. Conner Garza describes worsening shortness of breath primarily on exertion and orthopnea.  The patient told me that she has not been using oxygen on an outpatient basis but according to the ER note she has been using it intermittently primarily with exertion and therapy and occasionally overnight.  Also complains of constipation and mild abdominal bloating.  She also reports nausea and vomiting few days ago but nothing current and she has been tolerating p.o. intake.  She states she is compliant with her home medications.     Patient was placed on 2 L nasal cannula in the ER. Conner Garza has been afebrile heart rates been in the 90s to the low 110's A. Fib.     Chest x-ray shows no acute changes from prior study with a has a persistent sizable left effusion with opacity at the left lung base and pulmonary vascular congestion. Sodium is 129 BUN and creatinine are 26/1. 72.  Her normal BUN and creatinine are around 15/0.58 and normal sodiums in the mid 130s.   BNP is 3134.  On her last admission it BQO 1468 and on her admission in early  dexamethasone  6 mg Oral Daily    lactulose  10 g Oral TID    digoxin  125 mcg Per NG tube Daily    lidocaine 1 % injection  5 mL IntraDERmal Once    sodium chloride flush  5-40 mL IntraVENous 2 times per day     Continuous Infusions:    sodium chloride       PRN Meds: ipratropium, albuterol sulfate HFA, metoprolol, sodium chloride flush, sodium chloride, fentanNYL, magnesium sulfate, potassium chloride **OR** potassium alternative oral replacement **OR** potassium chloride, magnesium hydroxide, melatonin, ondansetron **OR** ondansetron, acetaminophen **OR** acetaminophen    Data:     Past Medical History:   has a past medical history of Asthma, COPD (chronic obstructive pulmonary disease) ([address] Gout, Hyperlipidemia, Hypertension, Mitral insufficiency, and Pneumonia. Social History:   reports that she has quit smoking. She has never used smokeless tobacco. She reports current alcohol use. She reports that she does not use drugs. Family History:   Family History   Problem Relation Age of Onset    High Blood Pressure Mother     Cancer Mother     Asthma Father     Heart Disease Father     Cancer Father        Vitals:  /60   Pulse 77   Temp 98.4 °F (36.9 °C) (Axillary)   Resp 24   Ht 5' 4\" (1.626 m)   Wt 238 lb 11.2 oz (108.3 kg)   SpO2 96%   BMI 40.97 kg/m²   Temp (24hrs), Av.6 °F (36.4 °C), Min:97.2 °F (36.2 °C), Max:98.4 °F (36.9 °C)    Recent Labs     21  0308 21  0459 21  1410 11/15/21  0336   POCGLU 158* 160* 152* 194*       I/O (24Hr):     Intake/Output Summary (Last 24 hours) at 2021 1523  Last data filed at 2021 0600  Gross per 24 hour   Intake 819.15 ml   Output 1850 ml   Net -1030.85 ml       Labs:  Hematology:  Recent Labs     21  0350 11/15/21  0356 21  0410   WBC 19.0* 20.1* 14.7*   RBC 3.82* 4.05 3.87*   HGB 11.9 12.1 11.8*   HCT 35.5* 38.1 36.1*   MCV 92.9 94.1 93.3   MCH 31.2 29.9 30.5   MCHC 33.5 31.8 32.7   RDW 12.6 12.5 12.6    388 359   MPV 9.7 9.5 9.4   CRP  --   --  9.4*     Chemistry:  Recent Labs     11/14/21  0350 11/15/21  0356 11/16/21  0410   * 136 138   K 4.2 4.2 3.8   CL 85* 91* 96*   CO2 29 29 33*   GLUCOSE 155* 167* 108*   BUN 42* 46* 39*   CREATININE 1.18* 1.00* 0.81   ANIONGAP 13 16 9   LABGLOM 45* 54* >60   GFRAA 54* >60 >60   CALCIUM 8.6 8.8 8.9   PHOS 3.3 3.9 3.2   CKTOTAL  --  14*  --      Recent Labs     11/13/21  1804 11/14/21  0308 11/14/21  0350 11/14/21  0459 11/14/21  1410 11/15/21  0336 11/15/21  0356 11/16/21  0410   LABALBU  --   --  3.1*  --   --   --  3.2* 3.1*   TRIG  --   --   --   --   --   --  417*  --    POCGLU 144* 158*  --  160* 152* 194*  --   --      ABG:  Lab Results   Component Value Date    POCPH 7.479 11/15/2021    PH 7.44 03/30/2016    POCPCO2 44.2 11/15/2021    PCO2 36 03/30/2016    POCPO2 97.8 11/15/2021    PO2 64 03/30/2016    POCHCO3 32.8 11/15/2021    HCO3 24 03/30/2016    NBEA NOT REPORTED 11/15/2021    PBEA 8 11/15/2021    SMC4NBP NOT REPORTED 11/15/2021    FKBK9LJR 98 11/15/2021    O2SAT 93 03/30/2016    FIO2 40.0 11/15/2021     Lab Results   Component Value Date/Time    SPECIAL LT HAND 2 ML 11/14/2021 07:26 PM     Lab Results   Component Value Date/Time    CULTURE NO GROWTH 2 DAYS 11/14/2021 07:26 PM       Radiology:  XR CHEST (SINGLE VIEW FRONTAL)    Result Date: 11/12/2021  Small bilateral pleural effusions. Slightly increased airspace opacity in the right mid to lower lung, which may reflect pneumonia in the appropriate clinical setting. XR CHEST (SINGLE VIEW FRONTAL)    Result Date: 11/9/2021  Stable volume loss in the left chest with left basilar atelectasis or pneumonia. Possible small left effusion. CT CHEST WO CONTRAST    Result Date: 11/12/2021  Intervally developed perihilar and bibasilar ground-glass and dense airspace consolidations, concerning for infection including atypical entities such as COVID pneumonia.  Similar small bilateral pleural medications and contacting us early in the course of exacerbations. Warning signs of respiratory distress were reviewed with the patient. Counseled to avoid exposure to cigarette smoke.          Persistent atrial fibrillation ([address] 11/12/2021 Yes    Recurrent pleural effusion 11/12/2021 Yes    Moderate malnutrition ([address] 11/14/2021 Clinically Undetermined          Plan:        Acute respiratory failure with hypoxia, resolved   -Likely secondary to left-sided pleural effusion   -Patient was successfully extubated to nasal cannula yesterday afternoon   -Continue dexamethasone for management of COVID-19 PNA   -Continue Bumex 1 mg bid   -Pulmonology following; s/p bronchoscopy     COVID-19 PNA   -Patient is currently maintaining oxygen saturations on room air   -Continue dexamethasone x 10 days     HFpEF   -In acute exacerbation   -Continue bumetanide 1 mg bid     Persistent atrial fibrillation   -Currently rate-controlled   -Continue amiodarone 200 mg daily   -Continue digoxin 125 mcg daily   -Continue metoprolol 25 mg bid   -Continue Eliquis 5 mg bid     Urinary tract infection   -Urine culture growing Pseudomonas   -Continue ciprofloxacin x 10 days (currently on day 9)     COPD  -Not in acute exacerbation   -Continue scheduled albuterol     Depression   -Continue home citalopram 20 mg daily     Elmo Moore MD  11/16/2021  3:23 PM

## 2021-11-16 NOTE — PROGRESS NOTES
[facility]   Progress Note                   Date:   11/16/2021  Patient name: Minerva Cochran  Date of admission:  11/3/2021  4:32 PM  MRN:   [id number]  YOB: 1945  PCP: KALYN Hogue CNP    Reason for Admission: CASEY (acute kidney injury) ([facility] [N17.9]  Acute on chronic congestive heart failure, unspecified heart failure type ([facility] [I50.9]    Subjective:       Clinical Changes / Abnormalities:  No acute CV issues/concerns. Extubated yesterday evening. Remains stable. HR remains Afib with rate 80-90s. Medications:   Scheduled Meds:   ipratropium  2 puff Inhalation 4x daily    albuterol sulfate HFA  2 puff Inhalation 4x daily    apixaban  5 mg Oral BID    vitamin C  500 mg Oral Daily    atorvastatin  40 mg Oral Daily    bumetanide  1 mg Oral BID    citalopram  20 mg Oral Daily    clopidogrel  75 mg Oral Daily    metoprolol tartrate  25 mg Oral BID    sennosides-docusate sodium  2 tablet Oral Daily    ciprofloxacin  400 mg IntraVENous Q12H    dexamethasone  6 mg Oral Daily    lactulose  10 g Oral TID    digoxin  125 mcg Per NG tube Daily    lidocaine 1 % injection  5 mL IntraDERmal Once    sodium chloride flush  5-40 mL IntraVENous 2 times per day     Continuous Infusions:   sodium chloride      amiodarone 0.5 mg/min (11/16/21 0241)     CBC:   Recent Labs     11/14/21  0350 11/15/21  0356 11/16/21  0410   WBC 19.0* 20.1* 14.7*   HGB 11.9 12.1 11.8*    388 359     BMP:    Recent Labs     11/14/21  0350 11/15/21  0356 11/16/21  0410   * 136 138   K 4.2 4.2 3.8   CL 85* 91* 96*   CO2 29 29 33*   BUN 42* 46* 39*   CREATININE 1.18* 1.00* 0.81   GLUCOSE 155* 167* 108*     Hepatic: No results for input(s): AST, ALT, ALB, BILITOT, ALKPHOS in the last 72 hours. Troponin:   No results for input(s): TROPHS in the last 72 hours. BNP: No results for input(s): BNP in the last 72 hours.   Lipids: No results for input(s): CHOL, HDL in the last 72 hours.    Invalid input(s): LDLCALCU  INR: No results for input(s): INR in the last 72 hours. Objective:   Vitals: BP (!) 142/78   Pulse 67   Temp 98.1 °F (36.7 °C) (Axillary)   Resp 22   Ht 5' 4\" (1.626 m)   Wt 238 lb 11.2 oz (108.3 kg)   SpO2 96%   BMI 40.97 kg/m²      For careful stewardship of limited PPE during COVID-19 pandemic my physical exam was deferred.  For physical exam, please see today's physical from primary team or ICU team.       Assessment / Acute Cardiac Problems:     AF with RVR- recurrent  Resp failure  COPD  COVID 19  Borderline hypotension    Patient Active Problem List:     Asthma     Benign essential HTN     Morbid obesity with BMI of 40.0-44.9, adult ([address]     Open bimalleolar fracture of left ankle     Hyperlipidemia     COPD exacerbation (HCC)     Pulmonary emphysema (HCC)     Postoperative confusion     Acute on chronic respiratory failure with hypoxia (HCC)     Simple chronic bronchitis (HCC)     Atrial fibrillation with rapid ventricular response (HCC)     Pneumonia of left lower lobe due to infectious organism     Sepsis with acute hypoxic respiratory failure without septic shock (HCC)     Acute on chronic diastolic heart failure (HCC)     Hyponatremia     Hypokalemia     Elevated troponin     Anemia     Arthritis     At high risk for falls     Colon polyps     Heart murmur     Legally blind in right eye, as defined in [place]     Chronic obstructive pulmonary disease with acute exacerbation (HCC)     Persistent atrial fibrillation (HCC)     Pleural effusion     Recurrent pleural effusion     Mucus plugging of bronchi     Atelectasis of left lung     Acute chest pain     Coag negative Staphylococcus bacteremia     Abnormal pleural fluid     CASEY (acute kidney injury) ([address]     Acute on chronic congestive heart failure (HCC)     Atrial flutter with rapid ventricular response ([address]     COVID-19 virus infection     Acute respiratory failure with hypoxemia ([address]     Respiratory

## 2021-11-17 LAB
ABSOLUTE EOS #: <0.03 K/UL (ref 0–0.44)
ABSOLUTE IMMATURE GRANULOCYTE: 0.19 K/UL (ref 0–0.3)
ABSOLUTE LYMPH #: 1.16 K/UL (ref 1.1–3.7)
ABSOLUTE MONO #: 1.05 K/UL (ref 0.1–1.2)
ALBUMIN SERPL-MCNC: 3.2 G/DL (ref 3.5–5.2)
ANION GAP SERPL CALCULATED.3IONS-SCNC: 13 MMOL/L (ref 9–17)
BASOPHILS # BLD: 0 % (ref 0–2)
BASOPHILS ABSOLUTE: 0.06 K/UL (ref 0–0.2)
BUN BLDV-MCNC: 35 MG/DL (ref 8–23)
BUN/CREAT BLD: ABNORMAL (ref 9–20)
CALCIUM SERPL-MCNC: 8.8 MG/DL (ref 8.6–10.4)
CHLORIDE BLD-SCNC: 93 MMOL/L (ref 98–107)
CO2: 29 MMOL/L (ref 20–31)
CREAT SERPL-MCNC: 0.85 MG/DL (ref 0.5–0.9)
DIFFERENTIAL TYPE: ABNORMAL
EOSINOPHILS RELATIVE PERCENT: 0 % (ref 1–4)
GFR AFRICAN AMERICAN: >60 ML/MIN
GFR NON-AFRICAN AMERICAN: >60 ML/MIN
GFR SERPL CREATININE-BSD FRML MDRD: ABNORMAL ML/MIN/{1.73_M2}
GFR SERPL CREATININE-BSD FRML MDRD: ABNORMAL ML/MIN/{1.73_M2}
GLUCOSE BLD-MCNC: 102 MG/DL (ref 65–105)
GLUCOSE BLD-MCNC: 97 MG/DL (ref 70–99)
HCT VFR BLD CALC: 40.1 % (ref 36.3–47.1)
HEMOGLOBIN: 13.3 G/DL (ref 11.9–15.1)
IMMATURE GRANULOCYTES: 1 %
LYMPHOCYTES # BLD: 7 % (ref 24–43)
MCH RBC QN AUTO: 30.9 PG (ref 25.2–33.5)
MCHC RBC AUTO-ENTMCNC: 33.2 G/DL (ref 28.4–34.8)
MCV RBC AUTO: 93.3 FL (ref 82.6–102.9)
MONOCYTES # BLD: 7 % (ref 3–12)
NRBC AUTOMATED: 0 PER 100 WBC
PDW BLD-RTO: 12.4 % (ref 11.8–14.4)
PHOSPHORUS: 3.4 MG/DL (ref 2.6–4.5)
PLATELET # BLD: 372 K/UL (ref 138–453)
PLATELET ESTIMATE: ABNORMAL
PMV BLD AUTO: 9.6 FL (ref 8.1–13.5)
POTASSIUM SERPL-SCNC: 3.4 MMOL/L (ref 3.7–5.3)
RBC # BLD: 4.3 M/UL (ref 3.95–5.11)
RBC # BLD: ABNORMAL 10*6/UL
SEG NEUTROPHILS: 85 % (ref 36–65)
SEGMENTED NEUTROPHILS ABSOLUTE COUNT: 13.42 K/UL (ref 1.5–8.1)
SODIUM BLD-SCNC: 135 MMOL/L (ref 135–144)
TOTAL CK: 22 U/L (ref 26–192)
TRIGL SERPL-MCNC: 235 MG/DL
WBC # BLD: 15.9 K/UL (ref 3.5–11.3)
WBC # BLD: ABNORMAL 10*3/UL

## 2021-11-17 PROCEDURE — 80069 RENAL FUNCTION PANEL: CPT

## 2021-11-17 PROCEDURE — 6360000002 HC RX W HCPCS: Performed by: NURSE PRACTITIONER

## 2021-11-17 PROCEDURE — 82550 ASSAY OF CK (CPK): CPT

## 2021-11-17 PROCEDURE — 94660 CPAP INITIATION&MGMT: CPT

## 2021-11-17 PROCEDURE — 99232 SBSQ HOSP IP/OBS MODERATE 35: CPT | Performed by: INTERNAL MEDICINE

## 2021-11-17 PROCEDURE — 99233 SBSQ HOSP IP/OBS HIGH 50: CPT | Performed by: INTERNAL MEDICINE

## 2021-11-17 PROCEDURE — 6370000000 HC RX 637 (ALT 250 FOR IP): Performed by: INTERNAL MEDICINE

## 2021-11-17 PROCEDURE — 1200000000 HC SEMI PRIVATE

## 2021-11-17 PROCEDURE — 82947 ASSAY GLUCOSE BLOOD QUANT: CPT

## 2021-11-17 PROCEDURE — 6370000000 HC RX 637 (ALT 250 FOR IP): Performed by: NURSE PRACTITIONER

## 2021-11-17 PROCEDURE — 2700000000 HC OXYGEN THERAPY PER DAY

## 2021-11-17 PROCEDURE — 97110 THERAPEUTIC EXERCISES: CPT

## 2021-11-17 PROCEDURE — 99232 SBSQ HOSP IP/OBS MODERATE 35: CPT | Performed by: STUDENT IN AN ORGANIZED HEALTH CARE EDUCATION/TRAINING PROGRAM

## 2021-11-17 PROCEDURE — 94640 AIRWAY INHALATION TREATMENT: CPT

## 2021-11-17 PROCEDURE — 97116 GAIT TRAINING THERAPY: CPT

## 2021-11-17 PROCEDURE — 2580000003 HC RX 258: Performed by: INTERNAL MEDICINE

## 2021-11-17 PROCEDURE — 97535 SELF CARE MNGMENT TRAINING: CPT

## 2021-11-17 PROCEDURE — 84478 ASSAY OF TRIGLYCERIDES: CPT

## 2021-11-17 PROCEDURE — 94761 N-INVAS EAR/PLS OXIMETRY MLT: CPT

## 2021-11-17 PROCEDURE — 36415 COLL VENOUS BLD VENIPUNCTURE: CPT

## 2021-11-17 PROCEDURE — 85025 COMPLETE CBC W/AUTO DIFF WBC: CPT

## 2021-11-17 RX ADMIN — CITALOPRAM 20 MG: 20 TABLET, FILM COATED ORAL at 10:31

## 2021-11-17 RX ADMIN — APIXABAN 5 MG: 5 TABLET, FILM COATED ORAL at 14:15

## 2021-11-17 RX ADMIN — CIPROFLOXACIN 400 MG: 2 INJECTION, SOLUTION INTRAVENOUS at 03:08

## 2021-11-17 RX ADMIN — Medication 2 PUFF: at 21:12

## 2021-11-17 RX ADMIN — Medication 2 PUFF: at 07:57

## 2021-11-17 RX ADMIN — Medication 2 PUFF: at 11:51

## 2021-11-17 RX ADMIN — SODIUM CHLORIDE, PRESERVATIVE FREE 10 ML: 5 INJECTION INTRAVENOUS at 20:42

## 2021-11-17 RX ADMIN — Medication 2 PUFF: at 15:49

## 2021-11-17 RX ADMIN — OXYCODONE HYDROCHLORIDE AND ACETAMINOPHEN 500 MG: 500 TABLET ORAL at 10:30

## 2021-11-17 RX ADMIN — CLOPIDOGREL 75 MG: 75 TABLET, FILM COATED ORAL at 10:32

## 2021-11-17 RX ADMIN — Medication 2 PUFF: at 07:56

## 2021-11-17 RX ADMIN — Medication 2 PUFF: at 21:10

## 2021-11-17 RX ADMIN — BUMETANIDE 1 MG: 1 TABLET ORAL at 10:31

## 2021-11-17 RX ADMIN — DOCUSATE SODIUM 50MG AND SENNOSIDES 8.6MG 2 TABLET: 8.6; 5 TABLET, FILM COATED ORAL at 10:28

## 2021-11-17 RX ADMIN — SODIUM CHLORIDE, PRESERVATIVE FREE 10 ML: 5 INJECTION INTRAVENOUS at 09:00

## 2021-11-17 RX ADMIN — METOPROLOL TARTRATE 25 MG: 25 TABLET ORAL at 10:15

## 2021-11-17 RX ADMIN — BUMETANIDE 1 MG: 1 TABLET ORAL at 20:41

## 2021-11-17 RX ADMIN — LACTULOSE 10 G: 20 SOLUTION ORAL at 14:15

## 2021-11-17 RX ADMIN — LACTULOSE 10 G: 20 SOLUTION ORAL at 10:15

## 2021-11-17 RX ADMIN — APIXABAN 5 MG: 5 TABLET, FILM COATED ORAL at 20:41

## 2021-11-17 RX ADMIN — POTASSIUM CHLORIDE 40 MEQ: 1500 TABLET, EXTENDED RELEASE ORAL at 10:15

## 2021-11-17 RX ADMIN — AMIODARONE HYDROCHLORIDE 200 MG: 200 TABLET ORAL at 10:30

## 2021-11-17 RX ADMIN — Medication 3 MG: at 20:41

## 2021-11-17 RX ADMIN — DEXAMETHASONE 6 MG: 4 TABLET ORAL at 10:29

## 2021-11-17 RX ADMIN — LACTULOSE 10 G: 20 SOLUTION ORAL at 20:41

## 2021-11-17 RX ADMIN — DESMOPRESSIN ACETATE 40 MG: 0.2 TABLET ORAL at 14:15

## 2021-11-17 RX ADMIN — Medication 2 PUFF: at 11:49

## 2021-11-17 RX ADMIN — METOPROLOL TARTRATE 25 MG: 25 TABLET ORAL at 20:41

## 2021-11-17 RX ADMIN — DIGOXIN 125 MCG: 125 TABLET ORAL at 10:31

## 2021-11-17 RX ADMIN — CIPROFLOXACIN 400 MG: 2 INJECTION, SOLUTION INTRAVENOUS at 14:14

## 2021-11-17 ASSESSMENT — PAIN DESCRIPTION - LOCATION: LOCATION: KNEE

## 2021-11-17 ASSESSMENT — ENCOUNTER SYMPTOMS
VOMITING: 0
CONSTIPATION: 0
DIARRHEA: 0
ABDOMINAL PAIN: 0
SORE THROAT: 0
WHEEZING: 0
COUGH: 0
SHORTNESS OF BREATH: 1
TROUBLE SWALLOWING: 0
NAUSEA: 0

## 2021-11-17 ASSESSMENT — PAIN SCALES - WONG BAKER: WONGBAKER_NUMERICALRESPONSE: 2

## 2021-11-17 ASSESSMENT — PAIN DESCRIPTION - PAIN TYPE: TYPE: CHRONIC PAIN

## 2021-11-17 ASSESSMENT — PAIN SCALES - GENERAL
PAINLEVEL_OUTOF10: 0
PAINLEVEL_OUTOF10: 0

## 2021-11-17 ASSESSMENT — PAIN DESCRIPTION - ORIENTATION: ORIENTATION: RIGHT;LEFT

## 2021-11-17 ASSESSMENT — PAIN DESCRIPTION - DESCRIPTORS: DESCRIPTORS: ACHING;DISCOMFORT;SORE

## 2021-11-17 ASSESSMENT — PAIN DESCRIPTION - FREQUENCY: FREQUENCY: CONTINUOUS

## 2021-11-17 NOTE — PROGRESS NOTES
NON INVASIVE VENTILATION  PROVIDE OPTIMAL VENTILATION/ACCEPTABLE SP02  IMPLEMENT NON INVASIVE VENTILATION PROTOCOL  ASSESSMENT SKIN INTEGRITY  PATIENT EDUCATION AS NEEDED  BIPAP AS NEEDEDBRONCHOSPASM/BRONCHOCONSTRICTION     [x]         IMPROVE AERATION/BREATH SOUNDS  [x]   ADMINISTER BRONCHODILATOR THERAPY AS APPROPRIATE  [x]   ASSESS BREATH SOUNDS  []   IMPLEMENT AEROSOL/MDI PROTOCOL  [x]   PATIENT EDUCATION AS NEEDED

## 2021-11-17 NOTE — PROGRESS NOTES
11/17/21 0809 136/73 97.4 °F (36.3 °C) Oral      11/17/21 0757     20 99 %   11/17/21 0400    69     11/17/21 0346    68 30 97 %   11/17/21 0313    74 23 97 %   11/17/21 0306 124/73 97.5 °F (36.4 °C) Axillary 75 25 99 %    11.11.21  No nausea and no lost of appetite  Cough same and old  Used 2L NC  abd soft    11/12  Intubated over night for resp distress - on 45 fio2 and 8 peep sedated  CXr shows R effusion and infiltrate and CRp a little higher    11/15  actemra 11/12, no new CRP    11/17  Extubated alert appropriate, poor appetite saturating 95%, she has some mild epigastric or abdominal discomfort. WBC 15, blood cultures are negative, off antibiotics. Summary of relevant labs:  Labs:  Creat 0.76   WBC  9.1  CRP 31-50  Ferritin  452      Micro;  covid rapid neg 11/3  covid rapid + 11/11    Imaging:  CT chest 11/12 multifocal pneumonia from covid    CXR 11/9  Stable volume loss in the left chest with left basilar atelectasis or pneumonia. Possible small left effusion. CXR 11/3  Persistent sizable left effusion with opacity at the left lung base and pulmonary vascular congestion. I have personally reviewed the past medical history, past surgical history, medications, social history, and family history, and I haveupdated the database accordingly. Allergies:   Latex, Betadine [povidone iodine], Bee venom, Dilaudid [hydromorphone hcl], Adhesive tape, and Sulfa antibiotics     Review of Systems:     Review of Systems   Constitutional: Negative for activity change. HENT: Negative for congestion. Eyes: Negative for discharge. Respiratory: Negative for apnea. Cardiovascular: Negative for chest pain. Gastrointestinal: Negative for abdominal distention. Endocrine: Negative for cold intolerance. Genitourinary: Negative for dysuria. Musculoskeletal: Negative for arthralgias. Skin: Negative for color change.    Allergic/Immunologic: Negative for immunocompromised state.   Neurological: Negative for headaches. Hematological: Negative for adenopathy. Psychiatric/Behavioral: Negative for agitation. Physical Examination :       Physical Exam  Constitutional:       General: She is not in acute distress. Appearance: Normal appearance. She is not ill-appearing. HENT:      Head: Atraumatic. Nose: Nose normal.      Mouth/Throat:      Mouth: Mucous membranes are moist.   Eyes:      General: No scleral icterus. Conjunctiva/sclera: Conjunctivae normal.   Cardiovascular:      Rate and Rhythm: Normal rate and regular rhythm. Heart sounds: Normal heart sounds. No murmur heard. Pulmonary:      Breath sounds: No rhonchi. Abdominal:      General: There is no distension. Palpations: Abdomen is soft. Tenderness: There is no abdominal tenderness. Genitourinary:     Comments: No goldstein  Musculoskeletal:         General: No swelling or deformity. Cervical back: Neck supple. No rigidity. Right lower leg: No edema. Left lower leg: No edema. Skin:     General: Skin is dry. Coloration: Skin is not jaundiced. Neurological:      General: No focal deficit present. Mental Status: She is alert. Mental status is at baseline. Psychiatric:         Mood and Affect: Mood normal.         Thought Content:  Thought content normal.      Comments: I         Past Medical History:     Past Medical History:   Diagnosis Date    Asthma     COPD (chronic obstructive pulmonary disease) (HCC)     Gout     Hyperlipidemia     Hypertension     Mitral insufficiency     Pneumonia        Past Surgical  History:     Past Surgical History:   Procedure Laterality Date    ANKLE SURGERY Left 03/22/2016    ORIF    BRONCHOSCOPY N/A 10/20/2021    BRONCHOSCOPY ALVEOLAR LAVAGE performed by Javon Claudio MD at [address] [facility] FLX W/RMVL OF TUMOR POLYP LESION [address] TQ N/A 8/8/2017    COLONOSCOPY POLYPECTOMY SNARE/COLD BIOPSY performed by Ne Saldaña MD at [address] AND ADENOIDECTOMY         Medications:      amiodarone  200 mg Oral Daily    ipratropium  2 puff Inhalation 4x daily    albuterol sulfate HFA  2 puff Inhalation 4x daily    apixaban  5 mg Oral BID    vitamin C  500 mg Oral Daily    atorvastatin  40 mg Oral Daily    bumetanide  1 mg Oral BID    citalopram  20 mg Oral Daily    clopidogrel  75 mg Oral Daily    metoprolol tartrate  25 mg Oral BID    sennosides-docusate sodium  2 tablet Oral Daily    ciprofloxacin  400 mg IntraVENous Q12H    dexamethasone  6 mg Oral Daily    lactulose  10 g Oral TID    digoxin  125 mcg Per NG tube Daily    lidocaine 1 % injection  5 mL IntraDERmal Once    sodium chloride flush  5-40 mL IntraVENous 2 times per day       Social History:     Social History     Socioeconomic History    Marital status:      Spouse name: Not on file    Number of children: Not on file    Years of education: Not on file    Highest education level: Not on file   Occupational History    Not on file   Tobacco Use    Smoking status: Former Smoker    Smokeless tobacco: Never Used    Tobacco comment: over 30 years ago    Vaping Use    Vaping Use: Never used   Substance and Sexual Activity    Alcohol use: Yes     Alcohol/week: 0.0 standard drinks     Comment: occasionally     Drug use: No    Sexual activity: Not on file   Other Topics Concern    Not on file   Social History Narrative    Not on file     Social Determinants of Health     Financial Resource Strain:     Difficulty of Paying Living Expenses: Not on file   Food Insecurity:     Worried About Running Out of Food in the Last Year: Not on file    Geronimo of Food in the Last Year: Not on file   Transportation Needs:     Lack of Transportation (Medical): Not on file    Lack of Transportation (Non-Medical):  Not on file   Physical Activity:     Days of Exercise per Week: Not on file    Minutes of Exercise per Session: Not on file   Stress:     Feeling of Stress : Not on file   Social Connections:     Frequency of Communication with Friends and Family: Not on file    Frequency of Social Gatherings with Friends and Family: Not on file    Attends Temple Services: Not on file    Active Member of [address] [place] [address] [employer] or Organizations: Not on file    Attends Club or Organization Meetings: Not on file    Marital Status: Not on file   Intimate Partner Violence:     Fear of Current or Ex-Partner: Not on file    Emotionally Abused: Not on file    Physically Abused: Not on file    Sexually Abused: Not on file   Housing Stability:     Unable to Pay for Housing in the Last Year: Not on file    Number of Places Lived in the Last Year: Not on file    Unstable Housing in the Last Year: Not on file       Family History:     Family History   Problem Relation Age of Onset    High Blood Pressure Mother     Cancer Mother     Asthma Father     Heart Disease Father     Cancer Father       Medical Decision Making:   I have independently reviewed/ordered the following labs:    CBC with Differential:   Recent Labs     11/16/21 0410 11/17/21  0542   WBC 14.7* 15.9*   HGB 11.8* 13.3   HCT 36.1* 40.1    372   LYMPHOPCT 7* 7*   MONOPCT 8 7     BMP:  Recent Labs     11/16/21 0410 11/17/21  0542    135   K 3.8 3.4*   CL 96* 93*   CO2 33* 29   BUN 39* 35*   CREATININE 0.81 0.85     Hepatic Function Panel:   Recent Labs     11/16/21  0410 11/17/21  0542   LABALBU 3.1* 3.2*     No results for input(s): RPR in the last 72 hours. No results for input(s): HIV in the last 72 hours. No results for input(s): BC in the last 72 hours. Lab Results   Component Value Date    CREATININE 0.85 11/17/2021    GLUCOSE 97 11/17/2021    GLUCOSE 97 05/09/2016       Detailed results: Thank you for allowing us to participate in the care of this patient. Please call with questions. This note is created with the assistance of a speech recognition program.  While intending to generate adocument that actually reflects the content of the visit, the document can still have some errors including those of syntax and sound a like substitutions which may escape proof reading. It such instances, actual meaningcan be extrapolated by contextual diversion.     Parker Henderson MD  Office: [phone]  Perfect serve / office [phone]

## 2021-11-17 NOTE — PROGRESS NOTES
Physical Therapy  Facility/Department: [facility] 3  Daily Treatment Note  NAME: Doni Dunaway  : 1945  MRN: [id number]    Date of Service: 2021    Discharge Recommendations:  Patient would benefit from continued therapy after discharge   PT Equipment Recommendations  Equipment Needed: No    Assessment   Assessment: Pt requires Min-A for bed mobility and transfers this date. Pt ambulated 5 ft. from bed to chair with CGA using a RW and 4L of O2. Pt c/o SOB but SpO2 remained >90% t/o session. Pt is limited by decreased endurance, strength, and mobility and would benefit from continued PT following discharge to address functional deficits. Prognosis: Good  Decision Making: Medium Complexity  PT Education: Goals; PT Role; Plan of Care; Functional Mobility Training; Home Exercise Program; General Safety; Energy Conservation  REQUIRES PT FOLLOW UP: Yes  Activity Tolerance  Activity Tolerance: Patient limited by endurance     Patient Diagnosis(es): The primary encounter diagnosis was Acute on chronic congestive heart failure, unspecified heart failure type ([facility] [address] Diagnoses of CASEY (acute kidney injury) ([address] and Acute on chronic diastolic heart failure ([address] were also pertinent to this visit. has a past medical history of Asthma, COPD (chronic obstructive pulmonary disease) ([address] Gout, Hyperlipidemia, Hypertension, Mitral insufficiency, and Pneumonia. has a past surgical history that includes Hysterectomy (); knee surgery; Colonoscopy; Dental surgery; Tonsillectomy and adenoidectomy; Ankle surgery (Left, 2016); pr colsc flx w/rmvl of tumor polyp lesion snare tq (N/A, 2017); and bronchoscopy (N/A, 10/20/2021).     Restrictions  Restrictions/Precautions  Restrictions/Precautions: Fall Risk  Required Braces or Orthoses?: No  Position Activity Restriction  Other position/activity restrictions: up with assist  Subjective   General  Chart Reviewed: Yes  Response To Previous Treatment: Patient visits  Short term goal 1: Perform bed mobility with SBA  Short term goal 2: Perform sit to stand transfer with CGA  Short term goal 3: Ambulate 200ft with RW and CGA  Short term goal 4: Demo Fair+ dynamic standing balance to decrease risk of falls  Short term goal 5: Participate in 30 minutes of therapy to demo increased endurance    Plan    Plan  Times per week: 5x/wk  Current Treatment Recommendations: Strengthening, ROM, Balance Training, Functional Mobility Training, Gait Training, Stair training, Endurance Training, Home Exercise Program, Safety Education & Training, Patient/Caregiver Education & Training, Transfer Training  Safety Devices  Type of devices: Nurse notified, Gait belt, Left in chair, All fall risk precautions in place, Call light within reach  Restraints  Initially in place: No     Therapy Time   Individual Concurrent Group Co-treatment   Time In 1010         Time Out 1035         Minutes 25         Timed Code Treatment Minutes: [address], [facility]

## 2021-11-17 NOTE — PLAN OF CARE
Problem: Falls - Risk of:  Goal: Will remain free from falls  Description: Will remain free from falls  11/17/2021 1831 by Flako Simpson RN  Outcome: Ongoing  11/17/2021 0644 by Miguel A Medellin RN  Outcome: Ongoing  Goal: Absence of physical injury  Description: Absence of physical injury  11/17/2021 1831 by Flako Simpson RN  Outcome: Ongoing  11/17/2021 0644 by Miguel A Medellin RN  Outcome: Ongoing     Problem: Skin Integrity:  Goal: Will show no infection signs and symptoms  Description: Will show no infection signs and symptoms  11/17/2021 1831 by Flako Simpson RN  Outcome: Ongoing  11/17/2021 0644 by Miguel A Medellin RN  Outcome: Ongoing  Goal: Absence of new skin breakdown  Description: Absence of new skin breakdown  11/17/2021 1831 by Flako Simpson RN  Outcome: Ongoing  11/17/2021 0644 by Miguel A Medellin RN  Outcome: Ongoing     Problem: OXYGENATION/RESPIRATORY FUNCTION  Goal: Patient will maintain patent airway  11/17/2021 0528 by Scooter Kaye RCP  Outcome: Completed  Goal: Patient will achieve/maintain normal respiratory rate/effort  Description: Respiratory rate and effort will be within normal limits for the patient  11/17/2021 1831 by Flako Simpson RN  Outcome: Ongoing  11/17/2021 0644 by Miguel A Medellin RN  Outcome: Ongoing     Problem: HEMODYNAMIC STATUS  Goal: Patient has stable vital signs and fluid balance  11/17/2021 1831 by Flako Simpson RN  Outcome: Ongoing  11/17/2021 0644 by Miguel A Medellin RN  Outcome: Ongoing     Problem: FLUID AND ELECTROLYTE IMBALANCE  Goal: Fluid and electrolyte balance are achieved/maintained  11/17/2021 1831 by Flako Simpson RN  Outcome: Ongoing  11/17/2021 0644 by Miguel A Medellin RN  Outcome: Ongoing     Problem: ACTIVITY INTOLERANCE/IMPAIRED MOBILITY  Goal: Mobility/activity is maintained at optimum level for patient  11/17/2021 0528 by Scooter Kaye RCP  Outcome: Completed     Problem: Nutrition  Goal: Optimal nutrition therapy  11/17/2021 1831 by Flako Simpson RN  Outcome: Ongoing  11/17/2021 0644 by Owen Amezcua RN  Outcome: Ongoing     Problem: Pain:  Goal: Pain level will decrease  Description: Pain level will decrease  11/17/2021 1831 by Queta Castellon RN  Outcome: Ongoing  11/17/2021 0644 by Owen Amezcua RN  Outcome: Ongoing  Goal: Control of acute pain  Description: Control of acute pain  11/17/2021 1831 by Queta Castellon RN  Outcome: Ongoing  11/17/2021 0644 by Owen Amezcua RN  Outcome: Ongoing  Goal: Control of chronic pain  Description: Control of chronic pain  11/17/2021 1831 by Queta Castellon RN  Outcome: Ongoing  11/17/2021 0644 by Owen Amezcua RN  Outcome: Ongoing     Problem: Gas Exchange - Impaired  Goal: Absence of hypoxia  11/17/2021 1831 by Queta Castellon RN  Outcome: Ongoing  11/17/2021 0644 by Owen Amezcua RN  Outcome: Ongoing  Intervention: Respiratory assessment  11/17/2021 0528 by Rubi aDly RCP  Note:   PROVIDE ADEQUATE OXYGENATION WITH ACCEPTABLE SP02/ABG'S    [x]  IDENTIFY APPROPRIATE OXYGEN THERAPY  [x]   MONITOR SP02/ABG'S AS NEEDED   [x]   PATIENT EDUCATION AS NEEDED     BRONCHOSPASM/BRONCHOCONSTRICTION     [x]         IMPROVE AERATION/BREATH SOUNDS  [x]   ADMINISTER BRONCHODILATOR THERAPY AS APPROPRIATE  [x]   ASSESS BREATH SOUNDS  []   IMPLEMENT AEROSOL/MDI PROTOCOL  [x]   PATIENT EDUCATION AS NEEDED     [x]  NON INVASIVE VENTILATION  [x]  PROVIDE OPTIMAL VENTILATION/ACCEPTABLE SP02  []  IMPLEMENT NON INVASIVE VENTILATION PROTOCOL  [x]  ASSESSMENT SKIN INTEGRITY  [x]  PATIENT EDUCATION AS NEEDED  [x]  BIPAP AS NEEDED   Goal: Promote optimal lung function  11/17/2021 1831 by Queta Castellon RN  Outcome: Ongoing  11/17/2021 0644 by Owen Amezcua RN  Outcome: Ongoing     Problem: Isolation Precautions - Risk of Spread of Infection  Goal: Prevent transmission of infection  11/17/2021 1831 by Queta Castellon RN  Outcome: Ongoing  11/17/2021 0644 by Owen Amezcua RN  Outcome: Ongoing

## 2021-11-17 NOTE — PLAN OF CARE
Problem: Falls - Risk of:  Goal: Will remain free from falls  Description: Will remain free from falls  Outcome: Ongoing  Goal: Absence of physical injury  Description: Absence of physical injury  Outcome: Ongoing     Problem: Skin Integrity:  Goal: Will show no infection signs and symptoms  Description: Will show no infection signs and symptoms  Outcome: Ongoing  Goal: Absence of new skin breakdown  Description: Absence of new skin breakdown  Outcome: Ongoing     Problem: OXYGENATION/RESPIRATORY FUNCTION  Goal: Patient will achieve/maintain normal respiratory rate/effort  Description: Respiratory rate and effort will be within normal limits for the patient  Outcome: Ongoing     Problem: HEMODYNAMIC STATUS  Goal: Patient has stable vital signs and fluid balance  Outcome: Ongoing     Problem: FLUID AND ELECTROLYTE IMBALANCE  Goal: Fluid and electrolyte balance are achieved/maintained  Outcome: Ongoing     Problem: Nutrition  Goal: Optimal nutrition therapy  Outcome: Ongoing     Problem: Pain:  Goal: Pain level will decrease  Description: Pain level will decrease  Outcome: Ongoing  Goal: Control of acute pain  Description: Control of acute pain  Outcome: Ongoing  Goal: Control of chronic pain  Description: Control of chronic pain  Outcome: Ongoing     Problem: Airway Clearance - Ineffective  Goal: Achieve or maintain patent airway  Outcome: Ongoing     Problem: Gas Exchange - Impaired  Goal: Absence of hypoxia  Outcome: Ongoing  Intervention: Respiratory assessment  11/17/2021 0528 by Edward Kaye RCP  Note:   PROVIDE ADEQUATE OXYGENATION WITH ACCEPTABLE SP02/ABG'S    [x]  IDENTIFY APPROPRIATE OXYGEN THERAPY  [x]   MONITOR SP02/ABG'S AS NEEDED   [x]   PATIENT EDUCATION AS NEEDED     BRONCHOSPASM/BRONCHOCONSTRICTION     [x]         IMPROVE AERATION/BREATH SOUNDS  [x]   ADMINISTER BRONCHODILATOR THERAPY AS APPROPRIATE  [x]   ASSESS BREATH SOUNDS  []   IMPLEMENT AEROSOL/MDI PROTOCOL  [x]   PATIENT EDUCATION AS

## 2021-11-17 NOTE — PROGRESS NOTES
PULMONARY & CRITICAL CARE MEDICINE PROGRESS NOTE     Patient:  Antonio Cox  MRN: [id number]  6 [facility] date: 11/3/2021  Primary Care Physician: KALYN Pascual - CNP  Consulting Physician: Pamella Darnell MD  CODE Status: Full Code  LOS: 15     SUBJECTIVE     CHIEF COMPLAINT/REASON FOR INITIAL CONSULT: Acute respiratory failure/COVID-19 pneumonia    BRIEF HOSPITAL COURSE:   The patient is a 68 y.o. female here for evaluation of shortness of breath with clear sputum, increased leg swelling.  Patient has chronic shortness of breath which has been worsening over the last couple of days along with increased swelling of her lower extremity.  She has conversational dyspnea, no wheezing.  Chest x-ray shows left pleural effusion and left atelectasis she has had multiple thoracentesis. I performed a bronchoscopic evaluation on due to persistent left lower lobe atelectasis and she had extensive mucus plugging on the left side    INTERVAL HISTORY:  11/16/21   Patient extubated on 11/15  Currently on nasal cannula  Hemodynamically stable  No overnight issues reported  Remains on Cipro as per ID recommendations  Continue Decadron    REVIEW OF SYSTEMS:  Review of Systems   Constitutional: Negative for appetite change, chills, fever and unexpected weight change. HENT: Negative for congestion, postnasal drip, sore throat and trouble swallowing. Eyes: Negative for visual disturbance. Respiratory: Positive for shortness of breath. Negative for cough and wheezing. Cardiovascular: Negative for chest pain, palpitations and leg swelling. Gastrointestinal: Negative for abdominal pain, constipation, diarrhea, nausea and vomiting. Genitourinary: Negative for difficulty urinating, dysuria and frequency. Musculoskeletal: Negative for arthralgias and joint swelling. Skin: Negative for rash. Allergic/Immunologic: Negative for immunocompromised state. Neurological: Positive for weakness.  Negative for dizziness, speech difficulty and headaches. Hematological: Negative for adenopathy. Psychiatric/Behavioral: Negative for behavioral problems and sleep disturbance. OBJECTIVE     VITAL SIGNS:   LAST:  /80   Pulse 69   Temp 98.8 °F (37.1 °C) (Oral)   Resp 26   Ht 5' 4\" (1.626 m)   Wt 238 lb 11.2 oz (108.3 kg)   SpO2 98%   BMI 40.97 kg/m²   8-24 HR RANGE:  TEMP Temp  Av.9 °F (36.6 °C)  Min: 97.3 °F (36.3 °C)  Max: 98.8 °F (72.9 °C)   BP Systolic (17OHA), KSY:647 , Min:108 , GRANT:350      Diastolic (56RNZ), ONV:28, Min:60, Max:80     PULSE Pulse  Av.3  Min: 61  Max: 88   RR Resp  Av.8  Min: 25  Max: 33   O2 SAT SpO2  Av.6 %  Min: 96 %  Max: 98 %   OXYGEN DELIVERY No data recorded        SYSTEMIC EXAMINATION:   General appearance -comfortable  Mental status -awake and alert  Eyes - pupils equal and reactive, sclera anicteric  Mouth - mucous membranes moist, pharynx normal without lesions  Neck - supple  Chest - Breath sounds bilaterally were dimnished to auscultation at bases. There were no wheezes, rhonchi or rales.     Heart - normal rate, regular rhythm, normal S1, S2, no murmurs, rubs, clicks or gallops  Abdomen - soft, nontender, nondistended, no masses or organomegaly  Neurological -nonfocal  Extremities - peripheral pulses normal, 1+ pedal edema, no clubbing or cyanosis  Skin - normal coloration and turgor    DATA REVIEW     Medications:  Scheduled Meds:   amiodarone  200 mg Oral Daily    ipratropium  2 puff Inhalation 4x daily    albuterol sulfate HFA  2 puff Inhalation 4x daily    apixaban  5 mg Oral BID    vitamin C  500 mg Oral Daily    atorvastatin  40 mg Oral Daily    bumetanide  1 mg Oral BID    citalopram  20 mg Oral Daily    clopidogrel  75 mg Oral Daily    metoprolol tartrate  25 mg Oral BID    sennosides-docusate sodium  2 tablet Oral Daily    ciprofloxacin  400 mg IntraVENous Q12H    dexamethasone  6 mg Oral Daily    lactulose  10 g Oral TID    digoxin  125 mcg Per NG tube Daily    lidocaine 1 % injection  5 mL IntraDERmal Once    sodium chloride flush  5-40 mL IntraVENous 2 times per day     Continuous Infusions:   sodium chloride         INPUT/OUTPUT:  No intake/output data recorded. Date 11/16/21 0000 - 11/16/21 2359   Shift [id number] 5819-0518 4800-3725 24 Hour Total   INTAKE   I.V.(mL/kg) 242.2(2.2)   242.2(2.2)   IV Piggyback(mL/kg) 200(1.8)   200(1.8)   Shift Total(mL/kg) 442. 2(4.1)   442. 2(4.1)   OUTPUT   Urine(mL/kg/hr) 1200(1.4)   1200   Shift Total(mL/kg) 1200(11.1)   1200(11.1)   Weight (kg) 108. 3 108. 3 108. 3 108.3        LABS:  ABGs:   Recent Labs     11/14/21  0308 11/14/21  0459 11/15/21  0336   POCPH 7.549* 7.521* 7.479*   POCPCO2 39.4 42.6 44.2   POCPO2 85.3 86.0 97.8   POCHCO3 34.5* 34.9* 32.8*   [id number] 98 97 98     CBC:   Recent Labs     11/14/21  0350 11/15/21  0356 11/16/21  0410   WBC 19.0* 20.1* 14.7*   HGB 11.9 12.1 11.8*   HCT 35.5* 38.1 36.1*   MCV 92.9 94.1 93.3    388 359   LYMPHOPCT 5* 4* 7*   RBC 3.82* 4.05 3.87*   MCH 31.2 29.9 30.5   MCHC 33.5 31.8 32.7   RDW 12.6 12.5 12.6     CRP:   Recent Labs     11/16/21 0410   CRP 9.4*     LDH:   No results for input(s): LDH in the last 72 hours. BMP:   Recent Labs     11/14/21  0350 11/15/21  0356 11/16/21  0410   * 136 138   K 4.2 4.2 3.8   CL 85* 91* 96*   CO2 29 29 33*   BUN 42* 46* 39*   CREATININE 1.18* 1.00* 0.81   GLUCOSE 155* 167* 108*   PHOS 3.3 3.9 3.2     Liver Function Test:   Recent Labs     11/14/21  0350 11/15/21  0356 11/16/21  0410   LABALBU 3.1* 3.2* 3.1*     Coagulation Profile:   No results for input(s): INR, PROTIME, APTT in the last 72 hours. D-Dimer:  No results for input(s): DDIMER in the last 72 hours. Ferritin:    No results for input(s): FERRITIN in the last 72 hours. Lactic Acid:  No results for input(s): LACTA in the last 72 hours.   Cardiac Enzymes:  Recent Labs     11/15/21  0356   CKTOTAL 14*     BNP/ProBNP:   No results for input(s): BNP, PROBNP in the last 72 hours. Triglycerides:  Recent Labs     11/15/21  0356   TRIG 417*        Microbiology:  Urine Culture:  No components found for: CURINE  Blood Culture:  No components found for: CBLOOD, CFUNGUSBL  Sputum Culture:  No components found for: CSPUTUM  Recent Labs     11/14/21 1926   SPECDESC . BLOOD   SPECIAL LT HAND 2 ML   CULTURE NO GROWTH 2 DAYS     Recent Labs     11/14/21 1926   SPECDESC . BLOOD   SPECIAL LT HAND 2 ML   CULTURE NO GROWTH 2 DAYS        Pathology:    Radiology Reports:  CT CHEST WO CONTRAST   Final Result   Intervally developed perihilar and bibasilar ground-glass and dense airspace   consolidations, concerning for infection including atypical entities such as   COVID pneumonia. Similar small bilateral pleural effusions. XR CHEST PORTABLE   Final Result   1. Endotracheal and esophageal catheters placed as described   2. More conspicuous lower to mid level opacification consistent with pleural   effusions and at least atelectasis         XR CHEST (SINGLE VIEW FRONTAL)   Final Result   Small bilateral pleural effusions. Slightly increased airspace opacity in   the right mid to lower lung, which may reflect pneumonia in the appropriate   clinical setting. XR CHEST (SINGLE VIEW FRONTAL)   Final Result   Stable volume loss in the left chest with left basilar atelectasis or   pneumonia. Possible small left effusion. XR CHEST (SINGLE VIEW FRONTAL)   Final Result   Interval improved aeration of the left lung base. XR CHEST (SINGLE VIEW FRONTAL)   Final Result   Interval decrease in left effusion. No pneumothorax. Persistent opacity at   the left lung base. Vascularity is slightly prominent. US THORACENTESIS Which side should the procedure be performed? Left   Final Result   Successful ultrasound guided thoracentesis. US RETROPERITONEAL COMPLETE   Final Result   1.  Patient had no urge to void during the exam.  Nonvisualization of ureteral jets.   2. Incidental cholelithiasis. 3. Unremarkable renal ultrasound. No hydronephrosis. XR CHEST PORTABLE   Final Result   No change from prior study. Persistent sizable left effusion with opacity at   the left lung base and pulmonary vascular congestion. Echocardiogram:   Results for orders placed during the hospital encounter of 10/02/21    ECHO Complete 2D W Doppler W Color    Summary  Left ventricle is normal in size with normal systolic function globally. Calculated ejection fraction is 59% ( Atrial-Fib). Mild mitral regurgitation. Mild tricuspid regurgitation. Estimated right ventricular systolic pressure is 35 mmHg. Anterior clear space noted, most likely adipose tissue vs small loculated  pericardial effusion. No evidence of tamponade. Pleural effusion is seen.        ASSESSMENT AND PLAN     Assessment:    // Acute hypoxic respiratory failure, s/p extubation 11/15  // Bilateral lower lobe atelectasis  // COVID-19 pneumonia  // Sepsis  // A. fib with RVR  // Acute kidney injury, improved  // Chronic obstructive pulmonary disease  // UTI, secondary to Pseudomonas  // CHF  // Essential hypertension  // Morbid obesity     Plan:     I personally interviewed/examined the patient; reviewed interval history, interpreted all available radiographic and laboratory data at the time of service.     · Patient extubated yesterday  · Doing well from a respiratory standpoint, on nasal cannula  · Continue supplemental oxygen to keep oxygen saturation greater than 90%  · Continue amiodarone as per cardiology  · Continue pulmonary toilet, aspiration precautions and bronchodilators  · Continue to monitor I/O with a goal of even/negative fluid balance  · Tolerating oral diet  · Stress ulcer prophylaxis  · Continue to monitor CBC, coagulation profile, transfuse as indicated  · Chemical DVT prophylaxis not needed, patient on Eliquis  · Antimicrobials reviewed; continue Cipro as per ID recommendations  · Monitor CRP, LDH, AST/ALT, D-Dimer, Ferritin   · Glycemic control appropriate  · Continue Decadron for 10 days  · Skin/wound care reviewed with the nursing staff  · Physical/occupational therapy     We will continue to follow. I would like to thank you for allowing me to participate in the care of this patient. Please feel free to call with any further questions or concerns. Pascale Allen MD  Pulmonary and Critical Care Medicine           11/16/2021     This patient was evaluated in the context of the global SARS-CoV-2 (COVID-19) pandemic, which necessitated considerations that the patient either has COVID-19 infection or is at risk of infection with COVID-19. Institutional protocols and algorithms that pertain to the evaluation & management of patients with COVID-19 or those at risk for COVID-19 are in a state of rapid changes based on information released by regulatory bodies including the CDC and federal and state organizations. These policies and algorithms were followed during the patient's care. Please note that this chart was generated using voice recognition Dragon dictation software. Although every effort was made to ensure the accuracy of this automated transcription, some errors in transcription may have occurred.

## 2021-11-17 NOTE — PROGRESS NOTES
Occupational Therapy  Facility/Department: [facility] CAR 3  Daily Treatment Note  NAME: Anisa Graham  : 1945  MRN: [id number]    Date of Service: 2021    Discharge Recommendations:  Patient would benefit from continued therapy after discharge       Assessment   Performance deficits / Impairments: Decreased functional mobility ; Decreased ADL status; Decreased endurance; Decreased high-level IADLs; Decreased balance; Decreased strength  Prognosis: Good  Patient Education: OT POC, transfer/walker safety, importance of participation in therapy, pursed lip breathing with good return. REQUIRES OT FOLLOW UP: Yes  Activity Tolerance  Activity Tolerance: Patient Tolerated treatment well; Patient limited by fatigue  Activity Tolerance: Fluctuating RR 35-44 at times. Safety Devices  Safety Devices in place: Yes  Type of devices: Call light within reach; Gait belt; Left in chair; Nurse notified         Patient Diagnosis(es): The primary encounter diagnosis was Acute on chronic congestive heart failure, unspecified heart failure type ([facility] [address] Diagnoses of CASEY (acute kidney injury) ([facility] [address] and Acute on chronic diastolic heart failure ([facility] [address] were also pertinent to this visit. has a past medical history of Asthma, COPD (chronic obstructive pulmonary disease) ([address] Gout, Hyperlipidemia, Hypertension, Mitral insufficiency, and Pneumonia. has a past surgical history that includes Hysterectomy (); knee surgery; Colonoscopy; Dental surgery; Tonsillectomy and adenoidectomy; Ankle surgery (Left, 2016); pr colsc flx w/rmvl of tumor polyp lesion snare tq (N/A, 2017); and bronchoscopy (N/A, 10/20/2021). Restrictions  Restrictions/Precautions  Restrictions/Precautions: Fall Risk, Up as Tolerated, Contact Precautions  Required Braces or Orthoses?: No  Position Activity Restriction  Other position/activity restrictions: up with assist. O2 NC 3 Lm.   Subjective   General  Chart Reviewed: Yes  Patient assessed for rehabilitation services?: Yes  Family / Caregiver Present: No  Diagnosis: CASEY  General Comment  Pain Assessment  Pain Assessment: Faces  Khan-Baker Pain Rating: Hurts a little bit  Pain Type: Chronic pain  Pain Location: Knee  Pain Orientation: Right; Left  Pain Descriptors: Aching; Discomfort; Sore  Pain Frequency: Continuous  Non-Pharmaceutical Pain Intervention(s): Therapeutic presence; Repositioned; Rest  Vital Signs  Patient Currently in Pain: Yes   Orientation  Orientation  Overall Orientation Status: Within Functional Limits  Objective    ADL  Feeding: Beverage management; Independent  Grooming: Setup; Increased time to complete; Contact guard assistance (Oral/denture care seated in chair.)  UE Bathing: Setup; Increased time to complete; Contact guard assistance (Max A for back seated in chair d/t limited reach.)  UE Dressing: Moderate assistance; Setup; Increased time to complete (To manage gown.)  Toileting: Setup; Increased time to complete; Maximum assistance (Pericare seated in chair, d/t limited reach.)  Additional Comments: Pt completed ADLcare seated in chair. Pt required multiple rest breaks d/t fatigue and mild SOB. Pt RR elevating to 35-44 at times requiring verbal cues to slow down breathing with good return. Pt O2 saturation 93% and above after completing functional activities. Balance  Sitting Balance: Supervision (Seated in chair.)  Standing Balance: Contact guard assistance (CGA/min A.)  Standing Balance  Time: 30-45 seconds x3. Activity: Static standing using RW at chair. Functional Mobility  Functional Mobility Comments: WANG d/t poor standing balance/weakness.     Cognition  Overall Cognitive Status: WFL  Arousal/Alertness: Appropriate responses to stimuli  Insights: Fully aware of deficits  Initiation: Requires cues for some  Sequencing: Requires cues for some  Plan   Plan  Times per week: 3-4x/wk  AM-PAC Score        AM-PAC Inpatient Daily Activity Raw Score: 16 (11/17/21 1559)  AM-PAC Inpatient ADL T-Scale Score : 35.96 (11/17/21 1559)  ADL Inpatient CMS 0-100% Score: 53.32 (11/17/21 1559)  ADL Inpatient CMS G-Code Modifier : CK (11/17/21 1559)    Goals  Short term goals  Time Frame for Short term goals: pt will, by discharge  Short term goal 1: complete LB ADLs with min A, set up and AE, as needed  Short term goal 2: complete UB ADLs with mod I  Short term goal 3: increase activity tolerance to 20+ minutes in order to participate in daily tasks  Short term goal 4: dem SBA During functional transfers/functional mobility with LRD ,as needed  Short term goal 5: dem ~6 minutes static/dynamic standing tolerance with SBA in order to complete functional tasks       Therapy Time   Individual Concurrent Group Co-treatment   Time In 1430         Time Out 1538         Minutes 68         Timed Code Treatment Minutes: 68 Minutes     Pt seated in chair upon therapist arrival. Pleasant and agreeable to therapy. See above for LOF for all tasks. Pt retired to seated in chair at end of session with call light within reach.     KRIS Gonzáles

## 2021-11-17 NOTE — CARE COORDINATION
Spoke to Eldon Perry at [facility]. They are [facility]. [facility] is in network (referral previously sent). They are able to accept. She will start precert as soon as PT and OT notes are available. Called both services and asked that they see pt    [phone] notified Eldon Perry that PT and OT notes are available. She will start precert. Attempted to call pt's room, no answer. Notified pt's son, Camron Flores. He is agreeable with plan. udpated [facility].  She will update pt

## 2021-11-17 NOTE — PROGRESS NOTES
PULMONARY & CRITICAL CARE MEDICINE PROGRESS NOTE     Patient:  Irma Mirza  MRN: [id number]  [facility] date: 11/3/2021  Primary Care Physician: KALYN Meyers - CNP  Consulting Physician: Diana Skinner MD  CODE Status: Full Code  LOS: 15     SUBJECTIVE     CHIEF COMPLAINT/REASON FOR INITIAL CONSULT: Acute respiratory failure/COVID-19 pneumonia    BRIEF HOSPITAL COURSE:   The patient is a 68 y.o. female here for evaluation of shortness of breath with clear sputum, increased leg swelling.  Patient has chronic shortness of breath which has been worsening over the last couple of days along with increased swelling of her lower extremity.  She has conversational dyspnea, no wheezing.  Chest x-ray shows left pleural effusion and left atelectasis she has had multiple thoracentesis. I performed a bronchoscopic evaluation on due to persistent left lower lobe atelectasis and she had extensive mucus plugging on the left side    INTERVAL HISTORY:  11/17/21   Patient extubated on 11/15  Remains on nasal cannula  No overnight issues reported  Hemodynamically stable  Remains on Cipro as per ID recommendations  Continue Decadron    REVIEW OF SYSTEMS:  Review of Systems   Constitutional: Negative for appetite change, chills, fever and unexpected weight change. HENT: Negative for congestion, postnasal drip, sore throat and trouble swallowing. Eyes: Negative for visual disturbance. Respiratory: Positive for shortness of breath. Negative for cough and wheezing. Cardiovascular: Negative for chest pain, palpitations and leg swelling. Gastrointestinal: Negative for abdominal pain, constipation, diarrhea, nausea and vomiting. Genitourinary: Negative for difficulty urinating, dysuria and frequency. Musculoskeletal: Negative for arthralgias and joint swelling. Skin: Negative for rash. Allergic/Immunologic: Negative for immunocompromised state. Neurological: Positive for weakness.  Negative for dizziness, speech difficulty and headaches. Hematological: Negative for adenopathy. Psychiatric/Behavioral: Negative for behavioral problems and sleep disturbance. OBJECTIVE     VITAL SIGNS:   LAST:  /72   Pulse 102   Temp 96.8 °F (36 °C) (Axillary)   Resp 29   Ht 5' 4\" (1.626 m)   Wt 238 lb 11.2 oz (108.3 kg)   SpO2 95%   BMI 40.97 kg/m²   8-24 HR RANGE:  TEMP Temp  Av.6 °F (36.4 °C)  Min: 96.8 °F (36 °C)  Max: 98.8 °F (28.5 °C)   BP Systolic (21MLT), CCZ:381 , Min:107 , JAH:466      Diastolic (09HSX), ADA:07, Min:72, Max:86     PULSE Pulse  Av.9  Min: 66  Max: 102   RR Resp  Av  Min: 22  Max: 29   O2 SAT SpO2  Av.5 %  Min: 95 %  Max: 97 %   OXYGEN DELIVERY O2 Flow Rate (L/min)  Av.3 L/min  Min: 2 L/min  Max: 3 L/min        SYSTEMIC EXAMINATION:   General appearance -comfortable  Mental status -awake and alert  Eyes - pupils equal and reactive, sclera anicteric  Mouth - mucous membranes moist, pharynx normal without lesions  Neck - supple  Chest - Breath sounds bilaterally were dimnished to auscultation at bases. There were no wheezes, rhonchi or rales.     Heart - normal rate, regular rhythm, normal S1, S2, no murmurs, rubs, clicks or gallops  Abdomen - soft, nontender, nondistended, no masses or organomegaly  Neurological -nonfocal  Extremities - peripheral pulses normal, 1+ pedal edema, no clubbing or cyanosis  Skin - normal coloration and turgor    DATA REVIEW     Medications:  Scheduled Meds:   amiodarone  200 mg Oral Daily    ipratropium  2 puff Inhalation 4x daily    albuterol sulfate HFA  2 puff Inhalation 4x daily    apixaban  5 mg Oral BID    vitamin C  500 mg Oral Daily    atorvastatin  40 mg Oral Daily    bumetanide  1 mg Oral BID    citalopram  20 mg Oral Daily    clopidogrel  75 mg Oral Daily    metoprolol tartrate  25 mg Oral BID    sennosides-docusate sodium  2 tablet Oral Daily    ciprofloxacin  400 mg IntraVENous Q12H    dexamethasone  6 mg Oral Daily    lactulose  10 g Oral TID    digoxin  125 mcg Per NG tube Daily    lidocaine 1 % injection  5 mL IntraDERmal Once    sodium chloride flush  5-40 mL IntraVENous 2 times per day     Continuous Infusions:   sodium chloride         INPUT/OUTPUT:  In: 1070 [P.O.:1070]  Out: 1100 [Urine:1100]  Date 11/17/21 0000 - 11/17/21 2359   Shift 5749-0527 3635-7487 5883-6389 24 Hour Total   INTAKE   P.O.(mL/kg/hr)  645(0.7) 425 1070   Shift Total(mL/kg)  645(6) 425(3.9) 1070(9.9)   OUTPUT   Urine(mL/kg/hr) 1100(1.3)   1100   Shift Total(mL/kg) 1100(10.2)   1100(10.2)   Weight (kg) 108. 3 108. 3 108. 3 108.3        LABS:  ABGs:   Recent Labs     11/15/21  0336   POCPH 7.479*   POCPCO2 44.2   POCPO2 97.8   POCHCO3 32.8*   PTDA3HDV 98     CBC:   Recent Labs     11/15/21  0356 11/16/21  0410 11/17/21  0542   WBC 20.1* 14.7* 15.9*   HGB 12.1 11.8* 13.3   HCT 38.1 36.1* 40.1   MCV 94.1 93.3 93.3    359 372   LYMPHOPCT 4* 7* 7*   RBC 4.05 3.87* 4.30   MCH 29.9 30.5 30.9   MCHC 31.8 32.7 33.2   RDW 12.5 12.6 12.4     CRP:   Recent Labs     11/16/21 0410   CRP 9.4*     LDH:   No results for input(s): LDH in the last 72 hours. BMP:   Recent Labs     11/15/21  0356 11/16/21  0410 11/17/21  0542    138 135   K 4.2 3.8 3.4*   CL 91* 96* 93*   CO2 29 33* 29   BUN 46* 39* 35*   CREATININE 1.00* 0.81 0.85   GLUCOSE 167* 108* 97   PHOS 3.9 3.2 3.4     Liver Function Test:   Recent Labs     11/15/21  0356 11/16/21  0410 11/17/21  0542   LABALBU 3.2* 3.1* 3.2*     Coagulation Profile:   No results for input(s): INR, PROTIME, APTT in the last 72 hours. D-Dimer:  No results for input(s): DDIMER in the last 72 hours. Ferritin:    No results for input(s): FERRITIN in the last 72 hours. Lactic Acid:  No results for input(s): LACTA in the last 72 hours. Cardiac Enzymes:  Recent Labs     11/15/21  0356 11/17/21  0542   CKTOTAL 14* 22*     BNP/ProBNP:   No results for input(s): BNP, PROBNP in the last 72 hours.   Triglycerides:  Recent Labs 11/15/21  0356 11/17/21  0542   TRIG 417* 235*        Microbiology:  Urine Culture:  No components found for: CURINE  Blood Culture:  No components found for: CBLOOD, CFUNGUSBL  Sputum Culture:  No components found for: CSPUTUM  Recent Labs     11/14/21 1926   SPECDESC . BLOOD   SPECIAL LT HAND 2 ML   CULTURE NO GROWTH 3 DAYS     Recent Labs     11/14/21 1926   SPECDESC . BLOOD   SPECIAL LT HAND 2 ML   CULTURE NO GROWTH 3 DAYS        Pathology:    Radiology Reports:  CT CHEST WO CONTRAST   Final Result   Intervally developed perihilar and bibasilar ground-glass and dense airspace   consolidations, concerning for infection including atypical entities such as   COVID pneumonia. Similar small bilateral pleural effusions. XR CHEST PORTABLE   Final Result   1. Endotracheal and esophageal catheters placed as described   2. More conspicuous lower to mid level opacification consistent with pleural   effusions and at least atelectasis         XR CHEST (SINGLE VIEW FRONTAL)   Final Result   Small bilateral pleural effusions. Slightly increased airspace opacity in   the right mid to lower lung, which may reflect pneumonia in the appropriate   clinical setting. XR CHEST (SINGLE VIEW FRONTAL)   Final Result   Stable volume loss in the left chest with left basilar atelectasis or   pneumonia. Possible small left effusion. XR CHEST (SINGLE VIEW FRONTAL)   Final Result   Interval improved aeration of the left lung base. XR CHEST (SINGLE VIEW FRONTAL)   Final Result   Interval decrease in left effusion. No pneumothorax. Persistent opacity at   the left lung base. Vascularity is slightly prominent. US THORACENTESIS Which side should the procedure be performed? Left   Final Result   Successful ultrasound guided thoracentesis. US RETROPERITONEAL COMPLETE   Final Result   1. Patient had no urge to void during the exam.  Nonvisualization of ureteral   jets.    2. Incidental cholelithiasis. 3. Unremarkable renal ultrasound. No hydronephrosis. XR CHEST PORTABLE   Final Result   No change from prior study. Persistent sizable left effusion with opacity at   the left lung base and pulmonary vascular congestion. Echocardiogram:   Results for orders placed during the hospital encounter of 10/02/21    ECHO Complete 2D W Doppler W Color    Summary  Left ventricle is normal in size with normal systolic function globally. Calculated ejection fraction is 59% ( Atrial-Fib). Mild mitral regurgitation. Mild tricuspid regurgitation. Estimated right ventricular systolic pressure is 35 mmHg. Anterior clear space noted, most likely adipose tissue vs small loculated  pericardial effusion. No evidence of tamponade. Pleural effusion is seen. ASSESSMENT AND PLAN     Assessment:    // Acute hypoxic respiratory failure, s/p extubation 11/15  // Bilateral lower lobe atelectasis  // COVID-19 pneumonia  // Sepsis, resolved  // A. fib with RVR, rate controlled  // Acute kidney injury, improved  // Chronic obstructive pulmonary disease  // UTI, secondary to Pseudomonas  // CHF  // Essential hypertension  // Morbid obesity     Plan:     I personally interviewed/examined the patient; reviewed interval history, interpreted all available radiographic and laboratory data at the time of service.     · Patient currently saturating well on nasal cannula  · Continue supplemental oxygen to keep oxygen saturation greater than 90%  · Continue pulmonary toilet, aspiration precautions and bronchodilators  · Continue to monitor I/O with a goal of even/negative fluid balance  · Tolerating oral diet  · Stress ulcer prophylaxis  · Chemical DVT prophylaxis not needed, patient on Eliquis  · Antimicrobials reviewed; continue Cipro as per ID recommendations  · Continue Decadron for 10 days  · Physical/occupational therapy     We will continue to follow.  I would like to thank you for allowing me to participate in the care of this patient. Please feel free to call with any further questions or concerns. Zulema Aburto MD  Pulmonary and Critical Care Medicine           11/17/2021     This patient was evaluated in the context of the global SARS-CoV-2 (COVID-19) pandemic, which necessitated considerations that the patient either has COVID-19 infection or is at risk of infection with COVID-19. Institutional protocols and algorithms that pertain to the evaluation & management of patients with COVID-19 or those at risk for COVID-19 are in a state of rapid changes based on information released by regulatory bodies including the CDC and federal and state organizations. These policies and algorithms were followed during the patient's care. Please note that this chart was generated using voice recognition Dragon dictation software. Although every effort was made to ensure the accuracy of this automated transcription, some errors in transcription may have occurred.

## 2021-11-17 NOTE — PROCEDURES
Procedure Note:     Description of procedure      [X] Fiberoptic Bronchoscopy   []  Bronchoalveolar lavage   []  Bronchial washing   []  Bronchial brushing   []  Transbronchial biopsy   []  Endobronchial biopsy   []  Transbronchial needle aspiration   []  Endobronchial ultrasound-guided   []  Pretracheal node   []  Subcarinal node   []  Right hilar node   []  Left hilar node   []  AP window node   []  Mass   []  Interventional procedure  []  With fluoroscopy   []  Balloon dilatation   []  Stent placement   []  Argon plasma coagulation   []  Nd:YAG laser  []  Inspection only   []  With fluoroscopic guidance. []  With ultrasound guidance. Indication for Bronchoscopy     []  Nodule(s). [] Atelectasis  [] Hemoptysis  [X] Pneumonia/Pulmonary infiltrate  [X] Respiratory failure/Hypoxemia  [] Airway Stenosis/Obstruction  []  Endotracheal mass/obstruction  [] Endobronchial mass/obstruction  [] Adenopathy. [] Immunocompromised host.     [] Lung cancer  [] Metastatic disease.  [] Lung transplant diagnostic/surveillance. [] Bronchiectasis. [X] Therapeutic, to clear airway secretions    Consent    [X] Performed emergently  [] Details of the procedure were explained in detail which included risks and benefits. An opportunity was provided to ask questions. Consent was obtained from the following person(s):   [] Patient   [] Sibling   [] Spouse              [] Parent   [] Child   [] Guardian    Documentation Review     [X] Patient admission history and physical examination as well as interval hospital course documentation reviewed prior to initiating procedure and/or procedural sedation    Time out     [X] The process of patient care was interrupted for all participants to confirm the correct patient, correct procedure, correct site and the availability of appropriate equipment. Please see nursing documentation for those present.     Quick Look     [X] Prior to initiating procedural sedation a \"quick look\" revealed pulse oximetry and vital signs appropriate to proceed    Topical Anesthesia          [X] Topical  1% lidocaine instilled through bronchoscope above & below cords. Sedation: Patient was already on sedation for mechanical ventilation    Airway   [X] Via mouth (endotracheal tube). [] Via nares. Monitoring  [X] Arterial Pressure   [X] NIBP     [X] ECG   [X] Oxygen supplemented as needed   [X] Pulse oximetry  [X] End-tidal capnography       Endobronchial findings    Distal trachea: Normal mucosa  Hortensia  Normal mucosa    Right Main Stem Bronchus  Normal mucosa  Right Upper Lobe Bronchi Normal mucosa  Right Middle Lobe Bronchi  Normal mucosa  Right Lower Lobe Bronchi (including the Superior segment)  Normal mucosa, mild mucous plugging noted    Left Main Stem Bronchus Normal mucosa  Left Upper Lobe Bronchus, Upper Division Normal mucosa  Left Upper Lobe Bronchus, Lingula  Normal mucosa  Left Lower Lobe Bronchus (including the Superior segment)  Normal mucosa, mild mucous plugging noted    [X] The bronchoscope was withdrawn without difficulty. [X] The patient tolerated the procedure well. Patient transferred to: recovery room/hospital bed.        Patient condition: stable    Estimated blood loss: None    Complications: None    Chest radiograph: None    Specimens Obtained: None     Labs requested: None    Final Impression: Mild mucous plugging in dependent lobes      Valentino Shawl, MD  Pulmonary and Critical Care Medicine

## 2021-11-17 NOTE — PROGRESS NOTES
[facility]  Office: [address], DO, Myke Talbert, DO, Hebert Blank, DO, Vane Vail Blood, DO, Zabrina Tsang MD, Charlotte Keith MD, Tere Alarcon MD, Anita Davila MD, Angel Cabrera MD, Omar Vaughn MD, Kita Pierre MD, Baljinder Amaya, DO, Sukumar Coon, DO, Kenna Moody MD,  Adalid Mcgraw, DO, Moisés Medina MD, Michelet Bell MD, Edward Luu MD, Farzana Sotelo MD, Peter Thomas MD, Ministerio Johnson MD, Brooklynn Dowd MD, Kodi Singh, [facility], [facility], CNP, Ani Segura, CNP, Ab Zuñiga, CNS, Ailene Lesches, CNP, Petty Cyr, CNP, Tamika Lo, CNP, Lara Mirza, CNP, Monisha Guo, CNP, SPIKE ReyesC, Chito Raza, [facility], Nate Navarrete, CNP, Mague Schaeffer, CNP, Freddy Caldwell, CNP, Ranjan Pérez, CNP, Aly Macedo, CNP, Trudy Gant, CNP, Miriam Gandara, [address] [place] [address]    Progress Note    11/17/2021    2:54 PM    Name:   Barbara Campbell  MRN:     [id number]     Acct:      [de-identified]   Room:   [address] [place] [address] Day:  15  Admit Date:  11/3/2021  4:32 PM    PCP:   KALYN Dc CNP  Code Status:  Full Code    Subjective:     C/C:   Chief Complaint   Patient presents with    Leg Swelling     increasing BLE edema; pt from SNF; per PA at SNF, pt has fluid retention, needs IV meds that they cannot administer     Interval History Status: significantly improved. Patient was seen and examined at bedside this afternoon. She reports feeling well and is without complaint. Denies fever/chills, nausea/vomiting, shortness of breath or chest pain. Awaiting facility placement.      Brief History:     (per my colleague)    Patient presented to the ER with complaints of leg swelling, shortness of breath with a productive cough (thin white sputum) and constipation.  She states she chronically has lower extremity edema left side greater than right she thinks that is increased over the last couple days. Cristy Cabrera was recently discharged on 10/15 after being admitted related to a pleural effusion.  At that time she presented with shortness of breath and palpitations. Kwaku Castro is found to have a left lower lobe pneumonia and A. fib with RVR and was admitted to the ICU for acute hypoxic respiratory failure.  During that visit the patient had a paracentesis, Negative CURT and subsequently cardioverted successfully, and a bronchoscopy that showed left main bronchus obstruction due to thick mucous plugging.  At discharge the patient was encouraged to follow-up with cardiology outpatient for an ablation.  During that visit she was also treated for UTI and was discharged on Augmentin for 5 days. Kwaku Castro was also given inhalers and steroids for COPD.     On arrival to the ER today she complains of not feeling well over the last 3 or 4 days. Kwaku Castro describes worsening shortness of breath primarily on exertion and orthopnea.  The patient told me that she has not been using oxygen on an outpatient basis but according to the ER note she has been using it intermittently primarily with exertion and therapy and occasionally overnight.  Also complains of constipation and mild abdominal bloating.  She also reports nausea and vomiting few days ago but nothing current and she has been tolerating p.o. intake.  She states she is compliant with her home medications.     Patient was placed on 2 L nasal cannula in the ER. Kwaku Castro has been afebrile heart rates been in the 90s to the low 110's A. Fib.     Chest x-ray shows no acute changes from prior study with a has a persistent sizable left effusion with opacity at the left lung base and pulmonary vascular congestion. Sodium is 129 BUN and creatinine are 26/1. 72.  Her normal BUN and creatinine are around 15/0.58 and normal sodiums in the mid 130s.   BNP is 3134.  On her last admission it VJL 3024 and on her admission in early October it was 3251.   WBCs 14.5 seems to run slightly elevated     Plan to consult nephrology and pulmonary medicine. Continue home Eliquis, beta-blocker and amiodarone.  Hold losartan  Plan Solu-Medrol related to IV wheezing throughout. It appears to be in fluid overload related to pitting edema in her lower extremities and increasing shortness of breath.  Plan to hold further hydration for now.  Give Lasix 20 mg IV x1     Patient was being transitioned to SNF. Unfortunately was discovered to be Covid positive. Approximately 12 hours later, patient was in respiratory distress  Intubated later that morning (11/12/2021)     Underwent bronchoscopy at bedside on 11/15. Patient was successfully extubated to nasal cannula on 11/15. Review of Systems:     Constitutional:  negative for chills, fevers, sweats  Respiratory:  negative for cough, dyspnea on exertion, shortness of breath, wheezing  Cardiovascular:  negative for chest pain, chest pressure/discomfort, lower extremity edema, palpitations  Gastrointestinal:  negative for abdominal pain, constipation, diarrhea, nausea, vomiting  Neurological:  negative for dizziness, headache    Medications: Allergies:     Allergies   Allergen Reactions    Latex Hives     Hives from rubber gloves    Betadine [Povidone Iodine] Hives    Bee Venom Swelling    Dilaudid [Hydromorphone Hcl] Other (See Comments)     Severe confusion    Adhesive Tape Rash    Sulfa Antibiotics Rash       Current Meds:   Scheduled Meds:    amiodarone  200 mg Oral Daily    ipratropium  2 puff Inhalation 4x daily    albuterol sulfate HFA  2 puff Inhalation 4x daily    apixaban  5 mg Oral BID    vitamin C  500 mg Oral Daily    atorvastatin  40 mg Oral Daily    bumetanide  1 mg Oral BID    citalopram  20 mg Oral Daily    clopidogrel  75 mg Oral Daily    metoprolol tartrate  25 mg Oral BID    sennosides-docusate sodium  2 tablet Oral Daily    ciprofloxacin  400 mg IntraVENous Q12H    dexamethasone  6 mg Oral Daily    lactulose  10 g Oral TID    digoxin  125 mcg Per NG tube Daily    lidocaine 1 % injection  5 mL IntraDERmal Once    sodium chloride flush  5-40 mL IntraVENous 2 times per day     Continuous Infusions:    sodium chloride       PRN Meds: ipratropium, albuterol sulfate HFA, metoprolol, sodium chloride flush, sodium chloride, fentanNYL, magnesium sulfate, potassium chloride **OR** potassium alternative oral replacement **OR** potassium chloride, magnesium hydroxide, melatonin, ondansetron **OR** ondansetron, acetaminophen **OR** acetaminophen    Data:     Past Medical History:   has a past medical history of Asthma, COPD (chronic obstructive pulmonary disease) ([address] Gout, Hyperlipidemia, Hypertension, Mitral insufficiency, and Pneumonia. Social History:   reports that she has quit smoking. She has never used smokeless tobacco. She reports current alcohol use. She reports that she does not use drugs. Family History:   Family History   Problem Relation Age of Onset    High Blood Pressure Mother     Cancer Mother     Asthma Father     Heart Disease Father     Cancer Father        Vitals:  /73   Pulse 69   Temp 97.4 °F (36.3 °C) (Oral)   Resp 27   Ht 5' 4\" (1.626 m)   Wt 238 lb 11.2 oz (108.3 kg)   SpO2 97%   BMI 40.97 kg/m²   Temp (24hrs), Av.9 °F (36.6 °C), Min:97.4 °F (36.3 °C), Max:98.8 °F (37.1 °C)    Recent Labs     11/15/21  0336 21  0705   POCGLU 194* 102       I/O (24Hr):     Intake/Output Summary (Last 24 hours) at 2021 1454  Last data filed at 2021 0313  Gross per 24 hour   Intake    Output 1100 ml   Net -1100 ml       Labs:  Hematology:  Recent Labs     11/15/21  0356 21  0410 21  0542   WBC 20.1* 14.7* 15.9*   RBC 4.05 3.87* 4.30   HGB 12.1 11.8* 13.3   HCT 38.1 36.1* 40.1   MCV 94.1 93.3 93.3   MCH 29.9 30.5 30.9   MCHC 31.8 32.7 33.2   RDW 12.5 12.6 12.4    359 372   MPV 9.5 9.4 9.6   CRP  --  9.4*  --      Chemistry:  Recent Labs     11/15/21  035 11/16/21 0410 11/17/21  0542    138 135   K 4.2 3.8 3.4*   CL 91* 96* 93*   CO2 29 33* 29   GLUCOSE 167* 108* 97   BUN 46* 39* 35*   CREATININE 1.00* 0.81 0.85   ANIONGAP 16 9 13   LABGLOM 54* >60 >60   GFRAA >60 >60 >60   CALCIUM 8.8 8.9 8.8   PHOS 3.9 3.2 3.4   CKTOTAL 14*  --  22*     Recent Labs     11/15/21  0336 11/15/21  0356 11/15/21  0356 11/16/21 0410 11/17/21  0542 11/17/21  0705   LABALBU  --  3.2*  --  3.1* 3.2*  --    TRIG  --  417*   < >  --  235*  --    POCGLU 194*  --   --   --   --  102    < > = values in this interval not displayed. ABG:  Lab Results   Component Value Date    POCPH 7.479 11/15/2021    PH 7.44 03/30/2016    POCPCO2 44.2 11/15/2021    PCO2 36 03/30/2016    POCPO2 97.8 11/15/2021    PO2 64 03/30/2016    POCHCO3 32.8 11/15/2021    HCO3 24 03/30/2016    NBEA NOT REPORTED 11/15/2021    PBEA 8 11/15/2021    CXU7SQO NOT REPORTED 11/15/2021    UQSG3ONP 98 11/15/2021    O2SAT 93 03/30/2016    FIO2 40.0 11/15/2021     Lab Results   Component Value Date/Time    SPECIAL LT HAND 2 ML 11/14/2021 07:26 PM     Lab Results   Component Value Date/Time    CULTURE NO GROWTH 3 DAYS 11/14/2021 07:26 PM       Radiology:  XR CHEST (SINGLE VIEW FRONTAL)    Result Date: 11/12/2021  Small bilateral pleural effusions. Slightly increased airspace opacity in the right mid to lower lung, which may reflect pneumonia in the appropriate clinical setting. XR CHEST (SINGLE VIEW FRONTAL)    Result Date: 11/9/2021  Stable volume loss in the left chest with left basilar atelectasis or pneumonia. Possible small left effusion. CT CHEST WO CONTRAST    Result Date: 11/12/2021  Intervally developed perihilar and bibasilar ground-glass and dense airspace consolidations, concerning for infection including atypical entities such as COVID pneumonia. Similar small bilateral pleural effusions. XR CHEST PORTABLE    Result Date: 11/12/2021  1. Endotracheal and esophageal catheters placed as described 2. More conspicuous lower to mid level opacification consistent with pleural effusions and at least atelectasis       Physical Examination:        General appearance:  alert, cooperative and no distress  Mental Status:  oriented to person, place and time and normal affect  Lungs:  Mildly decreased breath sounds bilaterally   Heart:  Irregular rhythm   Abdomen:  soft, nontender, nondistended, normal bowel sounds, no masses, hepatomegaly, splenomegaly  Extremities:  1+ edema in lower extremities bilaterally   Assessment:        Hospital Problems           Last Modified POA    * (Principal) CASEY (acute kidney injury) ([address] 11/12/2021 Yes    Atrial flutter with rapid ventricular response ([address] 11/12/2021 Yes    Pneumonia due to COVID-19 virus 11/12/2021 Yes    Acute respiratory failure with hypoxemia ([address] 11/12/2021 Yes    Respiratory alkalosis 11/12/2021 No    Shock ([address] 11/12/2021 No    Benign essential HTN 11/12/2021 Yes    Morbid obesity with BMI of 40.0-44.9, adult ([address] 11/12/2021 Yes    Overview Signed 10/3/2021 12:43 PM by Aleksandar Kumar MD     Last Assessment & Plan:   Formatting of this note might be different from the original.  Obesity is worsening. Discussed the patient's BMI. The BMI is above average; BMI management plan is completed. General weight loss/lifestyle modification strategies discussed (elicit support from others; identify saboteurs; non-food rewards, etc). Pulmonary emphysema ([address] 11/12/2021 Yes    Acute on chronic diastolic heart failure ([address] 11/12/2021 Yes    Chronic obstructive pulmonary disease with acute exacerbation ([address] 11/12/2021 Yes    Overview Signed 10/3/2021 12:43 PM by Aleksandar Kumar MD     Last Assessment & Plan:   Formatting of this note might be different from the original.  COPD is worsening. Discussed monitoring symptoms and use of quick-relief medications and contacting us early in the course of exacerbations.   Warning signs of respiratory distress were reviewed with the patient. Counseled to avoid exposure to cigarette smoke.          Persistent atrial fibrillation ([address] 11/12/2021 Yes    Recurrent pleural effusion 11/12/2021 Yes    Moderate malnutrition ([address] 11/14/2021 Clinically Undetermined          Plan:        Acute respiratory failure with hypoxia, resolved   -Likely secondary to left-sided pleural effusion   -Patient was successfully extubated 11/15  -Continue dexamethasone for management of COVID-19 PNA   -Continue Bumex 1 mg bid   -Pulmonology following; s/p bronchoscopy     COVID-19 PNA   -Patient is currently maintaining oxygen saturations on room air   -Continue dexamethasone x 10 days     HFpEF   -Stable; continue bid Bumex     Persistent atrial fibrillation   -Currently rate-controlled   -Continue amiodarone 200 mg daily   -Continue digoxin 125 mcg daily   -Continue metoprolol 25 mg bid   -Continue Eliquis 5 mg bid     Urinary tract infection   -Urine culture growing Pseudomonas   -Continue ciprofloxacin x 10 days (currently on day 10)     COPD  -Not in acute exacerbation   -Continue scheduled albuterol     Depression   -Continue home citalopram 20 mg daily     Merissa Chopra MD  11/17/2021  2:54 PM

## 2021-11-18 LAB
-: ABNORMAL
ABSOLUTE EOS #: <0.03 K/UL (ref 0–0.44)
ABSOLUTE IMMATURE GRANULOCYTE: 0.21 K/UL (ref 0–0.3)
ABSOLUTE LYMPH #: 1.21 K/UL (ref 1.1–3.7)
ABSOLUTE MONO #: 1.31 K/UL (ref 0.1–1.2)
ALBUMIN SERPL-MCNC: 3.1 G/DL (ref 3.5–5.2)
AMORPHOUS: ABNORMAL
ANION GAP SERPL CALCULATED.3IONS-SCNC: 12 MMOL/L (ref 9–17)
BACTERIA: ABNORMAL
BASOPHILS # BLD: 0 % (ref 0–2)
BASOPHILS ABSOLUTE: 0.03 K/UL (ref 0–0.2)
BILIRUBIN URINE: NEGATIVE
BUN BLDV-MCNC: 31 MG/DL (ref 8–23)
BUN/CREAT BLD: ABNORMAL (ref 9–20)
C-REACTIVE PROTEIN: <3 MG/L (ref 0–5)
CALCIUM SERPL-MCNC: 8.8 MG/DL (ref 8.6–10.4)
CASTS UA: ABNORMAL /LPF (ref 0–8)
CHLORIDE BLD-SCNC: 94 MMOL/L (ref 98–107)
CO2: 27 MMOL/L (ref 20–31)
COLOR: YELLOW
COMMENT UA: ABNORMAL
CREAT SERPL-MCNC: 0.77 MG/DL (ref 0.5–0.9)
CRYSTALS, UA: ABNORMAL /HPF
CULTURE: NORMAL
DIFFERENTIAL TYPE: ABNORMAL
DIRECT EXAM: NORMAL
EOSINOPHILS RELATIVE PERCENT: 0 % (ref 1–4)
EPITHELIAL CELLS UA: ABNORMAL /HPF (ref 0–5)
GFR AFRICAN AMERICAN: >60 ML/MIN
GFR NON-AFRICAN AMERICAN: >60 ML/MIN
GFR SERPL CREATININE-BSD FRML MDRD: ABNORMAL ML/MIN/{1.73_M2}
GFR SERPL CREATININE-BSD FRML MDRD: ABNORMAL ML/MIN/{1.73_M2}
GLUCOSE BLD-MCNC: 111 MG/DL (ref 70–99)
GLUCOSE URINE: NEGATIVE
HCT VFR BLD CALC: 39.8 % (ref 36.3–47.1)
HEMOGLOBIN: 13 G/DL (ref 11.9–15.1)
IMMATURE GRANULOCYTES: 1 %
KETONES, URINE: NEGATIVE
LACTIC ACID, SEPSIS WHOLE BLOOD: 3.8 MMOL/L (ref 0.5–1.9)
LACTIC ACID, SEPSIS WHOLE BLOOD: 4 MMOL/L (ref 0.5–1.9)
LACTIC ACID, SEPSIS: ABNORMAL MMOL/L (ref 0.5–1.9)
LACTIC ACID, SEPSIS: ABNORMAL MMOL/L (ref 0.5–1.9)
LEUKOCYTE ESTERASE, URINE: ABNORMAL
LYMPHOCYTES # BLD: 6 % (ref 24–43)
Lab: NORMAL
MCH RBC QN AUTO: 30.7 PG (ref 25.2–33.5)
MCHC RBC AUTO-ENTMCNC: 32.7 G/DL (ref 28.4–34.8)
MCV RBC AUTO: 94.1 FL (ref 82.6–102.9)
MONOCYTES # BLD: 7 % (ref 3–12)
MUCUS: ABNORMAL
NITRITE, URINE: NEGATIVE
NRBC AUTOMATED: 0 PER 100 WBC
OTHER OBSERVATIONS UA: ABNORMAL
PDW BLD-RTO: 12.4 % (ref 11.8–14.4)
PH UA: 5 (ref 5–8)
PHOSPHORUS: 2.8 MG/DL (ref 2.6–4.5)
PLATELET # BLD: 370 K/UL (ref 138–453)
PLATELET ESTIMATE: ABNORMAL
PMV BLD AUTO: 9.4 FL (ref 8.1–13.5)
POTASSIUM SERPL-SCNC: 3.5 MMOL/L (ref 3.7–5.3)
PROTEIN UA: NEGATIVE
RBC # BLD: 4.23 M/UL (ref 3.95–5.11)
RBC # BLD: ABNORMAL 10*6/UL
RBC UA: ABNORMAL /HPF (ref 0–4)
RENAL EPITHELIAL, UA: ABNORMAL /HPF
SEG NEUTROPHILS: 86 % (ref 36–65)
SEGMENTED NEUTROPHILS ABSOLUTE COUNT: 17.38 K/UL (ref 1.5–8.1)
SODIUM BLD-SCNC: 133 MMOL/L (ref 135–144)
SPECIFIC GRAVITY UA: 1.01 (ref 1–1.03)
SPECIMEN DESCRIPTION: NORMAL
TRICHOMONAS: ABNORMAL
TURBIDITY: CLEAR
URINE HGB: ABNORMAL
UROBILINOGEN, URINE: NORMAL
WBC # BLD: 20.2 K/UL (ref 3.5–11.3)
WBC # BLD: ABNORMAL 10*3/UL
WBC UA: ABNORMAL /HPF (ref 0–5)
YEAST: ABNORMAL

## 2021-11-18 PROCEDURE — 87086 URINE CULTURE/COLONY COUNT: CPT

## 2021-11-18 PROCEDURE — 6370000000 HC RX 637 (ALT 250 FOR IP): Performed by: NURSE PRACTITIONER

## 2021-11-18 PROCEDURE — 83605 ASSAY OF LACTIC ACID: CPT

## 2021-11-18 PROCEDURE — 2700000000 HC OXYGEN THERAPY PER DAY

## 2021-11-18 PROCEDURE — 99233 SBSQ HOSP IP/OBS HIGH 50: CPT | Performed by: INTERNAL MEDICINE

## 2021-11-18 PROCEDURE — 80069 RENAL FUNCTION PANEL: CPT

## 2021-11-18 PROCEDURE — 36415 COLL VENOUS BLD VENIPUNCTURE: CPT

## 2021-11-18 PROCEDURE — 6360000002 HC RX W HCPCS: Performed by: NURSE PRACTITIONER

## 2021-11-18 PROCEDURE — 99232 SBSQ HOSP IP/OBS MODERATE 35: CPT | Performed by: STUDENT IN AN ORGANIZED HEALTH CARE EDUCATION/TRAINING PROGRAM

## 2021-11-18 PROCEDURE — 81001 URINALYSIS AUTO W/SCOPE: CPT

## 2021-11-18 PROCEDURE — 2500000003 HC RX 250 WO HCPCS: Performed by: STUDENT IN AN ORGANIZED HEALTH CARE EDUCATION/TRAINING PROGRAM

## 2021-11-18 PROCEDURE — 1200000000 HC SEMI PRIVATE

## 2021-11-18 PROCEDURE — 85025 COMPLETE CBC W/AUTO DIFF WBC: CPT

## 2021-11-18 PROCEDURE — 6370000000 HC RX 637 (ALT 250 FOR IP): Performed by: INTERNAL MEDICINE

## 2021-11-18 PROCEDURE — 86140 C-REACTIVE PROTEIN: CPT

## 2021-11-18 PROCEDURE — 2580000003 HC RX 258: Performed by: INTERNAL MEDICINE

## 2021-11-18 PROCEDURE — 87186 SC STD MICRODIL/AGAR DIL: CPT

## 2021-11-18 PROCEDURE — 94640 AIRWAY INHALATION TREATMENT: CPT

## 2021-11-18 PROCEDURE — 87077 CULTURE AEROBIC IDENTIFY: CPT

## 2021-11-18 RX ORDER — DILTIAZEM HYDROCHLORIDE 5 MG/ML
5 INJECTION INTRAVENOUS ONCE
Status: COMPLETED | OUTPATIENT
Start: 2021-11-18 | End: 2021-11-18

## 2021-11-18 RX ADMIN — Medication 2 PUFF: at 15:50

## 2021-11-18 RX ADMIN — DIGOXIN 125 MCG: 125 TABLET ORAL at 09:38

## 2021-11-18 RX ADMIN — Medication 2 PUFF: at 21:08

## 2021-11-18 RX ADMIN — DEXAMETHASONE 6 MG: 4 TABLET ORAL at 09:37

## 2021-11-18 RX ADMIN — DILTIAZEM HYDROCHLORIDE 5 MG: 5 INJECTION INTRAVENOUS at 18:44

## 2021-11-18 RX ADMIN — Medication 2 PUFF: at 21:07

## 2021-11-18 RX ADMIN — CITALOPRAM 20 MG: 20 TABLET, FILM COATED ORAL at 09:38

## 2021-11-18 RX ADMIN — LACTULOSE 10 G: 20 SOLUTION ORAL at 14:10

## 2021-11-18 RX ADMIN — DOCUSATE SODIUM 50MG AND SENNOSIDES 8.6MG 2 TABLET: 8.6; 5 TABLET, FILM COATED ORAL at 09:37

## 2021-11-18 RX ADMIN — Medication 2 PUFF: at 09:07

## 2021-11-18 RX ADMIN — SODIUM CHLORIDE, PRESERVATIVE FREE 10 ML: 5 INJECTION INTRAVENOUS at 22:37

## 2021-11-18 RX ADMIN — Medication 2 PUFF: at 13:02

## 2021-11-18 RX ADMIN — BUMETANIDE 1 MG: 1 TABLET ORAL at 09:38

## 2021-11-18 RX ADMIN — METOPROLOL TARTRATE 25 MG: 25 TABLET ORAL at 09:39

## 2021-11-18 RX ADMIN — APIXABAN 5 MG: 5 TABLET, FILM COATED ORAL at 09:38

## 2021-11-18 RX ADMIN — LACTULOSE 10 G: 20 SOLUTION ORAL at 09:38

## 2021-11-18 RX ADMIN — CLOPIDOGREL 75 MG: 75 TABLET, FILM COATED ORAL at 09:38

## 2021-11-18 RX ADMIN — BUMETANIDE 1 MG: 1 TABLET ORAL at 22:37

## 2021-11-18 RX ADMIN — Medication 3 MG: at 22:37

## 2021-11-18 RX ADMIN — APIXABAN 5 MG: 5 TABLET, FILM COATED ORAL at 22:37

## 2021-11-18 RX ADMIN — AMIODARONE HYDROCHLORIDE 200 MG: 200 TABLET ORAL at 09:39

## 2021-11-18 RX ADMIN — METOPROLOL TARTRATE 25 MG: 25 TABLET ORAL at 22:36

## 2021-11-18 RX ADMIN — SODIUM CHLORIDE, PRESERVATIVE FREE 10 ML: 5 INJECTION INTRAVENOUS at 09:40

## 2021-11-18 RX ADMIN — CIPROFLOXACIN 400 MG: 2 INJECTION, SOLUTION INTRAVENOUS at 01:17

## 2021-11-18 RX ADMIN — POTASSIUM CHLORIDE 40 MEQ: 1500 TABLET, EXTENDED RELEASE ORAL at 09:48

## 2021-11-18 RX ADMIN — OXYCODONE HYDROCHLORIDE AND ACETAMINOPHEN 500 MG: 500 TABLET ORAL at 09:38

## 2021-11-18 RX ADMIN — DESMOPRESSIN ACETATE 40 MG: 0.2 TABLET ORAL at 09:38

## 2021-11-18 ASSESSMENT — ENCOUNTER SYMPTOMS
ABDOMINAL PAIN: 0
DIARRHEA: 0
CONSTIPATION: 0
APNEA: 0
SORE THROAT: 0
EYE DISCHARGE: 0
VOMITING: 0
WHEEZING: 0
TROUBLE SWALLOWING: 0
ABDOMINAL DISTENTION: 0
COLOR CHANGE: 0
COUGH: 0
SHORTNESS OF BREATH: 1
NAUSEA: 0

## 2021-11-18 ASSESSMENT — PAIN SCALES - GENERAL: PAINLEVEL_OUTOF10: 0

## 2021-11-18 NOTE — PROGRESS NOTES
[facility]  Office: [address], DO, Beth Arlington, DO, Donny Givens, DO, Genie Dutch Blood, DO, Nancie Escobedo MD, Lia Mercedes MD, Marisa Reyes MD, Gato Oliveira MD, Jamie Núñez MD, Bella Reagan MD, Lazarus Moats, MD, Babak Ortiz, DO, Jaleesa Hawthorne, DO, Acacia Jose MD,  Venkata Gill, DO, Ada Aquino MD, Eusebia Schaeffer MD, Miguel Angel Mcdonald MD, Taniya Everett MD, Jodi Clark MD, Halle Lara MD, Sandro Bell MD, Raimundo Roberts, [facility], [facility], CNP, Maru Collins, CNP, Melissa Ott, CNS, Andre Obando, CNP, Maria De Jesus Grider, CNP, Fern Carreon, CNP, Barbara Thomas, CNP, Emili Gilliam, CNP, SPIKE ChaidezC, Fabiola Roe, [facility], Winifred Clements, CNP, Tj Geller, CNP, Alexis Shaw, CNP, Zina Anguiano, CNP, General Olp, CNP, Zakia Jose, CNP, Adalberto Ballesteros, [address] [place] [address]    Progress Note    11/18/2021    10:34 AM    Name:   Atilio Sargent  MRN:     [id number]     [place]:      [de-identified]   Room:   [address] [place] [address] Day:  13  Admit Date:  11/3/2021  4:32 PM    PCP:   KALYN Jasso CNP  Code Status:  Full Code    Subjective:     C/C:   Chief Complaint   Patient presents with    Leg Swelling     increasing BLE edema; pt from SNF; per PA at SNF, pt has fluid retention, needs IV meds that they cannot administer     Interval History Status: significantly improved. Patient was seen and examined at bedside this afternoon. She reports feeling well and is without complaint. Denies fever/chills, nausea/vomiting, shortness of breath or chest pain. Awaiting facility placement.      Brief History:     (per my colleague)    Patient presented to the ER with complaints of leg swelling, shortness of breath with a productive cough (thin white sputum) and constipation.  She states she chronically has lower extremity edema left side greater than right she thinks that is increased over the last couple days. Sue Duke was recently discharged on 10/15 after being admitted related to a pleural effusion.  At that time she presented with shortness of breath and palpitations. Mar Romo is found to have a left lower lobe pneumonia and A. fib with RVR and was admitted to the ICU for acute hypoxic respiratory failure.  During that visit the patient had a paracentesis, Negative CURT and subsequently cardioverted successfully, and a bronchoscopy that showed left main bronchus obstruction due to thick mucous plugging.  At discharge the patient was encouraged to follow-up with cardiology outpatient for an ablation.  During that visit she was also treated for UTI and was discharged on Augmentin for 5 days. Mar Romo was also given inhalers and steroids for COPD.     On arrival to the ER today she complains of not feeling well over the last 3 or 4 days. Mar Romo describes worsening shortness of breath primarily on exertion and orthopnea.  The patient told me that she has not been using oxygen on an outpatient basis but according to the ER note she has been using it intermittently primarily with exertion and therapy and occasionally overnight.  Also complains of constipation and mild abdominal bloating.  She also reports nausea and vomiting few days ago but nothing current and she has been tolerating p.o. intake.  She states she is compliant with her home medications.     Patient was placed on 2 L nasal cannula in the ER. Mar Romo has been afebrile heart rates been in the 90s to the low 110's A. Fib.     Chest x-ray shows no acute changes from prior study with a has a persistent sizable left effusion with opacity at the left lung base and pulmonary vascular congestion. Sodium is 129 BUN and creatinine are 26/1. 72.  Her normal BUN and creatinine are around 15/0.58 and normal sodiums in the mid 130s.   BNP is 3134.  On her last admission it JAT 8227 and on her admission in early October it was 3251.   WBCs 14.5 seems to run slightly elevated     Plan to consult nephrology and pulmonary medicine. Continue home Eliquis, beta-blocker and amiodarone.  Hold losartan  Plan Solu-Medrol related to IV wheezing throughout. It appears to be in fluid overload related to pitting edema in her lower extremities and increasing shortness of breath.  Plan to hold further hydration for now.  Give Lasix 20 mg IV x1     Patient was being transitioned to SNF. Unfortunately was discovered to be Covid positive. Approximately 12 hours later, patient was in respiratory distress  Intubated later that morning (11/12/2021)     Underwent bronchoscopy at bedside on 11/15. Patient was successfully extubated to nasal cannula on 11/15. Review of Systems:     Constitutional:  negative for chills, fevers, sweats  Respiratory:  negative for cough, dyspnea on exertion, shortness of breath, wheezing  Cardiovascular:  negative for chest pain, chest pressure/discomfort, lower extremity edema, palpitations  Gastrointestinal:  negative for abdominal pain, constipation, diarrhea, nausea, vomiting  Neurological:  negative for dizziness, headache    Medications: Allergies:     Allergies   Allergen Reactions    Latex Hives     Hives from rubber gloves    Betadine [Povidone Iodine] Hives    Bee Venom Swelling    Dilaudid [Hydromorphone Hcl] Other (See Comments)     Severe confusion    Adhesive Tape Rash    Sulfa Antibiotics Rash       Current Meds:   Scheduled Meds:    amiodarone  200 mg Oral Daily    ipratropium  2 puff Inhalation 4x daily    albuterol sulfate HFA  2 puff Inhalation 4x daily    apixaban  5 mg Oral BID    vitamin C  500 mg Oral Daily    atorvastatin  40 mg Oral Daily    bumetanide  1 mg Oral BID    citalopram  20 mg Oral Daily    clopidogrel  75 mg Oral Daily    metoprolol tartrate  25 mg Oral BID    sennosides-docusate sodium  2 tablet Oral Daily    dexamethasone  6 mg Oral Daily    lactulose  10 g Oral TID    digoxin  125 mcg Per NG tube Daily    lidocaine 1 % injection  5 mL IntraDERmal Once    sodium chloride flush  5-40 mL IntraVENous 2 times per day     Continuous Infusions:    sodium chloride       PRN Meds: ipratropium, albuterol sulfate HFA, metoprolol, sodium chloride flush, sodium chloride, fentanNYL, magnesium sulfate, potassium chloride **OR** potassium alternative oral replacement **OR** potassium chloride, magnesium hydroxide, melatonin, ondansetron **OR** ondansetron, acetaminophen **OR** acetaminophen    Data:     Past Medical History:   has a past medical history of Asthma, COPD (chronic obstructive pulmonary disease) ([facility] Gout, Hyperlipidemia, Hypertension, Mitral insufficiency, and Pneumonia. Social History:   reports that she has quit smoking. She has never used smokeless tobacco. She reports current alcohol use. She reports that she does not use drugs. Family History:   Family History   Problem Relation Age of Onset    High Blood Pressure Mother     Cancer Mother     Asthma Father     Heart Disease Father     Cancer Father        Vitals:  /84   Pulse 79   Temp 97.5 °F (36.4 °C) (Oral)   Resp 19   Ht 5' 4\" (1.626 m)   Wt 238 lb 11.2 oz (108.3 kg)   SpO2 93%   BMI 40.97 kg/m²   Temp (24hrs), Av.7 °F (36.5 °C), Min:96.8 °F (36 °C), Max:98.8 °F (37.1 °C)    Recent Labs     21  0705   POCGLU 102       I/O (24Hr):     Intake/Output Summary (Last 24 hours) at 2021 1034  Last data filed at 2021 0953  Gross per 24 hour   Intake 1120 ml   Output 2700 ml   Net -1580 ml       Labs:  Hematology:  Recent Labs     21  0410 21  0542 21  0542   WBC 14.7* 15.9* 20.2*   RBC 3.87* 4.30 4.23   HGB 11.8* 13.3 13.0   HCT 36.1* 40.1 39.8   MCV 93.3 93.3 94.1   MCH 30.5 30.9 30.7   MCHC 32.7 33.2 32.7   RDW 12.6 12.4 12.4    372 370   MPV 9.4 9.6 9.4   CRP 9.4*  --   --      Chemistry:  Recent Labs     21  0410 21  0542 21  0542    135 133*   K 3.8 3.4* cooperative and no distress  Mental Status:  oriented to person, place and time and normal affect  Lungs:  Mildly decreased breath sounds bilaterally   Heart:  Irregular rhythm   Abdomen:  soft, nontender, nondistended, normal bowel sounds, no masses, hepatomegaly, splenomegaly  Extremities:  1+ edema in lower extremities bilaterally   Assessment:        Hospital Problems           Last Modified POA    * (Principal) CASEY (acute kidney injury) ([address] 11/12/2021 Yes    Atrial flutter with rapid ventricular response ([address] 11/12/2021 Yes    Pneumonia due to COVID-19 virus 11/12/2021 Yes    Acute respiratory failure with hypoxemia ([address] 11/12/2021 Yes    Respiratory alkalosis 11/12/2021 No    Shock ([address] 11/12/2021 No    Benign essential HTN 11/12/2021 Yes    Morbid obesity with BMI of 40.0-44.9, adult ([address] 11/12/2021 Yes    Overview Signed 10/3/2021 12:43 PM by Kayla Cortez MD     Last Assessment & Plan:   Formatting of this note might be different from the original.  Obesity is worsening. Discussed the patient's BMI. The BMI is above average; BMI management plan is completed. General weight loss/lifestyle modification strategies discussed (elicit support from others; identify saboteurs; non-food rewards, etc). Pulmonary emphysema ([address] 11/12/2021 Yes    Acute on chronic diastolic heart failure ([address] 11/12/2021 Yes    Chronic obstructive pulmonary disease with acute exacerbation ([address] 11/12/2021 Yes    Overview Signed 10/3/2021 12:43 PM by Kayla Cortez MD     Last Assessment & Plan:   Formatting of this note might be different from the original.  COPD is worsening. Discussed monitoring symptoms and use of quick-relief medications and contacting us early in the course of exacerbations. Warning signs of respiratory distress were reviewed with the patient. Counseled to avoid exposure to cigarette smoke.          Persistent atrial fibrillation ([address] 11/12/2021 Yes    Recurrent pleural effusion 11/12/2021 Yes Moderate malnutrition ([facility] 11/14/2021 Clinically Undetermined          Plan:        Acute respiratory failure with hypoxia, resolved   -Likely secondary to left-sided pleural effusion   -Patient was successfully extubated 11/15  -Continue dexamethasone for management of COVID-19 PNA   -Continue Bumex 1 mg bid   -Pulmonology following; s/p bronchoscopy     COVID-19 PNA   -Patient is currently maintaining oxygen saturations on room air   -Continue dexamethasone x 10 days     HFpEF   -Stable; continue bid Bumex     Persistent atrial fibrillation   -Currently rate-controlled   -Continue amiodarone 200 mg daily   -Continue digoxin 125 mcg daily   -Continue metoprolol 25 mg bid   -Continue Eliquis 5 mg bid     Urinary tract infection   -Urine culture growing Pseudomonas   -Completed course of ciprofloxacin     COPD  -Not in acute exacerbation   -Continue scheduled albuterol     Depression   -Continue home citalopram 20 mg daily     Gilbert Thompson MD  11/18/2021  10:34 AM

## 2021-11-18 NOTE — PROGRESS NOTES
[facility] -   Infectious diseases evaluation  admission date 11/3/2021    reason for consultation:   covid +    Impression :   Current:  · covid + rapid 11/11  · Recurrent L pleural effusion- many thoracentesis  · Repeat thoracentesis 11/4  · resp failure and intubated 11/11  · Acute on chronic CHF  · Pseudomonas UTI   · crp elevation  · SEJAL  · Obese     Other:  ·   Discussion / summary of stay / plan of care   · Was rapid covid neg at admission -  · Then tested + rapid 11/11  · fully vaccinated  · Might be asymptomatic for covid, no clear pneumonia   Recommendations   · regeneron x 1 on 11/11  · ACTEMRA 11/12  AFTER CT CHEST  SHOWED MULTIFOCAL COVID pneumonia   · Get CRP IN AM to FU response  · Decadron   · anticoag  · cipro for pseudomonas UTI 11/7 -11/15 stop after today doses      Infection Control Recommendations   · Willis Precautions  · Contact Isolation   · Airborne isolation  · Droplet Isolation      Antimicrobial Stewardship Recommendations   · Simplification of therapy  · Targeted therapy      Coordination ofOutpatient Care:   · Estimated Length of IV antimicrobials:  · Patient will need Midline / picc Catheter Insertion:   · Patient will need SNF:  · Patient will need outpatient wound care:     History of Present Illness:   Initial history:  Eusebia Torres is a 68y.o.-year-old female w recurrent left pleural effusion who comes in again for shortness of breath and was found to have a chronic left effusion had a thoracentesis 11/4, was also found to have a Pseudomonas UTI and started on Cipro on 11/7. Had initial rapid Covid test at admission that was negative, repeat 111/11 came back positive rapid Covid. Infectious consulted, reviewed the chest x-ray she does not seem to be having Covid pneumonia. No inflammatory markers at this point.     Interval changes  11/18/2021   Patient Vitals for the past 8 hrs:   BP Temp Temp src Pulse Resp SpO2 11/18/21 0938 124/84   79     11/18/21 0907     19 93 %   11/18/21 0800 124/84 97.5 °F (36.4 °C) Oral 73 25 94 %   11/18/21 0652    73     11/18/21 0404 (!) 132/91 97.7 °F (36.5 °C) Oral 75 22 96 %   11/18/21 0400    70      11.11.21  No nausea and no lost of appetite  Cough same and old  Used 2L NC  abd soft    11/12  Intubated over night for resp distress - on 45 fio2 and 8 peep sedated  CXr shows R effusion and infiltrate and CRp a little higher    11/15  actemra 11/12, no new CRP    11/17  Extubated alert appropriate, poor appetite saturating 95%, she has some mild epigastric or abdominal discomfort. WBC 15, blood cultures are negative, off antibiotics. 11/18  She is looking good 3 L of nasal cannula breathing very well but she is constipated and her white count is up to 20. He has no fever blood cultures are negative, no dysuria, IV site is okay  CRP less than 3 urinalysis has pyuria, questionable infection, awaiting urine culture      Summary of relevant labs:  Labs:  Creat 0.76   WBC  9.1  CRP 31-50  Ferritin  452      Micro;  covid rapid neg 11/3  covid rapid + 11/11    Imaging:  CT chest 11/12 multifocal pneumonia from covid    CXR 11/9  Stable volume loss in the left chest with left basilar atelectasis or pneumonia. Possible small left effusion. CXR 11/3  Persistent sizable left effusion with opacity at the left lung base and pulmonary vascular congestion. I have personally reviewed the past medical history, past surgical history, medications, social history, and family history, and I haveupdated the database accordingly. Allergies:   Latex, Betadine [povidone iodine], Bee venom, Dilaudid [hydromorphone hcl], Adhesive tape, and Sulfa antibiotics     Review of Systems:     Review of Systems   Constitutional: Negative for activity change, chills and diaphoresis. HENT: Negative for congestion. Eyes: Negative for discharge. Respiratory: Negative for apnea. Cardiovascular: Negative for chest pain. Gastrointestinal: Negative for abdominal distention. Endocrine: Negative for cold intolerance. Genitourinary: Negative for dysuria. Musculoskeletal: Negative for arthralgias. Skin: Negative for color change. Allergic/Immunologic: Negative for immunocompromised state. Neurological: Negative for headaches. Hematological: Negative for adenopathy. Psychiatric/Behavioral: Negative for agitation. Physical Examination :       Physical Exam  Constitutional:       General: She is not in acute distress. Appearance: Normal appearance. She is not ill-appearing or diaphoretic. HENT:      Head: Atraumatic. Nose: Nose normal.      Mouth/Throat:      Mouth: Mucous membranes are moist.   Eyes:      General: No scleral icterus. Conjunctiva/sclera: Conjunctivae normal.   Cardiovascular:      Rate and Rhythm: Normal rate and regular rhythm. Heart sounds: Normal heart sounds. No murmur heard. Pulmonary:      Breath sounds: No rhonchi. Abdominal:      General: There is no distension. Palpations: Abdomen is soft. Tenderness: There is no abdominal tenderness. Genitourinary:     Comments: No goldstein  Musculoskeletal:         General: No swelling or deformity. Cervical back: Neck supple. No rigidity or tenderness. Right lower leg: No edema. Left lower leg: No edema. Skin:     General: Skin is dry. Coloration: Skin is not jaundiced. Neurological:      General: No focal deficit present. Mental Status: She is alert. Mental status is at baseline. Psychiatric:         Mood and Affect: Mood normal.         Thought Content:  Thought content normal.      Comments: I         Past Medical History:     Past Medical History:   Diagnosis Date    Asthma     COPD (chronic obstructive pulmonary disease) (HCC)     Gout     Hyperlipidemia     Hypertension     Mitral insufficiency     Pneumonia        Past Surgical Expenses: Not on file   Food Insecurity:     Worried About Running Out of Food in the Last Year: Not on file    Geronimo of Food in the Last Year: Not on file   Transportation Needs:     Lack of Transportation (Medical): Not on file    Lack of Transportation (Non-Medical): Not on file   Physical Activity:     Days of Exercise per Week: Not on file    Minutes of Exercise per Session: Not on file   Stress:     Feeling of Stress : Not on file   Social Connections:     Frequency of Communication with Friends and Family: Not on file    Frequency of Social Gatherings with Friends and Family: Not on file    Attends [employer] Services: Not on file    Active Member of [address] [place] [address] [employer] or Organizations: Not on file    Attends Club or Organization Meetings: Not on file    Marital Status: Not on file   Intimate Partner Violence:     Fear of Current or Ex-Partner: Not on file    Emotionally Abused: Not on file    Physically Abused: Not on file    Sexually Abused: Not on file   Housing Stability:     Unable to Pay for Housing in the Last Year: Not on file    Number of [place] in the Last Year: Not on file    Unstable Housing in the Last Year: Not on file       Family History:     Family History   Problem Relation Age of Onset    High Blood Pressure Mother     Cancer Mother     Asthma Father     Heart Disease Father     Cancer Father       Medical Decision Making:   I have independently reviewed/ordered the following labs:    CBC with Differential:   Recent Labs     11/17/21  0542 11/18/21  0542   WBC 15.9* 20.2*   HGB 13.3 13.0   HCT 40.1 39.8    370   LYMPHOPCT 7* 6*   MONOPCT 7 7     BMP:  Recent Labs     11/17/21  0542 11/18/21  0542    133*   K 3.4* 3.5*   CL 93* 94*   CO2 29 27   BUN 35* 31*   CREATININE 0.85 0.77     Hepatic Function Panel:   Recent Labs     11/17/21  0542 11/18/21  0542   LABALBU 3.2* 3.1*     No results for input(s): RPR in the last 72 hours.   No results for input(s): HIV in the last 72 hours. No results for input(s): BC in the last 72 hours. Lab Results   Component Value Date    CREATININE 0.77 11/18/2021    GLUCOSE 111 11/18/2021    GLUCOSE 97 05/09/2016       Detailed results: Thank you for allowing us to participate in the care of this patient. Please call with questions. This note is created with the assistance of a speech recognition program.  While intending to generate adocument that actually reflects the content of the visit, the document can still have some errors including those of syntax and sound a like substitutions which may escape proof reading. It such instances, actual meaningcan be extrapolated by contextual diversion.     Celso Meeks MD  Office: [phone]  Perfect serve / office [phone]

## 2021-11-19 VITALS
SYSTOLIC BLOOD PRESSURE: 157 MMHG | BODY MASS INDEX: 40.75 KG/M2 | RESPIRATION RATE: 27 BRPM | DIASTOLIC BLOOD PRESSURE: 97 MMHG | WEIGHT: 238.7 LBS | HEART RATE: 92 BPM | OXYGEN SATURATION: 94 % | TEMPERATURE: 97.7 F | HEIGHT: 64 IN

## 2021-11-19 LAB
ABSOLUTE EOS #: <0.03 K/UL (ref 0–0.44)
ABSOLUTE IMMATURE GRANULOCYTE: 0.16 K/UL (ref 0–0.3)
ABSOLUTE LYMPH #: 1.26 K/UL (ref 1.1–3.7)
ABSOLUTE MONO #: 1.38 K/UL (ref 0.1–1.2)
ALBUMIN SERPL-MCNC: 3.3 G/DL (ref 3.5–5.2)
ANION GAP SERPL CALCULATED.3IONS-SCNC: 14 MMOL/L (ref 9–17)
BASOPHILS # BLD: 0 % (ref 0–2)
BASOPHILS ABSOLUTE: 0.04 K/UL (ref 0–0.2)
BUN BLDV-MCNC: 31 MG/DL (ref 8–23)
BUN/CREAT BLD: ABNORMAL (ref 9–20)
CALCIUM SERPL-MCNC: 8.9 MG/DL (ref 8.6–10.4)
CHLORIDE BLD-SCNC: 95 MMOL/L (ref 98–107)
CO2: 27 MMOL/L (ref 20–31)
CREAT SERPL-MCNC: 0.71 MG/DL (ref 0.5–0.9)
DIFFERENTIAL TYPE: ABNORMAL
EOSINOPHILS RELATIVE PERCENT: 0 % (ref 1–4)
GFR AFRICAN AMERICAN: >60 ML/MIN
GFR NON-AFRICAN AMERICAN: >60 ML/MIN
GFR SERPL CREATININE-BSD FRML MDRD: ABNORMAL ML/MIN/{1.73_M2}
GFR SERPL CREATININE-BSD FRML MDRD: ABNORMAL ML/MIN/{1.73_M2}
GLUCOSE BLD-MCNC: 114 MG/DL (ref 70–99)
HCT VFR BLD CALC: 41.6 % (ref 36.3–47.1)
HEMOGLOBIN: 13.8 G/DL (ref 11.9–15.1)
IMMATURE GRANULOCYTES: 1 %
LYMPHOCYTES # BLD: 7 % (ref 24–43)
MCH RBC QN AUTO: 30.7 PG (ref 25.2–33.5)
MCHC RBC AUTO-ENTMCNC: 33.2 G/DL (ref 28.4–34.8)
MCV RBC AUTO: 92.7 FL (ref 82.6–102.9)
MONOCYTES # BLD: 7 % (ref 3–12)
NRBC AUTOMATED: 0 PER 100 WBC
PDW BLD-RTO: 12.6 % (ref 11.8–14.4)
PHOSPHORUS: 2.9 MG/DL (ref 2.6–4.5)
PLATELET # BLD: 379 K/UL (ref 138–453)
PLATELET ESTIMATE: ABNORMAL
PMV BLD AUTO: 9.7 FL (ref 8.1–13.5)
POTASSIUM SERPL-SCNC: 3.5 MMOL/L (ref 3.7–5.3)
RBC # BLD: 4.49 M/UL (ref 3.95–5.11)
RBC # BLD: ABNORMAL 10*6/UL
SEG NEUTROPHILS: 85 % (ref 36–65)
SEGMENTED NEUTROPHILS ABSOLUTE COUNT: 16.58 K/UL (ref 1.5–8.1)
SODIUM BLD-SCNC: 136 MMOL/L (ref 135–144)
TOTAL CK: 33 U/L (ref 26–192)
TRIGL SERPL-MCNC: 163 MG/DL
WBC # BLD: 19.4 K/UL (ref 3.5–11.3)
WBC # BLD: ABNORMAL 10*3/UL

## 2021-11-19 PROCEDURE — 99232 SBSQ HOSP IP/OBS MODERATE 35: CPT | Performed by: STUDENT IN AN ORGANIZED HEALTH CARE EDUCATION/TRAINING PROGRAM

## 2021-11-19 PROCEDURE — 6370000000 HC RX 637 (ALT 250 FOR IP): Performed by: INTERNAL MEDICINE

## 2021-11-19 PROCEDURE — 6370000000 HC RX 637 (ALT 250 FOR IP): Performed by: NURSE PRACTITIONER

## 2021-11-19 PROCEDURE — 94640 AIRWAY INHALATION TREATMENT: CPT

## 2021-11-19 PROCEDURE — 84478 ASSAY OF TRIGLYCERIDES: CPT

## 2021-11-19 PROCEDURE — 97116 GAIT TRAINING THERAPY: CPT

## 2021-11-19 PROCEDURE — 36415 COLL VENOUS BLD VENIPUNCTURE: CPT

## 2021-11-19 PROCEDURE — 80069 RENAL FUNCTION PANEL: CPT

## 2021-11-19 PROCEDURE — 85025 COMPLETE CBC W/AUTO DIFF WBC: CPT

## 2021-11-19 PROCEDURE — 99233 SBSQ HOSP IP/OBS HIGH 50: CPT | Performed by: INTERNAL MEDICINE

## 2021-11-19 PROCEDURE — 82550 ASSAY OF CK (CPK): CPT

## 2021-11-19 PROCEDURE — 99232 SBSQ HOSP IP/OBS MODERATE 35: CPT | Performed by: INTERNAL MEDICINE

## 2021-11-19 PROCEDURE — 6360000002 HC RX W HCPCS: Performed by: NURSE PRACTITIONER

## 2021-11-19 PROCEDURE — 97110 THERAPEUTIC EXERCISES: CPT

## 2021-11-19 PROCEDURE — 97530 THERAPEUTIC ACTIVITIES: CPT

## 2021-11-19 RX ORDER — DEXAMETHASONE 6 MG/1
6 TABLET ORAL
Qty: 4 TABLET | Refills: 0 | Status: SHIPPED | OUTPATIENT
Start: 2021-11-19 | End: 2021-11-23

## 2021-11-19 RX ORDER — BUMETANIDE 1 MG/1
1 TABLET ORAL 2 TIMES DAILY
Qty: 30 TABLET | Refills: 3 | Status: SHIPPED | OUTPATIENT
Start: 2021-11-19

## 2021-11-19 RX ORDER — DIGOXIN 125 MCG
125 TABLET ORAL DAILY
Qty: 30 TABLET | Refills: 3 | Status: SHIPPED | OUTPATIENT
Start: 2021-11-20

## 2021-11-19 RX ORDER — DEXAMETHASONE 6 MG/1
6 TABLET ORAL DAILY
Qty: 10 TABLET | Refills: 0 | Status: SHIPPED | OUTPATIENT
Start: 2021-11-20 | End: 2021-11-19

## 2021-11-19 RX ADMIN — Medication 2 PUFF: at 16:33

## 2021-11-19 RX ADMIN — APIXABAN 5 MG: 5 TABLET, FILM COATED ORAL at 22:04

## 2021-11-19 RX ADMIN — BUMETANIDE 1 MG: 1 TABLET ORAL at 22:04

## 2021-11-19 RX ADMIN — METOPROLOL TARTRATE 25 MG: 25 TABLET ORAL at 09:44

## 2021-11-19 RX ADMIN — OXYCODONE HYDROCHLORIDE AND ACETAMINOPHEN 500 MG: 500 TABLET ORAL at 09:46

## 2021-11-19 RX ADMIN — APIXABAN 5 MG: 5 TABLET, FILM COATED ORAL at 09:44

## 2021-11-19 RX ADMIN — METOPROLOL TARTRATE 25 MG: 25 TABLET ORAL at 22:04

## 2021-11-19 RX ADMIN — DEXAMETHASONE 6 MG: 4 TABLET ORAL at 09:46

## 2021-11-19 RX ADMIN — BUMETANIDE 1 MG: 1 TABLET ORAL at 09:45

## 2021-11-19 RX ADMIN — CITALOPRAM 20 MG: 20 TABLET, FILM COATED ORAL at 09:46

## 2021-11-19 RX ADMIN — Medication 2 PUFF: at 22:16

## 2021-11-19 RX ADMIN — DESMOPRESSIN ACETATE 40 MG: 0.2 TABLET ORAL at 09:44

## 2021-11-19 RX ADMIN — DIGOXIN 125 MCG: 125 TABLET ORAL at 09:46

## 2021-11-19 RX ADMIN — CLOPIDOGREL 75 MG: 75 TABLET, FILM COATED ORAL at 09:46

## 2021-11-19 RX ADMIN — DOCUSATE SODIUM 50MG AND SENNOSIDES 8.6MG 2 TABLET: 8.6; 5 TABLET, FILM COATED ORAL at 09:44

## 2021-11-19 RX ADMIN — AMIODARONE HYDROCHLORIDE 200 MG: 200 TABLET ORAL at 09:44

## 2021-11-19 ASSESSMENT — ENCOUNTER SYMPTOMS
SHORTNESS OF BREATH: 1
DIARRHEA: 0
NAUSEA: 0
ABDOMINAL PAIN: 0
VOMITING: 0
EYE DISCHARGE: 0
CONSTIPATION: 0
TROUBLE SWALLOWING: 0
APNEA: 0
ABDOMINAL DISTENTION: 0
WHEEZING: 0
COLOR CHANGE: 0
SORE THROAT: 0
COUGH: 0
VOICE CHANGE: 1

## 2021-11-19 ASSESSMENT — PAIN SCALES - GENERAL: PAINLEVEL_OUTOF10: 0

## 2021-11-19 NOTE — PROGRESS NOTES
PULMONARY & CRITICAL CARE MEDICINE PROGRESS NOTE     Patient:  Mague Lombardi  MRN: [id number]  [facility] date: 11/3/2021  Primary Care Physician: KALYN Agarwal - JORDY  Consulting Physician: Jose Martin Cardenas MD  CODE Status: Full Code  LOS: 12     SUBJECTIVE     CHIEF COMPLAINT/REASON FOR INITIAL CONSULT: Acute respiratory failure/COVID-19 pneumonia    BRIEF HOSPITAL COURSE:   The patient is a 68 y.o. female here for evaluation of shortness of breath with clear sputum, increased leg swelling.  Patient has chronic shortness of breath which has been worsening over the last couple of days along with increased swelling of her lower extremity.  She has conversational dyspnea, no wheezing.  Chest x-ray shows left pleural effusion and left atelectasis she has had multiple thoracentesis. I performed a bronchoscopic evaluation on due to persistent left lower lobe atelectasis and she had extensive mucus plugging on the left side    INTERVAL HISTORY:  11/19/21   Patient extubated on 11/15  Remains on nasal cannula  No overnight issues reported  Hemodynamically stable  Remains on Cipro for Pseudomonas UTI  Continue Decadron    REVIEW OF SYSTEMS:  Review of Systems   Constitutional: Negative for appetite change, chills, fever and unexpected weight change. HENT: Negative for congestion, postnasal drip, sore throat and trouble swallowing. Eyes: Negative for visual disturbance. Respiratory: Positive for shortness of breath. Negative for cough and wheezing. Cardiovascular: Negative for chest pain, palpitations and leg swelling. Gastrointestinal: Negative for abdominal pain, constipation, diarrhea, nausea and vomiting. Genitourinary: Negative for difficulty urinating, dysuria and frequency. Musculoskeletal: Negative for arthralgias and joint swelling. Skin: Negative for rash. Allergic/Immunologic: Negative for immunocompromised state. Neurological: Positive for weakness.  Negative for dizziness, speech difficulty and headaches. Hematological: Negative for adenopathy. Psychiatric/Behavioral: Negative for behavioral problems and sleep disturbance. OBJECTIVE     VITAL SIGNS:   LAST:  BP (!) 144/84   Pulse 105   Temp 97.5 °F (36.4 °C) (Oral)   Resp 21   Ht 5' 4\" (1.626 m)   Wt 238 lb 11.2 oz (108.3 kg)   SpO2 (!) 88%   BMI 40.97 kg/m²   8-24 HR RANGE:  TEMP Temp  Av.6 °F (36.4 °C)  Min: 97.5 °F (36.4 °C)  Max: 97.9 °F (71.7 °C)   BP Systolic (27XFG), ATB:849 , Min:100 , QKD:745      Diastolic (83LCP), GQQ:23, Min:72, Max:84     PULSE Pulse  Av.3  Min: 81  Max: 105   RR Resp  Av  Min: 21  Max: 21   O2 SAT SpO2  Av %  Min: 88 %  Max: 88 %   OXYGEN DELIVERY O2 Flow Rate (L/min)  Avg: 3 L/min  Min: 3 L/min  Max: 3 L/min        SYSTEMIC EXAMINATION:   General appearance -comfortable  Mental status -awake and alert  Eyes - pupils equal and reactive, sclera anicteric  Mouth - mucous membranes moist, pharynx normal without lesions  Neck - supple  Chest - Breath sounds bilaterally were dimnished to auscultation at bases. There were no wheezes, rhonchi or rales.     Heart - normal rate, regular rhythm, normal S1, S2, no murmurs, rubs, clicks or gallops  Abdomen - soft, nontender, nondistended, no masses or organomegaly  Neurological -nonfocal  Extremities - peripheral pulses normal, 1+ pedal edema, no clubbing or cyanosis  Skin - normal coloration and turgor    DATA REVIEW     Medications:  Scheduled Meds:   magnesium citrate  296 mL Oral Once    amiodarone  200 mg Oral Daily    ipratropium  2 puff Inhalation 4x daily    albuterol sulfate HFA  2 puff Inhalation 4x daily    apixaban  5 mg Oral BID    vitamin C  500 mg Oral Daily    atorvastatin  40 mg Oral Daily    bumetanide  1 mg Oral BID    citalopram  20 mg Oral Daily    clopidogrel  75 mg Oral Daily    metoprolol tartrate  25 mg Oral BID    sennosides-docusate sodium  2 tablet Oral Daily    dexamethasone  6 mg Oral Daily    lactulose  10 g Oral TID    digoxin  125 mcg Per NG tube Daily    lidocaine 1 % injection  5 mL IntraDERmal Once    sodium chloride flush  5-40 mL IntraVENous 2 times per day     Continuous Infusions:   sodium chloride         INPUT/OUTPUT:  In: -   Out: 600 [Urine:600]  Date 11/19/21 0000 - 11/19/21 2359   Shift 3763-2729 7469-8930 5336-6468 24 Hour Total   INTAKE   Shift Total(mL/kg)       OUTPUT   Urine(mL/kg/hr) 600(0.7)   600   Shift Total(mL/kg) 600(5.5)   600(5.5)   Weight (kg) 108. 3 108. 3 108. 3 108.3        LABS:  ABGs:   No results for input(s): POCPH, POCPCO2, POCPO2, POCHCO3, CPIQ0ZAO in the last 72 hours. CBC:   Recent Labs     11/17/21  0542 11/18/21  0542 11/19/21  0534   WBC 15.9* 20.2* 19.4*   HGB 13.3 13.0 13.8   HCT 40.1 39.8 41.6   MCV 93.3 94.1 92.7    370 379   LYMPHOPCT 7* 6* 7*   RBC 4.30 4.23 4.49   MCH 30.9 30.7 30.7   MCHC 33.2 32.7 33.2   RDW 12.4 12.4 12.6     CRP:   Recent Labs     11/18/21  1228   CRP <3.0     LDH:   No results for input(s): LDH in the last 72 hours. BMP:   Recent Labs     11/17/21  0542 11/18/21  0542 11/19/21  0534    133* 136   K 3.4* 3.5* 3.5*   CL 93* 94* 95*   CO2 29 27 27   BUN 35* 31* 31*   CREATININE 0.85 0.77 0.71   GLUCOSE 97 111* 114*   PHOS 3.4 2.8 2.9     Liver Function Test:   Recent Labs     11/17/21  0542 11/18/21  0542 11/19/21  0534   LABALBU 3.2* 3.1* 3.3*     Coagulation Profile:   No results for input(s): INR, PROTIME, APTT in the last 72 hours. D-Dimer:  No results for input(s): DDIMER in the last 72 hours. Ferritin:    No results for input(s): FERRITIN in the last 72 hours. Lactic Acid:  No results for input(s): LACTA in the last 72 hours. Cardiac Enzymes:  Recent Labs     11/17/21  0542 11/19/21  0534   CKTOTAL 22* 33     BNP/ProBNP:   No results for input(s): BNP, PROBNP in the last 72 hours.   Triglycerides:  Recent Labs     11/17/21  0542 11/19/21  0534   TRIG 235* 163*        Microbiology:  Urine Culture:  No components found for: CURINE  Blood Culture:  No components found for: CBLOOD, CFUNGUSBL  Sputum Culture:  No components found for: CSPUTUM  No results for input(s): SPECDESC, SPECIAL, CULTURE, STATUS, ORG, CDIFFTOXPCR, CAMPYLOBPCR, SALMONELLAPC, SHIGAPCR, SHIGELLAPCR, MPNEUG, MPNEUM, LACTOQL in the last 72 hours. No results for input(s): SPUTUM, SPECDESC, SPECIAL, CULTURE, STATUS, ORG, CDIFFTOXPCR, MPNEUM, MPNEUG in the last 72 hours. Invalid input(s): CURINE, CBLOOD, CFUNGUSBL     Pathology:    Radiology Reports:  CT CHEST WO CONTRAST   Final Result   Intervally developed perihilar and bibasilar ground-glass and dense airspace   consolidations, concerning for infection including atypical entities such as   COVID pneumonia. Similar small bilateral pleural effusions. XR CHEST PORTABLE   Final Result   1. Endotracheal and esophageal catheters placed as described   2. More conspicuous lower to mid level opacification consistent with pleural   effusions and at least atelectasis         XR CHEST (SINGLE VIEW FRONTAL)   Final Result   Small bilateral pleural effusions. Slightly increased airspace opacity in   the right mid to lower lung, which may reflect pneumonia in the appropriate   clinical setting. XR CHEST (SINGLE VIEW FRONTAL)   Final Result   Stable volume loss in the left chest with left basilar atelectasis or   pneumonia. Possible small left effusion. XR CHEST (SINGLE VIEW FRONTAL)   Final Result   Interval improved aeration of the left lung base. XR CHEST (SINGLE VIEW FRONTAL)   Final Result   Interval decrease in left effusion. No pneumothorax. Persistent opacity at   the left lung base. Vascularity is slightly prominent. US THORACENTESIS Which side should the procedure be performed? Left   Final Result   Successful ultrasound guided thoracentesis. US RETROPERITONEAL COMPLETE   Final Result   1.  Patient had no urge to void during the exam.  Nonvisualization of ureteral   jets. 2. Incidental cholelithiasis. 3. Unremarkable renal ultrasound. No hydronephrosis. XR CHEST PORTABLE   Final Result   No change from prior study. Persistent sizable left effusion with opacity at   the left lung base and pulmonary vascular congestion. Echocardiogram:   Results for orders placed during the hospital encounter of 10/02/21    ECHO Complete 2D W Doppler W Color    Summary  Left ventricle is normal in size with normal systolic function globally. Calculated ejection fraction is 59% ( Atrial-Fib). Mild mitral regurgitation. Mild tricuspid regurgitation. Estimated right ventricular systolic pressure is 35 mmHg. Anterior clear space noted, most likely adipose tissue vs small loculated  pericardial effusion. No evidence of tamponade. Pleural effusion is seen. ASSESSMENT AND PLAN     Assessment:    // Acute hypoxic respiratory failure, s/p extubation 11/15  // Bilateral lower lobe atelectasis  // COVID-19 pneumonia  // Sepsis, resolved  // A. fib with RVR, rate controlled  // Acute kidney injury, improved  // Chronic obstructive pulmonary disease  // UTI, secondary to Pseudomonas  // CHF  // Essential hypertension  // Morbid obesity     Plan:     I personally interviewed/examined the patient; reviewed interval history, interpreted all available radiographic and laboratory data at the time of service.     · Patient currently saturating well on nasal cannula  · Continue supplemental oxygen to keep oxygen saturation greater than 90%  · Continue pulmonary toilet, aspiration precautions and bronchodilators  · Continue to monitor I/O with a goal of even/negative fluid balance  · Tolerating oral diet  · Stress ulcer prophylaxis  · Chemical DVT prophylaxis not needed, patient on Eliquis  · Antimicrobials reviewed; continue Cipro as per ID recommendations  · Continue Decadron for 10 days  · Physical/occupational therapy     We will continue to follow.  I would like to thank you for allowing me to participate in the care of this patient. Please feel free to call with any further questions or concerns. Yazmin Alvarez MD  Pulmonary and Critical Care Medicine           11/19/2021     This patient was evaluated in the context of the global SARS-CoV-2 (COVID-19) pandemic, which necessitated considerations that the patient either has COVID-19 infection or is at risk of infection with COVID-19. Institutional protocols and algorithms that pertain to the evaluation & management of patients with COVID-19 or those at risk for COVID-19 are in a state of rapid changes based on information released by regulatory bodies including the CDC and federal and state organizations. These policies and algorithms were followed during the patient's care. Please note that this chart was generated using voice recognition Dragon dictation software. Although every effort was made to ensure the accuracy of this automated transcription, some errors in transcription may have occurred.

## 2021-11-19 NOTE — PROGRESS NOTES
PULMONARY & CRITICAL CARE MEDICINE PROGRESS NOTE     Patient:  Doni Dunaway  MRN: [id number]  [facility] date: 11/3/2021  Primary Care Physician: KALYN Abdullahi - CNP  Consulting Physician: Elmo Moore MD  CODE Status: Full Code  LOS: 12     SUBJECTIVE     CHIEF COMPLAINT/REASON FOR INITIAL CONSULT: Acute respiratory failure/COVID-19 pneumonia    BRIEF HOSPITAL COURSE:   The patient is a 68 y.o. female here for evaluation of shortness of breath with clear sputum, increased leg swelling.  Patient has chronic shortness of breath which has been worsening over the last couple of days along with increased swelling of her lower extremity.  She has conversational dyspnea, no wheezing.  Chest x-ray shows left pleural effusion and left atelectasis she has had multiple thoracentesis. I performed a bronchoscopic evaluation on due to persistent left lower lobe atelectasis and she had extensive mucus plugging on the left side    INTERVAL HISTORY:  11/19/21   Overnight events noted, chart reviewed, labs seen and medications reviewed last imaging studies seen. Patient is status post extubation on 11/15/2021. Overnight she is afebrile T-max of 98.8 she is hemodynamically stable respiratory rate is in low 20s. No acute hypoxic events were reported this morning she was on 2 to 3 L nasal cannula and she was saturating 92 to 94%  When I saw the patient she looked comfortable she does have hoarseness of voice she was almost lying flat denies shortness of breath at rest. No stridor noted  Urine output output reported to be 1200 and last 24 hours. She is on Bumex 1 mg twice daily  Last chest x-ray on 11/12/2021 shows lower lobe consolidation/effusion. CT scan chest on 11/12/2021 shows lower lobe consolidation. She is on Eliquis, digoxin and metoprolol. On Eliquis  On Decadron 6 mg since 11/14/2021. REVIEW OF SYSTEMS:  Review of Systems   Constitutional: Negative for appetite change, chills, fever and unexpected weight change. HENT: Positive for voice change. Negative for congestion, postnasal drip, sore throat and trouble swallowing. Eyes: Negative for visual disturbance. Respiratory: Positive for shortness of breath. Negative for cough and wheezing. Cardiovascular: Negative for chest pain, palpitations and leg swelling. Gastrointestinal: Negative for abdominal pain, constipation, diarrhea, nausea and vomiting. Genitourinary: Negative for difficulty urinating, dysuria and frequency. Musculoskeletal: Negative for arthralgias and joint swelling. Skin: Negative for rash. Allergic/Immunologic: Negative for immunocompromised state. Neurological: Positive for weakness. Negative for dizziness, speech difficulty and headaches. Hematological: Negative for adenopathy. Psychiatric/Behavioral: Negative for behavioral problems and sleep disturbance. OBJECTIVE     VITAL SIGNS:   LAST:  BP (!) 144/84   Pulse 105   Temp 97.5 °F (36.4 °C) (Oral)   Resp 26   Ht 5' 4\" (1.626 m)   Wt 238 lb 11.2 oz (108.3 kg)   SpO2 (!) 88%   BMI 40.97 kg/m²   8-24 HR RANGE:  TEMP Temp  Av.7 °F (36.5 °C)  Min: 97.5 °F (36.4 °C)  Max: 97.9 °F (91.5 °C)   BP Systolic (35DCE), XFI:324 , Min:100 , TXO:529      Diastolic (30SPH), RVA:97, Min:72, Max:85     PULSE Pulse  Av.1  Min: 71  Max: 105   RR Resp  Av  Min: 26  Max: 26   O2 SAT SpO2  Av.5 %  Min: 88 %  Max: 93 %   OXYGEN DELIVERY O2 Flow Rate (L/min)  Avg: 3 L/min  Min: 3 L/min  Max: 3 L/min        SYSTEMIC EXAMINATION:   General appearance -comfortable  Mental status -awake and alert  Eyes - pupils equal and reactive, sclera anicteric  Mouth - mucous membranes moist, pharynx normal without lesions  Neck - supple  Chest - Breath sounds bilaterally were dimnished to auscultation at bases. Mild crackles noted at bases. There were no wheezes, rhonchi.     Heart - normal rate, regular rhythm, normal S1, S2, no murmurs, rubs, clicks or gallops  Abdomen - soft, nontender, nondistended, no masses or organomegaly  Neurological -nonfocal  Extremities - peripheral pulses normal, 1+ pedal edema, no clubbing or cyanosis  Skin - normal coloration and turgor    DATA REVIEW     Medications:  Scheduled Meds:   magnesium citrate  296 mL Oral Once    amiodarone  200 mg Oral Daily    ipratropium  2 puff Inhalation 4x daily    albuterol sulfate HFA  2 puff Inhalation 4x daily    apixaban  5 mg Oral BID    vitamin C  500 mg Oral Daily    atorvastatin  40 mg Oral Daily    bumetanide  1 mg Oral BID    citalopram  20 mg Oral Daily    clopidogrel  75 mg Oral Daily    metoprolol tartrate  25 mg Oral BID    sennosides-docusate sodium  2 tablet Oral Daily    dexamethasone  6 mg Oral Daily    lactulose  10 g Oral TID    digoxin  125 mcg Per NG tube Daily    lidocaine 1 % injection  5 mL IntraDERmal Once    sodium chloride flush  5-40 mL IntraVENous 2 times per day     Continuous Infusions:   sodium chloride         INPUT/OUTPUT:  In: 500 [P.O.:500]  Out: 900 [Urine:900]  Date 11/19/21 0000 - 11/19/21 2359   Shift 6285-2846 4104-2701 0874-5678 24 Hour Total   INTAKE   Shift Total(mL/kg)       OUTPUT   Urine(mL/kg/hr) 600(0.7)   600   Shift Total(mL/kg) 600(5.5)   600(5.5)   Weight (kg) 108. 3 108. 3 108. 3 108.3        LABS:  ABGs:   No results for input(s): POCPH, POCPCO2, POCPO2, POCHCO3, BUEG8QEO in the last 72 hours. CBC:   Recent Labs     11/17/21  0542 11/18/21  0542 11/19/21  0534   WBC 15.9* 20.2* 19.4*   HGB 13.3 13.0 13.8   HCT 40.1 39.8 41.6   MCV 93.3 94.1 92.7    370 379   LYMPHOPCT 7* 6* 7*   RBC 4.30 4.23 4.49   MCH 30.9 30.7 30.7   MCHC 33.2 32.7 33.2   RDW 12.4 12.4 12.6     CRP:   Recent Labs     11/18/21  1228   CRP <3.0     LDH:   No results for input(s): LDH in the last 72 hours.   BMP:   Recent Labs     11/17/21  0542 11/18/21  0542 11/19/21  0534    133* 136   K 3.4* 3.5* 3.5*   CL 93* 94* 95*   CO2 29 27 27   BUN 35* 31* 31*   CREATININE 0.85 0.77 0.71 GLUCOSE 97 111* 114*   PHOS 3.4 2.8 2.9     Liver Function Test:   Recent Labs     11/17/21  0542 11/18/21  0542 11/19/21  0534   LABALBU 3.2* 3.1* 3.3*     Coagulation Profile:   No results for input(s): INR, PROTIME, APTT in the last 72 hours. D-Dimer:  No results for input(s): DDIMER in the last 72 hours. Ferritin:    No results for input(s): FERRITIN in the last 72 hours. Lactic Acid:  No results for input(s): LACTA in the last 72 hours. Cardiac Enzymes:  Recent Labs     11/17/21  0542 11/19/21  0534   CKTOTAL 22* 33     BNP/ProBNP:   No results for input(s): BNP, PROBNP in the last 72 hours. Triglycerides:  Recent Labs     11/17/21  0542 11/19/21  0534   TRIG 235* 163*        Microbiology:  Urine Culture:  No components found for: CURINE  Blood Culture:  No components found for: CBLOOD, CFUNGUSBL  Sputum Culture:  No components found for: CSPUTUM  No results for input(s): SPECDESC, SPECIAL, CULTURE, STATUS, ORG, CDIFFTOXPCR, CAMPYLOBPCR, SALMONELLAPC, SHIGAPCR, SHIGELLAPCR, MPNEUG, MPNEUM, LACTOQL in the last 72 hours. No results for input(s): SPUTUM, SPECDESC, SPECIAL, CULTURE, STATUS, ORG, CDIFFTOXPCR, MPNEUM, MPNEUG in the last 72 hours. Invalid input(s): CURINE, CBLOOD, CFUNGUSBL     Pathology:    Radiology Reports:  CT CHEST WO CONTRAST   Final Result   Intervally developed perihilar and bibasilar ground-glass and dense airspace   consolidations, concerning for infection including atypical entities such as   COVID pneumonia. Similar small bilateral pleural effusions. XR CHEST PORTABLE   Final Result   1. Endotracheal and esophageal catheters placed as described   2. More conspicuous lower to mid level opacification consistent with pleural   effusions and at least atelectasis         XR CHEST (SINGLE VIEW FRONTAL)   Final Result   Small bilateral pleural effusions.   Slightly increased airspace opacity in   the right mid to lower lung, which may reflect pneumonia in the appropriate clinical setting. XR CHEST (SINGLE VIEW FRONTAL)   Final Result   Stable volume loss in the left chest with left basilar atelectasis or   pneumonia. Possible small left effusion. XR CHEST (SINGLE VIEW FRONTAL)   Final Result   Interval improved aeration of the left lung base. XR CHEST (SINGLE VIEW FRONTAL)   Final Result   Interval decrease in left effusion. No pneumothorax. Persistent opacity at   the left lung base. Vascularity is slightly prominent. US THORACENTESIS Which side should the procedure be performed? Left   Final Result   Successful ultrasound guided thoracentesis. US RETROPERITONEAL COMPLETE   Final Result   1. Patient had no urge to void during the exam.  Nonvisualization of ureteral   jets. 2. Incidental cholelithiasis. 3. Unremarkable renal ultrasound. No hydronephrosis. XR CHEST PORTABLE   Final Result   No change from prior study. Persistent sizable left effusion with opacity at   the left lung base and pulmonary vascular congestion. Echocardiogram:   Results for orders placed during the hospital encounter of 10/02/21    ECHO Complete 2D W Doppler W Color    Summary  Left ventricle is normal in size with normal systolic function globally. Calculated ejection fraction is 59% ( Atrial-Fib). Mild mitral regurgitation. Mild tricuspid regurgitation. Estimated right ventricular systolic pressure is 35 mmHg. Anterior clear space noted, most likely adipose tissue vs small loculated  pericardial effusion. No evidence of tamponade. Pleural effusion is seen.        ASSESSMENT AND PLAN     Assessment:    // Acute hypoxic respiratory failure, s/p extubation 11/15  // Bilateral lower lobe atelectasis  // COVID-19 pneumonia  // Sepsis, resolved  // A. fib with RVR, rate controlled  // Acute kidney injury, improved  // Chronic obstructive pulmonary disease  // UTI, secondary to Pseudomonas  // CHF  // Essential hypertension  // Morbid obesity     Plan:     I personally interviewed/examined the patient; reviewed interval history, interpreted all available radiographic and laboratory data at the time of service.     · Patient currently saturating well on nasal cannula  · Continue supplemental oxygen to keep oxygen saturation greater than 90%  · Continue pulmonary toilet, aspiration precautions and bronchodilators. Encourage incentive spirometry. · She does have hoarseness post extubation but no stridor. Will humidify oxygen  · She is currently on Bumex 1 mg twice daily. May need extra dose intermittently. · Continue to monitor I/O with a goal of even/negative fluid balance  · Tolerating oral diet  · On aspirin, statin, amiodarone, digoxin and Eliquis  · Chemical DVT prophylaxis not needed, patient on Eliquis  · Antimicrobials reviewed; continue Cipro as per ID recommendations for UTI  · Continue Decadron for 10 days currently on 6 mg daily  · Physical/occupational therapy     We will continue to follow. I would like to thank you for allowing me to participate in the care of this patient. Please feel free to call with any further questions or concerns. [address] LORI MD  Pulmonary and Critical Care Medicine           11/19/2021     This patient was evaluated in the context of the global SARS-CoV-2 (COVID-19) pandemic, which necessitated considerations that the patient either has COVID-19 infection or is at risk of infection with COVID-19. Institutional protocols and algorithms that pertain to the evaluation & management of patients with COVID-19 or those at risk for COVID-19 are in a state of rapid changes based on information released by regulatory bodies including the CDC and federal and state organizations. These policies and algorithms were followed during the patient's care. Please note that this chart was generated using voice recognition Dragon dictation software.   Although every effort was made to ensure the accuracy of this automated transcription, some errors in transcription may have occurred.

## 2021-11-19 NOTE — PROGRESS NOTES
Sue Duke was recently discharged on 10/15 after being admitted related to a pleural effusion.  At that time she presented with shortness of breath and palpitations. Mar Romo is found to have a left lower lobe pneumonia and A. fib with RVR and was admitted to the ICU for acute hypoxic respiratory failure.  During that visit the patient had a paracentesis, Negative CURT and subsequently cardioverted successfully, and a bronchoscopy that showed left main bronchus obstruction due to thick mucous plugging.  At discharge the patient was encouraged to follow-up with cardiology outpatient for an ablation.  During that visit she was also treated for UTI and was discharged on Augmentin for 5 days. Mar Romo was also given inhalers and steroids for COPD.     On arrival to the ER today she complains of not feeling well over the last 3 or 4 days. Mar Romo describes worsening shortness of breath primarily on exertion and orthopnea.  The patient told me that she has not been using oxygen on an outpatient basis but according to the ER note she has been using it intermittently primarily with exertion and therapy and occasionally overnight.  Also complains of constipation and mild abdominal bloating.  She also reports nausea and vomiting few days ago but nothing current and she has been tolerating p.o. intake.  She states she is compliant with her home medications.     Patient was placed on 2 L nasal cannula in the ER. Mar Romo has been afebrile heart rates been in the 90s to the low 110's A. Fib.     Chest x-ray shows no acute changes from prior study with a has a persistent sizable left effusion with opacity at the left lung base and pulmonary vascular congestion. Sodium is 129 BUN and creatinine are 26/1. 72.  Her normal BUN and creatinine are around 15/0.58 and normal sodiums in the mid 130s.   BNP is 3134.  On her last admission it [facility] 6014 and on her admission in early October it was 3251.   WBCs 14.5 seems to run slightly elevated     Plan to consult nephrology and pulmonary medicine. Continue home Eliquis, beta-blocker and amiodarone.  Hold losartan  Plan Solu-Medrol related to IV wheezing throughout. It appears to be in fluid overload related to pitting edema in her lower extremities and increasing shortness of breath.  Plan to hold further hydration for now.  Give Lasix 20 mg IV x1     Patient was being transitioned to SNF. Unfortunately was discovered to be Covid positive. Approximately 12 hours later, patient was in respiratory distress  Intubated later that morning (11/12/2021)     Underwent bronchoscopy at bedside on 11/15. Patient was successfully extubated to nasal cannula on 11/15. Review of Systems:     Constitutional:  negative for chills, fevers, sweats  Respiratory:  negative for cough, dyspnea on exertion, shortness of breath, wheezing  Cardiovascular:  negative for chest pain, chest pressure/discomfort, lower extremity edema, palpitations  Gastrointestinal:  negative for abdominal pain, constipation, diarrhea, nausea, vomiting  Neurological:  negative for dizziness, headache    Medications: Allergies:     Allergies   Allergen Reactions    Latex Hives     Hives from rubber gloves    Betadine [Povidone Iodine] Hives    Bee Venom Swelling    Dilaudid [Hydromorphone Hcl] Other (See Comments)     Severe confusion    Adhesive Tape Rash    Sulfa Antibiotics Rash       Current Meds:   Scheduled Meds:    magnesium citrate  296 mL Oral Once    amiodarone  200 mg Oral Daily    ipratropium  2 puff Inhalation 4x daily    albuterol sulfate HFA  2 puff Inhalation 4x daily    apixaban  5 mg Oral BID    vitamin C  500 mg Oral Daily    atorvastatin  40 mg Oral Daily    bumetanide  1 mg Oral BID    citalopram  20 mg Oral Daily    clopidogrel  75 mg Oral Daily    metoprolol tartrate  25 mg Oral BID    sennosides-docusate sodium  2 tablet Oral Daily    dexamethasone  6 mg Oral Daily    lactulose  10 g Oral TID    digoxin  125 mcg Per NG tube Daily    lidocaine 1 % injection  5 mL IntraDERmal Once    sodium chloride flush  5-40 mL IntraVENous 2 times per day     Continuous Infusions:    sodium chloride       PRN Meds: ipratropium, albuterol sulfate HFA, metoprolol, sodium chloride flush, sodium chloride, fentanNYL, magnesium sulfate, potassium chloride **OR** potassium alternative oral replacement **OR** potassium chloride, magnesium hydroxide, melatonin, ondansetron **OR** ondansetron, acetaminophen **OR** acetaminophen    Data:     Past Medical History:   has a past medical history of Asthma, COPD (chronic obstructive pulmonary disease) ([facility] [address] Gout, Hyperlipidemia, Hypertension, Mitral insufficiency, and Pneumonia. Social History:   reports that she has quit smoking. She has never used smokeless tobacco. She reports current alcohol use. She reports that she does not use drugs. Family History:   Family History   Problem Relation Age of Onset    High Blood Pressure Mother     Cancer Mother     Asthma Father     Heart Disease Father     Cancer Father        Vitals:  BP (!) 144/84   Pulse 105   Temp 97.5 °F (36.4 °C) (Oral)   Resp 26   Ht 5' 4\" (1.626 m)   Wt 238 lb 11.2 oz (108.3 kg)   SpO2 (!) 88%   BMI 40.97 kg/m²   Temp (24hrs), Av.6 °F (36.4 °C), Min:97.5 °F (36.4 °C), Max:97.9 °F (36.6 °C)    Recent Labs     21  0705   POCGLU 102       I/O (24Hr):     Intake/Output Summary (Last 24 hours) at 2021 1316  Last data filed at 2021 0657  Gross per 24 hour   Intake 500 ml   Output 900 ml   Net -400 ml       Labs:  Hematology:  Recent Labs     21  0542 21  0542 21  1228 21  0534   WBC 15.9* 20.2*  --  19.4*   RBC 4.30 4.23  --  4.49   HGB 13.3 13.0  --  13.8   HCT 40.1 39.8  --  41.6   MCV 93.3 94.1  --  92.7   MCH 30.9 30.7  --  30.7   MCHC 33.2 32.7  --  33.2   RDW 12.4 12.4  --  12.6    370  --  379   MPV 9.6 9.4  --  9.7   CRP  --   --  <3.0  -- were reviewed with the patient. Counseled to avoid exposure to cigarette smoke.          Persistent atrial fibrillation ([address] 11/12/2021 Yes    Recurrent pleural effusion 11/12/2021 Yes    Moderate malnutrition ([address] 11/14/2021 Clinically Undetermined          Plan:        Acute respiratory failure with hypoxia, resolved   -Likely secondary to left-sided pleural effusion   -Patient was successfully extubated 11/15  -Continue dexamethasone for management of COVID-19 PNA   -Continue Bumex 1 mg bid   -Pulmonology following; s/p bronchoscopy     COVID-19 PNA   -Patient is currently maintaining oxygen saturations on room air   -Continue dexamethasone x 10 days     HFpEF   -Stable; continue bid Bumex     Persistent atrial fibrillation   -Currently rate-controlled   -Continue amiodarone 200 mg daily   -Continue digoxin 125 mcg daily   -Continue metoprolol 25 mg bid   -Continue Eliquis 5 mg bid     Urinary tract infection   -Urine culture growing Pseudomonas   -Completed course of ciprofloxacin     COPD  -Not in acute exacerbation   -Continue scheduled albuterol     Depression   -Continue home citalopram 20 mg daily     Good Mobley MD  11/19/2021  1:16 PM

## 2021-11-19 NOTE — CARE COORDINATION
Spoke to Mayito Richards at [facility]. They have not received precert yet. Spoke to Meli San at [address] [place] [address] Rd [phone]. She will follow along with pt over the weekend. If precert is not received over weekend, they can submit for precert on Monday as long as pt is not symptomatic with covid    1805 notified by Mayito Richards that precert has been received. Call to [facility]. They are able to transport pt at 10:30pm. Attempted to call pt's room, no answer as she is sleeping. Call to pt's son, Pat Ortega. He is agreeable to transport at 10:30pm. Notified Mayito Richards. She is agreeable with plan. Dr Luciano notified.  MANDO and HENS faxed to Mayito Richards    [facility]  Written by: Greta Luna RN    Patient Name: Amadeo Markham  Attending Provider: Niels Berry MD  Admit Date: 11/3/2021  4:32 PM  MRN: [id number]  Account: [de-identified]                     : 1945  Discharge Date: 2021      Disposition: [facility]    Greta Luna RN

## 2021-11-19 NOTE — PROGRESS NOTES
[facility] -   Infectious diseases evaluation  admission date 11/3/2021    reason for consultation:   covid +    Impression :   Current:  · covid + rapid 11/11  · Recurrent L pleural effusion- many thoracentesis  · Repeat thoracentesis 11/4  · resp failure and intubated 11/11  · Acute on chronic CHF  · Pseudomonas UTI   · crp elevation  · SEJAL  · Obese     Other:  ·   Discussion / summary of stay / plan of care   · Was rapid covid neg at admission -  · Then tested + rapid 11/11  · fully vaccinated  · Might be asymptomatic for covid, no clear pneumonia   Recommendations   · regeneron x 1 on 11/11  · ACTEMRA 11/12  AFTER CT CHEST  SHOWED MULTIFOCAL COVID pneumonia   · Get CRP IN AM to FU response  · Decadron   · anticoag  · cipro for pseudomonas UTI 11/7 -11/15 stop after today doses      Infection Control Recommendations   · [place] Precautions  · Contact Isolation   · Airborne isolation  · Droplet Isolation      Antimicrobial Stewardship Recommendations   · Simplification of therapy  · Targeted therapy      Coordination ofOutpatient Care:   · Estimated Length of IV antimicrobials:  · Patient will need Midline / picc Catheter Insertion:   · Patient will need SNF:  · Patient will need outpatient wound care:     History of Present Illness:   Initial history:  Tino Fuentes is a 68y.o.-year-old female w recurrent left pleural effusion who comes in again for shortness of breath and was found to have a chronic left effusion had a thoracentesis 11/4, was also found to have a Pseudomonas UTI and started on Cipro on 11/7. Had initial rapid Covid test at admission that was negative, repeat 111/11 came back positive rapid Covid. Infectious consulted, reviewed the chest x-ray she does not seem to be having Covid pneumonia. No inflammatory markers at this point.     Interval changes  11/19/2021   Patient Vitals for the past 8 hrs:   BP Temp Temp src Pulse Resp SpO2 11/19/21 0421 (!) 144/84 97.5 °F (36.4 °C) Oral 91 26 93 %   11/19/21 0400    81      11.11.21  No nausea and no lost of appetite  Cough same and old  Used 2L NC  abd soft    11/12  Intubated over night for resp distress - on 45 fio2 and 8 peep sedated  CXr shows R effusion and infiltrate and CRp a little higher    11/15  actemra 11/12, no new CRP    11/17  Extubated alert appropriate, poor appetite saturating 95%, she has some mild epigastric or abdominal discomfort. WBC 15, blood cultures are negative, off antibiotics. 11/18  She is looking good 3 L of nasal cannula breathing very well but she is constipated and her white count is up to 20. He has no fever blood cultures are negative, no dysuria, IV site is okay  CRP less than 3 urinalysis has pyuria, questionable infection, awaiting urine culture    11/19  Oriented x3 looks much more comfortable saturating 90% on nasal cannula, 3 L, sitting in a chair  Urinalysis 20-50 WBCs moderate leukocyte esterase  WBC 19      Summary of relevant labs:  Labs:  Creat 0.76   WBC  9.1  CRP 31-50  Ferritin  452      Micro;  covid rapid neg 11/3  covid rapid + 11/11    Imaging:  CT chest 11/12 multifocal pneumonia from covid    CXR 11/9  Stable volume loss in the left chest with left basilar atelectasis or pneumonia. Possible small left effusion. CXR 11/3  Persistent sizable left effusion with opacity at the left lung base and pulmonary vascular congestion. I have personally reviewed the past medical history, past surgical history, medications, social history, and family history, and I haveupdated the database accordingly. Allergies:   Latex, Betadine [povidone iodine], Bee venom, Dilaudid [hydromorphone hcl], Adhesive tape, and Sulfa antibiotics     Review of Systems:     Review of Systems   Constitutional: Negative for activity change, chills and diaphoresis. HENT: Negative for congestion. Eyes: Negative for discharge.    Respiratory: Negative for apnea. Cardiovascular: Negative for chest pain. Gastrointestinal: Negative for abdominal distention. Endocrine: Negative for cold intolerance. Genitourinary: Negative for dysuria. Musculoskeletal: Negative for arthralgias. Skin: Negative for color change. Allergic/Immunologic: Negative for immunocompromised state. Neurological: Negative for seizures, speech difficulty and headaches. Hematological: Negative for adenopathy. Psychiatric/Behavioral: Negative for agitation. Physical Examination :       Physical Exam  Constitutional:       General: She is not in acute distress. Appearance: Normal appearance. She is not ill-appearing or diaphoretic. HENT:      Head: Atraumatic. Nose: Nose normal.      Mouth/Throat:      Mouth: Mucous membranes are moist.   Eyes:      General: No scleral icterus. Conjunctiva/sclera: Conjunctivae normal.   Cardiovascular:      Rate and Rhythm: Normal rate and regular rhythm. Heart sounds: Normal heart sounds. No murmur heard. Pulmonary:      Breath sounds: No rhonchi. Abdominal:      General: There is no distension. Palpations: Abdomen is soft. Tenderness: There is no abdominal tenderness. Genitourinary:     Comments: No goldstein  Musculoskeletal:         General: No swelling or deformity. Cervical back: Neck supple. No rigidity or tenderness. Right lower leg: No edema. Left lower leg: No edema. Skin:     General: Skin is dry. Coloration: Skin is not jaundiced or pale. Findings: No erythema. Neurological:      General: No focal deficit present. Mental Status: She is alert. Mental status is at baseline. Psychiatric:         Mood and Affect: Mood normal.         Thought Content:  Thought content normal.      Comments: I         Past Medical History:     Past Medical History:   Diagnosis Date    Asthma     COPD [facility]chronic obstructive [facility] disease) [facility] [address] [facility]     Gout     Hyperlipidemia     Hypertension     Mitral insufficiency     Pneumonia        Past Surgical  History:     Past Surgical History:   Procedure Laterality Date    ANKLE SURGERY Left 03/22/2016    ORIF    BRONCHOSCOPY N/A 10/20/2021    BRONCHOSCOPY ALVEOLAR LAVAGE performed by Magdy Ayala MD at [address]      AK COLSC FLX W/RMVL OF TUMOR POLYP LESION SNARE TQ N/A 8/8/2017    COLONOSCOPY POLYPECTOMY SNARE/COLD BIOPSY performed by Jefferson Samaniego MD at [facility]    TONSILLECTOMY AND ADENOIDECTOMY         Medications:      magnesium citrate  296 mL Oral Once    amiodarone  200 mg Oral Daily    ipratropium  2 puff Inhalation 4x daily    albuterol sulfate HFA  2 puff Inhalation 4x daily    apixaban  5 mg Oral BID    vitamin C  500 mg Oral Daily    atorvastatin  40 mg Oral Daily    bumetanide  1 mg Oral BID    citalopram  20 mg Oral Daily    clopidogrel  75 mg Oral Daily    metoprolol tartrate  25 mg Oral BID    sennosides-docusate sodium  2 tablet Oral Daily    dexamethasone  6 mg Oral Daily    lactulose  10 g Oral TID    digoxin  125 mcg Per NG tube Daily    lidocaine 1 % injection  5 mL IntraDERmal Once    sodium chloride flush  5-40 mL IntraVENous 2 times per day       Social History:     Social History     Socioeconomic History    Marital status:      Spouse name: Not on file    Number of children: Not on file    Years of education: Not on file    Highest education level: Not on file   Occupational History    Not on file   Tobacco Use    Smoking status: Former Smoker    Smokeless tobacco: Never Used    Tobacco comment: over 30 years ago    Vaping Use    Vaping Use: Never used   Substance and Sexual Activity    Alcohol use:  Yes     Alcohol/week: 0.0 standard drinks     Comment: occasionally     Drug use: No    Sexual activity: Not on file   Other Topics Concern    Not on file   Social History Narrative    Not on file     Social Determinants of Health     Financial Resource Strain:     Difficulty of Paying Living Expenses: Not on file   Food Insecurity:     Worried About Running Out of Food in the Last Year: Not on file    Geronimo of Food in the Last Year: Not on file   Transportation Needs:     Lack of Transportation (Medical): Not on file    Lack of Transportation (Non-Medical):  Not on file   Physical Activity:     Days of Exercise per Week: Not on file    Minutes of Exercise per Session: Not on file   Stress:     Feeling of Stress : Not on file   Social Connections:     Frequency of Communication with Friends and Family: Not on file    Frequency of Social Gatherings with Friends and Family: Not on file    Attends Taoism Services: Not on file    Active Member of [address] [place] [address] [employer] Services or Organizations: Not on file    Attends Club or Organization Meetings: Not on file    Marital Status: Not on file   Intimate Partner Violence:     Fear of Current or Ex-Partner: Not on file    Emotionally Abused: Not on file    Physically Abused: Not on file    Sexually Abused: Not on file   Housing Stability:     Unable to Pay for Housing in the Last Year: Not on file    Number of [place] in the Last Year: Not on file    Unstable Housing in the Last Year: Not on file       Family History:     Family History   Problem Relation Age of Onset    High Blood Pressure Mother     Cancer Mother     Asthma Father     Heart Disease Father     Cancer Father       Medical Decision Making:   I have independently reviewed/ordered the following labs:    CBC with Differential:   Recent Labs     11/18/21  0542 11/19/21  0534   WBC 20.2* 19.4*   HGB 13.0 13.8   HCT 39.8 41.6    379   LYMPHOPCT 6* 7*   MONOPCT 7 7     BMP:  Recent Labs     11/18/21  0542 11/19/21  0534   * 136   K 3.5* 3.5*   CL 94* 95*   CO2 27 27   BUN 31* 31*   CREATININE 0.77 0.71     Hepatic Function Panel:   Recent Labs     11/18/21  0542 11/19/21  0534   LABALBU 3.1* 3.3*     No results for input(s): RPR in the last 72 hours. No results for input(s): HIV in the last 72 hours. No results for input(s): BC in the last 72 hours. Lab Results   Component Value Date    CREATININE 0.71 11/19/2021    GLUCOSE 114 11/19/2021    GLUCOSE 97 05/09/2016       Detailed results: Thank you for allowing us to participate in the care of this patient. Please call with questions. This note is created with the assistance of a speech recognition program.  While intending to generate adocument that actually reflects the content of the visit, the document can still have some errors including those of syntax and sound a like substitutions which may escape proof reading. It such instances, actual meaningcan be extrapolated by contextual diversion.     Shea Heller MD  Office: [phone]  Perfect serve / office [phone]

## 2021-11-19 NOTE — PROGRESS NOTES
Physical Therapy  Facility/Department: [facility]  Daily Treatment Note  NAME: Barbara Campbell  : 1945  MRN: [id number]    Date of Service: 2021    Discharge Recommendations:  Patient would benefit from continued therapy after discharge        Assessment   Body structures, Functions, Activity limitations: Decreased functional mobility ; Decreased balance; Decreased endurance; Decreased strength  Assessment: Patient appears to be declining in mobility. Needed several attempts to stand for each successful attempt. Too weak and unstable to ambulate to bathroom. Patient will need further PT to regain functional independence. PT Education: Goals; PT Role; Plan of Care; Functional Mobility Training; Home Exercise Program; General Safety; Energy Conservation; Gait Training; Transfer Training  REQUIRES PT FOLLOW UP: Yes  Activity Tolerance  Activity Tolerance: Patient limited by endurance; Patient limited by fatigue     Patient Diagnosis(es): The primary encounter diagnosis was Acute on chronic congestive heart failure, unspecified heart failure type ([facility] [address] Diagnoses of CASEY (acute kidney injury) ([facility] [address] and Acute on chronic diastolic heart failure ([facility] [address] were also pertinent to this visit. has a past medical history of Asthma, COPD (chronic obstructive pulmonary disease) ([address] Gout, Hyperlipidemia, Hypertension, Mitral insufficiency, and Pneumonia. has a past surgical history that includes Hysterectomy (); knee surgery; Colonoscopy; Dental surgery; Tonsillectomy and adenoidectomy; Ankle surgery (Left, 2016); pr colsc flx w/rmvl of tumor polyp lesion snare tq (N/A, 2017); and bronchoscopy (N/A, 10/20/2021).     Restrictions  Restrictions/Precautions  Restrictions/Precautions: Fall Risk, Isolation  Required Braces or Orthoses?: No  Position Activity Restriction  Other position/activity restrictions: up with assist. O2 NC 3 LPM  Subjective   General  Chart Reviewed: Yes  Family / Caregiver Present: No  Subjective  Subjective: RN and pt agreeable to PT. pt agreeable and pleasant. Pt supine in bed at start of session. Pain Screening  Patient Currently in Pain: Denies  Vital Signs  Pulse: 105  Patient Currently in Pain: Denies  Oxygen Therapy  SpO2: (!) 88 %  O2 Device: Nasal cannula  O2 Flow Rate (L/min): 3 L/min       Orientation  Orientation  Overall Orientation Status: Impaired  Orientation Level: Disoriented to situation; Oriented to person; Oriented to time; Oriented to place (Though she knew the date and location, while therapist was assisting her on the commode, she said \"So does your family own this? \" Writer clarified what did patient mean by this, patient meant [employer]. Her orientation was then questioned.)  Cognition   Cognition  Overall Cognitive Status: Exceptions  Arousal/Alertness: Appropriate responses to stimuli  Following Commands: Follows one step commands consistently  Attention Span: Attends with cues to redirect  Safety Judgement: Decreased awareness of need for assistance; Decreased awareness of need for safety  Problem Solving: Assistance required to correct errors made; Assistance required to generate solutions  Insights: Decreased awareness of deficits  Initiation: Requires cues for some  Sequencing: Requires cues for some  Cognition Comment: Impaired insight demonstrated when patient asks staff assisting her on the commode if her family owns [employer]. Objective   Bed mobility  Supine to Sit: Minimal assistance  Transfers  Sit to Stand: Moderate Assistance  Stand to sit: Minimal Assistance  Ambulation  Ambulation?: Yes  Ambulation 1  Surface: level tile  Device: Rolling Walker  Other Apparatus: O2  Assistance: Moderate assistance  Quality of Gait: LEs weak, unstable, knees buckling. Writer felt she was unsafe to ambulate with help to bathroom as was originally planned.   Gait Deviations: Slow Radha; Decreased step length; Decreased step height  Distance: 2' to chair, then 2' to commode.      Balance  Sitting - Static: Fair  Sitting - Dynamic: Fair  Standing - Static: Poor  Standing - Dynamic: Poor  Exercises  Heelslides: x10 reps B  Knee Short Arc Quad: x10 reps B  Ankle Pumps: x10 reps B                AM-PAC Score  AM-PAC Inpatient Mobility Raw Score : 12 (11/19/21 1031)  AM-PAC Inpatient T-Scale Score : 35.33 (11/19/21 1031)  Mobility Inpatient CMS 0-100% Score: 68.66 (11/19/21 1031)  Mobility Inpatient CMS G-Code Modifier : CL (11/19/21 1031)          Goals  Short term goals  Time Frame for Short term goals: 14 visits  Short term goal 1: Perform bed mobility with SBA  Short term goal 2: Perform sit to stand transfer with CGA  Short term goal 3: Ambulate 200ft with RW and CGA  Short term goal 4: Demo Fair+ dynamic standing balance to decrease risk of falls  Short term goal 5: Participate in 30 minutes of therapy to demo increased endurance    Plan    Plan  Times per week: 5x/wk  Current Treatment Recommendations: Strengthening, ROM, Balance Training, Functional Mobility Training, Gait Training, Stair training, Endurance Training, Home Exercise Program, Safety Education & Training, Patient/Caregiver Education & Training, Transfer Training  Safety Devices  Type of devices: Nurse notified, Gait belt, Left in chair, All fall risk precautions in place, Call light within reach  Restraints  Initially in place: No     Therapy Time   Individual Concurrent Group Co-treatment   Time In 0935         Time Out 1020         Minutes 45         Timed Code Treatment Minutes: Cindy 112, PT

## 2021-11-19 NOTE — DISCHARGE INSTR - COC
Continuity of Care Form    Patient Name: Raheel Gaston   :  [birth year]  MRN:  [id number]    Admit date:  11/3/2021  Discharge date:  2021    Code Status Order: Full Code   Advance Directives:      Admitting Physician:  Good Mobley MD  PCP: KALYN Gunter CNP    Discharging Nurse: Denise Self Unit/Room#: 4823/5587-89  Discharging Unit Phone Number: [phone]    Emergency Contact:   Extended Emergency Contact Information  Primary Emergency Contact: [id number] [place] [address] Phone: [phone]  Relation: Child   needed?  No  Secondary Emergency Contact: Leny [address] Phone: [phone]  Relation: Child    Past Surgical History:  Past Surgical History:   Procedure Laterality Date    ANKLE SURGERY Left 2016    ORIF    BRONCHOSCOPY N/A 10/20/2021    BRONCHOSCOPY ALVEOLAR LAVAGE performed by Javon Claudio MD at [place] [address]. FLX W/RMVL OF TUMOR POLYP LESION SNARE TQ N/A 2017    COLONOSCOPY POLYPECTOMY SNARE/COLD BIOPSY performed by Anisha Armstrong MD at [address] [place] [address]         Immunization History:   Immunization History   Administered Date(s) Administered    Influenza Vaccine, unspecified formulation 2017    Influenza Virus Vaccine 2017, 2018    Influenza Whole 12/15/2015    Pneumococcal Conjugate 13-valent ([id number]) 10/17/2016    Pneumococcal Polysaccharide ([id number]) 12/15/2015    Td, unspecified formulation 2016       Active Problems:  Patient Active Problem List   Diagnosis Code    Asthma J45.909    Benign essential HTN I10    Morbid obesity with BMI of 40.0-44.9, adult ([facility] [address] E66.01, Z68.41    Open bimalleolar fracture of left ankle S82.842B    Hyperlipidemia E78.5    COPD exacerbation ([address] J44.1    Pulmonary emphysema ([address] J43.9 Postoperative confusion R41.0    Acute on chronic respiratory failure with hypoxia (HCC) J96.21    Simple chronic bronchitis (HCC) J41.0    Atrial fibrillation with rapid ventricular response (HCC) I48.91    Pneumonia of left lower lobe due to infectious organism J18.9    Sepsis with acute hypoxic respiratory failure without septic shock (HCC) A41.9, R65.20, J96.01    Acute on chronic diastolic heart failure (HCC) I50.33    Hyponatremia E87.1    Hypokalemia E87.6    Elevated troponin R77.8    Anemia D64.9    Arthritis M19.90    At high risk for falls Z91.81    Colon polyps K63.5    Heart murmur R01.1    Legally blind in right eye, as defined in [place] H54.8    Chronic obstructive pulmonary disease with acute exacerbation (HCC) J44.1    Persistent atrial fibrillation ([facility]) I48.19    Pleural effusion J90    Recurrent pleural effusion J90    Mucus plugging of bronchi T17.500A    Atelectasis of left lung J98.11    Acute chest pain R07.9    Coag negative Staphylococcus bacteremia R78.81, B95.7    Abnormal pleural fluid R89.9    CASEY (acute kidney injury) ([address] N17.9    Acute on chronic congestive heart failure (HCC) I50.9    Atrial flutter with rapid ventricular response (HCC) I48.92    Pneumonia due to COVID-19 virus U07.1, J12.82    Acute respiratory failure with hypoxemia ([facility]) J96.01    Respiratory alkalosis E87.3    Shock ([address] R57.9    Moderate malnutrition ([facility]) E44.0       Isolation/Infection:   Isolation            Droplet Plus          Patient Infection Status       Infection Onset Added Last Indicated Last Indicated By Review Planned Expiration Resolved Resolved By    COVID-19 11/11/21 11/11/21 11/11/21 COVID-19, Rapid 11/18/21 11/25/21      Resolved    COVID-19 (Rule Out) 10/03/21 10/03/21 10/03/21 Respiratory Panel, Molecular, with COVID-19 (Restricted: peds pts or suitable admitted adults) (Ordered)   10/03/21 Rule-Out Test Resulted    COVID-19 (Rule Out) 10/02/21 10/02/21 10/02/21 COVID-19, Rapid (Ordered) 10/02/21 Rule-Out Test Resulted            Nurse Assessment:  Last Vital Signs: BP (!) 144/84   Pulse 105   Temp 97.5 °F (36.4 °C) (Oral)   Resp 21   Ht 5' 4\" (1.626 m)   Wt 238 lb 11.2 oz (108.3 kg)   SpO2 (!) 88%   BMI 40.97 kg/m²     Last documented pain score (0-10 scale): Pain Level: 0  Last Weight:   Wt Readings from Last 1 Encounters:   11/13/21 238 lb 11.2 oz (108.3 kg)     Mental Status:  oriented and alert    IV Access:  - None    Nursing Mobility/ADLs:  Walking   Assisted  Transfer  Assisted  Bathing  Assisted  Dressing  Assisted  Toileting  Assisted  Feeding  Assisted  Med Admin  Assisted  Med Delivery   whole    Wound Care Documentation and Therapy:  Wound 03/28/16 Blister Foot Left;Medial Blood filled blister (Active)   Number of days: 2062       Wound 03/28/16 Blister Other (Comment) Left;Posterior intact blood filled blister (Active)   Number of days: 2062       Wound 03/28/16 Blister Foot Dorsal Multiple clear fliud filled blisters  (Active)   Number of days: 2062       Incision 03/22/16 Ankle Left;Medial (Active)   Number of days: 2068       Incision 03/28/16 Ankle Left;Lateral (Active)   Number of days: 2062        Elimination:  Continence: Bowel: Yes  Bladder: Yes  Urinary Catheter: None   Colostomy/Ileostomy/Ileal Conduit: No       Date of Last BM: 11/22/21    Intake/Output Summary (Last 24 hours) at 11/19/2021 1748  Last data filed at 11/19/2021 0657  Gross per 24 hour   Intake    Output 600 ml   Net -600 ml     I/O last 3 completed shifts: In: 500 [P.O.:500]  Out: 900 [Urine:900]    Safety Concerns:      At Risk for Falls    Impairments/Disabilities:      Vision    Nutrition Therapy:  Current Nutrition Therapy:   - Oral Diet:  General and Low Sodium (2gm)    Routes of Feeding: Oral  Liquids: No Restrictions  Daily Fluid Restriction: no  Last Modified Barium Swallow with Video (Video Swallowing Test): not done    Treatments at the Time of Hospital Discharge:   Respiratory Treatments: ***  Oxygen Therapy:  is on oxygen at 3 L/min per nasal cannula. Ventilator:    - No ventilator support    Rehab Therapies: Physical Therapy and Occupational Therapy  Weight Bearing Status/Restrictions: No weight bearing restirctions  Other Medical Equipment (for information only, NOT a DME order):  walker  Other Treatments: ***    Patient's personal belongings (please select all that are sent with patient):  Glasses    RN SIGNATURE:  Electronically signed by Garcia Mcelroy RN on 11/19/21 at 6:04 PM EST    CASE MANAGEMENT/SOCIAL WORK SECTION    Inpatient Status Date: ***    Readmission Risk Assessment Score:  Readmission Risk              Risk of Unplanned Readmission:  31           [facility]   [facility]   Address:   [address] [place] [address]         Phone: [phone]          / signature: Electronically signed by Patricia Garcia RN on 11/19/21 at 6:07 PM EST    PHYSICIAN SECTION    Prognosis: Fair    Condition at Discharge: Stable    Rehab Potential (if transferring to Rehab): Good    Recommended Labs or Other Treatments After Discharge: PT/OT    Physician Certification: I certify the above information and transfer of Guanako Shah  is necessary for the continuing treatment of the diagnosis listed and that she requires [facility] for greater 30 days.      Update Admission H&P: No change in H&P    PHYSICIAN SIGNATURE:  Electronically signed by Ling Pressley MD on 11/19/21 at 5:58 PM EST

## 2021-11-19 NOTE — PLAN OF CARE
BRONCHOSPASM/BPATIENT REFUSES TO WEAR BIPAP     [x] Risks and benefits explained to patient   [x] Patient refuses to wear Bipap  no thank you   [x] Patient verbalizes understanding of information presented.     RONCHOCONSTRICTION     [x]         IMPROVE AERATION/BREATH SOUNDS  [x]   ADMINISTER BRONCHODILATOR THERAPY AS APPROPRIATE  [x]   ASSESS BREATH SOUNDS  [x]   IMPLEMENT AEROSOL/MDI PROTOCOL  [x]   PATIENT EDUCATION AS NEEDED   PROVIDE ADEQUATE OXYGENATION WITH ACCEPTABLE SP02/ABG'S    [x]  IDENTIFY APPROPRIATE OXYGEN THERAPY  [x]   MONITOR SP02/ABG'S AS NEEDED   [x]   PATIENT EDUCATION AS NEEDED

## 2021-11-19 NOTE — PROGRESS NOTES
PULMONARY & CRITICAL CARE MEDICINE PROGRESS NOTE     Patient:  Shad Rivero  MRN: [id number]  6 [facility] date: 11/3/2021  Primary Care Physician: KALYN Lemon - CNP  Consulting Physician: Andree Dooley MD  CODE Status: Full Code  LOS: 13     SUBJECTIVE     CHIEF COMPLAINT/REASON FOR INITIAL CONSULT: Acute respiratory failure/COVID-19 pneumonia    BRIEF HOSPITAL COURSE:   The patient is a 68 y.o. female here for evaluation of shortness of breath with clear sputum, increased leg swelling.  Patient has chronic shortness of breath which has been worsening over the last couple of days along with increased swelling of her lower extremity.  She has conversational dyspnea, no wheezing.  Chest x-ray shows left pleural effusion and left atelectasis she has had multiple thoracentesis. I performed a bronchoscopic evaluation on due to persistent left lower lobe atelectasis and she had extensive mucus plugging on the left side    INTERVAL HISTORY:  11/18/21   Patient extubated on 11/15  Remains on nasal cannula  No overnight issues reported  Hemodynamically stable  Remains on Cipro as per ID recommendations  Continue Decadron    REVIEW OF SYSTEMS:  Review of Systems   Constitutional: Negative for appetite change, chills, fever and unexpected weight change. HENT: Negative for congestion, postnasal drip, sore throat and trouble swallowing. Eyes: Negative for visual disturbance. Respiratory: Positive for shortness of breath. Negative for cough and wheezing. Cardiovascular: Negative for chest pain, palpitations and leg swelling. Gastrointestinal: Negative for abdominal pain, constipation, diarrhea, nausea and vomiting. Genitourinary: Negative for difficulty urinating, dysuria and frequency. Musculoskeletal: Negative for arthralgias and joint swelling. Skin: Negative for rash. Allergic/Immunologic: Negative for immunocompromised state. Neurological: Positive for weakness.  Negative for dizziness, speech difficulty and headaches. Hematological: Negative for adenopathy. Psychiatric/Behavioral: Negative for behavioral problems and sleep disturbance. OBJECTIVE     VITAL SIGNS:   LAST:  /84   Pulse 89   Temp 97.5 °F (36.4 °C) (Oral)   Resp 27   Ht 5' 4\" (1.626 m)   Wt 238 lb 11.2 oz (108.3 kg)   SpO2 93%   BMI 40.97 kg/m²   8-24 HR RANGE:  TEMP Temp  Av.7 °F (36.5 °C)  Min: 97.5 °F (36.4 °C)  Max: 97.9 °F (85.5 °C)   BP Systolic (20AOX), VNN:095 , Min:124 , CJP:343      Diastolic (16OFT), UDD:98, Min:84, Max:92     PULSE Pulse  Av.9  Min: 70  Max: 89   RR Resp  Av  Min: 23  Max: 27   O2 SAT SpO2  Av %  Min: 91 %  Max: 93 %   OXYGEN DELIVERY No data recorded        SYSTEMIC EXAMINATION:   General appearance -comfortable  Mental status -awake and alert  Eyes - pupils equal and reactive, sclera anicteric  Mouth - mucous membranes moist, pharynx normal without lesions  Neck - supple  Chest - Breath sounds bilaterally were dimnished to auscultation at bases. There were no wheezes, rhonchi or rales.     Heart - normal rate, regular rhythm, normal S1, S2, no murmurs, rubs, clicks or gallops  Abdomen - soft, nontender, nondistended, no masses or organomegaly  Neurological -nonfocal  Extremities - peripheral pulses normal, 1+ pedal edema, no clubbing or cyanosis  Skin - normal coloration and turgor    DATA REVIEW     Medications:  Scheduled Meds:   magnesium citrate  296 mL Oral Once    amiodarone  200 mg Oral Daily    ipratropium  2 puff Inhalation 4x daily    albuterol sulfate HFA  2 puff Inhalation 4x daily    apixaban  5 mg Oral BID    vitamin C  500 mg Oral Daily    atorvastatin  40 mg Oral Daily    bumetanide  1 mg Oral BID    citalopram  20 mg Oral Daily    clopidogrel  75 mg Oral Daily    metoprolol tartrate  25 mg Oral BID    sennosides-docusate sodium  2 tablet Oral Daily    dexamethasone  6 mg Oral Daily    lactulose  10 g Oral TID    digoxin  125 mcg Per NG tube Daily    lidocaine 1 % injection  5 mL IntraDERmal Once    sodium chloride flush  5-40 mL IntraVENous 2 times per day     Continuous Infusions:   sodium chloride         INPUT/OUTPUT:  In: 950 [P.O.:950]  Out: 1800 [Urine:1800]  Date 11/18/21 0000 - 11/18/21 2359   Shift 0000-0759 [id number] 7748-0986 24 Hour Total   INTAKE   P.O.(mL/kg/hr)  450(0.5) 500 950   Shift Total(mL/kg)  450(4.2) 500(4.6) 950(8.8)   OUTPUT   Urine(mL/kg/hr) 1200(1.4) 300(0.3) 300 1800   Shift Total(mL/kg) 1200(11.1) 300(2.8) 300(2.8) 1800(16.6)   Weight (kg) 108. 3 108. 3 108. 3 108.3        LABS:  ABGs:   No results for input(s): POCPH, POCPCO2, POCPO2, POCHCO3, TKSN0UJT in the last 72 hours. CBC:   Recent Labs     11/16/21 0410 11/17/21 0542 11/18/21  0542   WBC 14.7* 15.9* 20.2*   HGB 11.8* 13.3 13.0   HCT 36.1* 40.1 39.8   MCV 93.3 93.3 94.1    372 370   LYMPHOPCT 7* 7* 6*   RBC 3.87* 4.30 4.23   MCH 30.5 30.9 30.7   MCHC 32.7 33.2 32.7   RDW 12.6 12.4 12.4     CRP:   Recent Labs     11/16/21 0410 11/18/21  1228   CRP 9.4* <3.0     LDH:   No results for input(s): LDH in the last 72 hours. BMP:   Recent Labs     11/16/21 0410 11/17/21  0542 11/18/21  0542    135 133*   K 3.8 3.4* 3.5*   CL 96* 93* 94*   CO2 33* 29 27   BUN 39* 35* 31*   CREATININE 0.81 0.85 0.77   GLUCOSE 108* 97 111*   PHOS 3.2 3.4 2.8     Liver Function Test:   Recent Labs     11/16/21 0410 11/17/21  0542 11/18/21  0542   LABALBU 3.1* 3.2* 3.1*     Coagulation Profile:   No results for input(s): INR, PROTIME, APTT in the last 72 hours. D-Dimer:  No results for input(s): DDIMER in the last 72 hours. Ferritin:    No results for input(s): FERRITIN in the last 72 hours. Lactic Acid:  No results for input(s): LACTA in the last 72 hours. Cardiac Enzymes:  Recent Labs     11/17/21  0542   CKTOTAL 22*     BNP/ProBNP:   No results for input(s): BNP, PROBNP in the last 72 hours.   Triglycerides:  Recent Labs     11/17/21  0542   TRIG 235* Microbiology:  Urine Culture:  No components found for: CURINE  Blood Culture:  No components found for: CBLOOD, CFUNGUSBL  Sputum Culture:  No components found for: CSPUTUM  No results for input(s): SPECDESC, SPECIAL, CULTURE, STATUS, ORG, CDIFFTOXPCR, CAMPYLOBPCR, SALMONELLAPC, SHIGAPCR, SHIGELLAPCR, MPNEUG, MPNEUM, LACTOQL in the last 72 hours. No results for input(s): SPUTUM, SPECDESC, SPECIAL, CULTURE, STATUS, ORG, CDIFFTOXPCR, MPNEUM, MPNEUG in the last 72 hours. Invalid input(s): CURINE, CBLOOD, CFUNGUSBL     Pathology:    Radiology Reports:  CT CHEST WO CONTRAST   Final Result   Intervally developed perihilar and bibasilar ground-glass and dense airspace   consolidations, concerning for infection including atypical entities such as   COVID pneumonia. Similar small bilateral pleural effusions. XR CHEST PORTABLE   Final Result   1. Endotracheal and esophageal catheters placed as described   2. More conspicuous lower to mid level opacification consistent with pleural   effusions and at least atelectasis         XR CHEST (SINGLE VIEW FRONTAL)   Final Result   Small bilateral pleural effusions. Slightly increased airspace opacity in   the right mid to lower lung, which may reflect pneumonia in the appropriate   clinical setting. XR CHEST (SINGLE VIEW FRONTAL)   Final Result   Stable volume loss in the left chest with left basilar atelectasis or   pneumonia. Possible small left effusion. XR CHEST (SINGLE VIEW FRONTAL)   Final Result   Interval improved aeration of the left lung base. XR CHEST (SINGLE VIEW FRONTAL)   Final Result   Interval decrease in left effusion. No pneumothorax. Persistent opacity at   the left lung base. Vascularity is slightly prominent. US THORACENTESIS Which side should the procedure be performed? Left   Final Result   Successful ultrasound guided thoracentesis. US RETROPERITONEAL COMPLETE   Final Result   1.  Patient had no urge to void during the exam.  Nonvisualization of ureteral   jets. 2. Incidental cholelithiasis. 3. Unremarkable renal ultrasound. No hydronephrosis. XR CHEST PORTABLE   Final Result   No change from prior study. Persistent sizable left effusion with opacity at   the left lung base and pulmonary vascular congestion. Echocardiogram:   Results for orders placed during the hospital encounter of 10/02/21    ECHO Complete 2D W Doppler W Color    Summary  Left ventricle is normal in size with normal systolic function globally. Calculated ejection fraction is 59% ( Atrial-Fib). Mild mitral regurgitation. Mild tricuspid regurgitation. Estimated right ventricular systolic pressure is 35 mmHg. Anterior clear space noted, most likely adipose tissue vs small loculated  pericardial effusion. No evidence of tamponade. Pleural effusion is seen.        ASSESSMENT AND PLAN     Assessment:    // Acute hypoxic respiratory failure, s/p extubation 11/15  // Bilateral lower lobe atelectasis  // COVID-19 pneumonia  // Sepsis, resolved  // A. fib with RVR, rate controlled  // Acute kidney injury, improved  // Chronic obstructive pulmonary disease  // UTI, secondary to Pseudomonas  // CHF  // Essential hypertension  // Morbid obesity     Plan:     I personally interviewed/examined the patient; reviewed interval history, interpreted all available radiographic and laboratory data at the time of service.     · Patient currently saturating well on nasal cannula  · Continue supplemental oxygen to keep oxygen saturation greater than 90%  · Continue pulmonary toilet, aspiration precautions and bronchodilators  · Continue to monitor I/O with a goal of even/negative fluid balance  · Tolerating oral diet  · Stress ulcer prophylaxis  · Chemical DVT prophylaxis not needed, patient on Eliquis  · Antimicrobials reviewed; continue Cipro as per ID recommendations  · Continue Decadron for 10 days  · Physical/occupational therapy     We will continue to follow. I would like to thank you for allowing me to participate in the care of this patient. Please feel free to call with any further questions or concerns. Mihir Rodríguez MD  Pulmonary and Critical Care Medicine           11/18/2021     This patient was evaluated in the context of the global SARS-CoV-2 (COVID-19) pandemic, which necessitated considerations that the patient either has COVID-19 infection or is at risk of infection with COVID-19. Institutional protocols and algorithms that pertain to the evaluation & management of patients with COVID-19 or those at risk for COVID-19 are in a state of rapid changes based on information released by regulatory bodies including the CDC and federal and state organizations. These policies and algorithms were followed during the patient's care. Please note that this chart was generated using voice recognition Dragon dictation software. Although every effort was made to ensure the accuracy of this automated transcription, some errors in transcription may have occurred.

## 2021-11-19 NOTE — PLAN OF CARE
Problem: Falls - Risk of:  Goal: Will remain free from falls  Description: Will remain free from falls  11/19/2021 0653 by Annabelle Rand RN  Outcome: Ongoing  Goal: Absence of physical injury  Description: Absence of physical injury  11/19/2021 0653 by Annabelle Rand RN  Outcome: Ongoing       Problem: Skin Integrity:  Goal: Will show no infection signs and symptoms  Description: Will show no infection signs and symptoms  11/19/2021 0653 by Annabelle Rand RN  Outcome: Ongoing    Goal: Absence of new skin breakdown  Description: Absence of new skin breakdown  11/19/2021 0653 by Annabelle Rand RN  Outcome: Ongoing       Problem: OXYGENATION/RESPIRATORY FUNCTION  Goal: Patient will achieve/maintain normal respiratory rate/effort  Description: Respiratory rate and effort will be within normal limits for the patient  11/19/2021 0653 by Annabelle Rand RN  Outcome: Ongoing       Problem: HEMODYNAMIC STATUS  Goal: Patient has stable vital signs and fluid balance  11/19/2021 0653 by Annabelle Rand RN  Outcome: Ongoing    Problem: FLUID AND ELECTROLYTE IMBALANCE  Goal: Fluid and electrolyte balance are achieved/maintained  11/19/2021 0653 by Annabelle Rand RN  Outcome: Ongoing       Problem: Nutrition  Goal: Optimal nutrition therapy  11/19/2021 0653 by Annabelle Rand RN  Outcome: Ongoing    Problem: Pain:  Goal: Pain level will decrease  Description: Pain level will decrease  11/19/2021 0653 by Annabelle Rand RN  Outcome: Ongoing    Goal: Control of acute pain  Description: Control of acute pain  11/19/2021 0653 by Annabelle Rand RN  Outcome: Ongoing    Goal: Control of chronic pain  Description: Control of chronic pain  11/19/2021 0653 by Annabelle Rand RN  Outcome: Ongoing       Problem: Airway Clearance - Ineffective  Goal: Achieve or maintain patent airway  11/19/2021 0653 by Annabelle Rand RN  Outcome: Ongoing       Problem: Gas Exchange - Impaired  Goal: Absence of hypoxia  11/19/2021 0653 by Gaby Howard Robert Colmenares RN  Outcome: Ongoing    Goal: Promote optimal lung function  11/19/2021 0653 by Tez Oreilly RN  Outcome: Ongoing       Problem: Breathing Pattern - Ineffective  Goal: Ability to achieve and maintain a regular respiratory rate  11/19/2021 0653 by Tez Oreilly RN  Outcome: Ongoing       Problem:  Body Temperature -  Risk of, Imbalanced  Goal: Ability to maintain a body temperature within defined limits  11/19/2021 0653 by Tez Oreilly RN  Outcome: Ongoing    Goal: Will regain or maintain usual level of consciousness  11/19/2021 0653 by Tez Oreilly RN  Outcome: Ongoing    Goal: Complications related to the disease process, condition or treatment will be avoided or minimized  11/19/2021 0653 by Tez Oreilly RN  Outcome: Ongoing       Problem: Isolation Precautions - Risk of Spread of Infection  Goal: Prevent transmission of infection  11/19/2021 0653 by Tez Oreilly RN  Outcome: Ongoing       Problem: Nutrition Deficits  Goal: Optimize nutritional status  11/19/2021 0653 by Tez Oreilly RN  Outcome: Ongoing       Problem: Risk for Fluid Volume Deficit  Goal: Maintain normal heart rhythm  11/19/2021 0653 by Tez Oreilly RN  Outcome: Ongoing    Goal: Maintain absence of muscle cramping  11/19/2021 0653 by Tez Oreilly RN  Outcome: Ongoing    Goal: Maintain normal serum potassium, sodium, calcium, phosphorus, and pH  11/19/2021 0653 by Tez Oreilly RN  Outcome: Ongoing    Problem: Loneliness or Risk for Loneliness  Goal: Demonstrate positive use of time alone when socialization is not possible  11/19/2021 0653 by Tez Oreilly RN  Outcome: Ongoing    Problem: Fatigue  Goal: Verbalize increase energy and improved vitality  11/19/2021 0653 by Tez Oreilly RN  Outcome: Ongoing  Problem: Patient Education: Go to Patient Education Activity  Goal: Patient/Family Education  11/19/2021 [id number] by Tez Oreilly RN  Outcome: Ongoing     Problem: Non-Violent Restraints  Goal: Removal from restraints as soon as assessed to be safe  11/19/2021 0653 by Granville Eisenmenger, RN  Outcome: Ongoing  Goal: No harm/injury to patient while restraints in use  11/19/2021 0653 by Granville Eisenmenger, RN  Outcome: Ongoing  Goal: Patient's dignity will be maintained  11/19/2021 0653 by Granville Eisenmenger, RN  Outcome: Ongoing

## 2021-11-20 LAB
CULTURE: ABNORMAL
CULTURE: ABNORMAL
CULTURE: NORMAL
Lab: ABNORMAL
Lab: NORMAL
SPECIMEN DESCRIPTION: ABNORMAL
SPECIMEN DESCRIPTION: NORMAL

## 2021-11-20 NOTE — PLAN OF CARE
Problem: Falls - Risk of:  Goal: Will remain free from falls  Description: Will remain free from falls  Outcome: Completed  Goal: Absence of physical injury  Description: Absence of physical injury  Outcome: Completed     Problem: Skin Integrity:  Goal: Will show no infection signs and symptoms  Description: Will show no infection signs and symptoms  Outcome: Completed  Goal: Absence of new skin breakdown  Description: Absence of new skin breakdown  Outcome: Completed     Problem: OXYGENATION/RESPIRATORY FUNCTION  Goal: Patient will achieve/maintain normal respiratory rate/effort  Description: Respiratory rate and effort will be within normal limits for the patient  Outcome: Completed     Problem: HEMODYNAMIC STATUS  Goal: Patient has stable vital signs and fluid balance  Outcome: Completed     Problem: FLUID AND ELECTROLYTE IMBALANCE  Goal: Fluid and electrolyte balance are achieved/maintained  Outcome: Completed     Problem: Nutrition  Goal: Optimal nutrition therapy  Outcome: Completed     Problem: Pain:  Goal: Pain level will decrease  Description: Pain level will decrease  Outcome: Completed  Goal: Control of acute pain  Description: Control of acute pain  Outcome: Completed  Goal: Control of chronic pain  Description: Control of chronic pain  Outcome: Completed     Problem: Airway Clearance - Ineffective  Goal: Achieve or maintain patent airway  Outcome: Completed     Problem: Gas Exchange - Impaired  Goal: Absence of hypoxia  Outcome: Completed  Goal: Promote optimal lung function  Outcome: Completed     Problem: Breathing Pattern - Ineffective  Goal: Ability to achieve and maintain a regular respiratory rate  Outcome: Completed     Problem:  Body Temperature -  Risk of, Imbalanced  Goal: Ability to maintain a body temperature within defined limits  Outcome: Completed  Goal: Will regain or maintain usual level of consciousness  Outcome: Completed  Goal: Complications related to the disease process, condition or treatment will be avoided or minimized  Outcome: Completed     Problem: Isolation Precautions - Risk of Spread of Infection  Goal: Prevent transmission of infection  Outcome: Completed     Problem: Nutrition Deficits  Goal: Optimize nutritional status  Outcome: Completed     Problem: Risk for Fluid Volume Deficit  Goal: Maintain normal heart rhythm  Outcome: Completed  Goal: Maintain absence of muscle cramping  Outcome: Completed  Goal: Maintain normal serum potassium, sodium, calcium, phosphorus, and pH  Outcome: Completed     Problem: Loneliness or Risk for Loneliness  Goal: Demonstrate positive use of time alone when socialization is not possible  Outcome: Completed     Problem: Fatigue  Goal: Verbalize increase energy and improved vitality  Outcome: Completed     Problem: Patient Education: Go to Patient Education Activity  Goal: Patient/Family Education  Outcome: Completed     Problem: Non-Violent Restraints  Goal: Removal from restraints as soon as assessed to be safe  Outcome: Completed  Goal: No harm/injury to patient while restraints in use  Outcome: Completed  Goal: Patient's dignity will be maintained  Outcome: Completed

## 2021-11-20 NOTE — PROGRESS NOTES
Patient discharged to SNF. Patient transported off unit via stretcher with Oskar Beck. Patient left with all of her belongings. RN attempted to call report, no answer.

## 2021-11-20 NOTE — DISCHARGE SUMMARY
[facility]  Office: [address], DO, Karlos Kauffman, DO, [facility], DO, Roland Inmanmond Blood, DO, China Cook MD, Wilson Kennedy MD, Rito Tenorio MD, Alysha Martinez MD, Keron Stokes MD, Yue Guerrero MD, Deborah Call MD, Ezio Hernández, DO, Jacquie Brown, DO, Raegan Munson MD,  Andrews Martins, DO, Nadine Gray MD, Pam Pate MD, Staci Witt MD, Matthias Bender MD, Deja Navarro MD, Michael Dela Cruz MD, Verna Bonilla MD, Janice Villegas, [facility], Piter Argueta, CNP, Michael Carrasco, [facility], Thom Gentile, CNP, Maki King, CNP, Baron Lesch, CNP, Jovani Stevens, [facility], Niki Wu, CNP, Irish Barney PA-C, Denver Parks, [facility], Martinez Wang, CNP, Khushboo Baker, CNP, Ankush Avila, CNP, Michael Curtis, CNP, Jesse Aguirre, CNP, Roxanne Pascual, CNP, Corby Dorsey, [facility]    Discharge Summary     Patient ID: Antony Cruz  :  1945   MRN: [id number]     ACCOUNT:  [de-identified]   Patient's PCP: KALYN Galvez CNP  Admit Date: 11/3/2021   Discharge Date: 2021   Length of Stay: 16  Code Status:  Prior  Admitting Physician: Matthias Bender MD  Discharge Physician: Matthias Bender MD     Active Discharge Diagnoses:     Hospital Problem Lists:  Principal Problem:    CASEY (acute kidney injury) [facility])  Active Problems:    Atrial flutter with rapid ventricular response ([address]    Pneumonia due to COVID-19 virus    Acute respiratory failure with hypoxemia ([address]    Respiratory alkalosis    Shock ([address]    Benign essential HTN    Morbid obesity with BMI of 40.0-44.9, adult ([address]    Pulmonary emphysema ([address]    Acute on chronic diastolic heart failure (HCC)    Chronic obstructive pulmonary disease with acute exacerbation (HCC)    Persistent atrial fibrillation (HCC)    Recurrent pleural effusion    Moderate malnutrition ([address]  Resolved Problems:    * No resolved hospital problems. *      Admission Condition:  stable     Discharged Condition: fair    Hospital Stay:     Hospital Course:  Nadine Beltran is a 68 y.o. female with a past medical history of HFpEF, recurrent left-sided pleural effusion, atrial fibrillation, COPD and depression who presented to the emergency department on 11/3/2021 complaining of shortness of breath and leg swelling. In the ED, the patient was hypoxic and ill-appearing. She was admitted to internal medicine for further management of an acute heart failure exacerbation as well as CASEY. The patient was diuresed and received a left-sided thoracentesis during admission. She was incidentally found to positive for COVID-19 and was started on dexamethasone. The patient acutely decompensated and required intubation on 11/12. A bronchoscopy was done while the patient was intubated and was remarkable for extensive mucus plugging. She was successfully extubated on 11/15. The patient improved throughout the course of admission. She is discharged today (11/19/2021) to SNF in stable condition. Significant therapeutic interventions: Thoracentesis; diuresis; dexamethasone     Significant Diagnostic Studies:   Labs / Micro:  BMP:    Lab Results   Component Value Date    GLUCOSE 114 11/19/2021    GLUCOSE 97 05/09/2016     11/19/2021    K 3.5 11/19/2021    CL 95 11/19/2021    CO2 27 11/19/2021    ANIONGAP 14 11/19/2021    BUN 31 11/19/2021    CREATININE 0.71 11/19/2021    BUNCRER NOT REPORTED 11/19/2021    CALCIUM 8.9 11/19/2021    LABGLOM >60 11/19/2021    LABGLOM >90 03/30/2016    GFRAA >60 11/19/2021    GFR      11/19/2021    GFR NOT REPORTED 11/19/2021       Radiology:  No results found.     Consultations:    Consults:     Final Specialist Recommendations/Findings:   IP CONSULT TO NEPHROLOGY  IP CONSULT TO PULMONOLOGY  IP CONSULT TO HEART FAILURE NURSE/COORDINATOR  IP CONSULT TO IV TEAM  IP CONSULT TO CARDIOLOGY  IP CONSULT TO INFECTIOUS DISEASES  IP CONSULT TO PHARMACY  IP CONSULT TO DIETITIAN      The patient was seen and examined on day of discharge and this discharge summary is in conjunction with any daily progress note from day of discharge. Discharge plan:     Disposition: To a non-[facility] facility    Physician Follow Up: [facility]  [address] [facility] [id number] [phone]  On 12/8/2021  at 2:30pm for hospital f/u with cardiology    Raffy Ogden, APRN - [address]  [phone]    In 2 weeks  Hospital follow-up      Diet: Low sodium     Activity: As tolerated    Instructions to Patient: Follow-up with cardiology and your primary care physician as scheduled.      Discharge Medications:      Medication List      START taking these medications    bumetanide 1 MG tablet  Commonly known as: BUMEX  Take 1 tablet by mouth 2 times daily     dexamethasone 6 MG tablet  Commonly known as: DECADRON  Take 1 tablet by mouth daily (with breakfast) for 4 days     digoxin 125 MCG tablet  Commonly known as: LANOXIN  Take 1 tablet by mouth daily        CONTINUE taking these medications    albuterol sulfate  (90 Base) MCG/ACT inhaler  Inhale 2 puffs into the lungs every 6 hours as needed for Wheezing     amiodarone 200 MG tablet  Commonly known as: CORDARONE  Take 1 tablet by mouth daily     apixaban 5 MG Tabs tablet  Commonly known as: Eliquis  Take 1 tablet by mouth 2 times daily     atorvastatin 40 MG tablet  Commonly known as: LIPITOR  Take 1 tablet by mouth daily     beclomethasone 80 MCG/ACT inhaler  Commonly known as: QVAR  Inhale 2 puffs into the lungs 2 times daily     Calamine 8-8 % Lotn lotion  Apply to rash on legs     CALCIUM + D3 PO     citalopram 40 MG tablet  Commonly known as: CELEXA  Take 0.5 tablets by mouth daily     clopidogrel 75 MG tablet  Commonly known as: PLAVIX  Take 1 tablet by mouth daily     cyclobenzaprine 10 MG tablet  Commonly known as: FLEXERIL  TAKE ONE TABLET BY MOUTH THREE TIMES DAILY AS NEEDED FOR MUSCLE SPASM     diphenhydrAMINE 25 MG capsule  Commonly known as: BENADRYL     ipratropium-albuterol 0.5-2.5 (3) MG/3ML Soln nebulizer solution  Commonly known as: DUONEB     isosorbide mononitrate 30 MG extended release tablet  Commonly known as: IMDUR  Take 1 tablet by mouth daily     losartan 100 MG tablet  Commonly known as: Cozaar  Take 1 tablet by mouth daily     LYSINE PO     Magnesium 500 MG Tabs     metoprolol tartrate 50 MG tablet  Commonly known as: LOPRESSOR  Take 1 tablet by mouth 2 times daily     miconazole 2 % powder  Commonly known as: MICOTIN  Apply topically 2 times daily.      Mobic 15 MG tablet  Generic drug: meloxicam     simethicone 80 MG chewable tablet  Commonly known as: MYLICON  Take 1 tablet by mouth every 6 hours as needed for Flatulence (Abdominal Cramps)     TURMERIC CURCUMIN PO     VITAMIN B 12 PO     vitamin B-6 50 MG tablet  Commonly known as: PYRIDOXINE     vitamin C 500 MG tablet  Commonly known as: ASCORBIC ACID     vitamin D 1000 UNIT Tabs tablet  Commonly known as: CHOLECALCIFEROL     zinc oxide 40 % ointment  Commonly known as: DESITIN        STOP taking these medications    CINNAMON PO     clonazePAM 1 MG tablet  Commonly known as: KLONOPIN     furosemide 20 MG tablet  Commonly known as: LASIX           Where to Get Your Medications      These medications were sent to [facility] [address] [facility], [address] [place] [address], [place] [address]    Phone: [phone]   · bumetanide 1 MG tablet  · dexamethasone 6 MG tablet  · digoxin 125 MCG tablet         Discharge Procedure Orders   [address] [facility]   Referral Priority: Routine Referral Type: Eval and Treat   Referral Reason: Specialty Services Required   Number of Visits Requested: 1       Time Spent on discharge is  31 mins in patient examination, evaluation, counseling as well as medication reconciliation, prescriptions for required medications, discharge plan and follow up. Electronically signed by   Good Mobley MD  11/20/2021  11:35 AM      Thank you Dr. Bard Saenz, KALYN - JORDY for the opportunity to be involved in this patient's care.

## 2021-11-21 LAB
CULTURE: NORMAL
DIRECT EXAM: NORMAL
Lab: NORMAL
SPECIMEN DESCRIPTION: NORMAL

## 2021-11-22 LAB
CULTURE: NORMAL
CULTURE: NORMAL
DIRECT EXAM: NORMAL
Lab: NORMAL
Lab: NORMAL
SPECIMEN DESCRIPTION: NORMAL
SPECIMEN DESCRIPTION: NORMAL

## 2021-12-04 PROBLEM — R77.8 ELEVATED TROPONIN: Status: RESOLVED | Noted: 2021-10-03 | Resolved: 2021-12-04

## 2021-12-05 ENCOUNTER — HOSPITAL ENCOUNTER (OUTPATIENT)
Age: 76
Setting detail: SPECIMEN
Discharge: HOME | End: 2021-12-05

## 2021-12-05 LAB
ANION GAP SERPL CALCULATED.3IONS-SCNC: 14 MMOL/L (ref 9–17)
BUN BLDV-MCNC: 33 MG/DL (ref 8–23)
BUN/CREAT BLD: 38 (ref 9–20)
CALCIUM SERPL-MCNC: 9.9 MG/DL (ref 8.6–10.4)
CHLORIDE BLD-SCNC: 96 MMOL/L (ref 98–107)
CO2: 27 MMOL/L (ref 20–31)
CREAT SERPL-MCNC: 0.86 MG/DL (ref 0.5–0.9)
GFR AFRICAN AMERICAN: >60 ML/MIN
GFR NON-AFRICAN AMERICAN: >60 ML/MIN
GFR SERPL CREATININE-BSD FRML MDRD: ABNORMAL ML/MIN/{1.73_M2}
GFR SERPL CREATININE-BSD FRML MDRD: ABNORMAL ML/MIN/{1.73_M2}
GLUCOSE BLD-MCNC: 122 MG/DL (ref 70–99)
HCT VFR BLD CALC: 39.9 % (ref 36.3–47.1)
HEMOGLOBIN: 12.8 G/DL (ref 11.9–15.1)
MCH RBC QN AUTO: 30.6 PG (ref 25.2–33.5)
MCHC RBC AUTO-ENTMCNC: 32.1 G/DL (ref 28.4–34.8)
MCV RBC AUTO: 95.5 FL (ref 82.6–102.9)
NRBC AUTOMATED: 0 PER 100 WBC
PDW BLD-RTO: 15.3 % (ref 11.8–14.4)
PLATELET # BLD: 122 K/UL (ref 138–453)
PMV BLD AUTO: 9.6 FL (ref 8.1–13.5)
POTASSIUM SERPL-SCNC: 4.3 MMOL/L (ref 3.7–5.3)
RBC # BLD: 4.18 M/UL (ref 3.95–5.11)
SODIUM BLD-SCNC: 137 MMOL/L (ref 135–144)
WBC # BLD: 6.9 K/UL (ref 3.5–11.3)

## 2021-12-05 PROCEDURE — 85027 COMPLETE CBC AUTOMATED: CPT

## 2021-12-05 PROCEDURE — 80048 BASIC METABOLIC PNL TOTAL CA: CPT

## 2021-12-05 PROCEDURE — 36415 COLL VENOUS BLD VENIPUNCTURE: CPT

## 2021-12-05 PROCEDURE — P9603 ONE-WAY ALLOW PRORATED MILES: HCPCS

## 2021-12-06 LAB
CULTURE: NORMAL
DIRECT EXAM: NORMAL
Lab: NORMAL
SPECIMEN DESCRIPTION: NORMAL

## 2021-12-07 ENCOUNTER — HOSPITAL ENCOUNTER (OUTPATIENT)
Age: 76
Setting detail: SPECIMEN
Discharge: HOME | End: 2021-12-07

## 2021-12-07 LAB
-: ABNORMAL
AMORPHOUS: ABNORMAL
ANION GAP SERPL CALCULATED.3IONS-SCNC: 13 MMOL/L (ref 9–17)
BACTERIA: ABNORMAL
BILIRUBIN URINE: NEGATIVE
BUN BLDV-MCNC: 34 MG/DL (ref 8–23)
BUN/CREAT BLD: 29 (ref 9–20)
CALCIUM SERPL-MCNC: 9.5 MG/DL (ref 8.6–10.4)
CASTS UA: ABNORMAL /LPF (ref 0–2)
CASTS UA: ABNORMAL /LPF (ref 0–2)
CHLORIDE BLD-SCNC: 101 MMOL/L (ref 98–107)
CO2: 27 MMOL/L (ref 20–31)
COLOR: ABNORMAL
COMMENT UA: ABNORMAL
CREAT SERPL-MCNC: 1.19 MG/DL (ref 0.5–0.9)
CRYSTALS, UA: ABNORMAL /HPF
CRYSTALS, UA: ABNORMAL /HPF
EPITHELIAL CELLS UA: ABNORMAL /HPF (ref 0–5)
GFR AFRICAN AMERICAN: 53 ML/MIN
GFR NON-AFRICAN AMERICAN: 44 ML/MIN
GFR SERPL CREATININE-BSD FRML MDRD: ABNORMAL ML/MIN/{1.73_M2}
GFR SERPL CREATININE-BSD FRML MDRD: ABNORMAL ML/MIN/{1.73_M2}
GLUCOSE BLD-MCNC: 110 MG/DL (ref 70–99)
GLUCOSE URINE: NEGATIVE
HCT VFR BLD CALC: 42.1 % (ref 36.3–47.1)
HEMOGLOBIN: 13 G/DL (ref 11.9–15.1)
KETONES, URINE: ABNORMAL
LEUKOCYTE ESTERASE, URINE: ABNORMAL
MCH RBC QN AUTO: 30.3 PG (ref 25.2–33.5)
MCHC RBC AUTO-ENTMCNC: 30.9 G/DL (ref 28.4–34.8)
MCV RBC AUTO: 98.1 FL (ref 82.6–102.9)
MUCUS: ABNORMAL
NITRITE, URINE: NEGATIVE
NRBC AUTOMATED: ABNORMAL PER 100 WBC
OTHER OBSERVATIONS UA: ABNORMAL
PDW BLD-RTO: 15.3 % (ref 11.8–14.4)
PH UA: 5 (ref 5–8)
PLATELET # BLD: 81 K/UL (ref 138–453)
PMV BLD AUTO: 10.1 FL (ref 8.1–13.5)
POTASSIUM SERPL-SCNC: 3.6 MMOL/L (ref 3.7–5.3)
PROTEIN UA: NEGATIVE
RBC # BLD: 4.29 M/UL (ref 3.95–5.11)
RBC UA: ABNORMAL /HPF (ref 0–2)
RENAL EPITHELIAL, UA: ABNORMAL /HPF
SODIUM BLD-SCNC: 141 MMOL/L (ref 135–144)
SPECIFIC GRAVITY UA: 1.02 (ref 1–1.03)
TRICHOMONAS: ABNORMAL
TURBIDITY: ABNORMAL
URINE HGB: ABNORMAL
UROBILINOGEN, URINE: NORMAL
WBC # BLD: 7.3 K/UL (ref 3.5–11.3)
WBC UA: ABNORMAL /HPF (ref 0–5)
YEAST: ABNORMAL

## 2021-12-07 PROCEDURE — 81001 URINALYSIS AUTO W/SCOPE: CPT

## 2021-12-07 PROCEDURE — 87086 URINE CULTURE/COLONY COUNT: CPT

## 2021-12-07 PROCEDURE — 80048 BASIC METABOLIC PNL TOTAL CA: CPT

## 2021-12-07 PROCEDURE — 36415 COLL VENOUS BLD VENIPUNCTURE: CPT

## 2021-12-07 PROCEDURE — P9603 ONE-WAY ALLOW PRORATED MILES: HCPCS

## 2021-12-07 PROCEDURE — 85027 COMPLETE CBC AUTOMATED: CPT

## 2021-12-08 LAB
CULTURE: NORMAL
Lab: NORMAL
SPECIMEN DESCRIPTION: NORMAL

## 2024-12-05 NOTE — PROGRESS NOTES
LEFT VM TO RESCHEDULE    [facility]  Office: [address], DO, Sarahy Jackson, DO, Irving Siddiqi, DO, Berta Barbara Luu, DO, Anand Gracia MD, Sandy Shah MD, Dana Weeks MD, Helena Dominguez MD, Thais Elaine MD, Vicki Carmen MD, Desmond Escamilla MD, Brendon Rene, DO, Cem Holland, DO, Claudio Barlow MD,  Ernesto Merrill, DO, Kristen Adame MD, Laurie Levy MD, Sandra Hicks MD, Lieutenant Daron MD, Nava Doll MD, Tricia Cortez MD, Cheo Yang MD, Kt Lewis, [facility], [facility] Martita, CNP, Ramón Palumbo, CNP, Sakina Lucas, CNS, Ramirez Carlin, CNP, mJ Garcia, CNP, Giorgi Perez, CNP, Ashley Teran, CNP, Pasha Ortiz, CNP, SPIKE BlancaC, Jaclyn Van, [facility], Kavin Chavez, CNP, Heron Johns, CNP, Philomena Berger, CNP, Depea Hopson, CNP, Hemanth Keller, CNP, Liang Morales, CNP, Samantha Guerra, [address] [place] [address]    Progress Note    11/9/2021    9:26 AM    Name:   Jazmin Crandall  MRN:     [id number]     Ivanna Trevizoelsen:      [de-identified]   Room:   2019/2019-01  IP Day:  6  Admit Date:  11/3/2021  4:32 PM    PCP:   KALYN Berman CNP  Code Status:  Full Code    Subjective:     C/C:   Chief Complaint   Patient presents with    Leg Swelling     increasing BLE edema; pt from SNF; per PA at SNF, pt has fluid retention, needs IV meds that they cannot administer     Interval History Status: improved    Pt seen and examined this morning. No acute events overnight. Patient reports that she is feeling improved. Denies any shortness of breath. Does report that she does not typically wear oxygen at home. Reports that her leg swelling has gone down quite a bit. States that she has not had a bowel movement for several days. Heart rate is somewhat erratic.     Brief History:     Patient presented to the ER with complaints of leg swelling, shortness of breath with a productive cough (thin white sputum) and constipation. Violeta Hodges states she admission it TUJ 8009 and on her admission in early October it was 3251.   WBCs 14.5 seems to run slightly elevated     Plan to consult nephrology and pulmonary medicine. Continue home Eliquis, beta-blocker and amiodarone.  Hold losartan  Plan Solu-Medrol related to IV wheezing throughout. It appears to be in fluid overload related to pitting edema in her lower extremities and increasing shortness of breath.  Plan to hold further hydration for now.  Give Lasix 20 mg IV x1    Review of Systems:     Constitutional:  negative for chills, fevers, sweats  Respiratory: Denies shortness of breath; negative for cough, dyspnea on exertion, wheezing  Cardiovascular:  negative for chest pain, chest pressure/discomfort, lower extremity edema, palpitations  Gastrointestinal:  negative for abdominal pain, constipation, diarrhea, nausea, vomiting  Neurological:  negative for dizziness, headache  Extremities: Reports improvement of leg swelling    Medications: Allergies:     Allergies   Allergen Reactions    Latex Hives     Hives from rubber gloves    Betadine [Povidone Iodine] Hives    Bee Venom Swelling    Dilaudid [Hydromorphone Hcl] Other (See Comments)     Severe confusion    Adhesive Tape Rash    Sulfa Antibiotics Rash       Current Meds:   Scheduled Meds:    bumetanide  1 mg Oral BID    ciprofloxacin  500 mg Oral 2 times per day    metoprolol tartrate  50 mg Oral BID    ipratropium-albuterol  1 ampule Inhalation TID    amiodarone  200 mg Oral Daily    apixaban  5 mg Oral BID    vitamin C  500 mg Oral Daily    atorvastatin  40 mg Oral Daily    fluticasone  1 puff Inhalation BID    citalopram  20 mg Oral Daily    clopidogrel  75 mg Oral Daily    isosorbide mononitrate  30 mg Oral Daily    sodium chloride flush  5-40 mL IntraVENous 2 times per day     Continuous Infusions:    sodium chloride       PRN Meds: cyclobenzaprine, sodium chloride flush, sodium chloride, ondansetron **OR** ondansetron, 86.0 10/03/2021    PO2 64 03/30/2016    POCHCO3 24.5 10/03/2021    HCO3 24 03/30/2016    NBEA NOT REPORTED 10/03/2021    PBEA 0 10/03/2021    AQL4ZJA NOT REPORTED 10/03/2021    IAPG3XJW 97 10/03/2021    O2SAT 93 03/30/2016    FIO2 40.0 10/03/2021     Lab Results   Component Value Date/Time    SPECIAL NOT REPORTED 11/05/2021 08:32 AM     Lab Results   Component Value Date/Time    CULTURE PSEUDOMONAS AERUGINOSA >731475 CFU/ML (A) 11/05/2021 08:32 AM    CULTURE (A) 11/05/2021 08:32 AM     LACTOSE FERMENTING GRAM NEGATIVE RODS 10 to 50,000 CFU/ML Susceptibility testing not performed on low colony count organisms. Radiology:  XR CHEST (SINGLE VIEW FRONTAL)    Result Date: 11/6/2021  Interval improved aeration of the left lung base. XR CHEST (SINGLE VIEW FRONTAL)    Result Date: 11/5/2021  Interval decrease in left effusion. No pneumothorax. Persistent opacity at the left lung base. Vascularity is slightly prominent. XR CHEST PORTABLE    Result Date: 11/3/2021  No change from prior study. Persistent sizable left effusion with opacity at the left lung base and pulmonary vascular congestion. US THORACENTESIS Which side should the procedure be performed? Left    Result Date: 11/4/2021  Successful ultrasound guided thoracentesis. US RETROPERITONEAL COMPLETE    Result Date: 11/4/2021  1. Patient had no urge to void during the exam.  Nonvisualization of ureteral jets. 2. Incidental cholelithiasis. 3. Unremarkable renal ultrasound. No hydronephrosis.        Physical Examination:        General appearance:  alert, cooperative and no distress, obese  female lying supine in bed  Mental Status:  oriented to person, place and time and normal affect  HEENT: simple cannula present, EOMI, perrla  Lungs:  Globally diminished lung sounds with poor air movement  Heart:  Controlled rate and irregular rhythm, no murmur  Abdomen:  soft, nontender, nondistended, normal bowel sounds, no masses, hepatomegaly, splenomegaly  Extremities:  no edema, redness, tenderness in the calves  Skin:  no gross lesions, rashes, induration    Assessment:        Hospital Problems           Last Modified POA    * (Principal) CASEY (acute kidney injury) ([facility] 11/3/2021 Yes    Benign essential HTN 11/3/2021 Yes    Morbid obesity with BMI of 40.0-44.9, adult ([facility] 11/4/2021 Yes    Overview Signed 10/3/2021 12:43 PM by Unique Taylor MD     Last Assessment & Plan:   Formatting of this note might be different from the original.  Obesity is worsening. Discussed the patient's BMI. The BMI is above average; BMI management plan is completed. General weight loss/lifestyle modification strategies discussed (elicit support from others; identify saboteurs; non-food rewards, etc). Pulmonary emphysema ([facility] 11/3/2021 Yes    Acute on chronic diastolic heart failure ([facility] 11/4/2021 Yes    Chronic obstructive pulmonary disease with acute exacerbation ([facility] 11/3/2021 Yes    Overview Signed 10/3/2021 12:43 PM by Unique Taylor MD     Last Assessment & Plan:   Formatting of this note might be different from the original.  COPD is worsening. Discussed monitoring symptoms and use of quick-relief medications and contacting us early in the course of exacerbations. Warning signs of respiratory distress were reviewed with the patient. Counseled to avoid exposure to cigarette smoke. Persistent atrial fibrillation ([address] 11/4/2021 Yes    Recurrent pleural effusion 11/3/2021 Yes    Acute on chronic congestive heart failure ([facility] 11/7/2021 Yes          Plan:        1. CASEY - resolved  2. Acute respiratory failure with hypoxia - currently resting on 4 L of oxygen via NC. Check CXR. Continue oral diuretic - Bumex 1 mg BID. 3. Afib with RVR - patient currently on amiodarone 200 mg QD; AC = eliuqis. Continue Lopressor. Given extra dose of Lopressor this afternoon x 2.  4. COPD - continue alburterol, flovent  5. Pseudomonal UTI - continue ciprofloxacin.

## (undated) DEVICE — SET ADMIN 25ML L117IN PMP MOD CK VLV RLER CLMP 2 SMRTSITE

## (undated) DEVICE — TRAP SPEC 80ML MUCUS

## (undated) DEVICE — 2000CC GUARDIAN II: Brand: GUARDIAN

## (undated) DEVICE — ENDO KIT: Brand: MEDLINE INDUSTRIES, INC.

## (undated) DEVICE — Device: Brand: JELCO

## (undated) DEVICE — SUREFIT, DUAL DISPERSIVE ELECTRODE, CONTACT QUALITY MONITOR: Brand: SUREFIT

## (undated) DEVICE — TRAP POLYP ETRAP

## (undated) DEVICE — SNARE POLYP SM W13MMXL240CM SHTH DIA2.4MM OVL FLX DISP

## (undated) DEVICE — TUBING IV STOPCOCK 48 CM 3 W

## (undated) DEVICE — IV START KIT: Brand: MEDLINE INDUSTRIES, INC.

## (undated) DEVICE — DEFENDO AIR WATER SUCTION AND BIOPSY VALVE KIT FOR  OLYMPUS: Brand: DEFENDO AIR/WATER/SUCTION AND BIOPSY VALVE

## (undated) DEVICE — SOLUTION IV 1000ML 0.45% SOD CHL PH 5 INJ USP VIAFLX PLAS